# Patient Record
Sex: FEMALE | Race: WHITE | NOT HISPANIC OR LATINO | Employment: OTHER | URBAN - METROPOLITAN AREA
[De-identification: names, ages, dates, MRNs, and addresses within clinical notes are randomized per-mention and may not be internally consistent; named-entity substitution may affect disease eponyms.]

---

## 2017-08-29 DIAGNOSIS — Z96.642 S/P TOTAL HIP ARTHROPLASTY, LEFT: Primary | ICD-10-CM

## 2017-09-19 ENCOUNTER — OFFICE VISIT (OUTPATIENT)
Dept: ORTHOPEDICS | Facility: CLINIC | Age: 67
End: 2017-09-19
Payer: COMMERCIAL

## 2017-09-19 ENCOUNTER — RADIANT APPOINTMENT (OUTPATIENT)
Dept: GENERAL RADIOLOGY | Facility: CLINIC | Age: 67
End: 2017-09-19
Attending: ORTHOPAEDIC SURGERY
Payer: COMMERCIAL

## 2017-09-19 VITALS
HEART RATE: 57 BPM | HEIGHT: 68 IN | WEIGHT: 151 LBS | SYSTOLIC BLOOD PRESSURE: 112 MMHG | BODY MASS INDEX: 22.88 KG/M2 | OXYGEN SATURATION: 99 % | DIASTOLIC BLOOD PRESSURE: 60 MMHG

## 2017-09-19 DIAGNOSIS — Z96.642 S/P TOTAL HIP ARTHROPLASTY, LEFT: ICD-10-CM

## 2017-09-19 DIAGNOSIS — M25.561 PAIN IN BOTH KNEES, UNSPECIFIED CHRONICITY: ICD-10-CM

## 2017-09-19 DIAGNOSIS — M25.562 PAIN IN BOTH KNEES, UNSPECIFIED CHRONICITY: ICD-10-CM

## 2017-09-19 DIAGNOSIS — M25.562 PAIN IN BOTH KNEES, UNSPECIFIED CHRONICITY: Primary | ICD-10-CM

## 2017-09-19 DIAGNOSIS — M25.561 PAIN IN BOTH KNEES, UNSPECIFIED CHRONICITY: Primary | ICD-10-CM

## 2017-09-19 PROCEDURE — 99213 OFFICE O/P EST LOW 20 MIN: CPT | Performed by: ORTHOPAEDIC SURGERY

## 2017-09-19 PROCEDURE — 73502 X-RAY EXAM HIP UNI 2-3 VIEWS: CPT | Performed by: RADIOLOGY

## 2017-09-19 PROCEDURE — 73562 X-RAY EXAM OF KNEE 3: CPT | Mod: LT | Performed by: RADIOLOGY

## 2017-09-19 ASSESSMENT — PAIN SCALES - GENERAL: PAINLEVEL: NO PAIN (0)

## 2017-09-19 NOTE — NURSING NOTE
"Laura Gonzalez's goals for this visit include:   Chief Complaint   Patient presents with     RECHECK     1 year follow up - left hip     Consult     bilateral knee pain       She requests these members of at her care team be copied on today's visit    Initial Vitals: /60 (BP Location: Right arm, Cuff Size: Adult Regular)  Pulse 57  Ht 1.727 m (5' 8\")  Wt 68.5 kg (151 lb)  SpO2 99%  BMI 22.96 kg/m2 Estimated body mass index is 22.96 kg/(m^2) as calculated from the following:    Height as of this encounter: 1.727 m (5' 8\").    Weight as of this encounter: 68.5 kg (151 lb).. BP completed using cuff size :regular  PCP: Rob Jansen    Referring Provider  ESTABLISHED PATIENT  No address on file    Do you need refills at today's visit? SHERIF Vargas CMA    "

## 2017-09-19 NOTE — PROGRESS NOTES
"Chief Complaint: RECHECK (1 year follow up - left hip) and Consult (bilateral knee pain)       HPI: Laura Gonzalez returns today in follow-up for her hip. She reports that she is doing well. She is exercising regularly and is not having any hip problems. She is having some anterior knee symptoms, made worse with doing squats. She is taking no pain medication for this    Returned to work:     Medications and allergies are documented in the EMR and have been reviewed.      Current Outpatient Prescriptions:      valACYclovir (VALTREX) 1000 mg tablet, , Disp: , Rfl:      ESTRING 2 MG vaginal ring, , Disp: , Rfl:      Zolpidem Tartrate (AMBIEN PO), Take 10 mg by mouth nightly as needed for sleep, Disp: , Rfl:      calcium-magnesium (CALMAG) 500-250 MG TABS, Take 2 tablets by mouth daily, Disp: , Rfl:      UNABLE TO FIND, 1 capsule daily MEDICATION NAME: Strontium, Disp: , Rfl:      Atorvastatin Calcium (LIPITOR PO), Take 20 mg by mouth At Bedtime , Disp: , Rfl:      Montelukast Sodium (SINGULAIR PO), Take 10 mg by mouth At Bedtime , Disp: , Rfl:      estradiol (VIVELLE-DOT) 0.1 MG/24HR patch, Place 1 patch onto the skin twice a week, Disp: , Rfl:     Allergies: Codeine camsylate    Physical Exam:  On physical examination the patient appears the stated age, is in no acute distress, affect is appropriate, and breathing is non-labored.    /60 (BP Location: Right arm, Cuff Size: Adult Regular)  Pulse 57  Ht 1.727 m (5' 8\")  Wt 68.5 kg (151 lb)  SpO2 99%  BMI 22.96 kg/m2  Body mass index is 22.96 kg/(m^2).    Gait: normal   ROM:  Both hip and knee ROM and full, fluid, and painless.     Distally, the circulatory, motor, and sensation exam is intact with 5/5 EHL, gastroc-soleus, and tibialis anterior.  Sensation to light touch is intact.  Dorsalis pedis and posterior tibialis pulses are palpable.  There are no sores on the feet, no bruising, and no lymphedema.    X-rays:    I reviewed the x-rays dated today. "  Previous films reviewed.    Findings:  Normal progression for a total hip arthroplasty without evidence of loosening or subsidence.    Knee radiographs are normal       Assessment: doing well total hip. Anterior knee pain, mild, normal radiographs.     Plan: RTC in five years for routine hip radiographs, sooner if issues.     Established Patient

## 2017-09-19 NOTE — MR AVS SNAPSHOT
After Visit Summary   2017    Laura Gonzalez    MRN: [id number]           Patient Information     Date Of Birth          1950        Visit Information        Provider Department      2017 2:40 PM Jeffy Wolff MD [facility]        Today's Diagnoses     Pain in both knees, unspecified chronicity    -  1       Follow-ups after your visit        Who to contact     If you have questions or need follow up information about today's clinic visit or your schedule please contact [facility] directly at [phone].  Normal or non-critical lab and imaging results will be communicated to you by [employer], letter or phone within 4 business days after the clinic has received the results. If you do not hear from us within 7 days, please contact the clinic through [employer] or phone. If you have a critical or abnormal lab result, we will notify you by phone as soon as possible.  Submit refill requests through [email or web address] or call your pharmacy and they will forward the refill request to us. Please allow 3 business days for your refill to be completed.          Additional Information About Your Visit        MyChart Information     [email or web address] is an electronic gateway that provides easy, online access to your medical records. With [email or web address], you can request a clinic appointment, read your test results, renew a prescription or communicate with your care team.     To sign up for [email or web address] visit the website at [email or web address]   You will be asked to enter the access code listed below, as well as some personal information. Please follow the directions to create your username and password.     Your access code is: [email or web address]  Expires: 2017  5:56 PM     Your access code will  in 90 days. If you need help or a new code, please contact your [facility] or call [phone] for assistance.        Care EveryWhere ID     This  "is your Care EveryWhere ID. This could be used by other organizations to access your [facility] medical records  [id number]        Your Vitals Were     Pulse Height Pulse Oximetry BMI (Body Mass Index)          57 1.727 m (5' 8\") 99% 22.96 kg/m2         Blood Pressure from Last 3 Encounters:   09/19/17 112/60   09/20/16 110/66   08/02/16 114/64    Weight from Last 3 Encounters:   09/19/17 68.5 kg (151 lb)   06/15/16 71 kg (156 lb 8.4 oz)   05/24/16 67.6 kg (149 lb)                 Today's Medication Changes          These changes are accurate as of: 9/19/17  5:56 PM.  If you have any questions, ask your nurse or doctor.               Stop taking these medicines if you haven't already. Please contact your care team if you have questions.     Calcium Magnesium 240-180-100 Liqd   Stopped by:  Jeffy Wolff MD           order for DME   Stopped by:  Jeffy Wolff MD           oxyCODONE 5 MG IR tablet   Commonly known as:  ROXICODONE   Stopped by:  Jeffy Wolff MD                    Primary Care Provider Office Phone # Fax #    Hout Inderjitcomb [phone]       [facility] [id number] [facility] [id number]        Equal Access to Services     BELIA [facility] CenterNANETTE AH: Hadii garth ku hadasho Soomaali, waaxda luqadaha, qaybta kaalmada adeegyada, waxdiandra cruz hayramon shaffer . So [facility] [phone].    ATENCIÓN: Si habla español, tiene a little disposición servicios gratuitos de asistencia lingüística. Fartun al [phone].    We comply with applicable federal civil rights laws and [place] laws. We do not discriminate on the basis of race, color, national origin, age, disability sex, sexual orientation or gender identity.            Thank you!     Thank you for choosing [facility]  for your care. Our goal is always to provide you with excellent care. Hearing back from our patients is one way we can continue to improve our services. Please take a few minutes to complete the " written survey that you may receive in the mail after your visit with us. Thank you!             Your Updated Medication List - Protect others around you: Learn how to safely use, store and throw away your medicines at www.disposemymeds.org.          This list is accurate as of: 9/19/17  5:56 PM.  Always use your most recent med list.                   Brand Name Dispense Instructions for use Diagnosis    AMBIEN PO      Take 10 mg by mouth nightly as needed for sleep        calcium-magnesium 500-250 MG Tabs per tablet    CALMAG     Take 2 tablets by mouth daily        ESTRING 2 MG vaginal ring   Generic drug:  estradiol           LIPITOR PO      Take 20 mg by mouth At Bedtime        SINGULAIR PO      Take 10 mg by mouth At Bedtime        UNABLE TO FIND      1 capsule daily MEDICATION NAME: Strontium        valACYclovir 1000 mg tablet    VALTREX          VIVELLE-DOT 0.1 MG/24HR BIW patch   Generic drug:  estradiol      Place 1 patch onto the skin twice a week

## 2018-09-24 ENCOUNTER — APPOINTMENT (OUTPATIENT)
Dept: ULTRASOUND IMAGING | Facility: CLINIC | Age: 68
End: 2018-09-24
Attending: EMERGENCY MEDICINE
Payer: COMMERCIAL

## 2018-09-24 ENCOUNTER — HOSPITAL ENCOUNTER (EMERGENCY)
Facility: CLINIC | Age: 68
Discharge: HOME | End: 2018-09-24
Attending: EMERGENCY MEDICINE | Admitting: EMERGENCY MEDICINE
Payer: COMMERCIAL

## 2018-09-24 VITALS
OXYGEN SATURATION: 97 % | RESPIRATION RATE: 14 BRPM | DIASTOLIC BLOOD PRESSURE: 60 MMHG | TEMPERATURE: 98.2 F | BODY MASS INDEX: 22.58 KG/M2 | WEIGHT: 149 LBS | HEIGHT: 68 IN | SYSTOLIC BLOOD PRESSURE: 125 MMHG | HEART RATE: 52 BPM

## 2018-09-24 DIAGNOSIS — R10.11 RUQ ABDOMINAL PAIN: ICD-10-CM

## 2018-09-24 LAB
ALBUMIN SERPL-MCNC: 3.5 G/DL (ref 3.4–5)
ALBUMIN UR-MCNC: NEGATIVE MG/DL
ALP SERPL-CCNC: 68 U/L (ref 40–150)
ALT SERPL W P-5'-P-CCNC: 32 U/L (ref 0–50)
ANION GAP SERPL CALCULATED.3IONS-SCNC: 6 MMOL/L (ref 3–14)
APPEARANCE UR: CLEAR
AST SERPL W P-5'-P-CCNC: 27 U/L (ref 0–45)
BACTERIA #/AREA URNS HPF: ABNORMAL /HPF
BILIRUB SERPL-MCNC: 0.3 MG/DL (ref 0.2–1.3)
BILIRUB UR QL STRIP: NEGATIVE
BUN SERPL-MCNC: 25 MG/DL (ref 7–30)
CALCIUM SERPL-MCNC: 8.4 MG/DL (ref 8.5–10.1)
CHLORIDE SERPL-SCNC: 105 MMOL/L (ref 94–109)
CO2 SERPL-SCNC: 27 MMOL/L (ref 20–32)
COLOR UR AUTO: ABNORMAL
CREAT SERPL-MCNC: 0.86 MG/DL (ref 0.52–1.04)
D DIMER PPP FEU-MCNC: 0.4 UG/ML FEU (ref 0–0.5)
ERYTHROCYTE [DISTWIDTH] IN BLOOD BY AUTOMATED COUNT: 13.4 % (ref 10–15)
GFR SERPL CREATININE-BSD FRML MDRD: 65 ML/MIN/1.7M2
GLUCOSE SERPL-MCNC: 77 MG/DL (ref 70–99)
GLUCOSE UR STRIP-MCNC: NEGATIVE MG/DL
HCT VFR BLD AUTO: 40.7 % (ref 35–47)
HGB BLD-MCNC: 13.8 G/DL (ref 11.7–15.7)
HGB UR QL STRIP: NEGATIVE
INTERPRETATION ECG - MUSE: NORMAL
KETONES UR STRIP-MCNC: NEGATIVE MG/DL
LEUKOCYTE ESTERASE UR QL STRIP: NEGATIVE
LIPASE SERPL-CCNC: 138 U/L (ref 73–393)
MCH RBC QN AUTO: 31.6 PG (ref 26.5–33)
MCHC RBC AUTO-ENTMCNC: 33.9 G/DL (ref 31.5–36.5)
MCV RBC AUTO: 93 FL (ref 78–100)
NITRATE UR QL: NEGATIVE
PH UR STRIP: 6.5 PH (ref 5–7)
PLATELET # BLD AUTO: 262 10E9/L (ref 150–450)
POTASSIUM SERPL-SCNC: 3.8 MMOL/L (ref 3.4–5.3)
PROT SERPL-MCNC: 7.1 G/DL (ref 6.8–8.8)
RBC # BLD AUTO: 4.37 10E12/L (ref 3.8–5.2)
RBC #/AREA URNS AUTO: <1 /HPF (ref 0–2)
SODIUM SERPL-SCNC: 138 MMOL/L (ref 133–144)
SOURCE: ABNORMAL
SP GR UR STRIP: 1 (ref 1–1.03)
TROPONIN I SERPL-MCNC: <0.015 UG/L (ref 0–0.04)
UROBILINOGEN UR STRIP-MCNC: NORMAL MG/DL (ref 0–2)
WBC # BLD AUTO: 6.4 10E9/L (ref 4–11)
WBC #/AREA URNS AUTO: 2 /HPF (ref 0–5)

## 2018-09-24 PROCEDURE — 25000125 ZZHC RX 250: Performed by: EMERGENCY MEDICINE

## 2018-09-24 PROCEDURE — 99285 EMERGENCY DEPT VISIT HI MDM: CPT | Mod: 25

## 2018-09-24 PROCEDURE — 83690 ASSAY OF LIPASE: CPT | Performed by: EMERGENCY MEDICINE

## 2018-09-24 PROCEDURE — 85379 FIBRIN DEGRADATION QUANT: CPT | Performed by: EMERGENCY MEDICINE

## 2018-09-24 PROCEDURE — 96361 HYDRATE IV INFUSION ADD-ON: CPT

## 2018-09-24 PROCEDURE — 96374 THER/PROPH/DIAG INJ IV PUSH: CPT

## 2018-09-24 PROCEDURE — 81001 URINALYSIS AUTO W/SCOPE: CPT | Performed by: EMERGENCY MEDICINE

## 2018-09-24 PROCEDURE — 84484 ASSAY OF TROPONIN QUANT: CPT | Performed by: EMERGENCY MEDICINE

## 2018-09-24 PROCEDURE — 25000132 ZZH RX MED GY IP 250 OP 250 PS 637: Performed by: EMERGENCY MEDICINE

## 2018-09-24 PROCEDURE — 80053 COMPREHEN METABOLIC PANEL: CPT | Performed by: EMERGENCY MEDICINE

## 2018-09-24 PROCEDURE — 85027 COMPLETE CBC AUTOMATED: CPT | Performed by: EMERGENCY MEDICINE

## 2018-09-24 PROCEDURE — 93005 ELECTROCARDIOGRAM TRACING: CPT

## 2018-09-24 PROCEDURE — 25000128 H RX IP 250 OP 636: Performed by: EMERGENCY MEDICINE

## 2018-09-24 PROCEDURE — 76705 ECHO EXAM OF ABDOMEN: CPT

## 2018-09-24 RX ORDER — EZETIMIBE 10 MG/1
10 TABLET ORAL AT BEDTIME
COMMUNITY

## 2018-09-24 RX ORDER — OXYCODONE HYDROCHLORIDE 5 MG/1
5 TABLET ORAL EVERY 4 HOURS PRN
Qty: 6 TABLET | Refills: 0 | Status: SHIPPED | OUTPATIENT
Start: 2018-09-24 | End: 2021-08-27

## 2018-09-24 RX ORDER — ACETAMINOPHEN 325 MG/1
650 TABLET ORAL ONCE
Status: COMPLETED | OUTPATIENT
Start: 2018-09-24 | End: 2018-09-24

## 2018-09-24 RX ORDER — HYDROMORPHONE HYDROCHLORIDE 1 MG/ML
0.5 INJECTION, SOLUTION INTRAMUSCULAR; INTRAVENOUS; SUBCUTANEOUS ONCE
Status: COMPLETED | OUTPATIENT
Start: 2018-09-24 | End: 2018-09-24

## 2018-09-24 RX ADMIN — SODIUM CHLORIDE 1000 ML: 9 INJECTION, SOLUTION INTRAVENOUS at 21:54

## 2018-09-24 RX ADMIN — Medication 0.5 MG: at 23:10

## 2018-09-24 RX ADMIN — ACETAMINOPHEN 650 MG: 325 TABLET, FILM COATED ORAL at 23:09

## 2018-09-24 RX ADMIN — LIDOCAINE HYDROCHLORIDE 30 ML: 20 SOLUTION ORAL; TOPICAL at 21:52

## 2018-09-24 ASSESSMENT — ENCOUNTER SYMPTOMS
ABDOMINAL PAIN: 1
NAUSEA: 0
BLOOD IN STOOL: 0
DYSURIA: 0
HEMATURIA: 0
CONSTIPATION: 0
VOMITING: 0
FREQUENCY: 0
SHORTNESS OF BREATH: 0
DIARRHEA: 0
FEVER: 0
CHILLS: 0
WOUND: 0

## 2018-09-24 NOTE — ED AVS SNAPSHOT
Emergency Department    [address] [facility] [address]    Phone:  [phone]    Fax:  [phone]                                       Laura Gonzalez   MRN: [id number]    Department:   Emergency Department   Date of Visit:  9/24/2018           After Visit Summary Signature Page     I have received my discharge instructions, and my questions have been answered. I have discussed any challenges I see with this plan with the nurse or doctor.    ..........................................................................................................................................  Patient/Patient Representative Signature      ..........................................................................................................................................  Patient Representative Print Name and Relationship to Patient    ..................................................               ................................................  Date                                   Time    ..........................................................................................................................................  Reviewed by Signature/Title    ...................................................              ..............................................  Date                                               Time          22EPIC Rev 08/18

## 2018-09-24 NOTE — ED AVS SNAPSHOT
Emergency Department    [address] [facility] [address]    Phone:  [phone]    Fax:  [phone]                                       Laura Gonzalez   MRN: [id number]    Department:   Emergency Department   Date of Visit:  9/24/2018           Patient Information     Date Of Birth          1950        Your diagnoses for this visit were:     RUQ abdominal pain        You were seen by Janet Brooks MD.      Follow-up Information     Follow up with Rob Jansen.    Specialty:  Internal Medicine    Why:  tomorrow for close follow-up    Contact information:    [facility]  [address] [place] [address]  [phone]          Follow up with Zachary Singletary MD.    Specialty:  Gastroenterology    Why:  or your GI doctor to schedule Endoscopy procedure to look in your stomach    Contact information:    [facility]  [address], [place] [address] [phone]          Follow up with  Emergency Department.    Specialty:  EMERGENCY MEDICINE    Why:  As needed if pain worsens or if you develop chest pain, trouble breathing, fever, vomiting or rash    Contact information:    [address] [facility] [address] [phone]        Discharge Instructions       Take Tylenol 650 mg every 4-6 hours as needed for pain.    Avoid Advil, Ibuprofen, aleve and other non-steroidal anti-inflammatory medications.    Stop the Ezetimibe medication and call your doctor tomorrow to discuss your new pain.    Opioid Medication Information    You have been given a prescription for an opioid (narcotic) pain medicine and/or have received a pain medicine while here in the Emergency Department. These medicines can make you drowsy or impaired. You must not drive, operate dangerous equipment, or engage in any other dangerous activities while taking these medications. If you drive while taking these medications, you could be arrested for DUI, or driving  under the influence. Do not drink any alcohol while you are taking these medications.   Opioid pain medications can cause addiction. If you have a history of chemical dependency of any type, you are at a higher risk of becoming addicted to pain medications.  Only take these prescribed medications to treat your pain when all other options have been tried. Take it for as short a time and as few doses as possible. Store your pain pills in a secure place, as they are frequently stolen and provide a dangerous opportunity for children or visitors in your house to start abusing these powerful medications. We will not replace any lost or stolen medicine.  As soon as your pain is better, you should flush all your remaining medication.   Many prescription pain medications contain Tylenol  (acetaminophen), including Vicodin , Tylenol #3 , Norco , Lortab , and Percocet .  You should not take any extra pills of Tylenol  if you are using these prescription medications or you can get very sick.  Do not ever take more than 4000 mg of acetaminophen in any 24 hour period.  All opioids tend to cause constipation. Drink plenty of water and eat foods that have a lot of fiber, such as fruits, vegetables, prune juice, apple juice and high fiber cereal.  Take a laxative if you don t move your bowels at least every other day. Miralax , Milk of Magnesia, Colace , or Senna  can be used to keep you regular.            Discharge References/Attachments     ABDOMINAL PAIN, ADULT (ENGLISH)    FLANK PAIN, UNCERTAIN CAUSE (ENGLISH)      24 Hour Appointment Hotline       To make an appointment at any [facility], call [phone] ([phone]). If you don't have a family doctor or clinic, we will help you find one. [place] clinics are conveniently located to serve the needs of you and your family.             Review of your medicines      START taking        Dose / Directions Last dose taken    omeprazole 20 MG CR capsule   Commonly known  as:  priLOSEC   Dose:  40 mg   Quantity:  28 capsule        Take 2 capsules (40 mg) by mouth daily for 14 days   Refills:  0        oxyCODONE IR 5 MG tablet   Commonly known as:  ROXICODONE   Dose:  5 mg   Quantity:  6 tablet        Take 1 tablet (5 mg) by mouth every 4 hours as needed for pain No driving a car or drinking alcohol for 8 hours after taking this medication.   Refills:  0          Our records show that you are taking the medicines listed below. If these are incorrect, please call your family doctor or clinic.        Dose / Directions Last dose taken    AMBIEN PO   Dose:  10 mg        Take 10 mg by mouth nightly as needed for sleep   Refills:  0        calcium-magnesium 500-250 MG Tabs per tablet   Commonly known as:  CALMAG   Dose:  2 tablet   Indication:  with Vitamin C, Vitamin D, Vitamin K capsule        Take 2 tablets by mouth daily   Refills:  0        ESTRING 2 MG vaginal ring   Generic drug:  estradiol        Refills:  0        EZETIMIBE PO   Dose:  10 mg        Take 10 mg by mouth At Bedtime   Refills:  0        LIPITOR PO   Dose:  20 mg        Take 20 mg by mouth At Bedtime   Refills:  0        SINGULAIR PO   Dose:  10 mg        Take 10 mg by mouth At Bedtime   Refills:  0        UNABLE TO FIND   Dose:  1 capsule        1 capsule daily MEDICATION NAME: Strontium   Refills:  0        valACYclovir 1000 mg tablet   Commonly known as:  VALTREX        Refills:  0        VIVELLE-DOT 0.1 MG/24HR BIW patch   Dose:  1 patch   Generic drug:  estradiol        Place 1 patch onto the skin twice a week   Refills:  0                Information about OPIOIDS     PRESCRIPTION OPIOIDS: WHAT YOU NEED TO KNOW   We gave you an opioid (narcotic) pain medicine. It is important to manage your pain, but opioids are not always the best choice. You should first try all the other options your care team gave you. Take this medicine for as short a time (and as few doses) as possible.    Some activities can increase your  pain, such as bandage changes or therapy sessions. It may help to take your pain medicine 30 to 60 minutes before these activities. Reduce your stress by getting enough sleep, working on hobbies you enjoy and practicing relaxation or meditation. Talk to your care team about ways to manage your pain beyond prescription opioids.    These medicines have risks:    DO NOT drive when on new or higher doses of pain medicine. These medicines can affect your alertness and reaction times, and you could be arrested for driving under the influence (DUI). If you need to use opioids long-term, talk to your care team about driving.    DO NOT operate heavy machinery    DO NOT do any other dangerous activities while taking these medicines.    DO NOT drink any alcohol while taking these medicines.     If the opioid prescribed includes acetaminophen, DO NOT take with any other medicines that contain acetaminophen. Read all labels carefully. Look for the word  acetaminophen  or  Tylenol.  Ask your pharmacist if you have questions or are unsure.    You can get addicted to pain medicines, especially if you have a history of addiction (chemical, alcohol or substance dependence). Talk to your care team about ways to reduce this risk.    All opioids tend to cause constipation. Drink plenty of water and eat foods that have a lot of fiber, such as fruits, vegetables, prune juice, apple juice and high-fiber cereal. Take a laxative (Miralax, milk of magnesia, Colace, Senna) if you don t move your bowels at least every other day. Other side effects include upset stomach, sleepiness, dizziness, throwing up, tolerance (needing more of the medicine to have the same effect), physical dependence and slowed breathing.    Store your pills in a secure place, locked if possible. We will not replace any lost or stolen medicine. If you don t finish your medicine, please throw away (dispose) as directed by your pharmacist. The [place] [employer] has more information about safe disposal: https://www.[facility].us/living-green/managing-unwanted-medications        Prescriptions were sent or printed at these locations (2 Prescriptions)                   Other Prescriptions                Printed at Department/Unit printer (2 of 2)         omeprazole (PRILOSEC) 20 MG CR capsule               oxyCODONE IR (ROXICODONE) 5 MG tablet                Procedures and tests performed during your visit     Abdomen US, limited (RUQ only)    CBC (+ platelets, no diff)    Comprehensive metabolic panel    D dimer quantitative    EKG 12 lead    Lipase    Troponin I (now)    UA with Microscopic reflex to Culture      Orders Needing Specimen Collection     None      Pending Results     No orders found from 9/22/2018 to 9/25/2018.            Pending Culture Results     No orders found from 9/22/2018 to 9/25/2018.            Pending Results Instructions     If you had any lab results that were not finalized at the time of your Discharge, you can call the ED Lab Result RN at [phone]. You will be contacted by this team for any positive Lab results or changes in treatment. The nurses are available 7 days a week from 10A to 6:30P.  You can leave a message 24 hours per day and they will return your call.        Test Results From Your Hospital Stay        9/24/2018  9:48 PM      Component Results     Component Value Ref Range & Units Status    WBC 6.4 4.0 - 11.0 10e9/L Final    RBC Count 4.37 3.8 - 5.2 10e12/L Final    Hemoglobin 13.8 11.7 - 15.7 g/dL Final    Hematocrit 40.7 35.0 - 47.0 % Final    MCV 93 78 - 100 fl Final    MCH 31.6 26.5 - 33.0 pg Final    MCHC 33.9 31.5 - 36.5 g/dL Final    RDW 13.4 10.0 - 15.0 % Final    Platelet Count 262 150 - 450 10e9/L Final         9/24/2018 10:05 PM      Component Results     Component Value Ref Range & Units Status    D Dimer 0.4 0.0 - 0.50 ug/ml FEU Final    This D-dimer assay is intended for use in conjunction with a clinical  pretest   probability assessment model to exclude pulmonary embolism (PE) and deep   venous thrombosis (DVT) in outpatients suspected of PE or DVT. The cut-off   value is 0.5 ug/mL FEU.           9/24/2018 10:11 PM      Component Results     Component Value Ref Range & Units Status    Troponin I ES <0.015 0.000 - 0.045 ug/L Final    The 99th percentile for upper reference range is 0.045 ug/L.  Troponin values   in the range of 0.045 - 0.120 ug/L may be associated with risks of adverse   clinical events.           9/24/2018 10:11 PM      Component Results     Component Value Ref Range & Units Status    Sodium 138 133 - 144 mmol/L Final    Potassium 3.8 3.4 - 5.3 mmol/L Final    Chloride 105 94 - 109 mmol/L Final    Carbon Dioxide 27 20 - 32 mmol/L Final    Anion Gap 6 3 - 14 mmol/L Final    Glucose 77 70 - 99 mg/dL Final    Urea Nitrogen 25 7 - 30 mg/dL Final    Creatinine 0.86 0.52 - 1.04 mg/dL Final    GFR Estimate 65 >60 mL/min/1.7m2 Final    Non  GFR Calc    GFR Estimate If Black 79 >60 mL/min/1.7m2 Final    African American GFR Calc    Calcium 8.4 (L) 8.5 - 10.1 mg/dL Final    Bilirubin Total 0.3 0.2 - 1.3 mg/dL Final    Albumin 3.5 3.4 - 5.0 g/dL Final    Protein Total 7.1 6.8 - 8.8 g/dL Final    Alkaline Phosphatase 68 40 - 150 U/L Final    ALT 32 0 - 50 U/L Final    AST 27 0 - 45 U/L Final         9/24/2018 10:06 PM      Component Results     Component Value Ref Range & Units Status    Lipase 138 73 - 393 U/L Final         9/24/2018 10:30 PM      Narrative     RIGHT UPPER QUADRANT ULTRASOUND 9/24/2018 10:16 PM    HISTORY: Check for cholecystitis or hepatitis. Right upper quadrant  pain.     COMPARISON: None.    FINDINGS:   Gallbladder: Normal with no cholelithiasis, wall thickening or focal  tenderness.     Bile ducts: CHD is normal diameter. No intrahepatic biliary  dilatation.    Liver: Normal.     Pancreas: Normal.     Right kidney: Normal.         Impression     IMPRESSION: Normal right  upper quadrant ultrasound.     ALIYA KABA MD         9/24/2018  9:53 PM      Component Results     Component Value Ref Range & Units Status    Color Urine Straw  Final    Appearance Urine Clear  Final    Glucose Urine Negative NEG^Negative mg/dL Final    Bilirubin Urine Negative NEG^Negative Final    Ketones Urine Negative NEG^Negative mg/dL Final    Specific Gravity Urine 1.003 1.003 - 1.035 Final    Blood Urine Negative NEG^Negative Final    pH Urine 6.5 5.0 - 7.0 pH Final    Protein Albumin Urine Negative NEG^Negative mg/dL Final    Urobilinogen mg/dL Normal 0.0 - 2.0 mg/dL Final    Nitrite Urine Negative NEG^Negative Final    Leukocyte Esterase Urine Negative NEG^Negative Final    Source Midstream Urine  Final    WBC Urine 2 0 - 5 /HPF Final    RBC Urine <1 0 - 2 /HPF Final    Bacteria Urine Few (A) NEG^Negative /HPF Final                Clinical Quality Measure: Blood Pressure Screening     Your blood pressure was checked while you were in the emergency department today. The last reading we obtained was  BP: 112/89 . Please read the guidelines below about what these numbers mean and what you should do about them.  If your systolic blood pressure (the top number) is less than 120 and your diastolic blood pressure (the bottom number) is less than 80, then your blood pressure is normal. There is nothing more that you need to do about it.  If your systolic blood pressure (the top number) is 120-139 or your diastolic blood pressure (the bottom number) is 80-89, your blood pressure may be higher than it should be. You should have your blood pressure rechecked within a year by a primary care provider.  If your systolic blood pressure (the top number) is 140 or greater or your diastolic blood pressure (the bottom number) is 90 or greater, you may have high blood pressure. High blood pressure is treatable, but if left untreated over time it can put you at risk for heart attack, stroke, or kidney failure. You should  "have your blood pressure rechecked by a primary care provider within the next 4 weeks.  If your provider in the emergency department today gave you specific instructions to follow-up with your doctor or provider even sooner than that, you should follow that instruction and not wait for up to 4 weeks for your follow-up visit.        Thank you for choosing [facility]       Thank you for choosing [facility] for your care. Our goal is always to provide you with excellent care. Hearing back from our patients is one way we can continue to improve our services. Please take a few minutes to complete the written survey that you may receive in the mail after you visit with us. Thank you!        [employer] Information     [employer] lets you send messages to your doctor, view your test results, renew your prescriptions, schedule appointments and more. To sign up, go to [email or web address] . Click on \"Log in\" on the left side of the screen, which will take you to the Welcome page. Then click on \"Sign up Now\" on the right side of the page.     You will be asked to enter the access code listed below, as well as some personal information. Please follow the directions to create your username and password.     Your access code is: [email or web address]  Expires: 2018 11:14 PM     Your access code will  in 90 days. If you need help or a new code, please call your [facility] clinic or [phone].        Care EveryWhere ID     This is your Care EveryWhere ID. This could be used by other organizations to access your [facility] medical records  [id number]        Equal Access to Services     BELIA BELL : Hadii garth ku hadasho Soomaali, waaxda luqadaha, qaybta kaalmada adeegyada, roel shaffer . So [facility] [phone].    ATENCIÓN: Si habla español, tiene a little disposición servicios gratuitos de asistencia lingüística. Llame al [phone].    We comply with applicable federal civil rights laws and [place] laws. We " do not discriminate on the basis of race, color, national origin, age, disability, sex, sexual orientation, or gender identity.            After Visit Summary       This is your record. Keep this with you and show to your community pharmacist(s) and doctor(s) at your next visit.

## 2018-09-25 NOTE — DISCHARGE INSTRUCTIONS
Take Tylenol 650 mg every 4-6 hours as needed for pain.    Avoid Advil, Ibuprofen, aleve and other non-steroidal anti-inflammatory medications.    Stop the Ezetimibe medication and call your doctor tomorrow to discuss your new pain.    Opioid Medication Information    You have been given a prescription for an opioid (narcotic) pain medicine and/or have received a pain medicine while here in the Emergency Department. These medicines can make you drowsy or impaired. You must not drive, operate dangerous equipment, or engage in any other dangerous activities while taking these medications. If you drive while taking these medications, you could be arrested for DUI, or driving under the influence. Do not drink any alcohol while you are taking these medications.   Opioid pain medications can cause addiction. If you have a history of chemical dependency of any type, you are at a higher risk of becoming addicted to pain medications.  Only take these prescribed medications to treat your pain when all other options have been tried. Take it for as short a time and as few doses as possible. Store your pain pills in a secure place, as they are frequently stolen and provide a dangerous opportunity for children or visitors in your house to start abusing these powerful medications. We will not replace any lost or stolen medicine.  As soon as your pain is better, you should flush all your remaining medication.   Many prescription pain medications contain Tylenol  (acetaminophen), including Vicodin , Tylenol #3 , Norco , Lortab , and Percocet .  You should not take any extra pills of Tylenol  if you are using these prescription medications or you can get very sick.  Do not ever take more than 4000 mg of acetaminophen in any 24 hour period.  All opioids tend to cause constipation. Drink plenty of water and eat foods that have a lot of fiber, such as fruits, vegetables, prune juice, apple juice and high fiber cereal.  Take a laxative  if you don t move your bowels at least every other day. Miralax , Milk of Magnesia, Colace , or Senna  can be used to keep you regular.

## 2018-09-25 NOTE — ED PROVIDER NOTES
History     Chief Complaint:  Abdominal Pain    HPI   Laura Gonzalez is a 67 year old female with a history of coronary artery disease and hyperlipidemia who presents with upper abdominal pain. This morning, the patient reports she woke up with bilateral upper abdominal pain, more pronounced on the right side. Throughout the rest of the day, the patient states the pain persisted, so she came to the ED for further evaluation this evening. She describes the pain as a burning, inflammatory-like sensation. The patient reports some radiation into the middle of her back. She also endorses some increased dizziness since yesterday, but denies any nausea, vomiting, chest pain, shortness of breath, urinary symptoms, constipation, diarrhea, blood in her stool, trauma or injury, unusual food last night, or other acute symptoms. Of note, the patient recently started Ezetimibe about 8 days ago after being evaluated at [facility] by Dr. Anselmo Payne after she was found to have a significant amount of atherosclerosis.    Allergies:  Codeine    Medications:    Lipitor  Ezetimibe 10 mg  Estradiol patch  Estring vaginal ring  Singulair  Valtrex  Ambien  Aspirin 81 mg    Past Medical History:    Asthma  Coronary artery disease  Hyperlipidemia  She denies any history of gallbladder problems.    Past Surgical History:    Appendectomy  Left arthroplasty hip  Foot surgery  Tooth extraction  Hysterectomy    Family History:    History reviewed. No pertinent family history.     Social History:  Smoking status: Former smoker, quit 10/3/1988  Alcohol use: No  PCP: MIQUEL ANDERS  Presents to the ED with her spouse  Marital Status:  [2]     Review of Systems   Constitutional: Negative for chills and fever.   Respiratory: Negative for shortness of breath.    Cardiovascular: Negative for chest pain.   Gastrointestinal: Positive for abdominal pain. Negative for blood in stool, constipation, diarrhea, nausea and vomiting.  "  Genitourinary: Negative for dysuria, frequency, hematuria and urgency.   Skin: Negative for wound.   All other systems reviewed and are negative.    Physical Exam   Patient Vitals for the past 24 hrs:   BP Temp Temp src Pulse Heart Rate Resp SpO2 Height Weight   09/24/18 2313 - - - - 52 14 97 % - -   09/24/18 2311 125/60 - - - - - - - -   09/24/18 2310 - - - - 54 17 - - -   09/24/18 2233 - - - - 50 19 - - -   09/24/18 2148 - - - - - - 100 % - -   09/24/18 2121 112/89 - - - 51 11 98 % - -   09/24/18 2042 147/65 98.2  F (36.8  C) Temporal 52 - 18 98 % 1.727 m (5' 8\") 67.6 kg (149 lb)     Physical Exam  Nursing note and vitals reviewed.  Constitutional:  Appears well-developed and well-nourished.   HENT:   Head:    Atraumatic.   Mouth/Throat:   Oropharynx is clear and moist. No oropharyngeal exudate.   Eyes:    Pupils are equal, round, and reactive to light.   Neck:    Normal range of motion. Neck supple.      No tracheal deviation present. No thyromegaly present.   Cardiovascular:  Normal rate, regular rhythm, no murmur   Pulmonary/Chest: Breath sounds are clear and equal without wheezes or crackles.  Abdominal:   Soft. Bowel sounds are normal. Exhibits no distension and      no mass. RUQ tenderness with mild guarding, but no rebound.  Musculoskeletal:  Exhibits no edema.   Lymphadenopathy:  No cervical adenopathy.   Neurological:   Alert and oriented to person, place, and time.   Skin:    Skin is warm and dry. No rash noted. No pallor.     Emergency Department Course   ECG (20:44:13):  Rate 49 bpm. NJ interval 148. QRS duration 84. QT/QTc 448/404. P-R-T axes 69 65 70. Sinus bradycardia. Otherwise normal ECG. Interpreted at 2058 by Janet Brooks MD.    Imaging:  Radiographic findings were communicated with the patient who voiced understanding of the findings.    Abdomen US, limited (RUQ only):  Normal right upper quadrant ultrasound.   As read by Radiology.    Laboratory:  CBC: WNL (WBC 6.4, HGB 13.8, PLT " 262)  CMP: Calcium 8.4 (L), o/w WNL (Creatinine 0.86)  Lipase: 138  Troponin: <0.015  D-dimer: 0.4  UA: Few bacteria, o/w negative    Interventions:  2152: GI Cocktail - Maalox 15 mL, Viscous Lidocaine 15 mL, 30 mL suspension PO  2154: NS 1L IV Bolus  2309: 650 mg Tylenol PO  2310: 0.5 mg Dilaudid IV    Emergency Department Course:  Past medical records, nursing notes, and vitals reviewed.  2111: I performed an exam of the patient and obtained history, as documented above.  ECG performed, results above.  IV inserted and blood drawn.  The patient was sent for a RUQ abdominal ultrasound while in the emergency department, findings above.    2249: I rechecked the patient. Explained findings to the patient and her spouse. We discussed performing a CT scan, but the patient refused at this time.    I rechecked the patient. Findings and plan explained to the patient. Patient discharged home with instructions regarding supportive care, medications, and reasons to return. The importance of close follow-up was reviewed.     Impression & Plan    Medical Decision Making:  Laura Gonzalez is a 67 year old female who presents to the ED for evaluation of abdominal pain. This patient has right upper quadrant abdominal pain of unclear etiology. Initially, I considered the possibility of cholecystitis, but her gallbladder ultrasound is normal. I considered the possibility of hepatitis from the statin medication she is on, but her liver enzymes are normal. There was no sign of cardiac problem or pulmonary embolism either, and no signs of pyelonephritis since her urine is clear. She has not been having any cough or cold symptoms to suggest pneumonia, and her white blood cell count is normal. She has not had a fever recently. I considered the possibility of shingles in the differential as well, so she was told to return if she develops a rash, which she did not have at this time. The patient's  had persistent concerns  about the cause of her pain, as did I, so I suggested performing a CT scan to further evaluate for possible causes, such as a SBO, diverticulitis, colitis, etc. However, the patient refused a CT scan because she states she did not feel that it would show anything. I discussed with her that since her pain just started today, it would be reasonable to decline the CT scan. She was told she could return at any time if her pain worsens or if she develops any other symptoms such as fever, chest pain, vomiting, trouble breathing, or other concerning symptoms. The patient has currently been denying any shortness of breath during her examination today. I discussed with her that she needs close follow-up with her primary care physician tomorrow. She should return with worsening pain since we did not find a clear cause today. I considered gastritis or peptic ulcer in the differential, so she was placed on Omeprazole and told to avoid Nsaid medications; she can take Tylenol as needed. She will call her Dr. Payne to discuss possible medication side effects as the cause of her pain since she started Ezetimibe recently. I felt she could be safely discharged to home. I gave her a script for Oxycodone, and told her not to drink alcohol or drive a car for at least 8 hours after taking this medication. The patient voiced understanding and was agreeable to the plan, and was discharged home in stable condition.    Diagnosis:    ICD-10-CM   1. RUQ abdominal pain R10.11     Disposition: Discharged to home    Discharge Medications:  New Prescriptions    OMEPRAZOLE (PRILOSEC) 20 MG CR CAPSULE    Take 2 capsules (40 mg) by mouth daily for 14 days    OXYCODONE IR (ROXICODONE) 5 MG TABLET    Take 1 tablet (5 mg) by mouth every 4 hours as needed for pain No driving a car or drinking alcohol for 8 hours after taking this medication.     Velma Driver  9/24/2018    EMERGENCY DEPARTMENT    I, Velma Driver, am serving as a scribe at 9:11 PM on  9/24/2018 to document services personally performed by Janet Brooks MD based on my observations and the provider's statements to me.     Janet Brooks MD  09/25/18 0113       Janet Brooks MD  09/25/18 0114

## 2020-06-02 ENCOUNTER — OFFICE VISIT (OUTPATIENT)
Dept: PLASTIC SURGERY | Facility: CLINIC | Age: 70
End: 2020-06-02

## 2020-06-02 DIAGNOSIS — Z41.1 ELECTIVE PROCEDURE FOR UNACCEPTABLE COSMETIC APPEARANCE: Primary | ICD-10-CM

## 2020-06-02 NOTE — LETTER
June 2, 2020  Re: Laura Bret Gonzalez  1950    Dear Dr. Amador,    Thank you so much for referring Laura Bret Gonzalez to the [facility]. I had the pleasure of visiting with Laura today.     Attached you will find a copy of my note. Please feel free to reach out to me with any questions, [phone].     Facial Plastic and Reconstructive Surgery    Procedure: Chemodenervation with Botulinum Toxin A  Indication: Undesirable Dynamic Rhytids  Injector: Shanda Harley MD      Informed consent was obtained.  The skin was cleaned with antimicrobial solution and a topical ice was placed.     The patient was asked to systematically engage the muscles in the area to be injected. The tuberculin needles were used for subdermal injection and hemostasis was obtained with light digital pressure when needed. The skin was cleaned.     A total of 30 units were injected.  Botulinum toxin A type: Botox    Please see procedure log.    The patient tolerated the procedure well and there were no complications. Post procedure care instructions were given to the patient.    She is also considering Belotero for perioral rhytids, and possible blepharoplasty              Your trust in our practice and care is much appreciated.    Sincerely,  Shanda Harley MD

## 2020-06-02 NOTE — PROGRESS NOTES
Facial Plastic and Reconstructive Surgery    Procedure: Chemodenervation with Botulinum Toxin A  Indication: Undesirable Dynamic Rhytids  Injector: Shanda Harley MD      Informed consent was obtained.  The skin was cleaned with antimicrobial solution and a topical ice was placed.     The patient was asked to systematically engage the muscles in the area to be injected. The tuberculin needles were used for subdermal injection and hemostasis was obtained with light digital pressure when needed. The skin was cleaned.     A total of 30 units were injected.  Botulinum toxin A type: Botox    Please see procedure log.    The patient tolerated the procedure well and there were no complications. Post procedure care instructions were given to the patient.      She is also considering Belotero for perioral rhytids, and possible blepharoplasty

## 2020-06-02 NOTE — LETTER
6/2/2020       RE: Laura Gonzalez  [address]  [place] [address]     Dear Colleague,    Thank you for referring your patient, Laura Gonzalez, to the [facility] at [facility]. Please see a copy of my visit note below.    Facial Plastic and Reconstructive Surgery    Procedure: Chemodenervation with Botulinum Toxin A  Indication: Undesirable Dynamic Rhytids  Injector: Shanda Harley MD    Informed consent was obtained.  The skin was cleaned with antimicrobial solution and a topical ice was placed.     The patient was asked to systematically engage the muscles in the area to be injected. The tuberculin needles were used for subdermal injection and hemostasis was obtained with light digital pressure when needed. The skin was cleaned.     A total of 30 units were injected.  Botulinum toxin A type: Botox    Please see procedure log.    The patient tolerated the procedure well and there were no complications. Post procedure care instructions were given to the patient.    She is also considering Belotero for perioral rhytids, and possible blepharoplasty    Again, thank you for allowing me to participate in the care of your patient.      Sincerely,    Shanda Harley MD

## 2020-12-03 ENCOUNTER — APPOINTMENT (OUTPATIENT)
Dept: URBAN - METROPOLITAN AREA CLINIC 256 | Age: 70
Setting detail: DERMATOLOGY
End: 2020-12-03

## 2020-12-03 VITALS — HEIGHT: 67 IN | WEIGHT: 149 LBS | RESPIRATION RATE: 15 BRPM

## 2020-12-03 DIAGNOSIS — H01.13 ECZEMATOUS DERMATITIS OF EYELID: ICD-10-CM

## 2020-12-03 PROBLEM — H01.135 ECZEMATOUS DERMATITIS OF LEFT LOWER EYELID: Status: ACTIVE | Noted: 2020-12-03

## 2020-12-03 PROBLEM — H01.134 ECZEMATOUS DERMATITIS OF LEFT UPPER EYELID: Status: ACTIVE | Noted: 2020-12-03

## 2020-12-03 PROBLEM — H01.131 ECZEMATOUS DERMATITIS OF RIGHT UPPER EYELID: Status: ACTIVE | Noted: 2020-12-03

## 2020-12-03 PROBLEM — H01.132 ECZEMATOUS DERMATITIS OF RIGHT LOWER EYELID: Status: ACTIVE | Noted: 2020-12-03

## 2020-12-03 PROCEDURE — 99202 OFFICE O/P NEW SF 15 MIN: CPT

## 2020-12-03 PROCEDURE — OTHER COUNSELING: OTHER

## 2020-12-03 PROCEDURE — OTHER PRESCRIPTION: OTHER

## 2020-12-03 RX ORDER — TACROLIMUS 1 MG/G
0.1% OINTMENT TOPICAL BID
Qty: 1 | Refills: 3 | Status: ERX | COMMUNITY
Start: 2020-12-03

## 2020-12-03 ASSESSMENT — LOCATION SIMPLE DESCRIPTION DERM
LOCATION SIMPLE: LEFT INFERIOR EYELID
LOCATION SIMPLE: RIGHT INFERIOR EYELID
LOCATION SIMPLE: LEFT SUPERIOR EYELID
LOCATION SIMPLE: RIGHT SUPERIOR EYELID

## 2020-12-03 ASSESSMENT — LOCATION DETAILED DESCRIPTION DERM
LOCATION DETAILED: LEFT MEDIAL INFERIOR EYELID
LOCATION DETAILED: LEFT LATERAL SUPERIOR EYELID
LOCATION DETAILED: RIGHT LATERAL INFERIOR EYELID
LOCATION DETAILED: RIGHT MEDIAL SUPERIOR EYELID

## 2020-12-03 ASSESSMENT — LOCATION ZONE DERM: LOCATION ZONE: EYELID

## 2020-12-03 NOTE — HPI: RASH
What Type Of Note Output Would You Prefer (Optional)?: Standard Output
How Severe Is Your Rash?: mild
Is This A New Presentation, Or A Follow-Up?: Rash
Additional History: She has used tacrolimus in the past for this, however her current tube is .

## 2021-08-27 ENCOUNTER — HOSPITAL ENCOUNTER (INPATIENT)
Facility: CLINIC | Age: 71
LOS: 2 days | Discharge: HOME | DRG: 439 | End: 2021-08-29
Attending: EMERGENCY MEDICINE | Admitting: STUDENT IN AN ORGANIZED HEALTH CARE EDUCATION/TRAINING PROGRAM
Payer: COMMERCIAL

## 2021-08-27 ENCOUNTER — APPOINTMENT (OUTPATIENT)
Dept: CT IMAGING | Facility: CLINIC | Age: 71
DRG: 439 | End: 2021-08-27
Attending: EMERGENCY MEDICINE
Payer: COMMERCIAL

## 2021-08-27 DIAGNOSIS — K56.609 SBO (SMALL BOWEL OBSTRUCTION) (H): ICD-10-CM

## 2021-08-27 DIAGNOSIS — K85.90 ACUTE PANCREATITIS, UNSPECIFIED COMPLICATION STATUS, UNSPECIFIED PANCREATITIS TYPE: ICD-10-CM

## 2021-08-27 LAB
ALBUMIN SERPL-MCNC: 3.4 G/DL (ref 3.4–5)
ALP SERPL-CCNC: 135 U/L (ref 40–150)
ALT SERPL W P-5'-P-CCNC: 53 U/L (ref 0–50)
ANION GAP SERPL CALCULATED.3IONS-SCNC: 5 MMOL/L (ref 3–14)
AST SERPL W P-5'-P-CCNC: 40 U/L (ref 0–45)
BASOPHILS # BLD AUTO: 0 10E3/UL (ref 0–0.2)
BASOPHILS NFR BLD AUTO: 0 %
BILIRUB SERPL-MCNC: 0.3 MG/DL (ref 0.2–1.3)
BUN SERPL-MCNC: 14 MG/DL (ref 7–30)
CALCIUM SERPL-MCNC: 8.9 MG/DL (ref 8.5–10.1)
CHLORIDE BLD-SCNC: 106 MMOL/L (ref 94–109)
CO2 SERPL-SCNC: 28 MMOL/L (ref 20–32)
CREAT SERPL-MCNC: 0.86 MG/DL (ref 0.52–1.04)
EOSINOPHIL # BLD AUTO: 0 10E3/UL (ref 0–0.7)
EOSINOPHIL NFR BLD AUTO: 0 %
ERYTHROCYTE [DISTWIDTH] IN BLOOD BY AUTOMATED COUNT: 14 % (ref 10–15)
GFR SERPL CREATININE-BSD FRML MDRD: 68 ML/MIN/1.73M2
GLUCOSE BLD-MCNC: 96 MG/DL (ref 70–99)
HCT VFR BLD AUTO: 39.7 % (ref 35–47)
HGB BLD-MCNC: 12.8 G/DL (ref 11.7–15.7)
IMM GRANULOCYTES # BLD: 0 10E3/UL
IMM GRANULOCYTES NFR BLD: 0 %
LIPASE SERPL-CCNC: ABNORMAL U/L (ref 73–393)
LYMPHOCYTES # BLD AUTO: 1.4 10E3/UL (ref 0.8–5.3)
LYMPHOCYTES NFR BLD AUTO: 14 %
MCH RBC QN AUTO: 29.1 PG (ref 26.5–33)
MCHC RBC AUTO-ENTMCNC: 32.2 G/DL (ref 31.5–36.5)
MCV RBC AUTO: 90 FL (ref 78–100)
MONOCYTES # BLD AUTO: 0.8 10E3/UL (ref 0–1.3)
MONOCYTES NFR BLD AUTO: 9 %
NEUTROPHILS # BLD AUTO: 7.6 10E3/UL (ref 1.6–8.3)
NEUTROPHILS NFR BLD AUTO: 77 %
NRBC # BLD AUTO: 0 10E3/UL
NRBC BLD AUTO-RTO: 0 /100
PLATELET # BLD AUTO: 313 10E3/UL (ref 150–450)
POTASSIUM BLD-SCNC: 4 MMOL/L (ref 3.4–5.3)
PROT SERPL-MCNC: 7.2 G/DL (ref 6.8–8.8)
RBC # BLD AUTO: 4.4 10E6/UL (ref 3.8–5.2)
SARS-COV-2 RNA RESP QL NAA+PROBE: NEGATIVE
SODIUM SERPL-SCNC: 139 MMOL/L (ref 133–144)
WBC # BLD AUTO: 9.9 10E3/UL (ref 4–11)

## 2021-08-27 PROCEDURE — 96361 HYDRATE IV INFUSION ADD-ON: CPT

## 2021-08-27 PROCEDURE — 74177 CT ABD & PELVIS W/CONTRAST: CPT

## 2021-08-27 PROCEDURE — 87635 SARS-COV-2 COVID-19 AMP PRB: CPT | Performed by: EMERGENCY MEDICINE

## 2021-08-27 PROCEDURE — 85025 COMPLETE CBC W/AUTO DIFF WBC: CPT | Performed by: EMERGENCY MEDICINE

## 2021-08-27 PROCEDURE — 36415 COLL VENOUS BLD VENIPUNCTURE: CPT | Performed by: EMERGENCY MEDICINE

## 2021-08-27 PROCEDURE — 258N000003 HC RX IP 258 OP 636: Performed by: EMERGENCY MEDICINE

## 2021-08-27 PROCEDURE — 80053 COMPREHEN METABOLIC PANEL: CPT | Performed by: EMERGENCY MEDICINE

## 2021-08-27 PROCEDURE — 99285 EMERGENCY DEPT VISIT HI MDM: CPT | Mod: 25

## 2021-08-27 PROCEDURE — 250N000011 HC RX IP 250 OP 636: Performed by: EMERGENCY MEDICINE

## 2021-08-27 PROCEDURE — 250N000009 HC RX 250: Performed by: EMERGENCY MEDICINE

## 2021-08-27 PROCEDURE — 120N000004 HC R&B MS OVERFLOW

## 2021-08-27 PROCEDURE — 99223 1ST HOSP IP/OBS HIGH 75: CPT | Mod: AI | Performed by: STUDENT IN AN ORGANIZED HEALTH CARE EDUCATION/TRAINING PROGRAM

## 2021-08-27 PROCEDURE — 96374 THER/PROPH/DIAG INJ IV PUSH: CPT | Mod: 59

## 2021-08-27 PROCEDURE — 83690 ASSAY OF LIPASE: CPT | Performed by: EMERGENCY MEDICINE

## 2021-08-27 PROCEDURE — 96375 TX/PRO/DX INJ NEW DRUG ADDON: CPT

## 2021-08-27 PROCEDURE — C9803 HOPD COVID-19 SPEC COLLECT: HCPCS

## 2021-08-27 PROCEDURE — 96376 TX/PRO/DX INJ SAME DRUG ADON: CPT

## 2021-08-27 RX ORDER — ZOLPIDEM TARTRATE 12.5 MG/1
12.5 TABLET, FILM COATED, EXTENDED RELEASE ORAL AT BEDTIME
Status: ON HOLD | COMMUNITY
End: 2022-05-21 | Stop reason: DRUGHIGH

## 2021-08-27 RX ORDER — HYDROMORPHONE HYDROCHLORIDE 1 MG/ML
0.5 INJECTION, SOLUTION INTRAMUSCULAR; INTRAVENOUS; SUBCUTANEOUS
Status: DISCONTINUED | OUTPATIENT
Start: 2021-08-27 | End: 2021-08-28

## 2021-08-27 RX ORDER — LORAZEPAM 0.5 MG/1
TABLET ORAL
Status: ON HOLD | COMMUNITY
End: 2022-06-07

## 2021-08-27 RX ORDER — IOPAMIDOL 755 MG/ML
74 INJECTION, SOLUTION INTRAVASCULAR ONCE
Status: COMPLETED | OUTPATIENT
Start: 2021-08-27 | End: 2021-08-27

## 2021-08-27 RX ORDER — ONDANSETRON 2 MG/ML
8 INJECTION INTRAMUSCULAR; INTRAVENOUS ONCE
Status: COMPLETED | OUTPATIENT
Start: 2021-08-27 | End: 2021-08-27

## 2021-08-27 RX ORDER — SODIUM CHLORIDE 9 MG/ML
1000 INJECTION, SOLUTION INTRAVENOUS CONTINUOUS
Status: DISCONTINUED | OUTPATIENT
Start: 2021-08-27 | End: 2021-08-28

## 2021-08-27 RX ADMIN — ONDANSETRON 8 MG: 2 INJECTION INTRAMUSCULAR; INTRAVENOUS at 19:25

## 2021-08-27 RX ADMIN — SODIUM CHLORIDE 1000 ML: 9 INJECTION, SOLUTION INTRAVENOUS at 19:26

## 2021-08-27 RX ADMIN — SODIUM CHLORIDE 61 ML: 9 INJECTION, SOLUTION INTRAVENOUS at 20:23

## 2021-08-27 RX ADMIN — HYDROMORPHONE HYDROCHLORIDE 0.5 MG: 1 INJECTION, SOLUTION INTRAMUSCULAR; INTRAVENOUS; SUBCUTANEOUS at 21:13

## 2021-08-27 RX ADMIN — IOPAMIDOL 74 ML: 755 INJECTION, SOLUTION INTRAVENOUS at 20:23

## 2021-08-27 RX ADMIN — HYDROMORPHONE HYDROCHLORIDE 1 MG: 1 INJECTION, SOLUTION INTRAMUSCULAR; INTRAVENOUS; SUBCUTANEOUS at 19:24

## 2021-08-27 RX ADMIN — SODIUM CHLORIDE 1000 ML: 9 INJECTION, SOLUTION INTRAVENOUS at 21:13

## 2021-08-27 ASSESSMENT — MIFFLIN-ST. JEOR: SCORE: 1234.82

## 2021-08-27 ASSESSMENT — ENCOUNTER SYMPTOMS
VOMITING: 1
ABDOMINAL PAIN: 1

## 2021-08-28 LAB
ALBUMIN SERPL-MCNC: 2.7 G/DL (ref 3.4–5)
ALP SERPL-CCNC: 113 U/L (ref 40–150)
ALT SERPL W P-5'-P-CCNC: 38 U/L (ref 0–50)
ANION GAP SERPL CALCULATED.3IONS-SCNC: 1 MMOL/L (ref 3–14)
AST SERPL W P-5'-P-CCNC: 28 U/L (ref 0–45)
BILIRUB SERPL-MCNC: 0.5 MG/DL (ref 0.2–1.3)
BUN SERPL-MCNC: 11 MG/DL (ref 7–30)
CALCIUM SERPL-MCNC: 8.1 MG/DL (ref 8.5–10.1)
CHLORIDE BLD-SCNC: 109 MMOL/L (ref 94–109)
CO2 SERPL-SCNC: 29 MMOL/L (ref 20–32)
CREAT SERPL-MCNC: 0.8 MG/DL (ref 0.52–1.04)
FASTING STATUS PATIENT QL REPORTED: YES
GFR SERPL CREATININE-BSD FRML MDRD: 74 ML/MIN/1.73M2
GLUCOSE BLD-MCNC: 84 MG/DL (ref 70–99)
LIPASE SERPL-CCNC: ABNORMAL U/L (ref 73–393)
POTASSIUM BLD-SCNC: 3.4 MMOL/L (ref 3.4–5.3)
PROT SERPL-MCNC: 5.8 G/DL (ref 6.8–8.8)
SODIUM SERPL-SCNC: 139 MMOL/L (ref 133–144)
TRIGL SERPL-MCNC: 54 MG/DL

## 2021-08-28 PROCEDURE — 99233 SBSQ HOSP IP/OBS HIGH 50: CPT | Performed by: INTERNAL MEDICINE

## 2021-08-28 PROCEDURE — 36415 COLL VENOUS BLD VENIPUNCTURE: CPT | Performed by: STUDENT IN AN ORGANIZED HEALTH CARE EDUCATION/TRAINING PROGRAM

## 2021-08-28 PROCEDURE — 99214 OFFICE O/P EST MOD 30 MIN: CPT | Performed by: SURGERY

## 2021-08-28 PROCEDURE — 250N000011 HC RX IP 250 OP 636: Performed by: STUDENT IN AN ORGANIZED HEALTH CARE EDUCATION/TRAINING PROGRAM

## 2021-08-28 PROCEDURE — 83690 ASSAY OF LIPASE: CPT | Performed by: STUDENT IN AN ORGANIZED HEALTH CARE EDUCATION/TRAINING PROGRAM

## 2021-08-28 PROCEDURE — 82040 ASSAY OF SERUM ALBUMIN: CPT | Performed by: STUDENT IN AN ORGANIZED HEALTH CARE EDUCATION/TRAINING PROGRAM

## 2021-08-28 PROCEDURE — 84478 ASSAY OF TRIGLYCERIDES: CPT | Performed by: STUDENT IN AN ORGANIZED HEALTH CARE EDUCATION/TRAINING PROGRAM

## 2021-08-28 PROCEDURE — 258N000003 HC RX IP 258 OP 636: Performed by: STUDENT IN AN ORGANIZED HEALTH CARE EDUCATION/TRAINING PROGRAM

## 2021-08-28 PROCEDURE — 258N000003 HC RX IP 258 OP 636: Performed by: INTERNAL MEDICINE

## 2021-08-28 PROCEDURE — 250N000013 HC RX MED GY IP 250 OP 250 PS 637: Performed by: STUDENT IN AN ORGANIZED HEALTH CARE EDUCATION/TRAINING PROGRAM

## 2021-08-28 PROCEDURE — 120N000004 HC R&B MS OVERFLOW

## 2021-08-28 RX ORDER — HYDROMORPHONE HYDROCHLORIDE 2 MG/1
2 TABLET ORAL EVERY 4 HOURS PRN
Status: DISCONTINUED | OUTPATIENT
Start: 2021-08-28 | End: 2021-08-29 | Stop reason: HOSPADM

## 2021-08-28 RX ORDER — MONTELUKAST SODIUM 10 MG/1
10 TABLET ORAL AT BEDTIME
Status: DISCONTINUED | OUTPATIENT
Start: 2021-08-28 | End: 2021-08-29 | Stop reason: HOSPADM

## 2021-08-28 RX ORDER — ATORVASTATIN CALCIUM 40 MG/1
40 TABLET, FILM COATED ORAL AT BEDTIME
Status: DISCONTINUED | OUTPATIENT
Start: 2021-08-28 | End: 2021-08-29 | Stop reason: HOSPADM

## 2021-08-28 RX ORDER — LORAZEPAM 0.5 MG/1
0.5 TABLET ORAL 2 TIMES DAILY PRN
Status: DISCONTINUED | OUTPATIENT
Start: 2021-08-28 | End: 2021-08-29 | Stop reason: HOSPADM

## 2021-08-28 RX ORDER — ONDANSETRON 4 MG/1
4 TABLET, ORALLY DISINTEGRATING ORAL EVERY 6 HOURS PRN
Status: DISCONTINUED | OUTPATIENT
Start: 2021-08-28 | End: 2021-08-29 | Stop reason: HOSPADM

## 2021-08-28 RX ORDER — NALOXONE HYDROCHLORIDE 0.4 MG/ML
0.2 INJECTION, SOLUTION INTRAMUSCULAR; INTRAVENOUS; SUBCUTANEOUS
Status: DISCONTINUED | OUTPATIENT
Start: 2021-08-28 | End: 2021-08-29 | Stop reason: HOSPADM

## 2021-08-28 RX ORDER — ACETAMINOPHEN 325 MG/1
650 TABLET ORAL EVERY 6 HOURS PRN
Status: DISCONTINUED | OUTPATIENT
Start: 2021-08-28 | End: 2021-08-29 | Stop reason: HOSPADM

## 2021-08-28 RX ORDER — HYDROMORPHONE HYDROCHLORIDE 1 MG/ML
.3-.5 INJECTION, SOLUTION INTRAMUSCULAR; INTRAVENOUS; SUBCUTANEOUS
Status: DISCONTINUED | OUTPATIENT
Start: 2021-08-28 | End: 2021-08-29 | Stop reason: HOSPADM

## 2021-08-28 RX ORDER — ONDANSETRON 2 MG/ML
4 INJECTION INTRAMUSCULAR; INTRAVENOUS EVERY 6 HOURS PRN
Status: DISCONTINUED | OUTPATIENT
Start: 2021-08-28 | End: 2021-08-29 | Stop reason: HOSPADM

## 2021-08-28 RX ORDER — NALOXONE HYDROCHLORIDE 0.4 MG/ML
0.4 INJECTION, SOLUTION INTRAMUSCULAR; INTRAVENOUS; SUBCUTANEOUS
Status: DISCONTINUED | OUTPATIENT
Start: 2021-08-28 | End: 2021-08-29 | Stop reason: HOSPADM

## 2021-08-28 RX ORDER — LIDOCAINE 40 MG/G
CREAM TOPICAL
Status: DISCONTINUED | OUTPATIENT
Start: 2021-08-28 | End: 2021-08-29 | Stop reason: HOSPADM

## 2021-08-28 RX ORDER — EZETIMIBE 10 MG/1
10 TABLET ORAL AT BEDTIME
Status: DISCONTINUED | OUTPATIENT
Start: 2021-08-28 | End: 2021-08-29 | Stop reason: HOSPADM

## 2021-08-28 RX ORDER — PROCHLORPERAZINE 25 MG
12.5 SUPPOSITORY, RECTAL RECTAL EVERY 12 HOURS PRN
Status: DISCONTINUED | OUTPATIENT
Start: 2021-08-28 | End: 2021-08-29 | Stop reason: HOSPADM

## 2021-08-28 RX ORDER — PROCHLORPERAZINE MALEATE 5 MG
5 TABLET ORAL EVERY 6 HOURS PRN
Status: DISCONTINUED | OUTPATIENT
Start: 2021-08-28 | End: 2021-08-29 | Stop reason: HOSPADM

## 2021-08-28 RX ORDER — ACETAMINOPHEN 650 MG/1
650 SUPPOSITORY RECTAL EVERY 6 HOURS PRN
Status: DISCONTINUED | OUTPATIENT
Start: 2021-08-28 | End: 2021-08-29 | Stop reason: HOSPADM

## 2021-08-28 RX ORDER — SODIUM CHLORIDE 9 MG/ML
INJECTION, SOLUTION INTRAVENOUS CONTINUOUS
Status: DISCONTINUED | OUTPATIENT
Start: 2021-08-28 | End: 2021-08-28

## 2021-08-28 RX ORDER — ZOLPIDEM TARTRATE 6.25 MG/1
12.5 TABLET, FILM COATED, EXTENDED RELEASE ORAL
Status: DISCONTINUED | OUTPATIENT
Start: 2021-08-28 | End: 2021-08-29 | Stop reason: HOSPADM

## 2021-08-28 RX ADMIN — HYDROMORPHONE HYDROCHLORIDE 0.5 MG: 1 INJECTION, SOLUTION INTRAMUSCULAR; INTRAVENOUS; SUBCUTANEOUS at 05:06

## 2021-08-28 RX ADMIN — SODIUM CHLORIDE, PRESERVATIVE FREE: 5 INJECTION INTRAVENOUS at 21:21

## 2021-08-28 RX ADMIN — SODIUM CHLORIDE 500 ML: 9 INJECTION, SOLUTION INTRAVENOUS at 08:16

## 2021-08-28 RX ADMIN — HYDROMORPHONE HYDROCHLORIDE 0.5 MG: 1 INJECTION, SOLUTION INTRAMUSCULAR; INTRAVENOUS; SUBCUTANEOUS at 00:44

## 2021-08-28 RX ADMIN — SODIUM CHLORIDE, PRESERVATIVE FREE: 5 INJECTION INTRAVENOUS at 00:44

## 2021-08-28 RX ADMIN — ZOLPIDEM TARTRATE 12.5 MG: 6.25 TABLET, FILM COATED, EXTENDED RELEASE ORAL at 02:00

## 2021-08-28 RX ADMIN — HYDROMORPHONE HYDROCHLORIDE 0.5 MG: 1 INJECTION, SOLUTION INTRAMUSCULAR; INTRAVENOUS; SUBCUTANEOUS at 07:46

## 2021-08-28 RX ADMIN — ZOLPIDEM TARTRATE 12.5 MG: 6.25 TABLET, FILM COATED, EXTENDED RELEASE ORAL at 22:09

## 2021-08-28 RX ADMIN — HYDROMORPHONE HYDROCHLORIDE 2 MG: 2 TABLET ORAL at 09:59

## 2021-08-28 RX ADMIN — ATORVASTATIN CALCIUM 40 MG: 40 TABLET, FILM COATED ORAL at 00:45

## 2021-08-28 RX ADMIN — EZETIMIBE 10 MG: 10 TABLET ORAL at 01:16

## 2021-08-28 RX ADMIN — SODIUM CHLORIDE, PRESERVATIVE FREE: 5 INJECTION INTRAVENOUS at 13:41

## 2021-08-28 RX ADMIN — ATORVASTATIN CALCIUM 40 MG: 40 TABLET, FILM COATED ORAL at 21:19

## 2021-08-28 RX ADMIN — ACETAMINOPHEN 650 MG: 325 TABLET, FILM COATED ORAL at 13:39

## 2021-08-28 RX ADMIN — MONTELUKAST 10 MG: 10 TABLET, FILM COATED ORAL at 21:19

## 2021-08-28 RX ADMIN — EZETIMIBE 10 MG: 10 TABLET ORAL at 21:19

## 2021-08-28 RX ADMIN — MONTELUKAST 10 MG: 10 TABLET, FILM COATED ORAL at 00:45

## 2021-08-28 RX ADMIN — HYDROMORPHONE HYDROCHLORIDE 2 MG: 2 TABLET ORAL at 14:34

## 2021-08-28 RX ADMIN — HYDROMORPHONE HYDROCHLORIDE 0.5 MG: 1 INJECTION, SOLUTION INTRAMUSCULAR; INTRAVENOUS; SUBCUTANEOUS at 02:59

## 2021-08-28 RX ADMIN — SODIUM CHLORIDE, PRESERVATIVE FREE: 5 INJECTION INTRAVENOUS at 05:56

## 2021-08-28 RX ADMIN — HYDROMORPHONE HYDROCHLORIDE 2 MG: 2 TABLET ORAL at 19:29

## 2021-08-28 ASSESSMENT — ACTIVITIES OF DAILY LIVING (ADL)
ADLS_ACUITY_SCORE: 11

## 2021-08-28 NOTE — PLAN OF CARE
Aox4. VSS on RA. Abd pain managed with PRN dilaudid and heat. denies nausea. NPO ex. Meds and ice chips. SBA. PRN ambien given. IVF infusing. Hypoactive BS. Passing flatus. Discharge pending. Continue to monitor.

## 2021-08-28 NOTE — CONSULTS
"[facility] Consultation     Laura Gonzalez  [address]  [place] [address]  70 year old female     Admission Date/Time: 8/27/2021  Primary Care Provider: Rob Jansen  Referring / Attending Physician:  Tay     We were asked to see the patient in consultation by Dr. Cross for evaluation of pancreatitis.    ASSESSMENT:    Acute recurrent pancreatitis without clear etiology such as etoh use, or common Rxs.  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      RECOMMENDATIONS:  -Clears, ADAT  -Hope to minimize narcotic use further and plan dispo in the next 24 hrs at which time she will need to follow up with [facility] for further evaluation.    -Alternatively, if her pancreatitis continues to smolder, we could attempt endoscopy to evaluate the pancreaticojejunostomy with Dr. Wilson or Concetta.    Thank you for involving us in this patient's care.  Please call me with any questions.    Allen Duffy DO   [facility]  Cell [phone]    ________________________________________________________________________        CC: Abdominal pain and vomiting     HPI:  Laura Gonzalez is a 70 year old female who we are asked to see for abdominal pain and vomiting, presenting initially to the emergency department last night with symptoms starting earlier that day.    The patient is currently feeling dramatically improved since admission.  She is now on oral narcotics which she is tolerating well without worsening pain.  She would like to try diet which we discussed in detail.    Her history is rather extensive, mostly involving gastroenterology at [facility].  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      Available in \"care everywhere\" is an EUS from 2/2020 with Dr. Funes for acute recurrent pancreatitis at which time he did not clearly describe a " dilated PD, but appears he passed a 22g needle into the PD and contrast flowed into the jejunum readily.  Therefore, it was assumed there was no stricture at the pancreaticoj.  ERCP from [place] in 2020 was performed for suspected a sending cholangitis.  A strictured choledochojejunostomy was found in the proximal jejunum,a wire was passed and the biliary tree was cannulated with brisk flow of bile though a single 5 mm stenosis was seen and balloon sweep revealed no stones or sludge, 10 mm axios metal stent was placed into the common hepatic duct across the strictured choledochojejunostomy, second intrahepatic plastic stent was placed, right hepatic duct.  Repeat ERCP was performed in September for biliary stent removal, at which time choledochojejunostomy was well visualized and there was no stricture or ulcer.  Both the previously placed stents were removed from the biliary tree.     ROS: A comprehensive ten point review of systems was negative aside from those in mentioned in the HPI.      PAST MEDICAL HISTORY:  Patient Active Problem List    Diagnosis Date Noted     SBO (small bowel obstruction) (H) 2021     Priority: Medium     Acute pancreatitis, unspecified complication status, unspecified pancreatitis type 2021     Priority: Medium     Status post total replacement of left hip 2016     Priority: Medium     Aftercare following hip joint replacement surgery 2016     Priority: Medium     History of total left hip replacement 06/15/2016     Priority: Medium     SOCIAL HISTORY:  Social History     Tobacco Use     Smoking status: Former Smoker     Quit date: 10/3/1988     Years since quittin.9     Smokeless tobacco: Never Used   Substance Use Topics     Alcohol use: No     Drug use: No     FAMILY HISTORY:  No family history on file.  ALLERGIES:   Allergies   Allergen Reactions     Codeine Camsylate      MEDICATIONS:   Current Facility-Administered Medications   Medication      "acetaminophen (TYLENOL) tablet 650 mg    Or     acetaminophen (TYLENOL) Suppository 650 mg     atorvastatin (LIPITOR) tablet 40 mg     ezetimibe (ZETIA) tablet 10 mg     HYDROmorphone (DILAUDID) tablet 2 mg     HYDROmorphone (PF) (DILAUDID) injection 0.3-0.5 mg     lidocaine (LMX4) cream     lidocaine 1 % 0.1-1 mL     LORazepam (ATIVAN) tablet 0.5 mg     melatonin tablet 1 mg     montelukast (SINGULAIR) tablet 10 mg     naloxone (NARCAN) injection 0.2 mg    Or     naloxone (NARCAN) injection 0.4 mg    Or     naloxone (NARCAN) injection 0.2 mg    Or     naloxone (NARCAN) injection 0.4 mg     ondansetron (ZOFRAN-ODT) ODT tab 4 mg    Or     ondansetron (ZOFRAN) injection 4 mg     prochlorperazine (COMPAZINE) injection 5 mg    Or     prochlorperazine (COMPAZINE) tablet 5 mg    Or     prochlorperazine (COMPAZINE) suppository 12.5 mg     sodium chloride (PF) 0.9% PF flush 3 mL     sodium chloride (PF) 0.9% PF flush 3 mL     sodium chloride 0.9% infusion     zolpidem ER (AMBIEN CR) CR tablet 12.5 mg     PHYSICAL EXAM:   /48 (BP Location: Right arm)   Pulse 52   Temp 97.7  F (36.5  C) (Oral)   Resp 16   Ht 1.727 m (5' 8\")   Wt 67.1 kg (148 lb)   SpO2 97%   BMI 22.50 kg/m     GEN: Alert, oriented x3, communicative and in NAD.  JULEE: AT, anicteric, OP without erythema, exudate, or ulcers.    NECK: Supple.    LYMPH: No LAD noted.  HRT: RRR  LUNGS: CTA  ABD: ND, +BS, mild TTP without rebound.   SKIN: No rash, jaundice or spider angiomata  MSKL: LE free of edema, strength 5/5 all 4 extrems  NEURO: CN grossly intact, sensation intact to light touch, toes downgoing.     ADDITIONAL DATA:   I reviewed the patient's new clinical lab test results.   Recent Labs   Lab Test 08/27/21  1925 09/24/18  2116 06/18/16  0616 06/17/16  0723   WBC 9.9 6.4  --   --    HGB 12.8 13.8  --  11.2*   MCV 90 93  --   --     262 224  --      Recent Labs   Lab Test 08/28/21  0646 08/27/21  1925 09/24/18  2116   POTASSIUM 3.4 4.0 3.8 "   CHLORIDE 109 106 105   CO2 29 28 27   BUN 11 14 25   ANIONGAP 1* 5 6     Recent Labs   Lab Test 08/28/21  0646 08/27/21  1925 09/24/18  2116 06/15/16  0840   ALBUMIN 2.7* 3.4 3.5  --    BILITOTAL 0.5 0.3 0.3  --    ALT 38 53* 32  --    AST 28 40 27  --    PROTEIN  --   --  Negative Negative   LIPASE 10,482* 18,274* 138  --         I reviewed the patient's new imaging results.

## 2021-08-28 NOTE — PROGRESS NOTES
MD Notification    Notified Person: MD    Notified Person Name: Delgado Cross    Notification Date/Time: 8/28/21 @ 4:18 pm    Notification Interaction: Page    Purpose of Notification: Obs 23 ED. GI spoke with pt and they can advance to clear liquid diet, can we get orders for this? Thank you.     Orders Received: Clear liquid diet ordered    Comments:

## 2021-08-28 NOTE — ED NOTES
[facility]  ED Nurse Handoff Report    ED Chief complaint: Abdominal Pain      ED Diagnosis:   Final diagnoses:   Acute pancreatitis, unspecified complication status, unspecified pancreatitis type   SBO (small bowel obstruction) (H)       Code Status: Full Code    Allergies:   Allergies   Allergen Reactions     Codeine Camsylate        Patient Story: Patient presents to the ED complaining of abdominal pain Complicated GI history  Focused Assessment:  Alert, oriented. Pain controled with IV dilaudid.  Patient is independent with cares.  Lipase 18,000    Treatments and/or interventions provided: labs, imaging   Patient's response to treatments and/or interventions:   Labs Ordered and Resulted from Time of ED Arrival Up to the Time of Departure from the ED   COMPREHENSIVE METABOLIC PANEL - Abnormal; Notable for the following components:       Result Value    ALT 53 (*)     All other components within normal limits   LIPASE - Abnormal; Notable for the following components:    Lipase 18,274 (*)     All other components within normal limits   CBC WITH PLATELETS & DIFFERENTIAL    Narrative:     The following orders were created for panel order CBC with platelets differential.  Procedure                               Abnormality         Status                     ---------                               -----------         ------                     CBC with platelets and d...[[id number]]                      Final result                 Please view results for these tests on the individual orders.   CBC WITH PLATELETS AND DIFFERENTIAL   COVID-19 VIRUS (CORONAVIRUS) BY PCR         To be done/followed up on inpatient unit:      Does this patient have any cognitive concerns?: none    Activity level - Baseline/Home:  Independent  Activity Level - Current:   Independent    Patient's Preferred language: English   Needed?: No    Isolation: None  Infection: Not Applicable  Patient tested for COVID 19  "prior to admission: YES  Bariatric?: No    Vital Signs:   Vitals:    08/27/21 1911 08/27/21 1912 08/27/21 1917 08/27/21 2140   BP:   117/72 130/55   Pulse: 71   56   Resp: 20      Temp:  98.7  F (37.1  C)     TempSrc: Oral Oral     SpO2: 97%      Weight: 67.1 kg (148 lb)      Height: 1.727 m (5' 8\")          Cardiac Rhythm:     Was the PSS-3 completed:   Yes  What interventions are required if any?               Family Comments:   OBS brochure/video discussed/provided to patient/family: N/A              Name of person given brochure if not patient:               Relationship to patient:     For the majority of the shift this patient's behavior was Green.   Behavioral interventions performed were .    ED NURSE PHONE NUMBER: *[phone]         "

## 2021-08-28 NOTE — PROGRESS NOTES
GI consult received.    Will see the patient this afternoon.     Allen Duffy DO   [facility]  Cell [phone]

## 2021-08-28 NOTE — PROGRESS NOTES
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 She has not been able to pass gas or have a bowel movement since. Her current symptoms feel similar to when she had obstruction of her biliary tree.   -She is found to have markedly elevated lipase of 18,274 and normal LFTs. CT scan results are as noted below.      IMPRESSION:   1.  Postoperative changes of prior Whipple procedure.  2.  Moderate fat stranding and fluid about the remaining pancreatic  body and tail is suspicious for acute pancreatitis.  3.  A segment of thickened small bowel in the left mid and upper  abdomen is suspicious for an infectious or inflammatory enteritis, and  appears to cause some degree of associated small bowel obstruction.  4.  Moderate amount of stool in the ascending colon.     - Etiology for pancreatitis is not clear. Pt denies EtOH use. She has been on some new medications (neomycin, rifaximin, repatha) but per Up to Date review these are now known to cause pancreatitis. Presumably 2/2 anatomical obstruction in the setting of her whipple procedure.  -calcium level nl.   - SBO possibly secondary to inflammation, but could also be related to adhesions from multiple prior abdominal surgeries.      Today: nl vitals. Afebrile. IV diluadid every 2  hours overnight.   Had 2L NS on admission. UOB independently  LFTs nl. Bmp nl. Lipase 18K--> 10K today  Seen by Gen surgery 8/28. Bowel obstruction 2/2 intraabdominal inflammatory process. Agree with medical management. Recommend transfer to [facility] team if surgical intervention needed. No need for surgical intervention at this time, appreciate in put  -BUN and hematocrit nl.   -no SIRS. Nl HR.   -abd pain better. No new issues  - had biliary stent removed about 1 year ago. No hx of pancreatic stenosis or stenting    Plan:   - Attempted to transfer pt to [facility] and [facility] (pt follows with GI at [facility]) but no beds are available currently. The ED provider did discuss with the gastroenterologist on call at [facility] who recommended treating conservatively for now. If she does not responding to conservative treatment, may consider transfer to [facility] in the coming days for consideration of some sort of procedure.  - Continue IVF , give 500cc NS bolus  - NPO  - Pain, and nausea control PRN  - Will consult GI here, assistance appreciated.  - General surgery consult for SBO  -seen 8/27, medical management  -[facility] following to see this afternoon.     Hx of SIBO  Recently completed a course of Neomycin and Rifaximin.      Coronary Atherosclerosis  Follows with [facility]. PTA medications include atrovastatin, ezetemibe and repatha.      Diet: NPO, diet per GI.   DVT Prophylaxis: Pneumatic Compression Devices  Condon Catheter: Not present  Central Lines: None  Code Status: Full Code            Disposition Plan     Expected discharge: TBD recommended to prior living arrangement once adequate pain management/ tolerating PO medications.    Delgado Cross MD  Text Page  (7am to 6pm)  Interval History    Seen by Gen surgery  Lipase down  abd pain better but still significant  Up ambulating  Passing gas  No new issues.       -Data reviewed today: I reviewed all new labs and imaging results over the last 24 hours. I personally reviewed labs and imaging  since admission    Physical Exam   Temp: 97.5  F (36.4  C) Temp src: Oral BP: 125/43 Pulse: 56   Resp: 18 SpO2: 98 % O2 Device: None (Room air)    Vitals:    08/27/21 1911   Weight: 67.1 kg (148 lb)     Vital Signs with Ranges  Temp:  [97.5  F (36.4  C)-98.7  F (37.1  C)] 97.5  F (36.4  C)  Pulse:  [56-71] 56  Resp:  [16-20] 18  BP: (117-137)/(43-72) 125/43  SpO2:  [96 %-98 %] 98 %  No intake/output data recorded.    Constitutional: In bed, nad, nontoxic  Respiratory: CTAB  Cardiovascular: RRR no r/g/m. Not tachy  GI: soft, diffusely tender. +BS but reduced. ND  Skin/Integumen: no rash or edema  Neuro: nl speech and mentation  Psych: nl affect      Medications     sodium chloride 125 mL/hr at 08/28/21 0756       sodium chloride 0.9%  500 mL Intravenous Once     atorvastatin  40 mg Oral At Bedtime     ezetimibe  10 mg Oral At Bedtime     montelukast  10 mg Oral At Bedtime     sodium chloride (PF)  3 mL Intracatheter Q8H       Data   Recent Labs   Lab 08/28/21  0646 08/27/21  1925   WBC  --  9.9   HGB  --  12.8   MCV  --  90   PLT  --  313    139   POTASSIUM 3.4 4.0   CHLORIDE 109 106   CO2 29 28   BUN 11 14   CR 0.80 0.86   ANIONGAP 1* 5   GLEN 8.1* 8.9   GLC 84 96   ALBUMIN 2.7* 3.4   PROTTOTAL 5.8* 7.2   BILITOTAL 0.5 0.3   ALKPHOS 113 135   ALT 38 53*   AST 28 40   LIPASE 10,482* 18,274*       Imaging:   Recent Results (from the past 24 hour(s))   CT Abdomen Pelvis w Contrast    Narrative    CT ABDOMEN AND PELVIS WITH CONTRAST 8/27/2021 8:32 PM    CLINICAL HISTORY: Abdominal pain. Vomiting.    TECHNIQUE: CT scan of the abdomen and pelvis was performed following  injection of IV contrast. Multiplanar reformats were obtained. Dose  reduction techniques were used.  CONTRAST: 74mL Isovue-370  COMPARISON: None.    FINDINGS:   LOWER CHEST: The visualized lung bases are clear.    HEPATOBILIARY: The gallbladder is not seen, and is likely surgically  absent. Mild pneumobilia may be related to a prior sphincterotomy.  No  focal hepatic masses are identified.    PANCREAS: Postoperative changes of prior Whipple procedure. Moderate  fat stranding and fluid about the remaining pancreatic body and tail  is suspicious for acute pancreatitis. No areas of pancreatic  nonenhancement to suggest pancreatic necrosis. No discrete  peripancreatic fluid collections are identified.    SPLEEN: Normal.    ADRENAL GLANDS: Normal.    KIDNEYS/BLADDER: Urinary bladder is mildly distended. The kidneys are  unremarkable. No hydronephrosis.    BOWEL: There are multiple mildly dilated loops of proximal small  bowel, with a transition point at a thickened segment of small bowel  in the left upper abdomen measuring approximately 12 cm in length  (series 5 images 15-36). Multiple loops of decompressed distal small  bowel are noted in the right lower quadrant. There is a moderate  amount of stool in the ascending colon. Prior appendectomy.    PELVIC ORGANS: Multiple images through the pelvis are degraded by  artifact related to a left hip arthroplasty. Prior hysterectomy.    LYMPH NODES: No enlarged lymph nodes are identified in the abdomen or  pelvis.    VASCULATURE: Moderate atherosclerotic aortoiliac calcification.    ADDITIONAL FINDINGS: None.    MUSCULOSKELETAL: Degenerative changes are noted in the lumbar spine.      Impression    IMPRESSION:   1.  Postoperative changes of prior Whipple procedure.  2.  Moderate fat stranding and fluid about the remaining pancreatic  body and tail. Findings are suspicious for acute pancreatitis.  Clinical and laboratory correlation are recommended.  3.  A segment of thickened small bowel in the left mid and upper  abdomen is suspicious for an infectious or inflammatory enteritis, and  appears to cause some degree of associated small bowel obstruction.  4.  Moderate amount of stool in the ascending colon.    ESTEPHANIA OREILLY MD         SYSTEM ID:  [id number]

## 2021-08-28 NOTE — PLAN OF CARE
RN:  Patient A/O x4.  C/O abdominal pain controlled at this time best on oral dilaudid.  Pain rated 6-8/10; diminished to a 2/10 on recheck.  Other VSS on RA.  Denies nausea.  No chest pain or SOB.  Tylenol provided x1 for discharge headache with relief. Tolerating ice chips.  IV fluids infusing per orders. X1 bolus of fluids administered this morning.  Up IND to the bathroom.  Belching often and passing flatus.  Surgery following.  GI consulted and yet to see today.  Discharge pending progress.

## 2021-08-28 NOTE — CONSULTS
[facility]  General Surgery Consultation         Rubens Pinedo MD, MD    Laura Gonzalez MRN# [id number]   YOB: 1950 Age: 70 year old      Date of Admission:  8/27/2021  Date of Consult: 8/28/2021         Assessment and Plan:   Pleasant 70-year-old female presents with abdominal pain and nausea.  Is feeling considerably better.  Has a complicated surgical history with some postsurgical complications.  Current issue appears to be pancreatitis.  Whether this is due to a stricture or something else, her lipase is trending down.  I imagine the concern for bowel obstruction is secondary to the intra-abdominal inflammatory process.  I agree with medical management including IV fluids, pain management, and n.p.o. status today.  If she is feeling improved tomorrow and is not significantly nauseated, may consider starting clears.  From a surgical standpoint, if intervention was required, I would strongly advocate transfer to [place].  I do not think it is necessary at this point.  Surgical co-morbities include coronary artery disease, hyperlipidemia, previous Whipple procedure with biliary stricture.            Code Status:   Full Code         Primary Care Physician:   Rob Jansen [phone]         Requesting Physician:      Dr. Jameson         Chief Complaint:   Abdominal pain, nausea    History is obtained from the patient         History of Present Illness:     Laura Gonzalez is a 70 year old female who has a complicated GI history who presented to the ED with 4 hours of increasing abdominal pain and vomiting. She feels like her symptoms are similar to when she had her biliary stricture in 2020, but her bilirubin is normal. She has had recurrent episodes of vomiting. She has not passed any gas or had a bowel movement for several hours.  Lipase was found to be 18,000 upon presentation and inflammatory changes suggested acute pancreatitis.  Also noted was concern  for a bowel obstruction in the upper abdomen, possibly due to inflammatory changes from pancreatitis.  We are asked to help evaluate.    She denies chest pain, shortness of breath, urinary symptoms, fever or chills.            Past Medical History:   Laura Gonzalez  has a past medical history of Asthma, CAD (coronary artery disease), and HLD (hyperlipidemia).         Past Surgical History:   Laura Gonzalez  has a past surgical history that includes Foot surgery; Hysterectomy; appendectomy; TOOTH EXTRACTION W/FORCEP; Colonoscopy (10/3/2013); and Arthroplasty hip (Left, 6/15/2016).         Home Medications:     Prior to Admission medications    Medication Sig Last Dose Taking? Auth Provider   Atorvastatin Calcium (LIPITOR PO) Take 40 mg by mouth At Bedtime  8/26/2021 at Unknown time Yes Reported, Patient   calcium-magnesium (CALMAG) 500-250 MG TABS Take 2 tablets by mouth daily 8/26/2021 at Unknown time Yes Reported, Patient   EVOLOCUMAB SC Inject 140 mg Subcutaneous every 14 days 8/25/2021 Yes Unknown, Entered By History   EZETIMIBE PO Take 10 mg by mouth At Bedtime 8/26/2021 at Unknown time Yes Reported, Patient   LORazepam (ATIVAN) 0.5 MG tablet Take 1-2 tablets by mouth twice daily as needed for anxiety  Yes Unknown, Entered By History   Montelukast Sodium (SINGULAIR PO) Take 10 mg by mouth At Bedtime  8/26/2021 at Unknown time Yes Reported, Patient   UNABLE TO FIND MEDICATION NAME: Estrogen pellet in arm  Yes Unknown, Entered By History   valACYclovir (VALTREX) 1000 mg tablet Take 1,000 mg by mouth daily as needed (cold sore)   Yes Reported, Patient   VITAMIN A PO Take 1 capsule (unknown dose, prescription strength) by mouth daily 8/26/2021 at Unknown time Yes Unknown, Entered By History   zolpidem ER (AMBIEN CR) 12.5 MG CR tablet Take 12.5 mg by mouth nightly as needed for sleep Past Week at Unknown time Yes Unknown, Entered By History            Current Medications:           sodium chloride  "0.9%  500 mL Intravenous Once     atorvastatin  40 mg Oral At Bedtime     ezetimibe  10 mg Oral At Bedtime     montelukast  10 mg Oral At Bedtime     sodium chloride (PF)  3 mL Intracatheter Q8H     acetaminophen **OR** acetaminophen, HYDROmorphone, HYDROmorphone, lidocaine 4%, lidocaine (buffered or not buffered), LORazepam, melatonin, naloxone **OR** naloxone **OR** naloxone **OR** naloxone, ondansetron **OR** ondansetron, prochlorperazine **OR** prochlorperazine **OR** prochlorperazine, sodium chloride (PF), zolpidem ER         Allergies:     Allergies   Allergen Reactions     Codeine Camsylate             Social History:   Laura Gonzalez  reports that she quit smoking about 32 years ago. She has never used smokeless tobacco. She reports that she does not drink alcohol and does not use drugs.          Family History:   Laura Gonzalez family history is not on file.          Review of Systems:   The 10 point Review of Systems is negative other than noted in the HPI.  No fever or chills.  No recent weight loss.           Physical Exam:   Blood pressure 125/43, pulse 56, temperature 97.5  F (36.4  C), temperature source Oral, resp. rate 18, height 1.727 m (5' 8\"), weight 67.1 kg (148 lb), SpO2 98 %, not currently breastfeeding.  148 lbs 0 oz    Thin healthy female in no distress.  Patient has a pleasant affect and communicates well.   Pupils equal round and reactive to light.   No cervical lymphadenopathy or thyromegaly.   Lung fields clear, breathing comfortably.   Heart normal sinus rhythm.  No murmurs rubs or gallops.  Abdomen soft, minimally tender.  No distention.  Scars consistent with surgical history.  No rebound or guarding.  Skin warm, dry.  No obvious rashes or lesions.           Data:   All new lab and imaging data was reviewed.  CT scan images suggest pancreatitis.  Also noted is possible partial small bowel obstruction related to a thickened loop of small intestine.  Minimal gastric " distention.  Recent Labs   Lab Test 08/27/21 1925 09/24/18  2116   WBC 9.9 6.4   HGB 12.8 13.8   MCV 90 93    262      Recent Labs   Lab Test 08/28/21  0646 08/27/21  1925    139   POTASSIUM 3.4 4.0   CHLORIDE 109 106   CO2 29 28   BUN 11 14   CR 0.80 0.86   ANIONGAP 1* 5   GLEN 8.1* 8.9   GLC 84 96

## 2021-08-28 NOTE — PHARMACY-ADMISSION MEDICATION HISTORY
Pharmacy Medication History  Admission medication history interview status for the 8/27/2021 admission is complete. See EPIC admission navigator for prior to admission medications     Location of Interview: Patient room  Medication history sources: Patient and Surescripts    Significant changes made to the medication list:  Added: Repatha, lorazepam, estrogen pellet, vitamin A  Removed: Estradiol patch, oxycodone  Changed: Atorvastatin 20 mg -> 40 mg    In the past week, patient estimated taking medication this percent of the time: greater than 90%    Additional medication history information:   Patient has a prescription for hydrochlorothiazide 25 mg, but she only takes this when she is traveling and has leg swelling. She has not taken it in several months (not added to PTA list.)    Medication reconciliation completed by provider prior to medication history? No    Time spent in this activity: 20 minutes    Prior to Admission medications    Medication Sig Last Dose Taking? Auth Provider   Atorvastatin Calcium (LIPITOR PO) Take 40 mg by mouth At Bedtime  8/26/2021 at Unknown time Yes Reported, Patient   calcium-magnesium (CALMAG) 500-250 MG TABS Take 2 tablets by mouth daily 8/26/2021 at Unknown time Yes Reported, Patient   EVOLOCUMAB SC Inject 140 mg Subcutaneous every 14 days 8/25/2021 Yes Unknown, Entered By History   EZETIMIBE PO Take 10 mg by mouth At Bedtime 8/26/2021 at Unknown time Yes Reported, Patient   LORazepam (ATIVAN) 0.5 MG tablet Take 1-2 tablets by mouth twice daily as needed for anxiety  Yes Unknown, Entered By History   Montelukast Sodium (SINGULAIR PO) Take 10 mg by mouth At Bedtime  8/26/2021 at Unknown time Yes Reported, Patient   UNABLE TO FIND MEDICATION NAME: Estrogen pellet in arm  Yes Unknown, Entered By History   valACYclovir (VALTREX) 1000 mg tablet Take 1,000 mg by mouth daily as needed (cold sore)   Yes Reported, Patient   VITAMIN A PO Take 1 capsule (unknown dose, prescription  strength) by mouth daily 8/26/2021 at Unknown time Yes Unknown, Entered By History   Zolpidem Tartrate (AMBIEN PO) Take 12.5 mg by mouth nightly as needed for sleep  Yes Reported, Patient       The information provided in this note is only as accurate as the sources available at the time of update(s)

## 2021-08-28 NOTE — ED PROVIDER NOTES
History   Chief Complaint:  Abdominal pain and vomiting     HPI   Laura Gonzalez is a 70 year old female with history of duodenal polyp who presents with abdominal pain and vomiting. Patient reports she is post operative for a pylorus-preserving whipple (2019 for a duodenal polyp) with recurrent choledochojejunostomy anastomotic stricture in 2020 requiring stent (Stent removal 2020). Recently she had a positive hydrogen breath for small bowel bacteria and started a 2 week course of antibiotics approximately 3 weeks ago. She notes she has another breath test scheduled. Today, her abdominal pain began approximately 4 hours ago and she feels like something is obstructed. Patient has been vomiting for the past 4 hours and had yellow emesis full of mucous. She has been able to belch but not pass gas. Her symptoms have gotten progressively worse over the past 4 hours and feels similar to when she her biliary tree was obstructed.     Review of Systems   Gastrointestinal: Positive for abdominal pain and vomiting.   All other systems reviewed and are negative.      Allergies:  Codeine Camsylate    Medications:  Lipitor  Estradiol  Estring  Ezetimibe  Singulair   Oxycodone  Valtrex  Ambien  Vitamin D3  Repatha sureclick   Amoxil  Hydrochlorothiazide   Ativan  Medrol Dosepak  Zetia    Past Medical History:    Asthma  CAD  Hyperlipidemia  SOCO  Polyp colon adenomatous   Chronic abdominal pain  Polyp duodenal  Cataract    Past Surgical History:    Appendectomy  Left hip arthroplasty  Combined colonoscopy, single biopsy/polypectomy by biopsy  Foot surgery  HC tooth extraction w/forcep  Hysterectomy  Laparoscopic pancreatectomy-pylorus preserving whipple  Sinus surgery  Artery surgery  Breast augmentation  Salping-oophorectomy  Therapeutic     Family History:    Colon cancer  Chron's disease  Breast cancer  Hyperlipidemia  Anxiety disorder  Depression  Alcohol abuse  Clotting  "disorder  CAD  Arthritis  Lung cancer  ADD    Social History:  Patient presents alone.     Physical Exam     Patient Vitals for the past 24 hrs:   BP Temp Temp src Pulse Resp SpO2 Height Weight   08/27/21 2140 130/55 -- -- 56 -- -- -- --   08/27/21 1917 117/72 -- -- -- -- -- -- --   08/27/21 1912 -- 98.7  F (37.1  C) Oral -- -- -- -- --   08/27/21 1911 -- -- Oral 71 20 97 % 1.727 m (5' 8\") 67.1 kg (148 lb)       Physical Exam  General/Appearance: appears stated age, well-groomed, appears very uncomfortable, dry heaving  Eyes: EOMI, no scleral injection, no icterus  ENT: MMM  Neck: supple, nl ROM, no stiffness  Cardiovascular: RRR, nl S1S2, no m/r/g, 2+ pulses in all 4 extremities, cap refill <2sec  Respiratory: CTAB, good air movement throughout, no wheezes/rhonchi/rales, no increased WOB, no retractions  GI: abd soft, non-distended, diffuse ttp to light palpation,  no HSM, no rebound, no guarding, nl BS  MSK: AREVALO, good tone, no bony abnormality  Skin: warm and well-perfused, no rash, no edema, no ecchymosis, nl turgor  Neuro: GCS 15, alert and oriented, no gross focal neuro deficits  Psych: interacts appropriately  Heme: no petechia, no purpura, no active bleeding        Emergency Department Course   Imaging:  CT Abdomen Pelvis w Contrast  1.  Postoperative changes of prior Whipple procedure.   2.  Moderate fat stranding and fluid about the remaining pancreatic   body and tail is suspicious for acute pancreatitis.   3.  A segment of thickened small bowel in the left mid and upper   abdomen is suspicious for an infectious or inflammatory enteritis, and   appears to cause some degree of associated small bowel obstruction.   4.  Moderate amount of stool in the ascending colon.  Per radiology    Laboratory:  CMP: ALT 53 (H) o/w WNL (Creatinine 0.86)   Lipase: 18,274 (H)  CBC: WBC 9.9, HGB 12.8,    Asymptomatic COVID: in process    Emergency Department Course:    Reviewed:  I reviewed nursing notes, vitals, past " medical history and care everywhere    Assessments:  1904 I obtained history and examined the patient as noted above.   2100 I rechecked the patient and explained findings.   2136 I rechecked the patient and explained findings.     Consults:   2116 I spoke with the hospitalist service regarding patient's presentation, findings, and plan of care.  2133 I spoke with patient placement at [facility] regarding patient's presentation, findings, and plan of care.  2215 I spoke with [facility] regarding pt transfer. They are unable to accept at this time but state, if pt doesn't improve with conservative management in the next few days to recontact them for possible procedures    Interventions:  1924 Hydromorphone 1 mg IV  1925 Zofran 8 mg IV  1926 0.9% sodium chloride BOLUS 1,000 mL IV  1933 Omnipaque 50 mL PO  2113 Hydromorphone 0.5 mg IV  2113 0.9% sodium chloride BOLUS 1,000 mL IV    Disposition:  The patient was admitted to the hospital under the care of Dr. Jameson.       Impression & Plan   Medical Decision Making:  This patient is a pleasant 70-year-old female with history of Whipple status post recurrent choledochojejunostomy anastomotic stricture, status post stenting with stent removal who presents today with abdominal pain, nausea and vomiting, decreased gas/fluctuance. CT and labs confirm pancreatitis with possible small bowel obstruction. Is unclear if this is functional secondary to inflammation from the nearby pancreas. After IV pain meds and Zofran she looks markedly more comfortable. We will continue IV fluids, supportive care, and she will come into the hospitalist service with possible GI consultation as needed.    Covid-19  Laura Gonzalez was evaluated during a global COVID-19 pandemic, which necessitated consideration that the patient might be at risk for infection with the SARS-CoV-2 virus that causes COVID-19.   Applicable protocols for evaluation were followed during the patient's care.   COVID-19 was  considered as part of the patient's evaluation. The plan for testing is:  a test was obtained during this visit.    Diagnosis:    ICD-10-CM    1. Acute pancreatitis, unspecified complication status, unspecified pancreatitis type  K85.90    2. SBO (small bowel obstruction) (H)  K56.609        Discharge Medications:  New Prescriptions    No medications on file       Scribe Disclosure:  I, Bela Franco, am serving as a scribe at 7:04 PM on 8/27/2021 to document services personally performed by Nely Thapa based on my observations and the provider's statements to me.            Nely Thapa MD  08/27/21 2220       Nely Thapa MD  08/27/21 2220

## 2021-08-28 NOTE — PROGRESS NOTES
RECEIVING UNIT ED HANDOFF REVIEW    ED Nurse Handoff Report was reviewed by: Mariluz Geiger RN on August 27, 2021 at 11:41 PM

## 2021-08-28 NOTE — H&P
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 She has not been able to pass gas or have a bowel movement since. Her current symptoms feel similar to when she had obstruction of her biliary tree. She is found to have markedly elevated lipase of 18,274 and normal LFTs. CT scan results are as noted below.     IMPRESSION:   1.  Postoperative changes of prior Whipple procedure.  2.  Moderate fat stranding and fluid about the remaining pancreatic  body and tail is suspicious for acute pancreatitis.  3.  A segment of thickened small bowel in the left mid and upper  abdomen is suspicious for an infectious or inflammatory enteritis, and  appears to cause some degree of associated small bowel obstruction.  4.  Moderate amount of stool in the ascending colon.    - Etiology for pancreatitis is not clear. Pt denies EtOH use. She has been on some new medications (neomycin, rifaximin, repatha) but per Up to Date review these are now known to cause pancreatitis. Presumably 2/2 anatomical obstruction in the setting of her whipple procedure.  - SBO possibly secondary to inflammation, but could also be related to adhesions from multiple prior abdominal surgeries.    - Attempted to transfer  pt to [facility] and [place] (pt follows with GI at [place]) but no beds are available currently. The ED provider did discuss with the gastroenterologist on call at [place] who recommended treating conservatively for now. If she does not responding to conservative treatment, may consider transfer to [place] in the coming days for consideration of some sort of procedure.  - Continue IVF   - NPO  - Pain, and nausea control PRN  - Will consult GI here, assistance appreciated.  - General surgery consult for SBO      Hx of SIBO  Recently completed a course of Neomycin and Rifaximin.     Coronary Atherosclerosis  Follows with [place]. PTA medications include atrovastatin, ezetemibe and repatha.     Diet: NPO  DVT Prophylaxis: Pneumatic Compression Devices  Condon Catheter: Not present  Central Lines: None  Code Status: Full Code     Disposition Plan   Expected discharge: TBD recommended to prior living arrangement once adequate pain management/ tolerating PO medications.     The patient's care was discussed with the Patient.    Shakila Jameson MD  [facility]  Securely message with the Vocera Web Console (learn more here)  Text page via [facility] Paging/Directory      ______________________________________________________________________    Chief Complaint   Abdominal Pain     History is obtained from the patient    History of Present Illness   Laura Gonzalez is a 70 year old female who has a complicated GI history as noted above who presented to the ED with 4 hours of increasing abdominal pain and vomiting. She feels like her symptoms are similar to when she had her biliary stricture in 2020. She has had recurrent episodes of vomiting. She has not passed any gas or had a bowel movement for several hours.     She denies chest pain, shortness of breath, urinary symptoms, fever or chills.     Review of Systems    The 10 point Review of Systems is negative other than noted in the HPI or here.     Past Medical  History    I have reviewed this patient's medical history and updated it with pertinent information if needed.   Past Medical History:   Diagnosis Date     Asthma      CAD (coronary artery disease)      HLD (hyperlipidemia)        Past Surgical History   I have reviewed this patient's surgical history and updated it with pertinent information if needed.  Past Surgical History:   Procedure Laterality Date     APPENDECTOMY       ARTHROPLASTY HIP Left 6/15/2016    Procedure: ARTHROPLASTY HIP;  Surgeon: Jeffy Wolff MD;  Location: [facility] OR     COLONOSCOPY  10/3/2013    Procedure: COMBINED COLONOSCOPY, SINGLE BIOPSY/POLYPECTOMY BY BIOPSY;;  Surgeon: Kenney Walls MD;  Location:  GI     FOOT SURGERY       HC TOOTH EXTRACTION W/FORCEP       HYSTERECTOMY         Social History   I have reviewed this patient's social history and updated it with pertinent information if needed.  Social History     Tobacco Use     Smoking status: Former Smoker     Quit date: 10/3/1988     Years since quittin.9     Smokeless tobacco: Never Used   Substance Use Topics     Alcohol use: No     Drug use: No       Family History   Reviewed in Care everywhere and non-contributory     Prior to Admission Medications   Prior to Admission Medications   Prescriptions Last Dose Informant Patient Reported? Taking?   Atorvastatin Calcium (LIPITOR PO)   Yes No   Sig: Take 20 mg by mouth At Bedtime    ESTRING 2 MG vaginal ring   Yes No   EZETIMIBE PO   Yes No   Sig: Take 10 mg by mouth At Bedtime   Montelukast Sodium (SINGULAIR PO)   Yes No   Sig: Take 10 mg by mouth At Bedtime    UNABLE TO FIND   Yes No   Si capsule daily MEDICATION NAME: Strontium   Zolpidem Tartrate (AMBIEN PO)   Yes No   Sig: Take 10 mg by mouth nightly as needed for sleep   calcium-magnesium (CALMAG) 500-250 MG TABS   Yes No   Sig: Take 2 tablets by mouth daily   estradiol (VIVELLE-DOT) 0.1 MG/24HR patch   Yes No   Sig: Place 1 patch onto the skin twice a week    oxyCODONE IR (ROXICODONE) 5 MG tablet   No No   Sig: Take 1 tablet (5 mg) by mouth every 4 hours as needed for pain No driving a car or drinking alcohol for 8 hours after taking this medication.   valACYclovir (VALTREX) 1000 mg tablet   Yes No      Facility-Administered Medications: None     Allergies   Allergies   Allergen Reactions     Codeine Camsylate        Physical Exam   Vital Signs: Temp: 98.7  F (37.1  C) Temp src: Oral BP: 117/72 Pulse: 71   Resp: 20 SpO2: 97 % O2 Device: None (Room air)    Weight: 148 lbs 0 oz    Constitutional: Awake, alert, cooperative, no apparent distress.  Eyes: Conjunctiva and pupils examined and normal.  HEENT: Moist mucous membranes, normal dentition.  Respiratory: Clear to auscultation bilaterally, no crackles or wheezing.  Cardiovascular: Regular rate and rhythm, normal S1 and S2, and no murmur noted.  GI: Soft, non-distended, diffusely tender, hypoactive bowel sounds.  Skin: No rashes, no cyanosis, no edema.  Musculoskeletal: No joint swelling, erythema or tenderness.  Neurologic: Cranial nerves 2-12 intact, normal strength and sensation.  Psychiatric: Alert, oriented to person, place and time, no obvious anxiety or depression.      Data   Data reviewed today: I reviewed all medications, new labs and imaging results over the last 24 hours.  Recent Labs   Lab 08/27/21  1925   WBC 9.9   HGB 12.8   MCV 90         POTASSIUM 4.0   CHLORIDE 106   CO2 28   BUN 14   CR 0.86   ANIONGAP 5   GLEN 8.9   GLC 96   ALBUMIN 3.4   PROTTOTAL 7.2   BILITOTAL 0.3   ALKPHOS 135   ALT 53*   AST 40   LIPASE 18,274*     Recent Results (from the past 24 hour(s))   CT Abdomen Pelvis w Contrast    Narrative    CT ABDOMEN AND PELVIS WITH CONTRAST 8/27/2021 8:32 PM    CLINICAL HISTORY: Abdominal pain. Vomiting.    TECHNIQUE: CT scan of the abdomen and pelvis was performed following  injection of IV contrast. Multiplanar reformats were obtained. Dose  reduction techniques were used.  CONTRAST:  74mL Isovue-370  COMPARISON: None.    FINDINGS:   LOWER CHEST: The visualized lung bases are clear.    HEPATOBILIARY: The gallbladder is not seen, and is likely surgically  absent. Mild pneumobilia may be related to a prior sphincterotomy. No  focal hepatic masses are identified.    PANCREAS: Postoperative changes of prior Whipple procedure. Moderate  fat stranding and fluid about the remaining pancreatic body and tail  is suspicious for acute pancreatitis. No areas of pancreatic  nonenhancement to suggest pancreatic necrosis. No discrete  peripancreatic fluid collections are identified.    SPLEEN: Normal.    ADRENAL GLANDS: Normal.    KIDNEYS/BLADDER: Urinary bladder is mildly distended. The kidneys are  unremarkable. No hydronephrosis.    BOWEL: There are multiple mildly dilated loops of proximal small  bowel, with a transition point at a thickened segment of small bowel  in the left upper abdomen measuring approximately 12 cm in length  (series 5 images 15-36). Multiple loops of decompressed distal small  bowel are noted in the right lower quadrant. There is a moderate  amount of stool in the ascending colon. Prior appendectomy.    PELVIC ORGANS: Multiple images through the pelvis are degraded by  artifact related to a left hip arthroplasty. Prior hysterectomy.    LYMPH NODES: No enlarged lymph nodes are identified in the abdomen or  pelvis.    VASCULATURE: Moderate atherosclerotic aortoiliac calcification.    ADDITIONAL FINDINGS: None.    MUSCULOSKELETAL: Degenerative changes are noted in the lumbar spine.      Impression    IMPRESSION:   1.  Postoperative changes of prior Whipple procedure.  2.  Moderate fat stranding and fluid about the remaining pancreatic  body and tail. Findings are suspicious for acute pancreatitis.  Clinical and laboratory correlation are recommended.  3.  A segment of thickened small bowel in the left mid and upper  abdomen is suspicious for an infectious or inflammatory enteritis,  and  appears to cause some degree of associated small bowel obstruction.  4.  Moderate amount of stool in the ascending colon.    ESTEPHANIA OREILLY MD         SYSTEM ID:  [id number]

## 2021-08-29 VITALS
DIASTOLIC BLOOD PRESSURE: 48 MMHG | TEMPERATURE: 49.3 F | OXYGEN SATURATION: 95 % | BODY MASS INDEX: 22.43 KG/M2 | RESPIRATION RATE: 16 BRPM | SYSTOLIC BLOOD PRESSURE: 112 MMHG | HEART RATE: 72 BPM | WEIGHT: 148 LBS | HEIGHT: 68 IN

## 2021-08-29 LAB
ALBUMIN SERPL-MCNC: 2.6 G/DL (ref 3.4–5)
ALP SERPL-CCNC: 112 U/L (ref 40–150)
ALT SERPL W P-5'-P-CCNC: 36 U/L (ref 0–50)
ANION GAP SERPL CALCULATED.3IONS-SCNC: 3 MMOL/L (ref 3–14)
AST SERPL W P-5'-P-CCNC: 31 U/L (ref 0–45)
BILIRUB DIRECT SERPL-MCNC: 0.2 MG/DL (ref 0–0.2)
BILIRUB SERPL-MCNC: 0.4 MG/DL (ref 0.2–1.3)
BUN SERPL-MCNC: 7 MG/DL (ref 7–30)
CALCIUM SERPL-MCNC: 8.2 MG/DL (ref 8.5–10.1)
CHLORIDE BLD-SCNC: 110 MMOL/L (ref 94–109)
CO2 SERPL-SCNC: 27 MMOL/L (ref 20–32)
CREAT SERPL-MCNC: 0.8 MG/DL (ref 0.52–1.04)
ERYTHROCYTE [DISTWIDTH] IN BLOOD BY AUTOMATED COUNT: 14.2 % (ref 10–15)
GFR SERPL CREATININE-BSD FRML MDRD: 74 ML/MIN/1.73M2
GLUCOSE BLD-MCNC: 89 MG/DL (ref 70–99)
HCT VFR BLD AUTO: 33.3 % (ref 35–47)
HGB BLD-MCNC: 10.7 G/DL (ref 11.7–15.7)
LIPASE SERPL-CCNC: 1426 U/L (ref 73–393)
MCH RBC QN AUTO: 29.2 PG (ref 26.5–33)
MCHC RBC AUTO-ENTMCNC: 32.1 G/DL (ref 31.5–36.5)
MCV RBC AUTO: 91 FL (ref 78–100)
PLATELET # BLD AUTO: 241 10E3/UL (ref 150–450)
POTASSIUM BLD-SCNC: 3.8 MMOL/L (ref 3.4–5.3)
PROT SERPL-MCNC: 5.8 G/DL (ref 6.8–8.8)
RBC # BLD AUTO: 3.67 10E6/UL (ref 3.8–5.2)
SODIUM SERPL-SCNC: 140 MMOL/L (ref 133–144)
WBC # BLD AUTO: 7 10E3/UL (ref 4–11)

## 2021-08-29 PROCEDURE — 85027 COMPLETE CBC AUTOMATED: CPT | Performed by: STUDENT IN AN ORGANIZED HEALTH CARE EDUCATION/TRAINING PROGRAM

## 2021-08-29 PROCEDURE — 36415 COLL VENOUS BLD VENIPUNCTURE: CPT | Performed by: STUDENT IN AN ORGANIZED HEALTH CARE EDUCATION/TRAINING PROGRAM

## 2021-08-29 PROCEDURE — 82248 BILIRUBIN DIRECT: CPT | Performed by: INTERNAL MEDICINE

## 2021-08-29 PROCEDURE — 80048 BASIC METABOLIC PNL TOTAL CA: CPT | Performed by: INTERNAL MEDICINE

## 2021-08-29 PROCEDURE — 99231 SBSQ HOSP IP/OBS SF/LOW 25: CPT | Performed by: PHYSICIAN ASSISTANT

## 2021-08-29 PROCEDURE — 83690 ASSAY OF LIPASE: CPT | Performed by: INTERNAL MEDICINE

## 2021-08-29 PROCEDURE — 99238 HOSP IP/OBS DSCHRG MGMT 30/<: CPT | Performed by: INTERNAL MEDICINE

## 2021-08-29 ASSESSMENT — ACTIVITIES OF DAILY LIVING (ADL)
ADLS_ACUITY_SCORE: 11

## 2021-08-29 NOTE — DISCHARGE SUMMARY
[facility]    Discharge Summary  Hospitalist    Date of Admission:  8/27/2021  Date of Discharge:  8/29/2021  Discharging Provider: Delgado Cross MD    Discharge Diagnoses   Acute pancreatitis likely related to stenosis at the pancreaticojejunostomy.    History of Present Illness     Laura Gonzalez is a 70 year old female who has a complicated GI history as noted above who presented to the ED with 4 hours of increasing abdominal pain and vomiting. She feels like her symptoms are similar to when she had her biliary stricture in 2020. She has had recurrent episodes of vomiting. She has not passed any gas or had a bowel movement for several hours.     She denies chest pain, shortness of breath, urinary symptoms, fever or chills.           Hospital Course   Laura Gonzalez was admitted on 8/27/2021.  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 She has not been able to pass gas or have a bowel movement since. Her current symptoms feel similar to when she had obstruction of her biliary tree.   -She is found to have markedly elevated lipase of 18,274 and normal  LFTs. CT scan results are as noted below.      IMPRESSION:   1.  Postoperative changes of prior Whipple procedure.  2.  Moderate fat stranding and fluid about the remaining pancreatic  body and tail is suspicious for acute pancreatitis.  3.  A segment of thickened small bowel in the left mid and upper  abdomen is suspicious for an infectious or inflammatory enteritis, and  appears to cause some degree of associated small bowel obstruction.  4.  Moderate amount of stool in the ascending colon.     - Etiology for pancreatitis is not clear. Pt denies EtOH use. She has been on some new medications (neomycin, rifaximin, repatha) but per Up to Date review these are now known to cause pancreatitis. Presumably 2/2 anatomical obstruction in the setting of her whipple procedure.  -calcium level nl.   - SBO possibly secondary to inflammation from pancreatitis, but could also be related to adhesions from multiple prior abdominal surgeries.       -Had 2L NS on admission. UOB independently  LFTs nl. Bmp nl. Lipase 18K--> 10K  Seen by Gen surgery 8/28. Bowel obstruction 2/2 intraabdominal inflammatory process. Agree with medical management. Recommend transfer to [place] team if surgical intervention needed. No need for surgical intervention at this time, appreciate in put  -BUN and hematocrit nl.   -no SIRS. Nl HR.   -abd pain better. No new issues  -did need prn dilaudid iv at beginning of admission  - had biliary stent removed about 1 year ago. No hx of pancreatic stenosis or stenting  -seen by Gen Surgery and [facility]  - pt pain rapidly improved. Started on clear liquid diet and tolerated  - Lipase down to 1,426.  Hb 10 range down from 12 range. Wbc nl. Nl vitals  - abd now soft, NT, abd pain resolved.   -off ivfs  -likely had mild ileus related to acute pancreatitis. Resolved.   Pancreatitis essentially resolved. Suspect likely related to stenosis at the ppancreaticojejunostomy. Essentially resolved. No further pain.  Tolerated low fat diet. OK for discharge per surgery and GI.   Pt will follow up with [facility] primary GI team this week.   Cont with low fat diet     -follow up Hb outpatient. Last Hb 7/2021 was in 12 range.          Hx of SIBO  Recently completed a course of Neomycin and Rifaximin.      Coronary Atherosclerosis  Follows with [place]. PTA medications include atrovastatin, ezetemibe and repatha.      Diet: pt advanced to low fat diet and tolerated  DVT Prophylaxis: Pneumatic Compression Devices  Condon Catheter: Not present  Central Lines: None  Code Status: Full Code            Disposition Plan-discharge home.     Delgado Cross MD, MD    Significant Results and Procedures   See hospital course  Pending Results   none  Unresulted Labs Ordered in the Past 30 Days of this Admission     No orders found from 7/28/2021 to 8/28/2021.          Code Status   Full Code       Primary Care Physician   MIQUEL ANDERS    Physical Exam   Temp: (!) 49.3  F (9.6  C) Temp src: Oral BP: 112/48 Pulse: 72   Resp: 16 SpO2: 95 % O2 Device: None (Room air)    Vitals:    08/27/21 1911   Weight: 67.1 kg (148 lb)     Vital Signs with Ranges  Temp:  [49.3  F (9.6  C)-98.1  F (36.7  C)] 49.3  F (9.6  C)  Pulse:  [52-72] 72  Resp:  [16-18] 16  BP: (109-130)/(45-52) 112/48  SpO2:  [95 %-98 %] 95 %  I/O last 3 completed shifts:  In: 2297.08 [P.O.:720; I.V.:1577.08]  Out: -     Constitutional: nad  Respiratory: CTAB  Cardiovascular: RRR no r/g/m  GI: soft, nt, nd, nl bS  Skin: no rash or edema  Musculoskeletal: no focal jt swelling or redness  Neurologic: nl speech and  mentation  Neuropsychiatric: nl affect    Discharge Disposition   Discharged to home  Condition at discharge: Good    Consultations This Hospital Stay   GASTROENTEROLOGY IP CONSULT  SURGERY GENERAL IP CONSULT    Time Spent on this Encounter   IDelgado MD, personally saw the patient today and spent less than or equal to 30 minutes discharging this  patient.    Discharge Orders      Reason for your hospital stay    Acute pancreatitis resolved and likely related to stenosis at the pancreaticojejunostomy.  There are no other obvious precipitating factors     Activity    Your activity upon discharge: activity as tolerated     Follow-up and recommended labs and tests     Follow up with primary gastroenterology team at [facility] this week  Follow up with primary care doctor this week with CBC to follow up hemoglobin level. With fluids went from 12 to 10 range.     Full Code     Diet    Follow this diet upon discharge: Orders Placed This Encounter      Low Fat Diet     Discharge Medications   Current Discharge Medication List      CONTINUE these medications which have NOT CHANGED    Details   Atorvastatin Calcium (LIPITOR PO) Take 40 mg by mouth At Bedtime       calcium-magnesium (CALMAG) 500-250 MG TABS Take 2 tablets by mouth daily      EVOLOCUMAB SC Inject 140 mg Subcutaneous every 14 days      EZETIMIBE PO Take 10 mg by mouth At Bedtime      LORazepam (ATIVAN) 0.5 MG tablet Take 1-2 tablets by mouth twice daily as needed for anxiety      Montelukast Sodium (SINGULAIR PO) Take 10 mg by mouth At Bedtime       UNABLE TO FIND MEDICATION NAME: Estrogen pellet in arm      valACYclovir (VALTREX) 1000 mg tablet Take 1,000 mg by mouth daily as needed (cold sore)       VITAMIN A PO Take 1 capsule (unknown dose, prescription strength) by mouth daily      zolpidem ER (AMBIEN CR) 12.5 MG CR tablet Take 12.5 mg by mouth nightly as needed for sleep           Allergies   Allergies   Allergen Reactions     Codeine Camsylate      Data   Most Recent 3 CBC's:Recent Labs   Lab Test 08/29/21  0631 08/27/21  1925 09/24/18  2116   WBC 7.0 9.9 6.4   HGB 10.7* 12.8 13.8   MCV 91 90 93    313 262      Most Recent 3 BMP's:  Recent Labs   Lab Test 08/29/21  0631 08/28/21  0646 08/27/21  1925    139 139   POTASSIUM 3.8 3.4 4.0   CHLORIDE 110* 109 106   CO2 27 29 28   BUN 7 11  14   CR 0.80 0.80 0.86   ANIONGAP 3 1* 5   GLEN 8.2* 8.1* 8.9   GLC 89 84 96     Most Recent 2 LFT's:  Recent Labs   Lab Test 08/29/21  0631 08/28/21  0646   AST 31 28   ALT 36 38   ALKPHOS 112 113   BILITOTAL 0.4 0.5     Most Recent INR's and Anticoagulation Dosing History:  Anticoagulation Dose History     Recent Dosing and Labs Latest Ref Rng & Units 5/26/2006    INR 0.86 - 1.14 0.92        Most Recent 3 Troponin's:  Recent Labs   Lab Test 09/24/18  2116   TROPI <0.015     Most Recent Cholesterol Panel:  Recent Labs   Lab Test 08/28/21  0646   TRIG 54     Most Recent 6 Bacteria Isolates From Any Culture (See EPIC Reports for Culture Details):No lab results found.  Most Recent TSH, T4 and A1c Labs:No lab results found.  Results for orders placed or performed during the hospital encounter of 08/27/21   CT Abdomen Pelvis w Contrast    Narrative    CT ABDOMEN AND PELVIS WITH CONTRAST 8/27/2021 8:32 PM    CLINICAL HISTORY: Abdominal pain. Vomiting.    TECHNIQUE: CT scan of the abdomen and pelvis was performed following  injection of IV contrast. Multiplanar reformats were obtained. Dose  reduction techniques were used.  CONTRAST: 74mL Isovue-370  COMPARISON: None.    FINDINGS:   LOWER CHEST: The visualized lung bases are clear.    HEPATOBILIARY: The gallbladder is not seen, and is likely surgically  absent. Mild pneumobilia may be related to a prior sphincterotomy. No  focal hepatic masses are identified.    PANCREAS: Postoperative changes of prior Whipple procedure. Moderate  fat stranding and fluid about the remaining pancreatic body and tail  is suspicious for acute pancreatitis. No areas of pancreatic  nonenhancement to suggest pancreatic necrosis. No discrete  peripancreatic fluid collections are identified.    SPLEEN: Normal.    ADRENAL GLANDS: Normal.    KIDNEYS/BLADDER: Urinary bladder is mildly distended. The kidneys are  unremarkable. No hydronephrosis.    BOWEL: There are multiple mildly dilated loops of  proximal small  bowel, with a transition point at a thickened segment of small bowel  in the left upper abdomen measuring approximately 12 cm in length  (series 5 images 15-36). Multiple loops of decompressed distal small  bowel are noted in the right lower quadrant. There is a moderate  amount of stool in the ascending colon. Prior appendectomy.    PELVIC ORGANS: Multiple images through the pelvis are degraded by  artifact related to a left hip arthroplasty. Prior hysterectomy.    LYMPH NODES: No enlarged lymph nodes are identified in the abdomen or  pelvis.    VASCULATURE: Moderate atherosclerotic aortoiliac calcification.    ADDITIONAL FINDINGS: None.    MUSCULOSKELETAL: Degenerative changes are noted in the lumbar spine.      Impression    IMPRESSION:   1.  Postoperative changes of prior Whipple procedure.  2.  Moderate fat stranding and fluid about the remaining pancreatic  body and tail. Findings are suspicious for acute pancreatitis.  Clinical and laboratory correlation are recommended.  3.  A segment of thickened small bowel in the left mid and upper  abdomen is suspicious for an infectious or inflammatory enteritis, and  appears to cause some degree of associated small bowel obstruction.  4.  Moderate amount of stool in the ascending colon.    ESTEPHANIA OREILLY MD         SYSTEM ID:  [id number]

## 2021-08-29 NOTE — PROGRESS NOTES
GI    Patient feeling much improved today.  Tolerating liquids, cream of wheat, would like to try regular diet.  This has not worsened her abdominal pain, she actually denies pain currently.    Abdomen is soft and nontender    My impression is this patient's pancreatitis has largely resolved and is likely related to stenosis at the pancreaticojejunostomy.  There are no other obvious precipitating factors for diagnosis.  She is improved to the point where I think she can be discharged with short interval follow-up with [facility].    Please call with questions, we will sign off.    Allen Duffy DO   [facility]  Cell [phone]

## 2021-08-29 NOTE — PROGRESS NOTES
"General Surgery Progress Note    Admission Date: 8/27/2021  Today's Date: 8/29/2021         Assessment:      Laura Gonzalez is a 70 year old female with history of Whipple procedure admitted with pancreatitis and likely stenosis at pancreaticojejunostomy.    Significantly improved. Passing gas, tolerating full liquids, free of nausea.         Plan:   - Trying lower fat diet for lunch today. If tolerates this well without recurrent pain or nausea, expect discharge home today  - Give miralax today. Discussed home bowel regimen to keep stools soft and moving. She has medications at home she will use  - Follow-up with [facility] team. She has already been in contact with them and is scheduling appointment  - Continue with frequent ambulation, PCDs while resting  - No further recommendations from surgery.        Interval History:   Afebrile overnight, vitals stable. One inaccurate temp reading recorded overnight. Patient feeling significantly better. Tolerating full liquids, no nausea, minimal pain. Passing gas, going for long walks in the halls. Lipase continues to improve.          Physical Exam:   /48   Pulse 72   Temp (!) 49.3  F (9.6  C) (Oral)   Resp 16   Ht 1.727 m (5' 8\")   Wt 67.1 kg (148 lb)   SpO2 95%   BMI 22.50 kg/m    I/O last 3 completed shifts:  In: 2297.08 [P.O.:720; I.V.:1577.08]  Out: -   General: NAD, pleasant, alert and oriented x3  Respiratory: non-labored breathing   Abdomen: soft, minimal tenderness to palpation. Nondistended. Well-healed surgical scars.  Extremities: no lower extremity edema, no calf tenderness    LABS:  Recent Labs   Lab Test 08/29/21  0631 08/27/21 1925 09/24/18 2116   WBC 7.0 9.9 6.4   HGB 10.7* 12.8 13.8   MCV 91 90 93    313 262      Recent Labs   Lab Test 08/29/21  0631 08/28/21  0646 08/27/21 1925   POTASSIUM 3.8 3.4 4.0   CHLORIDE 110* 109 106   CO2 27 29 28   BUN 7 11 14   CR 0.80 0.80 0.86   ANIONGAP 3 1* 5      Recent Labs   Lab Test " 08/29/21  0631 08/28/21  0646 08/27/21 1925   ALBUMIN 2.6* 2.7* 3.4   BILITOTAL 0.4 0.5 0.3   ALT 36 38 53*   AST 31 28 40   ALKPHOS 112 113 135      Recent Labs   Lab Test 08/29/21  0631 08/28/21  0646 08/27/21 1925   LIPASE 1,426* 10,482* 18,274*     -------------------------------    Jeanie Moise PA-C  [facility]  [phone]

## 2021-08-29 NOTE — PLAN OF CARE
Pt is AxOx4, darshana otherwise VSS on RA, gave instruction not to be woken up for further vital signs check overnight.Jamila,clear liquid diet,PIV is s/l,denies any pain nor nausea, up ind, amb to bathroom and voiding o.k.Gen surgery/G.I following, attempted to transfer her to [facility] or [place] but bed unavailable,Plan is to minimize narcotic use., if pancreatitis worsens, will attempt endoscopy,discharge pending.

## 2021-08-29 NOTE — PROGRESS NOTES
MD Notification    Notified Person: MD    Notified Person Name: Tl Todd    Notification Date/Time: 8/28/21 @ 10:39 pm    Notification Interaction: Page    Purpose of Notification: Obs 23 EB. Can we d/c IV fluids. Pt tolerating clears. Thank you.     Orders Received:    Comments:

## 2021-08-29 NOTE — PROGRESS NOTES
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 She has not been able to pass gas or have a bowel movement since. Her current symptoms feel similar to when she had obstruction of her biliary tree.   -She is found to have markedly elevated lipase of 18,274 and normal LFTs. CT scan results are as noted below.      IMPRESSION:   1.  Postoperative changes of prior Whipple procedure.  2.  Moderate fat stranding and fluid about the remaining pancreatic  body and tail is suspicious for acute pancreatitis.  3.  A segment of thickened small bowel in the left mid and upper  abdomen is suspicious for an infectious or inflammatory enteritis, and  appears to cause some degree of associated small bowel obstruction.  4.  Moderate amount of stool in the ascending colon.     - Etiology for pancreatitis is not clear. Pt denies EtOH use. She has been on some new medications (neomycin, rifaximin, repatha) but per Up to Date review these are now known to cause pancreatitis. Presumably 2/2 anatomical obstruction in the setting of her whipple procedure.  -calcium level nl.   - SBO possibly secondary to inflammation, but could also be related to adhesions from multiple prior abdominal surgeries.      -Had 2L NS on admission. UOB independently  LFTs  nl. Bmp nl. Lipase 18K--> 10K  Seen by Gen surgery 8/28. Bowel obstruction 2/2 intraabdominal inflammatory process. Agree with medical management. Recommend transfer to [place] team if surgical intervention needed. No need for surgical intervention at this time, appreciate in put  -BUN and hematocrit nl.   -no SIRS. Nl HR.   -abd pain better. No new issues  -did need prn dilaudid iv at beginning of admission  - had biliary stent removed about 1 year ago. No hx of pancreatic stenosis or stenting  -seen by Gen Surgery and [facility]  - pt pain rapidly improved. Started on clear liquid diet and tolerated  - Lipase down to 1,426.  Hb 10 range down from 12 range. Wbc nl. Nl vitals  - abd now soft, NT, abd pain resolved.   -off ivfs    Plan:   -ADAT low fat. Advance to full liquids with bkfst  - possible discharge later today if does well with diet today.   Await GI in put  - will need close follow up with [facility] this week for further eval.   -follow up Hb outpatient. Last Hb 7/2021 was in 12 range.         Hx of SIBO  Recently completed a course of Neomycin and Rifaximin.      Coronary Atherosclerosis  Follows with [facility]. PTA medications include atrovastatin, ezetemibe and repatha.      Diet: NPO, diet per GI.   DVT Prophylaxis: Pneumatic Compression Devices  Condon Catheter: Not present  Central Lines: None  Code Status: Full Code            Disposition Plan     Expected discharge: possibly later today    Delgado Cross MD  Text Page  (7am to 6pm)  Interval History    Feeling well. No further abd pain.   Last pain  meds yest am. Passing gas    -Data reviewed today: I reviewed all new labs and imaging results over the last 24 hours. I personally reviewed labs and imaging since admission    Physical Exam   Temp: (!) 49.3  F (9.6  C) Temp src: Oral BP: 122/52 Pulse: 59   Resp: 16 SpO2: 95 % O2 Device: None (Room air)    Vitals:    08/27/21 1911   Weight: 67.1 kg (148 lb)     Vital Signs with Ranges  Temp:  [49.3   F (9.6  C)-98.1  F (36.7  C)] 49.3  F (9.6  C)  Pulse:  [52-59] 59  Resp:  [16-18] 16  BP: (109-130)/(45-52) 122/52  SpO2:  [95 %-98 %] 95 %  I/O last 3 completed shifts:  In: 2297.08 [P.O.:720; I.V.:1577.08]  Out: -     Constitutional: In bed, nad, nontoxic  Respiratory: CTAB  Cardiovascular: RRR no r/g/m. Not tachy  GI: soft, NT, nl BS  Skin/Integumen: no rash or edema  Neuro: nl speech and mentation  Psych: nl affect      Medications       atorvastatin  40 mg Oral At Bedtime     ezetimibe  10 mg Oral At Bedtime     montelukast  10 mg Oral At Bedtime     sodium chloride (PF)  3 mL Intracatheter Q8H       Data   Recent Labs   Lab 08/29/21  0631 08/28/21  0646 08/27/21  1925   WBC 7.0  --  9.9   HGB 10.7*  --  12.8   MCV 91  --  90     --  313    139 139   POTASSIUM 3.8 3.4 4.0   CHLORIDE 110* 109 106   CO2 27 29 28   BUN 7 11 14   CR 0.80 0.80 0.86   ANIONGAP 3 1* 5   GLEN 8.2* 8.1* 8.9   GLC 89 84 96   ALBUMIN 2.6* 2.7* 3.4   PROTTOTAL 5.8* 5.8* 7.2   BILITOTAL 0.4 0.5 0.3   ALKPHOS 112 113 135   ALT 36 38 53*   AST 31 28 40   LIPASE 1,426* 10,482* 18,274*       Imaging:   No results found for this or any previous visit (from the past 24 hour(s)).

## 2021-08-29 NOTE — PLAN OF CARE
Pt A/Ox4. VSS on RA. Denied nausea and abdominal pain this shift. Tolerated low fat diet. Amb to bathroom,voiding. Independent in room. O.K gen surgery. AVS discharge teachings done, pt verbalized understanding. Home meds returned to patient. With belongings, pt discharged with NA.

## 2022-02-18 ENCOUNTER — ANESTHESIA (OUTPATIENT)
Dept: SURGERY | Facility: CLINIC | Age: 72
End: 2022-02-18
Payer: COMMERCIAL

## 2022-02-18 ENCOUNTER — ANESTHESIA EVENT (OUTPATIENT)
Dept: SURGERY | Facility: CLINIC | Age: 72
End: 2022-02-18
Payer: COMMERCIAL

## 2022-02-18 ENCOUNTER — HOSPITAL ENCOUNTER (OUTPATIENT)
Facility: CLINIC | Age: 72
Discharge: HOME | End: 2022-02-18
Attending: EMERGENCY MEDICINE | Admitting: PLASTIC SURGERY
Payer: COMMERCIAL

## 2022-02-18 VITALS
WEIGHT: 145 LBS | DIASTOLIC BLOOD PRESSURE: 58 MMHG | OXYGEN SATURATION: 98 % | HEART RATE: 70 BPM | TEMPERATURE: 97.8 F | BODY MASS INDEX: 22.05 KG/M2 | SYSTOLIC BLOOD PRESSURE: 106 MMHG | RESPIRATION RATE: 16 BRPM

## 2022-02-18 DIAGNOSIS — N64.4 BREAST PAIN: ICD-10-CM

## 2022-02-18 LAB
ABO/RH(D): NORMAL
ANION GAP SERPL CALCULATED.3IONS-SCNC: 6 MMOL/L (ref 3–14)
ANTIBODY SCREEN: NEGATIVE
APTT PPP: 41 SECONDS (ref 22–38)
BASOPHILS # BLD AUTO: 0 10E3/UL (ref 0–0.2)
BASOPHILS NFR BLD AUTO: 0 %
BUN SERPL-MCNC: 17 MG/DL (ref 7–30)
CALCIUM SERPL-MCNC: 8.1 MG/DL (ref 8.5–10.1)
CHLORIDE BLD-SCNC: 103 MMOL/L (ref 94–109)
CO2 SERPL-SCNC: 27 MMOL/L (ref 20–32)
CREAT SERPL-MCNC: 0.83 MG/DL (ref 0.52–1.04)
EOSINOPHIL # BLD AUTO: 0.2 10E3/UL (ref 0–0.7)
EOSINOPHIL NFR BLD AUTO: 2 %
ERYTHROCYTE [DISTWIDTH] IN BLOOD BY AUTOMATED COUNT: 15.5 % (ref 10–15)
GFR SERPL CREATININE-BSD FRML MDRD: 74 ML/MIN/1.73M2
GLUCOSE BLD-MCNC: 152 MG/DL (ref 70–99)
HCT VFR BLD AUTO: 30.7 % (ref 35–47)
HGB BLD-MCNC: 7.6 G/DL (ref 11.7–15.7)
HGB BLD-MCNC: 9.6 G/DL (ref 11.7–15.7)
HOLD SPECIMEN: NORMAL
HOLD SPECIMEN: NORMAL
IMM GRANULOCYTES # BLD: 0 10E3/UL
IMM GRANULOCYTES NFR BLD: 0 %
INR PPP: 0.98 (ref 0.85–1.15)
LYMPHOCYTES # BLD AUTO: 1.2 10E3/UL (ref 0.8–5.3)
LYMPHOCYTES NFR BLD AUTO: 11 %
MCH RBC QN AUTO: 29.4 PG (ref 26.5–33)
MCHC RBC AUTO-ENTMCNC: 31.3 G/DL (ref 31.5–36.5)
MCV RBC AUTO: 94 FL (ref 78–100)
MONOCYTES # BLD AUTO: 0.8 10E3/UL (ref 0–1.3)
MONOCYTES NFR BLD AUTO: 7 %
NEUTROPHILS # BLD AUTO: 8.8 10E3/UL (ref 1.6–8.3)
NEUTROPHILS NFR BLD AUTO: 80 %
NRBC # BLD AUTO: 0 10E3/UL
NRBC BLD AUTO-RTO: 0 /100
PLATELET # BLD AUTO: 284 10E3/UL (ref 150–450)
POTASSIUM BLD-SCNC: 3.6 MMOL/L (ref 3.4–5.3)
RBC # BLD AUTO: 3.26 10E6/UL (ref 3.8–5.2)
SARS-COV-2 RNA RESP QL NAA+PROBE: NEGATIVE
SODIUM SERPL-SCNC: 136 MMOL/L (ref 133–144)
SPECIMEN EXPIRATION DATE: NORMAL
WBC # BLD AUTO: 11 10E3/UL (ref 4–11)

## 2022-02-18 PROCEDURE — 85610 PROTHROMBIN TIME: CPT | Performed by: EMERGENCY MEDICINE

## 2022-02-18 PROCEDURE — 360N000075 HC SURGERY LEVEL 2, PER MIN: Performed by: PLASTIC SURGERY

## 2022-02-18 PROCEDURE — 710N000009 HC RECOVERY PHASE 1, LEVEL 1, PER MIN: Performed by: PLASTIC SURGERY

## 2022-02-18 PROCEDURE — 93005 ELECTROCARDIOGRAM TRACING: CPT

## 2022-02-18 PROCEDURE — 999N000141 HC STATISTIC PRE-PROCEDURE NURSING ASSESSMENT: Performed by: PLASTIC SURGERY

## 2022-02-18 PROCEDURE — 99285 EMERGENCY DEPT VISIT HI MDM: CPT | Mod: 25

## 2022-02-18 PROCEDURE — 85018 HEMOGLOBIN: CPT | Performed by: ANESTHESIOLOGY

## 2022-02-18 PROCEDURE — 272N000001 HC OR GENERAL SUPPLY STERILE: Performed by: PLASTIC SURGERY

## 2022-02-18 PROCEDURE — 250N000013 HC RX MED GY IP 250 OP 250 PS 637: Performed by: ANESTHESIOLOGY

## 2022-02-18 PROCEDURE — 250N000011 HC RX IP 250 OP 636: Performed by: NURSE ANESTHETIST, CERTIFIED REGISTERED

## 2022-02-18 PROCEDURE — 250N000011 HC RX IP 250 OP 636: Performed by: ANESTHESIOLOGY

## 2022-02-18 PROCEDURE — 710N000012 HC RECOVERY PHASE 2, PER MINUTE: Performed by: PLASTIC SURGERY

## 2022-02-18 PROCEDURE — 96374 THER/PROPH/DIAG INJ IV PUSH: CPT

## 2022-02-18 PROCEDURE — 250N000009 HC RX 250: Performed by: NURSE ANESTHETIST, CERTIFIED REGISTERED

## 2022-02-18 PROCEDURE — 87635 SARS-COV-2 COVID-19 AMP PRB: CPT | Performed by: EMERGENCY MEDICINE

## 2022-02-18 PROCEDURE — 96375 TX/PRO/DX INJ NEW DRUG ADDON: CPT

## 2022-02-18 PROCEDURE — 86901 BLOOD TYPING SEROLOGIC RH(D): CPT | Performed by: EMERGENCY MEDICINE

## 2022-02-18 PROCEDURE — 250N000009 HC RX 250: Performed by: PLASTIC SURGERY

## 2022-02-18 PROCEDURE — 999N000054 HC STATISTIC EKG NON-CHARGEABLE

## 2022-02-18 PROCEDURE — 36415 COLL VENOUS BLD VENIPUNCTURE: CPT | Performed by: ANESTHESIOLOGY

## 2022-02-18 PROCEDURE — 258N000003 HC RX IP 258 OP 636: Performed by: NURSE ANESTHETIST, CERTIFIED REGISTERED

## 2022-02-18 PROCEDURE — 85730 THROMBOPLASTIN TIME PARTIAL: CPT | Performed by: EMERGENCY MEDICINE

## 2022-02-18 PROCEDURE — 85025 COMPLETE CBC W/AUTO DIFF WBC: CPT | Performed by: EMERGENCY MEDICINE

## 2022-02-18 PROCEDURE — 370N000017 HC ANESTHESIA TECHNICAL FEE, PER MIN: Performed by: PLASTIC SURGERY

## 2022-02-18 PROCEDURE — 82310 ASSAY OF CALCIUM: CPT | Performed by: EMERGENCY MEDICINE

## 2022-02-18 PROCEDURE — 250N000011 HC RX IP 250 OP 636: Performed by: EMERGENCY MEDICINE

## 2022-02-18 PROCEDURE — 86850 RBC ANTIBODY SCREEN: CPT | Performed by: EMERGENCY MEDICINE

## 2022-02-18 PROCEDURE — C9803 HOPD COVID-19 SPEC COLLECT: HCPCS

## 2022-02-18 PROCEDURE — 36415 COLL VENOUS BLD VENIPUNCTURE: CPT | Performed by: EMERGENCY MEDICINE

## 2022-02-18 PROCEDURE — 258N000003 HC RX IP 258 OP 636: Performed by: EMERGENCY MEDICINE

## 2022-02-18 RX ORDER — ONDANSETRON 2 MG/ML
INJECTION INTRAMUSCULAR; INTRAVENOUS PRN
Status: DISCONTINUED | OUTPATIENT
Start: 2022-02-18 | End: 2022-02-18

## 2022-02-18 RX ORDER — LIDOCAINE HYDROCHLORIDE AND EPINEPHRINE 10; 10 MG/ML; UG/ML
INJECTION, SOLUTION INFILTRATION; PERINEURAL PRN
Status: DISCONTINUED | OUTPATIENT
Start: 2022-02-18 | End: 2022-02-18 | Stop reason: HOSPADM

## 2022-02-18 RX ORDER — FENTANYL CITRATE 50 UG/ML
25 INJECTION, SOLUTION INTRAMUSCULAR; INTRAVENOUS
Status: DISCONTINUED | OUTPATIENT
Start: 2022-02-18 | End: 2022-02-18 | Stop reason: HOSPADM

## 2022-02-18 RX ORDER — HYDROMORPHONE HCL IN WATER/PF 6 MG/30 ML
0.4 PATIENT CONTROLLED ANALGESIA SYRINGE INTRAVENOUS EVERY 5 MIN PRN
Status: DISCONTINUED | OUTPATIENT
Start: 2022-02-18 | End: 2022-02-18 | Stop reason: HOSPADM

## 2022-02-18 RX ORDER — FENTANYL CITRATE 50 UG/ML
25 INJECTION, SOLUTION INTRAMUSCULAR; INTRAVENOUS EVERY 5 MIN PRN
Status: DISCONTINUED | OUTPATIENT
Start: 2022-02-18 | End: 2022-02-18 | Stop reason: HOSPADM

## 2022-02-18 RX ORDER — PROPOFOL 10 MG/ML
INJECTION, EMULSION INTRAVENOUS PRN
Status: DISCONTINUED | OUTPATIENT
Start: 2022-02-18 | End: 2022-02-18

## 2022-02-18 RX ORDER — GLYCOPYRROLATE 0.2 MG/ML
INJECTION, SOLUTION INTRAMUSCULAR; INTRAVENOUS PRN
Status: DISCONTINUED | OUTPATIENT
Start: 2022-02-18 | End: 2022-02-18

## 2022-02-18 RX ORDER — PHENYLEPHRINE HYDROCHLORIDE 10 MG/ML
INJECTION INTRAVENOUS PRN
Status: DISCONTINUED | OUTPATIENT
Start: 2022-02-18 | End: 2022-02-18

## 2022-02-18 RX ORDER — EPHEDRINE SULFATE 50 MG/ML
INJECTION, SOLUTION INTRAVENOUS PRN
Status: DISCONTINUED | OUTPATIENT
Start: 2022-02-18 | End: 2022-02-18

## 2022-02-18 RX ORDER — ONDANSETRON 2 MG/ML
4 INJECTION INTRAMUSCULAR; INTRAVENOUS EVERY 30 MIN PRN
Status: DISCONTINUED | OUTPATIENT
Start: 2022-02-18 | End: 2022-02-18 | Stop reason: HOSPADM

## 2022-02-18 RX ORDER — SODIUM CHLORIDE 9 MG/ML
INJECTION, SOLUTION INTRAVENOUS CONTINUOUS
Status: DISCONTINUED | OUTPATIENT
Start: 2022-02-18 | End: 2022-02-18 | Stop reason: HOSPADM

## 2022-02-18 RX ORDER — SODIUM CHLORIDE, SODIUM LACTATE, POTASSIUM CHLORIDE, CALCIUM CHLORIDE 600; 310; 30; 20 MG/100ML; MG/100ML; MG/100ML; MG/100ML
INJECTION, SOLUTION INTRAVENOUS CONTINUOUS
Status: DISCONTINUED | OUTPATIENT
Start: 2022-02-18 | End: 2022-02-18 | Stop reason: HOSPADM

## 2022-02-18 RX ORDER — DEXAMETHASONE SODIUM PHOSPHATE 4 MG/ML
INJECTION, SOLUTION INTRA-ARTICULAR; INTRALESIONAL; INTRAMUSCULAR; INTRAVENOUS; SOFT TISSUE PRN
Status: DISCONTINUED | OUTPATIENT
Start: 2022-02-18 | End: 2022-02-18

## 2022-02-18 RX ORDER — LIDOCAINE HYDROCHLORIDE 10 MG/ML
INJECTION, SOLUTION INFILTRATION; PERINEURAL PRN
Status: DISCONTINUED | OUTPATIENT
Start: 2022-02-18 | End: 2022-02-18

## 2022-02-18 RX ORDER — LABETALOL HYDROCHLORIDE 5 MG/ML
10 INJECTION, SOLUTION INTRAVENOUS EVERY 10 MIN PRN
Status: DISCONTINUED | OUTPATIENT
Start: 2022-02-18 | End: 2022-02-18 | Stop reason: HOSPADM

## 2022-02-18 RX ORDER — TRANEXAMIC ACID 10 MG/ML
1 INJECTION, SOLUTION INTRAVENOUS 2 TIMES DAILY
Status: DISCONTINUED | OUTPATIENT
Start: 2022-02-18 | End: 2022-02-18 | Stop reason: HOSPADM

## 2022-02-18 RX ORDER — OXYCODONE HYDROCHLORIDE 5 MG/1
5 TABLET ORAL EVERY 4 HOURS PRN
Status: DISCONTINUED | OUTPATIENT
Start: 2022-02-18 | End: 2022-02-18 | Stop reason: HOSPADM

## 2022-02-18 RX ORDER — KETOROLAC TROMETHAMINE 30 MG/ML
15 INJECTION, SOLUTION INTRAMUSCULAR; INTRAVENOUS
Status: COMPLETED | OUTPATIENT
Start: 2022-02-18 | End: 2022-02-18

## 2022-02-18 RX ORDER — HYDROMORPHONE HYDROCHLORIDE 1 MG/ML
0.5 INJECTION, SOLUTION INTRAMUSCULAR; INTRAVENOUS; SUBCUTANEOUS
Status: DISCONTINUED | OUTPATIENT
Start: 2022-02-18 | End: 2022-02-18 | Stop reason: HOSPADM

## 2022-02-18 RX ORDER — CEFAZOLIN SODIUM/WATER 2 G/20 ML
2 SYRINGE (ML) INTRAVENOUS
Status: COMPLETED | OUTPATIENT
Start: 2022-02-18 | End: 2022-02-18

## 2022-02-18 RX ORDER — ONDANSETRON 4 MG/1
4 TABLET, ORALLY DISINTEGRATING ORAL EVERY 30 MIN PRN
Status: DISCONTINUED | OUTPATIENT
Start: 2022-02-18 | End: 2022-02-18 | Stop reason: HOSPADM

## 2022-02-18 RX ORDER — MEPERIDINE HYDROCHLORIDE 25 MG/ML
25 INJECTION INTRAMUSCULAR; INTRAVENOUS; SUBCUTANEOUS
Status: DISCONTINUED | OUTPATIENT
Start: 2022-02-18 | End: 2022-02-18 | Stop reason: HOSPADM

## 2022-02-18 RX ORDER — SODIUM CHLORIDE, SODIUM LACTATE, POTASSIUM CHLORIDE, CALCIUM CHLORIDE 600; 310; 30; 20 MG/100ML; MG/100ML; MG/100ML; MG/100ML
INJECTION, SOLUTION INTRAVENOUS CONTINUOUS PRN
Status: DISCONTINUED | OUTPATIENT
Start: 2022-02-18 | End: 2022-02-18

## 2022-02-18 RX ADMIN — PHENYLEPHRINE HYDROCHLORIDE 200 MCG: 10 INJECTION INTRAVENOUS at 18:14

## 2022-02-18 RX ADMIN — ONDANSETRON 4 MG: 2 INJECTION INTRAMUSCULAR; INTRAVENOUS at 16:29

## 2022-02-18 RX ADMIN — FENTANYL CITRATE 25 MCG: 50 INJECTION, SOLUTION INTRAMUSCULAR; INTRAVENOUS at 19:19

## 2022-02-18 RX ADMIN — FENTANYL CITRATE 50 MCG: 50 INJECTION, SOLUTION INTRAMUSCULAR; INTRAVENOUS at 17:40

## 2022-02-18 RX ADMIN — TRANEXAMIC ACID 1 G: 10 INJECTION, SOLUTION INTRAVENOUS at 18:18

## 2022-02-18 RX ADMIN — HYDROMORPHONE HYDROCHLORIDE 0.5 MG: 1 INJECTION, SOLUTION INTRAMUSCULAR; INTRAVENOUS; SUBCUTANEOUS at 16:19

## 2022-02-18 RX ADMIN — EPHEDRINE SULFATE 10 MG: 50 INJECTION, SOLUTION INTRAVENOUS at 17:46

## 2022-02-18 RX ADMIN — LIDOCAINE HYDROCHLORIDE 50 MG: 10 INJECTION, SOLUTION INFILTRATION; PERINEURAL at 17:40

## 2022-02-18 RX ADMIN — SODIUM CHLORIDE, POTASSIUM CHLORIDE, SODIUM LACTATE AND CALCIUM CHLORIDE: 600; 310; 30; 20 INJECTION, SOLUTION INTRAVENOUS at 17:35

## 2022-02-18 RX ADMIN — ONDANSETRON HYDROCHLORIDE 4 MG: 2 INJECTION, SOLUTION INTRAVENOUS at 18:14

## 2022-02-18 RX ADMIN — KETOROLAC TROMETHAMINE 15 MG: 30 INJECTION, SOLUTION INTRAMUSCULAR at 19:22

## 2022-02-18 RX ADMIN — FENTANYL CITRATE 25 MCG: 50 INJECTION, SOLUTION INTRAMUSCULAR; INTRAVENOUS at 19:27

## 2022-02-18 RX ADMIN — TRANEXAMIC ACID 1 G: 10 INJECTION, SOLUTION INTRAVENOUS at 17:35

## 2022-02-18 RX ADMIN — SODIUM CHLORIDE, POTASSIUM CHLORIDE, SODIUM LACTATE AND CALCIUM CHLORIDE: 600; 310; 30; 20 INJECTION, SOLUTION INTRAVENOUS at 18:27

## 2022-02-18 RX ADMIN — PHENYLEPHRINE HYDROCHLORIDE 200 MCG: 10 INJECTION INTRAVENOUS at 18:10

## 2022-02-18 RX ADMIN — GLYCOPYRROLATE 0.2 MG: 0.2 INJECTION, SOLUTION INTRAMUSCULAR; INTRAVENOUS at 17:48

## 2022-02-18 RX ADMIN — OXYCODONE HYDROCHLORIDE 5 MG: 5 TABLET ORAL at 19:47

## 2022-02-18 RX ADMIN — Medication 2 G: at 17:35

## 2022-02-18 RX ADMIN — PHENYLEPHRINE HYDROCHLORIDE 100 MCG: 10 INJECTION INTRAVENOUS at 17:56

## 2022-02-18 RX ADMIN — SODIUM CHLORIDE 1000 ML: 9 INJECTION, SOLUTION INTRAVENOUS at 16:24

## 2022-02-18 RX ADMIN — HYDROMORPHONE HYDROCHLORIDE 0.5 MG: 1 INJECTION, SOLUTION INTRAMUSCULAR; INTRAVENOUS; SUBCUTANEOUS at 16:46

## 2022-02-18 RX ADMIN — DEXAMETHASONE SODIUM PHOSPHATE 4 MG: 4 INJECTION, SOLUTION INTRA-ARTICULAR; INTRALESIONAL; INTRAMUSCULAR; INTRAVENOUS; SOFT TISSUE at 17:41

## 2022-02-18 RX ADMIN — FENTANYL CITRATE 50 MCG: 50 INJECTION, SOLUTION INTRAMUSCULAR; INTRAVENOUS at 18:54

## 2022-02-18 RX ADMIN — PROPOFOL 75 MCG/KG/MIN: 10 INJECTION, EMULSION INTRAVENOUS at 17:49

## 2022-02-18 RX ADMIN — PHENYLEPHRINE HYDROCHLORIDE 100 MCG: 10 INJECTION INTRAVENOUS at 18:01

## 2022-02-18 RX ADMIN — PHENYLEPHRINE HYDROCHLORIDE 100 MCG: 10 INJECTION INTRAVENOUS at 18:21

## 2022-02-18 RX ADMIN — PROPOFOL 120 MG: 10 INJECTION, EMULSION INTRAVENOUS at 17:40

## 2022-02-18 RX ADMIN — PHENYLEPHRINE HYDROCHLORIDE 100 MCG: 10 INJECTION INTRAVENOUS at 17:52

## 2022-02-18 NOTE — ED PROVIDER NOTES
History     Chief Complaint:  Post-op Problem     The history is provided by the patient and the spouse.      Laura Gonzalez is a 71 year old female with history of asthma, coronary artery disease, hyperlipidemia who presents with left breast pain which began approximately 2 hours ago as she was putting on a camisole. After this, she went to run a simple errand when her left breast began to swell. She states that she had a left-sided breast implant 1.5 weeks ago. She has been alternating between ibuprofen, Tylenol, and oxycodone and has been feeling well until the pain began approximately 2 hours ago. She denies fever, nausea, vomiting, discharge from the surgical incision site. Last oral intake was a sip of water approximately 45 minutes ago and breakfast ar 0730.    Review of Systems   All other systems reviewed and are negative.    Allergies:  Codeine    Medications:  Lipitor  Evolocumab  Ezetimibe  Ativan  Singulair  Valacyclovir  Ambien  Roxicodone  Zofran  Tramadol  Hydrochlorothiazide     Past Medical History:     Asthma  Coronary artery disease  Hyperlipidemia  SBO  Pancreatitis  Anxiety  Colon polyp  Chronic abdominal pain  Cataracts  Benign paroxysmal positional vertigo    Past Surgical History:    Appendectomy  Hip arthroplasty, left  Colonoscopy  Foot surgery  Tooth extraction  Hysterectomy with salpingo-oophorectomy  Pancreatectomy  Sinus surgery  Colon polyp removal  Artery surgery  Breast augmentation  Therapeutic     Family History:    Father: alcohol abuse, aortic aneurysm, depression, coronary artery disease, colon cancer  Mother: arthritis, lung cancer  Brother: depression, anxiety, hyperlipidemia, colon cancer  Sister: anxiety, depression   Son: ADD    Social History:  The patient presents to the ED with her .  The patient presents to the ED via car.    Physical Exam     Patient Vitals for the past 24 hrs:   BP Temp Temp src Pulse Resp SpO2 Weight   22 2043 106/58  97.8  F (36.6  C) Temporal 70 16 98 % --   02/18/22 2021 128/50 97.8  F (36.6  C) -- 70 14 100 % --   02/18/22 2000 120/58 98.4  F (36.9  C) Temporal 78 14 100 % --   02/18/22 1925 -- -- -- 72 12 100 % --   02/18/22 1920 128/65 -- -- 70 11 100 % --   02/18/22 1915 -- -- -- 81 11 100 % --   02/18/22 1910 125/59 -- -- 83 10 100 % --   02/18/22 1905 127/55 -- -- 82 22 (!) 85 % --   02/18/22 1900 122/62 -- -- (!) 0 25 100 % --   02/18/22 1720 116/54 97.5  F (36.4  C) -- 80 20 96 % --   02/18/22 1655 98/48 -- -- 76 -- 95 % --   02/18/22 1645 111/82 -- -- 78 -- 97 % --   02/18/22 1640 96/53 -- -- 80 -- 99 % --   02/18/22 1543 110/63 97  F (36.1  C) Oral 95 22 100 % 65.8 kg (145 lb)     Physical Exam  General: Resting on the bed.  Head: No obvious trauma to head.  Ears, Nose, Throat:  External ears normal.  Nose normal.    Eyes:  Conjunctivae clear.   CV: Regular rate and rhythm.  No murmurs.      Respiratory: Effort normal and breath sounds normal.  No wheezing or crackles.   Gastrointestinal: Soft.  No distension. There is no tenderness.    Neuro: Alert. Moving all extremities appropriately.  Normal speech.    Skin: Skin is warm and dry.  No rash noted.   Breast: left breast notably swollen and firm.  Incision appears clean dry intact.  Very tender on exam.      Emergency Department Course     ECG  ECG taken at 1645, ECG read at 1700  Normal sinus rhythm  Low voltage QRS  Borderline ECG  No significant change as compared to prior, dated 9/24/18.  Rate 80 bpm. GA interval 128 ms. QRS duration 76 ms. QT/QTc 404/465 ms. P-R-T axes 51 2 22.     Laboratory:  Labs Ordered and Resulted from Time of ED Arrival to Time of ED Departure   CBC WITH PLATELETS AND DIFFERENTIAL - Abnormal       Result Value    WBC Count 11.0      RBC Count 3.26 (*)     Hemoglobin 9.6 (*)     Hematocrit 30.7 (*)     MCV 94      MCH 29.4      MCHC 31.3 (*)     RDW 15.5 (*)     Platelet Count 284      % Neutrophils 80      % Lymphocytes 11      %  Monocytes 7      % Eosinophils 2      % Basophils 0      % Immature Granulocytes 0      NRBCs per 100 WBC 0      Absolute Neutrophils 8.8 (*)     Absolute Lymphocytes 1.2      Absolute Monocytes 0.8      Absolute Eosinophils 0.2      Absolute Basophils 0.0      Absolute Immature Granulocytes 0.0      Absolute NRBCs 0.0     PARTIAL THROMBOPLASTIN TIME - Abnormal    aPTT 41 (*)    INR - Normal    INR 0.98     TYPE AND SCREEN, ADULT    ABO/RH(D) A POS      Antibody Screen Negative      SPECIMEN EXPIRATION DATE [id number]        Emergency Department Course:       Reviewed:  I reviewed nursing notes, vitals, past medical history, Care Everywhere    Assessments:  1612 I obtained history and examined the patient as noted above.     Consults:  1618 PA from [facility] is at the bedside. She states that the patient has a delayed hematoma and Dr. Garcia plans to take her to the OR.  1700 Dr. Garcia arrived to the ED and is taking the patient to the OR.    Interventions:  1619 Dilaudid 0.5 mg IV  1624 NS 1 L IV  1629 Zofran 4 mg IV  1646 Dilaudid 0.5 mg IV    Disposition:  The patient was transferred to the OR under the care of Dr. Garcia.    Impression & Plan     Medical Decision Making:  Laura Gonzalez is a 71 year old female who presents to the emergency department for evaluation of breast pain.  VS reassuring.  Broad differential was pursued include not limited to postoperative complication, hematoma, abscess, cellulitis, postoperative surgical site infection, etc.  CBC with mild anemia but no significant leukocytosis.  Coags normal.  EKG done for preoperative purposes and unremarkable.  Patient given pain medications and nausea medicine.  She is feeling improved.  Plastic surgery to bedside to evaluate patient.  Concern for postoperative hematoma.  Plan for operative management.  Patient sent to the OR.    Diagnosis:    ICD-10-CM    1. Breast pain  N64.4        Scribe Disclosure:  Abigail ARAIZA  Patrick, am serving as a scribe at 3:58 PM on 2/18/2022 to document services personally performed by Ruthie Kline MD based on my observations and the provider's statements to me.        Ruthie Kline MD  02/18/22 0394

## 2022-02-18 NOTE — ANESTHESIA PROCEDURE NOTES
Airway       Patient location during procedure: OR       Procedure Start/Stop Times: 2/18/2022 5:42 PM  Staff -        CRNA: Narcisa Qiu APRN CRNA       Performed By: CRNA  Consent for Airway        Urgency: emergent  Indications and Patient Condition       Indications for airway management: amy-procedural       Induction type:intravenous       Mask difficulty assessment: 1 - vent by mask    Final Airway Details       Final airway type: supraglottic airway    Supraglottic Airway Details        Type: LMA       Brand: I-Gel       LMA size: 4    Post intubation assessment        Placement verified by: capnometry        Number of attempts at approach: 1       Secured with: commercial tube [email or web address]       Ease of procedure: easy       Dentition: Intact

## 2022-02-18 NOTE — ED TRIAGE NOTES
"Pt arrives to the ED due to intense pain after having a left sided breast implant surgery a week ago Tuesday. Pt states she contacted her surgeon who states they will meet them here. Pt states she is having \"delayed bleeding\".   "

## 2022-02-18 NOTE — ANESTHESIA PREPROCEDURE EVALUATION
Anesthesia Pre-Procedure Evaluation    Patient: Laura Gonzalez   MRN: [id number] : 1950        Preoperative Diagnosis: * No pre-op diagnosis entered *    Procedure : Procedure(s):  evacuate hematoma left  BREAST          Past Medical History:   Diagnosis Date     Asthma      CAD (coronary artery disease)      HLD (hyperlipidemia)       Past Surgical History:   Procedure Laterality Date     APPENDECTOMY       ARTHROPLASTY HIP Left 6/15/2016    Procedure: ARTHROPLASTY HIP;  Surgeon: Jeffy Wolff MD;  Location: [facility] OR     COLONOSCOPY  10/3/2013    Procedure: COMBINED COLONOSCOPY, SINGLE BIOPSY/POLYPECTOMY BY BIOPSY;;  Surgeon: Kenney Walls MD;  Location:  GI     FOOT SURGERY       HC TOOTH EXTRACTION W/FORCEP       HYSTERECTOMY        No Known Allergies   Social History     Tobacco Use     Smoking status: Former Smoker     Quit date: 10/3/1988     Years since quittin.4     Smokeless tobacco: Never Used   Substance Use Topics     Alcohol use: No      Wt Readings from Last 1 Encounters:   22 65.8 kg (145 lb)        Anesthesia Evaluation   Pt has had prior anesthetic. Type: General.    No history of anesthetic complications       ROS/MED HX  ENT/Pulmonary:     (+) Intermittent, asthma     Neurologic:  - neg neurologic ROS     Cardiovascular:     (+) Dyslipidemia --CAD ---    METS/Exercise Tolerance:     Hematologic:  - neg hematologic  ROS     Musculoskeletal:  - neg musculoskeletal ROS     GI/Hepatic:  - neg GI/hepatic ROS     Renal/Genitourinary:  - neg Renal ROS     Endo:  - neg endo ROS     Psychiatric/Substance Use:  - neg psychiatric ROS     Infectious Disease:  - neg infectious disease ROS     Malignancy:  - neg malignancy ROS     Other:  - neg other ROS          Physical Exam    Airway        Mallampati: I   TM distance: > 3 FB   Neck ROM: full   Mouth opening: > 3 cm    Respiratory Devices and Support         Dental  no notable dental history         Cardiovascular    cardiovascular exam normal          Pulmonary   pulmonary exam normal                OUTSIDE LABS:  CBC:   Lab Results   Component Value Date    WBC 11.0 02/18/2022    WBC 7.0 08/29/2021    HGB 9.6 (L) 02/18/2022    HGB 10.7 (L) 08/29/2021    HCT 30.7 (L) 02/18/2022    HCT 33.3 (L) 08/29/2021     02/18/2022     08/29/2021     BMP:   Lab Results   Component Value Date     02/18/2022     08/29/2021    POTASSIUM 3.6 02/18/2022    POTASSIUM 3.8 08/29/2021    CHLORIDE 103 02/18/2022    CHLORIDE 110 (H) 08/29/2021    CO2 27 02/18/2022    CO2 27 08/29/2021    BUN 17 02/18/2022    BUN 7 08/29/2021    CR 0.83 02/18/2022    CR 0.80 08/29/2021     (H) 02/18/2022    GLC 89 08/29/2021     COAGS:   Lab Results   Component Value Date    PTT 41 (H) 02/18/2022    INR 0.98 02/18/2022     POC:   Lab Results   Component Value Date     (H) 06/16/2016     HEPATIC:   Lab Results   Component Value Date    ALBUMIN 2.6 (L) 08/29/2021    PROTTOTAL 5.8 (L) 08/29/2021    ALT 36 08/29/2021    AST 31 08/29/2021    ALKPHOS 112 08/29/2021    BILITOTAL 0.4 08/29/2021     OTHER:   Lab Results   Component Value Date    GLEN 8.1 (L) 02/18/2022    LIPASE 1,426 (H) 08/29/2021       Anesthesia Plan    ASA Status:  2   NPO Status:  NPO Appropriate    Anesthesia Type: General.     - Airway: LMA   Induction: Intravenous, Propofol.   Maintenance: Balanced.        Consents    Anesthesia Plan(s) and associated risks, benefits, and realistic alternatives discussed. Questions answered and patient/representative(s) expressed understanding.    - Discussed:     - Discussed with:  Patient         Postoperative Care    Pain management: IV analgesics, Oral pain medications, Multi-modal analgesia.   PONV prophylaxis: Ondansetron (or other 5HT-3), Dexamethasone or Solumedrol     Comments:                Romaine Martinez MD

## 2022-02-19 NOTE — ANESTHESIA POSTPROCEDURE EVALUATION
Patient: Laura Gonzalez    Procedure: Procedure(s):  evacuate hematoma left  BREAST       Diagnosis:* No pre-op diagnosis entered *  Diagnosis Additional Information: No value filed.    Anesthesia Type:  General    Note:  Disposition: Inpatient   Postop Pain Control: Uneventful            Sign Out: Well controlled pain   PONV: No   Neuro/Psych: Uneventful            Sign Out: Acceptable/Baseline neuro status   Airway/Respiratory: Uneventful            Sign Out: Acceptable/Baseline resp. status   CV/Hemodynamics: Uneventful            Sign Out: Acceptable CV status; No obvious hypovolemia; No obvious fluid overload   Other NRE: NONE   DID A NON-ROUTINE EVENT OCCUR? No    Event details/Postop Comments:  PACU HGB 7.6. No apparent ongoing bloodloss. VSS. Josette           Last vitals:  Vitals Value Taken Time   /58 02/18/22 2000   Temp 98.4  F (36.9  C) 02/18/22 2000   Pulse 74 02/18/22 2003   Resp 22 02/18/22 2003   SpO2 98 % 02/18/22 2003   Vitals shown include unvalidated device data.    Electronically Signed By: Sung Tam MD  February 18, 2022  8:48 PM

## 2022-02-19 NOTE — OR NURSING
Dr. Garcia was called for discharge order and to clarify any discharge instructions.  He instructed me to give them instructions regarding the WILL drain and he would call the family tomorrow (2/19) to assess understanding and questions.      , Rubens confirmed that he received adequate instructions from Dr. Garcia and that he had a phone number to reach him in the event they had questions.    Reviewed WILL drain care and provided written instructions. Confirmed understanding.

## 2022-02-19 NOTE — ANESTHESIA CARE TRANSFER NOTE
Patient: Laura Gonzalez    Procedure: Procedure(s):  evacuate hematoma left  BREAST       Diagnosis: * No pre-op diagnosis entered *  Diagnosis Additional Information: No value filed.    Anesthesia Type:   General     Note:    Oropharynx: oropharynx clear of all foreign objects and spontaneously breathing  Level of Consciousness: awake  Oxygen Supplementation: face mask    Independent Airway: airway patency satisfactory and stable  Dentition: dentition unchanged  Vital Signs Stable: post-procedure vital signs reviewed and stable  Report to RN Given: handoff report given  Patient transferred to: PACU    Handoff Report: Identifed the Patient, Identified the Reponsible Provider, Reviewed the pertinent medical history, Discussed the surgical course, Reviewed Intra-OP anesthesia mangement and issues during anesthesia, Set expectations for post-procedure period and Allowed opportunity for questions and acknowledgement of understanding      Vitals:  Vitals Value Taken Time   /56 02/18/22 1857   Temp     Pulse 185 02/18/22 1857   Resp 25 02/18/22 1900   SpO2 100 % 02/18/22 1900   Vitals shown include unvalidated device data.    Electronically Signed By: ITZ Ball CRNA  February 18, 2022  7:01 PM

## 2022-02-21 LAB
ATRIAL RATE - MUSE: 80 BPM
DIASTOLIC BLOOD PRESSURE - MUSE: NORMAL MMHG
INTERPRETATION ECG - MUSE: NORMAL
P AXIS - MUSE: 51 DEGREES
PR INTERVAL - MUSE: 128 MS
QRS DURATION - MUSE: 76 MS
QT - MUSE: 404 MS
QTC - MUSE: 465 MS
R AXIS - MUSE: 2 DEGREES
SYSTOLIC BLOOD PRESSURE - MUSE: NORMAL MMHG
T AXIS - MUSE: 22 DEGREES
VENTRICULAR RATE- MUSE: 80 BPM

## 2022-02-24 NOTE — OP NOTE
Procedure Date: 02/18/2022    PREOPERATIVE DIAGNOSIS:  Left breast hematoma, status post capsulectomy 14 days previous.    POSTOPERATIVE DIAGNOSIS:  Left breast hematoma, status post capsulectomy 14 days previous.     PROCEDURE PERFORMED:  Evacuation of left breast hematoma, replacement of intact silicone implant.    INDICATION:  Treatment for expanding hematoma.    SURGEON:  Dayron Garcia MD    ASSISTANT:  None.    ANESTHESIA:  General, current events.    DESCRIPTION OF PROCEDURE:  The patient had been seen in the emergency room with an expanding left breast hematoma after a capsulectomy and implant exchange 14 days previous.  After correct identification of the patient, surgical site and informed consent, the patient was taken to the operating room.  General anesthesia was induced and the chest was prepped and draped sterilely.  A secondary inframammary fold incision was made and a hematoma was encountered.  This was evacuated from the space.  The implant was removed, placed in a sterile container and soaked in Betadine.  The complete hematoma was evacuated. Approximately 1 liter of hematoma was removed. With inspection of the pocket, there were 2 active arterial bleeding sites along the pectoralis muscle that were controlled with electrocautery.  Irrigation with saline solution was done and there were multiple areas of punctate oozing along the pectoralis muscle.  After considerable effort to control all sites of oozing and active bleeding, further irrigation with saline solution was done until absolute clear return was noted.  A drain was placed.  Antibiotic irrigation of Betadine solution was rinsed in the pocket.  Gloves were changed.  The implant was rinsed with Betadine solution and replaced into the pocket.      Belinda's layer was closed to itself with 2-0 Vicryl and the skin was closed with interrupted and running 3-0 Monocryl and a 3-0 Stratafix suture.  Interrupted staples were also placed on top of  this.  A dressing of Xeroform was placed, and the patient's drain was dressed with gauze placed to bulb suction and the fluffy dressing was placed over the chest and the patient was wrapped in an Ace wrap.  Upon completion of the operation, the patient was hemodynamically stable throughout.  There were no immediate complications.  The patient tolerated the procedure well.  Sponge and needle counts were correct upon completion and the patient was transferred to the recovery unit in stable fashion.  The operative events were communicated to the patient's .  Total blood loss of the operation itself was 50 mL.    Dayron Garcia MD        D: 2022   T: 2022   MT: [id number]    Name:     BERTHA HOBBS  MRN:      [id number]        Account:        [id number]   :      1950           Procedure Date: 2022     Document: [id number]

## 2022-03-17 ENCOUNTER — PATIENT OUTREACH (OUTPATIENT)
Dept: GASTROENTEROLOGY | Facility: CLINIC | Age: 72
End: 2022-03-17
Payer: COMMERCIAL

## 2022-03-17 NOTE — TELEPHONE ENCOUNTER
Per Dr. Noguera book into clinic on 3/23    Per referral  1. Whipple procedure a few years ago for a 5 cm sessile duodenal polyp after biopsy showed dysplastic cells and there was a failed attempt at endoscopic snare removal of polyp.    2. Recurrent episodes of upper abdo pain since then, some so severe that have prompted ER visits and some inpatient admissions to [facility], for recurrent pancreatitis.     3. At one point she had endoscopic treatment of biliary stricture via dilations and stents via direct endoscopic placement via Tyrone loop.    4. Now for past year has had increasing frequency of abdominal pain, now daily, significantly limiting her quality of life. Was admitted again last week to [facility] with a severe flare due to pancreatitis and [facility] are now recommending treatment of a pancreatic duct stricture via an EUS-guided rendezvous procedure. The medical (non-procedural) GI doc she saw made it sound like risks were high and so Laura would like a second opinion here at the [facility]    ML

## 2022-03-18 ENCOUNTER — DOCUMENTATION ONLY (OUTPATIENT)
Dept: GASTROENTEROLOGY | Facility: CLINIC | Age: 72
End: 2022-03-18
Payer: COMMERCIAL

## 2022-03-18 NOTE — PROGRESS NOTES
Called to request for recent images:    Department of Radiology in [place], [place]    [address] [place] [address]    [phone]      Scans:  MR, 9/1/21  MR, 9/24/21  DX, 3/10/22  CT, 3/10/22  MR, 3/11/22    Images will be pushed to  PACS.    Fortunato Lozano LPN

## 2022-03-21 ENCOUNTER — DOCUMENTATION ONLY (OUTPATIENT)
Dept: GASTROENTEROLOGY | Facility: CLINIC | Age: 72
End: 2022-03-21
Payer: COMMERCIAL

## 2022-03-21 NOTE — PROGRESS NOTES
No images received yet, per Film File room.    Called [facility] again to URGENTLY push images, as Pt has appt on 3/23/22.    Msg was sent to processor.    Fortunato Lozano LPN

## 2022-03-21 NOTE — PROGRESS NOTES
No images received yet. Called [facility] again to request for images.    Department of Radiology in [place], [place]    [address] [place] [address]    [phone]    HIM: [phone], option #2     Scans:  MR, 9/1/21  MR, 9/24/21  DX, 3/10/22  CT, 3/10/22  MR, 3/11/22     Images will be pushed to  PACS.     Fortunato Lozano LPN

## 2022-03-23 ENCOUNTER — TELEPHONE (OUTPATIENT)
Dept: GASTROENTEROLOGY | Facility: CLINIC | Age: 72
End: 2022-03-23

## 2022-03-23 ENCOUNTER — OFFICE VISIT (OUTPATIENT)
Dept: GASTROENTEROLOGY | Facility: CLINIC | Age: 72
End: 2022-03-23
Payer: COMMERCIAL

## 2022-03-23 VITALS
SYSTOLIC BLOOD PRESSURE: 118 MMHG | BODY MASS INDEX: 22.73 KG/M2 | HEART RATE: 66 BPM | OXYGEN SATURATION: 100 % | WEIGHT: 150 LBS | DIASTOLIC BLOOD PRESSURE: 67 MMHG | HEIGHT: 68 IN

## 2022-03-23 DIAGNOSIS — K85.90 ACUTE PANCREATITIS, UNSPECIFIED COMPLICATION STATUS, UNSPECIFIED PANCREATITIS TYPE: Primary | ICD-10-CM

## 2022-03-23 PROCEDURE — 99205 OFFICE O/P NEW HI 60 MIN: CPT | Mod: GC | Performed by: INTERNAL MEDICINE

## 2022-03-23 RX ORDER — HYDROCHLOROTHIAZIDE 25 MG/1
25 TABLET ORAL DAILY
COMMUNITY
Start: 2022-03-03 | End: 2022-05-20

## 2022-03-23 ASSESSMENT — PAIN SCALES - GENERAL: PAINLEVEL: NO PAIN (0)

## 2022-03-23 NOTE — PATIENT INSTRUCTIONS
Follow up:    Dr. Noguera has outlined the following steps after your recent clinic visit:    1. Your provider is recommending a fecal elastase test. This requires a stool sample. You can  a kit any [facility] lab location, take the kit home and return a sample to the lab.     2. Creon    3. Visit with dietitian    4. Follow up with Viv, date TBD      Please call with any questions or concerns regarding your clinic visit today.    It is a pleasure being involved in your health care.    Contacts post-consultation depending on your need:    Schedule Clinic Appointments            [phone] # 1   M-F 7:30 - 5 pm    Emily Cifuentes RN Care Coordinator  [phone]    Fortunato Lozano LPN    [phone]     OR Procedure Scheduling                             [phone]    My Chart is available 24 hours a day and is a secure way to access your records and communicate with your care team.  I strongly recommend signing up if you haven't already done so, if you are comfortable with computers.  If you would like to inquire about this or are having problems with My Chart access, you may call [phone] or go online at [email or web address].  Please allow at least 24 hours for a response and extra time on weekends and Holidays.       Creon was sent to your Pharmacy, with refills.  Please call your Pharmacy to ensure script has been received.  Please let me know if the cost is very high.     With a current Creon prescription, you can register for the Creon ON COURSE program ([employer] [employer]) which is a very simple process.  By doing this, you can receive FREE Vitamin supplements & delivery, for all eligible patients (You cannot be on Medicare, Medicaid, U[employer] or Government assistance programs)   You can look into [email or web address] if you don't have any coverage and cannot afford the medication.  It is based on your income with no guarantee that assistance would be available.  Also, you can  look into: www.pparx.org for assistance.     You ve been prescribed pancreatic enzyme therapy to help aid in your digestion because of pancreatic insufficiency and/or the symptoms you are exhibiting.  With normal digestion, pancreatic enzymes are released once nourishment is introduced by mouth, to aid in the breakdown of protein, carbohydrates and other elements.  When you have some insufficiency in this process, you can experience abdominal pain, bloating, nausea, vomiting, diarrhea +/- oily stools and/or abdominal cramping.     It s important to continue with a low fat diet, taking in small amounts of nourishment at a time, having a solid baseline of hydration at all times.  When pancreatic enzymes are recommended for you, it s best to take one enzyme pill at the start of getting nourishment; whether it s your first bite of solid food or initial drink of nutritional liquid supplements.  Keep in mind some liquid alternatives, like [employer] Instant Breakfast, Ensure and Boost, as well as smoothies and protein shakes.  It isn t necessary to take enzymes with clear liquids, but important to have water and clear liquids with you at all times for hydration.  Take another enzyme during that nourishment and at the end, up to prescription recommendations.     You may notice worsening or initiation of symptoms when introducing pancreatic enzymes to your digestive process, but should improve or subside within a week or so.  If you are experiencing significant symptoms, stop taking the enzymes and call your RN Care Coordinator.  If you are unsure if you are getting the response you should be and/or have any questions or concerns, please contact your RN Care Coordinator.      Important Safety Facts    The most common side effects include: increased (hyperglycemia) or decreased (hypoglycemia) blood sugars, pain in your stomach area (abdominal area), frequent or abnormal bowel movements, gas, vomiting, dizziness or sore throat  and cough.    CREON may increase blood uric acid levels. This may cause worsening of swollen, painful joints (gout).    CREON and other pancreatic enzyme products are made from the pancreas of pigs, the same pigs people eat as pork. These pigs may carry viruses. Although it has never been reported, it may be possible for a person to get a viral infection from taking pancreatic enzyme products that come from pigs.    CREON may increase your chance of having a rare bowel disorder called fibrosing colonopathy. This condition is serious and may require surgery. The risk of having this condition may be reduced by following the dosing instructions that your doctor gave you.  Before Starting  Tell your doctor if you:    are allergic to pork (pig) products    have gout, kidney disease, or high blood uric acid (hyperuricemia)    have trouble swallowing capsules    are pregnant or plan to become pregnant. It is not known if CREON will harm your unborn baby.    are breast-feeding or plan to breast-feed. It is not known if CREON passes into your breast milk.  CREON is a prescription medicine used to treat people who cannot digest food normally because their pancreas does not make enough enzymes.    Always take CREON with a meal or snack and enough liquid to swallow CREON completely.    Do not crush or chew CREON capsules or its contents, and do not hold the capsule or capsule contents in your mouth. This may cause irritation in your mouth or change the way CREON works in your body.

## 2022-03-23 NOTE — LETTER
"    3/23/2022         RE: Laura Gonzalez  [address]  [place] [address]        Dear Colleague,    Thank you for referring your patient, Laura Gonzalez, to the [facility] [place]. Please see a copy of my visit note below.    Gastroenterology History and Physical     Laura Gonzalez MRN# [id number]   YOB: 1950 Age: 71 year old       CC: Chronic pain, recurrent acute pancreatitis s/p Whipple procedure in 2019.     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     Was offered EUS guided \"rendezvous\" of pancreatic duct at [facility] but was concerned about need, high risk,  and lack of meeting the performing MD until the day of the procedure.     At least 2-3 documented episodes of acute pancreatitis, recent pain flare and admission to [facility] w low lipase. MRCPs with variable degrees of remnant PD dilation. See latest imaging from [facility] below.    After over one hour reviewing data, history, and options, our consensus -     1) Has had at least 2-3 documented episodes of acute pancreatitis since laparoscopic Whipple in 2019, documented by lipase and CT showing interstitial pancreatitis, last in Aug 2021. MRCPs with varying degrees of mild remnant pancreatic duct dilation. Now some element of chronic pain, similar, with low lipases suggestive of chronic pancreatitis.   2) Probably has evolved to chronic pancreatitis  3) Uncertain whether has obstructed pancreaticojejunostomy, or would benefit from PD decompression. In 2020 had EUS transgastric pancreatogram with \"prompt drainage) into jejunum.   4) Due to " uncertainty of need for aggressive intervention, we all agreed to wait and see how she does before offering   5) Reassured that if intervention is decided upon, Dr Oates has the longest and probably most cumulative experience with EUS guided pancreatic drainage, and is an ideal choice.   6) If intervention offered, would ideally consist of pancreaticogastrostomy, with extension of stents through stomach, pancreatic duct remnant , pancreaticojejunostomy and using small caliber needle (22 ga) , 018 wire, angioplasty balloon , and 3 or 4 F stents as we have reported and performed since last 4-5 years in such patients, in lieu of rendezvous, as only requires occasional revision, and appears to be safest w respect to acute complications due to small caliber of puncture, and especially if drainage fails.  7) Needs reassurane, and trial of medical therapy.     Questions were answered to the patient's stated satisfaction. They were advised on risks and benefits of the management options and expressed understanding and agreement with the current plan.    60 minutes were spent on this patient encounter with staff and resident together.    Plan:  - Creon , start 24K units per meal, increase as tolerated to 72 K  - Dietitian consult  - Fecal elastase  - RTC 3-4 weeks to discuss course, and possible EUS intervention.     Arnaldo Noguera M.D., NAVEEN, DEANDRE  Professor of Medicine  CoDirector, [facility]  [place] [id number]    HPI: Laura Gonzalez is a 71 year old female with past medical history of CAD, HLD, and unresectable duodenal polyp now s/p whipple procedure on 5/08/19. While undergoing workup for abdominal pain that started in fall of 2018 she underwent and EGD where a flat 5 cm duodenal polyp was discovered. She was recommended to undergo a polypectomy due to her history of colon cancer in her father and brother. On 2/19/19 she  "underwent an unsuccessful endoscopic polypectomy due to submucosal fibrosis. She was subsequently admitted to [facility] for a Whipple procedure. A duodenal surgical biopsy showed a tubulovillous morphology with low grade dysplasia.    Unfortunately her recovery has been complicated by recurrent pain episodes requiring hospitalizations with dramatically elevated lipase on at least one, possibly more occasions that [facility] [id number][facility]    She had recent hospitalization at [facility], summarized at bottom, for anemia / transfusion from very large hematoma resulting from breast implant revision. And abdominal pain flare, similar to her last documented episode of acute pancreatitis 8/2021. But with low lipase. LFTs essentially normal. Offered an EUS guided \"rendezvous\" for decompression of her pancreatic duct.  as of 3/24/2022 16:50     She has an extensive endoscopy/imaging history and briefly on 1/23/2020 she underwent MRCP with secretin which showed biliary dilation suggestive of hepaticojejunostomy and pancreatic duct following secretin administration suggestive of narrowing of the pancreaticojejunostomy, however, subsequently she underwent EUS/ERCP with Dr. Funes on 2/18/2020 with 22 gau needle puncture of 1-2mm pancreatic duct, where there was no evidence of stricture that needed stenting or dilation. Additionally she had a trial of Creon starting 3/4/2020 but it is not clear whether this had any benefit. At that time her A1C was 5.9 putting her in the prediabetic range. She did require admission on 6/26/2020 for abdominal pain with workup requiring EGD which revealed a patent but a strictured choledochojejunostomy and she did receive an AXIOS metal stent in the common hepatic duct across the choledochojejunostomy.   Ref. Range 9/24/2018 21:16 8/27/2021 19:25 8/28/2021 06:46 8/29/2021 06:31   Lipase Latest Ref Range: 73 - 393 U/L 138 18,274 (H) 10,482 (H) 1,426 " "(H)     IMPRESSION:   1.  Postoperative changes of prior Whipple procedure.  2.  Moderate fat stranding and fluid about the remaining pancreatic  body and tail. Findings are suspicious for acute pancreatitis.  Clinical and laboratory correlation are recommended.  3.  A segment of thickened small bowel in the left mid and upper  abdomen is suspicious for an infectious or inflammatory enteritis, and  appears to cause some degree of associated small bowel obstruction.  4.  Moderate amount of stool in the ascending colon.       suspected pancreatitis and/or anastomotic strictures. At other times her pain is equally severe but does not last long enough to require hospitalization. There are no obvious triggers.    Today she is doing well however she lives in a constant state of worry that her pain will recur. She denies any symptoms today other than low grade bloating and discomfort, that she can \"live with\" .    Faustino Nobles, Ph.D., MS4  [employer] [place] [employer]    1. Mildly prominent pancreatic duct is stable to slightly decreased in size.   2. Mild intrahepatic biliary ductal dilatation, peribiliary enhancement, and heterogeneous hepatic   enhancement, likely inflammatory related.    Narrative  Performed by [employer]  EXAM:  MR ABDOMEN MRCP WITHOUT AND WITH IV CONTRAST     3D maximum intensity projections/volume renderings were created on an independent workstation as   ordered by the treating provider and reviewed by the radiologist for biliary and pancreatic duct   visualization.     COMPARISON:  CT abdomen 03/07/2022. MR abdomen 09/24/2021.     FINDINGS:  Again demonstrated are postoperative changes of Whipple procedure. Mildly prominent   pancreatic duct in the remnant pancreas measuring 3 mm, previously measuring 4 mm on prior MR   abdomen 09/24/2021. Homogenous signal intensity and enhancement of the remnant pancreas. No   peripancreatic fluid collections.     Stable mild intrahepatic " biliary ductal dilatation with peribiliary enhancement. Pneumobilia. Stable   hepatic cysts. No suspicious hepatic lesions. Scattered regions of arterial hyperenhancement without   additional signal abnormality may be inflammatory or perfusional.     Stable prominent periportal and peripancreatic lymph nodes, possibly reactive. No ascites. Spleen   and adrenal glands are unremarkable. Small left renal cyst. L3 hemangioma. Thoracolumbar curvature.   No suspicious osseous lesions. Sacral Tarlov cysts.     Bilateral breast implants.    Procedure Note    Gracie Napier M.D. - 03/11/2022   Formatting of this note might be different from the original.   EXAM:  MR ABDOMEN MRCP WITHOUT AND WITH IV CONTRAST     3D maximum intensity projections/volume renderings were created on an independent workstation as   ordered by the treating provider and reviewed by the radiologist for biliary and pancreatic duct   visualization.     COMPARISON:  CT abdomen 03/07/2022. MR abdomen 09/24/2021.     FINDINGS:  Again demonstrated are postoperative changes of Whipple procedure. Mildly prominent   pancreatic duct in the remnant pancreas measuring 3 mm, previously measuring 4 mm on prior MR   abdomen 09/24/2021. Homogenous signal intensity and enhancement of the remnant pancreas. No   peripancreatic fluid collections.     Stable mild intrahepatic biliary ductal dilatation with peribiliary enhancement. Pneumobilia. Stable   hepatic cysts. No suspicious hepatic lesions. Scattered regions of arterial hyperenhancement without   additional signal abnormality may be inflammatory or perfusional.     Stable prominent periportal and peripancreatic lymph nodes, possibly reactive. No ascites. Spleen   and adrenal glands are unremarkable. Small left renal cyst. L3 hemangioma. Thoracolumbar curvature.   No suspicious osseous lesions. Sacral Tarlov cysts.     Bilateral breast implants.     IMPRESSION:   1. Mildly prominent pancreatic duct is stable to  slightly decreased in size.   2. Mild intrahepatic biliary ductal dilatation, peribiliary enhancement, and heterogeneous hepatic   enhancement, likely inflammatory related.  Specimen Collected: 03/11/22  3:13 PM Last Resulted: 03/11/22  3:42 PM   Received From: [facility]  Result Received: 03/23/22  8:21 AM        CT Abdomen Angiogram with IV Contrast    Anatomical Region Laterality Modality   Abdomen -- Computed Tomography   Cardiovascular RST LOS -- Computed Tomography   Abdominal ARZ LOS -- --   Vascular Interventional ARZ LOS -- --   Vascular Interventional FLA LOS -- --   Abdominal FLA LOS -- --       Impression  Performed by [employer] [place]  No active GI bleed. No active contrast extravasation.    Narrative  Performed by [id number]  EXAM:  CT ABDOMEN ANGIOGRAM WITH IV CONTRAST   Including 3D image post-processing.     COMPARISON:  Outside CT abdomen and pelvis performed 08/27/2021     FINDINGS:   VASCULAR FINDINGS:   No findings of active GI bleed. No contrast extravasation. Mesenteric vasculature is patent with   normal enhancement of the bowel. Patent portal vein. Mild to moderate calcified atherosclerosis of   the abdominal aorta and its branches. No aortic dissection, penetrating atherosclerotic ulcer, or   aneurysm. The celiac axis, SMA, renal arteries, and BEATRIS are patent.     ADDITIONAL FINDINGS:   Postoperative changes of pylorus preserving Whipple. Pneumobilia secondary to   pancreaticojejunostomy. Mild dependent pulmonary atelectasis and/or scarring. Partially seen   postoperative changes of the left breast. Degenerative changes of the spine. Lumbar curve. Left BRODIE.    Procedure Note    Trav Girard M.D. - 03/10/2022   Formatting of this note might be different from the original.   EXAM:  CT ABDOMEN ANGIOGRAM WITH IV CONTRAST   Including 3D image post-processing.     COMPARISON:  Outside CT abdomen and pelvis performed 08/27/2021     FINDINGS:   VASCULAR FINDINGS:   No findings of active GI  bleed. No contrast extravasation. Mesenteric vasculature is patent with   normal enhancement of the bowel. Patent portal vein. Mild to moderate calcified atherosclerosis of   the abdominal aorta and its branches. No aortic dissection, penetrating atherosclerotic ulcer, or   aneurysm. The celiac axis, SMA, renal arteries, and BEATRIS are patent.     ADDITIONAL FINDINGS:   Postoperative changes of pylorus preserving Whipple. Pneumobilia secondary to   pancreaticojejunostomy. Mild dependent pulmonary atelectasis and/or scarring. Partially seen   postoperative changes of the left breast. Degenerative changes of the spine. Lumbar curve. Left BRODIE.        Recent Data from [facility]  Related to Lipase  Component 03/10/22 09/03/20 06/24/20   Lipase, S 11 8 Low     In 2020, had following EUS after recurrent episodes of pancreatitis.    GI   Patient Name: Laura Gonzalze   MRN: [id number]   YOB: 1950   Age: 69   Gender: Female   Procedure Date: 2/18/2020   Procedure:               Upper EUS   Providers:               Rubens Funes MD   Referring Provider:      Itzel Armas MD   Pre-op Diagnoses:        Acute recurrent pancreatitis   Recommendation:        - The findings and recommendations were discussed with the referring        physician.   Findings:        ENDOSONOGRAPHIC FINDING: :        There was no sign of endosonographic abnormality in the entire remaining        pancreas. The duct in the tail measured 1mm and in the body 2mm. The        wall was not thickened, the side branches were not dilated or even        visible, and the parenchyma was normal. There is no evidence of an        anastomotic stricture and with simple pressure with the echoendoscope        tip against the gastric wall the duct completely drained into the        jejunum. To further assess the anastomosis, a 22 gauge needle was        advanced into the duct and contrast freely flowed into the jejunum and        the duct quickly  decompressed. The findings were discussed with the        referring team and they agree there is no role for drainage. We also        discussed the biliary tree and given the normal LFTs they opted for no        intervention.   Procedural Details:        The patient was seen, evaluated, history reviewed, airway and heart-lung        exams were performed by licensed provider and were satisfactory for        planned level of sedation care.        The risks, benefits and alternatives for the procedure and sedation were        discussed and informed consent was obtained. A procedural pause was        conducted in the presence of assisting personnel to verify the correct        patient identity and procedure to be performed. Throughout the        procedure, the patient's blood pressure, pulse, and oxygen saturations        were monitored continuously. The Endosonoscope was introduced through        the mouth, and advanced to the jejunum. The upper EUS was accomplished        without difficulty. The patient tolerated the procedure well.   Complications:           No immediate complications.   Estimated Blood Loss:        Estimated blood loss: none.   Attending Participation: I personally performed the entire procedure.   Rubens Funes MD   2/18/2020 9:13:20 AM   This report has been signed electronically.   Number of Addenda: 0   Note Initiated On: 2/18/2020 7:30 AM  Specimen Collected: --   Date: 02/18/20   Received From: [facility]          Results for BERTHA HOBBS (MRN [id number]) as of 3/24/2022 16:50   Ref. Range 9/24/2018 21:16 8/27/2021 19:25 8/28/2021 06:46 8/29/2021 06:31   Lipase Latest Ref Range: 73 - 393 U/L 138 18,274 (H) 10,482 (H) 1,426 (H)     Past Medical History:  Past Medical History:   Diagnosis Date     Asthma      CAD (coronary artery disease)      HLD (hyperlipidemia)        Past Surgical History:  Past Surgical History:   Procedure Laterality Date     APPENDECTOMY       ARTHROPLASTY  HIP Left 6/15/2016    Procedure: ARTHROPLASTY HIP;  Surgeon: Jeffy Wolff MD;  Location: [facility] OR     COLONOSCOPY  10/3/2013    Procedure: COMBINED COLONOSCOPY, SINGLE BIOPSY/POLYPECTOMY BY BIOPSY;;  Surgeon: Kenney Walls MD;  Location:  GI     FOOT SURGERY       HC TOOTH EXTRACTION W/FORCEP       HYSTERECTOMY       INCISION AND DRAINAGE BREAST Left 2/18/2022    Procedure: evacuate hematoma left  BREAST;  Surgeon: Dayron Garcia MD;  Location: [facility] OR       Allergies:   No Known Allergies    Medications:  Atorvastatin Calcium (LIPITOR PO), Take 40 mg by mouth At Bedtime   calcium-magnesium (CALMAG) 500-250 MG TABS, Take 2 tablets by mouth daily  EVOLOCUMAB SC, Inject 140 mg Subcutaneous every 14 days  EZETIMIBE PO, Take 10 mg by mouth At Bedtime  Montelukast Sodium (SINGULAIR PO), Take 10 mg by mouth At Bedtime   valACYclovir (VALTREX) 1000 mg tablet, Take 1,000 mg by mouth daily as needed (cold sore)   VITAMIN A PO, Take 1 capsule (unknown dose, prescription strength) by mouth daily  hydrochlorothiazide (HYDRODIURIL) 25 MG tablet, Take 25 mg by mouth daily  LORazepam (ATIVAN) 0.5 MG tablet, Take 1-2 tablets by mouth twice daily as needed for anxiety (Patient not taking: Reported on 3/23/2022)  UNABLE TO FIND, MEDICATION NAME: Estrogen pellet in arm (Patient not taking: Reported on 3/23/2022)  zolpidem ER (AMBIEN CR) 12.5 MG CR tablet, Take 12.5 mg by mouth nightly as needed for sleep (Patient not taking: Reported on 3/23/2022)    No current facility-administered medications on file prior to visit.      Medications prior to Admission:  Current Outpatient Medications   Medication Sig Dispense Refill     amylase-lipase-protease (CREON) 71621-33426 units CPEP per EC capsule Take 2-3 with meals / 1-2 with snacks, up to 15 per day. 450 capsule 6     Atorvastatin Calcium (LIPITOR PO) Take 40 mg by mouth At Bedtime        calcium-magnesium (CALMAG) 500-250 MG TABS Take 2 tablets by mouth daily        EVOLOCUMAB SC Inject 140 mg Subcutaneous every 14 days       EZETIMIBE PO Take 10 mg by mouth At Bedtime       Montelukast Sodium (SINGULAIR PO) Take 10 mg by mouth At Bedtime        valACYclovir (VALTREX) 1000 mg tablet Take 1,000 mg by mouth daily as needed (cold sore)        VITAMIN A PO Take 1 capsule (unknown dose, prescription strength) by mouth daily       hydrochlorothiazide (HYDRODIURIL) 25 MG tablet Take 25 mg by mouth daily       LORazepam (ATIVAN) 0.5 MG tablet Take 1-2 tablets by mouth twice daily as needed for anxiety (Patient not taking: Reported on 3/23/2022)       UNABLE TO FIND MEDICATION NAME: Estrogen pellet in arm (Patient not taking: Reported on 3/23/2022)       zolpidem ER (AMBIEN CR) 12.5 MG CR tablet Take 12.5 mg by mouth nightly as needed for sleep (Patient not taking: Reported on 3/23/2022)           Social History:  Social History     Socioeconomic History     Marital status:      Spouse name: Not on file     Number of children: Not on file     Years of education: Not on file     Highest education level: Not on file   Occupational History     Not on file   Tobacco Use     Smoking status: Former Smoker     Quit date: 10/3/1988     Years since quittin.4     Smokeless tobacco: Never Used   Substance and Sexual Activity     Alcohol use: No     Drug use: No     Sexual activity: Not on file   Other Topics Concern     Parent/sibling w/ CABG, MI or angioplasty before 65F 55M? Not Asked   Social History Narrative     Not on file     Social Determinants of Health     Financial Resource Strain: Not on file   Food Insecurity: Not on file   Transportation Needs: Not on file   Physical Activity: Not on file   Stress: Not on file   Social Connections: Not on file   Intimate Partner Violence: Not on file   Housing Stability: Not on file       Family History:  No family history on file.    ROS:  The remainder of the complete ROS was negative unless noted in the HPI.    Exam:  /67 (BP  "Location: Left arm, Patient Position: Sitting, Cuff Size: Adult Regular)   Pulse 66   Ht 1.715 m (5' 7.5\")   Wt 68 kg (150 lb)   SpO2 100%   BMI 23.15 kg/m      Constitutional: Well appearing, no acute distress, oriented to time, place, and self  Psychiatric: Normal speech, normal thought process, congruent mood  Skin: No noticeable traumatic lesions or rashes  Neurologic: Grossly no neurologic deficits, responds to questions  Eyes: Anicteric, equal and round, no conjunctival injection  Head, Ears, Throat: Atraumatic, anicteric, no proptosis, lips without lesions, normal appearing nose, vocalizes, no discharge  Neck: No visible lymphadenopathy, no goiter  Respiratory: Breathing comfortably, no digital clubbing  Cardiovascular: Non-diaphoretic, acyanotic, no peripheral edema  Abdomen: Non-distended, no obvious hernias or masses  Musculoskeletal: Moves limbs appropriately, no obvious deformities    Labs:  All laboratory data reviewed    Emily Cifuentes RN Freeman, Arnaldo Nguyen MD  Care Summary     Admission at [facility]   03/10/2022 - 03/12/2022   Anemia (Primary Dx);   Abdominal Pain;   Elevated Alkaline Phosphatase       HOSPITAL COURSE   Ms. Laura Gonzalez is a 71 y.o. female who lives at home with her  in [place], [place] with a PMH of duodenal poylp s/p pylorus preserving Whipple 5/2019 with recurrent choledochojejunostomy anastomotic stricture s/p AXIOS stenting and coaxial double pigtail stent in 6/2020, s/p stent removal 9/2020, hx of pancreatitis 9/2021 treated OSH, CAD, hyperlipidemia, who presents with abdominal pain, bloating, nausea and anemia.     Symptoms ongoing for 10 days. On ED presentation she was afebrile with stable vital signs. ED labs significant for hemoglobin 7.0, , lipase 11, total protein 5.7. Of note she had L breast implant 2/8/2022 with hematoma evacuation 2/18/2022. Prior to this event Hgb noted at 10.7 8/2021. Abdominal CTA and abdominal xray without new " "changes. She was given 1U pRBCs for Hgb 7.0.     MRCP obtained 3/11 with mild pancreatic duct dilation which was stable to decreased and mild intrahepatic biliary dilatation peribiliary enhancement and heterogenous hepatic enhancement, likely inflammatory. GI evaluation felt symptoms consistent with anastomotic stricture at choledocho jejunostomy stent site. Consideration in outpatient setting for interventional endoscopy and EUS guided transgastric approach at the pancreatic duct. This procedure with significant risk, ongoing discussions with patient and GI team.     Additionally with concern for hematoma at L breast surgical site, ultrasound performed and negative. Some concern for LE circumference asymmetry (L>R), LE ultrasound negative for DVT.     Iron studies obtained and significant for pronounced iron deficiency anemia. This, in context of post-surgical bleed and likely malabsorption given duodenal resection in context of polyp and Whipple procedure. It is notable however that prior to surgery her hemoglobin was quite stably normal without microcytosis. She has had normal colonoscopy 2020 with multiple EGDs over the past few years due to her GI issues. Do not feel oral iron supplementation will be useful given this operation. Will replete with IV iron dextran. Additionally workup for NIKOLAI per GI and PCP.     Patient stable for discharge on 3/12 with stable hemoglobin 7.7.     GI Consult while inpatient:   GASTROENTEROLOGY & HEPATOBILIARY CONSULT   Date/Time: 3/11/2022 9:08 AM CST   Patient Name: Laura Bertrand   MRN: [id number]   : 1950     CONSULT   Evaluation of: \"Followed by Dr. Mg. Pt with pyloris-sparing Whipple, recurrent episodes of pancreatitis and previous axial stent (now removed). He with pain syndrome similar to pancreatitis, without lipase or imaging consistent with this. ? Recurrent stricture\"   Referring Provider: Medicine 3     HISTORY OF PRESENT ILLNESS   Ms. Sanchez " Bret Gonzalez is a 71 y.o. female admitted to Medicine 3 on 3/10/2022 with 10-day history of abdominal pain, distention, bloating, and nausea. 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     Briefly, she reports 10 days of postprandial abdominal pain (worsening in duration and frequency, described as severe upper abdominal cramping pain), distention, bloating, and nausea; ROS also positive for drenching night sweats, fatigue, low oral intake d/t postprandial pain (with normal appetite). She states she has this type of severe, cramping pain at baseline but it lasts 1-2 minutes; when the pain became longer in duration and more frequent, she called EMS. Denies melena/hematochezia, vomiting/hematemesis, fevers/chills, weight changes. In the ED, she was vitally stable; hemoglobin 6.9 (baseline 12.0 in 9/2021, last 7.6 in 2/2022), normal BMP, normal ALT/ALT,  (baseline 130s-200s), albumin 3.3, lipase 11, normal UA. CTA abdomen showed only postoperative changes, pneumobilia secondary to pancreaticojejunostomy, and mild atelectasis without evidence of active GI bleed. She was transfused 1 unit PRBCs and admitted for further management. Her anemia was thought to be secondary to the recent breast hematoma in the absence of overt GI bleed symptoms and negative stool guaiac in the ED.     Regarding her recent episode  "of pancreatitis, she had OSH CT AP on 8/27 showing moderate fat stranding and fluid about the remaining pancreatic body and tail, with a segment of thickened small bowel suspicious for infectious/inflammatory enteritis appearing to cause some degree of associated SBO; labs checked at that time showed lipase 84890 > 33401 > 1426, triglycerides 54, normal LFTs excluding mild elevation in ALT 53. She was admitted at OSH and managed conservatively. She states that her symptoms at that time were similar to her current symptoms except that she had profound vomiting at that time. Her last MRCP was on 9/1/2021 and showed \"interval decrease in pancreas swelling and improved peripancreatic fluid with residual mild peripancreatic fat stranding, similar appearance of mild pancreatic duct dilation, redemonstrated diffuse mild bile duct dilation with amy/biliary hyperemia.\" Regarding the prior abdominal wall trigger point injections, she thinks she had some benefit with this, stating that \"the pain did go away,\" and reports that her abdominal wall pain is \"similar\" to her current pain. Otherwise denies intake of spicy/fatty foods, alcohol use, recent travel, recent illness or sick contacts, recent medication changes. She reports that her pain has improved considerably since admission, likely at least in part from the pain medications she has received.     REVIEW OF SYSTEMS   Negative except per HPI.     PHYSICAL EXAM   Vitals: /61 (BP Location: Right arm;Upper, Patient Position: Lying)  Pulse (!) 57  Temp 36.8  C (Oral)  Resp 16  Ht 171.5 cm  Wt 72 kg  SpO2 95%  BMI 24.49 kg/m      General: Sitting comfortably in bed. Cooperative, in no acute distress.   Eyes: Clear, non-injected, anicteric sclera.   ENT: MMM. No oropharyngeal exudate, erythema, or lesions.   Cardiac: Hemodynamically stable.   Lungs: Non-labored breathing on room air.   Abdomen: Tender to palpation in upper abdomen, particularly in RUQ > epigastric " regions, with mild diffuse discomfort in remainder of abdomen, not acutely peritonitic. Positive Carnett sign.   Extremities: Warm and well-perfused UE and LE.   Skin: No rashes.   Neuro: Alert, oriented x 3.     PERTINENT DIAGNOSTIC STUDIES   LABS   Recent Labs   03/11/22   0556 03/10/22   2216 03/10/22   1940   WBC 7.2 8.4 7.8   HGB 7.6 L 7.0 L 6.9 L    346 380 H    -- 138    H -- 106   BUN 10 -- 12   CREATININE 0.92 -- 0.80   CALCIUM 8.0 L -- 8.0 L   ALBUMIN 2.9 L -- 3.3 L   BILITOT 0.4 -- <0.2   AST 22 -- 29   ALT 24 -- 26   ALKPHOS 247 H -- 266 H     ASSESSMENT AND PLAN   Ms. Laura Gonzalez is a 71 y.o. female admitted to [place] [id number] on 3/10/2022 with 10-day history of abdominal pain, distention, bloating, and nausea. 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  was consulted for evaluation of her abdominal pain (query anastomotic stricture causing her symptoms).     Her negative CT imaging and normal lipase are indeed reassuring that she is not having acute pancreatitis. It is odd that her symptoms are postprandial, and it is unclear how her new night sweats fit into the picture as well. Agree with further workup for anastomotic stricture, for which we would suggest MRCP. If this is inconclusive and her symptoms persist/worsen, could consider invasive procedure such as  transgastric approach via EUS to assess for pancreatic duct stricture/dilatation, although will defer this for now (especially given her symptoms are improving) in favor of noninvasive approach. If workup is negative for progressive strictures, abdominal wall pain would certainly be higher on the differential - could also trial diagnostic and therapeutic lidocaine patches while inpatient, since trigger point injections can likely not be facilitated inpatient. Remainder as below.     PROBLEM LIST   # Upper abdominal pain   # Positive Carnett sign   # History of duodenal polyp s/p pylorus-sparing Whipple   # Recurrent choledochojejunostomy anastomotic stricture s/p stenting   # Recent history of acute pancreatitis   # Abdominal wall pain s/p trigger point injections     RECOMMENDATIONS   1. MRCP to further evaluate for pancreatic duct stricture/dilatation.   2. Can trial topical lidocaine patches over sites of abdominal pain as diagnostic and therapeutic approach for possible abdominal wall pain.     This patient was staffed with Dr. Caraballo, with the recommendations discussed with the primary team. Thank you for involving us in the care of this patient. We will continue to follow along. Please page the GI Hepatobiliary consult pager at [phone] with any questions or concerns.     Randall Alejandra M.D.   Internal Medicine PGY-3          Arnaldo Noguera MD

## 2022-03-23 NOTE — TELEPHONE ENCOUNTER
Called [facility] radiology file room:  [phone]    Fax request to [place] [phone]    Called file room, attached to patient record.    ML

## 2022-03-24 ENCOUNTER — LAB (OUTPATIENT)
Dept: LAB | Facility: CLINIC | Age: 72
End: 2022-03-24
Payer: COMMERCIAL

## 2022-03-24 ENCOUNTER — TELEPHONE (OUTPATIENT)
Dept: FAMILY MEDICINE | Facility: CLINIC | Age: 72
End: 2022-03-24
Payer: COMMERCIAL

## 2022-03-24 ENCOUNTER — TRANSCRIBE ORDERS (OUTPATIENT)
Dept: OTHER | Age: 72
End: 2022-03-24

## 2022-03-24 DIAGNOSIS — K85.90 ACUTE PANCREATITIS: Primary | ICD-10-CM

## 2022-03-24 DIAGNOSIS — K85.90 ACUTE PANCREATITIS, UNSPECIFIED COMPLICATION STATUS, UNSPECIFIED PANCREATITIS TYPE: ICD-10-CM

## 2022-03-24 PROCEDURE — 82653 EL-1 FECAL QUANTITATIVE: CPT

## 2022-03-24 NOTE — PROGRESS NOTES
"Gastroenterology History and Physical     Laura Gonzalez MRN# [id number]   YOB: 1950 Age: 71 year old       CC: Chronic pain, recurrent acute pancreatitis s/p Whipple procedure in 2019.     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     Was offered EUS guided \"rendezvous\" of pancreatic duct at [facility] but was concerned about need, high risk,  and lack of meeting the performing MD until the day of the procedure.     At least 2-3 documented episodes of acute pancreatitis, recent pain flare and admission to [facility] w low lipase. MRCPs with variable degrees of remnant PD dilation. See latest imaging from [place] below.    After over one hour reviewing data, history, and options, our consensus -     1) Has had at least 2-3 documented episodes of acute pancreatitis since laparoscopic Whipple in 2019, documented by lipase and CT showing interstitial pancreatitis, last in Aug 2021. MRCPs with varying degrees of mild remnant pancreatic duct dilation. Now some element of chronic pain, similar, with low lipases suggestive of chronic pancreatitis.   2) Probably has evolved to chronic pancreatitis  3) Uncertain whether has obstructed pancreaticojejunostomy, or would benefit from PD decompression. In 2020 had EUS transgastric pancreatogram with \"prompt drainage) into jejunum.   4) Due to uncertainty of need for aggressive intervention, we all agreed to wait and see how she does before offering   5) Reassured that if intervention is decided upon, Dr Oates has the longest and probably most cumulative experience with EUS guided pancreatic drainage, and is an ideal choice.   6) If " intervention offered, would ideally consist of pancreaticogastrostomy, with extension of stents through stomach, pancreatic duct remnant , pancreaticojejunostomy and using small caliber needle (22 ga) , 018 wire, angioplasty balloon , and 3 or 4 F stents as we have reported and performed since last 4-5 years in such patients, in lieu of rendezvous, as only requires occasional revision, and appears to be safest w respect to acute complications due to small caliber of puncture, and especially if drainage fails.  7) Needs reassurane, and trial of medical therapy.     Questions were answered to the patient's stated satisfaction. They were advised on risks and benefits of the management options and expressed understanding and agreement with the current plan.    60 minutes were spent on this patient encounter with staff and resident together.    Plan:  - Creon , start 24K units per meal, increase as tolerated to 72 K  - Dietitian consult  - Fecal elastase  - RTC 3-4 weeks to discuss course, and possible EUS intervention.     Arnaldo Noguera M.D., DEANDRE HODGE  Professor of Medicine  CoDirector, [place] [address]    HPI: Laura Gonzalez is a 71 year old female with past medical history of CAD, HLD, and unresectable duodenal polyp now s/p whipple procedure on 5/08/19. While undergoing workup for abdominal pain that started in fall of 2018 she underwent and EGD where a flat 5 cm duodenal polyp was discovered. She was recommended to undergo a polypectomy due to her history of colon cancer in her father and brother. On 2/19/19 she underwent an unsuccessful endoscopic polypectomy due to submucosal fibrosis. She was subsequently admitted to [facility] for a Whipple procedure. A duodenal surgical biopsy showed a tubulovillous morphology with low grade dysplasia.    Unfortunately her recovery has been  "complicated by recurrent pain episodes requiring hospitalizations with dramatically elevated lipase on at least one, possibly more occasions [facility] [id number][facility]    She had recent hospitalization at [facility], summarized at bottom, for anemia / transfusion from very large hematoma resulting from breast implant revision. And abdominal pain flare, similar to her last documented episode of acute pancreatitis 8/2021. But with low lipase. LFTs essentially normal. Offered an EUS guided \"rendezvous\" for decompression of her pancreatic duct.  as of 3/24/2022 16:50     She has an extensive endoscopy/imaging history and briefly on 1/23/2020 she underwent MRCP with secretin which showed biliary dilation suggestive of hepaticojejunostomy and pancreatic duct following secretin administration suggestive of narrowing of the pancreaticojejunostomy, however, subsequently she underwent EUS/ERCP with Dr. Funes on 2/18/2020 with 22 gau needle puncture of 1-2mm pancreatic duct, where there was no evidence of stricture that needed stenting or dilation. Additionally she had a trial of Creon starting 3/4/2020 but it is not clear whether this had any benefit. At that time her A1C was 5.9 putting her in the prediabetic range. She did require admission on 6/26/2020 for abdominal pain with workup requiring EGD which revealed a patent but a strictured choledochojejunostomy and she did receive an AXIOS metal stent in the common hepatic duct across the choledochojejunostomy.   Ref. Range 9/24/2018 21:16 8/27/2021 19:25 8/28/2021 06:46 8/29/2021 06:31   Lipase Latest Ref Range: 73 - 393 U/L 138 18,274 (H) 10,482 (H) 1,426 (H)     IMPRESSION:   1.  Postoperative changes of prior Whipple procedure.  2.  Moderate fat stranding and fluid about the remaining pancreatic  body and tail. Findings are suspicious for acute pancreatitis.  Clinical and laboratory correlation are recommended.  3.  A segment of thickened " "small bowel in the left mid and upper  abdomen is suspicious for an infectious or inflammatory enteritis, and  appears to cause some degree of associated small bowel obstruction.  4.  Moderate amount of stool in the ascending colon.       suspected pancreatitis and/or anastomotic strictures. At other times her pain is equally severe but does not last long enough to require hospitalization. There are no obvious triggers.    Today she is doing well however she lives in a constant state of worry that her pain will recur. She denies any symptoms today other than low grade bloating and discomfort, that she can \"live with\" .    Faustino Nobles, Ph.D., MS4  [employer] [place] [employer]    1. Mildly prominent pancreatic duct is stable to slightly decreased in size.   2. Mild intrahepatic biliary ductal dilatation, peribiliary enhancement, and heterogeneous hepatic   enhancement, likely inflammatory related.    Narrative  Performed by [employer]  EXAM:  MR ABDOMEN MRCP WITHOUT AND WITH IV CONTRAST     3D maximum intensity projections/volume renderings were created on an independent workstation as   ordered by the treating provider and reviewed by the radiologist for biliary and pancreatic duct   visualization.     COMPARISON:  CT abdomen 03/07/2022. MR abdomen 09/24/2021.     FINDINGS:  Again demonstrated are postoperative changes of Whipple procedure. Mildly prominent   pancreatic duct in the remnant pancreas measuring 3 mm, previously measuring 4 mm on prior MR   abdomen 09/24/2021. Homogenous signal intensity and enhancement of the remnant pancreas. No   peripancreatic fluid collections.     Stable mild intrahepatic biliary ductal dilatation with peribiliary enhancement. Pneumobilia. Stable   hepatic cysts. No suspicious hepatic lesions. Scattered regions of arterial hyperenhancement without   additional signal abnormality may be inflammatory or perfusional.     Stable prominent periportal and peripancreatic " lymph nodes, possibly reactive. No ascites. Spleen   and adrenal glands are unremarkable. Small left renal cyst. L3 hemangioma. Thoracolumbar curvature.   No suspicious osseous lesions. Sacral Tarlov cysts.     Bilateral breast implants.    Procedure Note    Gracie Napier M.D. - 03/11/2022   Formatting of this note might be different from the original.   EXAM:  MR ABDOMEN MRCP WITHOUT AND WITH IV CONTRAST     3D maximum intensity projections/volume renderings were created on an independent workstation as   ordered by the treating provider and reviewed by the radiologist for biliary and pancreatic duct   visualization.     COMPARISON:  CT abdomen 03/07/2022. MR abdomen 09/24/2021.     FINDINGS:  Again demonstrated are postoperative changes of Whipple procedure. Mildly prominent   pancreatic duct in the remnant pancreas measuring 3 mm, previously measuring 4 mm on prior MR   abdomen 09/24/2021. Homogenous signal intensity and enhancement of the remnant pancreas. No   peripancreatic fluid collections.     Stable mild intrahepatic biliary ductal dilatation with peribiliary enhancement. Pneumobilia. Stable   hepatic cysts. No suspicious hepatic lesions. Scattered regions of arterial hyperenhancement without   additional signal abnormality may be inflammatory or perfusional.     Stable prominent periportal and peripancreatic lymph nodes, possibly reactive. No ascites. Spleen   and adrenal glands are unremarkable. Small left renal cyst. L3 hemangioma. Thoracolumbar curvature.   No suspicious osseous lesions. Sacral Tarlov cysts.     Bilateral breast implants.     IMPRESSION:   1. Mildly prominent pancreatic duct is stable to slightly decreased in size.   2. Mild intrahepatic biliary ductal dilatation, peribiliary enhancement, and heterogeneous hepatic   enhancement, likely inflammatory related.  Specimen Collected: 03/11/22  3:13 PM Last Resulted: 03/11/22  3:42 PM   Received From: [facility]  Result Received: 03/23/22   8:21 AM        CT Abdomen Angiogram with IV Contrast    Anatomical Region Laterality Modality   Abdomen -- Computed Tomography   Cardiovascular RST LOS -- Computed Tomography   Abdominal ARZ LOS -- --   Vascular Interventional ARZ LOS -- --   Vascular Interventional FLA LOS -- --   Abdominal FLA LOS -- --       Impression  Performed by 20x200  No active GI bleed. No active contrast extravasation.    Narrative  Performed by 20x200  EXAM:  CT ABDOMEN ANGIOGRAM WITH IV CONTRAST   Including 3D image post-processing.     COMPARISON:  Outside CT abdomen and pelvis performed 08/27/2021     FINDINGS:   VASCULAR FINDINGS:   No findings of active GI bleed. No contrast extravasation. Mesenteric vasculature is patent with   normal enhancement of the bowel. Patent portal vein. Mild to moderate calcified atherosclerosis of   the abdominal aorta and its branches. No aortic dissection, penetrating atherosclerotic ulcer, or   aneurysm. The celiac axis, SMA, renal arteries, and BEATRIS are patent.     ADDITIONAL FINDINGS:   Postoperative changes of pylorus preserving Whipple. Pneumobilia secondary to   pancreaticojejunostomy. Mild dependent pulmonary atelectasis and/or scarring. Partially seen   postoperative changes of the left breast. Degenerative changes of the spine. Lumbar curve. Left BRODIE.    Procedure Note    Trav Girard M.D. - 03/10/2022   Formatting of this note might be different from the original.   EXAM:  CT ABDOMEN ANGIOGRAM WITH IV CONTRAST   Including 3D image post-processing.     COMPARISON:  Outside CT abdomen and pelvis performed 08/27/2021     FINDINGS:   VASCULAR FINDINGS:   No findings of active GI bleed. No contrast extravasation. Mesenteric vasculature is patent with   normal enhancement of the bowel. Patent portal vein. Mild to moderate calcified atherosclerosis of   the abdominal aorta and its branches. No aortic dissection, penetrating atherosclerotic ulcer, or   aneurysm. The celiac axis,  SMA, renal arteries, and BEATRIS are patent.     ADDITIONAL FINDINGS:   Postoperative changes of pylorus preserving Whipple. Pneumobilia secondary to   pancreaticojejunostomy. Mild dependent pulmonary atelectasis and/or scarring. Partially seen   postoperative changes of the left breast. Degenerative changes of the spine. Lumbar curve. Left BRODIE.        Recent Data from [facility]  Related to Lipase  Component 03/10/22 09/03/20 06/24/20   Lipase, S 11 8 Low     In 2020, had following EUS after recurrent episodes of pancreatitis.    GI   Patient Name: Laura Gonzalez   MRN: [id number]   YOB: 1950   Age: 69   Gender: Female   Procedure Date: 2/18/2020   Procedure:               Upper EUS   Providers:               Rubens Funes MD   Referring Provider:      Itzel Armas MD   Pre-op Diagnoses:        Acute recurrent pancreatitis   Recommendation:        - The findings and recommendations were discussed with the referring        physician.   Findings:        ENDOSONOGRAPHIC FINDING: :        There was no sign of endosonographic abnormality in the entire remaining        pancreas. The duct in the tail measured 1mm and in the body 2mm. The        wall was not thickened, the side branches were not dilated or even        visible, and the parenchyma was normal. There is no evidence of an        anastomotic stricture and with simple pressure with the echoendoscope        tip against the gastric wall the duct completely drained into the        jejunum. To further assess the anastomosis, a 22 gauge needle was        advanced into the duct and contrast freely flowed into the jejunum and        the duct quickly decompressed. The findings were discussed with the        referring team and they agree there is no role for drainage. We also        discussed the biliary tree and given the normal LFTs they opted for no        intervention.   Procedural Details:        The patient was seen, evaluated, history reviewed,  airway and heart-lung        exams were performed by licensed provider and were satisfactory for        planned level of sedation care.        The risks, benefits and alternatives for the procedure and sedation were        discussed and informed consent was obtained. A procedural pause was        conducted in the presence of assisting personnel to verify the correct        patient identity and procedure to be performed. Throughout the        procedure, the patient's blood pressure, pulse, and oxygen saturations        were monitored continuously. The Endosonoscope was introduced through        the mouth, and advanced to the jejunum. The upper EUS was accomplished        without difficulty. The patient tolerated the procedure well.   Complications:           No immediate complications.   Estimated Blood Loss:        Estimated blood loss: none.   Attending Participation: I personally performed the entire procedure.   Rubens Funes MD   2/18/2020 9:13:20 AM   This report has been signed electronically.   Number of Addenda: 0   Note Initiated On: 2/18/2020 7:30 AM  Specimen Collected: --   Date: 02/18/20   Received From: [facility]          Results for BERTHA HOBBS (MRN [id number]) as of 3/24/2022 16:50   Ref. Range 9/24/2018 21:16 8/27/2021 19:25 8/28/2021 06:46 8/29/2021 06:31   Lipase Latest Ref Range: 73 - 393 U/L 138 18,274 (H) 10,482 (H) 1,426 (H)     Past Medical History:  Past Medical History:   Diagnosis Date     Asthma      CAD (coronary artery disease)      HLD (hyperlipidemia)        Past Surgical History:  Past Surgical History:   Procedure Laterality Date     APPENDECTOMY       ARTHROPLASTY HIP Left 6/15/2016    Procedure: ARTHROPLASTY HIP;  Surgeon: Jeffy Wolff MD;  Location: [facility] OR     COLONOSCOPY  10/3/2013    Procedure: COMBINED COLONOSCOPY, SINGLE BIOPSY/POLYPECTOMY BY BIOPSY;;  Surgeon: Kenney Walls MD;  Location:  GI     FOOT SURGERY       HC TOOTH EXTRACTION W/FORCEP        HYSTERECTOMY       INCISION AND DRAINAGE BREAST Left 2/18/2022    Procedure: evacuate hematoma left  BREAST;  Surgeon: Dayron Garcia MD;  Location: [facility] OR       Allergies:   No Known Allergies    Medications:  Atorvastatin Calcium (LIPITOR PO), Take 40 mg by mouth At Bedtime   calcium-magnesium (CALMAG) 500-250 MG TABS, Take 2 tablets by mouth daily  EVOLOCUMAB SC, Inject 140 mg Subcutaneous every 14 days  EZETIMIBE PO, Take 10 mg by mouth At Bedtime  Montelukast Sodium (SINGULAIR PO), Take 10 mg by mouth At Bedtime   valACYclovir (VALTREX) 1000 mg tablet, Take 1,000 mg by mouth daily as needed (cold sore)   VITAMIN A PO, Take 1 capsule (unknown dose, prescription strength) by mouth daily  hydrochlorothiazide (HYDRODIURIL) 25 MG tablet, Take 25 mg by mouth daily  LORazepam (ATIVAN) 0.5 MG tablet, Take 1-2 tablets by mouth twice daily as needed for anxiety (Patient not taking: Reported on 3/23/2022)  UNABLE TO FIND, MEDICATION NAME: Estrogen pellet in arm (Patient not taking: Reported on 3/23/2022)  zolpidem ER (AMBIEN CR) 12.5 MG CR tablet, Take 12.5 mg by mouth nightly as needed for sleep (Patient not taking: Reported on 3/23/2022)    No current facility-administered medications on file prior to visit.      Medications prior to Admission:  Current Outpatient Medications   Medication Sig Dispense Refill     amylase-lipase-protease (CREON) 18948-81521 units CPEP per EC capsule Take 2-3 with meals / 1-2 with snacks, up to 15 per day. 450 capsule 6     Atorvastatin Calcium (LIPITOR PO) Take 40 mg by mouth At Bedtime        calcium-magnesium (CALMAG) 500-250 MG TABS Take 2 tablets by mouth daily       EVOLOCUMAB SC Inject 140 mg Subcutaneous every 14 days       EZETIMIBE PO Take 10 mg by mouth At Bedtime       Montelukast Sodium (SINGULAIR PO) Take 10 mg by mouth At Bedtime        valACYclovir (VALTREX) 1000 mg tablet Take 1,000 mg by mouth daily as needed (cold sore)        VITAMIN A PO Take 1 capsule  "(unknown dose, prescription strength) by mouth daily       hydrochlorothiazide (HYDRODIURIL) 25 MG tablet Take 25 mg by mouth daily       LORazepam (ATIVAN) 0.5 MG tablet Take 1-2 tablets by mouth twice daily as needed for anxiety (Patient not taking: Reported on 3/23/2022)       UNABLE TO FIND MEDICATION NAME: Estrogen pellet in arm (Patient not taking: Reported on 3/23/2022)       zolpidem ER (AMBIEN CR) 12.5 MG CR tablet Take 12.5 mg by mouth nightly as needed for sleep (Patient not taking: Reported on 3/23/2022)           Social History:  Social History     Socioeconomic History     Marital status:      Spouse name: Not on file     Number of children: Not on file     Years of education: Not on file     Highest education level: Not on file   Occupational History     Not on file   Tobacco Use     Smoking status: Former Smoker     Quit date: 10/3/1988     Years since quittin.4     Smokeless tobacco: Never Used   Substance and Sexual Activity     Alcohol use: No     Drug use: No     Sexual activity: Not on file   Other Topics Concern     Parent/sibling w/ CABG, MI or angioplasty before 65F 55M? Not Asked   Social History Narrative     Not on file     Social Determinants of Health     Financial Resource Strain: Not on file   Food Insecurity: Not on file   Transportation Needs: Not on file   Physical Activity: Not on file   Stress: Not on file   Social Connections: Not on file   Intimate Partner Violence: Not on file   Housing Stability: Not on file       Family History:  No family history on file.    ROS:  The remainder of the complete ROS was negative unless noted in the HPI.    Exam:  /67 (BP Location: Left arm, Patient Position: Sitting, Cuff Size: Adult Regular)   Pulse 66   Ht 1.715 m (5' 7.5\")   Wt 68 kg (150 lb)   SpO2 100%   BMI 23.15 kg/m      Constitutional: Well appearing, no acute distress, oriented to time, place, and self  Psychiatric: Normal speech, normal thought process, " congruent mood  Skin: No noticeable traumatic lesions or rashes  Neurologic: Grossly no neurologic deficits, responds to questions  Eyes: Anicteric, equal and round, no conjunctival injection  Head, Ears, Throat: Atraumatic, anicteric, no proptosis, lips without lesions, normal appearing nose, vocalizes, no discharge  Neck: No visible lymphadenopathy, no goiter  Respiratory: Breathing comfortably, no digital clubbing  Cardiovascular: Non-diaphoretic, acyanotic, no peripheral edema  Abdomen: Non-distended, no obvious hernias or masses  Musculoskeletal: Moves limbs appropriately, no obvious deformities    Labs:  All laboratory data reviewed    Emily Cifuentes, Arnaldo Murcia MD  Care Summary     Admission at [place]   03/10/2022 - 03/12/2022   Anemia (Primary Dx);   Abdominal Pain;   Elevated Alkaline Phosphatase       HOSPITAL COURSE   Ms. Laura Gonzalez is a 71 y.o. female who lives at home with her  in [place], [place] with a PMH of duodenal poylp s/p pylorus preserving Whipple 5/2019 with recurrent choledochojejunostomy anastomotic stricture s/p AXIOS stenting and coaxial double pigtail stent in 6/2020, s/p stent removal 9/2020, hx of pancreatitis 9/2021 treated OSH, CAD, hyperlipidemia, who presents with abdominal pain, bloating, nausea and anemia.     Symptoms ongoing for 10 days. On ED presentation she was afebrile with stable vital signs. ED labs significant for hemoglobin 7.0, , lipase 11, total protein 5.7. Of note she had L breast implant 2/8/2022 with hematoma evacuation 2/18/2022. Prior to this event Hgb noted at 10.7 8/2021. Abdominal CTA and abdominal xray without new changes. She was given 1U pRBCs for Hgb 7.0.     MRCP obtained 3/11 with mild pancreatic duct dilation which was stable to decreased and mild intrahepatic biliary dilatation peribiliary enhancement and heterogenous hepatic enhancement, likely inflammatory. GI evaluation felt symptoms consistent with  "anastomotic stricture at choledocho jejunostomy stent site. Consideration in outpatient setting for interventional endoscopy and EUS guided transgastric approach at the pancreatic duct. This procedure with significant risk, ongoing discussions with patient and GI team.     Additionally with concern for hematoma at L breast surgical site, ultrasound performed and negative. Some concern for LE circumference asymmetry (L>R), LE ultrasound negative for DVT.     Iron studies obtained and significant for pronounced iron deficiency anemia. This, in context of post-surgical bleed and likely malabsorption given duodenal resection in context of polyp and Whipple procedure. It is notable however that prior to surgery her hemoglobin was quite stably normal without microcytosis. She has had normal colonoscopy  with multiple EGDs over the past few years due to her GI issues. Do not feel oral iron supplementation will be useful given this operation. Will replete with IV iron dextran. Additionally workup for NIKOLAI per GI and PCP.     Patient stable for discharge on 3/12 with stable hemoglobin 7.7.     GI Consult while inpatient:   GASTROENTEROLOGY & HEPATOBILIARY CONSULT   Date/Time: 3/11/2022 9:08 AM CST   Patient Name: Laura Bertrand   MRN: [id number]   : 1950     CONSULT   Evaluation of: \"Followed by Dr. Mg. Pt with pyloris-sparing Whipple, recurrent episodes of pancreatitis and previous axial stent (now removed). He with pain syndrome similar to pancreatitis, without lipase or imaging consistent with this. ? Recurrent stricture\"   Referring Provider: [place] [id number]     HISTORY OF PRESENT ILLNESS   Ms. Laura Gonzalez is a 71 y.o. female admitted to Medicine 3 on 3/10/2022 with 10-day history of abdominal pain, distention, bloating, and nausea. Medical comorbidities are significant for duodenal polyp (5 cm near-circumferential, low-grade dysplasia) s/p pylorus-preserving Whipple (2019) c/b recurrent " choledochojejunostomy anastomotic stricture s/p AXIOS stent placement and coaxial double pigtail stent (6/26/2020, removed 9/2020), recent history of acute pancreatitis (8/2021) thought to be secondary to anastomotic stricture of pancreatic duct, arterially enhancing right hepatic lobe lesions, HLD, atypical chest pain, abdominal wall pain s/p trigger point injections to left and right abdominal wall (3/2021), recent breast augmentation c/b large breast hematoma s/p evacuation (2/18/2022).     Briefly, she reports 10 days of postprandial abdominal pain (worsening in duration and frequency, described as severe upper abdominal cramping pain), distention, bloating, and nausea; ROS also positive for drenching night sweats, fatigue, low oral intake d/t postprandial pain (with normal appetite). She states she has this type of severe, cramping pain at baseline but it lasts 1-2 minutes; when the pain became longer in duration and more frequent, she called EMS. Denies melena/hematochezia, vomiting/hematemesis, fevers/chills, weight changes. In the ED, she was vitally stable; hemoglobin 6.9 (baseline 12.0 in 9/2021, last 7.6 in 2/2022), normal BMP, normal ALT/ALT,  (baseline 130s-200s), albumin 3.3, lipase 11, normal UA. CTA abdomen showed only postoperative changes, pneumobilia secondary to pancreaticojejunostomy, and mild atelectasis without evidence of active GI bleed. She was transfused 1 unit PRBCs and admitted for further management. Her anemia was thought to be secondary to the recent breast hematoma in the absence of overt GI bleed symptoms and negative stool guaiac in the ED.     Regarding her recent episode of pancreatitis, she had OSH CT AP on 8/27 showing moderate fat stranding and fluid about the remaining pancreatic body and tail, with a segment of thickened small bowel suspicious for infectious/inflammatory enteritis appearing to cause some degree of associated SBO; labs checked at that time showed  "lipase 96146 > 83011 > 1426, triglycerides 54, normal LFTs excluding mild elevation in ALT 53. She was admitted at OSH and managed conservatively. She states that her symptoms at that time were similar to her current symptoms except that she had profound vomiting at that time. Her last MRCP was on 9/1/2021 and showed \"interval decrease in pancreas swelling and improved peripancreatic fluid with residual mild peripancreatic fat stranding, similar appearance of mild pancreatic duct dilation, redemonstrated diffuse mild bile duct dilation with amy/biliary hyperemia.\" Regarding the prior abdominal wall trigger point injections, she thinks she had some benefit with this, stating that \"the pain did go away,\" and reports that her abdominal wall pain is \"similar\" to her current pain. Otherwise denies intake of spicy/fatty foods, alcohol use, recent travel, recent illness or sick contacts, recent medication changes. She reports that her pain has improved considerably since admission, likely at least in part from the pain medications she has received.     REVIEW OF SYSTEMS   Negative except per HPI.     PHYSICAL EXAM   Vitals: /61 (BP Location: Right arm;Upper, Patient Position: Lying)  Pulse (!) 57  Temp 36.8  C (Oral)  Resp 16  Ht 171.5 cm  Wt 72 kg  SpO2 95%  BMI 24.49 kg/m      General: Sitting comfortably in bed. Cooperative, in no acute distress.   Eyes: Clear, non-injected, anicteric sclera.   ENT: MMM. No oropharyngeal exudate, erythema, or lesions.   Cardiac: Hemodynamically stable.   Lungs: Non-labored breathing on room air.   Abdomen: Tender to palpation in upper abdomen, particularly in RUQ > epigastric regions, with mild diffuse discomfort in remainder of abdomen, not acutely peritonitic. Positive Carnett sign.   Extremities: Warm and well-perfused UE and LE.   Skin: No rashes.   Neuro: Alert, oriented x 3.     PERTINENT DIAGNOSTIC STUDIES   LABS   Recent Labs   03/11/22   0556 03/10/22   2216 " 03/10/22   1940   WBC 7.2 8.4 7.8   HGB 7.6 L 7.0 L 6.9 L    346 380 H    -- 138    H -- 106   BUN 10 -- 12   CREATININE 0.92 -- 0.80   CALCIUM 8.0 L -- 8.0 L   ALBUMIN 2.9 L -- 3.3 L   BILITOT 0.4 -- <0.2   AST 22 -- 29   ALT 24 -- 26   ALKPHOS 247 H -- 266 H     ASSESSMENT AND PLAN   Ms. Laura Gonzalez is a 71 y.o. female admitted to [facility] on 3/10/2022 with 10-day history of abdominal pain, distention, bloating, and nausea. 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  was consulted for evaluation of her abdominal pain (query anastomotic stricture causing her symptoms).     Her negative CT imaging and normal lipase are indeed reassuring that she is not having acute pancreatitis. It is odd that her symptoms are postprandial, and it is unclear how her new night sweats fit into the picture as well. Agree with further workup for anastomotic stricture, for which we would suggest MRCP. If this is inconclusive and her symptoms persist/worsen, could consider invasive procedure such as transgastric approach via EUS to assess for pancreatic duct stricture/dilatation, although will defer this for now (especially given her symptoms are improving) in favor of noninvasive approach. If workup is negative for progressive strictures, abdominal wall pain would certainly be higher on the  differential - could also trial diagnostic and therapeutic lidocaine patches while inpatient, since trigger point injections can likely not be facilitated inpatient. Remainder as below.     PROBLEM LIST   # Upper abdominal pain   # Positive Carnett sign   # History of duodenal polyp s/p pylorus-sparing Whipple   # Recurrent choledochojejunostomy anastomotic stricture s/p stenting   # Recent history of acute pancreatitis   # Abdominal wall pain s/p trigger point injections     RECOMMENDATIONS   1. MRCP to further evaluate for pancreatic duct stricture/dilatation.   2. Can trial topical lidocaine patches over sites of abdominal pain as diagnostic and therapeutic approach for possible abdominal wall pain.     This patient was staffed with Dr. Caraballo, with the recommendations discussed with the primary team. Thank you for involving us in the care of this patient. We will continue to follow along. Please page the GI Hepatobiliary consult pager at [phone] with any questions or concerns.     Randall Alejandra M.D.   Internal Medicine PGY-3

## 2022-03-24 NOTE — TELEPHONE ENCOUNTER
Pt calling was seen yesterday at the Biliary/pancreas clinic at the [facility].  She needs to provide a fecal sample and was told to go to any [facility] [place] clinic and  a sterile container.    S/w lab who states pt needs an appt.  Advised pt of this and she scheduled for 2:15 pm today to  the sterile container and hat.    Gracie DURAN RN  EP Triage

## 2022-03-25 LAB — ELASTASE PANC STL-MCNT: 269 UG/G

## 2022-04-05 ENCOUNTER — VIRTUAL VISIT (OUTPATIENT)
Dept: GASTROENTEROLOGY | Facility: CLINIC | Age: 72
End: 2022-04-05
Payer: COMMERCIAL

## 2022-04-05 DIAGNOSIS — Z90.410 HISTORY OF WHIPPLE PROCEDURE: ICD-10-CM

## 2022-04-05 DIAGNOSIS — Z90.49 HISTORY OF WHIPPLE PROCEDURE: ICD-10-CM

## 2022-04-05 DIAGNOSIS — K85.90 ACUTE PANCREATITIS, UNSPECIFIED COMPLICATION STATUS, UNSPECIFIED PANCREATITIS TYPE: Primary | ICD-10-CM

## 2022-04-05 PROCEDURE — 97802 MEDICAL NUTRITION INDIV IN: CPT | Mod: GT | Performed by: DIETITIAN, REGISTERED

## 2022-04-05 NOTE — LETTER
4/5/2022         RE: Laura Gonzalez  [address]  [place] [address]        Dear Colleague,    Thank you for referring your patient, Laura Gonzalez, to the [facility] [place]. Please see a copy of my visit note below.      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 Met with Dr. Noguera recently who noted probably now evolved to chronic pancreatitis. 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 Wearing leggings or elastic around waist is uncomfortable. She recently started Creon and taking 2 capsules with meals currently but today will increase to 3 caps with meals. She takes them at the beginning of meals. She has noticed some improvement with her discomfort with taking them so believes they are helpful. She tends to eat several meals per day and snacks but lunch is more difficult for her to eat since retiring this past December 2021. She will have a snack in the afternoon for lunch or a Premier protein drink or premier drink mixed with granola, but she believes the premier drinks are causing some pain and discomfort. Since her last recent hospitalization, she has also had issues eating yogurt which she used to eat more and tolerate fine. She tolerates a small amount of 1% milk in her 2 cups of coffee though without issue. Since her recent hospitalization a few weeks ago, she has been having  "issues waking up about 3 AM with significant sweating where she has to change her bed clothes and sheet that she is sleeping with. She tends to snack on licorice and mary chips at night before bedtime and also drinks regular gingerale each night before bed. She also stated that she had very low vit D and A levels a few weeks ago in last hospitalization so was started on high dose prescription vit A and D.     She stated that she had not been previously following a pancreatitis diet or lower fat diet so interested in diet education.    Height:   Ht Readings from Last 1 Encounters:   03/23/22 1.715 m (5' 7.5\")     Weight:  Wt Readings from Last 10 Encounters:   03/23/22 68 kg (150 lb)   02/18/22 65.8 kg (145 lb)   08/27/21 67.1 kg (148 lb)   09/24/18 67.6 kg (149 lb)   09/19/17 68.5 kg (151 lb)   06/15/16 71 kg (156 lb 8.4 oz)   05/24/16 67.6 kg (149 lb)   10/03/13 67.1 kg (148 lb)        BMI: Estimated body mass index is 23.15 kg/m  as calculated from the following:    Height as of 3/23/22: 1.715 m (5' 7.5\").    Weight as of 3/23/22: 68 kg (150 lb).    Diet Recall:  (some usual/recent meals/snacks/beverages): used to eat yogurt but set off her panc symptoms  Meal Food    Breakfast oatmeal, blueberries and brown sugar. Splash milk or bagel with low fat cream cheese with orange wedge or blueberries or turkey sausage, egg white on english muffin (no cheese) with slice tayler or pure granola   Lunch Sometimes goes out and has salad and fish OR premier protein or granola with premier   Dinner Fish (halibut, salmon, shrimp, walleye) or lean chicken, baked potato and green leafy salad with tayler and olive oil and balsalmic dressing, green veg with baked potato with (1/2) with S&P. Recently had baby calf liver twice d/t low hemoglobin but doesn't usually do this   Snacks If snacks in evening mary chips sometimes adds some tuna fish with lite ramirez   Beverages 64 oz smartWater, 2 c coffee with 1% milk with stevia, gingerale " each night, herbal tea at night.   Alcohol Intake none      Labs:    Last Comprehensive Metabolic Panel:  Sodium   Date Value Ref Range Status   02/18/2022 136 133 - 144 mmol/L Final   09/24/2018 138 133 - 144 mmol/L Final     Potassium   Date Value Ref Range Status   02/18/2022 3.6 3.4 - 5.3 mmol/L Final   09/24/2018 3.8 3.4 - 5.3 mmol/L Final     Chloride   Date Value Ref Range Status   02/18/2022 103 94 - 109 mmol/L Final   09/24/2018 105 94 - 109 mmol/L Final     Carbon Dioxide   Date Value Ref Range Status   09/24/2018 27 20 - 32 mmol/L Final     Carbon Dioxide (CO2)   Date Value Ref Range Status   02/18/2022 27 20 - 32 mmol/L Final     Anion Gap   Date Value Ref Range Status   02/18/2022 6 3 - 14 mmol/L Final   09/24/2018 6 3 - 14 mmol/L Final     Glucose   Date Value Ref Range Status   02/18/2022 152 (H) 70 - 99 mg/dL Final   09/24/2018 77 70 - 99 mg/dL Final     Urea Nitrogen   Date Value Ref Range Status   02/18/2022 17 7 - 30 mg/dL Final   09/24/2018 25 7 - 30 mg/dL Final     Creatinine   Date Value Ref Range Status   02/18/2022 0.83 0.52 - 1.04 mg/dL Final   09/24/2018 0.86 0.52 - 1.04 mg/dL Final     GFR Estimate   Date Value Ref Range Status   02/18/2022 74 >60 mL/min/1.73m2 Final     Comment:     Effective December 21, 2021 eGFRcr in adults is calculated using the 2021 CKD-EPI creatinine equation which includes age and gender (Nidia et al., NEJM, DOI: 10.1056/PGEAog1785984)   09/24/2018 65 >60 mL/min/1.7m2 Final     Comment:     Non  GFR Calc     Calcium   Date Value Ref Range Status   02/18/2022 8.1 (L) 8.5 - 10.1 mg/dL Final   09/24/2018 8.4 (L) 8.5 - 10.1 mg/dL Final     CBC RESULTS:   Recent Labs   Lab Test 02/18/22  1920 02/18/22  1621   WBC  --  11.0   RBC  --  3.26*   HGB 7.6* 9.6*   HCT  --  30.7*   MCV  --  94   MCH  --  29.4   MCHC  --  31.3*   RDW  --  15.5*   PLT  --  284       Pertinent Medications/vitamin and mineral supplements:   Reported taking prescription vitamin A  and D.  Current Outpatient Medications   Medication     amylase-lipase-protease (CREON) 95564-95273 units CPEP per EC capsule     Atorvastatin Calcium (LIPITOR PO)     calcium-magnesium (CALMAG) 500-250 MG TABS     EVOLOCUMAB SC     EZETIMIBE PO     hydrochlorothiazide (HYDRODIURIL) 25 MG tablet     LORazepam (ATIVAN) 0.5 MG tablet     Montelukast Sodium (SINGULAIR PO)     UNABLE TO FIND     valACYclovir (VALTREX) 1000 mg tablet     VITAMIN A PO     zolpidem ER (AMBIEN CR) 12.5 MG CR tablet     No current facility-administered medications for this visit.     Food Allergies:  NKFA     MALNUTRITION:  % Weight Loss:  None noted  % Intake:  No decreased intake noted  Subcutaneous Fat Loss:  None observed  Muscle Loss:  None observed  Fluid Retention:  None noted    Malnutrition Diagnosis: Patient does not meet two of the above criteria necessary for diagnosing malnutrition  In Context of:  Chronic illness or disease    Nutrition Diagnosis:      Food and nutrition related knowledge deficit related to lack of previous diet education as evidenced by pt report and interest in diet education.    Nutrition Prescription: pancreatitis and s/p whipple procedure diet     Nutrition Intervention:    Nutrition Education/Counseling:  Provided diet education for pancreatitis and pt with hx of whipple procedure. Additionally discussed some diet tips based on pt reported sweating overnight and reported possible hypoglycemia. Additionally encouraged her to speak with her PCP and with Dr. Oates at her upcoming appt. Will message Dr. Oates as well.    Patient verbalized understanding of education provided. See Goals below.     Goals:  1. Aim for eating 4-6 smaller meals per day spread out throughout the day and lower in fat.  --generally lower in fat diet is 25%-30% of your total calories from fat.  --for example if you are eating 1500 calories per day, this is 42 grams-50 grams of fat per day.  --for 1800 calories per day, this is  about 50 grams-60 grams of fat per day.    2. For lower fat diet:   -Choose lean proteins/lean cuts of meats. Remove skins from chicken and turkey breast, lean cuts of meats, 93% lean beef, fish (not fried/baked).  -Use lower fat cooking methods such as baking, broiling, grilling.  -Limit using a lot of fats/oil/high fat sauces/butter when preparing foods.  -Choose skim or 1% based dairy products (such as lowfat yogurts, skim/1% milk, non fat/1% cottage cheese, lowfat pudding).  -Do not eat large amounts of nuts, seeds, nut butters, avocado, oils (these are the unsaturated fats so okay to have some fats in your diet but eat in small portions at a meal or snack.)    3. Can have a protein drink as a small meal/snack (these count towards your 4-6 smaller meals).   -Some examples include Ensure Max, Ensure High protein, Boost Max or can make your own using a protein powder and mix with fluids. (can also blend some fruit or vegetables into a homemade protein smoothie if you'd like).    4. Continue to take your enzyme capsules at the beginning meals, snacks, and also with protein drinks.     --If having coffee with milk, outside of a meal time, then can take an enzyme capsule with coffee and milk.    --If having coffee with milk right before eating breakfast, then can take 1 enzyme capsule with your coffee, and then take the other 2 right at the beginning of eating your meal.    5. Avoid all alcoholic beverages as you are currently doing.    6. Continue to drink at least 64 oz or more of water per day.     7. Continue taking your vitamin D and vitamin A supplements until you have follow up labs unless your doctor has you stop them.    8. Discontinued drinking gingerale especially in evening before bedtime.    9. Discontinue eating sweetened snacks such as licorice or cookies as well as just mary chips in the evening.     --In evening, recommend having a snack that contains a higher fiber carbohydrate source along with  "some protein such as 1 slice of whole grain toast with some lean protein (lean chicken or turkey slice/piece or small amount of unsweetened peanut butter spread on toast) in the evening to see if this is helpful in decreasing overnight sweating.     --If you wake up with overnight sweating, could try having a small amount (such as 4 oz/1/2 cup) of 100% juice to see if this is helpful in relieving some of the sweating.    *Recommend speaking with your primary care doctor and also with Dr. Oates about the significant overnight sweating as well.    10. Can keep daily food and beverage journals at least for a couple of weeks to get an idea of what percentage of fat you eat daily, based on your usual meals and foods.     --Can use an danisha such as \"Lose It\" or \"My Fitness Pal\" to get an idea of the percentage of fat you eat daily. (the free versions of these apps will give you total calories and percentages of fat, carbohydrates and protein daily.  Can follow up as needed.    Nutrition Monitoring and Evaluation: Will monitor adherence to nutrition recommendations at future RD visits.     Further Medical Nutrition Therapy:  Follow-up as needed.    Patient was encouraged to call/contact RD with any further questions.      Maddie Mullen, MS, RD, LD  "

## 2022-04-05 NOTE — PATIENT INSTRUCTIONS
It was nice speaking with you today. Below are the nutrition recommendations we discussed at your visit.    Please let me know if you have any additional questions.    Nutrition Recommendations:    1. Aim for eating 4-6 smaller meals per day spread out throughout the day and lower in fat.  --generally lower in fat diet is 25%-30% of your total calories from fat.  --for example if you are eating 1500 calories per day, this is 42 grams-50 grams of fat per day.  --for 1800 calories per day, this is about 50 grams-60 grams of fat per day.    2. For lower fat diet:   -Choose lean proteins/lean cuts of meats. Remove skins from chicken and turkey breast, lean cuts of meats, 93% lean beef, fish (not fried/baked).  -Use lower fat cooking methods such as baking, broiling, grilling.  -Limit using a lot of fats/oil/high fat sauces/butter when preparing foods.  -Choose skim or 1% based dairy products (such as lowfat yogurts, skim/1% milk, non fat/1% cottage cheese, lowfat pudding).  -Do not eat large amounts of nuts, seeds, nut butters, avocado, oils (these are the unsaturated fats so okay to have some fats in your diet but eat in small portions at a meal or snack.)    3. Can have a protein drink as a small meal/snack (these count towards your 4-6 smaller meals).   -Some examples include Ensure Max, Ensure High protein, Boost Max or can make your own using a protein powder and mix with fluids. (can also blend some fruit or vegetables into a homemade protein smoothie if you'd like).    4. Continue to take your enzyme capsules at the beginning meals, snacks, and also with protein drinks.     --If having coffee with milk, outside of a meal time, then can take an enzyme capsule with coffee and milk.    --If having coffee with milk right before eating breakfast, then can take 1 enzyme capsule with your coffee, and then take the other 2 right at the beginning of eating your meal.    5. Avoid all alcoholic beverages as you are  "currently doing.    6. Continue to drink at least 64 oz or more of water per day.     7. Continue taking your vitamin D and vitamin A supplements until you have follow up labs unless your doctor has you stop them.    8. Discontinued drinking gingerale especially in evening before bedtime.    9. Discontinue eating sweetened snacks such as licorice or cookies as well as just mary chips in the evening.     --In evening, recommend having a snack that contains a higher fiber carbohydrate source along with some protein such as 1 slice of whole grain toast with some lean protein (lean chicken or turkey slice/piece or small amount of unsweetened peanut butter spread on toast) in the evening to see if this is helpful in decreasing overnight sweating.     --If you wake up with overnight sweating, could try having a small amount (such as 4 oz/1/2 cup) of 100% juice to see if this is helpful in relieving some of the sweating.    *Recommend speaking with your primary care doctor and also with Dr. Oates about the significant overnight sweating as well.    10. Can keep daily food and beverage journals at least for a couple of weeks to get an idea of what percentage of fat you eat daily, based on your usual meals and foods.     --Can use an danisha such as \"Lose It\" or \"My Fitness Pal\" to get an idea of the percentage of fat you eat daily. (the free versions of these apps will give you total calories and percentages of fat, carbohydrates and protein daily.  Can follow up as needed.    If you would like to schedule a follow up appointment, please call [phone].    Thank you,    Maddie Mullen, MS, RD, LD              "

## 2022-04-05 NOTE — PROGRESS NOTES
Laura is a 71 year old who is being evaluated via a billable video visit.      Patient confirms medications and allergies are accurate via patients echeck in completion, and or denies any changes since last reviewed/verified.     How would you like to obtain your AVS? MyChart  If the video visit is dropped, the invitation should be resent by: Text to cell phone: [phone]  Will anyone else be joining your video visit? No    Video Start Time: 8:31 AM  Video-Visit Details    Type of service:  Video Visit    Video End Time:9:23 AM    Originating Location (pt. 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 Met with Dr. Noguera recently who noted probably now evolved to chronic pancreatitis. 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 Wearing leggings or elastic around waist is uncomfortable. She recently started Creon and taking 2 capsules with meals currently but today will increase to 3 caps with meals. She takes them at the beginning of meals. She has noticed some improvement with her discomfort with taking them so believes they are helpful. She tends to eat several meals per day and snacks but lunch is more difficult for her to eat since retiring this past December 2021. She will have a snack in the  "afternoon for lunch or a Premier protein drink or premier drink mixed with granola, but she believes the premier drinks are causing some pain and discomfort. Since her last recent hospitalization, she has also had issues eating yogurt which she used to eat more and tolerate fine. She tolerates a small amount of 1% milk in her 2 cups of coffee though without issue. Since her recent hospitalization a few weeks ago, she has been having issues waking up about 3 AM with significant sweating where she has to change her bed clothes and sheet that she is sleeping with. She tends to snack on licorice and mary chips at night before bedtime and also drinks regular gingerale each night before bed. She also stated that she had very low vit D and A levels a few weeks ago in last hospitalization so was started on high dose prescription vit A and D.     She stated that she had not been previously following a pancreatitis diet or lower fat diet so interested in diet education.    Height:   Ht Readings from Last 1 Encounters:   03/23/22 1.715 m (5' 7.5\")     Weight:  Wt Readings from Last 10 Encounters:   03/23/22 68 kg (150 lb)   02/18/22 65.8 kg (145 lb)   08/27/21 67.1 kg (148 lb)   09/24/18 67.6 kg (149 lb)   09/19/17 68.5 kg (151 lb)   06/15/16 71 kg (156 lb 8.4 oz)   05/24/16 67.6 kg (149 lb)   10/03/13 67.1 kg (148 lb)        BMI: Estimated body mass index is 23.15 kg/m  as calculated from the following:    Height as of 3/23/22: 1.715 m (5' 7.5\").    Weight as of 3/23/22: 68 kg (150 lb).    Diet Recall:  (some usual/recent meals/snacks/beverages): used to eat yogurt but set off her panc symptoms  Meal Food    Breakfast oatmeal, blueberries and brown sugar. Splash milk or bagel with low fat cream cheese with orange wedge or blueberries or turkey sausage, egg white on english muffin (no cheese) with slice tayler or pure granola   Lunch Sometimes goes out and has salad and fish OR premier protein or granola with premier   Dinner " Fish (halibut, salmon, shrimp, walleye) or lean chicken, baked potato and green leafy salad with tayler and olive oil and balsalmic dressing, green veg with baked potato with (1/2) with S&P. Recently had baby calf liver twice d/t low hemoglobin but doesn't usually do this   Snacks If snacks in evening mary chips sometimes adds some tuna fish with lite ramirez   Beverages 64 oz smartWater, 2 c coffee with 1% milk with stevia, gingerale each night, herbal tea at night.   Alcohol Intake none      Labs:    Last Comprehensive Metabolic Panel:  Sodium   Date Value Ref Range Status   02/18/2022 136 133 - 144 mmol/L Final   09/24/2018 138 133 - 144 mmol/L Final     Potassium   Date Value Ref Range Status   02/18/2022 3.6 3.4 - 5.3 mmol/L Final   09/24/2018 3.8 3.4 - 5.3 mmol/L Final     Chloride   Date Value Ref Range Status   02/18/2022 103 94 - 109 mmol/L Final   09/24/2018 105 94 - 109 mmol/L Final     Carbon Dioxide   Date Value Ref Range Status   09/24/2018 27 20 - 32 mmol/L Final     Carbon Dioxide (CO2)   Date Value Ref Range Status   02/18/2022 27 20 - 32 mmol/L Final     Anion Gap   Date Value Ref Range Status   02/18/2022 6 3 - 14 mmol/L Final   09/24/2018 6 3 - 14 mmol/L Final     Glucose   Date Value Ref Range Status   02/18/2022 152 (H) 70 - 99 mg/dL Final   09/24/2018 77 70 - 99 mg/dL Final     Urea Nitrogen   Date Value Ref Range Status   02/18/2022 17 7 - 30 mg/dL Final   09/24/2018 25 7 - 30 mg/dL Final     Creatinine   Date Value Ref Range Status   02/18/2022 0.83 0.52 - 1.04 mg/dL Final   09/24/2018 0.86 0.52 - 1.04 mg/dL Final     GFR Estimate   Date Value Ref Range Status   02/18/2022 74 >60 mL/min/1.73m2 Final     Comment:     Effective December 21, 2021 eGFRcr in adults is calculated using the 2021 CKD-EPI creatinine equation which includes age and gender (Nidia et al., NEJM, DOI: 10.1056/GLSEuw9876207)   09/24/2018 65 >60 mL/min/1.7m2 Final     Comment:     Non  GFR Calc     Calcium    Date Value Ref Range Status   02/18/2022 8.1 (L) 8.5 - 10.1 mg/dL Final   09/24/2018 8.4 (L) 8.5 - 10.1 mg/dL Final     CBC RESULTS:   Recent Labs   Lab Test 02/18/22 1920 02/18/22  1621   WBC  --  11.0   RBC  --  3.26*   HGB 7.6* 9.6*   HCT  --  30.7*   MCV  --  94   MCH  --  29.4   MCHC  --  31.3*   RDW  --  15.5*   PLT  --  284       Pertinent Medications/vitamin and mineral supplements:   Reported taking prescription vitamin A and D.  Current Outpatient Medications   Medication     amylase-lipase-protease (CREON) 73531-40308 units CPEP per EC capsule     Atorvastatin Calcium (LIPITOR PO)     calcium-magnesium (CALMAG) 500-250 MG TABS     EVOLOCUMAB SC     EZETIMIBE PO     hydrochlorothiazide (HYDRODIURIL) 25 MG tablet     LORazepam (ATIVAN) 0.5 MG tablet     Montelukast Sodium (SINGULAIR PO)     UNABLE TO FIND     valACYclovir (VALTREX) 1000 mg tablet     VITAMIN A PO     zolpidem ER (AMBIEN CR) 12.5 MG CR tablet     No current facility-administered medications for this visit.     Food Allergies:  NKFA     MALNUTRITION:  % Weight Loss:  None noted  % Intake:  No decreased intake noted  Subcutaneous Fat Loss:  None observed  Muscle Loss:  None observed  Fluid Retention:  None noted    Malnutrition Diagnosis: Patient does not meet two of the above criteria necessary for diagnosing malnutrition  In Context of:  Chronic illness or disease    Nutrition Diagnosis:      Food and nutrition related knowledge deficit related to lack of previous diet education as evidenced by pt report and interest in diet education.    Nutrition Prescription: pancreatitis and s/p whipple procedure diet     Nutrition Intervention:    Nutrition Education/Counseling:  Provided diet education for pancreatitis and pt with hx of whipple procedure. Additionally discussed some diet tips based on pt reported sweating overnight and reported possible hypoglycemia. Additionally encouraged her to speak with her PCP and with Dr. Oates at her  upcoming appt. Will message Dr. Oates as well.    Patient verbalized understanding of education provided. See Goals below.     Goals:  1. Aim for eating 4-6 smaller meals per day spread out throughout the day and lower in fat.  --generally lower in fat diet is 25%-30% of your total calories from fat.  --for example if you are eating 1500 calories per day, this is 42 grams-50 grams of fat per day.  --for 1800 calories per day, this is about 50 grams-60 grams of fat per day.    2. For lower fat diet:   -Choose lean proteins/lean cuts of meats. Remove skins from chicken and turkey breast, lean cuts of meats, 93% lean beef, fish (not fried/baked).  -Use lower fat cooking methods such as baking, broiling, grilling.  -Limit using a lot of fats/oil/high fat sauces/butter when preparing foods.  -Choose skim or 1% based dairy products (such as lowfat yogurts, skim/1% milk, non fat/1% cottage cheese, lowfat pudding).  -Do not eat large amounts of nuts, seeds, nut butters, avocado, oils (these are the unsaturated fats so okay to have some fats in your diet but eat in small portions at a meal or snack.)    3. Can have a protein drink as a small meal/snack (these count towards your 4-6 smaller meals).   -Some examples include Ensure Max, Ensure High protein, Boost Max or can make your own using a protein powder and mix with fluids. (can also blend some fruit or vegetables into a homemade protein smoothie if you'd like).    4. Continue to take your enzyme capsules at the beginning meals, snacks, and also with protein drinks.     --If having coffee with milk, outside of a meal time, then can take an enzyme capsule with coffee and milk.    --If having coffee with milk right before eating breakfast, then can take 1 enzyme capsule with your coffee, and then take the other 2 right at the beginning of eating your meal.    5. Avoid all alcoholic beverages as you are currently doing.    6. Continue to drink at least 64 oz or more of  "water per day.     7. Continue taking your vitamin D and vitamin A supplements until you have follow up labs unless your doctor has you stop them.    8. Discontinued drinking gingerale especially in evening before bedtime.    9. Discontinue eating sweetened snacks such as licorice or cookies as well as just mary chips in the evening.     --In evening, recommend having a snack that contains a higher fiber carbohydrate source along with some protein such as 1 slice of whole grain toast with some lean protein (lean chicken or turkey slice/piece or small amount of unsweetened peanut butter spread on toast) in the evening to see if this is helpful in decreasing overnight sweating.     --If you wake up with overnight sweating, could try having a small amount (such as 4 oz/1/2 cup) of 100% juice to see if this is helpful in relieving some of the sweating.    *Recommend speaking with your primary care doctor and also with Dr. Oates about the significant overnight sweating as well.    10. Can keep daily food and beverage journals at least for a couple of weeks to get an idea of what percentage of fat you eat daily, based on your usual meals and foods.     --Can use an danisha such as \"Lose It\" or \"My Fitness Pal\" to get an idea of the percentage of fat you eat daily. (the free versions of these apps will give you total calories and percentages of fat, carbohydrates and protein daily.  Can follow up as needed.    Nutrition Monitoring and Evaluation: Will monitor adherence to nutrition recommendations at future RD visits.     Further Medical Nutrition Therapy:  Follow-up as needed.    Patient was encouraged to call/contact RD with any further questions.    Maddie Mullen, MS, RD, LD      "

## 2022-04-11 ENCOUNTER — PATIENT OUTREACH (OUTPATIENT)
Dept: GASTROENTEROLOGY | Facility: CLINIC | Age: 72
End: 2022-04-11
Payer: COMMERCIAL

## 2022-04-11 NOTE — TELEPHONE ENCOUNTER
Called pt to review plan for f/ up with Dr. Noguera  Scheduled with Иван on 4/18, however, Chuckie will not be in person that day and is overbooked.

## 2022-04-13 ENCOUNTER — PATIENT OUTREACH (OUTPATIENT)
Dept: GASTROENTEROLOGY | Facility: CLINIC | Age: 72
End: 2022-04-13
Payer: COMMERCIAL

## 2022-04-13 DIAGNOSIS — Z90.410 HISTORY OF WHIPPLE PROCEDURE: ICD-10-CM

## 2022-04-13 DIAGNOSIS — K85.90 ACUTE PANCREATITIS, UNSPECIFIED COMPLICATION STATUS, UNSPECIFIED PANCREATITIS TYPE: Primary | ICD-10-CM

## 2022-04-13 DIAGNOSIS — Z90.49 HISTORY OF WHIPPLE PROCEDURE: ICD-10-CM

## 2022-04-13 NOTE — TELEPHONE ENCOUNTER
Per Dr. Noguera, r/t fevers, we should check LFTs, lipase also added    Labs ordered, left message for patient to discuss.    mL

## 2022-04-15 ENCOUNTER — LAB (OUTPATIENT)
Dept: LAB | Facility: CLINIC | Age: 72
End: 2022-04-15
Payer: COMMERCIAL

## 2022-04-15 DIAGNOSIS — K85.90 ACUTE PANCREATITIS, UNSPECIFIED COMPLICATION STATUS, UNSPECIFIED PANCREATITIS TYPE: ICD-10-CM

## 2022-04-15 DIAGNOSIS — Z90.49 HISTORY OF WHIPPLE PROCEDURE: ICD-10-CM

## 2022-04-15 DIAGNOSIS — Z90.410 HISTORY OF WHIPPLE PROCEDURE: ICD-10-CM

## 2022-04-15 LAB — LIPASE SERPL-CCNC: 54 U/L (ref 73–393)

## 2022-04-15 PROCEDURE — 83690 ASSAY OF LIPASE: CPT

## 2022-04-15 PROCEDURE — 80076 HEPATIC FUNCTION PANEL: CPT

## 2022-04-15 PROCEDURE — 36415 COLL VENOUS BLD VENIPUNCTURE: CPT

## 2022-04-16 ENCOUNTER — HEALTH MAINTENANCE LETTER (OUTPATIENT)
Age: 72
End: 2022-04-16

## 2022-04-16 LAB
ALBUMIN SERPL-MCNC: 3.5 G/DL (ref 3.4–5)
ALP SERPL-CCNC: 139 U/L (ref 40–150)
ALT SERPL W P-5'-P-CCNC: 33 U/L (ref 0–50)
AST SERPL W P-5'-P-CCNC: 38 U/L (ref 0–45)
BILIRUB DIRECT SERPL-MCNC: 0.2 MG/DL (ref 0–0.2)
BILIRUB SERPL-MCNC: 0.4 MG/DL (ref 0.2–1.3)
PROT SERPL-MCNC: 7 G/DL (ref 6.8–8.8)

## 2022-04-18 ENCOUNTER — OFFICE VISIT (OUTPATIENT)
Dept: GASTROENTEROLOGY | Facility: CLINIC | Age: 72
End: 2022-04-18
Attending: INTERNAL MEDICINE
Payer: COMMERCIAL

## 2022-04-18 VITALS
TEMPERATURE: 98.4 F | DIASTOLIC BLOOD PRESSURE: 73 MMHG | SYSTOLIC BLOOD PRESSURE: 117 MMHG | OXYGEN SATURATION: 98 % | HEART RATE: 69 BPM

## 2022-04-18 DIAGNOSIS — Z90.49 HISTORY OF WHIPPLE PROCEDURE: ICD-10-CM

## 2022-04-18 DIAGNOSIS — K85.90 ACUTE PANCREATITIS, UNSPECIFIED COMPLICATION STATUS, UNSPECIFIED PANCREATITIS TYPE: Primary | ICD-10-CM

## 2022-04-18 DIAGNOSIS — Z90.410 HISTORY OF WHIPPLE PROCEDURE: ICD-10-CM

## 2022-04-18 PROCEDURE — G0463 HOSPITAL OUTPT CLINIC VISIT: HCPCS

## 2022-04-18 PROCEDURE — 99215 OFFICE O/P EST HI 40 MIN: CPT | Performed by: INTERNAL MEDICINE

## 2022-04-18 ASSESSMENT — PAIN SCALES - GENERAL: PAINLEVEL: NO PAIN (0)

## 2022-04-18 NOTE — LETTER
4/18/2022         RE: Laura Gonzalez  [address]  [place] [address]        Dear Colleague,    Thank you for referring your patient, Laura Gonzalez, to the [facility]. Please see a copy of my visit note below.    GI CLINIC VISIT - NEW PATIENT    CC/REFERRING MD:  Arnaldo Noguera  REASON FOR CONSULTATION: Relapsing acute pancreatitis following Whipple. Discussion of possible endoscopic intervention.    ASSESSMENT/PLAN:  Relapsing acute pancreatitis following Whipple resection for non-malignant duodenal polyps. At least 2 episodes of acute pancreatitis, with the last in August 2021. More recent issues with bloating and discomfort with wearing tight clothing. The bloating may be due to post-op anatomic issues or pancreatic enzyme insufficiency and the other discomfort with clothing related to incisional pain or other issues but unlikely pancreatic in origin.    She is currently feeling very well since enzymes were initiated and is clearly quite hesitant to pursue endoscopic intervention, however her main issue at this time for considering is a desire to avoid needing enzyme supplementation.    We spent over 60 minutes discussing the potential techniques for endoscopic pancreatic ductal drainage and the potential risks, including pancreatitis, ductal leakage, pseudocyst formation peritonitis and bleeding.    I discussed that I would not recommend drainage solely for the purpose of being able to stop enzymes. It is not clear whether drainage will significantly improve enzyme levels and there are several options to pursue, including OTC plant-based enzyme supplements, that could be less expensive for her. She does not qualify for patient assistance programs.    The goal of intervention would be to relieve symptoms and prevent recurrent episodes of pancreatitis. Given that she is currently feeling the best she has in months and has not had an episode of  "pancreatitis in nearly 1 year, she is inclined to defer intervention at this time. She is scheduled to regroup with Dr. Noguera in several weeks to discuss this further.      PLAN:  - Conservative management for now.  Follow-up as scheduled with Dr. Noguera to discuss further.    Thank you for this consultation.  It was a pleasure to participate in the care of this patient; please contact us with any further questions.  A total of  65 minutes was spent on the day of the visit, >50% of which was counseling regarding the above delineated issues. The remainder was review of records and imaging as well as documentation and coordination of care.      RENEE Oates MD  Professor of Medicine  Division of Gastroenterology, Hepatology and Nutrition  [facility]  -------------------------------------------------------------------------------------------------------------------  HPI:  The pt is a/n 71 year old female who I was asked to see at the request of Dr. Arnaldo Noguera for further discussion of possible endoscopic intervention for pancreatitis. She was referred to Dr. Noguera by Dr. Rob Jansen on the recommendation of Dr. Graciela Gutierrez.    Please see Dr. Noguera's consult note dated 3/23/22 for additional details.  She was accompanied today by a friend who is a retired emergency physician.    The pt underwent laparoscopic Whipple by Dr. Preston at [place] for definitive treatment of duodenal polyposis that was refractory to endoscopic management. Pathology showed tubulovillous adenoma with LGD and no malignancy. She has had at least three episodes of relapsing acute pancreatitis since that time. During an evaluation at [place] she was apparently offered EUS-guided rendezvous however she was concerned re the possible risk and deferred.    I do see that she had EUS by Dr. Funes 2/18/20 with a listed indication of \"acute recurrent pancreatitis\". From his note, it appears that this was initially planned for " "possible rendezvous drainage. The main PD at that time measured 2 mm in maximal diameter. He did perform and EUS-guided pancreatogram, which per his note showed no evidence of anastomotic stricture and therefore he deferred additional ductal intervention. This was apparently uncomplicated and done as outpt. There is no lipase following the procedure and GI note from 1 wk later makes no mention of complications. She was recommended to be on a low fat diet and Creon.    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 She had been following with the GI clinic at [facility] for epigastric pain and had various testing including for SIBO. She had a documented episode of acute pancreatitis 8/2/21 with lipase 10,482. More recently she was admitted at [facility] 3/24/22 primarily for a hematoma following implant revision but did have abdominal pain at that time. Lipase was low at 11. CT angio showed no pancreatitis. MRCP showed a prominent PD at 4 mm but no apparent pancreatitis. Per report she was offered a PD rendezvous procedure but was hesitant to pursue this.    She sought second opinion with Dr. Noguera and was seen 3/23/22. At that time she noted dealing with a constant state of worry that her pain would return but was otherwise feeling well with \"low grade bloating\" that \"she can live with\".    Per Dr. Noguera's note, it was his impression that she has probably developed a degree of chronic pancreatitis at this point. He discussed possible EUS-guided interventions but recommended reassurance and a trial of medical therapy with enzymes.    She requested discussion with me re the endoscopic options.    Currently she states that she is \"feeling the best she has in many months\". She has upper abdominal discomfort primarily when wearing tight clothing and sports bras.     She is concerned that if she is now feeling better due to the re-instituted enzyme supplementation that she will " need to take enzymes for the rest of her life, and she had had difficulty getting insurance coverage for this with significant out of pocket cost. Her main concern today was re whether an EUS guided pancreaticogastrostomy would allow her to stop the enzymes.      ROS:  See history of present illness.    PERTINENT PAST MEDICAL HISTORY:  Past Medical History:   Diagnosis Date     Asthma     pt.states no longer has symptoms     CAD (coronary artery disease)      HLD (hyperlipidemia)        PREVIOUS SURGERIES:  Past Surgical History:   Procedure Laterality Date     APPENDECTOMY       ARTHROPLASTY HIP Left 6/15/2016    Procedure: ARTHROPLASTY HIP;  Surgeon: Jeffy Wolff MD;  Location: [facility] OR     COLONOSCOPY  10/3/2013    Procedure: COMBINED COLONOSCOPY, SINGLE BIOPSY/POLYPECTOMY BY BIOPSY;;  Surgeon: Kenney Walls MD;  Location:  GI     FOOT SURGERY       HC TOOTH EXTRACTION W/FORCEP       HYSTERECTOMY       INCISION AND DRAINAGE BREAST Left 2/18/2022    Procedure: evacuate hematoma left  BREAST;  Surgeon: Dayron Garcia MD;  Location: [facility] OR       PREVIOUS ENDOSCOPY:  See HPI    ALLERGIES:   No Known Allergies    PERTINENT MEDICATIONS:    Current Outpatient Medications:      amylase-lipase-protease (CREON) 09897-36624 units CPEP per EC capsule, Take 2-3 with meals / 1-2 with snacks, up to 15 per day. (Patient taking differently: 2 capsules Take 2-3 with meals / 1-2 with snacks, up to 15 per day.), Disp: 450 capsule, Rfl: 6     Atorvastatin Calcium (LIPITOR PO), Take 40 mg by mouth At Bedtime , Disp: , Rfl:      Calcium Carb-Cholecalciferol (CALCIUM 1000 + D) 1000-800 MG-UNIT TABS, , Disp: , Rfl:      calcium-magnesium (CALMAG) 500-250 MG TABS, Take 2 tablets by mouth daily, Disp: , Rfl:      EVOLOCUMAB SC, Inject 140 mg Subcutaneous every 14 days, Disp: , Rfl:      EZETIMIBE PO, Take 10 mg by mouth At Bedtime, Disp: , Rfl:      hydrochlorothiazide (HYDRODIURIL) 25 MG tablet, Take 25 mg by mouth  daily, Disp: , Rfl:      Montelukast Sodium (SINGULAIR PO), Take 10 mg by mouth At Bedtime , Disp: , Rfl:      valACYclovir (VALTREX) 1000 mg tablet, Take 1,000 mg by mouth daily as needed (cold sore) , Disp: , Rfl:      VITAMIN A PO, Take 1 capsule (unknown dose, prescription strength) by mouth daily, Disp: , Rfl:      LORazepam (ATIVAN) 0.5 MG tablet, Take 1-2 tablets by mouth twice daily as needed for anxiety (Patient not taking: No sig reported), Disp: , Rfl:      magnesium 100 MG CAPS, , Disp: , Rfl:      omega 3 complex with vitamins E and K (SMOOTH SPEAK) liquid, , Disp: , Rfl:      UNABLE TO FIND, MEDICATION NAME: Estrogen pellet in arm (Patient not taking: No sig reported), Disp: , Rfl:      zolpidem ER (AMBIEN CR) 12.5 MG CR tablet, Take 12.5 mg by mouth At Bedtime, Disp: , Rfl:     SOCIAL HISTORY:  Social History     Socioeconomic History     Marital status:      Spouse name: Not on file     Number of children: Not on file     Years of education: Not on file     Highest education level: Not on file   Occupational History     Not on file   Tobacco Use     Smoking status: Former Smoker     Quit date: 10/3/1988     Years since quittin.6     Smokeless tobacco: Never Used   Substance and Sexual Activity     Alcohol use: No     Drug use: No     Sexual activity: Not on file   Other Topics Concern     Parent/sibling w/ CABG, MI or angioplasty before 65F 55M? Not Asked   Social History Narrative     Not on file     Social Determinants of Health     Financial Resource Strain: Not on file   Food Insecurity: Not on file   Transportation Needs: Not on file   Physical Activity: Not on file   Stress: Not on file   Social Connections: Not on file   Intimate Partner Violence: Not on file   Housing Stability: Not on file       FAMILY HISTORY:  No family history on file.      PHYSICAL EXAMINATION:  Vitals reviewed, AFVSS   Wt   Wt Readings from Last 2 Encounters:   22 68 kg (150 lb)   22 65.8 kg (145  lb)      Not examined. Consultative discussion.    PERTINENT STUDIES:    Lab on 04/15/2022   Component Date Value Ref Range Status     Bilirubin Total 04/15/2022 0.4  0.2 - 1.3 mg/dL Final     Bilirubin Direct 04/15/2022 0.2  0.0 - 0.2 mg/dL Final     Protein Total 04/15/2022 7.0  6.8 - 8.8 g/dL Final     Albumin 04/15/2022 3.5  3.4 - 5.0 g/dL Final     Alkaline Phosphatase 04/15/2022 139  40 - 150 U/L Final     AST 04/15/2022 38  0 - 45 U/L Final     ALT 04/15/2022 33  0 - 50 U/L Final     Lipase 04/15/2022 54 (A) 73 - 393 U/L Final         Sincerely,    Romaine Oates MD

## 2022-04-18 NOTE — NURSING NOTE
"Oncology Rooming Note    April 18, 2022 1:47 PM   Laura Gonzalez is a 71 year old female who presents for:    Chief Complaint   Patient presents with     Oncology Clinic Visit     New Acute Pancreatitis     Initial Vitals: /73   Pulse 69   Temp 98.4  F (36.9  C) (Oral)   SpO2 98%  Estimated body mass index is 23.15 kg/m  as calculated from the following:    Height as of 3/23/22: 1.715 m (5' 7.5\").    Weight as of 3/23/22: 68 kg (150 lb). There is no height or weight on file to calculate BSA.  No Pain (0) Comment: Data Unavailable   No LMP recorded. Patient has had a hysterectomy.  Allergies reviewed: Yes  Medications reviewed: Yes    Medications: Medication refills not needed today.  Pharmacy name entered into [employer]: [facility] [id number] [place] [id number]    Clinical concerns: Pt wants to know about vit D and A and if she should be taking it.      Evette Cooper, EMT on April 18, 2022 at 1:48 PM        "

## 2022-04-19 ENCOUNTER — PATIENT OUTREACH (OUTPATIENT)
Dept: GASTROENTEROLOGY | Facility: CLINIC | Age: 72
End: 2022-04-19
Payer: COMMERCIAL

## 2022-04-19 NOTE — TELEPHONE ENCOUNTER
"Called to coordinate plan of care with patient, she had clinic with Dr Oates yesterday. Plan is to stay on Creon, she's tracking her temperature related to night sweats had \"profound night sweats last night\", her baseline is around 97 degrees, 99.9 at night right after her night sweats.     RTC with Dr. Noguera scheduled for tomorrow, pt prefers in person visit. Scheduled at noon    ML  "

## 2022-04-20 ENCOUNTER — LAB (OUTPATIENT)
Dept: LAB | Facility: CLINIC | Age: 72
End: 2022-04-20
Payer: COMMERCIAL

## 2022-04-20 ENCOUNTER — OFFICE VISIT (OUTPATIENT)
Dept: GASTROENTEROLOGY | Facility: CLINIC | Age: 72
End: 2022-04-20

## 2022-04-20 VITALS
DIASTOLIC BLOOD PRESSURE: 71 MMHG | BODY MASS INDEX: 23.15 KG/M2 | SYSTOLIC BLOOD PRESSURE: 126 MMHG | OXYGEN SATURATION: 98 % | HEIGHT: 68 IN | HEART RATE: 66 BPM

## 2022-04-20 DIAGNOSIS — R61 NIGHT SWEATS: Primary | ICD-10-CM

## 2022-04-20 DIAGNOSIS — R61 NIGHT SWEATS: ICD-10-CM

## 2022-04-20 LAB
ALBUMIN SERPL-MCNC: 3.5 G/DL (ref 3.4–5)
ALP SERPL-CCNC: 135 U/L (ref 40–150)
ALT SERPL W P-5'-P-CCNC: 35 U/L (ref 0–50)
ANION GAP SERPL CALCULATED.3IONS-SCNC: 4 MMOL/L (ref 3–14)
AST SERPL W P-5'-P-CCNC: 33 U/L (ref 0–45)
BILIRUB SERPL-MCNC: 0.3 MG/DL (ref 0.2–1.3)
BUN SERPL-MCNC: 16 MG/DL (ref 7–30)
CALCIUM SERPL-MCNC: 9.1 MG/DL (ref 8.5–10.1)
CHLORIDE BLD-SCNC: 106 MMOL/L (ref 94–109)
CO2 SERPL-SCNC: 30 MMOL/L (ref 20–32)
CREAT SERPL-MCNC: 0.73 MG/DL (ref 0.52–1.04)
CRP SERPL-MCNC: <2.9 MG/L (ref 0–8)
ERYTHROCYTE [DISTWIDTH] IN BLOOD BY AUTOMATED COUNT: 18.1 % (ref 10–15)
ERYTHROCYTE [SEDIMENTATION RATE] IN BLOOD BY WESTERGREN METHOD: 14 MM/HR (ref 0–30)
GFR SERPL CREATININE-BSD FRML MDRD: 87 ML/MIN/1.73M2
GLUCOSE BLD-MCNC: 105 MG/DL (ref 70–99)
HCT VFR BLD AUTO: 41.1 % (ref 35–47)
HGB BLD-MCNC: 12.5 G/DL (ref 11.7–15.7)
MCH RBC QN AUTO: 27.8 PG (ref 26.5–33)
MCHC RBC AUTO-ENTMCNC: 30.4 G/DL (ref 31.5–36.5)
MCV RBC AUTO: 91 FL (ref 78–100)
PLATELET # BLD AUTO: 337 10E3/UL (ref 150–450)
POTASSIUM BLD-SCNC: 4.5 MMOL/L (ref 3.4–5.3)
PROT SERPL-MCNC: 7.3 G/DL (ref 6.8–8.8)
RBC # BLD AUTO: 4.5 10E6/UL (ref 3.8–5.2)
SODIUM SERPL-SCNC: 140 MMOL/L (ref 133–144)
WBC # BLD AUTO: 8.1 10E3/UL (ref 4–11)

## 2022-04-20 PROCEDURE — 85652 RBC SED RATE AUTOMATED: CPT | Performed by: PATHOLOGY

## 2022-04-20 PROCEDURE — 86140 C-REACTIVE PROTEIN: CPT | Performed by: PATHOLOGY

## 2022-04-20 PROCEDURE — 99417 PROLNG OP E/M EACH 15 MIN: CPT | Performed by: INTERNAL MEDICINE

## 2022-04-20 PROCEDURE — 80053 COMPREHEN METABOLIC PANEL: CPT | Performed by: PATHOLOGY

## 2022-04-20 PROCEDURE — 85027 COMPLETE CBC AUTOMATED: CPT | Performed by: PATHOLOGY

## 2022-04-20 PROCEDURE — 36415 COLL VENOUS BLD VENIPUNCTURE: CPT | Performed by: PATHOLOGY

## 2022-04-20 PROCEDURE — 99215 OFFICE O/P EST HI 40 MIN: CPT | Mod: GC | Performed by: INTERNAL MEDICINE

## 2022-04-20 RX ORDER — CIPROFLOXACIN 500 MG/1
500 TABLET, FILM COATED ORAL 2 TIMES DAILY
Qty: 20 TABLET | Refills: 0 | Status: SHIPPED | OUTPATIENT
Start: 2022-04-20 | End: 2022-04-30

## 2022-04-20 RX ORDER — METRONIDAZOLE 500 MG/1
500 TABLET ORAL 3 TIMES DAILY
Qty: 30 TABLET | Refills: 0 | Status: SHIPPED | OUTPATIENT
Start: 2022-04-20 | End: 2022-04-30

## 2022-04-20 ASSESSMENT — PAIN SCALES - GENERAL: PAINLEVEL: NO PAIN (0)

## 2022-04-20 NOTE — NURSING NOTE
"Chief Complaint   Patient presents with     Follow Up       Vitals:    04/20/22 1148   BP: 126/71   Pulse: 66   SpO2: 98%   Height: 1.715 m (5' 7.5\")       Body mass index is 23.15 kg/m .    Toshia Samson MA    "

## 2022-04-20 NOTE — PROGRESS NOTES
"[facility]:     Follow up for: Chronic pain, recurrent acute pancreatitis s/p Whipple procedure in 2019.    Last visit: 3/23/2022    Date of visit: 4/20/2022     Referring MDs: Rob Hill    CC: Chronic pain, recurrent acute pancreatitis s/p Whipple procedure in 2019.    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 Last flare at [facility] 3/10/2022 with low lipase. She was offered EUS guided \"rendezvous\" of pancreatic duct at [facility] but was concerned about need, high risk,  and lack of meeting the performing MD until the day of the procedure thus declined. MRCPs with variable degrees of remnant PD dilation. See latest imaging from [facility] below.    We saw her for a second opinion 3/23/2022 at which time she was relatively symptomatic. Started her on PERT and referred to Dr Oates to discuss possibility of pancreaticogastrostomy. Despite her fecal elastase returning nml, her symptoms have significantly improved with PERT thus we are planning to continue with medical management for now. However I do suspect she may need further intervention at some point given the amount of flares she has had and degree of pancreatic destruction. We can revisit pancreaticogastrostomy with Dr Oates should her symptoms progress in the future. Currently her only concern is persistent drenching night sweats since her last hospitalization. No reported fevers. No recent inflammatory markers but white count has been normal. This may be enteric related to alterations/changes in her microbiota from surgery. Will plan to rule out acute infections process with " labs today and treat with empiric 10 days course of cipro/flagyl. Will check in again in 6 weeks to see how she is doing.        PLAN:  - Check CMP, CBC, CRP, ESR today  - Start empiric Ciprofloxacin and Flagyl for total 10 days course   - Continue Creon, increase to 72 K units per meal as tolerated and 24-48K units with snacks   - Aim for 48K units of amylase per meal if buying generic formula  - Continue Vit A/D supplementation   - Continue work with Dietitian   - Should symptoms increase, will re-discuss pancreaticogastrostomy with Dr Oates      Return to Clinic: 6 weeks for virtual visit     Walter Pack MD  Internal Medicine, PGY2  Gastroenterology- Pancreatic and Biliary Department   [facility]      Patient discussed and seen with Gastroenterology staff Dr. Noguera, who is in agreement with the above.     Seen and examined with GI fellow, agree with findings and recommendations  70 minutes face to face visit w pt and resident.  Think she will eventually need pancreaticogastrostomy as has had 5 attacks of acute pancreatitis. Concerned about future attacks and pancreatic injury  Feeling much better on Creon on a daily basis  HOWEVER having drenching night sweats, which may represent bacterial overgrowth, resdiual cholangitis, etc.  Plan as above   Arnaldo Noguera MD GI Staff    ====================================================================================================================================  HPI:   Laura Gonzalez is a 71 year old female with past medical history of CAD, HLD, and unresectable duodenal polyp now s/p whipple procedure on 5/08/19. While undergoing workup for abdominal pain that started in fall of 2018 she underwent and EGD where a flat 5 cm duodenal polyp was discovered. She was recommended to undergo a polypectomy due to her history of colon cancer in her father and brother. On 2/19/19 she underwent an unsuccessful endoscopic polypectomy due to  "submucosal fibrosis. She was subsequently admitted to [facility] for a Whipple procedure. A duodenal surgical biopsy showed a tubulovillous morphology with low grade dysplasia. Unfortunately her recovery has been complicated by recurrent pain episodes requiring hospitalizations with dramatically elevated lipase on at least one, possibly more occasions that we could find on [facility].     She had recent hospitalization at [place], summarized in previous note for anemia / transfusion from very large hematoma resulting from breast implant revision. She had an abdominal pain flare, similar to her last documented episode of acute pancreatitis 8/2021 but with low lipase. LFTs essentially normal. Offered an EUS guided \"rendezvous\" for decompression of her pancreatic duct which she declined. Subsequently referred to [facility] for second opinion.     She has an extensive endoscopy/imaging history and briefly on 1/23/2020 she underwent MRCP with secretin which showed biliary dilation suggestive of hepaticojejunostomy and pancreatic duct following secretin administration suggestive of narrowing of the pancreaticojejunostomy, however, subsequently she underwent EUS/ERCP with Dr. Funes on 2/18/2020 with 22 gau needle puncture of 1-2mm pancreatic duct, where there was no evidence of stricture that needed stenting or dilation. Additionally she had a trial of Creon starting 3/4/2020 but it is not clear whether this had any benefit. At that time her A1C was 5.9 putting her in the prediabetic range. She did require admission on 6/26/2020 for abdominal pain with workup requiring EGD which revealed a patent but a strictured choledochojejunostomy and she did receive an AXIOS metal stent in the common hepatic duct across the choledochojejunostomy.      We saw her for the first time in late March. At that time, she was relatively symptomatic with persistent abdominal pain. A fecal elastase was checked (returned normal) and " "she was initiated on PERT. She was referred to Dr Oates for discussion of potential pancreatciogastrostomy. However since starting on PERT, her symptoms have significantly improved and because of this, Dr Oates has recommended continuing medical management for now. Her only concern at this time is persistent night sweats since her last hospitalization in early March. She denies any fevers or chills with this. No history of C.diff.       Targeted GI review of systems complete and is otherwise negative.     Past Medical History:   Diagnosis Date     Asthma      CAD (coronary artery disease)      HLD (hyperlipidemia)        Past Surgical History:   Procedure Laterality Date     APPENDECTOMY       ARTHROPLASTY HIP Left 6/15/2016    Procedure: ARTHROPLASTY HIP;  Surgeon: Jeffy Wolff MD;  Location: [facility] OR     COLONOSCOPY  10/3/2013    Procedure: COMBINED COLONOSCOPY, SINGLE BIOPSY/POLYPECTOMY BY BIOPSY;;  Surgeon: Kenney Walls MD;  Location:  GI     FOOT SURGERY       HC TOOTH EXTRACTION W/FORCEP       HYSTERECTOMY       INCISION AND DRAINAGE BREAST Left 2022    Procedure: evacuate hematoma left  BREAST;  Surgeon: Dayron Garcia MD;  Location: [facility] OR       No family history on file.    Social History     Tobacco Use     Smoking status: Former Smoker     Quit date: 10/3/1988     Years since quittin.5     Smokeless tobacco: Never Used   Substance Use Topics     Alcohol use: No       Physical exam:     Vitals:/71   Pulse 66   Ht 1.715 m (5' 7.5\")   SpO2 98%   BMI 23.15 kg/m     BMI: Body mass index is 23.15 kg/m .      GEN: NAD, A&Ox3, appropriate  HEENT: EOMI, PERRLA, MMM, hearing grossly intact  Neck: full ROM  Cardiopulmonary: non labored, comfortable on RA, nondiaphoretic, no LE edema  Abdominal: soft, nontender, nondistended, no HSM, no rebound, no guarding, normoactive BS  Skin: no jaundice, no gross lesions on visible skin  Neuro: A&Ox3, grossly intact motor/sensation, " gait intact  MSK: no gross deformity, moves arms/legs equally  Psych: normal affect, congruent with mood      Labs:   CBC RESULTS: Recent Labs   Lab Test 04/20/22  1348   WBC 8.1   RBC 4.50   HGB 12.5   HCT 41.1   MCV 91   MCH 27.8   MCHC 30.4*   RDW 18.1*        Last Comprehensive Metabolic Panel:  Sodium   Date Value Ref Range Status   04/20/2022 140 133 - 144 mmol/L Final   09/24/2018 138 133 - 144 mmol/L Final     Potassium   Date Value Ref Range Status   04/20/2022 4.5 3.4 - 5.3 mmol/L Final   09/24/2018 3.8 3.4 - 5.3 mmol/L Final     Chloride   Date Value Ref Range Status   04/20/2022 106 94 - 109 mmol/L Final   09/24/2018 105 94 - 109 mmol/L Final     Carbon Dioxide   Date Value Ref Range Status   09/24/2018 27 20 - 32 mmol/L Final     Carbon Dioxide (CO2)   Date Value Ref Range Status   04/20/2022 30 20 - 32 mmol/L Final     Anion Gap   Date Value Ref Range Status   04/20/2022 4 3 - 14 mmol/L Final   09/24/2018 6 3 - 14 mmol/L Final     Glucose   Date Value Ref Range Status   04/20/2022 105 (H) 70 - 99 mg/dL Final   09/24/2018 77 70 - 99 mg/dL Final     Urea Nitrogen   Date Value Ref Range Status   04/20/2022 16 7 - 30 mg/dL Final   09/24/2018 25 7 - 30 mg/dL Final     Creatinine   Date Value Ref Range Status   04/20/2022 0.73 0.52 - 1.04 mg/dL Final   09/24/2018 0.86 0.52 - 1.04 mg/dL Final     GFR Estimate   Date Value Ref Range Status   04/20/2022 87 >60 mL/min/1.73m2 Final     Comment:     Effective December 21, 2021 eGFRcr in adults is calculated using the 2021 CKD-EPI creatinine equation which includes age and gender (Nidia steele al., NEJM, DOI: 10.1056/HJNZdb1657489)   09/24/2018 65 >60 mL/min/1.7m2 Final     Comment:     Non  GFR Calc     Calcium   Date Value Ref Range Status   04/20/2022 9.1 8.5 - 10.1 mg/dL Final   09/24/2018 8.4 (L) 8.5 - 10.1 mg/dL Final     CRP <2.9  ESR 14    Relevant imaging/Endoscopy/Pathology/Surgical reports  - See note dating 3/23/2022

## 2022-04-20 NOTE — LETTER
"    4/20/2022         RE: Laura Gonzalez  [address]  [place] [address]        Dear Colleague,    Thank you for referring your patient, Laura Gonzalez, to the [facility] [place]. Please see a copy of my visit note below.    [facility]astroenter[facility]:     Follow up for: Chronic pain, recurrent acute pancreatitis s/p Whipple procedure in 2019.    Last visit: 3/23/2022    Date of visit: 4/20/2022     Referring MDs: Rob Hill    CC: Chronic pain, recurrent acute pancreatitis s/p Whipple procedure in 2019.    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 Last flare at [place] 3/10/2022 with low lipase. She was offered EUS guided \"rendezvous\" of pancreatic duct at [place] but was concerned about need, high risk,  and lack of meeting the performing MD until the day of the procedure thus declined. MRCPs with variable degrees of remnant PD dilation. See latest imaging from [place] below.    We saw her for a second opinion 3/23/2022 at which time she was relatively symptomatic. Started her on PERT and referred to Dr Oates to discuss possibility of pancreaticogastrostomy. Despite her fecal elastase returning nml, her symptoms have significantly improved with PERT thus we are planning to continue with medical management for now. However I do suspect she may need further intervention at some point given the amount of flares she has had and degree of pancreatic destruction. We can revisit pancreaticogastrostomy with Dr Oates should her symptoms progress in the future. Currently her only " concern is persistent drenching night sweats since her last hospitalization. No reported fevers. No recent inflammatory markers but white count has been normal. This may be enteric related to alterations/changes in her microbiota from surgery. Will plan to rule out acute infections process with labs today and treat with empiric 10 days course of cipro/flagyl. Will check in again in 6 weeks to see how she is doing.        PLAN:  - Check CMP, CBC, CRP, ESR today  - Start empiric Ciprofloxacin and Flagyl for total 10 days course   - Continue Creon, increase to 72 K units per meal as tolerated and 24-48K units with snacks   - Aim for 48K units of amylase per meal if buying generic formula  - Continue Vit A/D supplementation   - Continue work with Dietitian   - Should symptoms increase, will re-discuss pancreaticogastrostomy with Dr Oates      Return to Clinic: 6 weeks for virtual visit     Walter Pack MD  Internal Medicine, PGY2  Gastroenterology- Pancreatic and Biliary Department   [facility]      Patient discussed and seen with Gastroenterology staff Dr. Noguera, who is in agreement with the above.     Seen and examined with GI fellow, agree with findings and recommendations  70 minutes face to face visit w pt and resident.  Think she will eventually need pancreaticogastrostomy as has had 5 attacks of acute pancreatitis. Concerned about future attacks and pancreatic injury  Feeling much better on Creon on a daily basis  HOWEVER having drenching night sweats, which may represent bacterial overgrowth, resdiual cholangitis, etc.  Plan as above   Arnalod Noguera MD GI Staff    ====================================================================================================================================  HPI:   Laura Gonzalez is a 71 year old female with past medical history of CAD, HLD, and unresectable duodenal polyp now s/p whipple procedure on 5/08/19. While undergoing workup for  "abdominal pain that started in fall of 2018 she underwent and EGD where a flat 5 cm duodenal polyp was discovered. She was recommended to undergo a polypectomy due to her history of colon cancer in her father and brother. On 2/19/19 she underwent an unsuccessful endoscopic polypectomy due to submucosal fibrosis. She was subsequently admitted to [facility] for a Whipple procedure. A duodenal surgical biopsy showed a tubulovillous morphology with low grade dysplasia. Unfortunately her recovery has been complicated by recurrent pain episodes requiring hospitalizations with dramatically elevated lipase on at least one, possibly more occasions that we could find on [facility].     She had recent hospitalization at [place], summarized in previous note for anemia / transfusion from very large hematoma resulting from breast implant revision. She had an abdominal pain flare, similar to her last documented episode of acute pancreatitis 8/2021 but with low lipase. LFTs essentially normal. Offered an EUS guided \"rendezvous\" for decompression of her pancreatic duct which she declined. Subsequently referred to [facility] for second opinion.     She has an extensive endoscopy/imaging history and briefly on 1/23/2020 she underwent MRCP with secretin which showed biliary dilation suggestive of hepaticojejunostomy and pancreatic duct following secretin administration suggestive of narrowing of the pancreaticojejunostomy, however, subsequently she underwent EUS/ERCP with Dr. Funes on 2/18/2020 with 22 gau needle puncture of 1-2mm pancreatic duct, where there was no evidence of stricture that needed stenting or dilation. Additionally she had a trial of Creon starting 3/4/2020 but it is not clear whether this had any benefit. At that time her A1C was 5.9 putting her in the prediabetic range. She did require admission on 6/26/2020 for abdominal pain with workup requiring EGD which revealed a patent but a strictured " "choledochojejunostomy and she did receive an AXIOS metal stent in the common hepatic duct across the choledochojejunostomy.      We saw her for the first time in late March. At that time, she was relatively symptomatic with persistent abdominal pain. A fecal elastase was checked (returned normal) and she was initiated on PERT. She was referred to Dr Oates for discussion of potential pancreatciogastrostomy. However since starting on PERT, her symptoms have significantly improved and because of this, Dr Oates has recommended continuing medical management for now. Her only concern at this time is persistent night sweats since her last hospitalization in early March. She denies any fevers or chills with this. No history of C.diff.       Targeted GI review of systems complete and is otherwise negative.     Past Medical History:   Diagnosis Date     Asthma      CAD (coronary artery disease)      HLD (hyperlipidemia)        Past Surgical History:   Procedure Laterality Date     APPENDECTOMY       ARTHROPLASTY HIP Left 6/15/2016    Procedure: ARTHROPLASTY HIP;  Surgeon: Jeffy Wolff MD;  Location: [facility] OR     COLONOSCOPY  10/3/2013    Procedure: COMBINED COLONOSCOPY, SINGLE BIOPSY/POLYPECTOMY BY BIOPSY;;  Surgeon: Kenney Walls MD;  Location:  GI     FOOT SURGERY       HC TOOTH EXTRACTION W/FORCEP       HYSTERECTOMY       INCISION AND DRAINAGE BREAST Left 2022    Procedure: evacuate hematoma left  BREAST;  Surgeon: Dayron Garcia MD;  Location: [facility] OR       No family history on file.    Social History     Tobacco Use     Smoking status: Former Smoker     Quit date: 10/3/1988     Years since quittin.5     Smokeless tobacco: Never Used   Substance Use Topics     Alcohol use: No       Physical exam:     Vitals:/71   Pulse 66   Ht 1.715 m (5' 7.5\")   SpO2 98%   BMI 23.15 kg/m     BMI: Body mass index is 23.15 kg/m .      GEN: NAD, A&Ox3, appropriate  HEENT: EOMI, PERRLA, MMM, hearing " grossly intact  Neck: full ROM  Cardiopulmonary: non labored, comfortable on RA, nondiaphoretic, no LE edema  Abdominal: soft, nontender, nondistended, no HSM, no rebound, no guarding, normoactive BS  Skin: no jaundice, no gross lesions on visible skin  Neuro: A&Ox3, grossly intact motor/sensation, gait intact  MSK: no gross deformity, moves arms/legs equally  Psych: normal affect, congruent with mood      Labs:   CBC RESULTS: Recent Labs   Lab Test 04/20/22  1348   WBC 8.1   RBC 4.50   HGB 12.5   HCT 41.1   MCV 91   MCH 27.8   MCHC 30.4*   RDW 18.1*        Last Comprehensive Metabolic Panel:  Sodium   Date Value Ref Range Status   04/20/2022 140 133 - 144 mmol/L Final   09/24/2018 138 133 - 144 mmol/L Final     Potassium   Date Value Ref Range Status   04/20/2022 4.5 3.4 - 5.3 mmol/L Final   09/24/2018 3.8 3.4 - 5.3 mmol/L Final     Chloride   Date Value Ref Range Status   04/20/2022 106 94 - 109 mmol/L Final   09/24/2018 105 94 - 109 mmol/L Final     Carbon Dioxide   Date Value Ref Range Status   09/24/2018 27 20 - 32 mmol/L Final     Carbon Dioxide (CO2)   Date Value Ref Range Status   04/20/2022 30 20 - 32 mmol/L Final     Anion Gap   Date Value Ref Range Status   04/20/2022 4 3 - 14 mmol/L Final   09/24/2018 6 3 - 14 mmol/L Final     Glucose   Date Value Ref Range Status   04/20/2022 105 (H) 70 - 99 mg/dL Final   09/24/2018 77 70 - 99 mg/dL Final     Urea Nitrogen   Date Value Ref Range Status   04/20/2022 16 7 - 30 mg/dL Final   09/24/2018 25 7 - 30 mg/dL Final     Creatinine   Date Value Ref Range Status   04/20/2022 0.73 0.52 - 1.04 mg/dL Final   09/24/2018 0.86 0.52 - 1.04 mg/dL Final     GFR Estimate   Date Value Ref Range Status   04/20/2022 87 >60 mL/min/1.73m2 Final     Comment:     Effective December 21, 2021 eGFRcr in adults is calculated using the 2021 CKD-EPI creatinine equation which includes age and gender (Nidia et al., NEJM, DOI: 10.1056/YYDRze5915861)   09/24/2018 65 >60 mL/min/1.7m2  Final     Comment:     Non  GFR Calc     Calcium   Date Value Ref Range Status   04/20/2022 9.1 8.5 - 10.1 mg/dL Final   09/24/2018 8.4 (L) 8.5 - 10.1 mg/dL Final     CRP <2.9  ESR 14    Relevant imaging/Endoscopy/Pathology/Surgical reports  - See note dating 3/23/2022        Sincerely,    Arnaldo Noguera MD

## 2022-04-21 ENCOUNTER — PATIENT OUTREACH (OUTPATIENT)
Dept: GASTROENTEROLOGY | Facility: CLINIC | Age: 72
End: 2022-04-21
Payer: COMMERCIAL

## 2022-04-21 NOTE — TELEPHONE ENCOUNTER
Called to follow up after clinic with Dr. Noguera      Per patient, she had bad night last night with pain/symptoms- pt asked if we can prescribe medication for pancreatic attacks, explained our office cannot do that, pt will f/ up with PCP. Message sent to /Иван regarding pain flare.     Per patient, she's not sure she wants to take Cipro- taking injection for atherosclerosis- encouraged patient to follow up with pharmacist regarding concerns with prescribed medication.     Will send information regarding creon options via [employer]    PLAN:  - Check CMP, CBC, CRP, ESR today  - Start empiric Ciprofloxacin and Flagyl for total 10 days course   - Continue Creon, increase to 72 K units per meal as tolerated and 24-48K units with snacks              - Aim for 48K units of amylase per meal if buying generic formula  - Continue Vit A/D supplementation   - Continue work with Dietitian   - Should symptoms increase, will re-discuss pancreaticogastrostomy with Dr Oates

## 2022-04-21 NOTE — PATIENT INSTRUCTIONS
Follow up:    Dr. Noguera has outlined the following steps after your recent clinic visit:    PLAN:  - Check CMP, CBC, CRP, ESR today  - Start empiric Ciprofloxacin and Flagyl for total 10 days course   - Continue Creon, increase to 72 K units per meal as tolerated and 24-48K units with snacks              - Aim for 48K units of amylase per meal if buying generic formula  - Continue Vit A/D supplementation   - Continue work with Dietitian   - Should symptoms increase, will re-discuss pancreaticogastrostomy with Dr Oates         Please call with any questions or concerns regarding your clinic visit today.    It is a pleasure being involved in your health care.    Contacts post-consultation depending on your need:    Schedule Clinic Appointments            [phone] # 1   M-F 7:30 - 5 pm    Emily Cifuentes RN Care Coordinator  [phone]     OR Procedure Scheduling                             [phone]    For urgent/emergent questions after business hours, you may reach the on-call GI Fellow by contacting the [facility]  at [phone].    How do I schedule labs, imaging studies, or procedures that were ordered in clinic today?     Labs: To schedule lab appointment at the Clinic and Surgery Center, use my chart or call [phone]. If you have a [facility] lab closer to home where you are regularly seen you can give them a call.     Procedures: If a colonoscopy, upper endoscopy, breath test, esophageal manometry, or pH impedence was ordered today, our endoscopy team will call you to schedule this. If you have not heard from our endoscopy team within a week, please call [phone] to schedule.     Imaging Studies: If you were scheduled for a CT scan, X-ray, MRI, ultrasound, HIDA scan or other imaging study, please call [phone] to have this scheduled.     Referral: If a referral to another specialty was ordered, expect a phone call or follow instructions above. If you have not heard  from anyone regarding your referral in a week, please call our clinic to check the status.     How to I schedule a follow-up visit?  If you did not schedule a follow-up visit today, please call [phone] to schedule a follow-up office visit.

## 2022-04-22 ENCOUNTER — PATIENT OUTREACH (OUTPATIENT)
Dept: GASTROENTEROLOGY | Facility: CLINIC | Age: 72
End: 2022-04-22
Payer: COMMERCIAL

## 2022-04-22 ENCOUNTER — PREP FOR PROCEDURE (OUTPATIENT)
Dept: GASTROENTEROLOGY | Facility: CLINIC | Age: 72
End: 2022-04-22
Payer: COMMERCIAL

## 2022-04-22 DIAGNOSIS — K85.90 ACUTE PANCREATITIS, UNSPECIFIED COMPLICATION STATUS, UNSPECIFIED PANCREATITIS TYPE: Primary | ICD-10-CM

## 2022-04-22 NOTE — TELEPHONE ENCOUNTER
"Called to talk to patient after flare and to discuss next procedure.     Per patient, she's \"uncomfortable\"- went to the gym today, pain back again today. Pain in right flank, radiates to left side/left lower flank. Review labs requested by Dr Oates    Please assist in scheduling:     Procedure/Imaging/Clinic: EUS/ERCP  Physician: Иван/Chuckie  Timin/12  Procedure length:provider average  Anesthesia:gen  Dx: recurrent pancreatitis  Tier:2  Location: [facility]       Comments:     "

## 2022-04-25 ENCOUNTER — LAB (OUTPATIENT)
Dept: LAB | Facility: CLINIC | Age: 72
End: 2022-04-25
Payer: COMMERCIAL

## 2022-04-25 DIAGNOSIS — K85.90 ACUTE PANCREATITIS, UNSPECIFIED COMPLICATION STATUS, UNSPECIFIED PANCREATITIS TYPE: ICD-10-CM

## 2022-04-25 LAB
ERYTHROCYTE [DISTWIDTH] IN BLOOD BY AUTOMATED COUNT: 18.5 % (ref 10–15)
HCT VFR BLD AUTO: 40.9 % (ref 35–47)
HGB BLD-MCNC: 13.1 G/DL (ref 11.7–15.7)
MCH RBC QN AUTO: 29.6 PG (ref 26.5–33)
MCHC RBC AUTO-ENTMCNC: 32 G/DL (ref 31.5–36.5)
MCV RBC AUTO: 93 FL (ref 78–100)
PLATELET # BLD AUTO: 314 10E3/UL (ref 150–450)
RBC # BLD AUTO: 4.42 10E6/UL (ref 3.8–5.2)
WBC # BLD AUTO: 6 10E3/UL (ref 4–11)

## 2022-04-25 PROCEDURE — 83690 ASSAY OF LIPASE: CPT

## 2022-04-25 PROCEDURE — 85027 COMPLETE CBC AUTOMATED: CPT

## 2022-04-25 PROCEDURE — 36415 COLL VENOUS BLD VENIPUNCTURE: CPT

## 2022-04-26 DIAGNOSIS — Z11.59 ENCOUNTER FOR SCREENING FOR OTHER VIRAL DISEASES: Primary | ICD-10-CM

## 2022-04-26 LAB — LIPASE SERPL-CCNC: 52 U/L (ref 73–393)

## 2022-04-29 ENCOUNTER — PREP FOR PROCEDURE (OUTPATIENT)
Dept: GASTROENTEROLOGY | Facility: CLINIC | Age: 72
End: 2022-04-29
Payer: COMMERCIAL

## 2022-05-08 NOTE — BRIEF OP NOTE
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 She was offered EUS guided \"rendezvous\" of pancreatic duct at OSH but patient declined. MRCP with variable degrees of remnant PD dilation. Patient presents today for planned pancreaticogastrostomy.    - Uncomplicated pancreaticogastrostomy was successfully performed and secured with a 3 Fr x 13 cm single pigtail Advanix stent with pigtail in the jejunum (across the pancreaticojejunostomy'' and straight end in the stomach ''through-and-through stent''.  - One hemostatic clip was placed to anchor stent to gastric wall.  - Post-pyloric preserving Whipple anatomy with healthy appearance and normal residual pancreatic parenchyma except for scattered hyperechoic strands.    Plan  - Observe patient in PACU for possible discharge same day.   - Observe patient's clinical course, adminster 1 L LR in PACU and repeat lipase/amylase in 2 hours. Discharge home later today if no/minimal tolerable pain and labs with no evidence of pancreatitis.    - Plan for ERCP with Dr. Noguera in two weeks for upsizing stent and further pancreatic endotherapy.   - The findings and recommendations were discussed with the patient and their family.   "

## 2022-05-10 ENCOUNTER — LAB (OUTPATIENT)
Dept: LAB | Facility: CLINIC | Age: 72
End: 2022-05-10
Attending: INTERNAL MEDICINE

## 2022-05-10 DIAGNOSIS — Z11.59 ENCOUNTER FOR SCREENING FOR OTHER VIRAL DISEASES: ICD-10-CM

## 2022-05-10 PROCEDURE — U0003 INFECTIOUS AGENT DETECTION BY NUCLEIC ACID (DNA OR RNA); SEVERE ACUTE RESPIRATORY SYNDROME CORONAVIRUS 2 (SARS-COV-2) (CORONAVIRUS DISEASE [COVID-19]), AMPLIFIED PROBE TECHNIQUE, MAKING USE OF HIGH THROUGHPUT TECHNOLOGIES AS DESCRIBED BY CMS-2020-01-R: HCPCS | Performed by: INTERNAL MEDICINE

## 2022-05-11 LAB — SARS-COV-2 RNA RESP QL NAA+PROBE: NEGATIVE

## 2022-05-11 NOTE — PROGRESS NOTES
GI CLINIC VISIT - NEW PATIENT    CC/REFERRING MD:  Arnaldo Noguera  REASON FOR CONSULTATION: Relapsing acute pancreatitis following Whipple. Discussion of possible endoscopic intervention.    ASSESSMENT/PLAN:  Relapsing acute pancreatitis following Whipple resection for non-malignant duodenal polyps. At least 2 episodes of acute pancreatitis, with the last in August 2021. More recent issues with bloating and discomfort with wearing tight clothing. The bloating may be due to post-op anatomic issues or pancreatic enzyme insufficiency and the other discomfort with clothing related to incisional pain or other issues but unlikely pancreatic in origin.    She is currently feeling very well since enzymes were initiated and is clearly quite hesitant to pursue endoscopic intervention, however her main issue at this time for considering is a desire to avoid needing enzyme supplementation.    We spent over 60 minutes discussing the potential techniques for endoscopic pancreatic ductal drainage and the potential risks, including pancreatitis, ductal leakage, pseudocyst formation peritonitis and bleeding.    I discussed that I would not recommend drainage solely for the purpose of being able to stop enzymes. It is not clear whether drainage will significantly improve enzyme levels and there are several options to pursue, including OTC plant-based enzyme supplements, that could be less expensive for her. She does not qualify for patient assistance programs.    The goal of intervention would be to relieve symptoms and prevent recurrent episodes of pancreatitis. Given that she is currently feeling the best she has in months and has not had an episode of pancreatitis in nearly 1 year, she is inclined to defer intervention at this time. She is scheduled to regroup with Dr. Noguera in several weeks to discuss this further.      PLAN:  - Conservative management for now.  Follow-up as scheduled with Dr. Noguera to discuss  "further.    Thank you for this consultation.  It was a pleasure to participate in the care of this patient; please contact us with any further questions.  A total of  65 minutes was spent on the day of the visit, >50% of which was counseling regarding the above delineated issues. The remainder was review of records and imaging as well as documentation and coordination of care.      RENEE Oates MD  Professor of Medicine  Division of Gastroenterology, Hepatology and Nutrition  [facility]  -------------------------------------------------------------------------------------------------------------------  HPI:  The pt is a/n 71 year old female who I was asked to see at the request of Dr. Arnaldo Noguera for further discussion of possible endoscopic intervention for pancreatitis. She was referred to Dr. Noguera by Dr. Rob Jansen on the recommendation of Dr. Graciela Gutierrez.    Please see Dr. Noguera's consult note dated 3/23/22 for additional details.  She was accompanied today by a friend who is a retired emergency physician.    The pt underwent laparoscopic Whipple by Dr. Preston at [place] for definitive treatment of duodenal polyposis that was refractory to endoscopic management. Pathology showed tubulovillous adenoma with LGD and no malignancy. She has had at least three episodes of relapsing acute pancreatitis since that time. During an evaluation at [place] she was apparently offered EUS-guided rendezvous however she was concerned re the possible risk and deferred.    I do see that she had EUS by Dr. Funes 2/18/20 with a listed indication of \"acute recurrent pancreatitis\". From his note, it appears that this was initially planned for possible rendezvous drainage. The main PD at that time measured 2 mm in maximal diameter. He did perform and EUS-guided pancreatogram, which per his note showed no evidence of anastomotic stricture and therefore he deferred additional ductal intervention. This was " "apparently uncomplicated and done as outpt. There is no lipase following the procedure and GI note from 1 wk later makes no mention of complications. She was recommended to be on a low fat diet and Creon.    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 She had been following with the GI clinic at [place] for epigastric pain and had various testing including for SIBO. She had a documented episode of acute pancreatitis 8/2/21 with lipase 10,482. More recently she was admitted at [place] 3/24/22 primarily for a hematoma following implant revision but did have abdominal pain at that time. Lipase was low at 11. CT angio showed no pancreatitis. MRCP showed a prominent PD at 4 mm but no apparent pancreatitis. Per report she was offered a PD rendezvous procedure but was hesitant to pursue this.    She sought second opinion with Dr. Noguera and was seen 3/23/22. At that time she noted dealing with a constant state of worry that her pain would return but was otherwise feeling well with \"low grade bloating\" that \"she can live with\".    Per Dr. Noguera's note, it was his impression that she has probably developed a degree of chronic pancreatitis at this point. He discussed possible EUS-guided interventions but recommended reassurance and a trial of medical therapy with enzymes.    She requested discussion with me re the endoscopic options.    Currently she states that she is \"feeling the best she has in many months\". She has upper abdominal discomfort primarily when wearing tight clothing and sports bras.     She is concerned that if she is now feeling better due to the re-instituted enzyme supplementation that she will need to take enzymes for the rest of her life, and she had had difficulty getting insurance coverage for this with significant out of pocket cost. Her main concern today was re whether an EUS guided pancreaticogastrostomy would allow her to stop the " enzymes.      ROS:  See history of present illness.    PERTINENT PAST MEDICAL HISTORY:  Past Medical History:   Diagnosis Date     Asthma     pt.states no longer has symptoms     CAD (coronary artery disease)      HLD (hyperlipidemia)        PREVIOUS SURGERIES:  Past Surgical History:   Procedure Laterality Date     APPENDECTOMY       ARTHROPLASTY HIP Left 6/15/2016    Procedure: ARTHROPLASTY HIP;  Surgeon: Jeffy Wolff MD;  Location: [facility] OR     COLONOSCOPY  10/3/2013    Procedure: COMBINED COLONOSCOPY, SINGLE BIOPSY/POLYPECTOMY BY BIOPSY;;  Surgeon: Kenney Walls MD;  Location:  GI     FOOT SURGERY       HC TOOTH EXTRACTION W/FORCEP       HYSTERECTOMY       INCISION AND DRAINAGE BREAST Left 2/18/2022    Procedure: evacuate hematoma left  BREAST;  Surgeon: Dayron Garcia MD;  Location: [facility] OR       PREVIOUS ENDOSCOPY:  See HPI    ALLERGIES:   No Known Allergies    PERTINENT MEDICATIONS:    Current Outpatient Medications:      amylase-lipase-protease (CREON) 59282-43545 units CPEP per EC capsule, Take 2-3 with meals / 1-2 with snacks, up to 15 per day. (Patient taking differently: 2 capsules Take 2-3 with meals / 1-2 with snacks, up to 15 per day.), Disp: 450 capsule, Rfl: 6     Atorvastatin Calcium (LIPITOR PO), Take 40 mg by mouth At Bedtime , Disp: , Rfl:      Calcium Carb-Cholecalciferol (CALCIUM 1000 + D) 1000-800 MG-UNIT TABS, , Disp: , Rfl:      calcium-magnesium (CALMAG) 500-250 MG TABS, Take 2 tablets by mouth daily, Disp: , Rfl:      EVOLOCUMAB SC, Inject 140 mg Subcutaneous every 14 days, Disp: , Rfl:      EZETIMIBE PO, Take 10 mg by mouth At Bedtime, Disp: , Rfl:      hydrochlorothiazide (HYDRODIURIL) 25 MG tablet, Take 25 mg by mouth daily, Disp: , Rfl:      Montelukast Sodium (SINGULAIR PO), Take 10 mg by mouth At Bedtime , Disp: , Rfl:      valACYclovir (VALTREX) 1000 mg tablet, Take 1,000 mg by mouth daily as needed (cold sore) , Disp: , Rfl:      VITAMIN A PO, Take 1 capsule  (unknown dose, prescription strength) by mouth daily, Disp: , Rfl:      LORazepam (ATIVAN) 0.5 MG tablet, Take 1-2 tablets by mouth twice daily as needed for anxiety (Patient not taking: No sig reported), Disp: , Rfl:      magnesium 100 MG CAPS, , Disp: , Rfl:      omega 3 complex with vitamins E and K (SMOOTH SPEAK) liquid, , Disp: , Rfl:      UNABLE TO FIND, MEDICATION NAME: Estrogen pellet in arm (Patient not taking: No sig reported), Disp: , Rfl:      zolpidem ER (AMBIEN CR) 12.5 MG CR tablet, Take 12.5 mg by mouth At Bedtime, Disp: , Rfl:     SOCIAL HISTORY:  Social History     Socioeconomic History     Marital status:      Spouse name: Not on file     Number of children: Not on file     Years of education: Not on file     Highest education level: Not on file   Occupational History     Not on file   Tobacco Use     Smoking status: Former Smoker     Quit date: 10/3/1988     Years since quittin.6     Smokeless tobacco: Never Used   Substance and Sexual Activity     Alcohol use: No     Drug use: No     Sexual activity: Not on file   Other Topics Concern     Parent/sibling w/ CABG, MI or angioplasty before 65F 55M? Not Asked   Social History Narrative     Not on file     Social Determinants of Health     Financial Resource Strain: Not on file   Food Insecurity: Not on file   Transportation Needs: Not on file   Physical Activity: Not on file   Stress: Not on file   Social Connections: Not on file   Intimate Partner Violence: Not on file   Housing Stability: Not on file       FAMILY HISTORY:  No family history on file.      PHYSICAL EXAMINATION:  Vitals reviewed, AFVSS   Wt   Wt Readings from Last 2 Encounters:   22 68 kg (150 lb)   22 65.8 kg (145 lb)      Not examined. Consultative discussion.    PERTINENT STUDIES:    Lab on 04/15/2022   Component Date Value Ref Range Status     Bilirubin Total 04/15/2022 0.4  0.2 - 1.3 mg/dL Final     Bilirubin Direct 04/15/2022 0.2  0.0 - 0.2 mg/dL Final      Protein Total 04/15/2022 7.0  6.8 - 8.8 g/dL Final     Albumin 04/15/2022 3.5  3.4 - 5.0 g/dL Final     Alkaline Phosphatase 04/15/2022 139  40 - 150 U/L Final     AST 04/15/2022 38  0 - 45 U/L Final     ALT 04/15/2022 33  0 - 50 U/L Final     Lipase 04/15/2022 54 (A) 73 - 393 U/L Final

## 2022-05-12 ENCOUNTER — APPOINTMENT (OUTPATIENT)
Dept: GENERAL RADIOLOGY | Facility: CLINIC | Age: 72
End: 2022-05-12
Attending: INTERNAL MEDICINE
Payer: COMMERCIAL

## 2022-05-12 ENCOUNTER — HOSPITAL ENCOUNTER (OUTPATIENT)
Facility: CLINIC | Age: 72
Discharge: HOME | End: 2022-05-12
Attending: INTERNAL MEDICINE | Admitting: INTERNAL MEDICINE
Payer: COMMERCIAL

## 2022-05-12 ENCOUNTER — ANESTHESIA EVENT (OUTPATIENT)
Dept: SURGERY | Facility: CLINIC | Age: 72
End: 2022-05-12
Payer: COMMERCIAL

## 2022-05-12 ENCOUNTER — ANESTHESIA (OUTPATIENT)
Dept: SURGERY | Facility: CLINIC | Age: 72
End: 2022-05-12
Payer: COMMERCIAL

## 2022-05-12 VITALS
RESPIRATION RATE: 16 BRPM | HEIGHT: 67 IN | SYSTOLIC BLOOD PRESSURE: 140 MMHG | HEART RATE: 54 BPM | DIASTOLIC BLOOD PRESSURE: 76 MMHG | TEMPERATURE: 97.7 F | OXYGEN SATURATION: 100 % | BODY MASS INDEX: 24.19 KG/M2 | WEIGHT: 154.1 LBS

## 2022-05-12 DIAGNOSIS — K85.90 ACUTE PANCREATITIS, UNSPECIFIED COMPLICATION STATUS, UNSPECIFIED PANCREATITIS TYPE: Primary | ICD-10-CM

## 2022-05-12 LAB
ALBUMIN SERPL-MCNC: 3.1 G/DL (ref 3.4–5)
ALP SERPL-CCNC: 115 U/L (ref 40–150)
ALT SERPL W P-5'-P-CCNC: 31 U/L (ref 0–50)
AMYLASE SERPL-CCNC: 48 U/L (ref 30–110)
AMYLASE SERPL-CCNC: 54 U/L (ref 30–110)
ANION GAP SERPL CALCULATED.3IONS-SCNC: 5 MMOL/L (ref 3–14)
AST SERPL W P-5'-P-CCNC: 32 U/L (ref 0–45)
BILIRUB SERPL-MCNC: 0.4 MG/DL (ref 0.2–1.3)
BUN SERPL-MCNC: 21 MG/DL (ref 7–30)
CALCIUM SERPL-MCNC: 8.6 MG/DL (ref 8.5–10.1)
CHLORIDE BLD-SCNC: 107 MMOL/L (ref 94–109)
CO2 SERPL-SCNC: 28 MMOL/L (ref 20–32)
CREAT SERPL-MCNC: 0.74 MG/DL (ref 0.52–1.04)
ERYTHROCYTE [DISTWIDTH] IN BLOOD BY AUTOMATED COUNT: 17.5 % (ref 10–15)
GFR SERPL CREATININE-BSD FRML MDRD: 85 ML/MIN/1.73M2
GLUCOSE BLD-MCNC: 103 MG/DL (ref 70–99)
GLUCOSE BLDC GLUCOMTR-MCNC: 91 MG/DL (ref 70–99)
HCT VFR BLD AUTO: 37 % (ref 35–47)
HGB BLD-MCNC: 11.9 G/DL (ref 11.7–15.7)
INR PPP: 1.08 (ref 0.85–1.15)
LIPASE SERPL-CCNC: 246 U/L (ref 73–393)
LIPASE SERPL-CCNC: 48 U/L (ref 73–393)
MCH RBC QN AUTO: 29 PG (ref 26.5–33)
MCHC RBC AUTO-ENTMCNC: 32.2 G/DL (ref 31.5–36.5)
MCV RBC AUTO: 90 FL (ref 78–100)
PLATELET # BLD AUTO: 264 10E3/UL (ref 150–450)
POTASSIUM BLD-SCNC: 3.8 MMOL/L (ref 3.4–5.3)
PROT SERPL-MCNC: 6.3 G/DL (ref 6.8–8.8)
RBC # BLD AUTO: 4.1 10E6/UL (ref 3.8–5.2)
SODIUM SERPL-SCNC: 140 MMOL/L (ref 133–144)
WBC # BLD AUTO: 7 10E3/UL (ref 4–11)

## 2022-05-12 PROCEDURE — 999N000181 XR SURGERY CARM FLUORO GREATER THAN 5 MIN W STILLS: Mod: TC

## 2022-05-12 PROCEDURE — 710N000012 HC RECOVERY PHASE 2, PER MINUTE: Performed by: INTERNAL MEDICINE

## 2022-05-12 PROCEDURE — 999N000141 HC STATISTIC PRE-PROCEDURE NURSING ASSESSMENT: Performed by: INTERNAL MEDICINE

## 2022-05-12 PROCEDURE — 250N000009 HC RX 250: Performed by: NURSE ANESTHETIST, CERTIFIED REGISTERED

## 2022-05-12 PROCEDURE — 370N000017 HC ANESTHESIA TECHNICAL FEE, PER MIN: Performed by: INTERNAL MEDICINE

## 2022-05-12 PROCEDURE — 255N000002 HC RX 255 OP 636: Performed by: INTERNAL MEDICINE

## 2022-05-12 PROCEDURE — 83690 ASSAY OF LIPASE: CPT | Performed by: INTERNAL MEDICINE

## 2022-05-12 PROCEDURE — 250N000025 HC SEVOFLURANE, PER MIN: Performed by: INTERNAL MEDICINE

## 2022-05-12 PROCEDURE — 82962 GLUCOSE BLOOD TEST: CPT

## 2022-05-12 PROCEDURE — 85027 COMPLETE CBC AUTOMATED: CPT | Performed by: INTERNAL MEDICINE

## 2022-05-12 PROCEDURE — 36415 COLL VENOUS BLD VENIPUNCTURE: CPT | Performed by: INTERNAL MEDICINE

## 2022-05-12 PROCEDURE — 250N000013 HC RX MED GY IP 250 OP 250 PS 637: Performed by: ANESTHESIOLOGY

## 2022-05-12 PROCEDURE — 258N000003 HC RX IP 258 OP 636: Performed by: NURSE ANESTHETIST, CERTIFIED REGISTERED

## 2022-05-12 PROCEDURE — 360N000082 HC SURGERY LEVEL 2 W/ FLUORO, PER MIN: Performed by: INTERNAL MEDICINE

## 2022-05-12 PROCEDURE — 710N000010 HC RECOVERY PHASE 1, LEVEL 2, PER MIN: Performed by: INTERNAL MEDICINE

## 2022-05-12 PROCEDURE — C1769 GUIDE WIRE: HCPCS | Performed by: INTERNAL MEDICINE

## 2022-05-12 PROCEDURE — 250N000009 HC RX 250: Performed by: INTERNAL MEDICINE

## 2022-05-12 PROCEDURE — C1877 STENT, NON-COAT/COV W/O DEL: HCPCS | Performed by: INTERNAL MEDICINE

## 2022-05-12 PROCEDURE — 82150 ASSAY OF AMYLASE: CPT | Performed by: INTERNAL MEDICINE

## 2022-05-12 PROCEDURE — 258N000003 HC RX IP 258 OP 636: Performed by: INTERNAL MEDICINE

## 2022-05-12 PROCEDURE — 272N000001 HC OR GENERAL SUPPLY STERILE: Performed by: INTERNAL MEDICINE

## 2022-05-12 PROCEDURE — 85610 PROTHROMBIN TIME: CPT | Performed by: INTERNAL MEDICINE

## 2022-05-12 PROCEDURE — 80053 COMPREHEN METABOLIC PANEL: CPT | Performed by: INTERNAL MEDICINE

## 2022-05-12 PROCEDURE — 250N000011 HC RX IP 250 OP 636: Performed by: NURSE ANESTHETIST, CERTIFIED REGISTERED

## 2022-05-12 PROCEDURE — C1725 CATH, TRANSLUMIN NON-LASER: HCPCS | Performed by: INTERNAL MEDICINE

## 2022-05-12 DEVICE — STENT ADVANIX PANCREA PIGTAIL 3FRX13CM M00536850
Type: IMPLANTABLE DEVICE | Site: PANCREATIC DUCT | Status: NON-FUNCTIONAL
Removed: 2023-03-30

## 2022-05-12 RX ORDER — FLUMAZENIL 0.1 MG/ML
0.2 INJECTION, SOLUTION INTRAVENOUS
Status: DISCONTINUED | OUTPATIENT
Start: 2022-05-12 | End: 2022-05-12 | Stop reason: HOSPADM

## 2022-05-12 RX ORDER — INDOMETHACIN 50 MG/1
100 SUPPOSITORY RECTAL
Status: DISCONTINUED | OUTPATIENT
Start: 2022-05-12 | End: 2022-05-12 | Stop reason: HOSPADM

## 2022-05-12 RX ORDER — ONDANSETRON 2 MG/ML
INJECTION INTRAMUSCULAR; INTRAVENOUS PRN
Status: DISCONTINUED | OUTPATIENT
Start: 2022-05-12 | End: 2022-05-12

## 2022-05-12 RX ORDER — GLYCOPYRROLATE 0.2 MG/ML
INJECTION, SOLUTION INTRAMUSCULAR; INTRAVENOUS PRN
Status: DISCONTINUED | OUTPATIENT
Start: 2022-05-12 | End: 2022-05-12

## 2022-05-12 RX ORDER — NALOXONE HYDROCHLORIDE 0.4 MG/ML
0.4 INJECTION, SOLUTION INTRAMUSCULAR; INTRAVENOUS; SUBCUTANEOUS
Status: DISCONTINUED | OUTPATIENT
Start: 2022-05-12 | End: 2022-05-12 | Stop reason: HOSPADM

## 2022-05-12 RX ORDER — ONDANSETRON 2 MG/ML
4 INJECTION INTRAMUSCULAR; INTRAVENOUS EVERY 30 MIN PRN
Status: DISCONTINUED | OUTPATIENT
Start: 2022-05-12 | End: 2022-05-12 | Stop reason: HOSPADM

## 2022-05-12 RX ORDER — ONDANSETRON 4 MG/1
4 TABLET, ORALLY DISINTEGRATING ORAL EVERY 30 MIN PRN
Status: DISCONTINUED | OUTPATIENT
Start: 2022-05-12 | End: 2022-05-12 | Stop reason: HOSPADM

## 2022-05-12 RX ORDER — DEXAMETHASONE SODIUM PHOSPHATE 4 MG/ML
4 INJECTION, SOLUTION INTRA-ARTICULAR; INTRALESIONAL; INTRAMUSCULAR; INTRAVENOUS; SOFT TISSUE EVERY 10 MIN PRN
Status: DISCONTINUED | OUTPATIENT
Start: 2022-05-12 | End: 2022-05-12 | Stop reason: HOSPADM

## 2022-05-12 RX ORDER — SODIUM CHLORIDE, SODIUM LACTATE, POTASSIUM CHLORIDE, CALCIUM CHLORIDE 600; 310; 30; 20 MG/100ML; MG/100ML; MG/100ML; MG/100ML
INJECTION, SOLUTION INTRAVENOUS CONTINUOUS
Status: DISCONTINUED | OUTPATIENT
Start: 2022-05-12 | End: 2022-05-12 | Stop reason: HOSPADM

## 2022-05-12 RX ORDER — LIDOCAINE 40 MG/G
CREAM TOPICAL
Status: DISCONTINUED | OUTPATIENT
Start: 2022-05-12 | End: 2022-05-12 | Stop reason: HOSPADM

## 2022-05-12 RX ORDER — OXYCODONE HYDROCHLORIDE 5 MG/1
5 TABLET ORAL EVERY 6 HOURS PRN
Qty: 12 TABLET | Refills: 0 | Status: ON HOLD | OUTPATIENT
Start: 2022-05-12 | End: 2022-05-15

## 2022-05-12 RX ORDER — PROPOFOL 10 MG/ML
INJECTION, EMULSION INTRAVENOUS PRN
Status: DISCONTINUED | OUTPATIENT
Start: 2022-05-12 | End: 2022-05-12

## 2022-05-12 RX ORDER — INDOMETHACIN 50 MG/1
SUPPOSITORY RECTAL PRN
Status: DISCONTINUED | OUTPATIENT
Start: 2022-05-12 | End: 2022-05-12 | Stop reason: HOSPADM

## 2022-05-12 RX ORDER — IOPAMIDOL 510 MG/ML
INJECTION, SOLUTION INTRAVASCULAR PRN
Status: DISCONTINUED | OUTPATIENT
Start: 2022-05-12 | End: 2022-05-12 | Stop reason: HOSPADM

## 2022-05-12 RX ORDER — FENTANYL CITRATE 50 UG/ML
25 INJECTION, SOLUTION INTRAMUSCULAR; INTRAVENOUS EVERY 5 MIN PRN
Status: DISCONTINUED | OUTPATIENT
Start: 2022-05-12 | End: 2022-05-12 | Stop reason: HOSPADM

## 2022-05-12 RX ORDER — LEVOFLOXACIN 5 MG/ML
INJECTION, SOLUTION INTRAVENOUS PRN
Status: DISCONTINUED | OUTPATIENT
Start: 2022-05-12 | End: 2022-05-12

## 2022-05-12 RX ORDER — DEXAMETHASONE SODIUM PHOSPHATE 4 MG/ML
INJECTION, SOLUTION INTRA-ARTICULAR; INTRALESIONAL; INTRAMUSCULAR; INTRAVENOUS; SOFT TISSUE PRN
Status: DISCONTINUED | OUTPATIENT
Start: 2022-05-12 | End: 2022-05-12

## 2022-05-12 RX ORDER — NALOXONE HYDROCHLORIDE 0.4 MG/ML
0.2 INJECTION, SOLUTION INTRAMUSCULAR; INTRAVENOUS; SUBCUTANEOUS
Status: DISCONTINUED | OUTPATIENT
Start: 2022-05-12 | End: 2022-05-12 | Stop reason: HOSPADM

## 2022-05-12 RX ORDER — ONDANSETRON 2 MG/ML
4 INJECTION INTRAMUSCULAR; INTRAVENOUS EVERY 6 HOURS PRN
Status: DISCONTINUED | OUTPATIENT
Start: 2022-05-12 | End: 2022-05-12 | Stop reason: HOSPADM

## 2022-05-12 RX ORDER — OXYCODONE HYDROCHLORIDE 5 MG/1
5 TABLET ORAL EVERY 4 HOURS PRN
Status: DISCONTINUED | OUTPATIENT
Start: 2022-05-12 | End: 2022-05-12 | Stop reason: HOSPADM

## 2022-05-12 RX ORDER — HYDROMORPHONE HCL IN WATER/PF 6 MG/30 ML
0.2 PATIENT CONTROLLED ANALGESIA SYRINGE INTRAVENOUS EVERY 5 MIN PRN
Status: DISCONTINUED | OUTPATIENT
Start: 2022-05-12 | End: 2022-05-12 | Stop reason: HOSPADM

## 2022-05-12 RX ORDER — FENTANYL CITRATE 50 UG/ML
25 INJECTION, SOLUTION INTRAMUSCULAR; INTRAVENOUS
Status: DISCONTINUED | OUTPATIENT
Start: 2022-05-12 | End: 2022-05-12 | Stop reason: HOSPADM

## 2022-05-12 RX ORDER — SODIUM CHLORIDE, SODIUM LACTATE, POTASSIUM CHLORIDE, CALCIUM CHLORIDE 600; 310; 30; 20 MG/100ML; MG/100ML; MG/100ML; MG/100ML
INJECTION, SOLUTION INTRAVENOUS CONTINUOUS PRN
Status: DISCONTINUED | OUTPATIENT
Start: 2022-05-12 | End: 2022-05-12

## 2022-05-12 RX ORDER — ONDANSETRON 4 MG/1
4 TABLET, ORALLY DISINTEGRATING ORAL EVERY 6 HOURS PRN
Status: DISCONTINUED | OUTPATIENT
Start: 2022-05-12 | End: 2022-05-12 | Stop reason: HOSPADM

## 2022-05-12 RX ORDER — FENTANYL CITRATE 50 UG/ML
INJECTION, SOLUTION INTRAMUSCULAR; INTRAVENOUS PRN
Status: DISCONTINUED | OUTPATIENT
Start: 2022-05-12 | End: 2022-05-12

## 2022-05-12 RX ORDER — LIDOCAINE HYDROCHLORIDE 20 MG/ML
INJECTION, SOLUTION INFILTRATION; PERINEURAL PRN
Status: DISCONTINUED | OUTPATIENT
Start: 2022-05-12 | End: 2022-05-12

## 2022-05-12 RX ADMIN — OXYCODONE HYDROCHLORIDE 5 MG: 5 TABLET ORAL at 10:35

## 2022-05-12 RX ADMIN — MIDAZOLAM 2 MG: 1 INJECTION INTRAMUSCULAR; INTRAVENOUS at 07:27

## 2022-05-12 RX ADMIN — PHENYLEPHRINE HYDROCHLORIDE 100 MCG: 10 INJECTION INTRAVENOUS at 07:43

## 2022-05-12 RX ADMIN — FENTANYL CITRATE 100 MCG: 50 INJECTION, SOLUTION INTRAMUSCULAR; INTRAVENOUS at 07:31

## 2022-05-12 RX ADMIN — PHENYLEPHRINE HYDROCHLORIDE 50 MCG: 10 INJECTION INTRAVENOUS at 08:08

## 2022-05-12 RX ADMIN — PHENYLEPHRINE HYDROCHLORIDE 150 MCG: 10 INJECTION INTRAVENOUS at 08:11

## 2022-05-12 RX ADMIN — PHENYLEPHRINE HYDROCHLORIDE 100 MCG: 10 INJECTION INTRAVENOUS at 07:38

## 2022-05-12 RX ADMIN — PHENYLEPHRINE HYDROCHLORIDE 100 MCG: 10 INJECTION INTRAVENOUS at 07:54

## 2022-05-12 RX ADMIN — PHENYLEPHRINE HYDROCHLORIDE 150 MCG: 10 INJECTION INTRAVENOUS at 07:49

## 2022-05-12 RX ADMIN — SODIUM CHLORIDE, POTASSIUM CHLORIDE, SODIUM LACTATE AND CALCIUM CHLORIDE 1000 ML: 600; 310; 30; 20 INJECTION, SOLUTION INTRAVENOUS at 09:30

## 2022-05-12 RX ADMIN — PHENYLEPHRINE HYDROCHLORIDE 100 MCG: 10 INJECTION INTRAVENOUS at 07:32

## 2022-05-12 RX ADMIN — LEVOFLOXACIN 500 MG: 5 INJECTION, SOLUTION INTRAVENOUS at 07:28

## 2022-05-12 RX ADMIN — PROPOFOL 140 MG: 10 INJECTION, EMULSION INTRAVENOUS at 07:31

## 2022-05-12 RX ADMIN — DEXAMETHASONE SODIUM PHOSPHATE 8 MG: 4 INJECTION, SOLUTION INTRA-ARTICULAR; INTRALESIONAL; INTRAMUSCULAR; INTRAVENOUS; SOFT TISSUE at 07:31

## 2022-05-12 RX ADMIN — ROCURONIUM BROMIDE 50 MG: 50 INJECTION, SOLUTION INTRAVENOUS at 07:31

## 2022-05-12 RX ADMIN — ONDANSETRON 4 MG: 2 INJECTION INTRAMUSCULAR; INTRAVENOUS at 08:34

## 2022-05-12 RX ADMIN — LIDOCAINE HYDROCHLORIDE 60 MG: 20 INJECTION, SOLUTION INFILTRATION; PERINEURAL at 07:31

## 2022-05-12 RX ADMIN — SODIUM CHLORIDE, POTASSIUM CHLORIDE, SODIUM LACTATE AND CALCIUM CHLORIDE: 600; 310; 30; 20 INJECTION, SOLUTION INTRAVENOUS at 07:27

## 2022-05-12 RX ADMIN — SUGAMMADEX 200 MG: 100 INJECTION, SOLUTION INTRAVENOUS at 08:42

## 2022-05-12 RX ADMIN — ROCURONIUM BROMIDE 50 MG: 50 INJECTION, SOLUTION INTRAVENOUS at 07:32

## 2022-05-12 RX ADMIN — PHENYLEPHRINE HYDROCHLORIDE 100 MCG: 10 INJECTION INTRAVENOUS at 08:21

## 2022-05-12 RX ADMIN — GLYCOPYRROLATE 0.3 MG: 0.2 INJECTION, SOLUTION INTRAMUSCULAR; INTRAVENOUS at 07:27

## 2022-05-12 NOTE — ANESTHESIA PROCEDURE NOTES
Airway       Patient location during procedure: OR       Procedure Start/Stop Times: 5/12/2022 7:34 AM  Staff -        CRNA: Alok Aceves APRN CRNA       Performed By: CRNAIndications and Patient Condition       Indications for airway management: amy-procedural       Induction type:intravenous       Mask difficulty assessment: 1 - vent by mask    Final Airway Details       Final airway type: endotracheal airway       Successful airway: ETT - single  Endotracheal Airway Details        ETT size (mm): 7.0       Cuffed: yes       Successful intubation technique: direct laryngoscopy       DL Blade Type: [place] 2       Grade View of Cords: 1       Adjucts: stylet       Position: Center    Post intubation assessment        Placement verified by: capnometry, equal breath sounds and chest rise        Number of attempts at approach: 1       Ease of procedure: easy       Dentition: Unchanged    Medication(s) Administered   Medication Administration Time: 5/12/2022 7:34 AM    Additional Comments       Eyes taped prior to intubation.

## 2022-05-12 NOTE — ANESTHESIA CARE TRANSFER NOTE
Patient: Laura Gonzalez    Procedure: Procedure(s):  ENDOSCOPIC ULTRASOUND WITH PANCREATICOGASTROSTOMY CREATION, TRACT DILATION, STENT PLACEMENT  ESOPHAGOGASTRODUODENOSCOPY WITH ENDOSCOPIC CLIP PLACEMENT       Diagnosis: Recurrent pancreatitis [K85.90]  Diagnosis Additional Information: No value filed.    Anesthesia Type:   General     Note:    Oropharynx: oropharynx clear of all foreign objects  Level of Consciousness: awake  Oxygen Supplementation: room air    Independent Airway: airway patency satisfactory and stable  Dentition: dentition unchanged  Vital Signs Stable: post-procedure vital signs reviewed and stable  Report to RN Given: handoff report given  Patient transferred to: PACU    Handoff Report: Identifed the Patient, Identified the Reponsible Provider, Reviewed the pertinent medical history, Discussed the surgical course, Reviewed Intra-OP anesthesia mangement and issues during anesthesia, Set expectations for post-procedure period and Allowed opportunity for questions and acknowledgement of understanding      Vitals:  Vitals Value Taken Time   /71 05/12/22 0846   Temp     Pulse 74 05/12/22 0849   Resp 12 05/12/22 0849   SpO2 98 % 05/12/22 0849   Vitals shown include unvalidated device data.    Electronically Signed By: ITZ Laura CRNA  May 12, 2022  8:50 AM

## 2022-05-12 NOTE — ANESTHESIA POSTPROCEDURE EVALUATION
Patient: Laura Gonzalez    Procedure: Procedure(s):  ENDOSCOPIC ULTRASOUND WITH PANCREATICOGASTROSTOMY CREATION, TRACT DILATION, STENT PLACEMENT  ESOPHAGOGASTRODUODENOSCOPY WITH ENDOSCOPIC CLIP PLACEMENT       Anesthesia Type:  General    Note:  Disposition: Outpatient   Postop Pain Control: Uneventful            Sign Out: Well controlled pain   PONV: No   Neuro/Psych: Uneventful            Sign Out: Acceptable/Baseline neuro status   Airway/Respiratory: Uneventful            Sign Out: Acceptable/Baseline resp. status   CV/Hemodynamics: Uneventful            Sign Out: Acceptable CV status; No obvious hypovolemia; No obvious fluid overload   Other NRE: NONE   DID A NON-ROUTINE EVENT OCCUR? No           Last vitals:  Vitals Value Taken Time   /71 05/12/22 0846   Temp 36.4  C (97.5  F) 05/12/22 0846   Pulse 62 05/12/22 0858   Resp 14 05/12/22 0858   SpO2 93 % 05/12/22 0858   Vitals shown include unvalidated device data.    Electronically Signed By: Nathaniel Erickson MD  May 12, 2022  9:00 AM

## 2022-05-12 NOTE — PROGRESS NOTES
SPIRITUAL HEALTH SERVICES  [facility] 3C   PRE-SURGERY VISIT    Had pre-surgery visit with pt.  Provided spiritual support, prayer.     Rev. Effie Schroeder MDiv, [facility]  Staff    Pager [phone]

## 2022-05-12 NOTE — ANESTHESIA PREPROCEDURE EVALUATION
Anesthesia Pre-Procedure Evaluation    Patient: Laura Gonzalez   MRN: [id number] : 1950        Procedure : Procedure(s):  ENDOSCOPIC ULTRASOUND, ESOPHAGOSCOPY / UPPER GASTROINTESTINAL TRACT (GI)  ENDOSCOPIC RETROGRADE CHOLANGIOPANCREATOGRAPHY          Past Medical History:   Diagnosis Date     Asthma     pt.states no longer has symptoms     CAD (coronary artery disease)      HLD (hyperlipidemia)       Past Surgical History:   Procedure Laterality Date     APPENDECTOMY       ARTHROPLASTY HIP Left 6/15/2016    Procedure: ARTHROPLASTY HIP;  Surgeon: Jeffy Wolff MD;  [facility]     COLONOSCOPY  10/3/2013    Procedure: COMBINED COLONOSCOPY, SINGLE BIOPSY/POLYPECTOMY BY BIOPSY;;  Surgeon: Kenney Walls MD;  Location:  GI     FOOT SURGERY       HC TOOTH EXTRACTION W/FORCEP       HYSTERECTOMY       INCISION AND DRAINAGE BREAST Left 2022    Procedure: evacuate hematoma left  BREAST;  Surgeon: Dayron Garcia MD;  Location: [facility] OR      No Known Allergies   Social History     Tobacco Use     Smoking status: Former Smoker     Quit date: 10/3/1988     Years since quittin.6     Smokeless tobacco: Never Used   Substance Use Topics     Alcohol use: No      Wt Readings from Last 1 Encounters:   22 69.9 kg (154 lb 1.6 oz)        Anesthesia Evaluation   Pt has had prior anesthetic. Type: General and MAC.    No history of anesthetic complications       ROS/MED HX  ENT/Pulmonary:    (-) asthma   Neurologic:       Cardiovascular:     (+) --CAD ---    METS/Exercise Tolerance:     Hematologic:       Musculoskeletal:       GI/Hepatic:       Renal/Genitourinary:       Endo:       Psychiatric/Substance Use:       Infectious Disease:       Malignancy:       Other:            Physical Exam    Airway  airway exam normal           Respiratory Devices and Support         Dental  no notable dental history         Cardiovascular   cardiovascular exam normal          Pulmonary   pulmonary exam  "normal                OUTSIDE LABS:  CBC:   Lab Results   Component Value Date    WBC 6.0 04/25/2022    WBC 8.1 04/20/2022    HGB 13.1 04/25/2022    HGB 12.5 04/20/2022    HCT 40.9 04/25/2022    HCT 41.1 04/20/2022     04/25/2022     04/20/2022     BMP:   Lab Results   Component Value Date     04/20/2022     02/18/2022    POTASSIUM 4.5 04/20/2022    POTASSIUM 3.6 02/18/2022    CHLORIDE 106 04/20/2022    CHLORIDE 103 02/18/2022    CO2 30 04/20/2022    CO2 27 02/18/2022    BUN 16 04/20/2022    BUN 17 02/18/2022    CR 0.73 04/20/2022    CR 0.83 02/18/2022    GLC 91 05/12/2022     (H) 04/20/2022     COAGS:   Lab Results   Component Value Date    PTT 41 (H) 02/18/2022    INR 0.98 02/18/2022     POC:   Lab Results   Component Value Date     (H) 06/16/2016     HEPATIC:   Lab Results   Component Value Date    ALBUMIN 3.5 04/20/2022    PROTTOTAL 7.3 04/20/2022    ALT 35 04/20/2022    AST 33 04/20/2022    ALKPHOS 135 04/20/2022    BILITOTAL 0.3 04/20/2022     OTHER:   Lab Results   Component Value Date    GLEN 9.1 04/20/2022    LIPASE 52 (L) 04/25/2022    CRP <2.9 04/20/2022    SED 14 04/20/2022     71 year old female with history of laparoscopic Whipple procedure by Dr Preston at [place] in 2019 for 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 Last flare at [place] 3/10/2022 with low lipase. She was offered EUS guided \"rendezvous\" of pancreatic duct at [place] but was concerned about need, high risk,  and lack of meeting the performing MD until the day of the procedure thus declined. MRCPs with variable degrees of remnant PD dilation. See latest imaging from [place] below.     We saw her for a second opinion 3/23/2022 at which time she was relatively symptomatic. Started her " on PERT and referred to Dr Oates to discuss possibility of pancreaticogastrostomy. Despite her fecal elastase returning nml, her symptoms have significantly improved with PERT thus we are planning to continue with medical management for now. However I do suspect she may need further intervention at some point given the amount of flares she has had and degree of pancreatic destruction. We can revisit pancreaticogastrostomy with Dr Oates should her symptoms progress in the future. Currently her only concern is persistent drenching night sweats since her last hospitalization. No reported fevers. No recent inflammatory markers but white count has been normal. This may be enteric related to alterations/changes in her microbiota from surgery. Will plan to rule out acute infections process with labs today and treat with empiric 10 days course of cipro/flagyl. Will check in again in 6 weeks to see how she is doing.    Anesthesia Plan    ASA Status:  2   NPO Status:  NPO Appropriate    Anesthesia Type: General.     - Airway: ETT   Induction: Intravenous.   Maintenance: Balanced.        Consents    Anesthesia Plan(s) and associated risks, benefits, and realistic alternatives discussed. Questions answered and patient/representative(s) expressed understanding.     - Discussed: Risks, Benefits and Alternatives for BOTH SEDATION and the PROCEDURE were discussed     - Discussed with:  Patient, Spouse      - Extended Intubation/Ventilatory Support Discussed: No.      - Patient is DNR/DNI Status: No    Use of blood products discussed: No .     Postoperative Care    Pain management: IV analgesics.   PONV prophylaxis: Ondansetron (or other 5HT-3), Dexamethasone or Solumedrol     Comments:           H&P reviewed: Unable to attach H&P to encounter due to EHR limitations. H&P Update: appropriate H&P reviewed, patient examined. No interval changes since H&P (within 30 days).         Nathaniel Erickson MD

## 2022-05-12 NOTE — DISCHARGE INSTRUCTIONS
[facility]  Same-Day Surgery   Adult Discharge Orders & Instructions     For 24 hours after surgery    Get plenty of rest.  A responsible adult must stay with you for at least 24 hours after you leave the hospital.   Do not drive or use heavy equipment.  If you have weakness or tingling, don't drive or use heavy equipment until this feeling goes away.  Do not drink alcohol.  Avoid strenuous or risky activities.  Ask for help when climbing stairs.   You may feel lightheaded.  IF so, sit for a few minutes before standing.  Have someone help you get up.   If you have nausea (feel sick to your stomach): Drink only clear liquids such as apple juice, ginger ale, broth or 7-Up.  Rest may also help.  Be sure to drink enough fluids.  Move to a regular diet as you feel able.  You may have a slight fever. Call the doctor if your fever is over 100 F (37.7 C) (taken under the tongue) or lasts longer than 24 hours.  You may have a dry mouth, a sore throat, muscle aches or trouble sleeping.  These should go away after 24 hours.  Do not make important or legal decisions.   Call your doctor for any of the followin.  Signs of infection (fever, growing tenderness at the surgery site, a large amount of drainage or bleeding, severe pain, foul-smelling drainage, redness, swelling).    2. It has been over 8 to 10 hours since surgery and you are still not able to urinate (pass water).    3.  Headache for over 24 hours.      To contact a doctor, call Dr Noguera's clinic at [phone]  or:    '   [phone] and ask for the resident on call for Gastroenterology (answered 24 hours a day)  '   Emergency Department:    [facility]: [phone]       (TTY for hearing impaired: [phone])    [facility]: [phone]       (TTY for hearing impaired: [phone])

## 2022-05-12 NOTE — PROVIDER NOTIFICATION
Dr. Mckeon paged: *Carlos, E. Patient is waiting for Rx to be sent to discharge pharmacy for 5 mg oxy. Are you able to send script? Thanks, Karina MERCEDES phase 2 [id number]

## 2022-05-13 ENCOUNTER — APPOINTMENT (OUTPATIENT)
Dept: CT IMAGING | Facility: CLINIC | Age: 72
DRG: 919 | End: 2022-05-13
Attending: EMERGENCY MEDICINE
Payer: COMMERCIAL

## 2022-05-13 ENCOUNTER — HOSPITAL ENCOUNTER (OUTPATIENT)
Facility: CLINIC | Age: 72
Setting detail: OBSERVATION
Discharge: HOME | DRG: 919 | End: 2022-05-15
Attending: EMERGENCY MEDICINE | Admitting: INTERNAL MEDICINE
Payer: COMMERCIAL

## 2022-05-13 DIAGNOSIS — Z11.52 ENCOUNTER FOR SCREENING LABORATORY TESTING FOR SEVERE ACUTE RESPIRATORY SYNDROME CORONAVIRUS 2 (SARS-COV-2): ICD-10-CM

## 2022-05-13 DIAGNOSIS — K85.90 ACUTE PANCREATITIS, UNSPECIFIED COMPLICATION STATUS, UNSPECIFIED PANCREATITIS TYPE: ICD-10-CM

## 2022-05-13 DIAGNOSIS — K56.609 SBO (SMALL BOWEL OBSTRUCTION) (H): Primary | ICD-10-CM

## 2022-05-13 LAB
ALBUMIN SERPL-MCNC: 3.2 G/DL (ref 3.4–5)
ALBUMIN SERPL-MCNC: 3.8 G/DL (ref 3.4–5)
ALP SERPL-CCNC: 112 U/L (ref 40–150)
ALP SERPL-CCNC: 135 U/L (ref 40–150)
ALT SERPL W P-5'-P-CCNC: 28 U/L (ref 0–50)
ALT SERPL W P-5'-P-CCNC: 32 U/L (ref 0–50)
ANION GAP SERPL CALCULATED.3IONS-SCNC: 5 MMOL/L (ref 3–14)
ANION GAP SERPL CALCULATED.3IONS-SCNC: 7 MMOL/L (ref 3–14)
AST SERPL W P-5'-P-CCNC: 25 U/L (ref 0–45)
AST SERPL W P-5'-P-CCNC: 32 U/L (ref 0–45)
BASOPHILS # BLD AUTO: 0 10E3/UL (ref 0–0.2)
BASOPHILS NFR BLD AUTO: 0 %
BILIRUB SERPL-MCNC: 0.4 MG/DL (ref 0.2–1.3)
BILIRUB SERPL-MCNC: 0.5 MG/DL (ref 0.2–1.3)
BUN SERPL-MCNC: 16 MG/DL (ref 7–30)
BUN SERPL-MCNC: 20 MG/DL (ref 7–30)
CALCIUM SERPL-MCNC: 8.5 MG/DL (ref 8.5–10.1)
CALCIUM SERPL-MCNC: 9 MG/DL (ref 8.5–10.1)
CHLORIDE BLD-SCNC: 103 MMOL/L (ref 94–109)
CHLORIDE BLD-SCNC: 108 MMOL/L (ref 94–109)
CO2 SERPL-SCNC: 26 MMOL/L (ref 20–32)
CO2 SERPL-SCNC: 26 MMOL/L (ref 20–32)
CREAT SERPL-MCNC: 0.81 MG/DL (ref 0.52–1.04)
CREAT SERPL-MCNC: 0.86 MG/DL (ref 0.52–1.04)
EOSINOPHIL # BLD AUTO: 0 10E3/UL (ref 0–0.7)
EOSINOPHIL NFR BLD AUTO: 0 %
ERYTHROCYTE [DISTWIDTH] IN BLOOD BY AUTOMATED COUNT: 17.2 % (ref 10–15)
ERYTHROCYTE [DISTWIDTH] IN BLOOD BY AUTOMATED COUNT: 17.6 % (ref 10–15)
ERYTHROCYTE [DISTWIDTH] IN BLOOD BY AUTOMATED COUNT: 17.6 % (ref 10–15)
GFR SERPL CREATININE-BSD FRML MDRD: 71 ML/MIN/1.73M2
GFR SERPL CREATININE-BSD FRML MDRD: 77 ML/MIN/1.73M2
GLUCOSE BLD-MCNC: 111 MG/DL (ref 70–99)
GLUCOSE BLD-MCNC: 155 MG/DL (ref 70–99)
HCT VFR BLD AUTO: 39 % (ref 35–47)
HCT VFR BLD AUTO: 42 % (ref 35–47)
HCT VFR BLD AUTO: 43.9 % (ref 35–47)
HGB BLD-MCNC: 12.2 G/DL (ref 11.7–15.7)
HGB BLD-MCNC: 12.9 G/DL (ref 11.7–15.7)
HGB BLD-MCNC: 13.9 G/DL (ref 11.7–15.7)
IMM GRANULOCYTES # BLD: 0.1 10E3/UL
IMM GRANULOCYTES NFR BLD: 0 %
IMM GRANULOCYTES NFR BLD: 0 %
IMM GRANULOCYTES NFR BLD: 1 %
LACTATE SERPL-SCNC: 1.6 MMOL/L (ref 0.7–2)
LIPASE SERPL-CCNC: 4949 U/L (ref 73–393)
LYMPHOCYTES # BLD AUTO: 0.9 10E3/UL (ref 0.8–5.3)
LYMPHOCYTES # BLD AUTO: 1 10E3/UL (ref 0.8–5.3)
LYMPHOCYTES # BLD AUTO: 1.2 10E3/UL (ref 0.8–5.3)
LYMPHOCYTES NFR BLD AUTO: 10 %
LYMPHOCYTES NFR BLD AUTO: 5 %
LYMPHOCYTES NFR BLD AUTO: 6 %
MCH RBC QN AUTO: 28.6 PG (ref 26.5–33)
MCHC RBC AUTO-ENTMCNC: 30.7 G/DL (ref 31.5–36.5)
MCHC RBC AUTO-ENTMCNC: 31.3 G/DL (ref 31.5–36.5)
MCHC RBC AUTO-ENTMCNC: 31.7 G/DL (ref 31.5–36.5)
MCV RBC AUTO: 90 FL (ref 78–100)
MCV RBC AUTO: 91 FL (ref 78–100)
MCV RBC AUTO: 93 FL (ref 78–100)
MONOCYTES # BLD AUTO: 0.8 10E3/UL (ref 0–1.3)
MONOCYTES # BLD AUTO: 1.1 10E3/UL (ref 0–1.3)
MONOCYTES # BLD AUTO: 1.1 10E3/UL (ref 0–1.3)
MONOCYTES NFR BLD AUTO: 6 %
NEUTROPHILS # BLD AUTO: 10.9 10E3/UL (ref 1.6–8.3)
NEUTROPHILS # BLD AUTO: 15.7 10E3/UL (ref 1.6–8.3)
NEUTROPHILS # BLD AUTO: 16.2 10E3/UL (ref 1.6–8.3)
NEUTROPHILS NFR BLD AUTO: 83 %
NEUTROPHILS NFR BLD AUTO: 88 %
NEUTROPHILS NFR BLD AUTO: 89 %
NRBC # BLD AUTO: 0 10E3/UL
NRBC BLD AUTO-RTO: 0 /100
PLATELET # BLD AUTO: 273 10E3/UL (ref 150–450)
PLATELET # BLD AUTO: 284 10E3/UL (ref 150–450)
PLATELET # BLD AUTO: 329 10E3/UL (ref 150–450)
POTASSIUM BLD-SCNC: 3.9 MMOL/L (ref 3.4–5.3)
POTASSIUM BLD-SCNC: 4.3 MMOL/L (ref 3.4–5.3)
PROT SERPL-MCNC: 6.4 G/DL (ref 6.8–8.8)
PROT SERPL-MCNC: 7.5 G/DL (ref 6.8–8.8)
RBC # BLD AUTO: 4.27 10E6/UL (ref 3.8–5.2)
RBC # BLD AUTO: 4.51 10E6/UL (ref 3.8–5.2)
RBC # BLD AUTO: 4.86 10E6/UL (ref 3.8–5.2)
SARS-COV-2 RNA RESP QL NAA+PROBE: NEGATIVE
SODIUM SERPL-SCNC: 136 MMOL/L (ref 133–144)
SODIUM SERPL-SCNC: 139 MMOL/L (ref 133–144)
UPPER EUS: NORMAL
WBC # BLD AUTO: 13.1 10E3/UL (ref 4–11)
WBC # BLD AUTO: 17.9 10E3/UL (ref 4–11)
WBC # BLD AUTO: 18.3 10E3/UL (ref 4–11)

## 2022-05-13 PROCEDURE — 258N000003 HC RX IP 258 OP 636: Performed by: STUDENT IN AN ORGANIZED HEALTH CARE EDUCATION/TRAINING PROGRAM

## 2022-05-13 PROCEDURE — 96376 TX/PRO/DX INJ SAME DRUG ADON: CPT

## 2022-05-13 PROCEDURE — 250N000011 HC RX IP 250 OP 636: Performed by: STUDENT IN AN ORGANIZED HEALTH CARE EDUCATION/TRAINING PROGRAM

## 2022-05-13 PROCEDURE — 85025 COMPLETE CBC W/AUTO DIFF WBC: CPT | Performed by: STUDENT IN AN ORGANIZED HEALTH CARE EDUCATION/TRAINING PROGRAM

## 2022-05-13 PROCEDURE — 96361 HYDRATE IV INFUSION ADD-ON: CPT | Performed by: EMERGENCY MEDICINE

## 2022-05-13 PROCEDURE — 85004 AUTOMATED DIFF WBC COUNT: CPT | Performed by: EMERGENCY MEDICINE

## 2022-05-13 PROCEDURE — 96374 THER/PROPH/DIAG INJ IV PUSH: CPT | Mod: 59 | Performed by: EMERGENCY MEDICINE

## 2022-05-13 PROCEDURE — 99223 1ST HOSP IP/OBS HIGH 75: CPT | Mod: AI | Performed by: INTERNAL MEDICINE

## 2022-05-13 PROCEDURE — 84155 ASSAY OF PROTEIN SERUM: CPT | Mod: 91 | Performed by: STUDENT IN AN ORGANIZED HEALTH CARE EDUCATION/TRAINING PROGRAM

## 2022-05-13 PROCEDURE — 36415 COLL VENOUS BLD VENIPUNCTURE: CPT | Performed by: STUDENT IN AN ORGANIZED HEALTH CARE EDUCATION/TRAINING PROGRAM

## 2022-05-13 PROCEDURE — 99285 EMERGENCY DEPT VISIT HI MDM: CPT | Mod: 25 | Performed by: EMERGENCY MEDICINE

## 2022-05-13 PROCEDURE — 250N000013 HC RX MED GY IP 250 OP 250 PS 637: Performed by: STUDENT IN AN ORGANIZED HEALTH CARE EDUCATION/TRAINING PROGRAM

## 2022-05-13 PROCEDURE — 36415 COLL VENOUS BLD VENIPUNCTURE: CPT | Performed by: EMERGENCY MEDICINE

## 2022-05-13 PROCEDURE — 250N000011 HC RX IP 250 OP 636: Performed by: EMERGENCY MEDICINE

## 2022-05-13 PROCEDURE — 83605 ASSAY OF LACTIC ACID: CPT | Performed by: EMERGENCY MEDICINE

## 2022-05-13 PROCEDURE — G0378 HOSPITAL OBSERVATION PER HR: HCPCS

## 2022-05-13 PROCEDURE — 74177 CT ABD & PELVIS W/CONTRAST: CPT

## 2022-05-13 PROCEDURE — 258N000003 HC RX IP 258 OP 636: Performed by: EMERGENCY MEDICINE

## 2022-05-13 PROCEDURE — 250N000013 HC RX MED GY IP 250 OP 250 PS 637

## 2022-05-13 PROCEDURE — 83690 ASSAY OF LIPASE: CPT | Performed by: EMERGENCY MEDICINE

## 2022-05-13 PROCEDURE — 96375 TX/PRO/DX INJ NEW DRUG ADDON: CPT | Performed by: EMERGENCY MEDICINE

## 2022-05-13 PROCEDURE — 120N000002 HC R&B MED SURG/OB UMMC

## 2022-05-13 PROCEDURE — 96376 TX/PRO/DX INJ SAME DRUG ADON: CPT | Performed by: EMERGENCY MEDICINE

## 2022-05-13 PROCEDURE — C9803 HOPD COVID-19 SPEC COLLECT: HCPCS | Performed by: EMERGENCY MEDICINE

## 2022-05-13 PROCEDURE — 99285 EMERGENCY DEPT VISIT HI MDM: CPT | Performed by: EMERGENCY MEDICINE

## 2022-05-13 PROCEDURE — 74177 CT ABD & PELVIS W/CONTRAST: CPT | Mod: 26 | Performed by: RADIOLOGY

## 2022-05-13 PROCEDURE — 99233 SBSQ HOSP IP/OBS HIGH 50: CPT | Mod: GC | Performed by: INTERNAL MEDICINE

## 2022-05-13 PROCEDURE — 36415 COLL VENOUS BLD VENIPUNCTURE: CPT | Performed by: INTERNAL MEDICINE

## 2022-05-13 PROCEDURE — 250N000013 HC RX MED GY IP 250 OP 250 PS 637: Performed by: INTERNAL MEDICINE

## 2022-05-13 PROCEDURE — U0003 INFECTIOUS AGENT DETECTION BY NUCLEIC ACID (DNA OR RNA); SEVERE ACUTE RESPIRATORY SYNDROME CORONAVIRUS 2 (SARS-COV-2) (CORONAVIRUS DISEASE [COVID-19]), AMPLIFIED PROBE TECHNIQUE, MAKING USE OF HIGH THROUGHPUT TECHNOLOGIES AS DESCRIBED BY CMS-2020-01-R: HCPCS | Performed by: EMERGENCY MEDICINE

## 2022-05-13 PROCEDURE — 82040 ASSAY OF SERUM ALBUMIN: CPT | Performed by: EMERGENCY MEDICINE

## 2022-05-13 PROCEDURE — 80053 COMPREHEN METABOLIC PANEL: CPT | Performed by: EMERGENCY MEDICINE

## 2022-05-13 PROCEDURE — 87040 BLOOD CULTURE FOR BACTERIA: CPT | Performed by: INTERNAL MEDICINE

## 2022-05-13 RX ORDER — NALOXONE HYDROCHLORIDE 0.4 MG/ML
0.4 INJECTION, SOLUTION INTRAMUSCULAR; INTRAVENOUS; SUBCUTANEOUS
Status: DISCONTINUED | OUTPATIENT
Start: 2022-05-13 | End: 2022-05-15 | Stop reason: HOSPADM

## 2022-05-13 RX ORDER — LIDOCAINE 40 MG/G
CREAM TOPICAL
Status: DISCONTINUED | OUTPATIENT
Start: 2022-05-13 | End: 2022-05-15 | Stop reason: HOSPADM

## 2022-05-13 RX ORDER — CALCIUM CARBONATE 500(1250)
500 TABLET ORAL DAILY
Status: DISCONTINUED | OUTPATIENT
Start: 2022-05-13 | End: 2022-05-15 | Stop reason: HOSPADM

## 2022-05-13 RX ORDER — HYDROMORPHONE HYDROCHLORIDE 1 MG/ML
0.5 INJECTION, SOLUTION INTRAMUSCULAR; INTRAVENOUS; SUBCUTANEOUS
Status: DISCONTINUED | OUTPATIENT
Start: 2022-05-13 | End: 2022-05-13

## 2022-05-13 RX ORDER — POLYETHYLENE GLYCOL 3350 17 G/17G
17 POWDER, FOR SOLUTION ORAL DAILY PRN
Status: DISCONTINUED | OUTPATIENT
Start: 2022-05-13 | End: 2022-05-15 | Stop reason: HOSPADM

## 2022-05-13 RX ORDER — HYDROXYZINE HYDROCHLORIDE 25 MG/1
25 TABLET, FILM COATED ORAL EVERY 6 HOURS PRN
Status: DISCONTINUED | OUTPATIENT
Start: 2022-05-13 | End: 2022-05-13

## 2022-05-13 RX ORDER — LORAZEPAM 0.5 MG/1
0.5 TABLET ORAL DAILY PRN
Status: DISCONTINUED | OUTPATIENT
Start: 2022-05-13 | End: 2022-05-15 | Stop reason: HOSPADM

## 2022-05-13 RX ORDER — ACETAMINOPHEN 500 MG
500 TABLET ORAL EVERY 6 HOURS
Status: DISCONTINUED | OUTPATIENT
Start: 2022-05-13 | End: 2022-05-15 | Stop reason: HOSPADM

## 2022-05-13 RX ORDER — HYDROMORPHONE HYDROCHLORIDE 1 MG/ML
0.5 INJECTION, SOLUTION INTRAMUSCULAR; INTRAVENOUS; SUBCUTANEOUS ONCE
Status: COMPLETED | OUTPATIENT
Start: 2022-05-13 | End: 2022-05-13

## 2022-05-13 RX ORDER — CELECOXIB 100 MG/1
100 CAPSULE ORAL 2 TIMES DAILY
Status: DISCONTINUED | OUTPATIENT
Start: 2022-05-13 | End: 2022-05-15 | Stop reason: HOSPADM

## 2022-05-13 RX ORDER — AMOXICILLIN 250 MG
2 CAPSULE ORAL 2 TIMES DAILY PRN
Status: DISCONTINUED | OUTPATIENT
Start: 2022-05-13 | End: 2022-05-15 | Stop reason: HOSPADM

## 2022-05-13 RX ORDER — NALOXONE HYDROCHLORIDE 0.4 MG/ML
0.2 INJECTION, SOLUTION INTRAMUSCULAR; INTRAVENOUS; SUBCUTANEOUS
Status: DISCONTINUED | OUTPATIENT
Start: 2022-05-13 | End: 2022-05-15 | Stop reason: HOSPADM

## 2022-05-13 RX ORDER — HYDROMORPHONE HYDROCHLORIDE 2 MG/1
2-4 TABLET ORAL EVERY 4 HOURS PRN
Status: DISCONTINUED | OUTPATIENT
Start: 2022-05-13 | End: 2022-05-15 | Stop reason: HOSPADM

## 2022-05-13 RX ORDER — HYDROMORPHONE HYDROCHLORIDE 2 MG/1
4-6 TABLET ORAL EVERY 4 HOURS PRN
Status: DISCONTINUED | OUTPATIENT
Start: 2022-05-13 | End: 2022-05-13

## 2022-05-13 RX ORDER — EZETIMIBE 10 MG/1
10 TABLET ORAL AT BEDTIME
Status: DISCONTINUED | OUTPATIENT
Start: 2022-05-13 | End: 2022-05-15 | Stop reason: HOSPADM

## 2022-05-13 RX ORDER — IOPAMIDOL 755 MG/ML
95 INJECTION, SOLUTION INTRAVASCULAR ONCE
Status: COMPLETED | OUTPATIENT
Start: 2022-05-13 | End: 2022-05-13

## 2022-05-13 RX ORDER — ATORVASTATIN CALCIUM 40 MG/1
40 TABLET, FILM COATED ORAL AT BEDTIME
Status: DISCONTINUED | OUTPATIENT
Start: 2022-05-13 | End: 2022-05-15 | Stop reason: HOSPADM

## 2022-05-13 RX ORDER — HYDROMORPHONE HYDROCHLORIDE 2 MG/1
4-6 TABLET ORAL EVERY 4 HOURS PRN
Status: CANCELLED | OUTPATIENT
Start: 2022-05-13

## 2022-05-13 RX ORDER — PROCHLORPERAZINE 25 MG
12.5 SUPPOSITORY, RECTAL RECTAL EVERY 12 HOURS PRN
Status: DISCONTINUED | OUTPATIENT
Start: 2022-05-13 | End: 2022-05-15 | Stop reason: HOSPADM

## 2022-05-13 RX ORDER — ONDANSETRON 2 MG/ML
4 INJECTION INTRAMUSCULAR; INTRAVENOUS EVERY 6 HOURS PRN
Status: DISCONTINUED | OUTPATIENT
Start: 2022-05-13 | End: 2022-05-15 | Stop reason: HOSPADM

## 2022-05-13 RX ORDER — HYDROMORPHONE HYDROCHLORIDE 1 MG/ML
0.5 INJECTION, SOLUTION INTRAMUSCULAR; INTRAVENOUS; SUBCUTANEOUS EVERY 4 HOURS PRN
Status: DISCONTINUED | OUTPATIENT
Start: 2022-05-13 | End: 2022-05-13

## 2022-05-13 RX ORDER — SODIUM CHLORIDE, SODIUM LACTATE, POTASSIUM CHLORIDE, CALCIUM CHLORIDE 600; 310; 30; 20 MG/100ML; MG/100ML; MG/100ML; MG/100ML
INJECTION, SOLUTION INTRAVENOUS CONTINUOUS
Status: DISCONTINUED | OUTPATIENT
Start: 2022-05-13 | End: 2022-05-14

## 2022-05-13 RX ORDER — HYDROXYZINE HYDROCHLORIDE 25 MG/1
25 TABLET, FILM COATED ORAL EVERY 6 HOURS PRN
Status: DISCONTINUED | OUTPATIENT
Start: 2022-05-13 | End: 2022-05-15 | Stop reason: HOSPADM

## 2022-05-13 RX ORDER — AMOXICILLIN 250 MG
1 CAPSULE ORAL 2 TIMES DAILY PRN
Status: DISCONTINUED | OUTPATIENT
Start: 2022-05-13 | End: 2022-05-15 | Stop reason: HOSPADM

## 2022-05-13 RX ORDER — HYDROMORPHONE HYDROCHLORIDE 4 MG/1
4 TABLET ORAL ONCE
Status: COMPLETED | OUTPATIENT
Start: 2022-05-13 | End: 2022-05-13

## 2022-05-13 RX ORDER — PROCHLORPERAZINE MALEATE 5 MG
5 TABLET ORAL EVERY 6 HOURS PRN
Status: DISCONTINUED | OUTPATIENT
Start: 2022-05-13 | End: 2022-05-15 | Stop reason: HOSPADM

## 2022-05-13 RX ORDER — ONDANSETRON 2 MG/ML
4 INJECTION INTRAMUSCULAR; INTRAVENOUS ONCE
Status: COMPLETED | OUTPATIENT
Start: 2022-05-13 | End: 2022-05-13

## 2022-05-13 RX ORDER — MONTELUKAST SODIUM 10 MG/1
10 TABLET ORAL AT BEDTIME
Status: DISCONTINUED | OUTPATIENT
Start: 2022-05-13 | End: 2022-05-15 | Stop reason: HOSPADM

## 2022-05-13 RX ORDER — ZOLPIDEM TARTRATE 6.25 MG/1
12.5 TABLET, FILM COATED, EXTENDED RELEASE ORAL AT BEDTIME
Status: DISCONTINUED | OUTPATIENT
Start: 2022-05-13 | End: 2022-05-15 | Stop reason: HOSPADM

## 2022-05-13 RX ORDER — HYDROXYZINE HYDROCHLORIDE 10 MG/1
10 TABLET, FILM COATED ORAL 3 TIMES DAILY PRN
Status: DISCONTINUED | OUTPATIENT
Start: 2022-05-13 | End: 2022-05-15 | Stop reason: HOSPADM

## 2022-05-13 RX ORDER — ONDANSETRON 4 MG/1
4 TABLET, ORALLY DISINTEGRATING ORAL EVERY 6 HOURS PRN
Status: DISCONTINUED | OUTPATIENT
Start: 2022-05-13 | End: 2022-05-15 | Stop reason: HOSPADM

## 2022-05-13 RX ORDER — SODIUM CHLORIDE 9 MG/ML
INJECTION, SOLUTION INTRAVENOUS CONTINUOUS
Status: DISCONTINUED | OUTPATIENT
Start: 2022-05-13 | End: 2022-05-13

## 2022-05-13 RX ADMIN — ZOLPIDEM TARTRATE 12.5 MG: 6.25 TABLET, FILM COATED, EXTENDED RELEASE ORAL at 22:19

## 2022-05-13 RX ADMIN — CELECOXIB 100 MG: 100 CAPSULE ORAL at 21:54

## 2022-05-13 RX ADMIN — ATORVASTATIN CALCIUM 40 MG: 40 TABLET, FILM COATED ORAL at 21:53

## 2022-05-13 RX ADMIN — POLYETHYLENE GLYCOL 3350 17 G: 17 POWDER, FOR SOLUTION ORAL at 18:35

## 2022-05-13 RX ADMIN — SODIUM CHLORIDE 1000 ML: 9 INJECTION, SOLUTION INTRAVENOUS at 03:28

## 2022-05-13 RX ADMIN — ACETAMINOPHEN 500 MG: 500 TABLET ORAL at 21:53

## 2022-05-13 RX ADMIN — ACETAMINOPHEN 500 MG: 500 TABLET ORAL at 13:42

## 2022-05-13 RX ADMIN — HYDROXYZINE HYDROCHLORIDE 25 MG: 25 TABLET, FILM COATED ORAL at 17:51

## 2022-05-13 RX ADMIN — HYDROMORPHONE HYDROCHLORIDE 1 MG: 1 INJECTION, SOLUTION INTRAMUSCULAR; INTRAVENOUS; SUBCUTANEOUS at 15:41

## 2022-05-13 RX ADMIN — HYDROMORPHONE HYDROCHLORIDE 1 MG: 1 INJECTION, SOLUTION INTRAMUSCULAR; INTRAVENOUS; SUBCUTANEOUS at 09:52

## 2022-05-13 RX ADMIN — DOCUSATE SODIUM 50 MG AND SENNOSIDES 8.6 MG 2 TABLET: 8.6; 5 TABLET, FILM COATED ORAL at 18:31

## 2022-05-13 RX ADMIN — HYDROMORPHONE HYDROCHLORIDE 1 MG: 1 INJECTION, SOLUTION INTRAMUSCULAR; INTRAVENOUS; SUBCUTANEOUS at 19:30

## 2022-05-13 RX ADMIN — ONDANSETRON 4 MG: 2 INJECTION INTRAMUSCULAR; INTRAVENOUS at 19:25

## 2022-05-13 RX ADMIN — HYDROMORPHONE HYDROCHLORIDE 0.5 MG: 1 INJECTION, SOLUTION INTRAMUSCULAR; INTRAVENOUS; SUBCUTANEOUS at 08:51

## 2022-05-13 RX ADMIN — HYDROMORPHONE HYDROCHLORIDE 1 MG: 1 INJECTION, SOLUTION INTRAMUSCULAR; INTRAVENOUS; SUBCUTANEOUS at 13:48

## 2022-05-13 RX ADMIN — ACETAMINOPHEN 500 MG: 500 TABLET ORAL at 07:54

## 2022-05-13 RX ADMIN — HYDROMORPHONE HYDROCHLORIDE 4 MG: 4 TABLET ORAL at 13:42

## 2022-05-13 RX ADMIN — HYDROMORPHONE HYDROCHLORIDE 1 MG: 1 INJECTION, SOLUTION INTRAMUSCULAR; INTRAVENOUS; SUBCUTANEOUS at 12:03

## 2022-05-13 RX ADMIN — MONTELUKAST 10 MG: 10 TABLET, FILM COATED ORAL at 21:53

## 2022-05-13 RX ADMIN — EZETIMIBE 10 MG: 10 TABLET ORAL at 22:19

## 2022-05-13 RX ADMIN — SODIUM CHLORIDE, POTASSIUM CHLORIDE, SODIUM LACTATE AND CALCIUM CHLORIDE: 600; 310; 30; 20 INJECTION, SOLUTION INTRAVENOUS at 06:25

## 2022-05-13 RX ADMIN — HYDROMORPHONE HYDROCHLORIDE 0.5 MG: 1 INJECTION, SOLUTION INTRAMUSCULAR; INTRAVENOUS; SUBCUTANEOUS at 05:10

## 2022-05-13 RX ADMIN — HYDROMORPHONE HYDROCHLORIDE 1 MG: 1 INJECTION, SOLUTION INTRAMUSCULAR; INTRAVENOUS; SUBCUTANEOUS at 03:28

## 2022-05-13 RX ADMIN — HYDROMORPHONE HYDROCHLORIDE 4 MG: 2 TABLET ORAL at 22:19

## 2022-05-13 RX ADMIN — CALCIUM 500 MG: 500 TABLET ORAL at 07:54

## 2022-05-13 RX ADMIN — IOPAMIDOL 95 ML: 755 INJECTION, SOLUTION INTRAVENOUS at 04:20

## 2022-05-13 RX ADMIN — ONDANSETRON 4 MG: 2 INJECTION INTRAMUSCULAR; INTRAVENOUS at 03:28

## 2022-05-13 RX ADMIN — SODIUM CHLORIDE, POTASSIUM CHLORIDE, SODIUM LACTATE AND CALCIUM CHLORIDE: 600; 310; 30; 20 INJECTION, SOLUTION INTRAVENOUS at 18:31

## 2022-05-13 RX ADMIN — CELECOXIB 100 MG: 100 CAPSULE ORAL at 13:42

## 2022-05-13 ASSESSMENT — ACTIVITIES OF DAILY LIVING (ADL)
ADLS_ACUITY_SCORE: 35

## 2022-05-13 NOTE — ED PROVIDER NOTES
ED Provider Note  [facility]      History     Chief Complaint   Patient presents with     Abdominal Pain     Abdominal Pain     HPI  Laura Gonzalez is a 71 year old female who has a past medical history of pancreatitis, bowel obstruction presenting with abdominal pain after an endoscopic ultrasound pancreatico gastrostomy that was done yesterday.  She has been nauseous and then dry heaving.  No fevers.  Pain is in the upper abdomen.  No chest pain, shortness of breath, fevers, chills, sweating.  Denies dysuria or hematuria.  She has been having normal bowel movements and passing gas.    Past Medical History  Past Medical History:   Diagnosis Date     Asthma     pt.states no longer has symptoms     CAD (coronary artery disease)      HLD (hyperlipidemia)      Past Surgical History:   Procedure Laterality Date     APPENDECTOMY       ARTHROPLASTY HIP Left 6/15/2016    Procedure: ARTHROPLASTY HIP;  Surgeon: Jeffy Wolff MD;  Location: [facility] OR     COLONOSCOPY  10/3/2013    Procedure: COMBINED COLONOSCOPY, SINGLE BIOPSY/POLYPECTOMY BY BIOPSY;;  Surgeon: Kenney Walls MD;  Location:  GI     FOOT SURGERY       HC TOOTH EXTRACTION W/FORCEP       HYSTERECTOMY       INCISION AND DRAINAGE BREAST Left 2/18/2022    Procedure: evacuate hematoma left  BREAST;  Surgeon: Dayron Garcia MD;  Location: [facility] OR     amylase-lipase-protease (CREON) 97065-07365 units CPEP per EC capsule  Atorvastatin Calcium (LIPITOR PO)  Calcium Carb-Cholecalciferol (CALCIUM 1000 + D) 1000-800 MG-UNIT TABS  calcium-magnesium (CALMAG) 500-250 MG TABS  EVOLOCUMAB SC  EZETIMIBE PO  hydrochlorothiazide (HYDRODIURIL) 25 MG tablet  LORazepam (ATIVAN) 0.5 MG tablet  magnesium 100 MG CAPS  Montelukast Sodium (SINGULAIR PO)  omega 3 complex with vitamins E and K (SMOOTH SPEAK) liquid  oxyCODONE (ROXICODONE) 5 MG tablet  UNABLE TO FIND  valACYclovir (VALTREX) 1000 mg tablet  VITAMIN A PO  zolpidem ER (AMBIEN CR) 12.5  MG CR tablet      Allergies   Allergen Reactions     Tramadol Other (See Comments)     Has a hyperactive reaction and can't sleep      Family History  No family history on file.  Social History   Social History     Tobacco Use     Smoking status: Former Smoker     Quit date: 10/3/1988     Years since quittin.6     Smokeless tobacco: Never Used   Substance Use Topics     Alcohol use: No     Drug use: No      Past medical history, past surgical history, medications, allergies, family history, and social history were reviewed with the patient. No additional pertinent items.       Review of Systems  A complete review of systems was performed with pertinent positives and negatives noted in the HPI, and all other systems negative.    Physical Exam   BP: (!) 166/60  Pulse: (!) 45  Temp: 97.8  F (36.6  C)  Resp: 16  SpO2: 99 %  Physical Exam  Physical Exam   Constitutional: oriented to person, place, and time. appears well-developed and well-nourished.   HENT:   Head: Normocephalic and atraumatic.   Neck: Normal range of motion.   Pulmonary/Chest: Effort normal. No respiratory distress.   Cardiac: No murmurs, rubs, gallops. RRR.  Abdominal: Abdomen soft, tender palpation epigastric area, nondistended. No rebound tenderness.  MSK: Long bones without deformity or evidence of trauma  Neurological: alert and oriented to person, place, and time.   Skin: Skin is warm and dry.   Psychiatric:  normal mood and affect.  behavior is normal. Thought content normal.     ED Course      Procedures                Results for orders placed or performed during the hospital encounter of 22   CT Abdomen Pelvis w Contrast     Status: None    Narrative    EXAM: CT ABDOMEN PELVIS W CONTRAST  LOCATION: [facility]  DATE/TIME: 2022 4:16 AM    INDICATION: Epigastric pain  COMPARISON: MRI from 2022.  TECHNIQUE: CT scan of the abdomen and pelvis was performed following injection of IV  contrast. Multiplanar reformats were obtained. Dose reduction techniques were used.  CONTRAST: iopamidol (ISOVUE 370) solution 95 mL    FINDINGS:   LOWER CHEST: Coronary artery calcifications. Bilateral breast implants. Scattered bands of atelectasis in the lower lung zones.    HEPATOBILIARY: Status post Whipple surgery with hepaticojejunostomy and cholecystectomy. Minimal central intrahepatic biliary ductal dilatation. Small amount of perihepatic ascites.    PANCREAS: Status post Whipple with preservation of the majority of the pancreatic parenchyma. A pancreaticogastrostomy tube is in place. There is some stranding along the distal aspect of the pancreas.    SPLEEN: Normal.    ADRENAL GLANDS: Normal.    KIDNEYS/BLADDER: Symmetric enhancement. No hydronephrosis. Bladder within normal limits where seen, though beam hardening artifact from left hip arthroplasty compromises detail.    BOWEL: Mild wall thickening of the stomach with some adjacent stranding. No mechanical small bowel obstruction. No free air.    LYMPH NODES: Normal.    VASCULATURE: Moderate aortoiliac calcification.    PELVIC ORGANS: Normal.    MUSCULOSKELETAL: Mild lumbar spine degenerative changes. Left hip arthroplasty.      Impression    IMPRESSION:   1.  Status post Whipple surgery with pancreaticogastrostomy tube in place. There is some mild edema about the pancreas and stomach which could reflect acute pancreatitis. There is additionally more diffuse wall thickening of the stomach which is likely   secondary in nature though a gastritis is also difficult to exclude.    2.  Small amount of perihepatic ascites.   Comprehensive metabolic panel     Status: Abnormal   Result Value Ref Range    Sodium 136 133 - 144 mmol/L    Potassium 3.9 3.4 - 5.3 mmol/L    Chloride 103 94 - 109 mmol/L    Carbon Dioxide (CO2) 26 20 - 32 mmol/L    Anion Gap 7 3 - 14 mmol/L    Urea Nitrogen 20 7 - 30 mg/dL    Creatinine 0.86 0.52 - 1.04 mg/dL    Calcium 9.0 8.5 - 10.1  mg/dL    Glucose 155 (H) 70 - 99 mg/dL    Alkaline Phosphatase 135 40 - 150 U/L    AST 32 0 - 45 U/L    ALT 32 0 - 50 U/L    Protein Total 7.5 6.8 - 8.8 g/dL    Albumin 3.8 3.4 - 5.0 g/dL    Bilirubin Total 0.4 0.2 - 1.3 mg/dL    GFR Estimate 71 >60 mL/min/1.73m2   Lactic acid whole blood     Status: Normal   Result Value Ref Range    Lactic Acid 1.6 0.7 - 2.0 mmol/L   Lipase     Status: Abnormal   Result Value Ref Range    Lipase 4,949 (H) 73 - 393 U/L   CBC with platelets and differential     Status: Abnormal   Result Value Ref Range    WBC Count 17.9 (H) 4.0 - 11.0 10e3/uL    RBC Count 4.86 3.80 - 5.20 10e6/uL    Hemoglobin 13.9 11.7 - 15.7 g/dL    Hematocrit 43.9 35.0 - 47.0 %    MCV 90 78 - 100 fL    MCH 28.6 26.5 - 33.0 pg    MCHC 31.7 31.5 - 36.5 g/dL    RDW 17.2 (H) 10.0 - 15.0 %    Platelet Count 329 150 - 450 10e3/uL    % Neutrophils 88 %    % Lymphocytes 6 %    % Monocytes 6 %    % Eosinophils 0 %    % Basophils 0 %    % Immature Granulocytes 0 %    NRBCs per 100 WBC 0 <1 /100    Absolute Neutrophils 15.7 (H) 1.6 - 8.3 10e3/uL    Absolute Lymphocytes 1.0 0.8 - 5.3 10e3/uL    Absolute Monocytes 1.1 0.0 - 1.3 10e3/uL    Absolute Eosinophils 0.0 0.0 - 0.7 10e3/uL    Absolute Basophils 0.0 0.0 - 0.2 10e3/uL    Absolute Immature Granulocytes 0.1 <=0.4 10e3/uL    Absolute NRBCs 0.0 10e3/uL   CBC with platelets differential     Status: Abnormal    Narrative    The following orders were created for panel order CBC with platelets differential.  Procedure                               Abnormality         Status                     ---------                               -----------         ------                     CBC with platelets and d...[[id number]]  Abnormal            Final result                 Please view results for these tests on the individual orders.     Medications   0.9% sodium chloride BOLUS (0 mLs Intravenous Stopped 5/13/22 0520)     Followed by   sodium chloride 0.9% infusion ( Intravenous  Canceled Entry 5/13/22 0521)   HYDROmorphone (DILAUDID) injection 1 mg (1 mg Intravenous Given 5/13/22 0328)   ondansetron (ZOFRAN) injection 4 mg (4 mg Intravenous Given 5/13/22 0328)   iopamidol (ISOVUE-370) solution 95 mL (95 mLs Intravenous Given 5/13/22 0420)   sodium chloride (PF) 0.9% PF flush 74 mL (74 mLs Intravenous Given 5/13/22 0421)   HYDROmorphone (PF) (DILAUDID) injection 0.5 mg (0.5 mg Intravenous Given 5/13/22 0510)        Assessments & Plan (with Medical Decision Making)   MDM   patient presenting with a postprocedure pancreatitis.  She has an elevated white blood count but no symptoms of infection.  She is afebrile.  Discussed gastroenterology who agrees with admission for pain control and to defer antibiotics unless if she spikes a fever or starts to appear ill.  CT findings of pancreatitis, no perforation or other complications.  Patient admitted to medicine for further care    I have reviewed the nursing notes. I have reviewed the findings, diagnosis, plan and need for follow up with the patient.    New Prescriptions    No medications on file       Final diagnoses:   Acute pancreatitis, unspecified complication status, unspecified pancreatitis type       --  Jatinder Martinez  [facility] EMERGENCY DEPARTMENT  5/13/2022     Jatinder Martinez MD  05/13/22 [id number]

## 2022-05-13 NOTE — PROGRESS NOTES
Admit to 5B from ED at 1330. BP (!) 152/55 (BP Location: Left arm)   Pulse 59   Temp 98.3  F (36.8  C) (Oral)   Resp 16   Wt 74.2 kg (163 lb 9.3 oz)   SpO2 97%   BMI 25.62 kg/m    Here with acute pancreatitis. Pain well controlled with PO and IV dilaudid. Up independently. Voiding adequately. Continue with pain control. Tolerating clear liquid diet. LR infusing 200ml/hour.     Belongings: clothes, phone, ear pods, purse.

## 2022-05-13 NOTE — CONSULTS
GASTROENTEROLOGY CONSULTATION      Date of Admission:  5/13/2022           Reason for Consultation:   We were asked by Dr. 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    Pt meets criteria for acute pancreatitis due to abd pain, biochemical evidence (elevated lipase) and CT imaging suggestive of pancreatitis. Mostly likely its is post procedural considering temporality of the events.  She was given one bolus of 1Lit Normal saline and is currently on 200 ml/hour LR.      Recommendations  -- Fluid resuscitation, with Lactated ringers at 3-5 cc/kg/hr                  - Reassess and adjust fluid requirement q6H  -- Monitor closely for evidence of adequate hydration (IAP/APA guidelines)                  - Hematocrit reduction by at least 35 %                  - BUN decrease by about 35%                  - Urine output of 0.5 - 1 cc/kg/hr                  - Improving Na  -- [place] analgesia with PRN dilaudid  -- Trend transaminases  -- Monitor oxygenation to detect early signs of ARDS  -- Regular BG checks   -- Encourage PO intake in the next 24 - 48 hours (Based on ACG and IAP/APA guidelines) if improved  -- No role for antibiotics for now     -- No utility in trending lipase  -- Daily BMP, monitor for hypocalcemia     Thank you for involving us in this patient's care. Please do not hesitate to contact the GI service with any questions or  concerns.     Pt care plan discussed with , GI staff physician.    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    Pt went home after the procedure and at around 3 pm started having upper abdominal pain. She took a couple of oxycodone tablets over the next four hours to control pain. By 8 pm, the pain was more intense, felt like an extended cramp that wouldn't go away. It would started in the epigastric region and radiate along with left upper quadrant towards the back.This pain was associated with hypersalivation, nausea and feeling of weakness.Due to uncontrolled symptoms, she came in to the ER.     In the ER, lipase was elevated to 4949. CT abdomen was done and this demosntrated post Whipple anatomy with some stranding along the distal aspect of the pancreas. Pt was started on IV analgesiccs, IV hydration and admitted for further management.     At the time of my interview, she reported continued ongoing pain. Denied having any nausea or vomiting.         Data:   Key relevant labs:    Latest Reference Range & Units 05/13/22 03:21   Bilirubin Total 0.2 - 1.3 mg/dL 0.4   Alkaline Phosphatase 40 - 150 U/L 135   ALT 0 - 50 U/L 32   AST 0 - 45 U/L 32   Lactic Acid 0.7 - 2.0 mmol/L 1.6   Lipase 73 - 393 U/L 4,949 (H)       Key relevant imaging:    CT Abdomen and Pelvis with IV contrast (5/13/2022)    IMPRESSION:   1.  Status post Whipple surgery with pancreaticogastrostomy tube in place. There is  some mild edema about the pancreas and stomach which could reflect acute pancreatitis. There is additionally more diffuse wall thickening of the stomach which is likely   secondary in nature though a gastritis is also difficult to exclude.     2.  Small amount of perihepatic ascites           Previous Endoscopy:   EUS (5/13/2022)    Impression:            - Uncomplicated pancreaticogastrostomy was                          successfully performed and secured with a 3 Fr x 13 cm                          single pigtail Advanix stent with pigtail in the                          jejunum (across the pancreaticojejunostomy' with the                          pigtail in the stomach ''through-and-through stent'').                          - One hemostatic clip was placed to anchor stent to                          gastric wall.                          - Post-pyloric preserving Whipple anatomy with healthy                          appearance and normal residual pancreatic parenchyma                          except for scattered hyperechoic strands.          Medications:   (Not in a hospital admission)            Physical Exam:   BP (!) 160/69   Pulse 74   Temp 97.8  F (36.6  C)   Resp 16   SpO2 100%   Wt:   Wt Readings from Last 2 Encounters:   05/12/22 69.9 kg (154 lb 1.6 oz)   03/23/22 68 kg (150 lb)      Constitutional: cooperative, pleasant, no acute distress  Eyes: Sclera anicteric  Ears/nose/mouth/throat: mucus membranes moist, hearing intact  Neck: supple,  CV: No edema, RRR  Respiratory: Unlabored breathing  Abd: Soft, tenderness in the epigastric and left upper quadrant region, tenderness along left loin and iliac region  Skin: warm, perfused, no jaundice  Neuro: AAO x 3, No asterixis  Psych: Normal affect  MSK: No gross deformities          Past Medical History:   Reviewed and edited as appropriate  Past Medical History:   Diagnosis Date     Asthma     pt.states no longer has symptoms     CAD (coronary artery  disease)      HLD (hyperlipidemia)             Past Surgical History:   Reviewed and edited as appropriate   Past Surgical History:   Procedure Laterality Date     APPENDECTOMY       ARTHROPLASTY HIP Left 6/15/2016    Procedure: ARTHROPLASTY HIP;  Surgeon: Jeffy Wolff MD;  Location: [facility] OR     COLONOSCOPY  10/3/2013    Procedure: COMBINED COLONOSCOPY, SINGLE BIOPSY/POLYPECTOMY BY BIOPSY;;  Surgeon: Kenney Walls MD;  Location:  GI     FOOT SURGERY       HC TOOTH EXTRACTION W/FORCEP       HYSTERECTOMY       INCISION AND DRAINAGE BREAST Left 2/18/2022    Procedure: evacuate hematoma left  BREAST;  Surgeon: Dayron Garcia MD;  Location:  OR            Social History:   Alcohol use: No  Smoking history: Former smoker  Living situation: Lives in [place]         Family History:   Reviewed and edited as appropriate  No family history on file.   Father had coronary artery disease and liver cancer.          Allergies:   Reviewed and edited as appropriate     Allergies   Allergen Reactions     Tramadol Other (See Comments)     Has a hyperactive reaction and can't sleep               Review of Systems:     A complete 10 point review of systems was performed and is negative except as noted in the HPI

## 2022-05-13 NOTE — PROGRESS NOTES
"Attempted to have patient go to triage from EMS stretcher. Pt stated she could not due to pain. Pt stated that she needs us to call Dr Noguera immediately and that her \"pancreas is going to bleed to death\". MD made  Aware.   "

## 2022-05-13 NOTE — H&P
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    Per GI note from 5/12, she was offered EUS guided \"rendezvous\" of pancreatic duct at OSH but patient declined. MRCP with variable degrees of remnant PD dilation. Patient presented on 5/12 for planned pancreaticogastrostomy given recurrent/frequent pancreatitis flares, which was successfully performed, and GI secured with a 3 Fr x 13 cm single pigtail Advanix stent with pigtail in the jejunum (across the pancreaticojejunostomy'' and straight end in the stomach ''through-and-through stent''. Pt was discharged home with f/u in 2 weeks with Dr. Noguera in 2 weeks for upsizing stent.     Pt stated that after her procedure on 5/12, after her anesthesia started to wear off in the late afternoon, her pain \"was unbearable and excruciating. I knew something was wrong.\" She stated that she has had epigastric/LLQ pain with intermittent emesis. She has not eaten anything since 5/11 prior to ERCP.     On admission, afebrile. WBC 18 with abs neutro 16, CMP unremarkable, lipase 4949 (246, 1 day ago), LA wnl. CT Abd showing \"Status post Whipple surgery with pancreaticogastrostomy tube in place. There is some mild edema about the pancreas and stomach which could " "reflect acute pancreatitis. There is additionally more diffuse wall thickening of the stomach which is likely secondary in nature though a gastritis is also difficult to exclude.\"    Denied fevers (but stated she is hot), chills, CP, bloody stools. She was SOB during the \"pancreas attacks.\"     Review of Systems   The 10 point Review of Systems is negative other than noted in the HPI or here.     Past Medical History    I have reviewed this patient's medical history and updated it with pertinent information if needed.   Past Medical History:   Diagnosis Date     Asthma     pt.states no longer has symptoms     CAD (coronary artery disease)      HLD (hyperlipidemia)         Past Surgical History   I have reviewed this patient's surgical history and updated it with pertinent information if needed.  Past Surgical History:   Procedure Laterality Date     APPENDECTOMY       ARTHROPLASTY HIP Left 6/15/2016    Procedure: ARTHROPLASTY HIP;  Surgeon: Jeffy Wolff MD;  Location: [facility] OR     COLONOSCOPY  10/3/2013    Procedure: COMBINED COLONOSCOPY, SINGLE BIOPSY/POLYPECTOMY BY BIOPSY;;  Surgeon: Kenney Walls MD;  Location:  GI     FOOT SURGERY       HC TOOTH EXTRACTION W/FORCEP       HYSTERECTOMY       INCISION AND DRAINAGE BREAST Left 2022    Procedure: evacuate hematoma left  BREAST;  Surgeon: Dayron Garcia MD;  Location:  OR        Social History   Social History     Tobacco Use     Smoking status: Former Smoker     Quit date: 10/3/1988     Years since quittin.6     Smokeless tobacco: Never Used   Substance Use Topics     Alcohol use: No     Drug use: No       Family History   Dad and brother had liver cancer. Brother passed away 5 yrs ago form liver cancer.    Prior to Admission Medications   Prior to Admission Medications   Prescriptions Last Dose Informant Patient Reported? Taking?   Atorvastatin Calcium (LIPITOR PO)   Yes No   Sig: Take 40 mg by mouth At Bedtime    Calcium " Carb-Cholecalciferol (CALCIUM 1000 + D) 1000-800 MG-UNIT TABS   Yes No   EVOLOCUMAB SC   Yes No   Sig: Inject 140 mg Subcutaneous every 14 days   EZETIMIBE PO   Yes No   Sig: Take 10 mg by mouth At Bedtime   LORazepam (ATIVAN) 0.5 MG tablet   Yes No   Sig: Take 1-2 tablets by mouth twice daily as needed for anxiety   Montelukast Sodium (SINGULAIR PO)   Yes No   Sig: Take 10 mg by mouth At Bedtime    UNABLE TO FIND   Yes No   Sig: MEDICATION NAME: Estrogen pellet in arm   Patient not taking: No sig reported   VITAMIN A PO   Yes No   Sig: Take 1 capsule (unknown dose, prescription strength) by mouth daily   amylase-lipase-protease (CREON) 28730-89206 units CPEP per EC capsule   No No   Sig: Take 2-3 with meals / 1-2 with snacks, up to 15 per day.   Patient taking differently: 2 capsules Take 2-3 with meals / 1-2 with snacks, up to 15 per day.   calcium-magnesium (CALMAG) 500-250 MG TABS   Yes No   Sig: Take 2 tablets by mouth daily   hydrochlorothiazide (HYDRODIURIL) 25 MG tablet   Yes No   Sig: Take 25 mg by mouth daily   magnesium 100 MG CAPS   Yes No   omega 3 complex with vitamins E and K (SMOOTH SPEAK) liquid   Yes No   oxyCODONE (ROXICODONE) 5 MG tablet   No No   Sig: Take 1 tablet (5 mg) by mouth every 6 hours as needed for pain   valACYclovir (VALTREX) 1000 mg tablet   Yes No   Sig: Take 1,000 mg by mouth daily as needed (cold sore)    zolpidem ER (AMBIEN CR) 12.5 MG CR tablet   Yes No   Sig: Take 12.5 mg by mouth At Bedtime      Facility-Administered Medications: None     Allergies   Allergies   Allergen Reactions     Tramadol Other (See Comments)     Has a hyperactive reaction and can't sleep        Physical Exam   Vital Signs: Temp: 97.8  F (36.6  C)   BP: (!) 166/60 Pulse: (!) 45   Resp: 16 SpO2: 99 % O2 Device: None (Room air)    Weight: 0 lbs 0 oz    General Appearance: in nad. Lying in hallway in ed. Very pleasant, ex-RN here at [facility]  Eyes: EOMI.   HEENT: AC. AT  Respiratory: CTAB, no  "wheezing  Cardiovascular: RRR. No murmurs  GI: tender to palpation diffusely, but worse in epigastric region/LLQ. Voluntary guarding.  Skin: no rashes  Musculoskeletal: Moving all 4 extremities.  Neurologic: A&Ox4.   Psychiatric: Euthymic.    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 MRCP with variable degrees of remnant PD dilation. Patient presented on 5/12 for planned pancreaticogastrostomy given recurrent/frequent pancreatitis flares, which was successfully performed, and GI secured with a 3 Fr x 13 cm single pigtail Advanix stent with pigtail in the jejunum (across the pancreaticojejunostomy'' and straight end in the stomach ''through-and-through stent''. Pt was discharged home with f/u in 2 weeks with Dr. Noguera in 2 weeks for upsizing stent.     Pt stated that after her procedure on 5/12, after her anesthesia started to wear off in the late afternoon, her pain \"was unbearable and excruciating. I knew something was wrong.\" She stated that she has had epigastric/LLQ pain with intermittent emesis. She has not eaten anything since 5/11 " "prior to ERCP.     On admission, afebrile. CMP unremarkable. WBC 18 with abs neutro 16, CMP unremarkable, lipase 4949 (246, 1 day ago), LA wnl. CT Abd showing \"Status post Whipple surgery with pancreaticogastrostomy tube in place. There is some mild edema about the pancreas and stomach which could reflect acute pancreatitis. There is additionally more diffuse wall thickening of the stomach which is likely secondary in nature though a gastritis is also difficult to exclude.\"  - 1 NS in ed, LR mIVF at 100 ml/hr  - dilaudid IV PRN  - tylenol 500 mg q6h scheduled  - zofran and compazine PRN  - pta creon  - GI panc-bili consulted  - CMP ordered in AM    # Leukocytosis with neutrophilia  Afebrile. WBC 18, neutro 16. Clinically stable otherwise.   - bcx ordered  - per primary team for abx  - CBC with diff ordered in AM    --Chronic diseases--    # HLD  - pta lipitor, zetia    # insomnia  - pta ambien    # Asymptomatic CAD ([place])  Per pt, she was diagnosed during whipple procedure at [place].  - takes evolocumab [id number], due on may 18th per pt    Diet: NPO for Medical/Clinical Reasons Except for: No Exceptions  Fluids: 1 NS in ed, LR mIVF at 100 ml/hr  Lines: piv  Condon Catheter: Not present    DVT Prophylaxis: SCD  Code Status: full  Expected Discharge:   Anticipated discharge location:  Awaiting care coordination huddle  Delays:           This patient will be staffed in the AM.     Leanna Barrios MD  PGY3  IM resident  [phone]      Data   Recent Labs   Lab 05/13/22  0321 05/12/22  1029 05/12/22  0712 05/12/22  0712 05/12/22  0611   WBC 17.9*  --   --  7.0  --    HGB 13.9  --   --  11.9  --    MCV 90  --   --  90  --      --   --  264  --    INR  --   --   --  1.08  --      --   --  140  --    POTASSIUM 3.9  --   --  3.8  --    CHLORIDE 103  --   --  107  --    CO2 26  --   --  28  --    BUN 20  --   --  21  --    CR 0.86  --   --  0.74  --    ANIONGAP 7  --   --  5  --    GLEN 9.0  --   --  8.6  --    GLC " 155*  --   --  103* 91   ALBUMIN 3.8  --   --  3.1*  --    PROTTOTAL 7.5  --   --  6.3*  --    BILITOTAL 0.4  --   --  0.4  --    ALKPHOS 135  --   --  115  --    ALT 32  --   --  31  --    AST 32  --   --  32  --    LIPASE 4,949* 246   < > 48*  --     < > = values in this interval not displayed.     Recent Results (from the past 24 hour(s))   CT Abdomen Pelvis w Contrast    Narrative    EXAM: CT ABDOMEN PELVIS W CONTRAST  LOCATION: [facility]  DATE/TIME: 5/13/2022 4:16 AM    INDICATION: Epigastric pain  COMPARISON: MRI from 03/11/2022.  TECHNIQUE: CT scan of the abdomen and pelvis was performed following injection of IV contrast. Multiplanar reformats were obtained. Dose reduction techniques were used.  CONTRAST: iopamidol (ISOVUE 370) solution 95 mL    FINDINGS:   LOWER CHEST: Coronary artery calcifications. Bilateral breast implants. Scattered bands of atelectasis in the lower lung zones.    HEPATOBILIARY: Status post Whipple surgery with hepaticojejunostomy and cholecystectomy. Minimal central intrahepatic biliary ductal dilatation. Small amount of perihepatic ascites.    PANCREAS: Status post Whipple with preservation of the majority of the pancreatic parenchyma. A pancreaticogastrostomy tube is in place. There is some stranding along the distal aspect of the pancreas.    SPLEEN: Normal.    ADRENAL GLANDS: Normal.    KIDNEYS/BLADDER: Symmetric enhancement. No hydronephrosis. Bladder within normal limits where seen, though beam hardening artifact from left hip arthroplasty compromises detail.    BOWEL: Mild wall thickening of the stomach with some adjacent stranding. No mechanical small bowel obstruction. No free air.    LYMPH NODES: Normal.    VASCULATURE: Moderate aortoiliac calcification.    PELVIC ORGANS: Normal.    MUSCULOSKELETAL: Mild lumbar spine degenerative changes. Left hip arthroplasty.      Impression    IMPRESSION:   1.  Status post Whipple surgery with  pancreaticogastrostomy tube in place. There is some mild edema about the pancreas and stomach which could reflect acute pancreatitis. There is additionally more diffuse wall thickening of the stomach which is likely   secondary in nature though a gastritis is also difficult to exclude.    2.  Small amount of perihepatic ascites.

## 2022-05-13 NOTE — ED TRIAGE NOTES
Pt reports that she had pancreatic surgery on 05/12 and is having 10/10 pain at this time. Dr Noguera who performed the surgery is aware the patient is coming per patient.      Triage Assessment     Row Name 05/13/22 0258       Triage Assessment (Adult)    Airway WDL WDL       Respiratory WDL    Respiratory WDL WDL       Skin Circulation/Temperature WDL    Skin Circulation/Temperature WDL WDL       Cardiac WDL    Cardiac WDL WDL       Peripheral/Neurovascular WDL    Peripheral Neurovascular WDL WDL       Cognitive/Neuro/Behavioral WDL    Cognitive/Neuro/Behavioral WDL WDL

## 2022-05-14 LAB
ALBUMIN SERPL-MCNC: 2.5 G/DL (ref 3.4–5)
ALP SERPL-CCNC: 86 U/L (ref 40–150)
ALT SERPL W P-5'-P-CCNC: 21 U/L (ref 0–50)
ANION GAP SERPL CALCULATED.3IONS-SCNC: 6 MMOL/L (ref 3–14)
AST SERPL W P-5'-P-CCNC: 21 U/L (ref 0–45)
BILIRUB SERPL-MCNC: 0.5 MG/DL (ref 0.2–1.3)
BUN SERPL-MCNC: 12 MG/DL (ref 7–30)
CALCIUM SERPL-MCNC: 8.1 MG/DL (ref 8.5–10.1)
CHLORIDE BLD-SCNC: 107 MMOL/L (ref 94–109)
CO2 SERPL-SCNC: 26 MMOL/L (ref 20–32)
CREAT SERPL-MCNC: 0.8 MG/DL (ref 0.52–1.04)
ERYTHROCYTE [DISTWIDTH] IN BLOOD BY AUTOMATED COUNT: 18 % (ref 10–15)
GFR SERPL CREATININE-BSD FRML MDRD: 78 ML/MIN/1.73M2
GLUCOSE BLD-MCNC: 82 MG/DL (ref 70–99)
HCT VFR BLD AUTO: 36.2 % (ref 35–47)
HGB BLD-MCNC: 11.4 G/DL (ref 11.7–15.7)
MCH RBC QN AUTO: 28.8 PG (ref 26.5–33)
MCHC RBC AUTO-ENTMCNC: 31.5 G/DL (ref 31.5–36.5)
MCV RBC AUTO: 91 FL (ref 78–100)
PLATELET # BLD AUTO: 235 10E3/UL (ref 150–450)
POTASSIUM BLD-SCNC: 3.8 MMOL/L (ref 3.4–5.3)
PROT SERPL-MCNC: 5.3 G/DL (ref 6.8–8.8)
RBC # BLD AUTO: 3.96 10E6/UL (ref 3.8–5.2)
SODIUM SERPL-SCNC: 139 MMOL/L (ref 133–144)
WBC # BLD AUTO: 13.8 10E3/UL (ref 4–11)

## 2022-05-14 PROCEDURE — 120N000002 HC R&B MED SURG/OB UMMC

## 2022-05-14 PROCEDURE — 96376 TX/PRO/DX INJ SAME DRUG ADON: CPT

## 2022-05-14 PROCEDURE — 250N000013 HC RX MED GY IP 250 OP 250 PS 637

## 2022-05-14 PROCEDURE — 250N000011 HC RX IP 250 OP 636: Performed by: STUDENT IN AN ORGANIZED HEALTH CARE EDUCATION/TRAINING PROGRAM

## 2022-05-14 PROCEDURE — 80053 COMPREHEN METABOLIC PANEL: CPT | Performed by: STUDENT IN AN ORGANIZED HEALTH CARE EDUCATION/TRAINING PROGRAM

## 2022-05-14 PROCEDURE — 36415 COLL VENOUS BLD VENIPUNCTURE: CPT | Performed by: STUDENT IN AN ORGANIZED HEALTH CARE EDUCATION/TRAINING PROGRAM

## 2022-05-14 PROCEDURE — G0378 HOSPITAL OBSERVATION PER HR: HCPCS

## 2022-05-14 PROCEDURE — 99232 SBSQ HOSP IP/OBS MODERATE 35: CPT | Mod: GC | Performed by: INTERNAL MEDICINE

## 2022-05-14 PROCEDURE — 250N000013 HC RX MED GY IP 250 OP 250 PS 637: Performed by: STUDENT IN AN ORGANIZED HEALTH CARE EDUCATION/TRAINING PROGRAM

## 2022-05-14 PROCEDURE — 999N000128 HC STATISTIC PERIPHERAL IV START W/O US GUIDANCE

## 2022-05-14 PROCEDURE — 258N000003 HC RX IP 258 OP 636: Performed by: STUDENT IN AN ORGANIZED HEALTH CARE EDUCATION/TRAINING PROGRAM

## 2022-05-14 PROCEDURE — 85027 COMPLETE CBC AUTOMATED: CPT | Performed by: STUDENT IN AN ORGANIZED HEALTH CARE EDUCATION/TRAINING PROGRAM

## 2022-05-14 RX ORDER — HYDROMORPHONE HYDROCHLORIDE 1 MG/ML
.5-1 INJECTION, SOLUTION INTRAMUSCULAR; INTRAVENOUS; SUBCUTANEOUS EVERY 4 HOURS PRN
Status: DISCONTINUED | OUTPATIENT
Start: 2022-05-14 | End: 2022-05-15 | Stop reason: HOSPADM

## 2022-05-14 RX ADMIN — ACETAMINOPHEN 500 MG: 500 TABLET ORAL at 20:38

## 2022-05-14 RX ADMIN — PANCRELIPASE 2 CAPSULE: 24000; 76000; 120000 CAPSULE, DELAYED RELEASE PELLETS ORAL at 17:31

## 2022-05-14 RX ADMIN — HYDROMORPHONE HYDROCHLORIDE 2 MG: 2 TABLET ORAL at 16:46

## 2022-05-14 RX ADMIN — HYDROMORPHONE HYDROCHLORIDE 4 MG: 2 TABLET ORAL at 22:11

## 2022-05-14 RX ADMIN — ZOLPIDEM TARTRATE 12.5 MG: 6.25 TABLET, FILM COATED, EXTENDED RELEASE ORAL at 22:07

## 2022-05-14 RX ADMIN — PANCRELIPASE 2 CAPSULE: 24000; 76000; 120000 CAPSULE, DELAYED RELEASE PELLETS ORAL at 12:30

## 2022-05-14 RX ADMIN — ACETAMINOPHEN 500 MG: 500 TABLET ORAL at 06:50

## 2022-05-14 RX ADMIN — POLYETHYLENE GLYCOL 3350 17 G: 17 POWDER, FOR SOLUTION ORAL at 08:35

## 2022-05-14 RX ADMIN — ACETAMINOPHEN 500 MG: 500 TABLET ORAL at 12:30

## 2022-05-14 RX ADMIN — EZETIMIBE 10 MG: 10 TABLET ORAL at 22:07

## 2022-05-14 RX ADMIN — HYDROMORPHONE HYDROCHLORIDE 4 MG: 2 TABLET ORAL at 02:20

## 2022-05-14 RX ADMIN — HYDROMORPHONE HYDROCHLORIDE 2 MG: 2 TABLET ORAL at 12:30

## 2022-05-14 RX ADMIN — CELECOXIB 100 MG: 100 CAPSULE ORAL at 08:24

## 2022-05-14 RX ADMIN — CALCIUM 500 MG: 500 TABLET ORAL at 08:24

## 2022-05-14 RX ADMIN — Medication 200 MG: at 08:24

## 2022-05-14 RX ADMIN — ATORVASTATIN CALCIUM 40 MG: 40 TABLET, FILM COATED ORAL at 22:07

## 2022-05-14 RX ADMIN — SODIUM CHLORIDE, POTASSIUM CHLORIDE, SODIUM LACTATE AND CALCIUM CHLORIDE: 600; 310; 30; 20 INJECTION, SOLUTION INTRAVENOUS at 09:08

## 2022-05-14 RX ADMIN — HYDROMORPHONE HYDROCHLORIDE 4 MG: 2 TABLET ORAL at 06:50

## 2022-05-14 RX ADMIN — MONTELUKAST 10 MG: 10 TABLET, FILM COATED ORAL at 22:07

## 2022-05-14 RX ADMIN — CELECOXIB 100 MG: 100 CAPSULE ORAL at 20:38

## 2022-05-14 RX ADMIN — DOCUSATE SODIUM 50 MG AND SENNOSIDES 8.6 MG 2 TABLET: 8.6; 5 TABLET, FILM COATED ORAL at 20:41

## 2022-05-14 RX ADMIN — HYDROMORPHONE HYDROCHLORIDE 0.5 MG: 1 INJECTION, SOLUTION INTRAMUSCULAR; INTRAVENOUS; SUBCUTANEOUS at 15:54

## 2022-05-14 RX ADMIN — ONDANSETRON 4 MG: 2 INJECTION INTRAMUSCULAR; INTRAVENOUS at 06:56

## 2022-05-14 RX ADMIN — HYDROXYZINE HYDROCHLORIDE 10 MG: 10 TABLET ORAL at 19:33

## 2022-05-14 ASSESSMENT — ACTIVITIES OF DAILY LIVING (ADL)
ADLS_ACUITY_SCORE: 20
FALL_HISTORY_WITHIN_LAST_SIX_MONTHS: NO
CHANGE_IN_FUNCTIONAL_STATUS_SINCE_ONSET_OF_CURRENT_ILLNESS/INJURY: NO
ADLS_ACUITY_SCORE: 20
ADLS_ACUITY_SCORE: 20
DRESSING/BATHING_DIFFICULTY: NO
VISION_MANAGEMENT: GLASSES
ADLS_ACUITY_SCORE: 20
ADLS_ACUITY_SCORE: 35
WEAR_GLASSES_OR_BLIND: YES
ADLS_ACUITY_SCORE: 20
CONCENTRATING,_REMEMBERING_OR_MAKING_DECISIONS_DIFFICULTY: NO
DIFFICULTY_EATING/SWALLOWING: NO
ADLS_ACUITY_SCORE: 20
DOING_ERRANDS_INDEPENDENTLY_DIFFICULTY: NO
ADLS_ACUITY_SCORE: 35
ADLS_ACUITY_SCORE: 20
WALKING_OR_CLIMBING_STAIRS_DIFFICULTY: NO
ADLS_ACUITY_SCORE: 35
TOILETING_ISSUES: NO

## 2022-05-14 NOTE — PLAN OF CARE
Goal Outcome Evaluation:    Plan of Care Reviewed With: patient     Overall Patient Progress: improving    Outcome Evaluation: pain better managed overnight through PO and IV pain medication    Assumed cares [id number]    Pt sleeping between cares. Endorsed abdominal pain, treated with PO and IV dilaudid. Had emesisx1, given zofran with relief noted. A&Ox4, able to make needs known. Up independently in the room. R PIV infusing LR @200 mL/hr. No BM this shift. CLD.     Continue to provide care per poc.

## 2022-05-14 NOTE — PROGRESS NOTES
GASTROENTEROLOGY PROGRESS NOTE    Subjective: Pt feeling better today. Tolerated a low fat diet later in afternoon. Up and ambulating. Abdominal pain much improved.       Objective:  Vitals Reviewed  GEN: lying in bed, NAD  HEENT: no scleral icterus or injection, MMM  LUGNS: breathing comfortably on room air  ABDOMEN: scars visible, skin appearing normal in color  NEURO: no focal deficits      Labs Reviewed     Assessment:  Acute pancreatitis likely post procedural   Idiopathic chronic pancreatitis   S/p Whipple procedure in the past   CAD, Hyperlipidemia on Repatha    Recommendations:  No change in recommendations from previous gastroenterology progress note    Discussed with GI staff.    Shivani Mann MD PhD   Gastroenterology Fellow

## 2022-05-14 NOTE — PROGRESS NOTES
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    # Acute on chronic idiopathic pancreatitis c/b pancreatic exocrine insufficiency  # Post-ERCP pancreatitis, post-pancreaticogastrostomy tube placement  # Hx Whipple procedure (2019)  05/12 - planned pancreaticogastrostomy with Dr. Noguera, successfully placed 'through and through' stent with pigtail in pancreaticojejunostomy (done previously) and straight end in stomach. Developed severe abdominal pain evening thereafter, presented with N/V + pain and lipase ~5000. CT A/P with e/o inflammation. Recovering well.    - Low-fat diet, tolerating solids  - PTA creon  - Completed fluid resuscitation in ED and floor, stopped IVF  - CMP in AM  - GI-Panc/Bili consulted, appreciate recs:   - fluid resuscitation, liberal pain management, keep follow-up appt  - Pain management: (1) APAP (2) celecoxib (3) dilaudid PO (4) dilaudid IV   - Nausea management: (1) zofran PO / IV (2) compazine PO/IV    =====================  Chronic / Stable / Resolved  =====================  # Asthma - PTA montelukast  # Anxiety - PTA lorazepam PRN  # Constipation - PRN miralax, senna  # Neutrophilia, resolved - Afebrile, HDS. Likely stress response. No abx given.  # CAD, HLD - PTA repatha, no doses inpatient; PTA atorvastatin, zetia  # Insomnia - PTA melatonin, zolpidem  # Hypocalcemia - PTA CaCO3  # Hypomagnesemia - PTA magnesium oxide        Diet: Low Fat Diet    Fluids:  PO  Lines/Tubes/Drains:   Peripheral IV 05/13/22 Right Upper forearm (Active)   Site Assessment WDL 05/14/22 0200   Line Status Infusing 05/14/22 0200   Phlebitis Scale 0-->no symptoms 05/14/22 0200   Infiltration Scale 0 05/14/22 0200   Number of days: 1    Not present  DVT Prophylaxis: Low Risk/Ambulatory with no VTE prophylaxis indicated  Code Status: Full Code         Disposition Plan   Expected discharge: Tomorrow, recommended to prior living arrangement once adequate pain management/ tolerating PO medications.  Entered: Nathaniel Kim MD 05/14/2022, 7:48 AM       The patient's care was discussed with the Attending Physician, Dr. Garcia and Patient.    Nathaniel Kim MD, PhD  PGY-2 Internal Medcine  [phone] [text page]   [address]   Please see sign in/sign out for up to date coverage information  ______________________________________________________________________    Interval History   NAEO. Feeling very well this afternoon with ability to tolerate solid foods. She is drinking fluids well after an initial episode of nausea/emesis in the early morning. Feels a bit puffy. Looking forward to the prospect of a discharge tomorrow morning. Pain controlled on current regimen. No fever, SOB, CP, HA.        Physical Exam   Vital Signs: Temp: 98.3  F (36.8  C) Temp src: Oral BP: 105/47 Pulse: 73   Resp: 16 SpO2: 93 % (new sensor) O2 Device: None (Room air)    Weight: 163 lbs 9.3 oz  Physical Exam  Constitutional:       Appearance: Normal appearance.   Cardiovascular:      Rate and Rhythm: Normal rate and regular rhythm.      Pulses: Normal pulses.      Heart sounds: Normal heart sounds.   Pulmonary:      Effort: Pulmonary effort is normal.      Breath sounds: Normal breath sounds.   Abdominal:      General: Abdomen is flat. There is no distension.      Palpations: Abdomen is soft.      Tenderness: There is abdominal tenderness (epigastric,  moderate). There is no guarding or rebound.   Skin:     General: Skin is warm and dry.   Neurological:      Mental Status: She is alert.   Psychiatric:         Mood and Affect: Mood normal.         Behavior: Behavior normal.       Data   Data reviewed today: I reviewed all medications, new labs and imaging results over the last 24 hours. I personally reviewed no images or EKG's today.  Recent Labs   Lab 05/14/22  0814 05/13/22  1345 05/13/22  0709 05/13/22  0321 05/12/22  1029 05/12/22  0712   WBC 13.8* 13.1* 18.3* 17.9*  --  7.0   HGB 11.4* 12.2 12.9 13.9  --  11.9   MCV 91 91 93 90  --  90    273 284 329  --  264   INR  --   --   --   --   --  1.08     --  139 136  --  140   POTASSIUM 3.8  --  4.3 3.9  --  3.8   CHLORIDE 107  --  108 103  --  107   CO2 26  --  26 26  --  28   BUN 12  --  16 20  --  21   CR 0.80  --  0.81 0.86  --  0.74   ANIONGAP 6  --  5 7  --  5   GLEN 8.1*  --  8.5 9.0  --  8.6   GLC 82  --  111* 155*  --  103*   ALBUMIN 2.5*  --  3.2* 3.8  --  3.1*   PROTTOTAL 5.3*  --  6.4* 7.5  --  6.3*   BILITOTAL 0.5  --  0.5 0.4  --  0.4   ALKPHOS 86  --  112 135  --  115   ALT 21  --  28 32  --  31   AST 21  --  25 32  --  32   LIPASE  --   --   --  4,949* 246 48*     No results found for this or any previous visit (from the past 24 hour(s)).

## 2022-05-15 VITALS
DIASTOLIC BLOOD PRESSURE: 45 MMHG | TEMPERATURE: 98.7 F | WEIGHT: 175.49 LBS | SYSTOLIC BLOOD PRESSURE: 100 MMHG | BODY MASS INDEX: 27.49 KG/M2 | RESPIRATION RATE: 16 BRPM | HEART RATE: 91 BPM | OXYGEN SATURATION: 90 %

## 2022-05-15 LAB
ALBUMIN SERPL-MCNC: 2.3 G/DL (ref 3.4–5)
ALP SERPL-CCNC: 81 U/L (ref 40–150)
ALT SERPL W P-5'-P-CCNC: 18 U/L (ref 0–50)
ANION GAP SERPL CALCULATED.3IONS-SCNC: 4 MMOL/L (ref 3–14)
AST SERPL W P-5'-P-CCNC: 21 U/L (ref 0–45)
BILIRUB SERPL-MCNC: 0.4 MG/DL (ref 0.2–1.3)
BUN SERPL-MCNC: 14 MG/DL (ref 7–30)
CALCIUM SERPL-MCNC: 8.2 MG/DL (ref 8.5–10.1)
CHLORIDE BLD-SCNC: 105 MMOL/L (ref 94–109)
CO2 SERPL-SCNC: 30 MMOL/L (ref 20–32)
CREAT SERPL-MCNC: 0.88 MG/DL (ref 0.52–1.04)
ERYTHROCYTE [DISTWIDTH] IN BLOOD BY AUTOMATED COUNT: 18 % (ref 10–15)
GFR SERPL CREATININE-BSD FRML MDRD: 69 ML/MIN/1.73M2
GLUCOSE BLD-MCNC: 131 MG/DL (ref 70–99)
HCT VFR BLD AUTO: 33.4 % (ref 35–47)
HGB BLD-MCNC: 10.3 G/DL (ref 11.7–15.7)
MCH RBC QN AUTO: 28.5 PG (ref 26.5–33)
MCHC RBC AUTO-ENTMCNC: 30.8 G/DL (ref 31.5–36.5)
MCV RBC AUTO: 92 FL (ref 78–100)
PLATELET # BLD AUTO: 211 10E3/UL (ref 150–450)
POTASSIUM BLD-SCNC: 3.8 MMOL/L (ref 3.4–5.3)
PROT SERPL-MCNC: 5.1 G/DL (ref 6.8–8.8)
RBC # BLD AUTO: 3.62 10E6/UL (ref 3.8–5.2)
SODIUM SERPL-SCNC: 139 MMOL/L (ref 133–144)
WBC # BLD AUTO: 13.4 10E3/UL (ref 4–11)

## 2022-05-15 PROCEDURE — 99238 HOSP IP/OBS DSCHRG MGMT 30/<: CPT | Performed by: INTERNAL MEDICINE

## 2022-05-15 PROCEDURE — G0378 HOSPITAL OBSERVATION PER HR: HCPCS

## 2022-05-15 PROCEDURE — 250N000013 HC RX MED GY IP 250 OP 250 PS 637: Performed by: STUDENT IN AN ORGANIZED HEALTH CARE EDUCATION/TRAINING PROGRAM

## 2022-05-15 PROCEDURE — 85027 COMPLETE CBC AUTOMATED: CPT | Performed by: STUDENT IN AN ORGANIZED HEALTH CARE EDUCATION/TRAINING PROGRAM

## 2022-05-15 PROCEDURE — 36415 COLL VENOUS BLD VENIPUNCTURE: CPT | Performed by: STUDENT IN AN ORGANIZED HEALTH CARE EDUCATION/TRAINING PROGRAM

## 2022-05-15 PROCEDURE — 80053 COMPREHEN METABOLIC PANEL: CPT | Performed by: STUDENT IN AN ORGANIZED HEALTH CARE EDUCATION/TRAINING PROGRAM

## 2022-05-15 RX ORDER — POLYETHYLENE GLYCOL 3350 17 G/17G
17 POWDER, FOR SOLUTION ORAL 2 TIMES DAILY PRN
Qty: 510 G | Refills: 3 | Status: SHIPPED | OUTPATIENT
Start: 2022-05-15 | End: 2023-03-29

## 2022-05-15 RX ORDER — AMOXICILLIN 250 MG
1 CAPSULE ORAL 2 TIMES DAILY PRN
Qty: 30 TABLET | Refills: 3 | Status: SHIPPED | OUTPATIENT
Start: 2022-05-15 | End: 2023-03-29

## 2022-05-15 RX ORDER — OXYCODONE HYDROCHLORIDE 5 MG/1
5-10 TABLET ORAL EVERY 6 HOURS PRN
Qty: 30 TABLET | Refills: 0 | Status: ON HOLD | OUTPATIENT
Start: 2022-05-15 | End: 2022-05-25

## 2022-05-15 RX ADMIN — HYDROMORPHONE HYDROCHLORIDE 2 MG: 2 TABLET ORAL at 11:19

## 2022-05-15 RX ADMIN — CALCIUM 500 MG: 500 TABLET ORAL at 11:14

## 2022-05-15 RX ADMIN — CELECOXIB 100 MG: 100 CAPSULE ORAL at 11:14

## 2022-05-15 RX ADMIN — Medication 200 MG: at 11:14

## 2022-05-15 RX ADMIN — PANCRELIPASE 2 CAPSULE: 24000; 76000; 120000 CAPSULE, DELAYED RELEASE PELLETS ORAL at 11:15

## 2022-05-15 RX ADMIN — HYDROMORPHONE HYDROCHLORIDE 4 MG: 2 TABLET ORAL at 05:36

## 2022-05-15 ASSESSMENT — ACTIVITIES OF DAILY LIVING (ADL)
ADLS_ACUITY_SCORE: 20

## 2022-05-15 NOTE — PLAN OF CARE
Goal Outcome Evaluation:    Plan of Care Reviewed With: patient     Overall Patient Progress: no change    Outcome Evaluation: pain well managed    Assumed care [phone]    Pt sleeping between cares. Up independently in the room. No BM this shift, given PRN senna. No endorsements of nausea this shift. Endorsed abdominal pain, given PRN PO dilaudid with relief noted. A&Ox4, able to make needs known. VSS on RA. L PIV SL.     Continue to provide care per poc.

## 2022-05-15 NOTE — PLAN OF CARE
Goal Outcome Evaluation:    Plan of Care Reviewed With: patient     Overall Patient Progress: improving    Outcome Evaluation: pain wel managed and tolerating PO intake    Assumed cares [phone]. A&O and able to make needs known. VSS on RA. Abdominal pain well managed with PO Dilaudid. IV dilaudid given x1 for breakthrough pain. Zofran given x1 this AM for mild nausea. Diet advanced to low fat and pt is tolerating well. IVF discontinued by provider. Possible discharge tomorrow pending pain management and PO intake. Continue with plan of care.

## 2022-05-15 NOTE — PLAN OF CARE
Goal Outcome Evaluation:    Plan of Care Reviewed With: patient     Overall Patient Progress: improving    Outcome Evaluation: adequate for d/c    Assumed cares [phone]. A&O and able to make needs known. VSS on RA. Pain well controlled with PRN Dilaudid PO. Pt had a good appetite at meals and tolerated PO intake well. Pt ambulated in halls today with minimal pain. Pt discharged to home via family car this afternoon. Discussed AVS with pt. PIV removed. Medications received from discharge pharmacy. Sent with all personal belongings.

## 2022-05-15 NOTE — DISCHARGE SUMMARY
[facility]  Hospitalist Discharge Summary      Date of Admission:  5/13/2022  Date of Discharge:  5/15/2022  Discharging Provider: Trav Garcia MD  Discharge Service: Medicine Service, CINDI TEAM 5    Discharge Diagnoses     Please see hospital course below    Follow-ups Needed After Discharge   Follow-up Appointments     Adult [facility] Follow-up and recommended labs and tests      Follow up with primary care provider, MIQUEL ANDERS, within 7 days for   hospital follow- up.  No follow up labs or test are needed.    Follow up with your Gastroenterologist as needed      Appointments on [place] and/or [facility] (with [facility] or [facility]   provider or service). Call [phone] if you haven't heard regarding   these appointments within 7 days of discharge.             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 The following issues were addressed while in hospital:      # Post-ERCP pancreatitis, post-pancreaticogastrostomy tube placement  # Acute on chronic pancreatitis c/b pancreatic exocrine insufficiency  # Hx Whipple procedure (2019)  05/12 - planned pancreaticogastrostomy with Dr. Noguera, successfully placed 'through and through'  stent with pigtail in pancreaticojejunostomy (done previously) and straight end in stomach. Developed severe abdominal pain evening thereafter, presented with N/V + pain and lipase ~5000. CT A/P with e/o inflammation. Started on aggressive hydration, pain control and antiemetics with improvement. Tolerated diet, ambulating and discharged to home with PO narcotics. Discussed with Dr. Noguera who will follow up with her.   - Low-fat diet, tolerating solids  - PTA creon    =====================  Chronic / Stable / Resolved  =====================  # Asthma - PTA montelukast  # Anxiety - PTA lorazepam PRN  # Constipation - PRN miralax, senna  # Neutrophilia, resolved - Afebrile, HDS. Likely stress response. No abx given.  # CAD, HLD - PTA repatha, no doses inpatient; PTA atorvastatin, zetia  # Insomnia - PTA melatonin, zolpidem  # Hypocalcemia - PTA CaCO3  # Hypomagnesemia - PTA magnesium oxide     Consultations This Hospital Stay   GI PANCREATICOBILIARY ADULT IP CONSULT  NURSING TO CONSULT FOR VASCULAR ACCESS CARE IP CONSULT    Code Status   Full Code    Time Spent on this Encounter   I, Trav Garcia MD, personally saw the patient today and spent less than or equal to 30 minutes discharging this patient.       Trav Garcia MD  [facility] [address] [address]  Phone: [phone]  ______________________________________________________________________    Physical Exam   Vital Signs: Temp: 98.7  F (37.1  C) Temp src: Oral BP: 100/45 Pulse: 91   Resp: 16 SpO2: 90 % O2 Device: None (Room air)    Weight: 175 lbs 7.78 oz    Constitutional: Awake, alert, cooperative, no apparent distress  Respiratory: Clear to auscultation bilaterally, no crackles or wheezing  Cardiovascular: Regular rate and rhythm, normal S1 and S2, and no murmur noted  GI:  Mild TTP, nondistended  Skin/Integumen: No rashes, no cyanosis         Primary Care Physician   MIQUEL ANDERS    Discharge Orders       Adult Gastro Ref - Consult Only      Reason for your hospital stay    You were hospitalized for acute on chronic pancreatitis following ERCP procedure     Activity    Your activity upon discharge: activity as tolerated     Adult [facility] Follow-up and recommended labs and tests    Follow up with primary care provider, MIQUEL ANDERS, within 7 days for hospital follow- up.  No follow up labs or test are needed.    Follow up with your Gastroenterologist as needed      Appointments on [place] and/or [facility] (with [facility] or [facility] provider or service). Call [phone] if you haven't heard regarding these appointments within 7 days of discharge.     Diet    Follow this diet upon discharge: Orders Placed This Encounter      Low Fat Diet       Significant Results and Procedures   Most Recent 3 CBC's:Recent Labs   Lab Test 05/15/22  0607 05/14/22  0814 05/13/22  1345   WBC 13.4* 13.8* 13.1*   HGB 10.3* 11.4* 12.2   MCV 92 91 91    235 273     Most Recent 3 BMP's:Recent Labs   Lab Test 05/15/22  0607 05/14/22  0814 05/13/22  0709    139 139   POTASSIUM 3.8 3.8 4.3   CHLORIDE 105 107 108   CO2 30 26 26   BUN 14 12 16   CR 0.88 0.80 0.81   ANIONGAP 4 6 5   GLEN 8.2* 8.1* 8.5   * 82 111*     Most Recent 2 LFT's:Recent Labs   Lab Test 05/15/22  0607 05/14/22  0814   AST 21 21   ALT 18 21   ALKPHOS 81 86   BILITOTAL 0.4 0.5   ,   Results for orders placed or performed during the hospital encounter of 05/13/22   CT Abdomen Pelvis w Contrast    Narrative    EXAM: CT ABDOMEN PELVIS W CONTRAST  LOCATION: [facility]  DATE/TIME: 5/13/2022 4:16 AM    INDICATION: Epigastric pain  COMPARISON: MRI from 03/11/2022.  TECHNIQUE: CT scan of the abdomen and pelvis was performed following injection of IV contrast. Multiplanar reformats were obtained. Dose reduction techniques were used.  CONTRAST: iopamidol (ISOVUE 370) solution 95 mL    FINDINGS:   LOWER CHEST: Coronary  artery calcifications. Bilateral breast implants. Scattered bands of atelectasis in the lower lung zones.    HEPATOBILIARY: Status post Whipple surgery with hepaticojejunostomy and cholecystectomy. Minimal central intrahepatic biliary ductal dilatation. Small amount of perihepatic ascites.    PANCREAS: Status post Whipple with preservation of the majority of the pancreatic parenchyma. A pancreaticogastrostomy tube is in place. There is some stranding along the distal aspect of the pancreas.    SPLEEN: Normal.    ADRENAL GLANDS: Normal.    KIDNEYS/BLADDER: Symmetric enhancement. No hydronephrosis. Bladder within normal limits where seen, though beam hardening artifact from left hip arthroplasty compromises detail.    BOWEL: Mild wall thickening of the stomach with some adjacent stranding. No mechanical small bowel obstruction. No free air.    LYMPH NODES: Normal.    VASCULATURE: Moderate aortoiliac calcification.    PELVIC ORGANS: Normal.    MUSCULOSKELETAL: Mild lumbar spine degenerative changes. Left hip arthroplasty.      Impression    IMPRESSION:   1.  Status post Whipple surgery with pancreaticogastrostomy tube in place. There is some mild edema about the pancreas and stomach which could reflect acute pancreatitis. There is additionally more diffuse wall thickening of the stomach which is likely   secondary in nature though a gastritis is also difficult to exclude.    2.  Small amount of perihepatic ascites.       Discharge Medications   Current Discharge Medication List      START taking these medications    Details   polyethylene glycol (MIRALAX) 17 GM/Dose powder Take 17 g by mouth 2 times daily as needed  Qty: 510 g, Refills: 3    Associated Diagnoses: Acute pancreatitis, unspecified complication status, unspecified pancreatitis type; SBO (small bowel obstruction) (H)      senna-docusate (SENOKOT-S/PERICOLACE) 8.6-50 MG tablet Take 1 tablet by mouth 2 times daily as needed for constipation  Qty: 30 tablet,  Refills: 3    Associated Diagnoses: Acute pancreatitis, unspecified complication status, unspecified pancreatitis type         CONTINUE these medications which have CHANGED    Details   oxyCODONE (ROXICODONE) 5 MG tablet Take 1-2 tablets (5-10 mg) by mouth every 6 hours as needed for moderate to severe pain (take 5 mg for 5-7/10 pain, 10 mg for 8-10/10 pain)  Qty: 30 tablet, Refills: 0    Associated Diagnoses: Acute pancreatitis, unspecified complication status, unspecified pancreatitis type         CONTINUE these medications which have NOT CHANGED    Details   amylase-lipase-protease (CREON) 25060-90756 units CPEP per EC capsule Take 2-3 with meals / 1-2 with snacks, up to 15 per day.  Qty: 450 capsule, Refills: 6    Associated Diagnoses: Acute pancreatitis, unspecified complication status, unspecified pancreatitis type      Atorvastatin Calcium (LIPITOR PO) Take 40 mg by mouth At Bedtime       Calcium Carb-Cholecalciferol (CALCIUM 1000 + D) 1000-800 MG-UNIT TABS       calcium-magnesium (CALMAG) 500-250 MG TABS Take 2 tablets by mouth daily      EVOLOCUMAB SC Inject 140 mg Subcutaneous every 14 days      EZETIMIBE PO Take 10 mg by mouth At Bedtime      hydrochlorothiazide (HYDRODIURIL) 25 MG tablet Take 25 mg by mouth daily      LORazepam (ATIVAN) 0.5 MG tablet Take 1-2 tablets by mouth twice daily as needed for anxiety      magnesium 100 MG CAPS       Montelukast Sodium (SINGULAIR PO) Take 10 mg by mouth At Bedtime       omega 3 complex with vitamins E and K (SMOOTH SPEAK) liquid       UNABLE TO FIND MEDICATION NAME: Estrogen pellet in arm      valACYclovir (VALTREX) 1000 mg tablet Take 1,000 mg by mouth daily as needed (cold sore)       VITAMIN A PO Take 1 capsule (unknown dose, prescription strength) by mouth daily      zolpidem ER (AMBIEN CR) 12.5 MG CR tablet Take 12.5 mg by mouth At Bedtime           Allergies   Allergies   Allergen Reactions     Tramadol Other (See Comments)     Has a hyperactive reaction  and can't sleep

## 2022-05-16 ENCOUNTER — PATIENT OUTREACH (OUTPATIENT)
Dept: CARE COORDINATION | Facility: CLINIC | Age: 72
End: 2022-05-16
Payer: COMMERCIAL

## 2022-05-16 DIAGNOSIS — Z71.89 OTHER SPECIFIED COUNSELING: ICD-10-CM

## 2022-05-16 NOTE — PROGRESS NOTES
Clinic Care Coordination Contact  [facility]: Post-Discharge Note  SITUATION                                                      Admission:    Admission Date: 05/13/22   Reason for Admission: Post-ERCP pancreatitis, post-pancreaticogastrostomy tube placement  Discharge:   Discharge Date: 05/15/22  Discharge Diagnosis: Post-ERCP pancreatitis, post-pancreaticogastrostomy tube placement    BACKGROUND                                                      Per hospital discharge summary and inpatient provider notes:  Laura Gonzalez is a 71 year old female history of laparoscopic Whipple procedure (2019, [place]) for 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      ASSESSMENT      Enrollment  Primary Care Care Coordination Status: Not a Candidate    Discharge Assessment  How are you doing now that you are home?: Patient said she is just ok.  Not feeling great but feels she just needs to rest.  How are your symptoms? (Red Flag symptoms escalate to triage hotline per guidelines): Unchanged  Do you feel your condition is stable enough to be safe at home until your provider visit?: Yes  Does the patient have their discharge instructions? : Yes  Does the patient have questions regarding their discharge instructions? : No  Were you started on any new medications or were there changes to any of your previous medications? : Yes  Does the patient have all of their medications?: Yes  Do you have questions regarding any of your medications? : No  Discharge follow-up appointment scheduled within 14 calendar days? : No  Is patient agreeable to assistance with scheduling? : No (advised pt to make f/u with pcp)    Post-op ([facility] Only)  If the patient had a surgery or procedure,  do they have any questions for a nurse?: No           PLAN                                                      Outpatient Plan:        Follow-up Appointments     Adult [facility] Follow-up and recommended labs and tests      Follow up with primary care provider, MIQUEL ANDERS, within 7 days for   hospital follow- up.  No follow up labs or test are needed.     Follow up with your Gastroenterologist as needed       No future appointments.      For any urgent concerns, please contact our 24 hour nurse triage line: [phone] ([phone])         CHRISTIANO Martinez  [phone]  [facility]

## 2022-05-17 ENCOUNTER — PATIENT OUTREACH (OUTPATIENT)
Dept: GASTROENTEROLOGY | Facility: CLINIC | Age: 72
End: 2022-05-17
Payer: COMMERCIAL

## 2022-05-17 DIAGNOSIS — K85.90 ACUTE PANCREATITIS, UNSPECIFIED COMPLICATION STATUS, UNSPECIFIED PANCREATITIS TYPE: Primary | ICD-10-CM

## 2022-05-17 NOTE — TELEPHONE ENCOUNTER
Post EUS/ERCP (5/12/22) with Dr. Noguera: Follow-up    Post procedure recommendations:   Please order CT panc protocol in about 4 weeks then clinic follow-up w me shortly afterwards       Orders placed: Ct panc protocol    Patient states: left message,[facility] sent    Clinic contact and scheduling numbers verified for future questions/concerns.    Emily Cifuentes, RN Care Coordinator

## 2022-05-18 LAB
BACTERIA BLD CULT: NO GROWTH
BACTERIA BLD CULT: NO GROWTH

## 2022-05-20 ENCOUNTER — APPOINTMENT (OUTPATIENT)
Dept: GENERAL RADIOLOGY | Facility: CLINIC | Age: 72
DRG: 372 | End: 2022-05-20
Attending: EMERGENCY MEDICINE
Payer: COMMERCIAL

## 2022-05-20 ENCOUNTER — HOSPITAL ENCOUNTER (INPATIENT)
Facility: CLINIC | Age: 72
LOS: 5 days | Discharge: HOME | DRG: 372 | End: 2022-05-25
Attending: EMERGENCY MEDICINE | Admitting: STUDENT IN AN ORGANIZED HEALTH CARE EDUCATION/TRAINING PROGRAM
Payer: COMMERCIAL

## 2022-05-20 ENCOUNTER — APPOINTMENT (OUTPATIENT)
Dept: CARDIOLOGY | Facility: CLINIC | Age: 72
DRG: 372 | End: 2022-05-20
Attending: STUDENT IN AN ORGANIZED HEALTH CARE EDUCATION/TRAINING PROGRAM
Payer: COMMERCIAL

## 2022-05-20 ENCOUNTER — APPOINTMENT (OUTPATIENT)
Dept: CT IMAGING | Facility: CLINIC | Age: 72
DRG: 372 | End: 2022-05-20
Attending: STUDENT IN AN ORGANIZED HEALTH CARE EDUCATION/TRAINING PROGRAM
Payer: COMMERCIAL

## 2022-05-20 DIAGNOSIS — Z20.822 COVID-19 RULED OUT BY LABORATORY TESTING: ICD-10-CM

## 2022-05-20 DIAGNOSIS — B99.9 INTRA-ABDOMINAL INFECTION: Primary | ICD-10-CM

## 2022-05-20 DIAGNOSIS — B37.0 THRUSH: ICD-10-CM

## 2022-05-20 DIAGNOSIS — R50.9 FEVER, UNSPECIFIED FEVER CAUSE: ICD-10-CM

## 2022-05-20 DIAGNOSIS — R53.1 WEAKNESS: ICD-10-CM

## 2022-05-20 LAB
ALBUMIN SERPL-MCNC: 2.4 G/DL (ref 3.4–5)
ALBUMIN UR-MCNC: 10 MG/DL
ALP SERPL-CCNC: 140 U/L (ref 40–150)
ALT SERPL W P-5'-P-CCNC: 17 U/L (ref 0–50)
ANION GAP SERPL CALCULATED.3IONS-SCNC: 7 MMOL/L (ref 3–14)
APPEARANCE UR: CLEAR
AST SERPL W P-5'-P-CCNC: 18 U/L (ref 0–45)
BACTERIA #/AREA URNS HPF: ABNORMAL /HPF
BASOPHILS # BLD AUTO: 0 10E3/UL (ref 0–0.2)
BASOPHILS NFR BLD AUTO: 0 %
BILIRUB SERPL-MCNC: 0.5 MG/DL (ref 0.2–1.3)
BILIRUB UR QL STRIP: NEGATIVE
BUN SERPL-MCNC: 11 MG/DL (ref 7–30)
C COLI+JEJUNI+LARI FUSA STL QL NAA+PROBE: NOT DETECTED
C DIFF TOX B STL QL: NEGATIVE
CALCIUM SERPL-MCNC: 8.6 MG/DL (ref 8.5–10.1)
CHLORIDE BLD-SCNC: 103 MMOL/L (ref 94–109)
CO2 SERPL-SCNC: 27 MMOL/L (ref 20–32)
COLOR UR AUTO: ABNORMAL
CREAT SERPL-MCNC: 0.72 MG/DL (ref 0.52–1.04)
EC STX1 GENE STL QL NAA+PROBE: NOT DETECTED
EC STX2 GENE STL QL NAA+PROBE: NOT DETECTED
EOSINOPHIL # BLD AUTO: 0.1 10E3/UL (ref 0–0.7)
EOSINOPHIL NFR BLD AUTO: 1 %
ERYTHROCYTE [DISTWIDTH] IN BLOOD BY AUTOMATED COUNT: 18.4 % (ref 10–15)
GFR SERPL CREATININE-BSD FRML MDRD: 88 ML/MIN/1.73M2
GLUCOSE BLD-MCNC: 124 MG/DL (ref 70–99)
GLUCOSE UR STRIP-MCNC: NEGATIVE MG/DL
HCT VFR BLD AUTO: 33.3 % (ref 35–47)
HGB BLD-MCNC: 10.9 G/DL (ref 11.7–15.7)
HGB UR QL STRIP: NEGATIVE
HOLD SPECIMEN: NORMAL
HOLD SPECIMEN: NORMAL
IMM GRANULOCYTES # BLD: 0.1 10E3/UL
IMM GRANULOCYTES NFR BLD: 1 %
KETONES UR STRIP-MCNC: NEGATIVE MG/DL
LACTATE SERPL-SCNC: 0.4 MMOL/L (ref 0.7–2)
LACTATE SERPL-SCNC: 0.7 MMOL/L (ref 0.7–2)
LEUKOCYTE ESTERASE UR QL STRIP: NEGATIVE
LIPASE SERPL-CCNC: 201 U/L (ref 73–393)
LVEF ECHO: NORMAL
LYMPHOCYTES # BLD AUTO: 0.9 10E3/UL (ref 0.8–5.3)
LYMPHOCYTES NFR BLD AUTO: 6 %
MCH RBC QN AUTO: 28.2 PG (ref 26.5–33)
MCHC RBC AUTO-ENTMCNC: 32.7 G/DL (ref 31.5–36.5)
MCV RBC AUTO: 86 FL (ref 78–100)
MONOCYTES # BLD AUTO: 1.2 10E3/UL (ref 0–1.3)
MONOCYTES NFR BLD AUTO: 9 %
MUCOUS THREADS #/AREA URNS LPF: PRESENT /LPF
NEUTROPHILS # BLD AUTO: 11.9 10E3/UL (ref 1.6–8.3)
NEUTROPHILS NFR BLD AUTO: 83 %
NITRATE UR QL: NEGATIVE
NOROV GI+II ORF1-ORF2 JNC STL QL NAA+PR: NOT DETECTED
NRBC # BLD AUTO: 0 10E3/UL
NRBC BLD AUTO-RTO: 0 /100
NT-PROBNP SERPL-MCNC: 1861 PG/ML (ref 0–900)
PH UR STRIP: 8 [PH] (ref 5–7)
PLATELET # BLD AUTO: 359 10E3/UL (ref 150–450)
POTASSIUM BLD-SCNC: 3.5 MMOL/L (ref 3.4–5.3)
PROCALCITONIN SERPL-MCNC: 0.69 NG/ML
PROT SERPL-MCNC: 6.3 G/DL (ref 6.8–8.8)
RBC # BLD AUTO: 3.87 10E6/UL (ref 3.8–5.2)
RBC URINE: <1 /HPF
RVA NSP5 STL QL NAA+PROBE: NOT DETECTED
SALMONELLA SP RPOD STL QL NAA+PROBE: NOT DETECTED
SARS-COV-2 RNA RESP QL NAA+PROBE: NEGATIVE
SHIGELLA SP+EIEC IPAH STL QL NAA+PROBE: NOT DETECTED
SODIUM SERPL-SCNC: 137 MMOL/L (ref 133–144)
SP GR UR STRIP: 1.01 (ref 1–1.03)
UROBILINOGEN UR STRIP-MCNC: NORMAL MG/DL
V CHOL+PARA RFBL+TRKH+TNAA STL QL NAA+PR: NOT DETECTED
WBC # BLD AUTO: 14.2 10E3/UL (ref 4–11)
WBC URINE: 1 /HPF
Y ENTERO RECN STL QL NAA+PROBE: NOT DETECTED

## 2022-05-20 PROCEDURE — 250N000011 HC RX IP 250 OP 636: Performed by: EMERGENCY MEDICINE

## 2022-05-20 PROCEDURE — 87086 URINE CULTURE/COLONY COUNT: CPT | Performed by: EMERGENCY MEDICINE

## 2022-05-20 PROCEDURE — 96376 TX/PRO/DX INJ SAME DRUG ADON: CPT | Performed by: EMERGENCY MEDICINE

## 2022-05-20 PROCEDURE — 250N000013 HC RX MED GY IP 250 OP 250 PS 637: Performed by: PHYSICIAN ASSISTANT

## 2022-05-20 PROCEDURE — 36415 COLL VENOUS BLD VENIPUNCTURE: CPT | Performed by: HOSPITALIST

## 2022-05-20 PROCEDURE — 250N000013 HC RX MED GY IP 250 OP 250 PS 637: Performed by: HOSPITALIST

## 2022-05-20 PROCEDURE — 36415 COLL VENOUS BLD VENIPUNCTURE: CPT | Performed by: EMERGENCY MEDICINE

## 2022-05-20 PROCEDURE — 96366 THER/PROPH/DIAG IV INF ADDON: CPT | Performed by: EMERGENCY MEDICINE

## 2022-05-20 PROCEDURE — 99223 1ST HOSP IP/OBS HIGH 75: CPT | Mod: AI | Performed by: STUDENT IN AN ORGANIZED HEALTH CARE EDUCATION/TRAINING PROGRAM

## 2022-05-20 PROCEDURE — 81001 URINALYSIS AUTO W/SCOPE: CPT | Performed by: EMERGENCY MEDICINE

## 2022-05-20 PROCEDURE — U0005 INFEC AGEN DETEC AMPLI PROBE: HCPCS | Performed by: EMERGENCY MEDICINE

## 2022-05-20 PROCEDURE — 71046 X-RAY EXAM CHEST 2 VIEWS: CPT

## 2022-05-20 PROCEDURE — 250N000011 HC RX IP 250 OP 636

## 2022-05-20 PROCEDURE — 80053 COMPREHEN METABOLIC PANEL: CPT | Performed by: EMERGENCY MEDICINE

## 2022-05-20 PROCEDURE — 84145 PROCALCITONIN (PCT): CPT | Performed by: STUDENT IN AN ORGANIZED HEALTH CARE EDUCATION/TRAINING PROGRAM

## 2022-05-20 PROCEDURE — 96361 HYDRATE IV INFUSION ADD-ON: CPT | Performed by: EMERGENCY MEDICINE

## 2022-05-20 PROCEDURE — 99222 1ST HOSP IP/OBS MODERATE 55: CPT | Performed by: INTERNAL MEDICINE

## 2022-05-20 PROCEDURE — 250N000011 HC RX IP 250 OP 636: Performed by: PHYSICIAN ASSISTANT

## 2022-05-20 PROCEDURE — 93308 TTE F-UP OR LMTD: CPT | Mod: 26 | Performed by: EMERGENCY MEDICINE

## 2022-05-20 PROCEDURE — 83690 ASSAY OF LIPASE: CPT | Performed by: EMERGENCY MEDICINE

## 2022-05-20 PROCEDURE — 71046 X-RAY EXAM CHEST 2 VIEWS: CPT | Mod: 26 | Performed by: RADIOLOGY

## 2022-05-20 PROCEDURE — C9803 HOPD COVID-19 SPEC COLLECT: HCPCS | Performed by: EMERGENCY MEDICINE

## 2022-05-20 PROCEDURE — 87506 IADNA-DNA/RNA PROBE TQ 6-11: CPT | Performed by: STUDENT IN AN ORGANIZED HEALTH CARE EDUCATION/TRAINING PROGRAM

## 2022-05-20 PROCEDURE — 250N000013 HC RX MED GY IP 250 OP 250 PS 637: Performed by: STUDENT IN AN ORGANIZED HEALTH CARE EDUCATION/TRAINING PROGRAM

## 2022-05-20 PROCEDURE — 99285 EMERGENCY DEPT VISIT HI MDM: CPT | Mod: CS,25 | Performed by: EMERGENCY MEDICINE

## 2022-05-20 PROCEDURE — 83605 ASSAY OF LACTIC ACID: CPT | Performed by: HOSPITALIST

## 2022-05-20 PROCEDURE — 74177 CT ABD & PELVIS W/CONTRAST: CPT

## 2022-05-20 PROCEDURE — 96365 THER/PROPH/DIAG IV INF INIT: CPT | Performed by: EMERGENCY MEDICINE

## 2022-05-20 PROCEDURE — 93306 TTE W/DOPPLER COMPLETE: CPT | Mod: 26 | Performed by: INTERNAL MEDICINE

## 2022-05-20 PROCEDURE — 87040 BLOOD CULTURE FOR BACTERIA: CPT | Performed by: EMERGENCY MEDICINE

## 2022-05-20 PROCEDURE — 93308 TTE F-UP OR LMTD: CPT | Performed by: EMERGENCY MEDICINE

## 2022-05-20 PROCEDURE — 120N000011 HC R&B TRANSPLANT UMMC

## 2022-05-20 PROCEDURE — 85025 COMPLETE CBC W/AUTO DIFF WBC: CPT | Performed by: EMERGENCY MEDICINE

## 2022-05-20 PROCEDURE — 250N000011 HC RX IP 250 OP 636: Performed by: STUDENT IN AN ORGANIZED HEALTH CARE EDUCATION/TRAINING PROGRAM

## 2022-05-20 PROCEDURE — 87493 C DIFF AMPLIFIED PROBE: CPT | Performed by: EMERGENCY MEDICINE

## 2022-05-20 PROCEDURE — 83605 ASSAY OF LACTIC ACID: CPT | Performed by: EMERGENCY MEDICINE

## 2022-05-20 PROCEDURE — 99285 EMERGENCY DEPT VISIT HI MDM: CPT | Mod: CS | Performed by: EMERGENCY MEDICINE

## 2022-05-20 PROCEDURE — 96375 TX/PRO/DX INJ NEW DRUG ADDON: CPT | Performed by: EMERGENCY MEDICINE

## 2022-05-20 PROCEDURE — 258N000003 HC RX IP 258 OP 636: Performed by: EMERGENCY MEDICINE

## 2022-05-20 PROCEDURE — 83880 ASSAY OF NATRIURETIC PEPTIDE: CPT | Performed by: STUDENT IN AN ORGANIZED HEALTH CARE EDUCATION/TRAINING PROGRAM

## 2022-05-20 PROCEDURE — 74177 CT ABD & PELVIS W/CONTRAST: CPT | Mod: 26 | Performed by: RADIOLOGY

## 2022-05-20 PROCEDURE — 93306 TTE W/DOPPLER COMPLETE: CPT

## 2022-05-20 RX ORDER — ONDANSETRON 4 MG/1
4 TABLET, ORALLY DISINTEGRATING ORAL EVERY 6 HOURS PRN
Status: DISCONTINUED | OUTPATIENT
Start: 2022-05-20 | End: 2022-05-25 | Stop reason: HOSPADM

## 2022-05-20 RX ORDER — OXYCODONE HYDROCHLORIDE 5 MG/1
5-10 TABLET ORAL EVERY 4 HOURS PRN
Status: DISCONTINUED | OUTPATIENT
Start: 2022-05-20 | End: 2022-05-25 | Stop reason: HOSPADM

## 2022-05-20 RX ORDER — IOPAMIDOL 755 MG/ML
107 INJECTION, SOLUTION INTRAVASCULAR ONCE
Status: COMPLETED | OUTPATIENT
Start: 2022-05-20 | End: 2022-05-20

## 2022-05-20 RX ORDER — ONDANSETRON 2 MG/ML
4 INJECTION INTRAMUSCULAR; INTRAVENOUS EVERY 6 HOURS PRN
Status: DISCONTINUED | OUTPATIENT
Start: 2022-05-20 | End: 2022-05-25 | Stop reason: HOSPADM

## 2022-05-20 RX ORDER — UREA 10 %
500 LOTION (ML) TOPICAL DAILY
Status: DISCONTINUED | OUTPATIENT
Start: 2022-05-20 | End: 2022-05-25 | Stop reason: HOSPADM

## 2022-05-20 RX ORDER — PIPERACILLIN SODIUM, TAZOBACTAM SODIUM 3; .375 G/15ML; G/15ML
3.38 INJECTION, POWDER, LYOPHILIZED, FOR SOLUTION INTRAVENOUS EVERY 6 HOURS
Status: DISCONTINUED | OUTPATIENT
Start: 2022-05-20 | End: 2022-05-20

## 2022-05-20 RX ORDER — AMOXICILLIN 250 MG
1 CAPSULE ORAL 2 TIMES DAILY PRN
Status: DISCONTINUED | OUTPATIENT
Start: 2022-05-20 | End: 2022-05-25 | Stop reason: HOSPADM

## 2022-05-20 RX ORDER — LIDOCAINE 40 MG/G
CREAM TOPICAL
Status: DISCONTINUED | OUTPATIENT
Start: 2022-05-20 | End: 2022-05-25 | Stop reason: HOSPADM

## 2022-05-20 RX ORDER — MONTELUKAST SODIUM 10 MG/1
10 TABLET ORAL AT BEDTIME
Status: DISCONTINUED | OUTPATIENT
Start: 2022-05-20 | End: 2022-05-25 | Stop reason: HOSPADM

## 2022-05-20 RX ORDER — POLYETHYLENE GLYCOL 3350 17 G/17G
17 POWDER, FOR SOLUTION ORAL 2 TIMES DAILY PRN
Status: DISCONTINUED | OUTPATIENT
Start: 2022-05-20 | End: 2022-05-25 | Stop reason: HOSPADM

## 2022-05-20 RX ORDER — PIPERACILLIN SODIUM, TAZOBACTAM SODIUM 4; .5 G/20ML; G/20ML
4.5 INJECTION, POWDER, LYOPHILIZED, FOR SOLUTION INTRAVENOUS EVERY 6 HOURS
Status: DISCONTINUED | OUTPATIENT
Start: 2022-05-20 | End: 2022-05-24

## 2022-05-20 RX ORDER — SODIUM CHLORIDE 9 MG/ML
INJECTION, SOLUTION INTRAVENOUS CONTINUOUS
Status: DISCONTINUED | OUTPATIENT
Start: 2022-05-20 | End: 2022-05-20

## 2022-05-20 RX ORDER — EZETIMIBE 10 MG/1
10 TABLET ORAL AT BEDTIME
Status: DISCONTINUED | OUTPATIENT
Start: 2022-05-20 | End: 2022-05-25 | Stop reason: HOSPADM

## 2022-05-20 RX ORDER — HYDROMORPHONE HYDROCHLORIDE 1 MG/ML
0.5 INJECTION, SOLUTION INTRAMUSCULAR; INTRAVENOUS; SUBCUTANEOUS ONCE
Status: COMPLETED | OUTPATIENT
Start: 2022-05-20 | End: 2022-05-20

## 2022-05-20 RX ORDER — LORAZEPAM 0.5 MG/1
0.5 TABLET ORAL 2 TIMES DAILY PRN
Status: DISCONTINUED | OUTPATIENT
Start: 2022-05-20 | End: 2022-05-25 | Stop reason: HOSPADM

## 2022-05-20 RX ORDER — PIPERACILLIN SODIUM, TAZOBACTAM SODIUM 4; .5 G/20ML; G/20ML
4.5 INJECTION, POWDER, LYOPHILIZED, FOR SOLUTION INTRAVENOUS ONCE
Status: DISCONTINUED | OUTPATIENT
Start: 2022-05-20 | End: 2022-05-20

## 2022-05-20 RX ORDER — ATORVASTATIN CALCIUM 40 MG/1
40 TABLET, FILM COATED ORAL AT BEDTIME
Status: DISCONTINUED | OUTPATIENT
Start: 2022-05-20 | End: 2022-05-25 | Stop reason: HOSPADM

## 2022-05-20 RX ORDER — OXYCODONE HYDROCHLORIDE 5 MG/1
5-10 TABLET ORAL EVERY 6 HOURS PRN
Status: DISCONTINUED | OUTPATIENT
Start: 2022-05-20 | End: 2022-05-20

## 2022-05-20 RX ORDER — HYDROMORPHONE HYDROCHLORIDE 1 MG/ML
0.5 INJECTION, SOLUTION INTRAMUSCULAR; INTRAVENOUS; SUBCUTANEOUS
Status: DISCONTINUED | OUTPATIENT
Start: 2022-05-20 | End: 2022-05-20

## 2022-05-20 RX ORDER — ACETAMINOPHEN 325 MG/1
650 TABLET ORAL EVERY 6 HOURS PRN
Status: DISCONTINUED | OUTPATIENT
Start: 2022-05-20 | End: 2022-05-22

## 2022-05-20 RX ORDER — HYDROMORPHONE HCL IN WATER/PF 6 MG/30 ML
0.2 PATIENT CONTROLLED ANALGESIA SYRINGE INTRAVENOUS EVERY 6 HOURS PRN
Status: DISCONTINUED | OUTPATIENT
Start: 2022-05-20 | End: 2022-05-25 | Stop reason: HOSPADM

## 2022-05-20 RX ORDER — ONDANSETRON 2 MG/ML
4 INJECTION INTRAMUSCULAR; INTRAVENOUS EVERY 30 MIN PRN
Status: DISCONTINUED | OUTPATIENT
Start: 2022-05-20 | End: 2022-05-20

## 2022-05-20 RX ORDER — CHOLECALCIFEROL (VITAMIN D3) 125 MCG
10000 CAPSULE ORAL DAILY
Status: DISCONTINUED | OUTPATIENT
Start: 2022-05-20 | End: 2022-05-25 | Stop reason: HOSPADM

## 2022-05-20 RX ORDER — ZOLPIDEM TARTRATE 5 MG/1
5 TABLET ORAL
Status: DISCONTINUED | OUTPATIENT
Start: 2022-05-20 | End: 2022-05-25 | Stop reason: HOSPADM

## 2022-05-20 RX ORDER — CALCIUM CARBONATE 500(1250)
1000 TABLET ORAL DAILY
Status: DISCONTINUED | OUTPATIENT
Start: 2022-05-20 | End: 2022-05-25 | Stop reason: HOSPADM

## 2022-05-20 RX ADMIN — OXYCODONE HYDROCHLORIDE 10 MG: 5 TABLET ORAL at 22:03

## 2022-05-20 RX ADMIN — ACETAMINOPHEN 650 MG: 325 TABLET ORAL at 18:31

## 2022-05-20 RX ADMIN — OXYCODONE HYDROCHLORIDE 10 MG: 5 TABLET ORAL at 15:40

## 2022-05-20 RX ADMIN — ONDANSETRON 4 MG: 2 INJECTION INTRAMUSCULAR; INTRAVENOUS at 05:01

## 2022-05-20 RX ADMIN — OXYCODONE HYDROCHLORIDE 10 MG: 5 TABLET ORAL at 09:13

## 2022-05-20 RX ADMIN — ATORVASTATIN CALCIUM 40 MG: 40 TABLET, FILM COATED ORAL at 22:04

## 2022-05-20 RX ADMIN — ONDANSETRON 4 MG: 2 INJECTION INTRAMUSCULAR; INTRAVENOUS at 13:49

## 2022-05-20 RX ADMIN — IOPAMIDOL 107 ML: 755 INJECTION, SOLUTION INTRAVENOUS at 09:49

## 2022-05-20 RX ADMIN — SODIUM CHLORIDE 1000 ML: 9 INJECTION, SOLUTION INTRAVENOUS at 05:01

## 2022-05-20 RX ADMIN — MONTELUKAST 10 MG: 10 TABLET, FILM COATED ORAL at 22:04

## 2022-05-20 RX ADMIN — PIPERACILLIN AND TAZOBACTAM 4.5 G: 4; .5 INJECTION, POWDER, FOR SOLUTION INTRAVENOUS at 18:32

## 2022-05-20 RX ADMIN — HYDROMORPHONE HYDROCHLORIDE 0.2 MG: 0.2 INJECTION, SOLUTION INTRAMUSCULAR; INTRAVENOUS; SUBCUTANEOUS at 19:54

## 2022-05-20 RX ADMIN — EZETIMIBE 10 MG: 10 TABLET ORAL at 22:04

## 2022-05-20 RX ADMIN — ZOLPIDEM TARTRATE 5 MG: 5 TABLET, FILM COATED ORAL at 22:04

## 2022-05-20 RX ADMIN — CALCIUM 1000 MG: 500 TABLET ORAL at 09:13

## 2022-05-20 RX ADMIN — HYDROMORPHONE HYDROCHLORIDE 0.5 MG: 1 INJECTION, SOLUTION INTRAMUSCULAR; INTRAVENOUS; SUBCUTANEOUS at 05:01

## 2022-05-20 RX ADMIN — PIPERACILLIN AND TAZOBACTAM 4.5 G: 4; .5 INJECTION, POWDER, FOR SOLUTION INTRAVENOUS at 12:36

## 2022-05-20 RX ADMIN — HYDROMORPHONE HYDROCHLORIDE 0.5 MG: 1 INJECTION, SOLUTION INTRAMUSCULAR; INTRAVENOUS; SUBCUTANEOUS at 13:44

## 2022-05-20 ASSESSMENT — ACTIVITIES OF DAILY LIVING (ADL)
ADLS_ACUITY_SCORE: 35
DRESSING/BATHING_DIFFICULTY: NO
CHANGE_IN_FUNCTIONAL_STATUS_SINCE_ONSET_OF_CURRENT_ILLNESS/INJURY: NO
CONCENTRATING,_REMEMBERING_OR_MAKING_DECISIONS_DIFFICULTY: NO
FALL_HISTORY_WITHIN_LAST_SIX_MONTHS: NO
ADLS_ACUITY_SCORE: 35
ADLS_ACUITY_SCORE: 35
WEAR_GLASSES_OR_BLIND: YES
WALKING_OR_CLIMBING_STAIRS_DIFFICULTY: NO
TOILETING_ISSUES: NO
ADLS_ACUITY_SCORE: 20
DOING_ERRANDS_INDEPENDENTLY_DIFFICULTY: NO
VISION_MANAGEMENT: GLASSES
DIFFICULTY_COMMUNICATING: NO
ADLS_ACUITY_SCORE: 35
HEARING_DIFFICULTY_OR_DEAF: NO
DIFFICULTY_EATING/SWALLOWING: NO
ADLS_ACUITY_SCORE: 20
ADLS_ACUITY_SCORE: 35

## 2022-05-20 NOTE — ED TRIAGE NOTES
Patient arrives via triage with fever of 104 at home, abdominal pain as well. Hx chronic pancreatitis. Patient too advil prior to arrival and now temp is normal. She recently had a procedure to have a stent placed in pancreatic duct.       Triage Assessment     Row Name 05/20/22 0444       Triage Assessment (Adult)    Airway WDL WDL       Respiratory WDL    Respiratory WDL WDL       Skin Circulation/Temperature WDL    Skin Circulation/Temperature WDL WDL       Cardiac WDL    Cardiac WDL WDL       Peripheral/Neurovascular WDL    Peripheral Neurovascular WDL WDL       Cognitive/Neuro/Behavioral WDL    Cognitive/Neuro/Behavioral WDL WDL

## 2022-05-20 NOTE — PROGRESS NOTES
"Brief Medicine Note:    Laura Gonzalez is a 71 year old female who was admitted on 5/20/2022. 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 Recently admitted 5/13/22 through 5/15/22 after she presented with abdominal pain after EUS guided pancreaticograstrostomy and through and through stent (5/12/22). Noted to have acute pancreatitis and treated with fluid resuscitation. Presented with fever up to 104-105 at home as well as increasing abdominal pain. See H&P from this date for full details.    She reports bloating of her abdomen since ERCP but has gotten somewhat better. Still reports feeling \"gassy.\" Having loose stools but related this to recent bowel regimen while inpatient. Noted abdominal pain that started in her right flank and has since migrated to mid-abdomen and is wrapping into LUQ and around left flank.     Sepsis suspected secondary to intra-abdominal infection  Hx of duodenal polyp s/p pylorus sparing Whipple c/b choledocojejunosotmy stricture s/p stenting  Recent EUS guided pancreaticgastrostomy with through and through stenting  Stent migration  Presented with fevers in setting of recent procedures with GI as well as persistent leukocytosis and elevated procal. Reporting RLQ pain now moving to LLQ/LUQ and around left flank. Notes some looser stools in setting of recent bowel regimen. Cdiff/enteric panel negative. CT abd/pelvis obtained with organized peripherally enhancing fluid collection in LUQ posterolateral to stomach and superior to spleen in addition to complex fluid and gas filled collection adjacent to lesser curvature of stomach tracking along GE junction and lower esophagus. Also notes single pancreaticogastrostomy stent is in the colon. LFTs, lipase wnl. Overall suspect presentation related to " intra-abdominal pathology as above. Low suspicion for other etiology such as UTI or pneumonia given lack of symptoms.  - Panc/Bili team consulted - recommended NPO and abx for now with plan for OR TBD.  - Continue zosyn 4.5 q6h   - Pending UC, BC  - NPO  - Holding on further IVF pending OR plan. Will start low mIVF if requiring ongoing NPO  - Pain regimen:   :: Oxycodone 5-10 mg q6h PRN   :: Giving 1x dose IV dilaudid for severe pain now  - Continue PTA creon with meals  - Trend WBC, LFTs  - Consider repeating procalcitonin in 48 hours if WBC remains elevated    Bilateral lower extremity edema  Bilateral pleural effusions  Reports ongoing lower extremity edema following aggressive fluid resuscitation during prior admission due to pancreatitis. Swelling improving but remains with some edema. Imaging with small left, trace right pleural effusions. BNP slightly elevated 1861. Not requiring supplement O2 and Echo without evidence of heart failure/diastolic dysfunction.   - Judicious use of fluids    CAD  Asymptomatic atherosclerosis  On repatha every 2 weeks on Wednesdays. Next due on 6/1/2022.  - Continue PTA atorvastatin, ezetimibe    Acute normocytic anemia  No e/o bleeding or hemolysis. Denies melena or hematochezia.  - Trending Hgb    Asthma - PTA montelukast  Anxiety - PTA lorazepam PRN  Insomnia - PTA zolpidem     CAMERON Hnuter-C  Internal Medicine JONNY Hospitalist  [facility]  Pager: [phone]

## 2022-05-20 NOTE — CONSULTS
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     # Infected pancreatic fluid collection 5/20  # pancreaticogastrostomy stent migration  5/20  # Recurrent acute pancreatitis (0540-5304)  # S/P pancreaticogastrostomy stent placement 5/12   # Hx of Post pancreaticogastrostomy acute pancreatitis 5/13   - The patient underwent pancreaticogastrostomy stent placement 5/12 by Dr. Noguera and Иван for management of recurrent acute pancreatitis and PD dilatation. A stent was placed across the stomach through the pancreas and into the jejunum. Post-procedurally on 5/13, the patient was admitted for pancreatitis (lipase 4949). That was managed conservatively. Since that time, the patient reported ongoing intermittent epigastric pain. On 5/20/22, the patient reported worsening abdominal pain and fevers of 103 at home. She presented to the ED and was found to have an enhancing fluid collection in the left upper quadrant posterolateral to the stomach and superior to the spleen measuring 8.5 x 6.3 x 4.8 cm and a complex fluid and gas filled collection adjacent to the lesser curvature of the stomach and " tracking along the gastroesophageal junction and lower esophagus measuring 6.1 x 2.5 x 5.8 cm. The recently placed single pigtail pancreaticogastrostomy stent was in the colon.   - On zosyn, pending blood cultures.      Recommendations  - No plans for endoscopic intervention this admission.   - Continue management with broad spectrum antibiotics and follow blood cultures and clinical response to Abx with WBC, CRP.   - We will evaluate the need for percutaneous drainage of the infected fluid collection   - We will discuss with the patient endoscopic and non-endoscopic management options for chronic pancreatitis   - Continue pancreatic enzymes at the same outpatient dosage   - Antiemetics/pain control per primary team.    Gastroenterology follow up recommendations: pending clinical course       Patient care plan discussed with GASTON Vivar, GI staff physician. Thank you for involving us in this patient's care. Please do not hesitate to contact the GI service with any questions or concerns.     Horace Gusman M.D  GI fellow  [phone]  Department of Gastroenterology   -------------------------------------------------------------------------------------------------------------------   History is obtained from the patient and the medical record.          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   The patient presented to the ED after waking up with fevers/sweating. Thermometer read at  home showed 104/105 F after repeating it for few times and trying different thermometers. The patient did not feel well yesterday and had decreased appetite and increased epigastric pain.   In the ED, vitals were stable. WBC 14.2, lipase 201, ALT 17, AST 18, TB 05, , LA 0.7, COVID-19 negative, negative C diff and enteric pathogen panel. CT abdomen pelvis showed Organized, peripherally enhancing fluid collection in the left upper quadrant posterolateral to the stomach and superior to the spleen measuring 8.5 x 6.3 x 4.8 cm and a complex fluid and gas filled collection adjacent to the lesser  curvature of the stomach and tracking along the gastroesophageal junction and lower esophagus measuring 6.1 x 2.5 x 5.8 cm. The recently placed single pigtail pancreaticogastrostomy stent was in the colon. The patient was started on zosyn.           Past Medical History:   Reviewed and edited as appropriate  Past Medical History:   Diagnosis Date     Asthma     pt.states no longer has symptoms     CAD (coronary artery disease)      HLD (hyperlipidemia)             Past Surgical History:   Reviewed and edited as appropriate   Past Surgical History:   Procedure Laterality Date     APPENDECTOMY       ARTHROPLASTY HIP Left 6/15/2016    Procedure: ARTHROPLASTY HIP;  Surgeon: Jeffy Wolff MD;  Location: [facility] OR     COLONOSCOPY  10/3/2013    Procedure: COMBINED COLONOSCOPY, SINGLE BIOPSY/POLYPECTOMY BY BIOPSY;;  Surgeon: Kenney Walls MD;  Location:  GI     ENDOSCOPIC ULTRASOUND UPPER GASTROINTESTINAL TRACT (GI) N/A 5/12/2022    Procedure: ENDOSCOPIC ULTRASOUND WITH PANCREATICOGASTROSTOMY CREATION, TRACT DILATION, STENT PLACEMENT;  Surgeon: Romaine Oates MD;  Location: [facility] OR     ESOPHAGOSCOPY, GASTROSCOPY, DUODENOSCOPY (EGD), COMBINED N/A 5/12/2022    Procedure: ESOPHAGOGASTRODUODENOSCOPY WITH ENDOSCOPIC CLIP PLACEMENT;  Surgeon: Arnaldo Noguera MD;  Location: [facility] OR     FOOT SURGERY        TOOTH  EXTRACTION W/FORCEP       HYSTERECTOMY       INCISION AND DRAINAGE BREAST Left 2/18/2022    Procedure: evacuate hematoma left  BREAST;  Surgeon: Dayron Garcia MD;  Location: [facility] OR            Previous Endoscopy:     Results for orders placed or performed during the hospital encounter of 10/03/13   COLONOSCOPY   Result Value Ref Range    COLONOSCOPY       [facility] Endoscopy Department  _______________________________________________________________________________  Patient Name: Laura Gonzalez          Procedure Date: 10/3/2013 11:45:20 AM       MRN: [id number]                       Account Number: [id number]                  YOB: 1950             Admit Type: Outpatient                      Age: 62                               Room: 2                                     Note Status: Finalized                Attending MD: Kenney Conroy MD                _______________________________________________________________________________     Procedure:                Colonoscopy  Indications:              FH of Colon Cancer - 1st degree relatives,father                             and brother.  Providers:                Kenney Walls MD, Zehra Forbes, RN  Referring MD:             Rob Jansen MD  Medicines:                Fentanyl 150 micrograms IV, Midazolam 2 mg IV,                             Glucagon 0.5 mg IV  Complications:            No immediate complications  _______________________________________________________________________________  Procedure:                Pre-Anesthesia Assessment:                            - Prior to the procedure, a History and Physical                             was performed, and patient medications and                             allergies were reviewed. The patient is competent.                             The risks and benefits of the procedure and the                             sedation options and risks were  discussed with the                             patient. All questions were answered and informed                             consent was obtained. Patient identification and                             proposed procedure were verified by the physician                             in the pre-procedure area. Mental Status                             Examination: alert and oriented. Airway                             Examination: normal oropharyngeal airway and neck                             mobility. Respiratory Examination: clear to                             auscultation. CV Examination: normal. Prophylactic                             Antibiotics: The patient does not require                             prophylactic antibiotics. Prior Anticoagulants: The                             patient has taken no previous anticoagulant or                             antiplatelet agents. ASA Grade Assessment: II - A                             patient with mild systemic disease. After reviewing                             the risks and benefits, the patient was deemed in                             satisfactory condition to undergo the procedure.                             The anesthesia plan was to use moderate sedation /                             analgesia (conscious sedation). Immediately prior                             to administration of medications, the patient was                             re-assessed for adequacy to receive sedatives. The                             heart rate, respiratory rate, oxygen saturations,                             blood pressure, adequacy of pulmonary ventilation,                             and response to care were monitored throughout the                             procedure. The physical status of the patient was                             re-assessed after the procedure.                            After obtaining informed consent, the colonoscope                              was passed under direct vision. Throughout the                             procedure, the patient's blood pressure, pulse, and                             oxygen saturations were monitored continuously. The                             Colonoscope was introduced through the anus and                             advanced to the cecum, identified by the                             appendiceal orifice, IC valve and                             transillumination. The colonoscopy was performed                             without difficulty. The patient tolerated the                             procedure well. The quality of the bowel                             preparation was good.                                                                                   Findings:       The perianal and digital rectal examinations were normal. Pertinent        negatives include normal sphincter tone. The colon (entire examined        portion) appeared normal. Biopsies were taken with a cold forceps from        the ascending colon and descending colon for evaluation of microscopic        colitis.                                                                                   Impression:               - The entire examined colon is normal. This was                             biopsied.  Recommendation:           - High fiber diet indefinitely.                            - Imodium 1 tablet by mouth daily.or prn.                                                                                     J Titi BAZZI  ________________  Kenney MARK Walls MD  Signed Date: 10/3/2013 12:45:54 PM  Number of Addenda: 0  I was physically present for the entire viewing portion of the exam.  Note Initiated On: 10/3/2013 11:45:20 AM  Scope Withdrawal Time: 0 hours 0 minutes 0 seconds   Scope Withdrawal Time: 0 hours 0 minutes 0 seconds   Total Procedure Duration: 0 hours 21 minutes 29 seconds   Total Procedure Duration: 0 hours 21 minutes 29 seconds              Social History:   Reviewed and edited as appropriate  Social History     Socioeconomic History     Marital status:      Spouse name: Not on file     Number of children: Not on file     Years of education: Not on file     Highest education level: Not on file   Occupational History     Not on file   Tobacco Use     Smoking status: Former Smoker     Quit date: 10/3/1988     Years since quittin.6     Smokeless tobacco: Never Used   Substance and Sexual Activity     Alcohol use: No     Drug use: No     Sexual activity: Not on file   Other Topics Concern     Parent/sibling w/ CABG, MI or angioplasty before 65F 55M? Not Asked   Social History Narrative     Not on file     Social Determinants of Health     Financial Resource Strain: Not on file   Food Insecurity: Not on file   Transportation Needs: Not on file   Physical Activity: Not on file   Stress: Not on file   Social Connections: Not on file   Intimate Partner Violence: Not on file   Housing Stability: Not on file            Family History:   Reviewed and edited as appropriate  No family history on file.        Allergies:   Reviewed and edited as appropriate     Allergies   Allergen Reactions     Tramadol Other (See Comments)     Has a hyperactive reaction and can't sleep             Medications:     Current Facility-Administered Medications   Medication     amylase-lipase-protease (CREON 24) 52577-65058 units per EC capsule 2 capsule     atorvastatin (LIPITOR) tablet 40 mg     calcium carbonate 500 mg (elemental) (OSCAL) tablet 1,000 mg     ezetimibe (ZETIA) tablet 10 mg     HYDROmorphone (DILAUDID) injection 0.2 mg     lidocaine (LMX4) cream     lidocaine 1 % 0.1-1 mL     LORazepam (ATIVAN) tablet 0.5 mg     magnesium gluconate (MAGONATE) tablet 500 mg     melatonin tablet 1 mg     montelukast (SINGULAIR) tablet 10 mg     ondansetron (ZOFRAN ODT) ODT tab 4 mg    Or     ondansetron (ZOFRAN) injection 4 mg     oxyCODONE (ROXICODONE) tablet 5-10  mg     piperacillin-tazobactam (ZOSYN) 4.5 g vial to attach to  mL bag     polyethylene glycol (MIRALAX) powder 17 g     senna-docusate (SENOKOT-S/PERICOLACE) 8.6-50 MG per tablet 1 tablet     sodium chloride (PF) 0.9% PF flush 3 mL     sodium chloride (PF) 0.9% PF flush 3 mL     vitamin A capsule 10,000 Units     zolpidem (AMBIEN) tablet 5 mg     Current Outpatient Medications   Medication Sig     amylase-lipase-protease (CREON) 65774-68345 units CPEP per EC capsule Take 2-3 with meals / 1-2 with snacks, up to 15 per day. (Patient taking differently: 2 capsules Take 2-3 with meals / 1-2 with snacks, up to 15 per day.)     Atorvastatin Calcium (LIPITOR PO) Take 40 mg by mouth At Bedtime      Calcium Carb-Cholecalciferol (CALCIUM 1000 + D) 1000-800 MG-UNIT TABS      calcium-magnesium (CALMAG) 500-250 MG TABS Take 2 tablets by mouth daily     EVOLOCUMAB SC Inject 140 mg Subcutaneous every 14 days     EZETIMIBE PO Take 10 mg by mouth At Bedtime     LORazepam (ATIVAN) 0.5 MG tablet Take 1-2 tablets by mouth twice daily as needed for anxiety     Montelukast Sodium (SINGULAIR PO) Take 10 mg by mouth At Bedtime      oxyCODONE (ROXICODONE) 5 MG tablet Take 1-2 tablets (5-10 mg) by mouth every 6 hours as needed for moderate to severe pain (take 5 mg for 5-7/10 pain, 10 mg for 8-10/10 pain)     polyethylene glycol (MIRALAX) 17 GM/Dose powder Take 17 g by mouth 2 times daily as needed     senna-docusate (SENOKOT-S/PERICOLACE) 8.6-50 MG tablet Take 1 tablet by mouth 2 times daily as needed for constipation     valACYclovir (VALTREX) 1000 mg tablet Take 1,000 mg by mouth daily as needed (cold sore)      VITAMIN A PO Take 1 capsule (unknown dose, prescription strength) by mouth daily     zolpidem ER (AMBIEN CR) 12.5 MG CR tablet Take 12.5 mg by mouth At Bedtime             Review of Systems:     A complete review of systems was performed and is negative except as noted in the HPI           Physical Exam:   /59 (BP  Location: Right arm, Patient Position: Semi-Monaco's, Cuff Size: Adult Regular)   Pulse 80   Temp (!) 100.6  F (38.1  C) (Oral)   Resp 14   SpO2 93%   Wt:   Wt Readings from Last 2 Encounters:   05/14/22 79.6 kg (175 lb 7.8 oz)   05/12/22 69.9 kg (154 lb 1.6 oz)      Constitutional: cooperative, pleasant, not dyspneic/diaphoretic, no acute distress  Eyes: Sclera anicteric/injected  Ears/nose/mouth/throat: Normal oropharynx without ulcers or exudate, mucus membranes moist, hearing intact  Neck: supple, thyroid normal size  CV: No edema  Respiratory: Unlabored breathing  Lymph: No axillary, submandibular, supraclavicular or inguinal lymphadenopathy  Abd: Epigastric tenderness, nondistended, +bs, no hepatosplenomegaly, nontender, no peritoneal signs  Skin: warm, perfused, no jaundice  Neuro: AAO x 3, No asterixis  Psych: Normal affect  MSK: Normal gait         Data:   Labs and imaging below were independently reviewed and interpreted    BMP  Recent Labs   Lab 05/20/22  0456 05/15/22  0607 05/14/22  0814    139 139   POTASSIUM 3.5 3.8 3.8   CHLORIDE 103 105 107   GLEN 8.6 8.2* 8.1*   CO2 27 30 26   BUN 11 14 12   CR 0.72 0.88 0.80   * 131* 82     CBC  Recent Labs   Lab 05/20/22  0456 05/15/22  0607 05/14/22  0814   WBC 14.2* 13.4* 13.8*   RBC 3.87 3.62* 3.96   HGB 10.9* 10.3* 11.4*   HCT 33.3* 33.4* 36.2   MCV 86 92 91   MCH 28.2 28.5 28.8   MCHC 32.7 30.8* 31.5   RDW 18.4* 18.0* 18.0*    211 235     INRNo lab results found in last 7 days.  LFTs  Recent Labs   Lab 05/20/22  0456 05/15/22  0607 05/14/22  0814   ALKPHOS 140 81 86   AST 18 21 21   ALT 17 18 21   BILITOTAL 0.5 0.4 0.5   PROTTOTAL 6.3* 5.1* 5.3*   ALBUMIN 2.4* 2.3* 2.5*      PANC  Recent Labs   Lab 05/20/22  0456   LIPASE 201     GI procedures   EUS pancreaticogastrostomy   Impression:            - Uncomplicated pancreaticogastrostomy was                          successfully performed and secured with a 3 Fr x 13 cm                           single pigtail Advanix stent with pigtail in the                          jejunum (across the pancreaticojejunostomy' with the                          pigtail in the stomach ''through-and-through stent'').                          - One hemostatic clip was placed to anchor stent to                          gastric wall.                          - Post-pyloric preserving Whipple anatomy with healthy                          appearance and normal residual pancreatic parenchyma                          except for scattered hyperechoic strands.   Recommendation:        - Observe patient in PACU for possible discharge same                          day.                          - Observe patient's clinical course, adminster 1 L LR                          in PACU and repeat lipase/amylase in 2 hours.                          Discharge home later today if no/minimal tolerable                          pain and labs with no evidence of pancreatitis.                          - Plan for ERCP with Dr. Noguera in two weeks for                          upsizing stent and further pancreatic endotherapy.                          - The findings and recommendations were discussed with                          the patient and their family.     Imaging:  CT abdomen pelvis 5/20/22   FINDINGS:     New small left and trace right pleural effusions. Linear  atelectasis/scarring in the lower lungs bilaterally.     Trace pneumobilia. Stable well-circumscribed low-density lesion in  hepatic segment 6. The gallbladder is surgically absent.     New organized fluid collection with peripheral enhancement in the left  upper quadrant superior to the spleen and posterior to the stomach  measuring 8.5 x 6.3 x 4.8 cm (series 4 image 64 and series 3 image  49).     New complex gas and fluid-filled collection with peripheral  enhancement adjacent to the lesser curvature of the stomach, gastric  cardia, and gastroesophageal junction measuring 6.1  x 2.5 are a 5.8 cm  (series 4 image 124, series 3 image 24).     Postsurgical changes of Whipple procedure. Increased thickening/fat  stranding in the alyssa hepatis region. Multiple dilated loops of small  bowel without a focal transition point identified. No pneumatosis or  portal venous gas. A single pigtail stent is present within the  transverse colon. The appendix is surgically absent.     Multiple scattered small pancreatic and mesenteric lymph nodes, likely  reactive.     Circumscribed low density lesion in the left kidney, likely a benign  cyst. Stones are hydronephrosis. Unremarkable bladder. Hysterectomy.  No pelvic masses.     Left total hip arthroplasty. No acute or suspicious osseous  abnormality. Levocurvature of the mid lumbar spine with degenerative  disc changes in the lumbar spine and lower thoracic spine. Bilateral  breast implants. Aortoiliac atherosclerotic calcifications.                                                                      IMPRESSION:    1. Organized, peripherally enhancing fluid collection in the left  upper quadrant posterolateral to the stomach and superior to the  spleen measuring 8.5 x 6.3 x 4.8 cm.  2. Complex fluid and gas filled collection adjacent to the lesser  curvature of the stomach and tracking along the gastroesophageal  junction and lower esophagus measuring 6.1 x 2.5 x 5.8 cm.  3. The single pigtail pancreaticogastrostomy stent is in the colon.  4. New small left and trace right pleural effusions.

## 2022-05-20 NOTE — H&P
[facility]    History and Physical - Hospitalist Service       Date of Admission:  5/20/2022    Assessment & Plan Laura Gonzalez is a 71 year old female admitted on 5/20/2022. She has a history of duodenal polyp s/p pylorus sparing Whipple (May 2019) c/b choledocojejunostomy stricture s/p axios stent placement with coaxial double pigtail stent 6/26/2020 removed 9/2020.    Fevers with unknown etiology - fever at home, low grade elevated temperatue in ED  Neutrophilic Leukocytosis - stable ongoing since ~5/13  Voluminous Watery Diarrhea - reported however no lab evidence to support (normal K, normal P, no metabolic acidosis).  - Potential sources of fever include - abdominal given recent endoscopic manipulation, enterocolitis given diarrhea, trace ascites noted on last CT abdomen, also recent stenting, less likely urinary or pulmonary given UA, CXR  - check C Diff and Enteric Panel, follow BCx, UCx, procalcitonin  - empiric zosyn for now, continued from ED, okay to stop if no infectious etiology unveiled  - check CT abdomen pelvis if c diff and enteric panel negative, evaluate for stent malfunction  - Panc Bili Consulted given recent instrumentation]    Acute Normocytic Anemia - no evidence of bleeding, doubt anemia due to inflammation given Hb normal approximately 1 week ago, does not have evidence of hemolysis as bilirubin is normal, question blood loss but patient denies.  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    Weight as of 5/14/22: 79.6 kg (175 lb 7.8 oz).      Disposition Plan   Expected Discharge: <2 days   Anticipated discharge location:  Awaiting care coordination huddle  Delays: none.      The patient's care was discussed with the Patient.    Jatinder Cortes DO  Hospitalist Service  [facility]  Securely message with the Vocera Web Console (learn more here)  Text page via [employer] Paging/Directory     ______________________________________________________________________    Chief Complaint   Fevers and Diarrhea    History is obtained from the patient    History of Present Illness   Laura Gonzalez is a 71 year old female admitted on 5/20/2022. She has a history of duodenal polyp s/p pylorus sparing Whipple (May 2019) c/b choledocojejunostomy stricture s/p axios stent placement with coaxial double pigtail stent 6/26/2020 removed 9/2020.    She presented to the ED with weakness and intolerance to PO intake at home associated with decreased energy and mild unchanged abdominal pain. Patient reported that early on the morning of 5/19 she awoke with flu like symptoms and checked her temperate at it was ~103 F so she took tylenol and presented to the ED.    In the ED she was afebrile and hemodynamically stable. Labs showing neutrophilic leukocytosis unchanged from prior admission ~1 week ago. Other labs looked okay. UA bland. CXR pending. Infectious studies sent to lab. She was started on IV zosyn for empiric treatment of infection.    At the " bedside she states RLQ abdominal pain is ongoing to improved and is currently she states mild radiating to the left lower quadrant and low back. She states fevers and overall malaise is improved after starting antibiotics and getting some IV fluids. Otherwise no major changes.    Review of Systems    The 10 point Review of Systems is negative other than noted in the HPI or here.     Past Medical History    I have reviewed this patient's medical history and updated it with pertinent information if needed.   Past Medical History:   Diagnosis Date     Asthma     pt.states no longer has symptoms     CAD (coronary artery disease)      HLD (hyperlipidemia)        Past Surgical History   I have reviewed this patient's surgical history and updated it with pertinent information if needed.  Past Surgical History:   Procedure Laterality Date     APPENDECTOMY       ARTHROPLASTY HIP Left 6/15/2016    Procedure: ARTHROPLASTY HIP;  Surgeon: Jeffy Wolff MD;  Location: [facility] OR     COLONOSCOPY  10/3/2013    Procedure: COMBINED COLONOSCOPY, SINGLE BIOPSY/POLYPECTOMY BY BIOPSY;;  Surgeon: Kenney Walls MD;  Location:  GI     ENDOSCOPIC ULTRASOUND UPPER GASTROINTESTINAL TRACT (GI) N/A 5/12/2022    Procedure: ENDOSCOPIC ULTRASOUND WITH PANCREATICOGASTROSTOMY CREATION, TRACT DILATION, STENT PLACEMENT;  Surgeon: Romaine Oates MD;  Location: [facility] OR     ESOPHAGOSCOPY, GASTROSCOPY, DUODENOSCOPY (EGD), COMBINED N/A 5/12/2022    Procedure: ESOPHAGOGASTRODUODENOSCOPY WITH ENDOSCOPIC CLIP PLACEMENT;  Surgeon: Arnaldo Noguera MD;  Location: [facility] OR     FOOT SURGERY       HC TOOTH EXTRACTION W/FORCEP       HYSTERECTOMY       INCISION AND DRAINAGE BREAST Left 2/18/2022    Procedure: evacuate hematoma left  BREAST;  Surgeon: Dayron Garcia MD;  Location:  OR       Social History   I have reviewed this patient's social history and updated it with pertinent information if needed.  Social History     Tobacco Use      Smoking status: Former Smoker     Quit date: 10/3/1988     Years since quittin.6     Smokeless tobacco: Never Used   Substance Use Topics     Alcohol use: No     Drug use: No       Family History   No significant family history, including no history of: early malignancy, cardiac death.    Prior to Admission Medications   Prior to Admission Medications   Prescriptions Last Dose Informant Patient Reported? Taking?   Atorvastatin Calcium (LIPITOR PO)   Yes No   Sig: Take 40 mg by mouth At Bedtime    Calcium Carb-Cholecalciferol (CALCIUM 1000 + D) 1000-800 MG-UNIT TABS   Yes No   EVOLOCUMAB SC   Yes No   Sig: Inject 140 mg Subcutaneous every 14 days   EZETIMIBE PO   Yes No   Sig: Take 10 mg by mouth At Bedtime   LORazepam (ATIVAN) 0.5 MG tablet   Yes No   Sig: Take 1-2 tablets by mouth twice daily as needed for anxiety   Montelukast Sodium (SINGULAIR PO)   Yes No   Sig: Take 10 mg by mouth At Bedtime    UNABLE TO FIND   Yes No   Sig: MEDICATION NAME: Estrogen pellet in arm   Patient not taking: No sig reported   VITAMIN A PO   Yes No   Sig: Take 1 capsule (unknown dose, prescription strength) by mouth daily   amylase-lipase-protease (CREON) 42805-65356 units CPEP per EC capsule   No No   Sig: Take 2-3 with meals / 1-2 with snacks, up to 15 per day.   Patient taking differently: 2 capsules Take 2-3 with meals / 1-2 with snacks, up to 15 per day.   calcium-magnesium (CALMAG) 500-250 MG TABS   Yes No   Sig: Take 2 tablets by mouth daily   hydrochlorothiazide (HYDRODIURIL) 25 MG tablet   Yes No   Sig: Take 25 mg by mouth daily   magnesium 100 MG CAPS   Yes No   omega 3 complex with vitamins E and K (SMOOTH SPEAK) liquid   Yes No   oxyCODONE (ROXICODONE) 5 MG tablet   No No   Sig: Take 1-2 tablets (5-10 mg) by mouth every 6 hours as needed for moderate to severe pain (take 5 mg for 5-7/10 pain, 10 mg for 8-10/10 pain)   polyethylene glycol (MIRALAX) 17 GM/Dose powder   No No   Sig: Take 17 g by mouth 2 times daily as  needed   senna-docusate (SENOKOT-S/PERICOLACE) 8.6-50 MG tablet   No No   Sig: Take 1 tablet by mouth 2 times daily as needed for constipation   valACYclovir (VALTREX) 1000 mg tablet   Yes No   Sig: Take 1,000 mg by mouth daily as needed (cold sore)    zolpidem ER (AMBIEN CR) 12.5 MG CR tablet   Yes No   Sig: Take 12.5 mg by mouth At Bedtime      Facility-Administered Medications: None     Allergies   Allergies   Allergen Reactions     Tramadol Other (See Comments)     Has a hyperactive reaction and can't sleep        Physical Exam   Vital Signs: Temp: 98.5  F (36.9  C) Temp src: Oral BP: 129/60 Pulse: 90   Resp: 20 SpO2: 93 % O2 Device: None (Room air)    Weight: 0 lbs 0 oz    General: non-distressed adult  HEENT: Normocephalic, atraumatic, pupils equal round reactive, membranes moist  CV: normal capillary refill, heart regular rate and rhythm, systolic murmur LUSB  Respiratory: Non-labored breathing, bronchovesicular lung sounds  Abdominal: soft, mildly tender to light palpation worse in the epigastrium, no rebound, no guarding, no rigidity, +bowel sounds  Genitourinary: no suprapubic tenderness  Musculoskeletal: normal bulk and tone  Skin: no rash, normal turgor  Neurologic: no facial droop, moving upper and lower extremities spontaneously, sensation in tact to light touch on extremities  Psychiatric: normal mood and affect    Data   Data reviewed today: I reviewed all medications, new labs and imaging results over the last 24 hours. I personally reviewed no images or EKG's today.    Recent Labs   Lab 05/20/22  0456 05/15/22  0607 05/14/22  0814   WBC 14.2* 13.4* 13.8*   HGB 10.9* 10.3* 11.4*   MCV 86 92 91    211 235    139 139   POTASSIUM 3.5 3.8 3.8   CHLORIDE 103 105 107   CO2 27 30 26   BUN 11 14 12   CR 0.72 0.88 0.80   ANIONGAP 7 4 6   GLEN 8.6 8.2* 8.1*   * 131* 82   ALBUMIN 2.4* 2.3* 2.5*   PROTTOTAL 6.3* 5.1* 5.3*   BILITOTAL 0.5 0.4 0.5   ALKPHOS 140 81 86   ALT 17 18 21   AST 18 21  21   LIPASE 201  --   --

## 2022-05-20 NOTE — PROGRESS NOTES
Admitted/transferred from: ED  2 RN   skin assessment completed by Kennedy Dow RN and Rosalia GUILLAUME RN  Skin assessment finding: intact Interventions/actions: none  Will continue to monitor.

## 2022-05-20 NOTE — UTILIZATION REVIEW
[facility] Utilization Review  Admission Status; Secondary Review Determination     Admission Date: 5/20/2022  4:34 AM      Under the authority of the Utilization Management Committee, the utilization review process indicated a secondary review on the above patient.  The review outcome is based on review of the medical records, discussions with staff, and applying clinical experience noted on the date of the review.        (X)      Inpatient Status Appropriate - This patient's medical care is consistent with medical management for inpatient care and reasonable inpatient medical practice.          RATIONALE FOR DETERMINATION   Laura Gonzalez is a 71 year old female with complex past medical history including Whipple procedure, choledochojejunostomy, and extensive gastrointestinal/hepatobiliary pathology, who presented with fever, abdominal pain.  She underwent recent US guided pancreatico gastrostomy.  Initial evaluation revealing for fever, leukocytosis and complex fluid collection in the abdomen along with displacement of the previous hepatobiliary stent.  She is started on IV empiric antibiotics, n.p.o., infectious work-up and surgical consultation with likely surgical intervention soon.  Cares are rendered complex due to prior hepatobiliary pathology and recent surgical procedure with high risk of adverse outcome.    The severity of illness, intensity of service provided, expected length of stay and risk for adverse outcome make the care complex, high risk and appropriate for hospital admission.The patient requires hospital based medical care which is anticipated to require a stay of 2 or more midnights;  therefore the patient is appropriately admitted to the hospital as inpatient.         The information on this document is developed by the utilization review team in order for the business office to ensure compliance.  This only denotes the appropriateness of proper admission status and does not  reflect the quality of care rendered.              Sincerely,       Farida Velez MD, MS  Physician Advisor  Utilization Review-[place]    Phone: [phone]

## 2022-05-20 NOTE — PLAN OF CARE
Goal Outcome Evaluation:  Pt admitted from ED with c/o abdominal pain and fever. Recently admitted 5/13/22 through 5/15/22 after she presented with abdominal pain after EUS guided pancreaticogastrostomy and through and through stent (5/12/22).  Temp 101.7,triggered lactic acid  which was negative.MD paged for tylenol order.pt c.o intermittent abdominal sharp pain usually associated with movements,declined pain medication at this time. denied nausea.Pt. is A&Ox4 up indep.LS clear,BS+,had BM this morning ,adequate urinary output,BLE +2 edema,skin is intact.zosyn administered as ordered.Will continue to monitor.

## 2022-05-20 NOTE — ED PROVIDER NOTES
1-year-old [employer] are just wondering if not for the last ED Provider Note  [facility]      History     Chief Complaint   Patient presents with     Abdominal Pain     HPI  Laura Gonzalez is a 71 year old female who history of Whipple procedure in 2019, acute on chronic pancreatic episodes, appendectomy, hysterectomy, with recent acute on chronic pancreatitis status post pancreatic stent placement in pancreaticojejunostomy-presents today with fever and weakness    Patient notes since discharge has continued to feel somewhat anorexic and weak at home.  Spouse notes decreased energy and poor p.o. intake with essentially 2 small meals a day in the morning and in the evening.  Has not been particular lightheaded or syncopal.  No chest pain shortness of breath.  Abdominal pain has been present but tolerable.    Early this morning patient felt particularly more weak and lightheaded woke up feeling hot and sweaty measured a temperature of 10 3-1 04, confirmed by her  several times.  Took Tylenol and came to the emergency department    Also endorses several episodes of coughing with thick sputum production  No dysuria or urinary urgency.  No vomiting.  No chest pain or shortness of breath.      Patient notes that shortly after discharge she has had greater than 5 watery stools per day and abdominal bloating.  Patient denies any recent antibiotics.  Per EMR received a prescription of Cipro Flagyl back in April 20, 2022        Past Medical History  Past Medical History:   Diagnosis Date     Asthma     pt.states no longer has symptoms     CAD (coronary artery disease)      HLD (hyperlipidemia)      Past Surgical History:   Procedure Laterality Date     APPENDECTOMY       ARTHROPLASTY HIP Left 6/15/2016    Procedure: ARTHROPLASTY HIP;  Surgeon: Jeffy Wolff MD;  Location: [facility] OR     COLONOSCOPY  10/3/2013    Procedure: COMBINED COLONOSCOPY, SINGLE BIOPSY/POLYPECTOMY BY BIOPSY;;  Surgeon:  "Kenney Walls MD;  Location:  GI     ENDOSCOPIC ULTRASOUND UPPER GASTROINTESTINAL TRACT (GI) N/A 2022    Procedure: ENDOSCOPIC ULTRASOUND WITH PANCREATICOGASTROSTOMY CREATION, TRACT DILATION, STENT PLACEMENT;  Surgeon: Romaine Oates MD;  Location: [facility] OR     ESOPHAGOSCOPY, GASTROSCOPY, DUODENOSCOPY (EGD), COMBINED N/A 2022    Procedure: ESOPHAGOGASTRODUODENOSCOPY WITH ENDOSCOPIC CLIP PLACEMENT;  Surgeon: Arnaldo Noguera MD;  Location: [facility] OR     FOOT SURGERY       HC TOOTH EXTRACTION W/FORCEP       HYSTERECTOMY       INCISION AND DRAINAGE BREAST Left 2022    Procedure: evacuate hematoma left  BREAST;  Surgeon: Dayron Garcia MD;  Location: [facility] OR     No current outpatient medications on file.    Allergies   Allergen Reactions     Tramadol Other (See Comments)     Has a hyperactive reaction and can't sleep      Family History  No family history on file.  Social History   Social History     Tobacco Use     Smoking status: Former Smoker     Quit date: 10/3/1988     Years since quittin.6     Smokeless tobacco: Never Used   Substance Use Topics     Alcohol use: No     Drug use: No      Past medical history, past surgical history, medications, allergies, family history, and social history were reviewed with the patient. No additional pertinent items.       Review of Systems  A complete review of systems was performed with pertinent positives and negatives noted in the HPI, and all other systems negative.    Physical Exam   BP: 129/60  Pulse: 90  Temp: 99.4  F (37.4  C)  Resp: 20  Height: 170.2 cm (5' 7\")  Weight: 73 kg (161 lb)  SpO2: 93 %  Physical Exam  GEN: Weak appearing but conversant, non toxic.  Very warm to touch.  HEENT: The head is normocephalic and atraumatic. Pupils are equal round and reactive to light. Extraocular motions are intact. There is no facial swelling. The neck is nontender and supple.   CV: Regular rate and rhythm 2/6 murmur over the right upper " sternal border. 2+ radial pulses bilaterally.  PULM: Clear to auscultation bilaterally.  ABD: Soft, mild diffuse tenderness worst in the right mid abdomen, nondistended.   EXT: Full range of motion.  Bilateral pitting edema to the knees  NEURO: Cranial nerves II through XII are intact and symmetric. Bilateral upper and lower extremities grossly show full range of motion without any focal deficits.   PSYCH: Calm and cooperative, interactive.     ED Course      Procedures  Results for orders placed during the hospital encounter of 05/20/22    POC US ECHO LIMITED    Impression  Limited Bedside Cardiac Ultrasound, performed and interpreted by me.  Indication: Weakness.  Parasternal long axis, parasternal short axis and apical 4 chamber views were acquired.  Image quality was satisfactory.    Findings:  Global left ventricular function appears intact.  Chambers do not appear dilated.  There is no evidence of free fluid within the pericardium.    IMPRESSION: Grossly normal left ventricular function and chamber size.  No pericardial effusion..                   Results for orders placed or performed during the hospital encounter of 05/20/22   XR Chest 2 Views     Status: None    Narrative    EXAM: XR CHEST 2 VW  LOCATION: [facility]  DATE/TIME: 5/20/2022 6:41 AM    INDICATION: fever  COMPARISON: 06/17/2016      Impression    IMPRESSION: Heart size is normal. There is new streaky bibasilar parenchymal opacity, right greater than left. Appearances favor atelectasis over early infiltrate. Upper lobes are clear. No effusions or CHF. No pneumothorax.   POC US ECHO LIMITED     Status: None    Impression    Limited Bedside Cardiac Ultrasound, performed and interpreted by me.   Indication: Weakness.  Parasternal long axis, parasternal short axis and apical 4 chamber views were acquired.   Image quality was satisfactory.    Findings:    Global left ventricular function appears  intact.  Chambers do not appear dilated.  There is no evidence of free fluid within the pericardium.    IMPRESSION: Grossly normal left ventricular function and chamber size.  No pericardial effusion..     CT Abdomen Pelvis w Contrast     Status: None    Narrative    EXAMINATION: CT ABDOMEN PELVIS W CONTRAST, 5/20/2022 9:56 AM    TECHNIQUE:  Helical CT of the abdomen and pelvis with IV contrast.  Coronal and sagittal reformatted images were created, archived and  reviewed at the workstation for further assessment.    CONTRAST: iopamidol (ISOVUE-370) solution 107 mL.    COMPARISON: CT 5/13/2022, ERCP images 5/12/2022     HISTORY: Abdominal abscess/infection suspected; patient with fevers  abdominal pain and recent ERCP with stenting, of note had whipple for  duodenal polyp \R\2019 as well, has chronic pancreatitis.    FINDINGS:     New small left and trace right pleural effusions. Linear  atelectasis/scarring in the lower lungs bilaterally.    Trace pneumobilia. Stable well-circumscribed low-density lesion in  hepatic segment 6. The gallbladder is surgically absent.    New organized fluid collection with peripheral enhancement in the left  upper quadrant superior to the spleen and posterior to the stomach  measuring 8.5 x 6.3 x 4.8 cm (series 4 image 64 and series 3 image  49).    New complex gas and fluid-filled collection with peripheral  enhancement adjacent to the lesser curvature of the stomach, gastric  cardia, and gastroesophageal junction measuring 6.1 x 2.5 are a 5.8 cm  (series 4 image 124, series 3 image 24).    Postsurgical changes of Whipple procedure. Increased thickening/fat  stranding in the alyssa hepatis region. Multiple dilated loops of small  bowel without a focal transition point identified. No pneumatosis or  portal venous gas. A single pigtail stent is present within the  transverse colon. The appendix is surgically absent.    Multiple scattered small pancreatic and mesenteric lymph nodes,  likely  reactive.    Circumscribed low density lesion in the left kidney, likely a benign  cyst. Stones are hydronephrosis. Unremarkable bladder. Hysterectomy.  No pelvic masses.    Left total hip arthroplasty. No acute or suspicious osseous  abnormality. Levocurvature of the mid lumbar spine with degenerative  disc changes in the lumbar spine and lower thoracic spine. Bilateral  breast implants. Aortoiliac atherosclerotic calcifications.      Impression    IMPRESSION:    1. Organized, peripherally enhancing fluid collection in the left  upper quadrant posterolateral to the stomach and superior to the  spleen measuring 8.5 x 6.3 x 4.8 cm.  2. Complex fluid and gas filled collection adjacent to the lesser  curvature of the stomach and tracking along the gastroesophageal  junction and lower esophagus measuring 6.1 x 2.5 x 5.8 cm.  3. The single pigtail pancreaticogastrostomy stent is in the colon.  4. New small left and trace right pleural effusions.    I have personally reviewed the examination and initial interpretation  and I agree with the findings.    ANAND ANTONIO MD         SYSTEM ID:  [id number]   Comprehensive metabolic panel     Status: Abnormal   Result Value Ref Range    Sodium 137 133 - 144 mmol/L    Potassium 3.5 3.4 - 5.3 mmol/L    Chloride 103 94 - 109 mmol/L    Carbon Dioxide (CO2) 27 20 - 32 mmol/L    Anion Gap 7 3 - 14 mmol/L    Urea Nitrogen 11 7 - 30 mg/dL    Creatinine 0.72 0.52 - 1.04 mg/dL    Calcium 8.6 8.5 - 10.1 mg/dL    Glucose 124 (H) 70 - 99 mg/dL    Alkaline Phosphatase 140 40 - 150 U/L    AST 18 0 - 45 U/L    ALT 17 0 - 50 U/L    Protein Total 6.3 (L) 6.8 - 8.8 g/dL    Albumin 2.4 (L) 3.4 - 5.0 g/dL    Bilirubin Total 0.5 0.2 - 1.3 mg/dL    GFR Estimate 88 >60 mL/min/1.73m2   Lipase     Status: Normal   Result Value Ref Range    Lipase 201 73 - 393 U/L   Lactic acid whole blood     Status: Normal   Result Value Ref Range    Lactic Acid 0.7 0.7 - 2.0 mmol/L   CBC with platelets and  differential     Status: Abnormal   Result Value Ref Range    WBC Count 14.2 (H) 4.0 - 11.0 10e3/uL    RBC Count 3.87 3.80 - 5.20 10e6/uL    Hemoglobin 10.9 (L) 11.7 - 15.7 g/dL    Hematocrit 33.3 (L) 35.0 - 47.0 %    MCV 86 78 - 100 fL    MCH 28.2 26.5 - 33.0 pg    MCHC 32.7 31.5 - 36.5 g/dL    RDW 18.4 (H) 10.0 - 15.0 %    Platelet Count 359 150 - 450 10e3/uL    % Neutrophils 83 %    % Lymphocytes 6 %    % Monocytes 9 %    % Eosinophils 1 %    % Basophils 0 %    % Immature Granulocytes 1 %    NRBCs per 100 WBC 0 <1 /100    Absolute Neutrophils 11.9 (H) 1.6 - 8.3 10e3/uL    Absolute Lymphocytes 0.9 0.8 - 5.3 10e3/uL    Absolute Monocytes 1.2 0.0 - 1.3 10e3/uL    Absolute Eosinophils 0.1 0.0 - 0.7 10e3/uL    Absolute Basophils 0.0 0.0 - 0.2 10e3/uL    Absolute Immature Granulocytes 0.1 <=0.4 10e3/uL    Absolute NRBCs 0.0 10e3/uL   [place] Draw     Status: None    Narrative    The following orders were created for panel order [place] Draw.  Procedure                               Abnormality         Status                     ---------                               -----------         ------                     Extra Blue Top Tube[[id number]]                              Final result               Extra Red Top Tube[[id number]]                               Final result                 Please view results for these tests on the individual orders.   Extra Blue Top Tube     Status: None   Result Value Ref Range    Hold Specimen JIC    Extra Red Top Tube     Status: None   Result Value Ref Range    Hold Specimen JIC    Asymptomatic COVID-19 Virus (Coronavirus) by PCR Nasopharyngeal     Status: Normal    Specimen: Nasopharyngeal; Swab   Result Value Ref Range    SARS CoV2 PCR Negative Negative, Testing sent to reference lab. Results will be returned via unsolicited result    Narrative    Testing was performed using the Xpert Xpress SARS-CoV-2 Assay on the  [employer] Gene-Xpert Instrument Systems. Additional information  about  this Emergency Use Authorization (EUA) assay can be found via the Lab  Guide. This test should be ordered for the detection of SARS-CoV-2 in  individuals who meet SARS-CoV-2 clinical and/or epidemiological  criteria. Test performance is unknown in asymptomatic patients. This  test is for in vitro diagnostic use under the FDA EUA for  laboratories certified under CLIA to perform high complexity testing.  This test has not been FDA cleared or approved. A negative result  does not rule out the presence of PCR inhibitors in the specimen or  target RNA in concentration below the limit of detection for the  assay. The possibility of a false negative should be considered if  the patient's recent exposure or clinical presentation suggests  COVID-19. This test was validated by the [facility]. This laboratory is certified under  the Clinical Laboratory Improvement Amendments of 1988 (CLIA-88) as  qualified to perform high complexity laboratory testing.     UA with Microscopic reflex to Culture     Status: Abnormal    Specimen: Urine, Clean Catch   Result Value Ref Range    Color Urine Straw Colorless, Straw, Light Yellow, Yellow    Appearance Urine Clear Clear    Glucose Urine Negative Negative mg/dL    Bilirubin Urine Negative Negative    Ketones Urine Negative Negative mg/dL    Specific Gravity Urine 1.012 1.003 - 1.035    Blood Urine Negative Negative    pH Urine 8.0 (H) 5.0 - 7.0    Protein Albumin Urine 10  (A) Negative mg/dL    Urobilinogen Urine Normal Normal, 2.0 mg/dL    Nitrite Urine Negative Negative    Leukocyte Esterase Urine Negative Negative    Bacteria Urine Few (A) None Seen /HPF    Mucus Urine Present (A) None Seen /LPF    RBC Urine <1 <=2 /HPF    WBC Urine 1 <=5 /HPF    Narrative    Urine Culture not indicated   Nt probnp inpatient     Status: Abnormal   Result Value Ref Range    N terminal Pro BNP Inpatient 1,861 (H) 0 - 900 pg/mL   Procalcitonin      Status: Abnormal   Result Value Ref Range    Procalcitonin 0.69 (H) <0.05 ng/mL   Lactic Acid STAT     Status: Abnormal   Result Value Ref Range    Lactic Acid 0.4 (L) 0.7 - 2.0 mmol/L   Echocardiogram Complete     Status: None   Result Value Ref Range    LVEF  60-65%     [facility]    [id number]  [id number]  [id number]  [id number]^ROZ^SANDRA     [facility]  Echocardiography Laboratory  [address] [place] [address]     Name: BERTHA HOBBS  MRN: [id number]  : 1950  Study Date: 2022 08:25 AM  Age: 71 yrs  Gender: Female  Patient Location: [facility]  Reason For Study: Cardiac Murmur  Ordering Physician: SANDRA COURTNEY  Performed By: DINO Cabello     BSA: 1.9 m2  Height: 67 in  Weight: 175 lb  BP: 123/61 mmHg  ______________________________________________________________________________  Procedure  Complete Portable Echo Adult.  ______________________________________________________________________________  Interpretation Summary  No significant valvular abnormalities were noted. No pathologic basis for  murmur identified.  ______________________________________________________________________________  Left Ventricle  Global and regional left ventricular function is normal with an EF of 60-65%.  Left ventricular wall thickness is normal. Left ventricular size is normal.  Left ventricular diastolic function is normal. No regional wall motion  abnormalities are seen.     Right Ventricle  Right ventricular function, chamber size, wall motion, and thickness are  normal.     Atria  Both atria appear normal. The atrial septum is intact as assessed by color  Doppler .     Mitral Valve  The mitral valve is normal. Trace mitral insufficiency is present.     Aortic Valve  The valve leaflets are not well visualized. On Doppler interrogation, there is  no significant stenosis or regurgitation.     Tricuspid Valve  The tricuspid valve is normal. Trace tricuspid  insufficiency is present.  Pulmonary artery systolic pressure cannot be assessed.     Pulmonic Valve  The valve leaflets are not well visualized. On Doppler interrogation, there is  no significant stenosis or regurgitation.     Vessels  The thoracic aorta is normal. The pulmonary artery cannot be assessed.  Dilation of the inferior vena cava is present with normal respiratory  variation in diameter.     Pericardium  No pericardial effusion is present.     Compared to Previous Study  There is no prior study for direct comparison.  ______________________________________________________________________________  MMode/2D Measurements & Calculations  IVSd: 0.94 cm  LVIDd: 5.4 cm  LVIDs: 3.6 cm  LVPWd: 0.87 cm  FS: 33.8 %  LV mass(C)d: 182.9 grams  LV mass(C)dI: 95.7 grams/m2  Ao root diam: 3.6 cm  LVOT diam: 2.3 cm  LVOT area: 4.2 cm2  LA Volume (BP): 57.9 ml  LA Volume Index (BP): 30.3 ml/m2  RWT: 0.32     Doppler Measurements & Calculations  MV E max maria luisa: 82.5 cm/sec  MV A max maria luisa: 77.1 cm/sec  MV E/A: 1.1  MV dec slope: 421.0 cm/sec2  PA V2 max: 89.2 cm/sec  PA max PG: 3.2 mmHg  PA acc time: 0.11 sec  E/E' av.3  Lateral E/e': 6.3  Medial E/e': 12.2     ______________________________________________________________________________  Report approved by: Herson Muniz 2022 09:54 AM         Blood Culture Peripheral Blood     Status: Normal (Preliminary result)    Specimen: Peripheral Blood   Result Value Ref Range    Culture No growth after 12 hours    Blood Culture Peripheral Blood     Status: Normal (Preliminary result)    Specimen: Peripheral Blood   Result Value Ref Range    Culture No growth after 12 hours    Clostridium difficile toxin B PCR     Status: Normal    Specimen: Per Rectum; Stool   Result Value Ref Range    C Difficile Toxin B by PCR Negative Negative    Narrative    The "[employer] Xpert C. difficile Assay, performed on the [employer]  Instrument Systems, is a qualitative in vitro  diagnostic test for rapid detection of toxin B gene sequences from unformed (liquid or soft) stool specimens collected from patients suspected of having Clostridioides difficile infection (CDI). The test utilizes automated real-time polymerase chain reaction (PCR) to detect toxin gene sequences associated with toxin producing C. difficile. The Xpert C. difficile Assay is intended as an aid in the diagnosis of CDI.   Enteric Bacteria and Virus Panel by [employer] Stool     Status: Normal    Specimen: Per Rectum; Stool   Result Value Ref Range    Campylobacter group Not Detected Not Detected    Salmonella species Not Detected Not Detected    Shigella species Not Detected Not Detected    Vibrio group Not Detected Not Detected    Rotavirus Not Detected Not Detected    Shiga toxin 1 gene Not Detected Not Detected    Shiga toxin 2 gene Not Detected Not Detected    Norovirus I and II Not Detected Not Detected    Yersinia enterocolitica Not Detected Not Detected    Narrative    Testing performed by multiplexed, qualitative PCR using the [employer] Enteric Pathogens Nucleic Acid Test. Results should not be used as the sole basis for diagnosis, treatment or other patient management decisions. Positive results do not rule out co-infection with other organisms that are not detected by this test and may not be the sole or definitive cause of patient illness. Negative results in the setting of clinical illness compatible with gastroenteritis may be due to infection by pathogens that are not detected by this test or non-infectious causes such as ulcerative colitis, irritable bowel syndrome or Crohn's disease. Note: Shiga toxin producing E. coli (STEC) typically harbor one or both genes that encode for Shiga toxins 1 and 2.   CBC with platelets differential     Status: Abnormal    Narrative    The following orders were created for panel order CBC with platelets differential.  Procedure                               Abnormality          Status                     ---------                               -----------         ------                     CBC with platelets and d...[[id number]]  Abnormal            Final result                 Please view results for these tests on the individual orders.   CBC with platelets differential     Status: None ()    Narrative    The following orders were created for panel order CBC with platelets differential.  Procedure                               Abnormality         Status                     ---------                               -----------         ------                     CBC with platelets and d...[[id number]]                                                   Please view results for these tests on the individual orders.     Medications   amylase-lipase-protease (CREON 24) 34074-77086 units per EC capsule 2 capsule (2 capsules Oral Not Given 5/20/22 1914)   atorvastatin (LIPITOR) tablet 40 mg (40 mg Oral Given 5/20/22 2204)   ezetimibe (ZETIA) tablet 10 mg (10 mg Oral Given 5/20/22 2204)   LORazepam (ATIVAN) tablet 0.5 mg (has no administration in time range)   montelukast (SINGULAIR) tablet 10 mg (10 mg Oral Given 5/20/22 2204)   polyethylene glycol (MIRALAX) powder 17 g (has no administration in time range)   senna-docusate (SENOKOT-S/PERICOLACE) 8.6-50 MG per tablet 1 tablet (has no administration in time range)   zolpidem (AMBIEN) tablet 5 mg (5 mg Oral Given 5/20/22 2204)   vitamin A capsule 10,000 Units (10,000 Units Oral Not Given 5/20/22 1237)   melatonin tablet 1 mg (has no administration in time range)   ondansetron (ZOFRAN ODT) ODT tab 4 mg ( Oral See Alternative 5/20/22 1349)     Or   ondansetron (ZOFRAN) injection 4 mg (4 mg Intravenous Given 5/20/22 1349)   calcium carbonate 500 mg (elemental) (OSCAL) tablet 1,000 mg (1,000 mg Oral Given 5/20/22 0913)   magnesium gluconate (MAGONATE) tablet 500 mg (500 mg Oral Not Given 5/20/22 1237)   lidocaine 1 % 0.1-1 mL (has no administration in  time range)   lidocaine (LMX4) cream (has no administration in time range)   sodium chloride (PF) 0.9% PF flush 3 mL (3 mLs Intracatheter Given 5/20/22 1839)   sodium chloride (PF) 0.9% PF flush 3 mL (has no administration in time range)   piperacillin-tazobactam (ZOSYN) 4.5 g vial to attach to  mL bag (4.5 g Intravenous New Bag 5/21/22 0038)   oxyCODONE (ROXICODONE) tablet 5-10 mg (10 mg Oral Given 5/21/22 0513)   HYDROmorphone (DILAUDID) injection 0.2 mg (0.2 mg Intravenous Given 5/21/22 0306)   acetaminophen (TYLENOL) tablet 650 mg (650 mg Oral Given 5/20/22 1831)   0.9% sodium chloride BOLUS (0 mLs Intravenous Stopped 5/20/22 1119)   iopamidol (ISOVUE-370) solution 107 mL (107 mLs Intravenous Given 5/20/22 0949)   sodium chloride (PF) 0.9% PF flush 77 mL (77 mLs Intravenous Given 5/20/22 0949)   HYDROmorphone (PF) (DILAUDID) injection 0.5 mg (0.5 mg Intravenous Given 5/20/22 1344)        Assessments & Plan (with Medical Decision Making)   71-year-old female with complex history as noted above presenting with fever, cough abdominal pain and generalized weakness    Differential diagnosis is broad including pneumonia, C. difficile, other bacterial colitis, urinary tract infection, endocarditis, pancreatitis, among other causes.    Clinically the patient appears weak but nontoxic  Temperature repeated here and labeled as afebrile, however was certainly very warm to touch at time of my examination.  Has recently placed pancreatico gastrostomy stent felt to be in good condition, abdominal examination appears reassuring with no significantly increased discomfort compared to her baseline.    Laboratories overall unrevealing including reassuring lactate  Wbc 14 but stable c/w prior  UA without clear evidence of infection   c-diff and stool culture sent and pending  CXR without clear PNA  Given hx and immunocompromise, Zosyn ordered and given    Discussed with internal medicine.  Etiology of suspected fever though  not documented here is uncertain but given clinical risk factors will admit to medicine observation with anticipated GI consultation for further management and work-up.pending      I have reviewed the nursing notes. I have reviewed the findings, diagnosis, plan and need for follow up with the patient.    Current Discharge Medication List          Final diagnoses:   Fever, unspecified fever cause       --  Faustino Buckley MD  [facility] EMERGENCY DEPARTMENT  5/20/2022     Faustino Buckley MD  05/21/22 0612

## 2022-05-21 ENCOUNTER — APPOINTMENT (OUTPATIENT)
Dept: INTERVENTIONAL RADIOLOGY/VASCULAR | Facility: CLINIC | Age: 72
DRG: 372 | End: 2022-05-21
Payer: COMMERCIAL

## 2022-05-21 LAB
ALBUMIN SERPL-MCNC: 1.8 G/DL (ref 3.4–5)
ALP SERPL-CCNC: 154 U/L (ref 40–150)
ALT SERPL W P-5'-P-CCNC: 12 U/L (ref 0–50)
ANION GAP SERPL CALCULATED.3IONS-SCNC: 7 MMOL/L (ref 3–14)
AST SERPL W P-5'-P-CCNC: 15 U/L (ref 0–45)
BACTERIA UR CULT: NO GROWTH
BASOPHILS # BLD AUTO: 0 10E3/UL (ref 0–0.2)
BASOPHILS NFR BLD AUTO: 0 %
BILIRUB SERPL-MCNC: 0.6 MG/DL (ref 0.2–1.3)
BUN SERPL-MCNC: 11 MG/DL (ref 7–30)
CALCIUM SERPL-MCNC: 7.6 MG/DL (ref 8.5–10.1)
CHLORIDE BLD-SCNC: 102 MMOL/L (ref 94–109)
CO2 SERPL-SCNC: 27 MMOL/L (ref 20–32)
CREAT SERPL-MCNC: 0.89 MG/DL (ref 0.52–1.04)
EOSINOPHIL # BLD AUTO: 0 10E3/UL (ref 0–0.7)
EOSINOPHIL NFR BLD AUTO: 0 %
ERYTHROCYTE [DISTWIDTH] IN BLOOD BY AUTOMATED COUNT: 18.6 % (ref 10–15)
GFR SERPL CREATININE-BSD FRML MDRD: 69 ML/MIN/1.73M2
GLUCOSE BLD-MCNC: 105 MG/DL (ref 70–99)
HCT VFR BLD AUTO: 29.6 % (ref 35–47)
HGB BLD-MCNC: 9.6 G/DL (ref 11.7–15.7)
IMM GRANULOCYTES # BLD: 0.2 10E3/UL
IMM GRANULOCYTES NFR BLD: 1 %
INR PPP: 1.33 (ref 0.85–1.15)
LYMPHOCYTES # BLD AUTO: 1.3 10E3/UL (ref 0.8–5.3)
LYMPHOCYTES NFR BLD AUTO: 6 %
MCH RBC QN AUTO: 28.6 PG (ref 26.5–33)
MCHC RBC AUTO-ENTMCNC: 32.4 G/DL (ref 31.5–36.5)
MCV RBC AUTO: 88 FL (ref 78–100)
MONOCYTES # BLD AUTO: 1.8 10E3/UL (ref 0–1.3)
MONOCYTES NFR BLD AUTO: 9 %
NEUTROPHILS # BLD AUTO: 17.5 10E3/UL (ref 1.6–8.3)
NEUTROPHILS NFR BLD AUTO: 84 %
NRBC # BLD AUTO: 0 10E3/UL
NRBC BLD AUTO-RTO: 0 /100
PLATELET # BLD AUTO: 357 10E3/UL (ref 150–450)
POTASSIUM BLD-SCNC: 3.5 MMOL/L (ref 3.4–5.3)
PROT SERPL-MCNC: 5.4 G/DL (ref 6.8–8.8)
RBC # BLD AUTO: 3.36 10E6/UL (ref 3.8–5.2)
SODIUM SERPL-SCNC: 136 MMOL/L (ref 133–144)
WBC # BLD AUTO: 20.9 10E3/UL (ref 4–11)

## 2022-05-21 PROCEDURE — 80053 COMPREHEN METABOLIC PANEL: CPT | Performed by: STUDENT IN AN ORGANIZED HEALTH CARE EDUCATION/TRAINING PROGRAM

## 2022-05-21 PROCEDURE — 250N000011 HC RX IP 250 OP 636: Performed by: PHYSICIAN ASSISTANT

## 2022-05-21 PROCEDURE — 99152 MOD SED SAME PHYS/QHP 5/>YRS: CPT | Mod: GC | Performed by: RADIOLOGY

## 2022-05-21 PROCEDURE — 250N000013 HC RX MED GY IP 250 OP 250 PS 637: Performed by: STUDENT IN AN ORGANIZED HEALTH CARE EDUCATION/TRAINING PROGRAM

## 2022-05-21 PROCEDURE — 36415 COLL VENOUS BLD VENIPUNCTURE: CPT | Performed by: STUDENT IN AN ORGANIZED HEALTH CARE EDUCATION/TRAINING PROGRAM

## 2022-05-21 PROCEDURE — 49406 IMAGE CATH FLUID PERI/RETRO: CPT | Mod: GC | Performed by: RADIOLOGY

## 2022-05-21 PROCEDURE — 85610 PROTHROMBIN TIME: CPT | Performed by: STUDENT IN AN ORGANIZED HEALTH CARE EDUCATION/TRAINING PROGRAM

## 2022-05-21 PROCEDURE — 120N000011 HC R&B TRANSPLANT UMMC

## 2022-05-21 PROCEDURE — 250N000013 HC RX MED GY IP 250 OP 250 PS 637: Performed by: PHYSICIAN ASSISTANT

## 2022-05-21 PROCEDURE — 99153 MOD SED SAME PHYS/QHP EA: CPT

## 2022-05-21 PROCEDURE — 250N000013 HC RX MED GY IP 250 OP 250 PS 637: Performed by: HOSPITALIST

## 2022-05-21 PROCEDURE — 87077 CULTURE AEROBIC IDENTIFY: CPT | Performed by: PHYSICIAN ASSISTANT

## 2022-05-21 PROCEDURE — 99233 SBSQ HOSP IP/OBS HIGH 50: CPT | Mod: FS | Performed by: HOSPITALIST

## 2022-05-21 PROCEDURE — 99207 PR APP CREDIT; MD BILLING SHARED VISIT: CPT | Performed by: PHYSICIAN ASSISTANT

## 2022-05-21 PROCEDURE — 49406 IMAGE CATH FLUID PERI/RETRO: CPT

## 2022-05-21 PROCEDURE — 87075 CULTR BACTERIA EXCEPT BLOOD: CPT | Performed by: PHYSICIAN ASSISTANT

## 2022-05-21 PROCEDURE — 87106 FUNGI IDENTIFICATION YEAST: CPT | Performed by: PHYSICIAN ASSISTANT

## 2022-05-21 PROCEDURE — 85025 COMPLETE CBC W/AUTO DIFF WBC: CPT | Performed by: STUDENT IN AN ORGANIZED HEALTH CARE EDUCATION/TRAINING PROGRAM

## 2022-05-21 PROCEDURE — 0W9G3ZZ DRAINAGE OF PERITONEAL CAVITY, PERCUTANEOUS APPROACH: ICD-10-PCS | Performed by: RADIOLOGY

## 2022-05-21 PROCEDURE — 99152 MOD SED SAME PHYS/QHP 5/>YRS: CPT

## 2022-05-21 RX ORDER — NALOXONE HYDROCHLORIDE 0.4 MG/ML
0.2 INJECTION, SOLUTION INTRAMUSCULAR; INTRAVENOUS; SUBCUTANEOUS
Status: DISCONTINUED | OUTPATIENT
Start: 2022-05-21 | End: 2022-05-25 | Stop reason: HOSPADM

## 2022-05-21 RX ORDER — FLUMAZENIL 0.1 MG/ML
0.2 INJECTION, SOLUTION INTRAVENOUS
Status: DISCONTINUED | OUTPATIENT
Start: 2022-05-21 | End: 2022-05-21 | Stop reason: HOSPADM

## 2022-05-21 RX ORDER — ZOLPIDEM TARTRATE 10 MG/1
1 TABLET ORAL
COMMUNITY
Start: 2022-05-08

## 2022-05-21 RX ORDER — FENTANYL CITRATE 50 UG/ML
25-50 INJECTION, SOLUTION INTRAMUSCULAR; INTRAVENOUS EVERY 5 MIN PRN
Status: DISCONTINUED | OUTPATIENT
Start: 2022-05-21 | End: 2022-05-21 | Stop reason: HOSPADM

## 2022-05-21 RX ORDER — NALOXONE HYDROCHLORIDE 0.4 MG/ML
0.4 INJECTION, SOLUTION INTRAMUSCULAR; INTRAVENOUS; SUBCUTANEOUS
Status: DISCONTINUED | OUTPATIENT
Start: 2022-05-21 | End: 2022-05-25 | Stop reason: HOSPADM

## 2022-05-21 RX ORDER — NALOXONE HYDROCHLORIDE 0.4 MG/ML
0.2 INJECTION, SOLUTION INTRAMUSCULAR; INTRAVENOUS; SUBCUTANEOUS
Status: DISCONTINUED | OUTPATIENT
Start: 2022-05-21 | End: 2022-05-21 | Stop reason: HOSPADM

## 2022-05-21 RX ORDER — NALOXONE HYDROCHLORIDE 0.4 MG/ML
0.4 INJECTION, SOLUTION INTRAMUSCULAR; INTRAVENOUS; SUBCUTANEOUS
Status: DISCONTINUED | OUTPATIENT
Start: 2022-05-21 | End: 2022-05-21 | Stop reason: HOSPADM

## 2022-05-21 RX ORDER — ESTRADIOL 0.1 MG/G
1 CREAM VAGINAL SEE ADMIN INSTRUCTIONS
COMMUNITY
Start: 2022-05-19 | End: 2022-11-18

## 2022-05-21 RX ADMIN — HYDROMORPHONE HYDROCHLORIDE 0.2 MG: 0.2 INJECTION, SOLUTION INTRAMUSCULAR; INTRAVENOUS; SUBCUTANEOUS at 23:58

## 2022-05-21 RX ADMIN — OXYCODONE HYDROCHLORIDE 10 MG: 5 TABLET ORAL at 18:41

## 2022-05-21 RX ADMIN — Medication 10000 UNITS: at 07:48

## 2022-05-21 RX ADMIN — PIPERACILLIN AND TAZOBACTAM 4.5 G: 4; .5 INJECTION, POWDER, FOR SOLUTION INTRAVENOUS at 12:25

## 2022-05-21 RX ADMIN — OXYCODONE HYDROCHLORIDE 10 MG: 5 TABLET ORAL at 22:11

## 2022-05-21 RX ADMIN — HYDROMORPHONE HYDROCHLORIDE 0.2 MG: 0.2 INJECTION, SOLUTION INTRAMUSCULAR; INTRAVENOUS; SUBCUTANEOUS at 10:01

## 2022-05-21 RX ADMIN — ACETAMINOPHEN 650 MG: 325 TABLET ORAL at 13:27

## 2022-05-21 RX ADMIN — PIPERACILLIN AND TAZOBACTAM 4.5 G: 4; .5 INJECTION, POWDER, FOR SOLUTION INTRAVENOUS at 23:58

## 2022-05-21 RX ADMIN — PANCRELIPASE 2 CAPSULE: 24000; 76000; 120000 CAPSULE, DELAYED RELEASE PELLETS ORAL at 07:39

## 2022-05-21 RX ADMIN — PIPERACILLIN AND TAZOBACTAM 4.5 G: 4; .5 INJECTION, POWDER, FOR SOLUTION INTRAVENOUS at 17:52

## 2022-05-21 RX ADMIN — Medication 500 MG: at 07:39

## 2022-05-21 RX ADMIN — HYDROMORPHONE HYDROCHLORIDE 0.2 MG: 0.2 INJECTION, SOLUTION INTRAMUSCULAR; INTRAVENOUS; SUBCUTANEOUS at 03:06

## 2022-05-21 RX ADMIN — ATORVASTATIN CALCIUM 40 MG: 40 TABLET, FILM COATED ORAL at 21:42

## 2022-05-21 RX ADMIN — MONTELUKAST 10 MG: 10 TABLET, FILM COATED ORAL at 21:42

## 2022-05-21 RX ADMIN — CALCIUM 1000 MG: 500 TABLET ORAL at 07:39

## 2022-05-21 RX ADMIN — HYDROMORPHONE HYDROCHLORIDE 0.2 MG: 0.2 INJECTION, SOLUTION INTRAMUSCULAR; INTRAVENOUS; SUBCUTANEOUS at 17:52

## 2022-05-21 RX ADMIN — PIPERACILLIN AND TAZOBACTAM 4.5 G: 4; .5 INJECTION, POWDER, FOR SOLUTION INTRAVENOUS at 06:49

## 2022-05-21 RX ADMIN — OXYCODONE HYDROCHLORIDE 10 MG: 5 TABLET ORAL at 05:13

## 2022-05-21 RX ADMIN — ACETAMINOPHEN 650 MG: 325 TABLET ORAL at 21:42

## 2022-05-21 RX ADMIN — PIPERACILLIN AND TAZOBACTAM 4.5 G: 4; .5 INJECTION, POWDER, FOR SOLUTION INTRAVENOUS at 00:38

## 2022-05-21 RX ADMIN — OXYCODONE HYDROCHLORIDE 10 MG: 5 TABLET ORAL at 13:27

## 2022-05-21 RX ADMIN — EZETIMIBE 10 MG: 10 TABLET ORAL at 21:42

## 2022-05-21 ASSESSMENT — ACTIVITIES OF DAILY LIVING (ADL)
ADLS_ACUITY_SCORE: 20

## 2022-05-21 NOTE — PROGRESS NOTES
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 She is now s/p CT-guided drainage of LUQ collection (5/21/22) due to fevers and progressive leukocytosis despite antibiotics.        #. Infected pancreatic fluid collection (LUQ)  #. pancreaticogastrostomy stent migration    #. recurrent acute pancreatitis ([id number])  #. S/P pancreaticogastrostomy stent placement   #. Hx of post-pancreaticogastrostomy acute pancreatitis   #. Leukocytosis, SIRS    RECOMMENDATIONS:  -- advance and hold at [facility] post-procedure  -- IR consult for percutaneous drain placement in LUQ collection, please obtain typical cultures of aspirate  -- no role for endoscopy at present  -- continue broad spectrum antibiotics  -- follow up all cultures to tailor therapy  -- daily CBC, CMP, INR while hospitalized  -- continue pancreatic enzymes at same home dose  -- antiemetics/pain control per primary team  -- pacnreaticobiliary GI team will continue to follow, please do not hesitate to reach out with questions or concerns    Gastroenterology follow up recommendations: Pending clinical course.      Thank you for involving us in this patient's care. Please do not hesitate to contact the GI service with any questions or concerns.      Pt care plan discussed with Dr. Gotti, GI staff physician.    Alfred Koo MD,  "PGY4  Gastroenterology Fellow  [facility]  See [facility] for GI on-call information    _______________________________________________________________    Subjective:    Abdominal pain ongoing though improved from previous. Felt feverish overnight. Some appetite has returned. Denies nausea. Extensive discussion of care plan had with patient, Dr. Noguera, Dr. Gotti, as well as the IR service. All questions answered.     Objective:  Blood pressure 113/63, pulse 75, temperature 98.3  F (36.8  C), temperature source Oral, resp. rate 16, height 1.702 m (5' 7\"), weight 72.5 kg (159 lb 14.4 oz), SpO2 94 %, not currently breastfeeding.    Gen: A&Ox3, NAD  HEENT: sclera anicteric  CV: RRR  Lungs: breathing comfortably on room air  Abd: soft, tender in epigastrium and LUQ, nondistended, bowel sounds present  Skin: no jaundice, no stigmata of chronic liver disease  MS: appropriate muscle mass for age  Neuro: non focal       LABS:    BMP  Recent Labs   Lab 05/21/22  0601 05/20/22  0456 05/15/22  0607    137 139   POTASSIUM 3.5 3.5 3.8   CHLORIDE 102 103 105   GLEN 7.6* 8.6 8.2*   CO2 27 27 30   BUN 11 11 14   CR 0.89 0.72 0.88   * 124* 131*     CBC  Recent Labs   Lab 05/21/22  0601 05/20/22  0456 05/15/22  0607   WBC 20.9* 14.2* 13.4*   RBC 3.36* 3.87 3.62*   HGB 9.6* 10.9* 10.3*   HCT 29.6* 33.3* 33.4*   MCV 88 86 92   MCH 28.6 28.2 28.5   MCHC 32.4 32.7 30.8*   RDW 18.6* 18.4* 18.0*    359 211     INR  Recent Labs   Lab 05/21/22  0601   INR 1.33*     LFTs  Recent Labs   Lab 05/21/22  0601 05/20/22  0456 05/15/22  0607   ALKPHOS 154* 140 81   AST 15 18 21   ALT 12 17 18   BILITOTAL 0.6 0.5 0.4   PROTTOTAL 5.4* 6.3* 5.1*   ALBUMIN 1.8* 2.4* 2.3*      PANC  Recent Labs   Lab 05/20/22  0456   LIPASE 201     IMAGING:    CT-guided abscess drainage: 5/21/22                                                                     IMPRESSION:   1. CT-guided drainage of left upper quadrant gas containing " fluid  collection as described. Total of 50 mL purulent appearing fluid is  aspirated and sent for requested labs. No significant residual  collection seen. Drain is in good position.     Plan is to keep the drain to gravity drainage. It will be flushed with  10 mL normal saline every shift. Outputs should be followed. Once the  outputs fall to less than 10 mL per day for 3 consecutive days,  sinogram should be performed to assess for pancreatic ductal  communication prior to considering drain removal.     ESTEPHANIA PATEL MD         SYSTEM ID:  [id number]    CTAP: 5/20/22                                                                     IMPRESSION:    1. Organized, peripherally enhancing fluid collection in the left  upper quadrant posterolateral to the stomach and superior to the  spleen measuring 8.5 x 6.3 x 4.8 cm.  2. Complex fluid and gas filled collection adjacent to the lesser  curvature of the stomach and tracking along the gastroesophageal  junction and lower esophagus measuring 6.1 x 2.5 x 5.8 cm.  3. The single pigtail pancreaticogastrostomy stent is in the colon.  4. New small left and trace right pleural effusions.     I have personally reviewed the examination and initial interpretation  and I agree with the findings.     ANAND ANTONIO MD

## 2022-05-21 NOTE — PHARMACY-ADMISSION MEDICATION HISTORY
"  Admission Medication History Completed by Pharmacy    See [facility] Admission Navigator for allergy information, preferred outpatient pharmacy, prior to admission medications and immunization status.     Medication History Sources:     EMR review, outpatient pharmacy fill history, last doses reported by patient to RN in ED    Changes made to PTA medication list (reason):    Added:   o Estradiol vaginal cream and Estradiol patch  o Cholecalciferol    Deleted: None    Changed:   o Added dose for vitamin A    Additional Information:    Unclear if patient is on hydrochlorothiazide. Admitting provider discontinued it from her med list on 5/20/22 but no comments/reference to hydrochlorothiazide made in the admission notes. Upon discharge on 5/15/22, her AVS states to \"continue\" hydrochlorothiazide 25 mg PO daily. Finally, based on fill history, she would be due for a refill on 6/3/22 so she would still have medication at home based on fills. I left it off med list for now.     Patient is supposed to transition off the estradiol patch to the estradiol vaginal cream. She changes the patch every 3 days, last placed Tues 5/17 prior to admission. She has not yet picked up the vaginal cream. Patient states she does NOT need the patch while here.     Prior to Admission medications    Medication Sig Last Dose Taking? Auth Provider   amylase-lipase-protease (CREON) 67564-67566 units CPEP per EC capsule Take 2-3 with meals / 1-2 with snacks, up to 15 per day.  Patient taking differently: No sig reported 5/19/2022 at Unknown time Yes Arnaldo Noguera MD   atorvastatin (LIPITOR) 40 MG tablet Take 40 mg by mouth At Bedtime  5/19/2022 at Unknown time Yes Reported, Patient   Calcium Carb-Cholecalciferol (CALCIUM 1000 + D) 1000-800 MG-UNIT TABS  5/19/2022 at Unknown time Yes Reported, Patient   calcium-magnesium (CALMAG) 500-250 MG TABS Take 2 tablets by mouth daily 5/19/2022 at Unknown time Yes Reported, Patient   cholecalciferol " (VITAMIN D3) 25 mcg (1000 units) capsule Take 1 capsule by mouth daily  Yes Unknown, Entered By History   estradiol (VIVELLE-DOT) 0.075 MG/24HR BIW patch Place 1 patch onto the skin twice a week 5/17/2022 Yes Unknown, Entered By History   evolocumab (REPATHA) 140 MG/ML prefilled autoinjector Inject 140 mg Subcutaneous every 14 days 5/19/2022 at Unknown time Yes Unknown, Entered By History   ezetimibe (ZETIA) 10 MG tablet Take 10 mg by mouth At Bedtime 5/19/2022 at Unknown time Yes Reported, Patient   LORazepam (ATIVAN) 0.5 MG tablet Take 1-2 tablets by mouth twice daily as needed for anxiety 5/19/2022 at Unknown time Yes Unknown, Entered By History   montelukast (SINGULAIR) 10 MG tablet Take 10 mg by mouth At Bedtime  5/19/2022 at Unknown time Yes Reported, Patient   polyethylene glycol (MIRALAX) 17 GM/Dose powder Take 17 g by mouth 2 times daily as needed Past Month at Unknown time Yes Trav Garcia MD   senna-docusate (SENOKOT-S/PERICOLACE) 8.6-50 MG tablet Take 1 tablet by mouth 2 times daily as needed for constipation Past Month at Unknown time Yes Trav Garcia MD   valACYclovir (VALTREX) 1000 mg tablet Take 1,000 mg by mouth daily as needed (cold sore)  More than a month at PRN Yes Reported, Patient   VITAMIN A PO Take 3,000 Units by mouth daily  Yes Unknown, Entered By History   zolpidem (AMBIEN) 10 MG tablet Take 1 tablet by mouth nightly as needed for sleep Past Week at Unknown time Yes Unknown, Entered By History   estradiol (ESTRACE) 0.1 MG/GM vaginal cream Place 1 g vaginally See Admin Instructions Insert 1 gram vaginally as directed by provider   Unknown, Entered By History   oxyCODONE (ROXICODONE) 5 MG tablet Take 1-2 tablets (5-10 mg) by mouth every 6 hours as needed for moderate to severe pain (take 5 mg for 5-7/10 pain, 10 mg for 8-10/10 pain)   Trav Garcia MD   hydrochlorothiazide (HYDRODIURIL) 25 MG tablet Take 25 mg by mouth daily   Reported, Patient       Date completed: 05/21/22  (addended 5/22/22)    Medication history completed by: Ruthie Rivera, PharmD, BCCP, BCPS

## 2022-05-21 NOTE — CONSULTS
"INTERVENTIONAL RADIOLOGY CONSULT    Patient is a 71 year old female with PMH of duodenal ampullary polyp s/p whipple complicated by chronic pancreatitis, s/p pancreaticogastrostomy stent placement 5/12/22. Interventional team consulted for \"image guided intra abdominal drainage.  Planned with Dr Noguera to perform subcostal liver drain\".    Admitted 5/20/2022 with worsening abdominal pain, fever, and leukocytosis.  CT shows development of complex peripancreatic fluid collection lesser curvature of stomach as well as additional perisplenic fluid collection.  As well as migration of stent.    - Covid: Negative  - Anticoagulation: None    SUBJECTIVE:    OBJECTIVE:    Physical Exam:     B/P: 108/52, T: 99.5, P: 84, R: 16       CBC RESULTS: Recent Labs   Lab Test 05/21/22  0601   WBC 20.9*   RBC 3.36*   HGB 9.6*   HCT 29.6*   MCV 88   MCH 28.6   MCHC 32.4   RDW 18.6*         Lab Results   Component Value Date    INR 1.33 05/21/2022    INR 1.08 05/12/2022    INR 0.92 05/26/2006     Lab Results   Component Value Date     05/21/2022     05/20/2022     09/24/2018     06/18/2016       Imaging:  CT 5/20 reviewed with complex rim enhancing gas and fluid containing multiloculated collection in the gastrohepatic space.  Likely connection to perisplenic fluid.  Pancreaticogastrostomy migrated to hepatic flexure/transverse colon.    ASSESSMENT/PLAN: Patient is on IR schedule 5/21/2022 for a CT guided intra-abdominal abscess drainage. Labs WNL for procedure. Orders for NPO, scrubs and antibiotics have been entered.    Of note, patient NPO at 830 AM and please keep NPO, for procedure at 430 PM.    Consent will be done prior to procedure.    Discussed with staff Dr Dickerson.    Ashok Molina,   Interventional Radiology Resident  Pager [phone]  "

## 2022-05-21 NOTE — PLAN OF CARE
A&O times 4. Pt denies nausea, SOB, and dizziness. Pt state that she is in moderate to severe pain prn medication given per MAR. Pt states that her pain is improving. Pt has no further needs and is safe with call light in reach.     Plan of Care Reviewed With: patient     Overall Patient Progress: improving

## 2022-05-21 NOTE — PRE-PROCEDURE
GENERAL PRE-PROCEDURE:   Procedure:  Image guided abdominal abscess drainage  Date/Time:  5/21/2022 4:13 PM    Written consent obtained?: Yes    Risks and benefits: Risks, benefits and alternatives were discussed    Consent given by:  Patient  Patient states understanding of procedure being performed: Yes    Patient's understanding of procedure matches consent: Yes    Procedure consent matches procedure scheduled: Yes    Expected level of sedation:  Moderate  Appropriately NPO:  Yes  ASA Class:  2  Mallampati  :  Grade 1- soft palate, uvula, tonsillar pillars, and posterior pharyngeal wall visible  Lungs:  Lungs clear with good breath sounds bilaterally  Heart:  Normal heart sounds and rate  History & Physical reviewed:  History and physical reviewed and no updates needed  Statement of review:  I have reviewed the lab findings, diagnostic data, medications, and the plan for sedation

## 2022-05-21 NOTE — PROGRESS NOTES
[facility]    Medicine Progress Note - Hospitalist Service, GOLD TEAM 4    Date of Admission:  5/20/2022    Assessment & Plan        Laura Gonzalez is a 71 year old female who was admitted on 5/20/2022. 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 Recently admitted 5/13/22 through 5/15/22 after she presented with abdominal pain after EUS guided pancreaticograstrostomy and through and through stent (5/12/22). Noted to have acute pancreatitis and treated with fluid resuscitation. Presented with fever up to 104-105 at home as well as increasing abdominal pain with persistent leukocytosis.     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 Reporting RLQ pain  which evolved to include LLQ/LUQ and around left flank. CT abd/pelvis obtained with organized peripherally enhancing fluid collection in LUQ posterolateral to stomach and superior to spleen in addition to complex fluid and gas filled collection adjacent to lesser curvature of stomach tracking along GE junction and lower esophagus. Also notes single pancreaticogastrostomy stent is in the colon. LFTs, lipase wnl. Overall suspect presentation related to intra-abdominal pathology as above. Low suspicion for other etiology such as UTI (negative UC) or pneumonia given lack of symptoms. WBC increasing and febrile overnight.   - Panc/Bili team following  - Resumed NPO status this morning per GI  recommendations  - IR consulted and plan for Dr. Noguera to perform subcostal liver drain this evening  - Continue zosyn 4.5 q6h   - Pending BC with NGTD   - Pain regimen:   :: Tylenol 650 mg q6h PRN              :: Oxycodone 5-10 mg q6h PRN              :: Giving 1x dose IV dilaudid for severe pain now  - Continue PTA creon with meals  - Trend WBC, LFTs     Bilateral lower extremity edema  Bilateral pleural effusions  Reports ongoing lower extremity edema following aggressive fluid resuscitation during prior admission due to pancreatitis. Swelling improving but remains with some edema. Imaging with small left, trace right pleural effusions. BNP slightly elevated 1861. Not requiring supplement O2 and Echo without evidence of heart failure/diastolic dysfunction.   - Judicious use of fluids  - Encouraged elevation of legs, ambulation     CAD  Asymptomatic atherosclerosis  On repatha every 2 weeks on Wednesdays. Next due on 6/1/2022.  - Continue PTA atorvastatin, ezetimibe     Acute normocytic anemia  No e/o bleeding or hemolysis. Denies melena or hematochezia. Hgb 9.6 (10.9, 10.3)  - Trending Hgb     Asthma - PTA montelukast  Anxiety - PTA lorazepam PRN  Insomnia - PTA zolpidem        Diet: NPO per Anesthesia Guidelines for Procedure/Surgery Except for: Meds    DVT Prophylaxis: Pneumatic Compression Devices  Condon Catheter: Not present  Central Lines: None  Cardiac Monitoring: None  Code Status: Full Code      Disposition Plan   Expected Discharge: 05/23/2022     Anticipated discharge location:  Awaiting care coordination huddle         The patient's care was discussed with the Attending Physician, Dr. Taylor, Bedside Nurse, Patient and GI Team.    Madhuri Geiger PA-C  Hospitalist Service, GOLD TEAM [address] [place] [address]  Securely message with the Vocera Web Console (learn more here)  Text page via [facility] Paging/Directory   Please see signed in provider for up to date coverage  "information      Clinically Significant Risk Factors Present on Admission             # Overweight: Estimated body mass index is 25.22 kg/m  as calculated from the following:    Height as of this encounter: 1.702 m (5' 7\").    Weight as of this encounter: 73 kg (161 lb).      ______________________________________________________________________    Interval History   No acute events overnight. Tmax noted to be 101.7. Feels pain is improving and appetite returning with ability to toleate some fruit and english muffin today. Had a BM yesterday which was closer to normal in characteristic. Continues to have some swelling in her feet but calves are improved in swelling.    4 point ROS is otherwise negative.    Data reviewed today: I reviewed all medications, new labs and imaging results over the last 24 hours. I personally reviewed no images or EKG's today.    Physical Exam   Vital Signs: Temp: 99.5  F (37.5  C) Temp src: Oral BP: 108/52 Pulse: 84   Resp: 16 SpO2: 90 % O2 Device: None (Room air)    Weight: 161 lbs 0 oz  General Appearance: Awake. Alert and oriented x4. Sitting up in chair eating breakfast. Appears comfortable and non-toxic.   Respiratory: Normal work of breathing on room air. Lungs CTAB. No wheezes.  Cardiovascular: RRR. S1, S2. No significant murmurs appreciated. Pedal pulses 2+. 1+ pitting edema in bilateral feet.  GI: Abdomen non-distended. Normoactive, soft. Tender to palpation in epigastric region and LUQ/LLQ.   Skin: Warm, dry. No rashes on exposed skin. No jaundice.  Neuro: No focal deficits. CN II-XII grossly intact.      Data   Recent Labs   Lab 05/21/22  0601 05/20/22  0456 05/15/22  0607   WBC 20.9* 14.2* 13.4*   HGB 9.6* 10.9* 10.3*   MCV 88 86 92    359 211   INR 1.33*  --   --     137 139   POTASSIUM 3.5 3.5 3.8   CHLORIDE 102 103 105   CO2 27 27 30   BUN 11 11 14   CR 0.89 0.72 0.88   ANIONGAP 7 7 4   GLEN 7.6* 8.6 8.2*   * 124* 131*   ALBUMIN 1.8* 2.4* 2.3*   PROTTOTAL " 5.4* 6.3* 5.1*   BILITOTAL 0.6 0.5 0.4   ALKPHOS 154* 140 81   ALT 12 17 18   AST 15 18 21   LIPASE  --  201  --      No results found for this or any previous visit (from the past 24 hour(s)).

## 2022-05-21 NOTE — PROCEDURES
[facility]    Procedure: IR Procedure Note    Date/Time: 5/21/2022 5:19 PM  Performed by: Ashok Molina MD  Authorized by: Ashok Molina MD   IR Fellow Physician:  Radiology Resident Physician: Ashok Molina DO  Other(s) attending procedure: Rubens Dickerson MD      UNIVERSAL PROTOCOL   Site Marked: NA  Prior Images Obtained and Reviewed:  Yes  Required items: Required blood products, implants, devices and special equipment available    Patient identity confirmed:  Verbally with patient, arm band, provided demographic data and hospital-assigned identification number  Patient was reevaluated immediately before administering moderate or deep sedation or anesthesia  Confirmation Checklist:  Patient's identity using two indicators, relevant allergies, procedure was appropriate and matched the consent or emergent situation and correct equipment/implants were available  Time out: Immediately prior to the procedure a time out was called    Universal Protocol: the Joint Commission Universal Protocol was followed    Preparation: Patient was prepped and draped in usual sterile fashion    ESBL (mL):  1     ANESTHESIA    Anesthesia: Local infiltration  Local Anesthetic:  Lidocaine 1% without epinephrine  Anesthetic Total (mL):  10      SEDATION  Patient Sedated: Yes    Sedation Type:  Moderate (conscious) sedation  Sedation:  Fentanyl and midazolam  Vital signs: Vital signs monitored during sedation    See dictated procedure note for full details.  Findings: CT guided left upper quadrant abscess drainage.  Approximately 50 ml of purulent appearing fluid was aspirated and sent to laboratory for analysis.    Specimens: fluid and/or tissue for laboratory analysis and culture    Complications: None    Condition: Stable    Plan: Place to gravity drainage.  Saline flush q shift with 10 ml of saline.  Chart inputs and outputs.  Once output below 10 mL per day  for 3 consecutive days, sinogram should be performed for pancreatic ductal communication prior to drain removal.      PROCEDURE    Patient Tolerance:  Patient tolerated the procedure well with no immediate complications  Length of time physician/provider present for 1:1 monitoring during sedation: 25

## 2022-05-21 NOTE — PLAN OF CARE
"/52 (BP Location: Right arm)   Pulse 84   Temp 99.5  F (37.5  C) (Oral)   Resp 16   Ht 1.702 m (5' 7\")   Wt 73 kg (161 lb)   SpO2 90%   BMI 25.22 kg/m      [id number]  VSS on RA. No blood sugar checks.  in room, supportive. Abdominal pain treated with prn dilaudid x1, oxy x1 and Tylenol x1. NPO. R PIV TKO between abx. LBM 5/20, denies nausea. Voiding WOD. Skin intact. Up SBA. Zosyn q6. Plan for CT guided drainage of bili abscess at 5pm in IR. Will continue to monitor and notify team with changes.                 "

## 2022-05-21 NOTE — PROGRESS NOTES
Patient Name: Laura Gonzalez  Medical Record Number: [id number]  Today's Date: 5/21/2022    Procedure: Abdominal abcess drain placement  Proceduralist: Dr. Dickerson and Dr. Layton  Pathology present: n/a    Procedure Start: 1645  Procedure end: 1710  Sedation medications administered: 125 mcg fentanyl and 2.5 mg versed     Report given to: Marley RN  : n/a    Other Notes: Pt arrived to IR room CT2 from . Consent reviewed. Pt denies any questions or concerns regarding procedure. Pt positioned supine and monitored per protocol. Pt tolerated procedure without any noted complications. 50 mls abcess fluid removed and sent to lab. Pt transferred back to .

## 2022-05-21 NOTE — PROGRESS NOTES
Brief Medicine Cross-Cover Note:    Discussed patient briefly with GI post-procedure who indicated okay to advance to clear liquid diet. If any post-procedural pain, change back to NPO and notify GI immediately. No other overnight recs at present. I appreciate their assistance.    [place] PA-C on 5/21/2022 at 6:54 PM

## 2022-05-22 LAB
ALBUMIN SERPL-MCNC: 2.1 G/DL (ref 3.4–5)
ALP SERPL-CCNC: 140 U/L (ref 40–150)
ALT SERPL W P-5'-P-CCNC: 20 U/L (ref 0–50)
ANION GAP SERPL CALCULATED.3IONS-SCNC: 6 MMOL/L (ref 3–14)
AST SERPL W P-5'-P-CCNC: 29 U/L (ref 0–45)
BASOPHILS # BLD AUTO: 0 10E3/UL (ref 0–0.2)
BASOPHILS NFR BLD AUTO: 0 %
BILIRUB SERPL-MCNC: 0.5 MG/DL (ref 0.2–1.3)
BUN SERPL-MCNC: 11 MG/DL (ref 7–30)
CALCIUM SERPL-MCNC: 8.1 MG/DL (ref 8.5–10.1)
CHLORIDE BLD-SCNC: 104 MMOL/L (ref 94–109)
CO2 SERPL-SCNC: 28 MMOL/L (ref 20–32)
CREAT SERPL-MCNC: 0.85 MG/DL (ref 0.52–1.04)
EOSINOPHIL # BLD AUTO: 0.1 10E3/UL (ref 0–0.7)
EOSINOPHIL NFR BLD AUTO: 1 %
ERYTHROCYTE [DISTWIDTH] IN BLOOD BY AUTOMATED COUNT: 18.6 % (ref 10–15)
GFR SERPL CREATININE-BSD FRML MDRD: 72 ML/MIN/1.73M2
GLUCOSE BLD-MCNC: 110 MG/DL (ref 70–99)
HCT VFR BLD AUTO: 33.7 % (ref 35–47)
HGB BLD-MCNC: 10.6 G/DL (ref 11.7–15.7)
IMM GRANULOCYTES # BLD: 0.1 10E3/UL
IMM GRANULOCYTES NFR BLD: 1 %
INR PPP: 1.21 (ref 0.85–1.15)
LIPASE BODY FLUID SOURCE: NORMAL
LIPASE FLD-CCNC: NORMAL U/L
LYMPHOCYTES # BLD AUTO: 1.4 10E3/UL (ref 0.8–5.3)
LYMPHOCYTES NFR BLD AUTO: 10 %
MCH RBC QN AUTO: 28.4 PG (ref 26.5–33)
MCHC RBC AUTO-ENTMCNC: 31.5 G/DL (ref 31.5–36.5)
MCV RBC AUTO: 90 FL (ref 78–100)
MONOCYTES # BLD AUTO: 1.2 10E3/UL (ref 0–1.3)
MONOCYTES NFR BLD AUTO: 9 %
NEUTROPHILS # BLD AUTO: 10.7 10E3/UL (ref 1.6–8.3)
NEUTROPHILS NFR BLD AUTO: 79 %
NRBC # BLD AUTO: 0 10E3/UL
NRBC BLD AUTO-RTO: 0 /100
PLATELET # BLD AUTO: 413 10E3/UL (ref 150–450)
POTASSIUM BLD-SCNC: 3.7 MMOL/L (ref 3.4–5.3)
PROT SERPL-MCNC: 6.4 G/DL (ref 6.8–8.8)
RBC # BLD AUTO: 3.73 10E6/UL (ref 3.8–5.2)
SODIUM SERPL-SCNC: 138 MMOL/L (ref 133–144)
WBC # BLD AUTO: 13.4 10E3/UL (ref 4–11)

## 2022-05-22 PROCEDURE — 99207 PR APP CREDIT; MD BILLING SHARED VISIT: CPT | Performed by: PHYSICIAN ASSISTANT

## 2022-05-22 PROCEDURE — 99233 SBSQ HOSP IP/OBS HIGH 50: CPT | Mod: FS | Performed by: HOSPITALIST

## 2022-05-22 PROCEDURE — 250N000013 HC RX MED GY IP 250 OP 250 PS 637: Performed by: PHYSICIAN ASSISTANT

## 2022-05-22 PROCEDURE — 120N000011 HC R&B TRANSPLANT UMMC

## 2022-05-22 PROCEDURE — 250N000013 HC RX MED GY IP 250 OP 250 PS 637: Performed by: HOSPITALIST

## 2022-05-22 PROCEDURE — 85610 PROTHROMBIN TIME: CPT | Performed by: PHYSICIAN ASSISTANT

## 2022-05-22 PROCEDURE — 83690 ASSAY OF LIPASE: CPT | Performed by: STUDENT IN AN ORGANIZED HEALTH CARE EDUCATION/TRAINING PROGRAM

## 2022-05-22 PROCEDURE — 250N000011 HC RX IP 250 OP 636: Performed by: PHYSICIAN ASSISTANT

## 2022-05-22 PROCEDURE — 85025 COMPLETE CBC W/AUTO DIFF WBC: CPT | Performed by: STUDENT IN AN ORGANIZED HEALTH CARE EDUCATION/TRAINING PROGRAM

## 2022-05-22 PROCEDURE — 36415 COLL VENOUS BLD VENIPUNCTURE: CPT | Performed by: PHYSICIAN ASSISTANT

## 2022-05-22 PROCEDURE — 250N000013 HC RX MED GY IP 250 OP 250 PS 637: Performed by: STUDENT IN AN ORGANIZED HEALTH CARE EDUCATION/TRAINING PROGRAM

## 2022-05-22 PROCEDURE — 80053 COMPREHEN METABOLIC PANEL: CPT | Performed by: STUDENT IN AN ORGANIZED HEALTH CARE EDUCATION/TRAINING PROGRAM

## 2022-05-22 RX ORDER — HYDROXYZINE HYDROCHLORIDE 25 MG/1
25 TABLET, FILM COATED ORAL
Status: COMPLETED | OUTPATIENT
Start: 2022-05-22 | End: 2022-05-24

## 2022-05-22 RX ORDER — LANOLIN ALCOHOL/MO/W.PET/CERES
3 CREAM (GRAM) TOPICAL
Status: DISCONTINUED | OUTPATIENT
Start: 2022-05-22 | End: 2022-05-25 | Stop reason: HOSPADM

## 2022-05-22 RX ORDER — ESTRADIOL 0.07 MG/D
1 FILM, EXTENDED RELEASE TRANSDERMAL
Status: ON HOLD | COMMUNITY
End: 2022-11-21

## 2022-05-22 RX ORDER — ACETAMINOPHEN 325 MG/1
650 TABLET ORAL EVERY 6 HOURS
Status: DISCONTINUED | OUTPATIENT
Start: 2022-05-22 | End: 2022-05-25 | Stop reason: HOSPADM

## 2022-05-22 RX ADMIN — CALCIUM 1000 MG: 500 TABLET ORAL at 07:28

## 2022-05-22 RX ADMIN — PIPERACILLIN AND TAZOBACTAM 4.5 G: 4; .5 INJECTION, POWDER, FOR SOLUTION INTRAVENOUS at 05:56

## 2022-05-22 RX ADMIN — OXYCODONE HYDROCHLORIDE 10 MG: 5 TABLET ORAL at 20:06

## 2022-05-22 RX ADMIN — ACETAMINOPHEN 650 MG: 325 TABLET ORAL at 02:10

## 2022-05-22 RX ADMIN — OXYCODONE HYDROCHLORIDE 10 MG: 5 TABLET ORAL at 15:45

## 2022-05-22 RX ADMIN — PANCRELIPASE 2 CAPSULE: 24000; 76000; 120000 CAPSULE, DELAYED RELEASE PELLETS ORAL at 17:55

## 2022-05-22 RX ADMIN — ACETAMINOPHEN 325MG 650 MG: 325 TABLET ORAL at 11:46

## 2022-05-22 RX ADMIN — EZETIMIBE 10 MG: 10 TABLET ORAL at 21:27

## 2022-05-22 RX ADMIN — PIPERACILLIN AND TAZOBACTAM 4.5 G: 4; .5 INJECTION, POWDER, FOR SOLUTION INTRAVENOUS at 17:54

## 2022-05-22 RX ADMIN — Medication 10000 UNITS: at 07:28

## 2022-05-22 RX ADMIN — OXYCODONE HYDROCHLORIDE 10 MG: 5 TABLET ORAL at 11:46

## 2022-05-22 RX ADMIN — Medication 500 MG: at 07:29

## 2022-05-22 RX ADMIN — HYDROMORPHONE HYDROCHLORIDE 0.2 MG: 0.2 INJECTION, SOLUTION INTRAMUSCULAR; INTRAVENOUS; SUBCUTANEOUS at 18:43

## 2022-05-22 RX ADMIN — PIPERACILLIN AND TAZOBACTAM 4.5 G: 4; .5 INJECTION, POWDER, FOR SOLUTION INTRAVENOUS at 11:46

## 2022-05-22 RX ADMIN — LORAZEPAM 0.5 MG: 0.5 TABLET ORAL at 02:41

## 2022-05-22 RX ADMIN — ATORVASTATIN CALCIUM 40 MG: 40 TABLET, FILM COATED ORAL at 21:27

## 2022-05-22 RX ADMIN — OXYCODONE HYDROCHLORIDE 10 MG: 5 TABLET ORAL at 02:10

## 2022-05-22 RX ADMIN — HYDROMORPHONE HYDROCHLORIDE 0.2 MG: 0.2 INJECTION, SOLUTION INTRAMUSCULAR; INTRAVENOUS; SUBCUTANEOUS at 12:40

## 2022-05-22 RX ADMIN — OXYCODONE HYDROCHLORIDE 10 MG: 5 TABLET ORAL at 07:35

## 2022-05-22 RX ADMIN — MONTELUKAST 10 MG: 10 TABLET, FILM COATED ORAL at 21:27

## 2022-05-22 RX ADMIN — HYDROMORPHONE HYDROCHLORIDE 0.2 MG: 0.2 INJECTION, SOLUTION INTRAMUSCULAR; INTRAVENOUS; SUBCUTANEOUS at 05:55

## 2022-05-22 RX ADMIN — ACETAMINOPHEN 325MG 650 MG: 325 TABLET ORAL at 17:54

## 2022-05-22 RX ADMIN — ZOLPIDEM TARTRATE 5 MG: 5 TABLET, FILM COATED ORAL at 21:27

## 2022-05-22 ASSESSMENT — ACTIVITIES OF DAILY LIVING (ADL)
ADLS_ACUITY_SCORE: 20

## 2022-05-22 NOTE — PLAN OF CARE
"BP 97/63 (BP Location: Right arm, Cuff Size: Adult Regular)   Pulse 71   Temp 98.2  F (36.8  C) (Oral)   Resp 16   Ht 1.702 m (5' 7\")   Wt 72.5 kg (159 lb 14.4 oz)   SpO2 90%   BMI 25.04 kg/m        Vitally stable, on room air. Pain somewhat unresolved by oxy and dilaudid. PRN ativan. Independent in room. Voiding well. No BM this shift. Drain with minimal (15ml) pink output. Dressing CDI. Clear liquid diet. Tolerating well. Patient stated she would like PRN sleep meds at bedtime.   Will continue to monitor.   "

## 2022-05-22 NOTE — PROGRESS NOTES
INTERVENTIONAL RADIOLOGY Progress note    SUBJECTIVE:No concerning events ovenight.  Exquisitive pain over drain site with movement, not fully relieved by pain meds. No upper chest pain, dyspnea, or pleuritic pain.    Overall generalized abdominal pain as improved with exception of pain at drain site.  No subjective fevers.    OBJECTIVE:      Physical Exam:    LUQ drain in place to bag, small serosanguinous fluid.  Drain site without redness but ttp, sutures intact    B/P: 97/63, T: 98.2, P: 71, R: 16       WBC 13.4 from 20.9 prior day.    Output ~45 serosanguinous in bag since placement.    Afebrile.    CBC RESULTS: Recent Labs   Lab Test 05/22/22  0636   WBC 13.4*   RBC 3.73*   HGB 10.6*   HCT 33.7*   MCV 90   MCH 28.4   MCHC 31.5   RDW 18.6*         Lab Results   Component Value Date    INR 1.21 05/22/2022    INR 1.33 05/21/2022    INR 0.92 05/26/2006     Lab Results   Component Value Date     05/22/2022     05/21/2022     09/24/2018     06/18/2016       Imaging: No recent imaging    ASSESSMENT/PLAN: CT abdominal drain placement 5/21/2022    - Pain at drain site is not unexpected given that approach was the only window to abdominal collection.  - Can consider local Marcaine injection if pain doesn't improve, otherwise continue pain management per primary  - Continue drainage to bag, chart drain outputs and q shift flush    Discussed with staff Dr Jimenez.    Ashok Molina DO  Interventional Radiology Resident  Pager [phone]

## 2022-05-22 NOTE — PLAN OF CARE
"/59   Pulse 58   Temp 98.3  F (36.8  C) (Oral)   Resp 16   Ht 1.702 m (5' 7\")   Wt 71.8 kg (158 lb 3.2 oz)   SpO2 93%   BMI 24.78 kg/m      3085-9851  VSS on RA. A+Ox4. No blood sugar checks. Drain site pain controlled with scheduled Tylenol and prn oxy and IV dilaudid. Full liquid diet. PIV TKO between zosyn q6. L drain to gravity. Irrigating q8. Dsg CDI. Teaching @3pm on Monday with PLC. LBM 5/20, passing gas. Voiding WOD. Skin intact. Up SBA. Pt ambulated in halls. Will continue to monitor and notify team with changes.                     "

## 2022-05-22 NOTE — PLAN OF CARE
Vitals: stable room air.   Blood glucose: NA.   Pain/nausea: tylenol x 1. Oxy x 2. Dilaudid x 1.   Diet: clears. Tolerating fair.  Lines: PIVR TKO NS.   : x 2.   GI: BM last yesterday.   Drains: Left drain OP 30 ml brown. Irrigated x 1. To gravity.   Skin: left drain CDI.   Mobility: up x 1. Sore from drain site.

## 2022-05-22 NOTE — PROGRESS NOTES
[facility]    Medicine Progress Note - Hospitalist Service, GOLD TEAM 4    Date of Admission:  5/20/2022    Assessment & Plan        Laura Gonzalez is a 71 year old female who was admitted on 5/20/2022. 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 Recently admitted 5/13/22 through 5/15/22 after she presented with abdominal pain after EUS guided pancreaticograstrostomy and through and through stent (5/12/22). Noted to have acute pancreatitis and treated with fluid resuscitation. Presented with fever up to 104-105 at home as well as increasing abdominal pain with persistent leukocytosis.     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 Reporting RLQ pain  which evolved to include LLQ/LUQ and around left flank. CT abd/pelvis obtained with organized peripherally enhancing fluid collection in LUQ posterolateral to stomach and superior to spleen in addition to complex fluid and gas filled collection adjacent to lesser curvature of stomach tracking along GE junction and lower esophagus. Also notes single pancreaticogastrostomy stent is in the colon. LFTs, lipase wnl. Overall suspect presentation related to intra-abdominal pathology as above. Underwent CT guided LUQ drainage with drain placed by IR on 5/21. WBC and fever curve improving.  - Panc/Bili and IR teams following  - Okay to advance to full liquid diet  - Continue zosyn 4.5 q6h   - Pending  aspirate cultures   :: Prelim aerobic with 3+ gram positive cocci, 1+ gram positive bacilli   :: Pending anaerobic culture  - Pending BC with NGTD   - Pain regimen:              :: Tylenol 650 mg q6h PRN --> scheduled q6h               :: Oxycodone 5-10 mg q4h PRN  - Continue PTA creon with meals  - Trend WBC, LFTs  - I/O/s from drain, saline flush qshift, once output less than 10 mL per day x 3 consecutive days  - Will need repeat sinogram prior to drain removal    Bilateral lower extremity edema - improving  Bilateral pleural effusions  Ongoing lower extremity edema on admission following aggressive fluid resuscitation during prior admission due to pancreatitis. Imaging with small left, trace right pleural effusions. BNP slightly elevated 1861. Not requiring supplement O2 and Echo without evidence of heart failure/diastolic dysfunction. Edema greatly improved today.  - Judicious use of fluids  - Encouraged elevation of legs, ambulation     CAD  Asymptomatic atherosclerosis  On repatha every 2 weeks on Wednesdays. Next due on 6/1/2022.  - Continue PTA atorvastatin, ezetimibe     Acute normocytic anemia  No e/o bleeding or hemolysis. Denies melena or hematochezia. Hgb 10.6 (9.6, 10.9)  - Trending Hgb     Asthma - PTA montelukast  Anxiety - PTA lorazepam PRN  Insomnia - PTA zolpidem        Diet: Full Liquid Diet    DVT Prophylaxis: Pneumatic Compression Devices/Ambulate  Condon Catheter: Not present  Central Lines: None  Cardiac Monitoring: None  Code Status: Full Code      Disposition Plan   Expected Discharge: 05/25/2022     Anticipated discharge location:  Awaiting care coordination huddle  Delays:    Awaiting cultures for antibiotics plan       The patient's care was discussed with the Attending Physician, Dr. Taylor, Bedside Nurse and Patient.    Madhuri Geiger PA-C  Hospitalist Service, GOLD TEAM [address] [place] [address]  Securely message with the Vocera Web Console (learn  more here)  Text page via [facility] Paging/Directory   Please see signed in provider for up to date coverage information      Clinically Significant Risk Factors Present on Admission                 ______________________________________________________________________    Interval History   No acute events overnight. Doing well today. Having some pain at procedural site. Notes pain worsening when she is due for pain medications. Only low grade fever overnight. Tolerating clears. Lower extremity edema resolved.    4 point ROS is otherwise negative.    Data reviewed today: I reviewed all medications, new labs and imaging results over the last 24 hours. I personally reviewed no images or EKG's today.    Physical Exam   Vital Signs: Temp: 98.2  F (36.8  C) Temp src: Oral BP: 105/54 Pulse: 57   Resp: 16 SpO2: 91 % O2 Device: None (Room air) Oxygen Delivery: 1 LPM  Weight: 158 lbs 3.2 oz    General Appearance:  Awake. Alert and oriented x4. Laying in bed. Appears comfortable and non-toxic.   Respiratory: Normal work of breathing on room air. Lungs CTAB. No wheezes.  Cardiovascular: RRR. S1, S2. No significant murmurs appreciated. Pedal pulses 2+. No peripheral edema.  GI: Abdomen non-distended. Normoactive, soft. Tender to palpation in LUQ/LLQ. No guarding. Drain with minimal pinkish-yellow fluid.  Skin: Warm, dry. No rashes on exposed skin. No jaundice. Dressing in place to LUQ.  Neuro: No focal deficits. CN II-XII grossly intact.      Data   Recent Labs   Lab 05/22/22  0636 05/21/22  0601 05/20/22  0456   WBC 13.4* 20.9* 14.2*   HGB 10.6* 9.6* 10.9*   MCV 90 88 86    357 359   INR 1.21* 1.33*  --     136 137   POTASSIUM 3.7 3.5 3.5   CHLORIDE 104 102 103   CO2 28 27 27   BUN 11 11 11   CR 0.85 0.89 0.72   ANIONGAP 6 7 7   GLEN 8.1* 7.6* 8.6   * 105* 124*   ALBUMIN 2.1* 1.8* 2.4*   PROTTOTAL 6.4* 5.4* 6.3*   BILITOTAL 0.5 0.6 0.5   ALKPHOS 140 154* 140   ALT 20 12 17   AST 29 15 18   LIPASE  --   --  201      Recent Results (from the past 24 hour(s))   CT Abdomen Peritonium Abscess Drainage    Narrative    PROCEDURE: CT-guided drain placement left upper quadrant abdominal  fluid collection    INDICATION: 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 She is status  post EUS guided pancreaticogastrostomy stent placement approximately  one week ago. The stent had migrated out of position and patient  developed a fluid collection containing gas in the left upper quadrant  of the abdomen between the liver and the stomach. Associated fevers  and leukocytosis. Request for CT-guided drainage.    RADIOLOGIST: Rubens Dickerson MD    RESIDENT: Dieter Molina D.O.    MEDICATIONS: Versed 2.5 mg IV, Fentanyl 125 mcg IV. Ministration and  continuous monitoring of intravenous conscious sedation was performed  by the Radiology nurse under my direct supervision.  Vital signs  throughout the procedure remained stable.  Total time of sedation was  25 minutes.     FINDINGS: Prior to the exam, risks and benefits of a CT-guided abscess  drainage was discussed and informed consent was obtained. The patient  was placed in the supine position on the CT scanner. Localizing CT  scan demonstrates the fluid collection in the left upper quadrant of  the abdomen containing gas the as seen on preoperative imaging. There  is adequate window for drainage though the lower margin of the rib  cage is in the path of access. Left upper quadrant of the abdomen is  prepped and draped sterilely.    Using 1% lidocaine for local anesthesia, CT guided puncture is made  into the fluid collection from a left anterolateral approach guided  over a lower anterior rib using an 18-gauge Hernández needle. There is  return of purulent appearing fluid samples of which are sent for  requested laboratory studies. 035 Bentson guidewire is coiled into the  collection. Tract is serially dilated up to a 12  Cypriot skater locking  pigtail drainage catheter positioned in the collection under CT  guidance. After drain placement, a total of 50 mL of fluid was  aspirated from the collection. Follow-up CT scan demonstrates that the  drain is in good position with no visible residual collection seen.     Tube is secured in place with 2-0 Ethilon suture and was placed under  sterile dressing to gravity drainage.    ESTIMATED BLOOD LOSS: Minimal.    COMPLICATIONS: No immediate complications.      Impression    IMPRESSION:   1. CT-guided drainage of left upper quadrant gas containing fluid  collection as described. Total of 50 mL purulent appearing fluid is  aspirated and sent for requested labs. No significant residual  collection seen. Drain is in good position.    Plan is to keep the drain to gravity drainage. It will be flushed with  10 mL normal saline every shift. Outputs should be followed. Once the  outputs fall to less than 10 mL per day for 3 consecutive days,  sinogram should be performed to assess for pancreatic ductal  communication prior to considering drain removal.    ESTEPHANIA PATEL MD         SYSTEM ID:  [id number]

## 2022-05-23 ENCOUNTER — APPOINTMENT (OUTPATIENT)
Dept: EDUCATION SERVICES | Facility: CLINIC | Age: 72
DRG: 372 | End: 2022-05-23
Payer: COMMERCIAL

## 2022-05-23 LAB
ALBUMIN SERPL-MCNC: 1.8 G/DL (ref 3.4–5)
ALP SERPL-CCNC: 104 U/L (ref 40–150)
ALT SERPL W P-5'-P-CCNC: 17 U/L (ref 0–50)
ANION GAP SERPL CALCULATED.3IONS-SCNC: 5 MMOL/L (ref 3–14)
AST SERPL W P-5'-P-CCNC: 19 U/L (ref 0–45)
BASOPHILS # BLD AUTO: 0 10E3/UL (ref 0–0.2)
BASOPHILS NFR BLD AUTO: 1 %
BILIRUB SERPL-MCNC: 0.3 MG/DL (ref 0.2–1.3)
BUN SERPL-MCNC: 8 MG/DL (ref 7–30)
CALCIUM SERPL-MCNC: 7.9 MG/DL (ref 8.5–10.1)
CHLORIDE BLD-SCNC: 107 MMOL/L (ref 94–109)
CO2 SERPL-SCNC: 28 MMOL/L (ref 20–32)
CREAT SERPL-MCNC: 0.73 MG/DL (ref 0.52–1.04)
EOSINOPHIL # BLD AUTO: 0.2 10E3/UL (ref 0–0.7)
EOSINOPHIL NFR BLD AUTO: 3 %
ERYTHROCYTE [DISTWIDTH] IN BLOOD BY AUTOMATED COUNT: 18.6 % (ref 10–15)
GFR SERPL CREATININE-BSD FRML MDRD: 87 ML/MIN/1.73M2
GLUCOSE BLD-MCNC: 112 MG/DL (ref 70–99)
HCT VFR BLD AUTO: 30.7 % (ref 35–47)
HGB BLD-MCNC: 9.6 G/DL (ref 11.7–15.7)
IMM GRANULOCYTES # BLD: 0 10E3/UL
IMM GRANULOCYTES NFR BLD: 1 %
INR PPP: 1.26 (ref 0.85–1.15)
LYMPHOCYTES # BLD AUTO: 1 10E3/UL (ref 0.8–5.3)
LYMPHOCYTES NFR BLD AUTO: 19 %
MCH RBC QN AUTO: 28 PG (ref 26.5–33)
MCHC RBC AUTO-ENTMCNC: 31.3 G/DL (ref 31.5–36.5)
MCV RBC AUTO: 90 FL (ref 78–100)
MONOCYTES # BLD AUTO: 0.7 10E3/UL (ref 0–1.3)
MONOCYTES NFR BLD AUTO: 13 %
NEUTROPHILS # BLD AUTO: 3.6 10E3/UL (ref 1.6–8.3)
NEUTROPHILS NFR BLD AUTO: 63 %
NRBC # BLD AUTO: 0 10E3/UL
NRBC BLD AUTO-RTO: 0 /100
PLATELET # BLD AUTO: 408 10E3/UL (ref 150–450)
POTASSIUM BLD-SCNC: 3.4 MMOL/L (ref 3.4–5.3)
PROT SERPL-MCNC: 5.4 G/DL (ref 6.8–8.8)
RBC # BLD AUTO: 3.43 10E6/UL (ref 3.8–5.2)
SODIUM SERPL-SCNC: 140 MMOL/L (ref 133–144)
WBC # BLD AUTO: 5.5 10E3/UL (ref 4–11)

## 2022-05-23 PROCEDURE — 85025 COMPLETE CBC W/AUTO DIFF WBC: CPT | Performed by: STUDENT IN AN ORGANIZED HEALTH CARE EDUCATION/TRAINING PROGRAM

## 2022-05-23 PROCEDURE — 120N000011 HC R&B TRANSPLANT UMMC

## 2022-05-23 PROCEDURE — 250N000011 HC RX IP 250 OP 636: Performed by: PHYSICIAN ASSISTANT

## 2022-05-23 PROCEDURE — 99207 PR APP CREDIT; MD BILLING SHARED VISIT: CPT | Performed by: PHYSICIAN ASSISTANT

## 2022-05-23 PROCEDURE — 250N000013 HC RX MED GY IP 250 OP 250 PS 637: Performed by: HOSPITALIST

## 2022-05-23 PROCEDURE — 85610 PROTHROMBIN TIME: CPT | Performed by: PHYSICIAN ASSISTANT

## 2022-05-23 PROCEDURE — 82040 ASSAY OF SERUM ALBUMIN: CPT | Performed by: STUDENT IN AN ORGANIZED HEALTH CARE EDUCATION/TRAINING PROGRAM

## 2022-05-23 PROCEDURE — 250N000013 HC RX MED GY IP 250 OP 250 PS 637: Performed by: PHYSICIAN ASSISTANT

## 2022-05-23 PROCEDURE — 99232 SBSQ HOSP IP/OBS MODERATE 35: CPT | Mod: FS | Performed by: HOSPITALIST

## 2022-05-23 PROCEDURE — 250N000013 HC RX MED GY IP 250 OP 250 PS 637: Performed by: STUDENT IN AN ORGANIZED HEALTH CARE EDUCATION/TRAINING PROGRAM

## 2022-05-23 PROCEDURE — 80053 COMPREHEN METABOLIC PANEL: CPT | Performed by: STUDENT IN AN ORGANIZED HEALTH CARE EDUCATION/TRAINING PROGRAM

## 2022-05-23 PROCEDURE — 36415 COLL VENOUS BLD VENIPUNCTURE: CPT | Performed by: PHYSICIAN ASSISTANT

## 2022-05-23 RX ORDER — DOCUSATE SODIUM 100 MG/1
100 CAPSULE, LIQUID FILLED ORAL DAILY
Status: DISCONTINUED | OUTPATIENT
Start: 2022-05-23 | End: 2022-05-25 | Stop reason: HOSPADM

## 2022-05-23 RX ORDER — NYSTATIN 100000/ML
500000 SUSPENSION, ORAL (FINAL DOSE FORM) ORAL 4 TIMES DAILY
Status: DISCONTINUED | OUTPATIENT
Start: 2022-05-23 | End: 2022-05-25 | Stop reason: HOSPADM

## 2022-05-23 RX ORDER — POLYETHYLENE GLYCOL 3350 17 G/17G
17 POWDER, FOR SOLUTION ORAL DAILY
Status: DISCONTINUED | OUTPATIENT
Start: 2022-05-23 | End: 2022-05-25 | Stop reason: HOSPADM

## 2022-05-23 RX ADMIN — Medication 10000 UNITS: at 07:20

## 2022-05-23 RX ADMIN — MONTELUKAST 10 MG: 10 TABLET, FILM COATED ORAL at 21:44

## 2022-05-23 RX ADMIN — CALCIUM 1000 MG: 500 TABLET ORAL at 07:20

## 2022-05-23 RX ADMIN — ACETAMINOPHEN 325MG 650 MG: 325 TABLET ORAL at 12:42

## 2022-05-23 RX ADMIN — OXYCODONE HYDROCHLORIDE 10 MG: 5 TABLET ORAL at 03:43

## 2022-05-23 RX ADMIN — ATORVASTATIN CALCIUM 40 MG: 40 TABLET, FILM COATED ORAL at 21:43

## 2022-05-23 RX ADMIN — ACETAMINOPHEN 325MG 650 MG: 325 TABLET ORAL at 17:36

## 2022-05-23 RX ADMIN — OXYCODONE HYDROCHLORIDE 10 MG: 5 TABLET ORAL at 12:52

## 2022-05-23 RX ADMIN — ACETAMINOPHEN 325MG 650 MG: 325 TABLET ORAL at 00:14

## 2022-05-23 RX ADMIN — POLYETHYLENE GLYCOL 3350 17 G: 17 POWDER, FOR SOLUTION ORAL at 12:42

## 2022-05-23 RX ADMIN — Medication 500 MG: at 07:20

## 2022-05-23 RX ADMIN — PANCRELIPASE 2 CAPSULE: 24000; 76000; 120000 CAPSULE, DELAYED RELEASE PELLETS ORAL at 10:13

## 2022-05-23 RX ADMIN — PIPERACILLIN AND TAZOBACTAM 4.5 G: 4; .5 INJECTION, POWDER, FOR SOLUTION INTRAVENOUS at 00:14

## 2022-05-23 RX ADMIN — EZETIMIBE 10 MG: 10 TABLET ORAL at 21:43

## 2022-05-23 RX ADMIN — OXYCODONE HYDROCHLORIDE 10 MG: 5 TABLET ORAL at 21:43

## 2022-05-23 RX ADMIN — PIPERACILLIN AND TAZOBACTAM 4.5 G: 4; .5 INJECTION, POWDER, FOR SOLUTION INTRAVENOUS at 17:37

## 2022-05-23 RX ADMIN — OXYCODONE HYDROCHLORIDE 10 MG: 5 TABLET ORAL at 00:14

## 2022-05-23 RX ADMIN — PANCRELIPASE 2 CAPSULE: 24000; 76000; 120000 CAPSULE, DELAYED RELEASE PELLETS ORAL at 17:36

## 2022-05-23 RX ADMIN — HYDROMORPHONE HYDROCHLORIDE 0.2 MG: 0.2 INJECTION, SOLUTION INTRAMUSCULAR; INTRAVENOUS; SUBCUTANEOUS at 01:07

## 2022-05-23 RX ADMIN — OXYCODONE HYDROCHLORIDE 10 MG: 5 TABLET ORAL at 17:36

## 2022-05-23 RX ADMIN — PIPERACILLIN AND TAZOBACTAM 4.5 G: 4; .5 INJECTION, POWDER, FOR SOLUTION INTRAVENOUS at 12:42

## 2022-05-23 RX ADMIN — DOCUSATE SODIUM 100 MG: 100 CAPSULE ORAL at 12:42

## 2022-05-23 RX ADMIN — NYSTATIN 500000 UNITS: 100000 SUSPENSION ORAL at 19:24

## 2022-05-23 RX ADMIN — ACETAMINOPHEN 325MG 650 MG: 325 TABLET ORAL at 06:10

## 2022-05-23 RX ADMIN — PANCRELIPASE 2 CAPSULE: 24000; 76000; 120000 CAPSULE, DELAYED RELEASE PELLETS ORAL at 14:25

## 2022-05-23 RX ADMIN — OXYCODONE HYDROCHLORIDE 10 MG: 5 TABLET ORAL at 08:26

## 2022-05-23 RX ADMIN — PIPERACILLIN AND TAZOBACTAM 4.5 G: 4; .5 INJECTION, POWDER, FOR SOLUTION INTRAVENOUS at 06:10

## 2022-05-23 RX ADMIN — ZOLPIDEM TARTRATE 5 MG: 5 TABLET, FILM COATED ORAL at 21:43

## 2022-05-23 ASSESSMENT — ACTIVITIES OF DAILY LIVING (ADL)
ADLS_ACUITY_SCORE: 20

## 2022-05-23 NOTE — PROGRESS NOTES
[id number]  VSS on RA. A+Ox4. No blood sugar checks. Drain site pain controlled with scheduled Tylenol and prn oxy x2. Pt tolerating full liquid diet. PIV TKO between zosyn q6. L drain to gravity. Irrigating q8. Dsg changed this shift. Drain teaching @3pm today. Small BM yesterday. Scheduled stool softeners given. Voiding WOD. Skin intact. Up ad benito. Will continue to monitor and notify team with changes.

## 2022-05-23 NOTE — PROGRESS NOTES
[facility]    Medicine Progress Note - Hospitalist Service, GOLD TEAM 4    Date of Admission:  5/20/2022    Assessment & Plan        Laura Gonzalez is a 71 year old female who was admitted on 5/20/2022. 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 Recently admitted 5/13/22 through 5/15/22 after she presented with abdominal pain after EUS guided pancreaticograstrostomy and through and through stent (5/12/22). Noted to have acute pancreatitis and treated with fluid resuscitation. Presented with fever up to 104-105 at home as well as increasing abdominal pain with persistent leukocytosis.     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 Reporting RLQ pain which evolved to include LLQ/LUQ and around left flank. CT abd/pelvis obtained with organized peripherally enhancing fluid collection in LUQ posterolateral to stomach and superior to spleen in addition to complex fluid and gas filled collection adjacent to lesser curvature of stomach tracking along GE junction and lower esophagus. Also notes single pancreaticogastrostomy stent is in the colon. LFTs, lipase wnl. Overall suspect presentation related to intra-abdominal pathology as above. Underwent CT guided LUQ drainage with drain placed by IR on 5/21. WBC and fever curve improving.  - Panc/Bili and IR teams following  - Continue full liquid diet today, advance when okay with GI   -  Continue zosyn 4.5 q6h pending cultures   - Pending aspirate cultures              :: Prelim aerobic with 3+ gram positive cocci, 1+ gram positive bacilli, 2+ WBC. 2+ Klebsiella pneumoniae               :: Prelim anaerobic culture with no anaerobic organisms isolated after 1 day   - Pending BC with NGTD   - Pain regimen:              :: Tylenol 650 mg q6h PRN --> scheduled q6h               :: Oxycodone 5-10 mg q4h PRN  - Continue PTA creon with meals  - Bowel Regimen:               :: Scheduled Miralax 17g PO daily              :: Scheduled Colace 1 tab PO daily   - Trend WBC, LFTs  - Nutrition consult   - I/O/s from drain, saline flush qshift  - Once output less than 10 mL per day x 3 consecutive days will need repeat sinogram prior to drain removal     Bilateral lower extremity edema - improving  Bilateral pleural effusions  Ongoing lower extremity edema on admission following aggressive fluid resuscitation during prior admission due to pancreatitis. Imaging with small left, trace right pleural effusions. BNP slightly elevated 1861. Not requiring supplement O2 and Echo without evidence of heart failure/diastolic dysfunction. Edema greatly improved today.  - Judicious use of fluids  - Encouraged elevation of legs, ambulation     CAD  Asymptomatic atherosclerosis  On repatha every 2 weeks on Wednesdays. Next due on 6/1/2022.  - Continue PTA atorvastatin, ezetimibe     Acute normocytic anemia  No e/o bleeding or hemolysis. Denies melena or hematochezia. Hgb 9.6 (10.6, 9.6, 10.9)  - Trending Hgb     Asthma - PTA montelukast  Anxiety - PTA lorazepam PRN  Insomnia - PTA zolpidem   Menopausal Sx - PTA estrogen cream once  bring it in - pharmacy to verify       Diet: Full Liquid Diet    DVT Prophylaxis: Pneumatic Compression Devices  Condon Catheter: Not present  Central Lines: None  Cardiac Monitoring: None  Code Status: Full Code      Disposition Plan   Expected Discharge: 05/25/2022     Anticipated discharge  "location:  Awaiting care coordination huddle  Delays: abx plan     The patient's care was discussed with the Attending Physician, Dr. Taylor, Bedside Nurse and Patient.    CAMERON Quintana  Hospitalist Service, GOLD TEAM [address] [place] [address]  Securely message with the Vocera Web Console (learn more here)  Text page via [facility] Paging/Directory   Please see signed in provider for up to date coverage information      Clinically Significant Risk Factors Present on Admission                 ______________________________________________________________________    Interval History   No acute events overnight. Doing well today but is somewhat tearful as she has to miss her granddaughter's 1st birthday today. Having some continued pain at drain site as well as some pruritus which patient believes may be due to her narcotic pain meds and dry skin. Applying lotion to affected areas with some improvement. Having difficulties with BM's but notes this is not abnormal for her. LBM 5/20. No fevers overnight, hemodynamically stable. No SOB or chest pain. Tolerating full liquid diet. Still notes minimal bilateral lower extremity edema, particularly around ankles, but otherwise improving.      4 point ROS is otherwise negative.    Data reviewed today: I reviewed all medications, new labs and imaging results over the last 24 hours.     Physical Exam   Blood pressure 139/84, pulse 62, temperature 97.8  F (36.6  C), temperature source Oral, resp. rate 18, height 1.702 m (5' 7\"), weight 72 kg (158 lb 11.7 oz), SpO2 97 %, not currently breastfeeding.  GENERAL: Alert and oriented x 3. NAD.   HEENT: Anicteric sclera. PERRL. Mucous membranes moist and without lesions.   CV: RRR. S1, S2. No murmurs appreciated.   RESPIRATORY: Effort normal on RA. Lungs CTAB with no wheezing, rales, rhonchi.   GI: Abdomen soft and non distended, bowel sounds present. No tenderness, rebound, guarding. Bandage in left " upper quadrant c/d/i.   MUSCULOSKELETAL: No joint swelling or tenderness. Moves all extremities. No calf tenderness.   NEUROLOGICAL: No focal deficits.   EXTREMITIES: No peripheral edema. Intact bilateral pedal pulses.   SKIN: No jaundice. No rashes.       Data   Recent Labs   Lab 05/23/22  0644 05/22/22  0636 05/21/22  0601 05/20/22  0456   WBC 5.5 13.4* 20.9* 14.2*   HGB 9.6* 10.6* 9.6* 10.9*   MCV 90 90 88 86    413 357 359   INR 1.26* 1.21* 1.33*  --     138 136 137   POTASSIUM 3.4 3.7 3.5 3.5   CHLORIDE 107 104 102 103   CO2 28 28 27 27   BUN 8 11 11 11   CR 0.73 0.85 0.89 0.72   ANIONGAP 5 6 7 7   GLEN 7.9* 8.1* 7.6* 8.6   * 110* 105* 124*   ALBUMIN 1.8* 2.1* 1.8* 2.4*   PROTTOTAL 5.4* 6.4* 5.4* 6.3*   BILITOTAL 0.3 0.5 0.6 0.5   ALKPHOS 104 140 154* 140   ALT 17 20 12 17   AST 19 29 15 18   LIPASE  --   --   --  201       Medications       acetaminophen  650 mg Oral Q6H     amylase-lipase-protease  2 capsule Oral TID w/meals     atorvastatin  40 mg Oral At Bedtime     calcium carbonate (OS-GLEN) 500 mg (elemental) tablet  1,000 mg Oral Daily     docusate sodium  100 mg Oral Daily     ezetimibe  10 mg Oral At Bedtime     magnesium gluconate  500 mg Oral Daily     montelukast  10 mg Oral At Bedtime     piperacillin-tazobactam  4.5 g Intravenous Q6H     polyethylene glycol  17 g Oral Daily     sodium chloride (PF)  10 mL Irrigation Q8H     sodium chloride (PF)  3 mL Intracatheter Q8H     vitamin A  10,000 Units Oral Daily

## 2022-05-23 NOTE — PROGRESS NOTES
"CLINICAL NUTRITION SERVICES - ASSESSMENT NOTE     Nutrition Prescription    RECOMMENDATIONS FOR MDs/PROVIDERS TO ORDER:  Consider the need for supplemental enteral nutrition if patient unable to consume adequate PO intake (at least ~1100 kcal and 50 grams of protein/60% of needs).     Recommend checking vitamin A and D levels.  Has been on supplementation and may be appropriate to reduce/stop.      Malnutrition Status:    Moderate malnutrition in the context of acute illness    Recommendations already ordered by Registered Dietitian (RD):  Oral supplements with meals (patient has list to choose off of)    Calorie counts (5/24-5/26)    Future/Additional Recommendations:  Consider having patient try PO intake without enzymes in 3-6 months and monitor for symptoms.  Once acute inflammation reduces, may have adequate pancreatic function to not require enzymes (per fecal elastase test).      REASON FOR ASSESSMENT  Laura Gonzalez is a/an 71 year old female assessed by the dietitian for Provider Order - poor PO intake, supplements    NUTRITION HISTORY  Patient reports fair PO intake prior to EUS procedure on 5/12/22.  Has been struggling with abdominal pain since the procedure which has limited her diet/oral intake.  She reports she feels like Creon may be helpful with symptoms, however also wondering when she can stop them.  Takes Premier at home from time to time and also likes protein bars. She tries to follow a low saturated fat diet.      CURRENT NUTRITION ORDERS  Diet: Full Liquid  Intake/Tolerance: Ate ~50% of dinner this evening.     LABS  Fecal elastase 3/24/22 - 269 (normal)     MEDICATIONS  Vitamin A 10,000 units daily  Creon 24 - 2 with meals TID     ANTHROPOMETRICS  Height: 170.2 cm (5' 7\")  Most Recent Weight: 72 kg (158 lb 11.7 oz)    IBW: 61.4 kg (117%)  BMI: Normal BMI  Weight History:   Wt Readings from Last 15 Encounters:   05/23/22 72 kg (158 lb 11.7 oz)   05/14/22 79.6 kg (175 lb 7.8 oz)   05/12/22 " 69.9 kg (154 lb 1.6 oz)   03/23/22 68 kg (150 lb)   02/18/22 65.8 kg (145 lb)   08/27/21 67.1 kg (148 lb)   09/24/18 67.6 kg (149 lb)   09/19/17 68.5 kg (151 lb)   06/15/16 71 kg (156 lb 8.4 oz)   05/24/16 67.6 kg (149 lb)   10/03/13 67.1 kg (148 lb)   Dosing Weight: 72 kg (actual wt)    ASSESSED NUTRITION NEEDS  Estimated Energy Needs: 2329-5140 kcals/day (25 - 30 kcals/kg)  Justification: Maintenance  Estimated Protein Needs: grams protein/day (1.2 - 1.5 grams of pro/kg)  Justification: Hypercatabolism with acute illness  Estimated Fluid Needs: 1 mL/kcal or per MD  Justification: Maintenance    PHYSICAL FINDINGS  See malnutrition section below.    MALNUTRITION  % Intake: </= 50% for >/= 5 days (severe)  % Weight Loss: Difficult to assess d/t fluid retention likely  Subcutaneous Fat Loss: Facial region:  mild  Muscle Loss: Facial & jaw region:  mild  Fluid Accumulation/Edema: Mild  Malnutrition Diagnosis: Moderate malnutrition in the context of acute illness    NUTRITION DIAGNOSIS  Inadequate oral intake related to abdominal pain as evidenced by patient on liquid diet with only ~50% intake.     INTERVENTIONS  Implementation  Nutrition Education: Provided education on fecal elastase, oral enzymes, oral supplements available (provided list for patient to choose off of when ordering), discussed calorie counts.     Medical food supplement therapy  Nutrition education for recommended modifications - encouraged small/frequent meals, reviewed high protein sources on liquid diet    Goals  Pt to consume at least 1100 kcal and 50 grams of protein daily (~60% needs)     Monitoring/Evaluation  Progress toward goals will be monitored and evaluated per protocol.    Donna Blanchard MS, RD, LD, CCTD  7A/Obs units, pager [phone]

## 2022-05-23 NOTE — PLAN OF CARE
"/58 (BP Location: Right arm)   Pulse 54   Temp 98.2  F (36.8  C) (Oral)   Resp 18   Ht 1.702 m (5' 7\")   Wt 71.8 kg (158 lb 3.2 oz)   SpO2 94%   BMI 24.78 kg/m      Shift: 0309-7882  VS: VSS on RA, afebrile  Neuro: AOx4  BG: none  Labs: Ca 8.1, lipase from drain 57,436  Respiratory: WDL  Pain/Nausea/PRN: LUQ pain around drain, oxy 10mg x3, IV dilaudid x1; denies nausea; received ambien at bedtime  Diet: full liquid  LDA: PIV TKO, LUQ drain to gravity with 30 ml out  GI/: voiding, LBM 5/22  Skin: WDL, drain dressing CDI  Mobility: UAL  Plan: drain edu today @ 3pm    Handoff given to following RN.                  "

## 2022-05-23 NOTE — CONSULTS
Met with patient and her , Rubens, for drainage tube education. Demonstrated on a model site cares, anchoring the tube, flushing with saline, and how to empty the drainage bag. We discussed when to call with concerns. We then did a bandage change on Laura's drain together. I encouraged both Laura and Rubens to practice flushing the tube along with the bedside nurse when able. Both learners verbalized an understanding of the information given.    Literature given: Handwashing and Skin Care, Drainage Tube Home Care Instructions, Flushing Your Drain with Saline.

## 2022-05-24 LAB
ALBUMIN SERPL-MCNC: 1.8 G/DL (ref 3.4–5)
ALP SERPL-CCNC: 110 U/L (ref 40–150)
ALT SERPL W P-5'-P-CCNC: 16 U/L (ref 0–50)
ANION GAP SERPL CALCULATED.3IONS-SCNC: 1 MMOL/L (ref 3–14)
AST SERPL W P-5'-P-CCNC: 21 U/L (ref 0–45)
BASOPHILS # BLD AUTO: 0 10E3/UL (ref 0–0.2)
BASOPHILS NFR BLD AUTO: 1 %
BILIRUB SERPL-MCNC: 0.4 MG/DL (ref 0.2–1.3)
BUN SERPL-MCNC: 9 MG/DL (ref 7–30)
CALCIUM SERPL-MCNC: 7.9 MG/DL (ref 8.5–10.1)
CHLORIDE BLD-SCNC: 109 MMOL/L (ref 94–109)
CO2 SERPL-SCNC: 32 MMOL/L (ref 20–32)
CREAT SERPL-MCNC: 0.77 MG/DL (ref 0.52–1.04)
DEPRECATED CALCIDIOL+CALCIFEROL SERPL-MC: 32 UG/L (ref 20–75)
EOSINOPHIL # BLD AUTO: 0.1 10E3/UL (ref 0–0.7)
EOSINOPHIL NFR BLD AUTO: 3 %
ERYTHROCYTE [DISTWIDTH] IN BLOOD BY AUTOMATED COUNT: 18.7 % (ref 10–15)
GFR SERPL CREATININE-BSD FRML MDRD: 82 ML/MIN/1.73M2
GLUCOSE BLD-MCNC: 88 MG/DL (ref 70–99)
HCT VFR BLD AUTO: 31.3 % (ref 35–47)
HGB BLD-MCNC: 9.8 G/DL (ref 11.7–15.7)
IMM GRANULOCYTES # BLD: 0 10E3/UL
IMM GRANULOCYTES NFR BLD: 0 %
INR PPP: 1.19 (ref 0.85–1.15)
LYMPHOCYTES # BLD AUTO: 1.4 10E3/UL (ref 0.8–5.3)
LYMPHOCYTES NFR BLD AUTO: 29 %
MCH RBC QN AUTO: 28.2 PG (ref 26.5–33)
MCHC RBC AUTO-ENTMCNC: 31.3 G/DL (ref 31.5–36.5)
MCV RBC AUTO: 90 FL (ref 78–100)
MONOCYTES # BLD AUTO: 0.6 10E3/UL (ref 0–1.3)
MONOCYTES NFR BLD AUTO: 12 %
NEUTROPHILS # BLD AUTO: 2.7 10E3/UL (ref 1.6–8.3)
NEUTROPHILS NFR BLD AUTO: 55 %
NRBC # BLD AUTO: 0 10E3/UL
NRBC BLD AUTO-RTO: 0 /100
PLATELET # BLD AUTO: 481 10E3/UL (ref 150–450)
POTASSIUM BLD-SCNC: 4 MMOL/L (ref 3.4–5.3)
PROT SERPL-MCNC: 5.6 G/DL (ref 6.8–8.8)
RBC # BLD AUTO: 3.48 10E6/UL (ref 3.8–5.2)
SODIUM SERPL-SCNC: 142 MMOL/L (ref 133–144)
WBC # BLD AUTO: 4.8 10E3/UL (ref 4–11)

## 2022-05-24 PROCEDURE — 84590 ASSAY OF VITAMIN A: CPT | Performed by: PHYSICIAN ASSISTANT

## 2022-05-24 PROCEDURE — 36415 COLL VENOUS BLD VENIPUNCTURE: CPT | Performed by: STUDENT IN AN ORGANIZED HEALTH CARE EDUCATION/TRAINING PROGRAM

## 2022-05-24 PROCEDURE — 80053 COMPREHEN METABOLIC PANEL: CPT | Performed by: STUDENT IN AN ORGANIZED HEALTH CARE EDUCATION/TRAINING PROGRAM

## 2022-05-24 PROCEDURE — 93005 ELECTROCARDIOGRAM TRACING: CPT

## 2022-05-24 PROCEDURE — 120N000011 HC R&B TRANSPLANT UMMC

## 2022-05-24 PROCEDURE — 93010 ELECTROCARDIOGRAM REPORT: CPT | Performed by: INTERNAL MEDICINE

## 2022-05-24 PROCEDURE — 250N000011 HC RX IP 250 OP 636: Performed by: PHYSICIAN ASSISTANT

## 2022-05-24 PROCEDURE — 250N000013 HC RX MED GY IP 250 OP 250 PS 637: Performed by: STUDENT IN AN ORGANIZED HEALTH CARE EDUCATION/TRAINING PROGRAM

## 2022-05-24 PROCEDURE — 85610 PROTHROMBIN TIME: CPT | Performed by: PHYSICIAN ASSISTANT

## 2022-05-24 PROCEDURE — 85025 COMPLETE CBC W/AUTO DIFF WBC: CPT | Performed by: STUDENT IN AN ORGANIZED HEALTH CARE EDUCATION/TRAINING PROGRAM

## 2022-05-24 PROCEDURE — 99232 SBSQ HOSP IP/OBS MODERATE 35: CPT | Mod: FS | Performed by: STUDENT IN AN ORGANIZED HEALTH CARE EDUCATION/TRAINING PROGRAM

## 2022-05-24 PROCEDURE — 82306 VITAMIN D 25 HYDROXY: CPT | Performed by: PHYSICIAN ASSISTANT

## 2022-05-24 PROCEDURE — 99233 SBSQ HOSP IP/OBS HIGH 50: CPT | Performed by: PHYSICIAN ASSISTANT

## 2022-05-24 PROCEDURE — 250N000013 HC RX MED GY IP 250 OP 250 PS 637: Performed by: HOSPITALIST

## 2022-05-24 PROCEDURE — 250N000013 HC RX MED GY IP 250 OP 250 PS 637: Performed by: PHYSICIAN ASSISTANT

## 2022-05-24 PROCEDURE — 99222 1ST HOSP IP/OBS MODERATE 55: CPT | Mod: GC | Performed by: INTERNAL MEDICINE

## 2022-05-24 RX ORDER — FLUCONAZOLE 200 MG/1
200 TABLET ORAL EVERY 24 HOURS
Status: DISCONTINUED | OUTPATIENT
Start: 2022-05-24 | End: 2022-05-25 | Stop reason: HOSPADM

## 2022-05-24 RX ADMIN — OXYCODONE HYDROCHLORIDE 10 MG: 5 TABLET ORAL at 12:07

## 2022-05-24 RX ADMIN — OXYCODONE HYDROCHLORIDE 10 MG: 5 TABLET ORAL at 05:24

## 2022-05-24 RX ADMIN — Medication 10000 UNITS: at 08:03

## 2022-05-24 RX ADMIN — NYSTATIN 500000 UNITS: 100000 SUSPENSION ORAL at 17:33

## 2022-05-24 RX ADMIN — NYSTATIN 500000 UNITS: 100000 SUSPENSION ORAL at 11:47

## 2022-05-24 RX ADMIN — ACETAMINOPHEN 325MG 650 MG: 325 TABLET ORAL at 11:47

## 2022-05-24 RX ADMIN — AMOXICILLIN AND CLAVULANATE POTASSIUM 1 TABLET: 875; 125 TABLET, FILM COATED ORAL at 19:47

## 2022-05-24 RX ADMIN — CALCIUM 1000 MG: 500 TABLET ORAL at 08:03

## 2022-05-24 RX ADMIN — Medication 500 MG: at 08:03

## 2022-05-24 RX ADMIN — HYDROXYZINE HYDROCHLORIDE 25 MG: 25 TABLET, FILM COATED ORAL at 00:21

## 2022-05-24 RX ADMIN — POLYETHYLENE GLYCOL 3350 17 G: 17 POWDER, FOR SOLUTION ORAL at 08:02

## 2022-05-24 RX ADMIN — PIPERACILLIN AND TAZOBACTAM 4.5 G: 4; .5 INJECTION, POWDER, FOR SOLUTION INTRAVENOUS at 06:08

## 2022-05-24 RX ADMIN — OXYCODONE HYDROCHLORIDE 10 MG: 5 TABLET ORAL at 17:33

## 2022-05-24 RX ADMIN — DOCUSATE SODIUM 100 MG: 100 CAPSULE ORAL at 08:03

## 2022-05-24 RX ADMIN — ACETAMINOPHEN 325MG 650 MG: 325 TABLET ORAL at 00:02

## 2022-05-24 RX ADMIN — HYDROMORPHONE HYDROCHLORIDE 0.2 MG: 0.2 INJECTION, SOLUTION INTRAMUSCULAR; INTRAVENOUS; SUBCUTANEOUS at 08:00

## 2022-05-24 RX ADMIN — HYDROMORPHONE HYDROCHLORIDE 0.2 MG: 0.2 INJECTION, SOLUTION INTRAMUSCULAR; INTRAVENOUS; SUBCUTANEOUS at 00:21

## 2022-05-24 RX ADMIN — FLUCONAZOLE 200 MG: 200 TABLET ORAL at 19:47

## 2022-05-24 RX ADMIN — NYSTATIN 500000 UNITS: 100000 SUSPENSION ORAL at 19:47

## 2022-05-24 RX ADMIN — PANCRELIPASE 2 CAPSULE: 24000; 76000; 120000 CAPSULE, DELAYED RELEASE PELLETS ORAL at 11:47

## 2022-05-24 RX ADMIN — EZETIMIBE 10 MG: 10 TABLET ORAL at 22:20

## 2022-05-24 RX ADMIN — PIPERACILLIN AND TAZOBACTAM 4.5 G: 4; .5 INJECTION, POWDER, FOR SOLUTION INTRAVENOUS at 00:04

## 2022-05-24 RX ADMIN — PIPERACILLIN AND TAZOBACTAM 4.5 G: 4; .5 INJECTION, POWDER, FOR SOLUTION INTRAVENOUS at 11:46

## 2022-05-24 RX ADMIN — ATORVASTATIN CALCIUM 40 MG: 40 TABLET, FILM COATED ORAL at 22:20

## 2022-05-24 RX ADMIN — ACETAMINOPHEN 325MG 650 MG: 325 TABLET ORAL at 17:34

## 2022-05-24 RX ADMIN — ZOLPIDEM TARTRATE 5 MG: 5 TABLET, FILM COATED ORAL at 22:21

## 2022-05-24 RX ADMIN — MONTELUKAST 10 MG: 10 TABLET, FILM COATED ORAL at 22:20

## 2022-05-24 RX ADMIN — PANCRELIPASE 2 CAPSULE: 24000; 76000; 120000 CAPSULE, DELAYED RELEASE PELLETS ORAL at 17:34

## 2022-05-24 RX ADMIN — NYSTATIN 500000 UNITS: 100000 SUSPENSION ORAL at 08:03

## 2022-05-24 ASSESSMENT — ACTIVITIES OF DAILY LIVING (ADL)
ADLS_ACUITY_SCORE: 20

## 2022-05-24 NOTE — PROGRESS NOTES
[facility]    Medicine Progress Note - Hospitalist Service, GOLD TEAM 4    Date of Admission:  5/20/2022    Assessment & Plan      Laura Gonzalez is a 71 year old female who was admitted on 5/20/2022. 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 Recently admitted 5/13/22 through 5/15/22 after she presented with abdominal pain after EUS guided pancreaticograstrostomy and through and through stent (5/12/22). Noted to have acute pancreatitis and treated with fluid resuscitation. Presented with fever up to 104-105 at home as well as increasing abdominal pain with persistent leukocytosis.     Sepsis suspected secondary to infected pancreatic fluid collection  Pancreticogastrostomy stent migration   Hx of duodenal polyp s/p pylorus sparing Whipple c/b choledocojejunosotmy stricture s/p stenting  Recent EUS guided pancreaticgastrostomy with through and through stenting 5/12/22  Presented with fevers in setting of recent procedures with GI as well as persistent leukocytosis and elevated procal. Reporting RLQ pain which evolved to include LLQ/LUQ and around left flank. CT abd/pelvis obtained with organized peripherally enhancing fluid collection in LUQ posterolateral to stomach and superior to spleen in addition to complex fluid and gas filled collection adjacent to lesser curvature of stomach tracking along GE junction and lower esophagus. Also notes single pancreaticogastrostomy stent is in the colon. LFTs, lipase wnl. Overall suspect presentation related to intra-abdominal pathology as above. Underwent CT guided LUQ drainage with drain placed by IR on 5/21. Fluid cultures with Klebsiella pneumoniae, enterococcus faecium and candida. WBC and fever curve improved.  - Panc/Bili and IR  teams following, appreciate recommendations   - Continue regular adult diet per GI    - Discontinue zosyn 4.5 q6h and start Augmentin 875 BID    - ID consulted, recommended starting fluconazole for candida in fluid collection    - Duration of abx TBD, likely 14 day course pending ID recs   - Pain regimen:              :: Tylenol 650 mg q6h PRN --> scheduled q6h               :: Oxycodone 5-10 mg q4h PRN              ::  Attempt to wean off IV dilaudid  - Continue PTA creon with meals  - Bowel Regimen:               :: Scheduled Miralax 17g PO daily              :: Scheduled Colace 1 tab PO daily   - Trend WBC, LFTs  - I/O/s from drain, saline flush qshift  - Once output less than 10 mL per day x 3 consecutive days will need repeat sinogram prior to drain removal with IR   - Follow up with Dr. Oates in clinic in 2 weeks (GI to arrange)      Bilateral lower extremity edema - improving  Bilateral pleural effusions  Ongoing lower extremity edema on admission following aggressive fluid resuscitation during prior admission due to pancreatitis. Imaging with small left, trace right pleural effusions. BNP slightly elevated 1861. Not requiring supplement O2 and Echo without evidence of heart failure/diastolic dysfunction. Edema greatly improved today.  - Judicious use of fluids  - Encouraged elevation of legs, ambulation     CAD  Asymptomatic atherosclerosis  On repatha every 2 weeks on Wednesdays. Next due on 6/1/2022.  - Continue PTA atorvastatin, ezetimibe     Acute normocytic anemia  No e/o bleeding or hemolysis. Denies melena or hematochezia. Hgb 9.8 (9.6, 10.6, 9.6, 10.9)  - Trending Hgb    Moderate malnutrition    As evident by % intake, subcutaneous fat loss, muscle los and fluid accumulation. PTA on vitamin D and Vitamin A.    - Nutrition consulted   - [place] cts   - Resume regular diet as tolerated   - Follow Vit A and D levels      Asthma - PTA montelukast  Anxiety - PTA lorazepam PRN  Insomnia - PTA  "zolpidem   Menopausal Sx - PTA estrogen cream once  bring it in - pharmacy to verify           Diet: Full Liquid Diet  Snacks/Supplements Adult: Other; Please allow patient to order any supplements with meals.; With Meals  Calorie Counts    DVT Prophylaxis: Pneumatic Compression Devices and Ambulate every shift  Condon Catheter: Not present  Central Lines: None  Cardiac Monitoring: None  Code Status: Full Code      Disposition Plan   Expected Discharge: 05/25/2022     Anticipated discharge location:  Awaiting care coordination huddle  Delays: Pain management with orals      The patient's care was discussed with the Attending Physician, Dr. Abreu, Bedside Nurse, Patient and GI, ID  Consultant.    CAMERON Quintana  Hospitalist Service, [address]  Securely message with the Vocera Web Console (learn more here)  Text page via [facility] Paging/Directory   Please see signed in provider for up to date coverage information      Clinically Significant Risk Factors Present on Admission                    ______________________________________________________________________    Interval History   No acute events overnight. Overall feeling much improved. Attempted to discontinue use of IV dilaudid but required additional doses at 12:30 and 08:00 for continued pain management. Patient notes she is learning to \"move better\" so as not to provoke her pain. Tolerating food well. Had a small BM this morning. Ne fevers, chills, chest pain, SOB or difficulties with urination. Denies palpitations, SOB, lightheadedness. Hopeful for discharge home tomorrow.     Data reviewed today: I reviewed all medications, new labs and imaging results over the last 24 hours.    Physical Exam   Blood pressure (!) 147/76, pulse 63, temperature 98.7  F (37.1  C), temperature source Oral, resp. rate 16, height 1.702 m (5' 7\"), weight 71.9 kg (158 lb 9.6 oz), SpO2 96 %, not currently " breastfeeding.  GENERAL: Alert and oriented x 3. NAD.   HEENT: Anicteric sclera. PERRL. Mucous membranes moist and without lesions.   CV: RRR. S1, S2. No murmurs appreciated.   RESPIRATORY: Effort normal on RA. Lungs CTAB with no wheezing, rales, rhonchi.   GI: Abdomen soft and non distended, bowel sounds present. No tenderness, rebound, guarding. Bandage in left upper quadrant c/d/i.   MUSCULOSKELETAL: No joint swelling or tenderness. Moves all extremities. No calf tenderness.   NEUROLOGICAL: No focal deficits.   EXTREMITIES: No peripheral edema. Intact bilateral pedal pulses.   SKIN: No jaundice. No rashes.       Data   Recent Labs   Lab 05/24/22  0436 05/23/22  0644 05/22/22  0636 05/21/22  0601 05/20/22  0456   WBC 4.8 5.5 13.4*   < > 14.2*   HGB 9.8* 9.6* 10.6*   < > 10.9*   MCV 90 90 90   < > 86   * 408 413   < > 359   INR 1.19* 1.26* 1.21*   < >  --     140 138   < > 137   POTASSIUM 4.0 3.4 3.7   < > 3.5   CHLORIDE 109 107 104   < > 103   CO2 32 28 28   < > 27   BUN 9 8 11   < > 11   CR 0.77 0.73 0.85   < > 0.72   ANIONGAP 1* 5 6   < > 7   GLEN 7.9* 7.9* 8.1*   < > 8.6   GLC 88 112* 110*   < > 124*   ALBUMIN 1.8* 1.8* 2.1*   < > 2.4*   PROTTOTAL 5.6* 5.4* 6.4*   < > 6.3*   BILITOTAL 0.4 0.3 0.5   < > 0.5   ALKPHOS 110 104 140   < > 140   ALT 16 17 20   < > 17   AST 21 19 29   < > 18   LIPASE  --   --   --   --  201    < > = values in this interval not displayed.       Medications       acetaminophen  650 mg Oral Q6H     amoxicillin-clavulanate  1 tablet Oral Q12H JAKE     amylase-lipase-protease  2 capsule Oral TID w/meals     atorvastatin  40 mg Oral At Bedtime     calcium carbonate (OS-GLEN) 500 mg (elemental) tablet  1,000 mg Oral Daily     docusate sodium  100 mg Oral Daily     ezetimibe  10 mg Oral At Bedtime     fluconazole  200 mg Oral Daily     magnesium gluconate  500 mg Oral Daily     montelukast  10 mg Oral At Bedtime     nystatin  500,000 Units Oral 4x Daily     polyethylene glycol  17 g  Oral Daily     sodium chloride (PF)  10 mL Irrigation Q8H     sodium chloride (PF)  3 mL Intracatheter Q8H     vitamin A  10,000 Units Oral Daily

## 2022-05-24 NOTE — PROGRESS NOTES
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 She is now s/p CT-guided drainage of LUQ collection (5/21/22) due to fevers and progressive leukocytosis despite antibiotics. Aspirate cultures with Klebsiella pneumoniae, Enterococcus faecium, Candida albicans. Treated with Zosyn.     # Infected pancreatic fluid collection (LUQ)  # pancreaticogastrostomy stent migration    # recurrent acute pancreatitis ([id number])  # S/P pancreaticogastrostomy stent placement   # Hx of post-pancreaticogastrostomy acute pancreatitis   # Leukocytosis, SIRS - improved    RECOMMENDATIONS:  - Tolerated FLD, ADAT  - Treated with Zosyn, defer to primary team for appropriate oral antibiotics based on susceptibilities    - Daily CBC, CMP, INR while hospitalized  - Continue pancreatic enzymes at same home dose  - Antiemetics/pain control per primary team  - Defer drain management to IR; typically if drain is putting out less than 5cc, IR can consider starting process of drain removal   - Follow up with Dr. Oates in clinic in 2 weeks (we will arrange)     GI will continue to follow    Thank you for involving us in this patient's care. Please do not hesitate to contact the GI service with any questions or  "concerns.     Pt care plan discussed with Dr. Edgar, GI staff physician.    Kaycee Keys PA-C  GI Service  [facility]  Text Page  _______________________________________________________________      Subjective: Nursing notes and 24hr events reviewed. Patient reports primary pain is at the drain site but much improved from yesterday. Pain is accentuated when she \"crunches\" to get out of bed.  Pain is managed on oral analgesics. Tolerated full liquid diet with juices and tomato soup, going to try a sandwich today. Had a large, soft brown BM this morning. No new nausea, vomiting, fevers.     ROS:   4 pt ROS negative unless noted in subjective.     Medications:  Current Facility-Administered Medications   Medication     acetaminophen (TYLENOL) tablet 650 mg     amylase-lipase-protease (CREON 24) 54110-75412 units per EC capsule 2 capsule     atorvastatin (LIPITOR) tablet 40 mg     calcium carbonate 500 mg (elemental) (OSCAL) tablet 1,000 mg     docusate sodium (COLACE) capsule 100 mg     ezetimibe (ZETIA) tablet 10 mg     HYDROmorphone (DILAUDID) injection 0.2 mg     lidocaine (LMX4) cream     lidocaine 1 % 0.1-1 mL     LORazepam (ATIVAN) tablet 0.5 mg     magnesium gluconate (MAGONATE) tablet 500 mg     melatonin tablet 3 mg     montelukast (SINGULAIR) tablet 10 mg     naloxone (NARCAN) injection 0.2 mg    Or     naloxone (NARCAN) injection 0.4 mg    Or     naloxone (NARCAN) injection 0.2 mg    Or     naloxone (NARCAN) injection 0.4 mg     nystatin (MYCOSTATIN) suspension 500,000 Units     ondansetron (ZOFRAN ODT) ODT tab 4 mg    Or     ondansetron (ZOFRAN) injection 4 mg     oxyCODONE (ROXICODONE) tablet 5-10 mg     piperacillin-tazobactam (ZOSYN) 4.5 g vial to attach to  mL bag     polyethylene glycol (MIRALAX) Packet 17 g     polyethylene glycol (MIRALAX) powder 17 g     senna-docusate (SENOKOT-S/PERICOLACE) 8.6-50 MG per tablet 1 tablet     sodium chloride (PF) 0.9% PF flush " "10 mL     sodium chloride (PF) 0.9% PF flush 3 mL     sodium chloride (PF) 0.9% PF flush 3 mL     vitamin A capsule 10,000 Units     zolpidem (AMBIEN) tablet 5 mg       Objective:  Blood pressure (!) 147/76, pulse 63, temperature 98.7  F (37.1  C), temperature source Oral, resp. rate 16, height 1.702 m (5' 7\"), weight 71.9 kg (158 lb 9.6 oz), SpO2 96 %, not currently breastfeeding.  Gen: Sitting in bed. Appears comfortable  HEENT: NCAT. Conjunctiva clear. Sclera anicteric   CV: RRR   Resp: Non-labored breathing  Abd: Soft, tenderness near drain site, ND, no guarding or rebound, +BS. Drain in place LUQ with minimal light yellow/off white output  Msk: no gross deformity  Skin: No jaundice  Ext: warm, well perfused  Neuro: grossly normal  Mental status/Psych: A&O. Asks/answers questions appropriately     PROCEDURES:  Reviewed in EMR    LABS:  BMP  Recent Labs   Lab 05/24/22 0436 05/23/22  0644 05/22/22  0636 05/21/22  0601    140 138 136   POTASSIUM 4.0 3.4 3.7 3.5   CHLORIDE 109 107 104 102   GLEN 7.9* 7.9* 8.1* 7.6*   CO2 32 28 28 27   BUN 9 8 11 11   CR 0.77 0.73 0.85 0.89   GLC 88 112* 110* 105*     CBC  Recent Labs   Lab 05/24/22 0436 05/23/22  0644 05/22/22  0636 05/21/22  0601   WBC 4.8 5.5 13.4* 20.9*   RBC 3.48* 3.43* 3.73* 3.36*   HGB 9.8* 9.6* 10.6* 9.6*   HCT 31.3* 30.7* 33.7* 29.6*   MCV 90 90 90 88   MCH 28.2 28.0 28.4 28.6   MCHC 31.3* 31.3* 31.5 32.4   RDW 18.7* 18.6* 18.6* 18.6*   * 408 413 357     INR  Recent Labs   Lab 05/24/22 0436 05/23/22  0644 05/22/22  0636 05/21/22  0601   INR 1.19* 1.26* 1.21* 1.33*     LFTs  Recent Labs   Lab 05/24/22  0436 05/23/22  0644 05/22/22  0636 05/21/22  0601   ALKPHOS 110 104 140 154*   AST 21 19 29 15   ALT 16 17 20 12   BILITOTAL 0.4 0.3 0.5 0.6   PROTTOTAL 5.6* 5.4* 6.4* 5.4*   ALBUMIN 1.8* 1.8* 2.1* 1.8*      PANC  Recent Labs   Lab 05/20/22  0456   LIPASE 201     CULTURES:   Blood culture:  Results for orders placed or performed during the " hospital encounter of 05/20/22   Blood Culture Peripheral Blood    Specimen: Peripheral Blood   Result Value Ref Range    Culture No growth after 4 days    Blood Culture Peripheral Blood    Specimen: Peripheral Blood   Result Value Ref Range    Culture No growth after 4 days    Results for orders placed or performed during the hospital encounter of 05/13/22   Blood Culture Arm, Left    Specimen: Arm, Left; Blood   Result Value Ref Range    Culture No Growth    Blood Culture Arm, Left    Specimen: Arm, Left; Blood   Result Value Ref Range    Culture No Growth       Urine culture:  Results for orders placed or performed during the hospital encounter of 05/20/22   Urine Culture    Specimen: Urine, Clean Catch   Result Value Ref Range    Culture No Growth      All cultures:  Recent Labs   Lab 05/21/22  1701 05/20/22  0552 05/20/22  0548 05/20/22  0547   CULTURE 2+ Klebsiella pneumoniae*  3+ Enterococcus faecium*  2+ Candida albicans*  Culture in progress No growth after 4 days No Growth No growth after 4 days     IMAGING:  CT-guided abscess drainage: 5/21/22                                                                      IMPRESSION:   1. CT-guided drainage of left upper quadrant gas containing fluid  collection as described. Total of 50 mL purulent appearing fluid is  aspirated and sent for requested labs. No significant residual  collection seen. Drain is in good position.     Plan is to keep the drain to gravity drainage. It will be flushed with  10 mL normal saline every shift. Outputs should be followed. Once the  outputs fall to less than 10 mL per day for 3 consecutive days,  sinogram should be performed to assess for pancreatic ductal  communication prior to considering drain removal.     ESTEPHANIA PATEL MD         SYSTEM ID:  [id number]     CTAP: 5/20/22                                                                      IMPRESSION:    1. Organized, peripherally enhancing fluid collection in the  left  upper quadrant posterolateral to the stomach and superior to the  spleen measuring 8.5 x 6.3 x 4.8 cm.  2. Complex fluid and gas filled collection adjacent to the lesser  curvature of the stomach and tracking along the gastroesophageal  junction and lower esophagus measuring 6.1 x 2.5 x 5.8 cm.  3. The single pigtail pancreaticogastrostomy stent is in the colon.  4. New small left and trace right pleural effusions.

## 2022-05-24 NOTE — PLAN OF CARE
"/67 (BP Location: Right arm)   Pulse 62   Temp 98.5  F (36.9  C) (Oral)   Resp 18   Ht 1.702 m (5' 7\")   Wt 72 kg (158 lb 11.7 oz)   SpO2 96%   BMI 24.86 kg/m   AVSS on RA. Patient continues to have abdominal pain. Prn Iv Dilaudid x 1.  Patient denies nausea. Urine Output - voiding not saving. Bowel Function - pt reports having a small BM yesterday. Drains - abscess drain x 1. Irrigating with 10 ml q 8 hours. Irrigated with 10 ml NS at 2400 and got 10 ml back. Activity - up independently. Education - she had a drain care class yesterday with her . At 2400 she participated in the flushing of her drain and seemed knowledgeable. Slept well in between cares.                        "

## 2022-05-24 NOTE — PROGRESS NOTES
Complained of mouth being sore - nystatin ordered. Abdominal pain with good relief from oxycodone. Liquid diet, poor appetite. Requested ambien for sleep.

## 2022-05-25 VITALS
DIASTOLIC BLOOD PRESSURE: 71 MMHG | RESPIRATION RATE: 18 BRPM | HEART RATE: 52 BPM | BODY MASS INDEX: 23.98 KG/M2 | TEMPERATURE: 98.3 F | OXYGEN SATURATION: 96 % | HEIGHT: 67 IN | SYSTOLIC BLOOD PRESSURE: 127 MMHG | WEIGHT: 152.78 LBS

## 2022-05-25 LAB
ALBUMIN SERPL-MCNC: 1.9 G/DL (ref 3.4–5)
ALP SERPL-CCNC: 106 U/L (ref 40–150)
ALT SERPL W P-5'-P-CCNC: 16 U/L (ref 0–50)
ANION GAP SERPL CALCULATED.3IONS-SCNC: 5 MMOL/L (ref 3–14)
AST SERPL W P-5'-P-CCNC: 20 U/L (ref 0–45)
ATRIAL RATE - MUSE: 60 BPM
BACTERIA BLD CULT: NO GROWTH
BACTERIA BLD CULT: NO GROWTH
BASOPHILS # BLD AUTO: 0 10E3/UL (ref 0–0.2)
BASOPHILS NFR BLD AUTO: 1 %
BILIRUB SERPL-MCNC: 0.2 MG/DL (ref 0.2–1.3)
BUN SERPL-MCNC: 10 MG/DL (ref 7–30)
CALCIUM SERPL-MCNC: 8.2 MG/DL (ref 8.5–10.1)
CHLORIDE BLD-SCNC: 110 MMOL/L (ref 94–109)
CO2 SERPL-SCNC: 26 MMOL/L (ref 20–32)
CREAT SERPL-MCNC: 0.69 MG/DL (ref 0.52–1.04)
DIASTOLIC BLOOD PRESSURE - MUSE: NORMAL MMHG
EOSINOPHIL # BLD AUTO: 0.1 10E3/UL (ref 0–0.7)
EOSINOPHIL NFR BLD AUTO: 2 %
ERYTHROCYTE [DISTWIDTH] IN BLOOD BY AUTOMATED COUNT: 18.8 % (ref 10–15)
GFR SERPL CREATININE-BSD FRML MDRD: >90 ML/MIN/1.73M2
GLUCOSE BLD-MCNC: 101 MG/DL (ref 70–99)
HCT VFR BLD AUTO: 32.6 % (ref 35–47)
HGB BLD-MCNC: 10.2 G/DL (ref 11.7–15.7)
IMM GRANULOCYTES # BLD: 0 10E3/UL
IMM GRANULOCYTES NFR BLD: 1 %
INR PPP: 1.16 (ref 0.85–1.15)
INTERPRETATION ECG - MUSE: NORMAL
LYMPHOCYTES # BLD AUTO: 1.7 10E3/UL (ref 0.8–5.3)
LYMPHOCYTES NFR BLD AUTO: 28 %
MCH RBC QN AUTO: 27.9 PG (ref 26.5–33)
MCHC RBC AUTO-ENTMCNC: 31.3 G/DL (ref 31.5–36.5)
MCV RBC AUTO: 89 FL (ref 78–100)
MONOCYTES # BLD AUTO: 0.6 10E3/UL (ref 0–1.3)
MONOCYTES NFR BLD AUTO: 9 %
NEUTROPHILS # BLD AUTO: 3.6 10E3/UL (ref 1.6–8.3)
NEUTROPHILS NFR BLD AUTO: 59 %
NRBC # BLD AUTO: 0 10E3/UL
NRBC BLD AUTO-RTO: 0 /100
P AXIS - MUSE: 54 DEGREES
PLATELET # BLD AUTO: 536 10E3/UL (ref 150–450)
POTASSIUM BLD-SCNC: 4 MMOL/L (ref 3.4–5.3)
PR INTERVAL - MUSE: 132 MS
PROT SERPL-MCNC: 5.8 G/DL (ref 6.8–8.8)
QRS DURATION - MUSE: 84 MS
QT - MUSE: 452 MS
QTC - MUSE: 452 MS
R AXIS - MUSE: 28 DEGREES
RBC # BLD AUTO: 3.65 10E6/UL (ref 3.8–5.2)
SODIUM SERPL-SCNC: 141 MMOL/L (ref 133–144)
SYSTOLIC BLOOD PRESSURE - MUSE: NORMAL MMHG
T AXIS - MUSE: 52 DEGREES
VENTRICULAR RATE- MUSE: 60 BPM
WBC # BLD AUTO: 6.1 10E3/UL (ref 4–11)

## 2022-05-25 PROCEDURE — 85610 PROTHROMBIN TIME: CPT | Performed by: PHYSICIAN ASSISTANT

## 2022-05-25 PROCEDURE — 250N000013 HC RX MED GY IP 250 OP 250 PS 637: Performed by: PHYSICIAN ASSISTANT

## 2022-05-25 PROCEDURE — 250N000013 HC RX MED GY IP 250 OP 250 PS 637: Performed by: STUDENT IN AN ORGANIZED HEALTH CARE EDUCATION/TRAINING PROGRAM

## 2022-05-25 PROCEDURE — 250N000013 HC RX MED GY IP 250 OP 250 PS 637: Performed by: HOSPITALIST

## 2022-05-25 PROCEDURE — 99233 SBSQ HOSP IP/OBS HIGH 50: CPT | Performed by: PHYSICIAN ASSISTANT

## 2022-05-25 PROCEDURE — 99239 HOSP IP/OBS DSCHRG MGMT >30: CPT | Mod: FS | Performed by: STUDENT IN AN ORGANIZED HEALTH CARE EDUCATION/TRAINING PROGRAM

## 2022-05-25 PROCEDURE — 36415 COLL VENOUS BLD VENIPUNCTURE: CPT | Performed by: STUDENT IN AN ORGANIZED HEALTH CARE EDUCATION/TRAINING PROGRAM

## 2022-05-25 PROCEDURE — 85025 COMPLETE CBC W/AUTO DIFF WBC: CPT | Performed by: STUDENT IN AN ORGANIZED HEALTH CARE EDUCATION/TRAINING PROGRAM

## 2022-05-25 PROCEDURE — 80053 COMPREHEN METABOLIC PANEL: CPT | Performed by: STUDENT IN AN ORGANIZED HEALTH CARE EDUCATION/TRAINING PROGRAM

## 2022-05-25 RX ORDER — NYSTATIN 100000/ML
500000 SUSPENSION, ORAL (FINAL DOSE FORM) ORAL 4 TIMES DAILY
Qty: 140 ML | Refills: 0 | Status: SHIPPED | OUTPATIENT
Start: 2022-05-25 | End: 2022-06-01

## 2022-05-25 RX ORDER — OXYCODONE HYDROCHLORIDE 5 MG/1
5 TABLET ORAL EVERY 6 HOURS PRN
Qty: 30 TABLET | Refills: 0 | Status: SHIPPED | OUTPATIENT
Start: 2022-05-25 | End: 2022-05-25

## 2022-05-25 RX ORDER — DOCUSATE SODIUM 100 MG/1
100 CAPSULE, LIQUID FILLED ORAL DAILY
Qty: 30 CAPSULE | Refills: 0 | Status: SHIPPED | OUTPATIENT
Start: 2022-05-25 | End: 2022-05-25

## 2022-05-25 RX ORDER — OXYCODONE HYDROCHLORIDE 5 MG/1
5 TABLET ORAL EVERY 6 HOURS PRN
Qty: 20 TABLET | Refills: 0 | Status: ON HOLD | OUTPATIENT
Start: 2022-05-25 | End: 2022-06-07

## 2022-05-25 RX ORDER — FLUCONAZOLE 200 MG/1
200 TABLET ORAL EVERY 24 HOURS
Qty: 10 TABLET | Refills: 0 | Status: ON HOLD | OUTPATIENT
Start: 2022-05-25 | End: 2022-06-07

## 2022-05-25 RX ADMIN — Medication 500 MG: at 08:12

## 2022-05-25 RX ADMIN — ACETAMINOPHEN 325MG 650 MG: 325 TABLET ORAL at 12:09

## 2022-05-25 RX ADMIN — Medication 10000 UNITS: at 08:12

## 2022-05-25 RX ADMIN — AMOXICILLIN AND CLAVULANATE POTASSIUM 1 TABLET: 875; 125 TABLET, FILM COATED ORAL at 08:12

## 2022-05-25 RX ADMIN — LORAZEPAM 0.5 MG: 0.5 TABLET ORAL at 00:19

## 2022-05-25 RX ADMIN — ACETAMINOPHEN 325MG 650 MG: 325 TABLET ORAL at 00:14

## 2022-05-25 RX ADMIN — NYSTATIN 500000 UNITS: 100000 SUSPENSION ORAL at 08:12

## 2022-05-25 RX ADMIN — OXYCODONE HYDROCHLORIDE 10 MG: 5 TABLET ORAL at 08:25

## 2022-05-25 RX ADMIN — OXYCODONE HYDROCHLORIDE 10 MG: 5 TABLET ORAL at 00:19

## 2022-05-25 RX ADMIN — ACETAMINOPHEN 325MG 650 MG: 325 TABLET ORAL at 06:39

## 2022-05-25 RX ADMIN — CALCIUM 1000 MG: 500 TABLET ORAL at 08:13

## 2022-05-25 RX ADMIN — PANCRELIPASE 2 CAPSULE: 24000; 76000; 120000 CAPSULE, DELAYED RELEASE PELLETS ORAL at 08:15

## 2022-05-25 ASSESSMENT — ACTIVITIES OF DAILY LIVING (ADL)
ADLS_ACUITY_SCORE: 20
ADLS_ACUITY_SCORE: 24
ADLS_ACUITY_SCORE: 24
ADLS_ACUITY_SCORE: 20
ADLS_ACUITY_SCORE: 20
ADLS_ACUITY_SCORE: 24
ADLS_ACUITY_SCORE: 20
ADLS_ACUITY_SCORE: 20

## 2022-05-25 NOTE — PROGRESS NOTES
Patient discharged to home at 2:59 PM via wheel chair. Accompanied by spouse and staff. Discharge instructions reviewed with patient, opportunity offered to ask questions. Prescriptions filled and sent with patient upon discharge. All belongings sent with patient.    Isatu Sogbeh, RN

## 2022-05-25 NOTE — PROGRESS NOTES
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 She is now s/p CT-guided drainage of LUQ collection (5/21/22) due to fevers and progressive leukocytosis despite antibiotics. Aspirate cultures with Klebsiella pneumoniae, Enterococcus faecium, Candida albicans. Treated with Zosyn, now switched to Augmentin and started on Fluconazole.     # Infected pancreatic fluid collection (LUQ)  # pancreaticogastrostomy stent migration    # recurrent acute pancreatitis ([id number])  # S/P pancreaticogastrostomy stent placement   # Hx of post-pancreaticogastrostomy acute pancreatitis   # Leukocytosis, SIRS - improved    RECOMMENDATIONS:  - Continue antibiotics; agree with Augmentin and fluconazole. Duration per ID, no longer than 2 weeks from drain placement or if drain is removed prior to 2 weeks no more than 3 days after that.   - Daily CBC, CMP, INR while hospitalized  - Continue pancreatic enzymes at same home dose  - Antiemetics/pain control per primary team  - Defer drain management to IR   - Follow up with Dr. Oates in clinic in 2 weeks (we will arrange)     GI will continue to follow    Thank you for involving us in this patient's care. Please do not hesitate to contact the GI service with  any questions or concerns.     Pt care plan discussed with Dr. Damon, GI staff physician.    Kaycee Keys PA-C  GI Service  [facility]  Text Page  _______________________________________________________________      Subjective: Nursing notes and 24hr events reviewed. Patient has tolerated full diet without nausea or increased pain. Pain continues to be localized to drain site and only when she moves. Has had loose stools since starting Augmentin. No new fevers or chills.     ROS:   4 pt ROS negative unless noted in subjective.     Medications:  Current Facility-Administered Medications   Medication     acetaminophen (TYLENOL) tablet 650 mg     amoxicillin-clavulanate (AUGMENTIN) 875-125 MG per tablet 1 tablet     amylase-lipase-protease (CREON 24) 12341-93078 units per EC capsule 2 capsule     atorvastatin (LIPITOR) tablet 40 mg     calcium carbonate 500 mg (elemental) (OSCAL) tablet 1,000 mg     docusate sodium (COLACE) capsule 100 mg     ezetimibe (ZETIA) tablet 10 mg     fluconazole (DIFLUCAN) tablet 200 mg     HYDROmorphone (DILAUDID) injection 0.2 mg     lidocaine (LMX4) cream     lidocaine 1 % 0.1-1 mL     LORazepam (ATIVAN) tablet 0.5 mg     magnesium gluconate (MAGONATE) tablet 500 mg     melatonin tablet 3 mg     montelukast (SINGULAIR) tablet 10 mg     naloxone (NARCAN) injection 0.2 mg    Or     naloxone (NARCAN) injection 0.4 mg    Or     naloxone (NARCAN) injection 0.2 mg    Or     naloxone (NARCAN) injection 0.4 mg     nystatin (MYCOSTATIN) suspension 500,000 Units     ondansetron (ZOFRAN ODT) ODT tab 4 mg    Or     ondansetron (ZOFRAN) injection 4 mg     oxyCODONE (ROXICODONE) tablet 5-10 mg     polyethylene glycol (MIRALAX) Packet 17 g     polyethylene glycol (MIRALAX) powder 17 g     senna-docusate (SENOKOT-S/PERICOLACE) 8.6-50 MG per tablet 1 tablet     sodium chloride (PF) 0.9% PF flush 10 mL     sodium chloride (PF) 0.9% PF flush 3 mL     sodium chloride  "(PF) 0.9% PF flush 3 mL     vitamin A capsule 10,000 Units     zolpidem (AMBIEN) tablet 5 mg       Objective:  Blood pressure (!) 143/81, pulse 62, temperature 97.9  F (36.6  C), temperature source Oral, resp. rate 18, height 1.702 m (5' 7\"), weight 71.9 kg (158 lb 9.6 oz), SpO2 98 %, not currently breastfeeding.  Gen: Sitting in bed. Appears comfortable  HEENT: NCAT. Conjunctiva clear. Sclera anicteric   CV: RRR   Resp: Non-labored breathing  Abd: Soft, tenderness near drain site, ND, no guarding or rebound, +BS. Drain in place LUQ with minimal light yellow/off white output  Msk: no gross deformity  Skin: No jaundice  Ext: warm, well perfused  Neuro: grossly normal  Mental status/Psych: A&O. Asks/answers questions appropriately     PROCEDURES:  Reviewed in EMR    LABS:  BMP  Recent Labs   Lab 05/25/22 0548 05/24/22 0436 05/23/22  0644 05/22/22  0636    142 140 138   POTASSIUM 4.0 4.0 3.4 3.7   CHLORIDE 110* 109 107 104   GLEN 8.2* 7.9* 7.9* 8.1*   CO2 26 32 28 28   BUN 10 9 8 11   CR 0.69 0.77 0.73 0.85   * 88 112* 110*     CBC  Recent Labs   Lab 05/25/22 0548 05/24/22 0436 05/23/22  0644 05/22/22  0636   WBC 6.1 4.8 5.5 13.4*   RBC 3.65* 3.48* 3.43* 3.73*   HGB 10.2* 9.8* 9.6* 10.6*   HCT 32.6* 31.3* 30.7* 33.7*   MCV 89 90 90 90   MCH 27.9 28.2 28.0 28.4   MCHC 31.3* 31.3* 31.3* 31.5   RDW 18.8* 18.7* 18.6* 18.6*   * 481* 408 413     INR  Recent Labs   Lab 05/25/22  0548 05/24/22  0436 05/23/22  0644 05/22/22  0636   INR 1.16* 1.19* 1.26* 1.21*     LFTs  Recent Labs   Lab 05/25/22  0548 05/24/22  0436 05/23/22  0644 05/22/22  0636   ALKPHOS 106 110 104 140   AST 20 21 19 29   ALT 16 16 17 20   BILITOTAL 0.2 0.4 0.3 0.5   PROTTOTAL 5.8* 5.6* 5.4* 6.4*   ALBUMIN 1.9* 1.8* 1.8* 2.1*      PANC  Recent Labs   Lab 05/20/22  0456   LIPASE 201     CULTURES:   Blood culture:  Results for orders placed or performed during the hospital encounter of 05/20/22   Blood Culture Peripheral Blood    " Specimen: Peripheral Blood   Result Value Ref Range    Culture No Growth    Blood Culture Peripheral Blood    Specimen: Peripheral Blood   Result Value Ref Range    Culture No Growth    Results for orders placed or performed during the hospital encounter of 05/13/22   Blood Culture Arm, Left    Specimen: Arm, Left; Blood   Result Value Ref Range    Culture No Growth    Blood Culture Arm, Left    Specimen: Arm, Left; Blood   Result Value Ref Range    Culture No Growth       Urine culture:  Results for orders placed or performed during the hospital encounter of 05/20/22   Urine Culture    Specimen: Urine, Clean Catch   Result Value Ref Range    Culture No Growth      All cultures:  Recent Labs   Lab 05/21/22  1701 05/20/22  0552 05/20/22  0548 05/20/22  0547   CULTURE 2+ Klebsiella pneumoniae*  3+ Enterococcus faecium*  2+ Candida albicans*  Culture in progress No Growth No Growth No Growth     IMAGING:  CT-guided abscess drainage: 5/21/22                                                                      IMPRESSION:   1. CT-guided drainage of left upper quadrant gas containing fluid  collection as described. Total of 50 mL purulent appearing fluid is  aspirated and sent for requested labs. No significant residual  collection seen. Drain is in good position.     Plan is to keep the drain to gravity drainage. It will be flushed with  10 mL normal saline every shift. Outputs should be followed. Once the  outputs fall to less than 10 mL per day for 3 consecutive days,  sinogram should be performed to assess for pancreatic ductal  communication prior to considering drain removal.     ESTEPHANIA PATEL MD         SYSTEM ID:  [id number]     CTAP: 5/20/22                                                                      IMPRESSION:    1. Organized, peripherally enhancing fluid collection in the left  upper quadrant posterolateral to the stomach and superior to the  spleen measuring 8.5 x 6.3 x 4.8 cm.  2. Complex  fluid and gas filled collection adjacent to the lesser  curvature of the stomach and tracking along the gastroesophageal  junction and lower esophagus measuring 6.1 x 2.5 x 5.8 cm.  3. The single pigtail pancreaticogastrostomy stent is in the colon.  4. New small left and trace right pleural effusions.

## 2022-05-25 NOTE — DISCHARGE SUMMARY
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  The following labs/tests are recommended: CBC, CMP.   Follow up candida sensitivities.   Follow up with gastroenterology, Dr. Grant in 2 weeks. Follow up candida   sensitivities.     Follow up with IR once drain output is less than 10 ml per day x 3 days or   with any issues with abdominal drain.     Appointments on [place] and/or [facility] (with [facility] or [facility]   provider or service). Call [phone] if you haven't heard regarding   these appointments within 7 days of discharge.             Unresulted Labs Ordered in the Past 30 Days of this Admission     Date and Time Order Name Status Description    5/24/2022  8:23 AM Vitamin A In process     5/21/2022  4:09 PM Anaerobic bacterial culture Preliminary     5/21/2022  4:09 PM Abscess Aerobic Bacterial Culture Routine with Gram Stain Preliminary       These results will be followed up by GI.     Discharge Disposition   Discharged to home  Condition at discharge: Stable      Hospital Course    Laura Gonzalez is a 71 year old female who was admitted on 5/20/2022. 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 Recently admitted 5/13/22 through 5/15/22 after she presented with abdominal pain after EUS guided pancreaticograstrostomy and through and through stent (5/12/22). Noted to have acute pancreatitis and treated with fluid resuscitation. Presented with fever up to 104-105 at home as well as increasing abdominal pain with persistent leukocytosis.     # Sepsis suspected secondary to infected pancreatic fluid collection  # Pancreticogastrostomy stent migration   # Hx of duodenal polyp s/p pylorus sparing Whipple c/b choledocojejunosotmy stricture s/p stenting  # Recent EUS guided pancreaticgastrostomy with through and through stenting 5/12/22  Presented with fevers in setting of recent procedures with GI as well as persistent leukocytosis and elevated procal. Reporting RLQ pain which evolved to include LLQ/LUQ and around left flank. CT abd/pelvis obtained with organized peripherally enhancing fluid collection in LUQ posterolateral to stomach and superior to spleen in addition to complex fluid and gas filled collection adjacent to lesser curvature of stomach tracking along GE junction and lower esophagus. Also notes single pancreaticogastrostomy stent in the colon. LFTs, lipase wnl. Overall suspect presentation related to intra-abdominal pathology as above. Underwent CT guided LUQ drainage with drain placed by IR on 5/21. Fluid cultures with Klebsiella pneumoniae, enterococcus faecium and candida. Transitioned from Zosyn to Augmentin on 5/24. ID consulted and recommended addition of fluconazole for candida coverage. WBC and fever curve improved.  - Augmentin 875 BID and Fluconazole 200 daily for no longer than 2 weeks OR 3  days after drain removal, whichever comes first.  - Pain control with 5 mg Oxycodone PO q4h PRN and Tylenol  - Continue PTA creon with meals  - Follow up with IR once daily drain outputs <10 mLs x3 days, will need repeat sinogram prior to drain removal  - Follow up with Dr. Oates in clinic in 2 weeks (GI to arrange)    # Thrush   - Nystatin  Oral suspension X 7 days      Chronic/Stable/Resolved   # Bilateral LE Edema/Bilateral Pleural Effusions, resolved - most likely 2/2 aggressive fluid resuscitation during prior admission for pancreatitis. Did not require supplemental O2 or lasix during inpatient stay and Echo without evidence of HF  # CAD/Asymptomatic atherosclerosis - PTA repatha, no doses inpatient; PTA atorvastatin, ezetimibe  # Normocytic Anemia, resolving - Hgb trending upward, most likely anemia of chronic disease. Follow up with PCP with CBC   # Constipation - PRN miralax, senna  # Moderate Malnutrition - Nutrition consulted, patient tolerating PO intake well prior to discharge. PTA Vit A and vitamin D continued. Vitamin D level 32. Continue PTA vitamin D and vitamin A. Follow up Vitamin A level with GI.   # Asthma - PTA montelukast  # Anxiety - PTA lorazepam PRN  # Insomnia - PTA zolpidem, melatonin  # Menopausal Sx - PTA estrogen cream  # Hypocalcemia - PTA CaCO3  # Hypomagnesemia - PTA magnesium oxide       Consultations This Hospital Stay   GI PANCREATICOBILIARY ADULT IP CONSULT  INTERVENTIONAL RADIOLOGY ADULT/PEDS IP CONSULT  PATIENT LEARNING CENTER IP CONSULT  MEDICATION HISTORY IP PHARMACY CONSULT  NUTRITION SERVICES ADULT IP CONSULT  INFECTIOUS DISEASE GENERAL ADULT IP CONSULT    Code Status   Full Code    Time Spent on this Encounter   IYovana PA, personally saw the patient today and spent greater than 30 minutes discharging this patient.       CAMERON Quintana  [facility] [address] [address]  Phone:  [phone]  ______________________________________________________________________    Physical Exam   Vital Signs: Temp: 98.4  F (36.9  C) Temp src: Oral BP: 120/54 Pulse: 56   Resp: 18 SpO2: 95 % O2 Device: None (Room air)    Weight: 152 lbs 12.46 oz  GENERAL: Alert and oriented x 3. NAD.   HEENT: Anicteric sclera. PERRL. Mucous membranes moist and without lesions.   CV: RRR. S1, S2. No murmurs appreciated.   RESPIRATORY: Effort normal on RA. Lungs CTAB with no wheezing, rales, rhonchi.   GI: Abdomen soft and non distended, bowel sounds present. No tenderness, rebound, guarding. Bandage in left upper quadrant c/d/i.   MUSCULOSKELETAL: No joint swelling or tenderness. Moves all extremities. No calf tenderness.   NEUROLOGICAL: No focal deficits.   EXTREMITIES: No peripheral edema. Intact bilateral pedal pulses.   SKIN: No jaundice. No rashes.       Primary Care Physician   MIQUEL ANDERS    Discharge Orders      IR Sinogram Injection Diagnostic     CT Abdomen Pelvis w/o Contrast     Reason for your hospital stay    Dear Laura Gonzalez    You were hospitalized at [facility] with an infected fluid collection in you abdomen for which a drain was placed and you were started on antibiotics/antifungals.  Over your hospitalization your symptoms improved and today you are ready to be discharged to home.  If you develop fever, shortness of breath, light headedness, chest pain or increased output from drain please seek medical attention.    We are suggesting the following medication changes:  - Augmentin one tab two times daily for 10 days or three days after drain is removed   - Fluconazole one tab daily for 10 days or three days after drain is removed     Please get the following tests done:  - Sinogram of drain with IR once output <10 ml per day for three consecutive days     Please set up an appointment with:  - Follow up with your PCP within one week   - Follow up with gastroenterology in  two weeks with Dr. Grant   - Follow up with interventional radiology once drain output is less than 10 ml per day for three days     It was a pleasure meeting with you today. Thank you for allowing me and my team the privilege of caring for you today. You are the reason we are here, and I truly hope we provided you with the excellent service you deserve. Please let us know if there is anything else we can do for you so that we can be sure you are leaving completely satisfied with your care experience.      Take care!  Yovana Victor PA-C  Hospitalist Service     Activity    Your activity upon discharge: activity as tolerated     Drain care    DRAIN CARES:  1) Drain to gravity.  Flush drain once daily with 10 ml of normal saline to maintain patency  2) Monitor and document daily drain outputs. Please subtract the daily flush amount from your totals. Bring drain output tracking to IR follow-up visit.  3) Change dressing once daily or as needed if dressing is soiled or wet (gauze and tape)  4) Call [phone] or [phone] during business hours with questions or concerns regarding drain. For urgent needs after business hours please call the hospital at [phone] and have the IR-On call provider paged.  5) Plan to follow-up with IR once outputs drop to <10 mLs per day consistently x3 days.     Follow Up ([facility])    Follow up with primary care provider, MIQUEL ANDERS, within 7 days for hospital follow- up.  The following labs/tests are recommended: CBC, CMP. Follow up candida sensitivities.   Follow up with gastroenterology, Dr. Grant in 2 weeks. Follow up candida sensitivities.     Follow up with IR once drain output is less than 10 ml per day x 3 days or with any issues with abdominal drain.     Appointments on [place] and/or [facility] (with [facility] or [facility] provider or service). Call [phone] if you haven't heard regarding these appointments within 7 days of discharge.     Diet    Follow this  diet upon discharge:       Regular Diet Adult       Significant Results and Procedures   Most Recent 3 CBC's:Recent Labs   Lab Test 05/25/22  0548 05/24/22  0436 05/23/22  0644   WBC 6.1 4.8 5.5   HGB 10.2* 9.8* 9.6*   MCV 89 90 90   * 481* 408     Most Recent 3 BMP's:Recent Labs   Lab Test 05/25/22  0548 05/24/22  0436 05/23/22  0644    142 140   POTASSIUM 4.0 4.0 3.4   CHLORIDE 110* 109 107   CO2 26 32 28   BUN 10 9 8   CR 0.69 0.77 0.73   ANIONGAP 5 1* 5   GLEN 8.2* 7.9* 7.9*   * 88 112*     Most Recent 2 LFT's:Recent Labs   Lab Test 05/25/22  0548 05/24/22  0436   AST 20 21   ALT 16 16   ALKPHOS 106 110   BILITOTAL 0.2 0.4     Most Recent 3 INR's:Recent Labs   Lab Test 05/25/22  0548 05/24/22  0436 05/23/22  0644   INR 1.16* 1.19* 1.26*   ,   Results for orders placed or performed during the hospital encounter of 05/20/22   XR Chest 2 Views    Narrative    EXAM: XR CHEST 2 VW  LOCATION: [facility]  DATE/TIME: 5/20/2022 6:41 AM    INDICATION: fever  COMPARISON: 06/17/2016      Impression    IMPRESSION: Heart size is normal. There is new streaky bibasilar parenchymal opacity, right greater than left. Appearances favor atelectasis over early infiltrate. Upper lobes are clear. No effusions or CHF. No pneumothorax.   POC US ECHO LIMITED    Impression    Limited Bedside Cardiac Ultrasound, performed and interpreted by me.   Indication: Weakness.  Parasternal long axis, parasternal short axis and apical 4 chamber views were acquired.   Image quality was satisfactory.    Findings:    Global left ventricular function appears intact.  Chambers do not appear dilated.  There is no evidence of free fluid within the pericardium.    IMPRESSION: Grossly normal left ventricular function and chamber size.  No pericardial effusion..     CT Abdomen Pelvis w Contrast    Narrative    EXAMINATION: CT ABDOMEN PELVIS W CONTRAST, 5/20/2022 9:56 AM    TECHNIQUE:  Helical CT  of the abdomen and pelvis with IV contrast.  Coronal and sagittal reformatted images were created, archived and  reviewed at the workstation for further assessment.    CONTRAST: iopamidol (ISOVUE-370) solution 107 mL.    COMPARISON: CT 5/13/2022, ERCP images 5/12/2022     HISTORY: Abdominal abscess/infection suspected; patient with fevers  abdominal pain and recent ERCP with stenting, of note had whipple for  duodenal polyp \R\2019 as well, has chronic pancreatitis.    FINDINGS:     New small left and trace right pleural effusions. Linear  atelectasis/scarring in the lower lungs bilaterally.    Trace pneumobilia. Stable well-circumscribed low-density lesion in  hepatic segment 6. The gallbladder is surgically absent.    New organized fluid collection with peripheral enhancement in the left  upper quadrant superior to the spleen and posterior to the stomach  measuring 8.5 x 6.3 x 4.8 cm (series 4 image 64 and series 3 image  49).    New complex gas and fluid-filled collection with peripheral  enhancement adjacent to the lesser curvature of the stomach, gastric  cardia, and gastroesophageal junction measuring 6.1 x 2.5 are a 5.8 cm  (series 4 image 124, series 3 image 24).    Postsurgical changes of Whipple procedure. Increased thickening/fat  stranding in the alyssa hepatis region. Multiple dilated loops of small  bowel without a focal transition point identified. No pneumatosis or  portal venous gas. A single pigtail stent is present within the  transverse colon. The appendix is surgically absent.    Multiple scattered small pancreatic and mesenteric lymph nodes, likely  reactive.    Circumscribed low density lesion in the left kidney, likely a benign  cyst. Stones are hydronephrosis. Unremarkable bladder. Hysterectomy.  No pelvic masses.    Left total hip arthroplasty. No acute or suspicious osseous  abnormality. Levocurvature of the mid lumbar spine with degenerative  disc changes in the lumbar spine and lower  thoracic spine. Bilateral  breast implants. Aortoiliac atherosclerotic calcifications.      Impression    IMPRESSION:    1. Organized, peripherally enhancing fluid collection in the left  upper quadrant posterolateral to the stomach and superior to the  spleen measuring 8.5 x 6.3 x 4.8 cm.  2. Complex fluid and gas filled collection adjacent to the lesser  curvature of the stomach and tracking along the gastroesophageal  junction and lower esophagus measuring 6.1 x 2.5 x 5.8 cm.  3. The single pigtail pancreaticogastrostomy stent is in the colon.  4. New small left and trace right pleural effusions.    I have personally reviewed the examination and initial interpretation  and I agree with the findings.    ANAND ANTONIO MD         SYSTEM ID:  [id number]   CT Abdomen Peritonium Abscess Drainage    Narrative    PROCEDURE: CT-guided drain placement left upper quadrant abdominal  fluid collection    INDICATION: 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 She is status  post EUS guided pancreaticogastrostomy stent placement approximately  one week ago. The stent had migrated out of position and patient  developed a fluid collection containing gas in the left upper quadrant  of the abdomen between the liver and the stomach. Associated fevers  and leukocytosis. Request for CT-guided drainage.    RADIOLOGIST: Rubens Dickerson MD    RESIDENT: Dieter Molina D.O.    MEDICATIONS: Versed 2.5 mg IV, Fentanyl 125 mcg IV. Ministration and  continuous monitoring of intravenous conscious sedation was performed  by the Radiology nurse under my direct supervision.  Vital signs  throughout the procedure remained stable.  Total time of sedation was  25 minutes.     FINDINGS: Prior to the exam, risks and benefits of a CT-guided abscess  drainage was discussed and informed consent was obtained. The patient  was placed in the supine position on the CT scanner.  Localizing CT  scan demonstrates the fluid collection in the left upper quadrant of  the abdomen containing gas the as seen on preoperative imaging. There  is adequate window for drainage though the lower margin of the rib  cage is in the path of access. Left upper quadrant of the abdomen is  prepped and draped sterilely.    Using 1% lidocaine for local anesthesia, CT guided puncture is made  into the fluid collection from a left anterolateral approach guided  over a lower anterior rib using an 18-gauge Hernández needle. There is  return of purulent appearing fluid samples of which are sent for  requested laboratory studies. 035 Aupixson guidewire is coiled into the  collection. Tract is serially dilated up to a 12 Korean skater locking  pigtail drainage catheter positioned in the collection under CT  guidance. After drain placement, a total of 50 mL of fluid was  aspirated from the collection. Follow-up CT scan demonstrates that the  drain is in good position with no visible residual collection seen.     Tube is secured in place with 2-0 Ethilon suture and was placed under  sterile dressing to gravity drainage.    ESTIMATED BLOOD LOSS: Minimal.    COMPLICATIONS: No immediate complications.      Impression    IMPRESSION:   1. CT-guided drainage of left upper quadrant gas containing fluid  collection as described. Total of 50 mL purulent appearing fluid is  aspirated and sent for requested labs. No significant residual  collection seen. Drain is in good position.    Plan is to keep the drain to gravity drainage. It will be flushed with  10 mL normal saline every shift. Outputs should be followed. Once the  outputs fall to less than 10 mL per day for 3 consecutive days,  sinogram should be performed to assess for pancreatic ductal  communication prior to considering drain removal.    ESTEPHANIA PATEL MD         SYSTEM ID:  [id number]   Echocardiogram Complete     Value    LVEF  60-65%    Narrative     [id number]  [id number]  [id number]  [id number]^ROZ^SANDRA     [facility]  Echocardiography Laboratory  [address] [place] [id number]     Name: BERTHA HOBBS  MRN: [id number]  : 1950  Study Date: 2022 08:25 AM  Age: 71 yrs  Gender: Female  Patient Location: [facility]  Reason For Study: Cardiac Murmur  Ordering Physician: SANDRA COURTNEY  Performed By: KOLBY Cabello     BSA: 1.9 m2  Height: 67 in  Weight: 175 lb  BP: 123/61 mmHg  ______________________________________________________________________________  Procedure  Complete Portable Echo Adult.  ______________________________________________________________________________  Interpretation Summary  No significant valvular abnormalities were noted. No pathologic basis for  murmur identified.  ______________________________________________________________________________  Left Ventricle  Global and regional left ventricular function is normal with an EF of 60-65%.  Left ventricular wall thickness is normal. Left ventricular size is normal.  Left ventricular diastolic function is normal. No regional wall motion  abnormalities are seen.     Right Ventricle  Right ventricular function, chamber size, wall motion, and thickness are  normal.     Atria  Both atria appear normal. The atrial septum is intact as assessed by color  Doppler .     Mitral Valve  The mitral valve is normal. Trace mitral insufficiency is present.     Aortic Valve  The valve leaflets are not well visualized. On Doppler interrogation, there is  no significant stenosis or regurgitation.     Tricuspid Valve  The tricuspid valve is normal. Trace tricuspid insufficiency is present.  Pulmonary artery systolic pressure cannot be assessed.     Pulmonic Valve  The valve leaflets are not well visualized. On Doppler interrogation, there is  no significant stenosis or regurgitation.     Vessels  The thoracic aorta is normal. The pulmonary artery cannot be  assessed.  Dilation of the inferior vena cava is present with normal respiratory  variation in diameter.     Pericardium  No pericardial effusion is present.     Compared to Previous Study  There is no prior study for direct comparison.  ______________________________________________________________________________  MMode/2D Measurements & Calculations  IVSd: 0.94 cm  LVIDd: 5.4 cm  LVIDs: 3.6 cm  LVPWd: 0.87 cm  FS: 33.8 %  LV mass(C)d: 182.9 grams  LV mass(C)dI: 95.7 grams/m2  Ao root diam: 3.6 cm  LVOT diam: 2.3 cm  LVOT area: 4.2 cm2  LA Volume (BP): 57.9 ml  LA Volume Index (BP): 30.3 ml/m2  RWT: 0.32     Doppler Measurements & Calculations  MV E max maria luisa: 82.5 cm/sec  MV A max maria luisa: 77.1 cm/sec  MV E/A: 1.1  MV dec slope: 421.0 cm/sec2  PA V2 max: 89.2 cm/sec  PA max PG: 3.2 mmHg  PA acc time: 0.11 sec  E/E' av.3  Lateral E/e': 6.3  Medial E/e': 12.2     ______________________________________________________________________________  Report approved by: Herson Muniz 2022 09:54 AM               Discharge Medications   Current Discharge Medication List      START taking these medications    Details   amoxicillin-clavulanate (AUGMENTIN) 875-125 MG tablet Take 1 tablet by mouth every 12 hours for 10 days  Qty: 20 tablet, Refills: 0    Associated Diagnoses: Intra-abdominal infection      fluconazole (DIFLUCAN) 200 MG tablet Take 1 tablet (200 mg) by mouth every 24 hours for 10 days  Qty: 10 tablet, Refills: 0    Associated Diagnoses: Intra-abdominal infection      nystatin (MYCOSTATIN) 694802 UNIT/ML suspension Take 5 mLs (500,000 Units) by mouth 4 times daily for 7 days  Qty: 140 mL, Refills: 0    Associated Diagnoses: Thrush      !! sodium chloride, PF, (NORMAL SALINE FLUSH) 0.9% PF flush 10 mLs by Intracatheter route every 24 hours  Qty: 300 mL, Refills: 4    Comments: Please flush abscess drain once daily with 10mLs normal saline to maintain patency  Associated Diagnoses: Intra-abdominal infection       !! sodium chloride, PF, 0.9% PF flush Irrigate with 10 mLs as directed every 8 hours  Qty: 500 mL, Refills: 0    Associated Diagnoses: Intra-abdominal infection       !! - Potential duplicate medications found. Please discuss with provider.      CONTINUE these medications which have CHANGED    Details   oxyCODONE (ROXICODONE) 5 MG tablet Take 1 tablet (5 mg) by mouth every 6 hours as needed for moderate to severe pain (take 5 mg for 5-7/10 pain, 10 mg for 8-10/10 pain)  Qty: 20 tablet, Refills: 0    Associated Diagnoses: Intra-abdominal infection         CONTINUE these medications which have NOT CHANGED    Details   amylase-lipase-protease (CREON) 79177-91747 units CPEP per EC capsule Take 2-3 with meals / 1-2 with snacks, up to 15 per day.  Qty: 450 capsule, Refills: 6    Associated Diagnoses: Acute pancreatitis, unspecified complication status, unspecified pancreatitis type      atorvastatin (LIPITOR) 40 MG tablet Take 40 mg by mouth At Bedtime       Calcium Carb-Cholecalciferol (CALCIUM 1000 + D) 1000-800 MG-UNIT TABS       calcium-magnesium (CALMAG) 500-250 MG TABS Take 2 tablets by mouth daily      cholecalciferol (VITAMIN D3) 25 mcg (1000 units) capsule Take 1 capsule by mouth daily      estradiol (VIVELLE-DOT) 0.075 MG/24HR BIW patch Place 1 patch onto the skin twice a week      evolocumab (REPATHA) 140 MG/ML prefilled autoinjector Inject 140 mg Subcutaneous every 14 days      ezetimibe (ZETIA) 10 MG tablet Take 10 mg by mouth At Bedtime      LORazepam (ATIVAN) 0.5 MG tablet Take 1-2 tablets by mouth twice daily as needed for anxiety      montelukast (SINGULAIR) 10 MG tablet Take 10 mg by mouth At Bedtime       polyethylene glycol (MIRALAX) 17 GM/Dose powder Take 17 g by mouth 2 times daily as needed  Qty: 510 g, Refills: 3    Associated Diagnoses: Acute pancreatitis, unspecified complication status, unspecified pancreatitis type; SBO (small bowel obstruction) (H)      senna-docusate (SENOKOT-S/PERICOLACE)  8.6-50 MG tablet Take 1 tablet by mouth 2 times daily as needed for constipation  Qty: 30 tablet, Refills: 3    Associated Diagnoses: Acute pancreatitis, unspecified complication status, unspecified pancreatitis type      valACYclovir (VALTREX) 1000 mg tablet Take 1,000 mg by mouth daily as needed (cold sore)       VITAMIN A PO Take 3,000 Units by mouth daily      zolpidem (AMBIEN) 10 MG tablet Take 1 tablet by mouth nightly as needed for sleep      estradiol (ESTRACE) 0.1 MG/GM vaginal cream Place 1 g vaginally See Admin Instructions Insert 1 gram vaginally as directed by provider         STOP taking these medications       zolpidem ER (AMBIEN CR) 12.5 MG CR tablet Comments:   Reason for Stopping:             Allergies   Allergies   Allergen Reactions     Tramadol Other (See Comments)     Has a hyperactive reaction and can't sleep

## 2022-05-25 NOTE — PROGRESS NOTES
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 Cultures with Klebsiella pneumoniae, Enterococcus faecium, and candida albicans. At this point, she is afebrile with resolving WBC count and clinically well appearing. Initially started on Zosyn which can be narrowed to Augmentin for discharge. Would add fluconazole for the time being. Duration is unknown and based on clinical response but would suggest no more than 2 weeks from drain placement or if drain is removed prior to 2 weeks - no more that 3 days after. Will defer to IR and GI for follow up. If antimicrobials are felt needed longer than 2 weeks, would request ID be consulted on an outpatient basis.    RECOMMENDATION:  1. Transition to Augmentin 875mg BID   I reviewed monitoring for side effects including CDI  2. Start PO fluconazole 200mg qday   I've requested susceptibles on the candida albicans in case issues arise in the future  3. Duration to be determined by IR and GI as discussed above  4. No specific ID follow up but if issues arise, please re-consult ID    The patient's care was discussed with the Attending Physician, Dr. Evans, Patient and Primary team.    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 This was complicated by pancreatitis but ultimately developed progressive night sweats and fever shortly prior to admission. Peripancreatic abscess was found on CT and IR placed drain on 5/21. Purulent material expressed and cultures with  Klebsiella pneumoniae, Enterococcus faecium, and candida albicans. ID consulted on 5/24 for antimicrobial management.    Today, she feels significantly improved and expected to discharge tomorrow. Night sweats and fevers resolved. Drain output decreasing. Denies ongoing issues.       Review of Systems   The 10 point Review of Systems is negative other than noted in the HPI or here.    Past Medical History    Past Medical History:   Diagnosis Date     Asthma     pt.states no longer has symptoms     CAD (coronary artery disease)      HLD (hyperlipidemia)        Past Surgical History   Past Surgical History:   Procedure Laterality Date     APPENDECTOMY       ARTHROPLASTY HIP Left 6/15/2016    Procedure: ARTHROPLASTY HIP;  Surgeon: Jeffy Wolff MD;  Location: [facility] OR     COLONOSCOPY  10/3/2013    Procedure: COMBINED COLONOSCOPY, SINGLE BIOPSY/POLYPECTOMY BY BIOPSY;;  Surgeon: Kenney Walls MD;  Location:  GI     ENDOSCOPIC ULTRASOUND UPPER GASTROINTESTINAL TRACT (GI) N/A 5/12/2022    Procedure: ENDOSCOPIC ULTRASOUND WITH PANCREATICOGASTROSTOMY CREATION, TRACT DILATION, STENT PLACEMENT;  Surgeon: Romaine Oates MD;  Location: [facility] OR     ESOPHAGOSCOPY, GASTROSCOPY, DUODENOSCOPY (EGD), COMBINED N/A  2022    Procedure: ESOPHAGOGASTRODUODENOSCOPY WITH ENDOSCOPIC CLIP PLACEMENT;  Surgeon: Arnaldo Noguera MD;  Location: [facility] OR     FOOT SURGERY       HC TOOTH EXTRACTION W/FORCEP       HYSTERECTOMY       INCISION AND DRAINAGE BREAST Left 2022    Procedure: evacuate hematoma left  BREAST;  Surgeon: Dayron Garcia MD;  Location: [facility] OR       Social History   Social History     Tobacco Use     Smoking status: Former Smoker     Quit date: 10/3/1988     Years since quittin.6     Smokeless tobacco: Never Used   Substance Use Topics     Alcohol use: No     Drug use: No       Medications   I have reviewed this patient's current medications  Current Facility-Administered Medications   Medication     acetaminophen (TYLENOL) tablet 650 mg     amoxicillin-clavulanate (AUGMENTIN) 875-125 MG per tablet 1 tablet     amylase-lipase-protease (CREON 24) 39662-32414 units per EC capsule 2 capsule     atorvastatin (LIPITOR) tablet 40 mg     calcium carbonate 500 mg (elemental) (OSCAL) tablet 1,000 mg     docusate sodium (COLACE) capsule 100 mg     ezetimibe (ZETIA) tablet 10 mg     fluconazole (DIFLUCAN) tablet 200 mg     HYDROmorphone (DILAUDID) injection 0.2 mg     lidocaine (LMX4) cream     lidocaine 1 % 0.1-1 mL     LORazepam (ATIVAN) tablet 0.5 mg     magnesium gluconate (MAGONATE) tablet 500 mg     melatonin tablet 3 mg     montelukast (SINGULAIR) tablet 10 mg     naloxone (NARCAN) injection 0.2 mg    Or     naloxone (NARCAN) injection 0.4 mg    Or     naloxone (NARCAN) injection 0.2 mg    Or     naloxone (NARCAN) injection 0.4 mg     nystatin (MYCOSTATIN) suspension 500,000 Units     ondansetron (ZOFRAN ODT) ODT tab 4 mg    Or     ondansetron (ZOFRAN) injection 4 mg     oxyCODONE (ROXICODONE) tablet 5-10 mg     polyethylene glycol (MIRALAX) Packet 17 g     polyethylene glycol (MIRALAX) powder 17 g     senna-docusate (SENOKOT-S/PERICOLACE) 8.6-50 MG per tablet 1 tablet     sodium chloride (PF) 0.9% PF  flush 10 mL     sodium chloride (PF) 0.9% PF flush 3 mL     sodium chloride (PF) 0.9% PF flush 3 mL     vitamin A capsule 10,000 Units     zolpidem (AMBIEN) tablet 5 mg       Allergies   Allergies   Allergen Reactions     Tramadol Other (See Comments)     Has a hyperactive reaction and can't sleep        Physical Exam   Vital Signs: Temp: 98.6  F (37  C) Temp src: Oral BP: 130/89 Pulse: 63   Resp: 16 SpO2: 98 % O2 Device: None (Room air)    Weight: 158 lbs 9.6 oz    Constitutional: awake, alert, cooperative, no apparent distress, and appears stated age  Eyes: Lids and lashes normal, pupils equal, round and reactive to light, extra ocular muscles intact, sclera clear, conjunctiva normal  ENT: Normocephalic, without obvious abnormality, atraumatic, external ears without lesions, oral pharynx with moist mucous membranes, tonsils without erythema or exudates, gums normal and good dentition.  Hematologic / Lymphatic: no cervical lymphadenopathy and no supraclavicular lymphadenopathy  Respiratory: No increased work of breathing, good air exchange, clear to auscultation bilaterally, no crackles or wheezing  Cardiovascular: Normal apical impulse, regular rate and rhythm, normal S1 and S2, no S3 or S4, and no murmur noted  GI: soft, mild TTP, LUQ drain with purulent output  Skin: no bruising or bleeding, normal skin color, texture, turgor and no redness, warmth, or swelling  Musculoskeletal: There is no redness, warmth, or swelling of the joints.  Full range of motion noted.  Motor strength is 5 out of 5 all extremities bilaterally.  Tone is normal.  Neurologic: Awake, alert, oriented to name, place and time.  Cranial nerves II-XII are grossly intact.  Neuropsychiatric: General: normal, calm and normal eye contact      Data   Inflammatory Markers   Recent Labs   Lab Test 04/20/22  1348   SED 14   CRP <2.9        Hematology Studies   Recent Labs   Lab Test 05/24/22  0436 05/23/22  0644 05/22/22  0636 05/21/22  0601  05/20/22  0456 05/15/22  0607   WBC 4.8 5.5 13.4* 20.9* 14.2* 13.4*   HGB 9.8* 9.6* 10.6* 9.6* 10.9* 10.3*   MCV 90 90 90 88 86 92   * 408 413 357 359 211       Metabolic Studies   Recent Labs   Lab Test 05/24/22  0436 05/23/22  0644 05/22/22  0636 05/21/22  0601 05/20/22  0456    140 138 136 137   POTASSIUM 4.0 3.4 3.7 3.5 3.5   CHLORIDE 109 107 104 102 103   CO2 32 28 28 27 27   BUN 9 8 11 11 11   CR 0.77 0.73 0.85 0.89 0.72   GFRESTIMATED 82 87 72 69 88       Hepatic Studies    Recent Labs   Lab Test 05/24/22  0436 05/23/22  0644 05/22/22  0636 05/21/22  0601 05/20/22  0456 05/15/22  0607   BILITOTAL 0.4 0.3 0.5 0.6 0.5 0.4   ALKPHOS 110 104 140 154* 140 81   ALBUMIN 1.8* 1.8* 2.1* 1.8* 2.4* 2.3*   AST 21 19 29 15 18 21   ALT 16 17 20 12 17 18       Most Recent 6 Bacteria Isolates From Any Culture (See EPIC Reports for Culture Details):No lab results found.    Urine Studies    Recent Labs   Lab Test 05/20/22  0546 09/24/18  2116 06/15/16  0840   LEUKEST Negative Negative Negative   WBCU 1 2  --        Vancomycin Levels  No lab results found.    Invalid input(s): VANCO    Hepatitis B Testing No lab results found.  Hepatitis C Testing   No results found for: HCVAB, HQTG, HCGENO, HCPCR, HQTRNA, HEPRNA  HIVTesting No lab results found.    Respiratory Virus Testing    No results found for: RS, FLUAG  COVID-19 Antibody Results, Testing for Immunity    COVID-19 Antibody Results, Testing for Immunity   No data to display.         COVID-19 PCR Results    COVID-19 PCR Results 6/24/20 9/3/20 9/8/20 9/28/20 10/21/20 8/27/21 2/18/22 5/10/22 5/13/22 5/20/22   COVID-19 Virus by PCR (External Result) Undetected Undetected Undetected Undetected Undetected        SARS CoV2 PCR      Negative Negative Negative Negative Negative      Comments are available for some flowsheets but are not being displayed.

## 2022-05-25 NOTE — PROGRESS NOTES
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 Cultures with Klebsiella pneumoniae, Enterococcus faecium, and candida albicans.    IR placed a 12F skater locking pigtail catheter into the LUQ on 5/21. Drain care recommendations are as follows.    DRAIN CARES:  1) Flush drain once daily with 10 ml of normal saline to maintain patency  2) Monitor and document daily drain outputs. Please subtract the daily flush amount from your totals. Bring drain output tracking to IR follow-up visit.  3) Change dressing once daily or as needed if dressing is soiled or wet (gauze and tape)  4) Call [phone] or [phone] during business hours with questions or concerns regarding drain. For urgent needs after business hours please call the hospital at [phone] and have the IR-On call provider paged.  5) Plan to follow-up with IR once outputs drop to <10 mLs per day consistently x3 days. Orders placed.    Chioma Lundberg DNP, APRN  Interventional Radiology   IR on-call pager: [phone]

## 2022-05-25 NOTE — PROGRESS NOTES
"VS: BP (!) 143/81 (BP Location: Right arm)   Pulse 62   Temp 97.9  F (36.6  C) (Oral)   Resp 18   Ht 1.702 m (5' 7\")   Wt 71.9 kg (158 lb 9.6 oz)   SpO2 98%   BMI 24.84 kg/m      [id number]  Current condition: VSS on room air  Neuro: Alert and oriented x4  Cardio: WNL  Respiratory: Lung sounds clear and equal bilaterally  GI/: Stool x1, voiding  Skin: Intact  Diet: Regular, 75% intake of dinner     Labs: Morning labs pending  BG: None  LDA: PIV on left forearm, painful when flushing, pt refused to have removed    Mobility: UAL    Pain: Pt requested oxy x1 at bedtime for abdominal pain at drain site  PRN medications: Ambien and Ativan x1 at bedtime  Changes:   Plan of Care: Will likely discharge home with oral antibiotics today, continue to follow plan of care, notify MD with any changes  "

## 2022-05-25 NOTE — PROGRESS NOTES
Calorie Count  Intake recorded for: 5/24  Total Kcals: 227 Total Protein: 20g  Kcals from Hospital Food: 227   Protein: 20g  Kcals from Outside Food (average):0 Protein: 0g  # Meals Ordered from Kitchen: 2  # Meals Recorded: 1 (Meal 1: 75% chicken salad sandwich, 50% sliced carrots)  # Supplements Recorded: 0

## 2022-05-26 ENCOUNTER — PATIENT OUTREACH (OUTPATIENT)
Dept: GASTROENTEROLOGY | Facility: CLINIC | Age: 72
End: 2022-05-26
Payer: COMMERCIAL

## 2022-05-26 ENCOUNTER — PATIENT OUTREACH (OUTPATIENT)
Dept: CARE COORDINATION | Facility: CLINIC | Age: 72
End: 2022-05-26
Payer: COMMERCIAL

## 2022-05-26 DIAGNOSIS — Z71.89 OTHER SPECIFIED COUNSELING: ICD-10-CM

## 2022-05-26 LAB
ANNOTATION COMMENT IMP: ABNORMAL
RETINYL PALMITATE SERPL-MCNC: 0.03 MG/L
VIT A SERPL-MCNC: 0.17 MG/L

## 2022-05-26 NOTE — CONFIDENTIAL NOTE
Called pt to discuss follow up clinic visit with Dr Oates. Left  offering 2-3 week follow up date of 6/13    Dr Noguera to also see pt, date yet to be determined.    1355  Pt returned call, would be available for an in person visit on 6/13 1:40pm. Message routed to clinic coordinators    Myesha Gurrola RN, BSN,   [facility]  Care coordinator

## 2022-05-26 NOTE — PROGRESS NOTES
Clinic Care Coordination Contact  [facility]: Post-Discharge Note  SITUATION                                                      Admission:    Admission Date: 05/20/22   Reason for Admission: Sepsis suspected secondary to infected pancreatic fluid collection  Discharge:   Discharge Date: 05/25/22  Discharge Diagnosis: Sepsis suspected secondary to infected pancreatic fluid collection    BACKGROUND                                                      Per hospital discharge summary and inpatient provider notes:    Laura Gonzalez is a 71 year old female who was admitted on 5/20/2022. 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 Recently admitted 5/13/22 through 5/15/22 after she presented with abdominal pain after EUS guided pancreaticograstrostomy and through and through stent (5/12/22). Noted to have acute pancreatitis and treated with fluid resuscitation. Presented with fever up to 104-105 at home as well as increasing abdominal pain with persistent leukocytosis.       ASSESSMENT      Enrollment  Primary Care Care Coordination Status: Not a Candidate    Discharge Assessment  How are you doing now that you are home?: Patient reports she is good, no questions  How are your symptoms? (Red Flag symptoms escalate to triage hotline per guidelines): Improved  Do you feel your condition is stable enough to be safe at home until your provider visit?: Yes  Does the patient have their discharge instructions? : Yes  Does the patient have questions regarding their discharge instructions? : No  Were you started on any new medications or were there changes to any of your previous medications? : Yes  Does the patient have all of their medications?: Yes  Do you have questions regarding any of your medications? : No  Discharge follow-up  appointment scheduled within 14 calendar days? : Yes  Discharge Follow Up Appointment Date: 06/13/22  Discharge Follow Up Appointment Scheduled with?: Specialty Care Provider                PLAN                                                      Outpatient Plan:      Follow-up Appointments     Follow Up ([facility])      Follow up with primary care provider, MIQUEL ANDERS, within 7 days for   hospital follow- up.  The following labs/tests are recommended: CBC, CMP.   Follow up candida sensitivities.   Follow up with gastroenterology, Dr. Grant in 2 weeks. Follow up candida   sensitivities.     Follow up with IR once drain output is less than 10 ml per day x 3 days or   with any issues with abdominal drain.        Future Appointments   Date Time Provider Department Center   6/13/2022  1:40 PM Romaine Oates MD [facility]         For any urgent concerns, please contact our 24 hour nurse triage line: [phone] ([phone])         CHRISTIANO Martinez  [phone]  [facility]

## 2022-05-27 ENCOUNTER — PATIENT OUTREACH (OUTPATIENT)
Dept: GASTROENTEROLOGY | Facility: CLINIC | Age: 72
End: 2022-05-27
Payer: COMMERCIAL

## 2022-05-27 DIAGNOSIS — Z11.59 ENCOUNTER FOR SCREENING FOR OTHER VIRAL DISEASES: Primary | ICD-10-CM

## 2022-05-27 LAB
BACTERIA ABSC ANAEROBE+AEROBE CULT: ABNORMAL
GRAM STAIN RESULT: ABNORMAL

## 2022-05-27 NOTE — CONFIDENTIAL NOTE
Pt called to update that her drain that was placed on 5/21 is not draining much. Was told to reach out if < 10ml a day. She is flushing it, and getting essentially nothing out today. Pt wondering if drain can be removed before the long holiday weekend  Message routed to IR to do pool with update. Per notes, plan:  Place to gravity drainage.  Saline flush q shift with 10 ml of saline.  Chart inputs and outputs.  Once output below 10 mL per day for 3 consecutive days, sinogram should be performed for pancreatic ductal communication prior to drain removal.    Myesha Gurrola, RN, BSN,   [facility]  Care coordinator

## 2022-05-28 LAB — BACTERIA ABSC ANAEROBE+AEROBE CULT: ABNORMAL

## 2022-05-31 ENCOUNTER — APPOINTMENT (OUTPATIENT)
Dept: INTERVENTIONAL RADIOLOGY/VASCULAR | Facility: CLINIC | Age: 72
End: 2022-05-31
Attending: NURSE PRACTITIONER
Payer: COMMERCIAL

## 2022-05-31 ENCOUNTER — HOSPITAL ENCOUNTER (OUTPATIENT)
Dept: CT IMAGING | Facility: CLINIC | Age: 72
Discharge: HOME | End: 2022-05-31
Attending: NURSE PRACTITIONER | Admitting: RADIOLOGY
Payer: COMMERCIAL

## 2022-05-31 ENCOUNTER — APPOINTMENT (OUTPATIENT)
Dept: INTERVENTIONAL RADIOLOGY/VASCULAR | Facility: CLINIC | Age: 72
End: 2022-05-31
Attending: RADIOLOGY
Payer: COMMERCIAL

## 2022-05-31 ENCOUNTER — HOSPITAL ENCOUNTER (OUTPATIENT)
Facility: CLINIC | Age: 72
Discharge: HOME | End: 2022-06-05
Attending: RADIOLOGY | Admitting: RADIOLOGY
Payer: COMMERCIAL

## 2022-05-31 ENCOUNTER — HOSPITAL ENCOUNTER (OUTPATIENT)
Facility: CLINIC | Age: 72
End: 2022-05-31
Admitting: INTERNAL MEDICINE
Payer: COMMERCIAL

## 2022-05-31 VITALS — SYSTOLIC BLOOD PRESSURE: 123 MMHG | DIASTOLIC BLOOD PRESSURE: 72 MMHG | OXYGEN SATURATION: 94 %

## 2022-05-31 DIAGNOSIS — B99.9 INTRA-ABDOMINAL INFECTION: Primary | ICD-10-CM

## 2022-05-31 DIAGNOSIS — B99.9 INTRA-ABDOMINAL INFECTION: ICD-10-CM

## 2022-05-31 PROCEDURE — G0463 HOSPITAL OUTPT CLINIC VISIT: HCPCS

## 2022-05-31 PROCEDURE — 74176 CT ABD & PELVIS W/O CONTRAST: CPT | Mod: 26 | Performed by: RADIOLOGY

## 2022-05-31 PROCEDURE — 99207 IR FOLLOW UP VISIT OUTPATIENT: CPT | Mod: GC | Performed by: RADIOLOGY

## 2022-05-31 PROCEDURE — 76080 X-RAY EXAM OF FISTULA: CPT | Mod: 26 | Performed by: PHYSICIAN ASSISTANT

## 2022-05-31 PROCEDURE — 74176 CT ABD & PELVIS W/O CONTRAST: CPT

## 2022-05-31 PROCEDURE — 255N000002 HC RX 255 OP 636: Performed by: RADIOLOGY

## 2022-05-31 PROCEDURE — 20501 NJX SINUS TRACT DIAGNOSTIC: CPT

## 2022-05-31 PROCEDURE — 49424 ASSESS CYST CONTRAST INJECT: CPT | Performed by: PHYSICIAN ASSISTANT

## 2022-05-31 RX ORDER — IOPAMIDOL 510 MG/ML
100 INJECTION, SOLUTION INTRAVASCULAR ONCE
Status: COMPLETED | OUTPATIENT
Start: 2022-05-31 | End: 2022-05-31

## 2022-05-31 RX ADMIN — IOPAMIDOL 15 ML: 510 INJECTION, SOLUTION INTRAVASCULAR at 14:44

## 2022-05-31 NOTE — PROGRESS NOTES
Exam: IR FOLLOW UP VISIT OUTPATIENT, 5/31/2022 5:22 PM    Indication: Drain removal, LUQ    Comparison: CT from 5/31/2022    Procedure/Findings:   Sinogram from earlier today was performed, with some concern about purulent drainage from the tube and drain was left in place and capped. Patient continues to have pain from the tube itself and after further review of the imaging, the decision was made to remove the catheter. CT reviewed with Dr. Dickerson, demonstrating resolution of the fluid collection surrounding the left upper quadrant drain.     Patient was brought to the holding room. Skin was prepped with chlorhexidine. Local anesthetic was offered however patient agreed to have the drain removed quickly without anesthesia. Retention suture was cut and the drain was removed without difficulty. Sterile dressing was applied. Patient noted immediate relief of discomfort. Return precautions provided to the patient.    Impression: Uneventful removal of left upper quadrant drain.

## 2022-05-31 NOTE — PROGRESS NOTES
Patient Name: Laura Gonzalez  Medical Record Number: [id number]  Today's Date: 5/31/2022    Procedure: Sinogram  Proceduralist: TATA Chaudhari  Pathology present: n/a    Procedure Start: 1415  Procedure end: 1430  Sedation medications administered: none       : n/a    Other Notes: Pt arrived to IR room4 from [facility] . Consent reviewed. Pt denies any questions or concerns regarding procedure. Pt positioned supine and monitored per protocol. Pt tolerated procedure without any noted complications. Drain will remain in place, pt will follow up with IR.  Pt transferred back to [place].    Carrie Fairchild, RN      Carrie Fairchild, RN

## 2022-05-31 NOTE — PROGRESS NOTES
Patient Name: Laura Gonzalez  Medical Record Number: [id number]  Today's Date: 5/31/2022    Procedure: abcess drain removal  Proceduralist: Dr. Dickerson and Dr. Morris  Pathology present: n/a    Procedure Start: 1720  Procedure end: 1722  Sedation medications administered: n/a     Other Notes: Pt arrived to IR room IR 6 from [facility]. Consent reviewed. Pt denies any questions or concerns regarding procedure. Pt positioned supine and monitored per protocol. Pt tolerated procedure without any noted complications. Pt transferred back to [facility].

## 2022-05-31 NOTE — PROCEDURES
[facility]    Procedure: IR Procedure Note    Date/Time: 5/31/2022 2:38 PM  Performed by: Jatinder Chaudhari PA-C  Authorized by: Jatinder Chaudhari PA-C       UNIVERSAL PROTOCOL   Site Marked: NA  Prior Images Obtained and Reviewed:  Yes  Required items: Required blood products, implants, devices and special equipment available    Patient identity confirmed:  Verbally with patient, arm band, provided demographic data and hospital-assigned identification number  Patient was reevaluated immediately before administering moderate or deep sedation or anesthesia  Confirmation Checklist:  Patient's identity using two indicators, relevant allergies, procedure was appropriate and matched the consent or emergent situation and correct equipment/implants were available  Time out: Immediately prior to the procedure a time out was called    Universal Protocol: the Joint Commission Universal Protocol was followed    Preparation: Patient was prepped and draped in usual sterile fashion    ESBL (mL):  0     ANESTHESIA    Anesthesia: Local infiltration  Local Anesthetic:  Lidocaine 1% without epinephrine      SEDATION    Patient Sedated: No    See dictated procedure note for full details.  Findings: Denies fever, chills, tremor, or nausea. Reports intermittent night-sweats, which have been present since hospitalization. Reports deep pain, and apprehension with deep breathing. Reports 4 ml net output X 4 days. CT prior demonstrates near resolution of prior abdominal collection. Aspiration returns a scant amount of purulent material. Sinogram of 12 Fr. Drain reveals complex abdominal collection, of approximately 7 ml in volume, with no identified fistula to bowel or pancreatic duct. Contrast observed flowing retrograde along drain tract into dressings. Due to complexity of collection and purulent material on aspiration, decision was made to initiate 7 day capping trial rather than  drain removal.    Specimens: none    Complications: None    Condition: Stable    Plan: Follow up with IR in 7 days for repeat evaluation and likely drain removal. Patient instructed to open drain to gravity should symptoms worsen.      PROCEDURE    Patient Tolerance:  Patient tolerated the procedure well with no immediate complications  Length of time physician/provider present for 1:1 monitoring during sedation: 0

## 2022-06-01 ENCOUNTER — TELEPHONE (OUTPATIENT)
Dept: GASTROENTEROLOGY | Facility: CLINIC | Age: 72
End: 2022-06-01
Payer: COMMERCIAL

## 2022-06-01 NOTE — TELEPHONE ENCOUNTER
[facility] Call Center    Phone Message    May a detailed message be left on voicemail: yes     Reason for Call: Other: 6.7.22 appointment to pull the tube is no longer needed. The pt it done 5.31.22.     Action Taken: Message routed to:  [facility] vascular    Travel Screening: Not Applicable

## 2022-06-05 ENCOUNTER — HOSPITAL ENCOUNTER (OUTPATIENT)
Facility: CLINIC | Age: 72
Setting detail: OBSERVATION
Discharge: HOME | End: 2022-06-07
Attending: EMERGENCY MEDICINE | Admitting: INTERNAL MEDICINE
Payer: COMMERCIAL

## 2022-06-05 ENCOUNTER — APPOINTMENT (OUTPATIENT)
Dept: CT IMAGING | Facility: CLINIC | Age: 72
End: 2022-06-05
Attending: EMERGENCY MEDICINE
Payer: COMMERCIAL

## 2022-06-05 DIAGNOSIS — R10.9 STOMACH ACHE: ICD-10-CM

## 2022-06-05 DIAGNOSIS — R11.2 NAUSEA AND VOMITING, INTRACTABILITY OF VOMITING NOT SPECIFIED, UNSPECIFIED VOMITING TYPE: ICD-10-CM

## 2022-06-05 DIAGNOSIS — R10.9 SUDDEN ONSET OF SEVERE ABDOMINAL PAIN: ICD-10-CM

## 2022-06-05 DIAGNOSIS — Z20.822 LAB TEST NEGATIVE FOR COVID-19 VIRUS: ICD-10-CM

## 2022-06-05 DIAGNOSIS — K85.90 ACUTE PANCREATITIS, UNSPECIFIED COMPLICATION STATUS, UNSPECIFIED PANCREATITIS TYPE: ICD-10-CM

## 2022-06-05 DIAGNOSIS — B99.9 INTRA-ABDOMINAL INFECTION: ICD-10-CM

## 2022-06-05 LAB
ALBUMIN SERPL-MCNC: 2.8 G/DL (ref 3.4–5)
ALBUMIN UR-MCNC: NEGATIVE MG/DL
ALP SERPL-CCNC: 194 U/L (ref 40–150)
ALT SERPL W P-5'-P-CCNC: 58 U/L (ref 0–50)
AMYLASE SERPL-CCNC: 803 U/L (ref 30–110)
ANION GAP SERPL CALCULATED.3IONS-SCNC: 5 MMOL/L (ref 3–14)
APPEARANCE UR: CLEAR
AST SERPL W P-5'-P-CCNC: 120 U/L (ref 0–45)
BASOPHILS # BLD AUTO: 0 10E3/UL (ref 0–0.2)
BASOPHILS NFR BLD AUTO: 0 %
BILIRUB SERPL-MCNC: 0.3 MG/DL (ref 0.2–1.3)
BILIRUB UR QL STRIP: NEGATIVE
BUN SERPL-MCNC: 21 MG/DL (ref 7–30)
CALCIUM SERPL-MCNC: 8.9 MG/DL (ref 8.5–10.1)
CHLORIDE BLD-SCNC: 106 MMOL/L (ref 94–109)
CO2 SERPL-SCNC: 26 MMOL/L (ref 20–32)
COLOR UR AUTO: ABNORMAL
CREAT SERPL-MCNC: 0.72 MG/DL (ref 0.52–1.04)
CRP SERPL-MCNC: 5 MG/L (ref 0–8)
EOSINOPHIL # BLD AUTO: 0 10E3/UL (ref 0–0.7)
EOSINOPHIL NFR BLD AUTO: 0 %
ERYTHROCYTE [DISTWIDTH] IN BLOOD BY AUTOMATED COUNT: 17.8 % (ref 10–15)
ERYTHROCYTE [SEDIMENTATION RATE] IN BLOOD BY WESTERGREN METHOD: 40 MM/HR (ref 0–30)
GFR SERPL CREATININE-BSD FRML MDRD: 88 ML/MIN/1.73M2
GLUCOSE BLD-MCNC: 125 MG/DL (ref 70–99)
GLUCOSE UR STRIP-MCNC: NEGATIVE MG/DL
HCO3 BLDV-SCNC: 30 MMOL/L (ref 21–28)
HCT VFR BLD AUTO: 38.5 % (ref 35–47)
HGB BLD-MCNC: 12.7 G/DL (ref 11.7–15.7)
HGB UR QL STRIP: NEGATIVE
IMM GRANULOCYTES # BLD: 0.1 10E3/UL
IMM GRANULOCYTES NFR BLD: 1 %
INR PPP: 1.06 (ref 0.85–1.15)
KETONES UR STRIP-MCNC: NEGATIVE MG/DL
LACTATE BLD-SCNC: 0.7 MMOL/L
LEUKOCYTE ESTERASE UR QL STRIP: NEGATIVE
LIPASE SERPL-CCNC: 9968 U/L (ref 73–393)
LYMPHOCYTES # BLD AUTO: 1.3 10E3/UL (ref 0.8–5.3)
LYMPHOCYTES NFR BLD AUTO: 10 %
MCH RBC QN AUTO: 28.3 PG (ref 26.5–33)
MCHC RBC AUTO-ENTMCNC: 33 G/DL (ref 31.5–36.5)
MCV RBC AUTO: 86 FL (ref 78–100)
MONOCYTES # BLD AUTO: 0.7 10E3/UL (ref 0–1.3)
MONOCYTES NFR BLD AUTO: 6 %
MUCOUS THREADS #/AREA URNS LPF: PRESENT /LPF
NEUTROPHILS # BLD AUTO: 10.2 10E3/UL (ref 1.6–8.3)
NEUTROPHILS NFR BLD AUTO: 83 %
NITRATE UR QL: NEGATIVE
NRBC # BLD AUTO: 0 10E3/UL
NRBC BLD AUTO-RTO: 0 /100
PCO2 BLDV: 36 MM HG (ref 40–50)
PH BLDV: 7.53 [PH] (ref 7.32–7.43)
PH UR STRIP: 8.5 [PH] (ref 5–7)
PLATELET # BLD AUTO: 549 10E3/UL (ref 150–450)
PO2 BLDV: 36 MM HG (ref 25–47)
POTASSIUM BLD-SCNC: 4 MMOL/L (ref 3.4–5.3)
PROT SERPL-MCNC: 7.2 G/DL (ref 6.8–8.8)
RBC # BLD AUTO: 4.49 10E6/UL (ref 3.8–5.2)
RBC URINE: 1 /HPF
SAO2 % BLDV: 76 % (ref 94–100)
SARS-COV-2 RNA RESP QL NAA+PROBE: NEGATIVE
SODIUM SERPL-SCNC: 137 MMOL/L (ref 133–144)
SP GR UR STRIP: 1 (ref 1–1.03)
SQUAMOUS EPITHELIAL: 2 /HPF
TROPONIN I SERPL HS-MCNC: 10 NG/L
TSH SERPL DL<=0.005 MIU/L-ACNC: 0.96 MU/L (ref 0.4–4)
UROBILINOGEN UR STRIP-MCNC: NORMAL MG/DL
WBC # BLD AUTO: 12.3 10E3/UL (ref 4–11)
WBC URINE: 1 /HPF

## 2022-06-05 PROCEDURE — G0378 HOSPITAL OBSERVATION PER HR: HCPCS

## 2022-06-05 PROCEDURE — C9803 HOPD COVID-19 SPEC COLLECT: HCPCS | Performed by: EMERGENCY MEDICINE

## 2022-06-05 PROCEDURE — 96361 HYDRATE IV INFUSION ADD-ON: CPT | Performed by: EMERGENCY MEDICINE

## 2022-06-05 PROCEDURE — 250N000011 HC RX IP 250 OP 636: Performed by: EMERGENCY MEDICINE

## 2022-06-05 PROCEDURE — 93010 ELECTROCARDIOGRAM REPORT: CPT | Performed by: EMERGENCY MEDICINE

## 2022-06-05 PROCEDURE — 83690 ASSAY OF LIPASE: CPT | Performed by: EMERGENCY MEDICINE

## 2022-06-05 PROCEDURE — 74177 CT ABD & PELVIS W/CONTRAST: CPT | Mod: 26 | Performed by: RADIOLOGY

## 2022-06-05 PROCEDURE — 120N000002 HC R&B MED SURG/OB UMMC

## 2022-06-05 PROCEDURE — 96376 TX/PRO/DX INJ SAME DRUG ADON: CPT

## 2022-06-05 PROCEDURE — U0005 INFEC AGEN DETEC AMPLI PROBE: HCPCS | Performed by: EMERGENCY MEDICINE

## 2022-06-05 PROCEDURE — 96374 THER/PROPH/DIAG INJ IV PUSH: CPT | Performed by: EMERGENCY MEDICINE

## 2022-06-05 PROCEDURE — 96375 TX/PRO/DX INJ NEW DRUG ADDON: CPT | Performed by: EMERGENCY MEDICINE

## 2022-06-05 PROCEDURE — 82803 BLOOD GASES ANY COMBINATION: CPT

## 2022-06-05 PROCEDURE — 36415 COLL VENOUS BLD VENIPUNCTURE: CPT | Performed by: EMERGENCY MEDICINE

## 2022-06-05 PROCEDURE — 258N000003 HC RX IP 258 OP 636: Performed by: NURSE PRACTITIONER

## 2022-06-05 PROCEDURE — 83605 ASSAY OF LACTIC ACID: CPT

## 2022-06-05 PROCEDURE — 258N000003 HC RX IP 258 OP 636: Performed by: INTERNAL MEDICINE

## 2022-06-05 PROCEDURE — 86140 C-REACTIVE PROTEIN: CPT | Performed by: EMERGENCY MEDICINE

## 2022-06-05 PROCEDURE — 99221 1ST HOSP IP/OBS SF/LOW 40: CPT | Mod: AI | Performed by: STUDENT IN AN ORGANIZED HEALTH CARE EDUCATION/TRAINING PROGRAM

## 2022-06-05 PROCEDURE — 96376 TX/PRO/DX INJ SAME DRUG ADON: CPT | Performed by: EMERGENCY MEDICINE

## 2022-06-05 PROCEDURE — 74177 CT ABD & PELVIS W/CONTRAST: CPT

## 2022-06-05 PROCEDURE — 85610 PROTHROMBIN TIME: CPT | Performed by: EMERGENCY MEDICINE

## 2022-06-05 PROCEDURE — 99207 PR APP CREDIT; MD BILLING SHARED VISIT: CPT | Mod: AI | Performed by: NURSE PRACTITIONER

## 2022-06-05 PROCEDURE — 96361 HYDRATE IV INFUSION ADD-ON: CPT

## 2022-06-05 PROCEDURE — 93005 ELECTROCARDIOGRAM TRACING: CPT | Performed by: EMERGENCY MEDICINE

## 2022-06-05 PROCEDURE — 85652 RBC SED RATE AUTOMATED: CPT | Performed by: EMERGENCY MEDICINE

## 2022-06-05 PROCEDURE — 87040 BLOOD CULTURE FOR BACTERIA: CPT | Performed by: EMERGENCY MEDICINE

## 2022-06-05 PROCEDURE — 82150 ASSAY OF AMYLASE: CPT | Performed by: EMERGENCY MEDICINE

## 2022-06-05 PROCEDURE — 84484 ASSAY OF TROPONIN QUANT: CPT | Performed by: EMERGENCY MEDICINE

## 2022-06-05 PROCEDURE — 250N000011 HC RX IP 250 OP 636: Performed by: NURSE PRACTITIONER

## 2022-06-05 PROCEDURE — 99285 EMERGENCY DEPT VISIT HI MDM: CPT | Mod: 25 | Performed by: EMERGENCY MEDICINE

## 2022-06-05 PROCEDURE — 84443 ASSAY THYROID STIM HORMONE: CPT | Performed by: EMERGENCY MEDICINE

## 2022-06-05 PROCEDURE — 80053 COMPREHEN METABOLIC PANEL: CPT | Performed by: EMERGENCY MEDICINE

## 2022-06-05 PROCEDURE — 81001 URINALYSIS AUTO W/SCOPE: CPT | Performed by: EMERGENCY MEDICINE

## 2022-06-05 PROCEDURE — 85025 COMPLETE CBC W/AUTO DIFF WBC: CPT | Performed by: EMERGENCY MEDICINE

## 2022-06-05 PROCEDURE — 258N000003 HC RX IP 258 OP 636: Performed by: EMERGENCY MEDICINE

## 2022-06-05 PROCEDURE — 250N000013 HC RX MED GY IP 250 OP 250 PS 637: Performed by: NURSE PRACTITIONER

## 2022-06-05 RX ORDER — SODIUM CHLORIDE, SODIUM LACTATE, POTASSIUM CHLORIDE, CALCIUM CHLORIDE 600; 310; 30; 20 MG/100ML; MG/100ML; MG/100ML; MG/100ML
INJECTION, SOLUTION INTRAVENOUS ONCE
Status: DISCONTINUED | OUTPATIENT
Start: 2022-06-05 | End: 2022-06-07 | Stop reason: HOSPADM

## 2022-06-05 RX ORDER — FENTANYL CITRATE 50 UG/ML
12.5 INJECTION, SOLUTION INTRAMUSCULAR; INTRAVENOUS ONCE
Status: COMPLETED | OUTPATIENT
Start: 2022-06-05 | End: 2022-06-05

## 2022-06-05 RX ORDER — LIDOCAINE 40 MG/G
CREAM TOPICAL
Status: DISCONTINUED | OUTPATIENT
Start: 2022-06-05 | End: 2022-06-07 | Stop reason: HOSPADM

## 2022-06-05 RX ORDER — ATORVASTATIN CALCIUM 40 MG/1
40 TABLET, FILM COATED ORAL AT BEDTIME
Status: DISCONTINUED | OUTPATIENT
Start: 2022-06-05 | End: 2022-06-07 | Stop reason: HOSPADM

## 2022-06-05 RX ORDER — ZOLPIDEM TARTRATE 5 MG/1
10 TABLET ORAL
Status: DISCONTINUED | OUTPATIENT
Start: 2022-06-05 | End: 2022-06-07 | Stop reason: HOSPADM

## 2022-06-05 RX ORDER — HYDROMORPHONE HCL IN WATER/PF 6 MG/30 ML
0.2 PATIENT CONTROLLED ANALGESIA SYRINGE INTRAVENOUS ONCE
Status: COMPLETED | OUTPATIENT
Start: 2022-06-05 | End: 2022-06-05

## 2022-06-05 RX ORDER — IOPAMIDOL 755 MG/ML
89 INJECTION, SOLUTION INTRAVASCULAR ONCE
Status: COMPLETED | OUTPATIENT
Start: 2022-06-05 | End: 2022-06-05

## 2022-06-05 RX ORDER — AMOXICILLIN 250 MG
2 CAPSULE ORAL 2 TIMES DAILY PRN
Status: DISCONTINUED | OUTPATIENT
Start: 2022-06-05 | End: 2022-06-07 | Stop reason: HOSPADM

## 2022-06-05 RX ORDER — AMOXICILLIN 250 MG
1 CAPSULE ORAL 2 TIMES DAILY PRN
Status: DISCONTINUED | OUTPATIENT
Start: 2022-06-05 | End: 2022-06-07 | Stop reason: HOSPADM

## 2022-06-05 RX ORDER — PROCHLORPERAZINE 25 MG
12.5 SUPPOSITORY, RECTAL RECTAL EVERY 12 HOURS PRN
Status: DISCONTINUED | OUTPATIENT
Start: 2022-06-05 | End: 2022-06-07 | Stop reason: HOSPADM

## 2022-06-05 RX ORDER — LORAZEPAM 0.5 MG/1
0.5 TABLET ORAL 2 TIMES DAILY PRN
Status: DISCONTINUED | OUTPATIENT
Start: 2022-06-05 | End: 2022-06-07 | Stop reason: HOSPADM

## 2022-06-05 RX ORDER — ONDANSETRON 4 MG/1
4 TABLET, ORALLY DISINTEGRATING ORAL EVERY 6 HOURS PRN
Status: DISCONTINUED | OUTPATIENT
Start: 2022-06-05 | End: 2022-06-07 | Stop reason: HOSPADM

## 2022-06-05 RX ORDER — ONDANSETRON 2 MG/ML
4 INJECTION INTRAMUSCULAR; INTRAVENOUS EVERY 6 HOURS PRN
Status: DISCONTINUED | OUTPATIENT
Start: 2022-06-05 | End: 2022-06-07 | Stop reason: HOSPADM

## 2022-06-05 RX ORDER — EZETIMIBE 10 MG/1
10 TABLET ORAL AT BEDTIME
Status: DISCONTINUED | OUTPATIENT
Start: 2022-06-05 | End: 2022-06-07 | Stop reason: HOSPADM

## 2022-06-05 RX ORDER — ACETAMINOPHEN 650 MG/1
650 SUPPOSITORY RECTAL EVERY 6 HOURS PRN
Status: DISCONTINUED | OUTPATIENT
Start: 2022-06-05 | End: 2022-06-07 | Stop reason: HOSPADM

## 2022-06-05 RX ORDER — PROCHLORPERAZINE MALEATE 5 MG
5 TABLET ORAL EVERY 6 HOURS PRN
Status: DISCONTINUED | OUTPATIENT
Start: 2022-06-05 | End: 2022-06-07 | Stop reason: HOSPADM

## 2022-06-05 RX ORDER — ACETAMINOPHEN 325 MG/1
650 TABLET ORAL EVERY 6 HOURS PRN
Status: DISCONTINUED | OUTPATIENT
Start: 2022-06-05 | End: 2022-06-07 | Stop reason: HOSPADM

## 2022-06-05 RX ORDER — SODIUM CHLORIDE, SODIUM LACTATE, POTASSIUM CHLORIDE, CALCIUM CHLORIDE 600; 310; 30; 20 MG/100ML; MG/100ML; MG/100ML; MG/100ML
INJECTION, SOLUTION INTRAVENOUS CONTINUOUS
Status: DISCONTINUED | OUTPATIENT
Start: 2022-06-05 | End: 2022-06-07 | Stop reason: HOSPADM

## 2022-06-05 RX ORDER — MONTELUKAST SODIUM 10 MG/1
10 TABLET ORAL AT BEDTIME
Status: DISCONTINUED | OUTPATIENT
Start: 2022-06-05 | End: 2022-06-07 | Stop reason: HOSPADM

## 2022-06-05 RX ORDER — HYDROMORPHONE HYDROCHLORIDE 1 MG/ML
0.5 INJECTION, SOLUTION INTRAMUSCULAR; INTRAVENOUS; SUBCUTANEOUS
Status: DISCONTINUED | OUTPATIENT
Start: 2022-06-05 | End: 2022-06-06

## 2022-06-05 RX ADMIN — IOPAMIDOL 89 ML: 755 INJECTION, SOLUTION INTRAVENOUS at 17:08

## 2022-06-05 RX ADMIN — HYDROMORPHONE HYDROCHLORIDE 0.2 MG: 0.2 INJECTION, SOLUTION INTRAMUSCULAR; INTRAVENOUS; SUBCUTANEOUS at 16:10

## 2022-06-05 RX ADMIN — MONTELUKAST 10 MG: 10 TABLET, FILM COATED ORAL at 21:48

## 2022-06-05 RX ADMIN — FENTANYL CITRATE 12.5 MCG: 50 INJECTION, SOLUTION INTRAMUSCULAR; INTRAVENOUS at 16:59

## 2022-06-05 RX ADMIN — SODIUM CHLORIDE, POTASSIUM CHLORIDE, SODIUM LACTATE AND CALCIUM CHLORIDE 1000 ML: 600; 310; 30; 20 INJECTION, SOLUTION INTRAVENOUS at 19:30

## 2022-06-05 RX ADMIN — HYDROMORPHONE HYDROCHLORIDE 0.5 MG: 1 INJECTION, SOLUTION INTRAMUSCULAR; INTRAVENOUS; SUBCUTANEOUS at 19:30

## 2022-06-05 RX ADMIN — ZOLPIDEM TARTRATE 10 MG: 5 TABLET, FILM COATED ORAL at 21:48

## 2022-06-05 RX ADMIN — ONDANSETRON 4 MG: 2 INJECTION INTRAMUSCULAR; INTRAVENOUS at 19:31

## 2022-06-05 RX ADMIN — ATORVASTATIN CALCIUM 40 MG: 40 TABLET, FILM COATED ORAL at 21:48

## 2022-06-05 RX ADMIN — SODIUM CHLORIDE, POTASSIUM CHLORIDE, SODIUM LACTATE AND CALCIUM CHLORIDE: 600; 310; 30; 20 INJECTION, SOLUTION INTRAVENOUS at 20:44

## 2022-06-05 RX ADMIN — ACETAMINOPHEN 650 MG: 325 TABLET ORAL at 20:44

## 2022-06-05 RX ADMIN — EZETIMIBE 10 MG: 10 TABLET ORAL at 22:21

## 2022-06-05 RX ADMIN — SODIUM CHLORIDE, POTASSIUM CHLORIDE, SODIUM LACTATE AND CALCIUM CHLORIDE 1000 ML: 600; 310; 30; 20 INJECTION, SOLUTION INTRAVENOUS at 16:11

## 2022-06-05 RX ADMIN — HYDROMORPHONE HYDROCHLORIDE 0.2 MG: 0.2 INJECTION, SOLUTION INTRAMUSCULAR; INTRAVENOUS; SUBCUTANEOUS at 17:52

## 2022-06-05 RX ADMIN — HYDROMORPHONE HYDROCHLORIDE 0.5 MG: 1 INJECTION, SOLUTION INTRAMUSCULAR; INTRAVENOUS; SUBCUTANEOUS at 21:38

## 2022-06-05 ASSESSMENT — ENCOUNTER SYMPTOMS
BLOOD IN STOOL: 0
SHORTNESS OF BREATH: 0
BACK PAIN: 0
NAUSEA: 1
DYSURIA: 0
HEMATURIA: 0
VOMITING: 1
MUSCULOSKELETAL NEGATIVE: 1
ABDOMINAL PAIN: 1
FEVER: 0
CHILLS: 0

## 2022-06-05 ASSESSMENT — ACTIVITIES OF DAILY LIVING (ADL)
ADLS_ACUITY_SCORE: 35

## 2022-06-05 NOTE — H&P
[facility]    History and Physical - Hospitalist Service, GOLD TEAM        Date of Admission:  6/5/2022    Assessment & Plan      Laura Gonzalez is a 71 year old woman admitted on 6/5/2022. 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  She was just admitted 5/13-5/15 for EUS guided pancreaticogastrostomy complicated by pancreatitis and an infected fluid collection requiring admission again from 5/20-5/25 as well as drain placement.     1) Recurrent pancreatitis, concern for intraabdominal infection - This is a patient with an extremely complex abdominal history most recently notable for pancreatitis that developed in May following an EUS guided pancreaticogastrostomy complicated by infected fluid collection necessitating admission 5/20-5/25 with drain placement 5/20 and removal 6/2.  Now returning with pancreatic pain, elevated lipase, AST, ALT and Alk Phos.  - CT abdomen pending,    - The patient was on fluconazole and Augmentin at home given prior cultures showing klebsiella pneumonia, enterococcus and candida which she stopped yesterday.  Will hold off on restarting antibiotics pending CT scan results.  - Will give one further liter of LR to a total of 2000 ccs and then run LR overnight at 150/hr  - Clear liquids overnight, NPO in am pending GI consultation  - Hydromorphone overnight      2) History of atherosclerosis  - Continue home atorvastatin     3) History of mild asthma  - Continue home monteluikast        Diet:   Clears, NPO midnight  DVT Prophylaxis: Pneumatic Compression Devices  Condon Catheter: Not present  Central Lines: None  Cardiac Monitoring: None  Code Status:   Full    Clinically Significant Risk Factors Present on Admission             # Hypoalbuminemia: Albumin = 2.8 g/dL (Ref range: 3.4 - 5.0 g/dL) on  admission, will monitor as appropriate          Disposition Plan   Expected Discharge: 06/07/2022   Anticipated discharge location:  Awaiting care coordination huddle  Delays:            The patient's care was discussed with the Attending Physician, Dr. Vazquez.    [facility]  Hospitalist Service, [facility]  Securely message with the Vocera Web Console (learn more here)  Text page via [facility] Paging/Directory   Please see signed in provider for up to date coverage information      ______________________________________________________________________    Chief Complaint   Abdominal pain    History is obtained from the patient    History of Present Illness   Laura Gonzalez is a 71 year old woman admitted on 6/5/2022. 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  She was just admitted 5/13-5/15 for EUS guided pancreaticogastrostomy complicated by pancreatitis and an infected fluid collection requiring admission again from 5/20-5/25 as well as drain placement.    The patient reports she had her abdominal drain removed on Tuesday of last week and stopped her antibiotics 2 days ago.  Yesterday she developed a sharp epigastric pain that took her breath away and was associated with flushing.  This lasted about 5 minutes.  These episodes tend to herald her development of pancreatitis.  She had a similar episode again this morning associated with nausea and vomiting and since that time has had persistent epigastric abdominal pain consistent with her past pancreatitis episodes.    She denies any fevers or chills.    Review of Systems    The 10 point Review of Systems is negative other than noted in the HPI or here.    Past Medical History    I have reviewed this patient's medical history and updated it with  pertinent information if needed.   Past Medical History:   Diagnosis Date     Asthma     pt.states no longer has symptoms     CAD (coronary artery disease)      HLD (hyperlipidemia)        Past Surgical History   I have reviewed this patient's surgical history and updated it with pertinent information if needed.  Past Surgical History:   Procedure Laterality Date     APPENDECTOMY       ARTHROPLASTY HIP Left 6/15/2016    Procedure: ARTHROPLASTY HIP;  Surgeon: Jeffy Wolff MD;  Location: [facility] OR     COLONOSCOPY  10/3/2013    Procedure: COMBINED COLONOSCOPY, SINGLE BIOPSY/POLYPECTOMY BY BIOPSY;;  Surgeon: Kenney Walls MD;  Location:  GI     ENDOSCOPIC ULTRASOUND UPPER GASTROINTESTINAL TRACT (GI) N/A 2022    Procedure: ENDOSCOPIC ULTRASOUND WITH PANCREATICOGASTROSTOMY CREATION, TRACT DILATION, STENT PLACEMENT;  Surgeon: Romaine Oates MD;  Location: [facility] OR     ESOPHAGOSCOPY, GASTROSCOPY, DUODENOSCOPY (EGD), COMBINED N/A 2022    Procedure: ESOPHAGOGASTRODUODENOSCOPY WITH ENDOSCOPIC CLIP PLACEMENT;  Surgeon: Arnaldo Noguera MD;  Location: [facility] OR     FOOT SURGERY       HC TOOTH EXTRACTION W/FORCEP       HYSTERECTOMY       INCISION AND DRAINAGE BREAST Left 2022    Procedure: evacuate hematoma left  BREAST;  Surgeon: Dayron Garcia MD;  Location: [facility] OR     IR FOLLOW UP VISIT OUTPATIENT  2022     IR SINOGRAM INJECTION DIAGNOSTIC  2022       Social History   I have reviewed this patient's social history and updated it with pertinent information if needed.  Social History     Tobacco Use     Smoking status: Former Smoker     Quit date: 10/3/1988     Years since quittin.6     Smokeless tobacco: Never Used   Substance Use Topics     Alcohol use: No     Drug use: No       Family History       Prior to Admission Medications   Prior to Admission Medications   Prescriptions Last Dose Informant Patient Reported? Taking?   Calcium Carb-Cholecalciferol (CALCIUM 1000 + D)  1000-800 MG-UNIT TABS   Yes No   LORazepam (ATIVAN) 0.5 MG tablet   Yes No   Sig: Take 1-2 tablets by mouth twice daily as needed for anxiety   VITAMIN A PO   Yes No   Sig: Take 3,000 Units by mouth daily   amoxicillin-clavulanate (AUGMENTIN) 875-125 MG tablet   No No   Sig: Take 1 tablet by mouth every 12 hours for 10 days   amylase-lipase-protease (CREON) 94155-85597 units CPEP per EC capsule   No No   Sig: Take 2-3 with meals / 1-2 with snacks, up to 15 per day.   Patient taking differently: No sig reported   atorvastatin (LIPITOR) 40 MG tablet   Yes No   Sig: Take 40 mg by mouth At Bedtime    calcium-magnesium (CALMAG) 500-250 MG TABS   Yes No   Sig: Take 2 tablets by mouth daily   cholecalciferol (VITAMIN D3) 25 mcg (1000 units) capsule   Yes No   Sig: Take 1 capsule by mouth daily   estradiol (ESTRACE) 0.1 MG/GM vaginal cream   Yes No   Sig: Place 1 g vaginally See Admin Instructions Insert 1 gram vaginally as directed by provider   estradiol (VIVELLE-DOT) 0.075 MG/24HR BIW patch   Yes No   Sig: Place 1 patch onto the skin twice a week   evolocumab (REPATHA) 140 MG/ML prefilled autoinjector   Yes No   Sig: Inject 140 mg Subcutaneous every 14 days   ezetimibe (ZETIA) 10 MG tablet   Yes No   Sig: Take 10 mg by mouth At Bedtime   fluconazole (DIFLUCAN) 200 MG tablet   No No   Sig: Take 1 tablet (200 mg) by mouth every 24 hours for 10 days   montelukast (SINGULAIR) 10 MG tablet   Yes No   Sig: Take 10 mg by mouth At Bedtime    oxyCODONE (ROXICODONE) 5 MG tablet   No No   Sig: Take 1 tablet (5 mg) by mouth every 6 hours as needed for moderate to severe pain (take 5 mg for 5-7/10 pain, 10 mg for 8-10/10 pain)   polyethylene glycol (MIRALAX) 17 GM/Dose powder   No No   Sig: Take 17 g by mouth 2 times daily as needed   senna-docusate (SENOKOT-S/PERICOLACE) 8.6-50 MG tablet   No No   Sig: Take 1 tablet by mouth 2 times daily as needed for constipation   sodium chloride, PF, (NORMAL SALINE FLUSH) 0.9% PF flush   No No    Sig: 10 mLs by Intracatheter route every 24 hours   sodium chloride, PF, 0.9% PF flush   No No   Sig: Irrigate with 10 mLs as directed every 8 hours   valACYclovir (VALTREX) 1000 mg tablet   Yes No   Sig: Take 1,000 mg by mouth daily as needed (cold sore)    zolpidem (AMBIEN) 10 MG tablet   Yes No   Sig: Take 1 tablet by mouth nightly as needed for sleep      Facility-Administered Medications: None     Allergies   Allergies   Allergen Reactions     Tramadol Other (See Comments)     Has a hyperactive reaction and can't sleep        Physical Exam   Vital Signs: Temp: 97.8  F (36.6  C) Temp src: Oral BP: (!) 150/70 Pulse: 59   Resp: 21 SpO2: 96 %      Weight: 145 lbs 0 oz    Physical Exam   Constitutional:   Well nourished, well developed, resting comfortably   Head: Normocephalic and atraumatic.   Eyes: Conjunctivae are normal. Pupils are equal, round, and reactive to light.    Oropharynx: Pharynx has no erythema or exudate, mucous membranes are moist  Cardiovascular: Regular rate and rhythm without murmurs or gallops  Pulmonary/Chest: Clear to auscultation bilaterally, with no wheezes or retractions. No respiratory distress.  GI: Soft but diffusely tender  Musculoskeletal:  No edema or clubbing   Skin: Skin is warm and dry. No rash noted.   Neurological: Alert and oriented to person, place, and time. Nonfocal exam  Psychiatric:  Normal mood and affect.      Data   Data reviewed today: I reviewed all medications, new labs and imaging results over the last 24 hours.

## 2022-06-05 NOTE — LETTER
Transition Communication Hand-off for Care Transitions to Next Level of Care Provider    Name: Laura Gonzalez  : 1950  MRN #: [id number]  Primary Care Provider: MIQUEL ANDERS     Primary Clinic: [address] [address]     Reason for Hospitalization:  Sudden onset of severe abdominal pain [R10.9]  Acute pancreatitis, unspecified complication status, unspecified pancreatitis type [K85.90]  Admit Date/Time: 2022  3:55 PM  Discharge Date: 2022  Payor Source: Payor: [employer] / Plan: [employer]VANTAGE / Product Type: HMO /            Reason for Communication Hand-off Referral: Other Continuity of care.     Discharge Plan: home with outpatient follow up.       Discharge Needs Assessment:  Needs    Flowsheet Row Most Recent Value   Equipment Currently Used at Home none          Follow-up plan:    Future Appointments   Date Time Provider Department Center   2022  1:40 PM Romaine Oates MD [facility]         Yohana Garza RN    AVS/Discharge Summary is the source of truth; this is a helpful guide for improved communication of patient story

## 2022-06-05 NOTE — ED PROVIDER NOTES
[place] EMERGENCY DEPARTMENT ([facility])  6/05/22  History     Chief Complaint   Patient presents with     Abdominal Pain     The history is provided by the patient and medical records.     Laura Gonzalez is a 71 year old female with a PMH significant for duodenal polyp with pylorus sparing Whipple (2019), which was complicated by choledochojejunostomy stricture s/p axial stent placement and coaxial double-pigtail stent (placed 6/2020, removed 9/2020), with more recent EUS-guided pancreaticogastrostomy w/ through and through stent (5/12/2022) with development of pancreatitis, fluid collection with infection, admitted for sepsis 5/20-25/22, with additional PMH notable for CAD, hyperlipidemia, asthma, prior appendectomy, hysterectomy, hip replacement, amongst others, presenting today with acute/severe abdominal pain.    RECENT HISTORY REVIEW:  Patient has an extensive abdominal and surgical history.  Please see EMR and Care Everywhere for full details.  More recently, patient was admitted 5/13/2022 - 5/15/2022 with post ERCP pancreatitis, post pancreatico gastrostomy tube placement with acute on chronic pancreatitis complicated by exocrine insufficiency.  She had presented that time with abdominal pain, nausea, vomiting and lipase was about 5000.  CT scan performed.  She was given IV hydration, pain control, and antiemetics.  She symptomatically improved and plan was to follow-up with Dr. Noguera with diet recommendations, Creon, etc.    Patient was admitted most recently from 5/20/2022 - 5/25/2022 with sepsis thought to be secondary to an infected pancreatic fluid collection, in the setting of pancreaticogastrostomy stent migration.  She underwent CT-guided LUQ drain placement by IR on 5/21/2022.  They transition from Zosyn to Augmentin on 5/24.  After an ID consult it was also recommended they add fluconazole for Candida coverage.  Leukocytosis and fever improved.  Discharge medication plan was  Augmentin twice daily and fluconazole daily for no more than 2 weeks or 3 days after drain removal (whichever came first), to follow-up with IR for drain removal, pain control with p.o. oxycodone and acetaminophen, continue Creon with meals.    Per IR note from 5/31/2022, CT reportedly showed near resolution of the prior collection.  They performed a sinogram that showed a complex abdominal collection of 7 mL with no obvious fistula to bowel or pancreatic duct.  Because of this, they recommended a 7-day capping trial rather than drain removal.  Plan was to follow-up in 7 days for repeat evaluation and likely drain removal.    CURRENT PRESENTATION:  Patient presents today with her loved one, reporting severe abdominal pain.  Last night they report she did have a brief episode of abdominal discomfort that as consistent with usual was self-limited.  She sometimes would have these before developing a round of pancreatitis.  Located in the upper abdomen, severe enough where she feels as though she needs to lay on the ground briefly, take off her bra, etc. but as per usual episodes generally resolved on itself.  Lasted only a number of minutes and she had been doing well until earlier this morning.  Around 11 AM, had recurrent bout of belly pain that she thought would be consistent with her self-limiting episodes, however, thereafter developed an episode of nonbloody emesis and since then has had severe worsening of the abdominal pain and it has not resolved on its own.    No clear triggers of why pain may have worsened.  She reports that this feels different than her usual simple rounds of pancreatitis and that it is more deep and severe and feels more like when she had infectious issues as opposed to when she had simple pancreatitis.  She has not had any fevers like she did during last admission though she does not feel well and feels as though she know something is wrong and that she feels quite sick.  She has not had  any recurrent vomiting, but is continued to be somewhat nauseous.  Discomfort starts in the upper abdomen, radiates to the left side of the abdomen and then down throughout the whole of the abdomen.  Not so much having radiation to the back, but definitely throughout the whole of the abdomen itself.  No chest or breathing symptoms.  No fevers or chills as mentioned.  She reports that she has had poor p.o. and liquid intake and may be urinating less and her urine appears more dark and concentrated.  No dysuria or blood.  She was having some more urgent stools, but then today had a bit more of a normal stool prior to these episodes.  No blood or melena in the stool itself.  Has had a previous hysterectomy, no new  symptoms.  No rashes or skin changes no MSK symptoms.  No other new symptoms or complaints at this time.  Please see ROS for further details.    This part of the medical record was transcribed by Evette Madison Medical Scribe, from a dictation done by Dot Ibarra MD.     Past Medical History  Past Medical History:   Diagnosis Date     Asthma     pt.states no longer has symptoms     CAD (coronary artery disease)      HLD (hyperlipidemia)      Past Surgical History:   Procedure Laterality Date     APPENDECTOMY       ARTHROPLASTY HIP Left 6/15/2016    Procedure: ARTHROPLASTY HIP;  Surgeon: Jeffy Wolff MD;  Location: [facility] OR     COLONOSCOPY  10/3/2013    Procedure: COMBINED COLONOSCOPY, SINGLE BIOPSY/POLYPECTOMY BY BIOPSY;;  Surgeon: Kenney Walls MD;  Location:  GI     ENDOSCOPIC ULTRASOUND UPPER GASTROINTESTINAL TRACT (GI) N/A 5/12/2022    Procedure: ENDOSCOPIC ULTRASOUND WITH PANCREATICOGASTROSTOMY CREATION, TRACT DILATION, STENT PLACEMENT;  Surgeon: Romaine Oates MD;  Location: [facility] OR     ESOPHAGOSCOPY, GASTROSCOPY, DUODENOSCOPY (EGD), COMBINED N/A 5/12/2022    Procedure: ESOPHAGOGASTRODUODENOSCOPY WITH ENDOSCOPIC CLIP PLACEMENT;  Surgeon: Arnaldo Noguera MD;  Location: [facility] OR      FOOT SURGERY       HC TOOTH EXTRACTION W/FORCEP       HYSTERECTOMY       INCISION AND DRAINAGE BREAST Left 2022    Procedure: evacuate hematoma left  BREAST;  Surgeon: Dayron Garcia MD;  Location: [facility] OR     IR FOLLOW UP VISIT OUTPATIENT  2022     IR SINOGRAM INJECTION DIAGNOSTIC  2022     amylase-lipase-protease (CREON) 74020-44827 units CPEP per EC capsule  atorvastatin (LIPITOR) 40 MG tablet  Calcium Carb-Cholecalciferol (CALCIUM 1000 + D) 1000-800 MG-UNIT TABS  calcium-magnesium (CALMAG) 500-250 MG TABS  cholecalciferol (VITAMIN D3) 25 mcg (1000 units) capsule  estradiol (ESTRACE) 0.1 MG/GM vaginal cream  estradiol (VIVELLE-DOT) 0.075 MG/24HR BIW patch  evolocumab (REPATHA) 140 MG/ML prefilled autoinjector  ezetimibe (ZETIA) 10 MG tablet  LORazepam (ATIVAN) 0.5 MG tablet  montelukast (SINGULAIR) 10 MG tablet  oxyCODONE (ROXICODONE) 5 MG tablet  polyethylene glycol (MIRALAX) 17 GM/Dose powder  senna-docusate (SENOKOT-S/PERICOLACE) 8.6-50 MG tablet  sodium chloride, PF, (NORMAL SALINE FLUSH) 0.9% PF flush  sodium chloride, PF, 0.9% PF flush  valACYclovir (VALTREX) 1000 mg tablet  VITAMIN A PO  zolpidem (AMBIEN) 10 MG tablet      Allergies   Allergen Reactions     Tramadol Other (See Comments)     Has a hyperactive reaction and can't sleep      Family History  No family history on file.  Social History   Social History     Tobacco Use     Smoking status: Former Smoker     Quit date: 10/3/1988     Years since quittin.6     Smokeless tobacco: Never Used   Substance Use Topics     Alcohol use: No     Drug use: No      Past medical history, past surgical history, medications, allergies, family history, and social history were reviewed with the patient. No additional pertinent items.     I have reviewed the Medications, Allergies, Past Medical and Surgical History, and Social History in the Epic system.    Review of Systems   Constitutional: Negative for chills and fever.   HENT:  "Negative.    Respiratory: Negative.  Negative for shortness of breath.    Cardiovascular: Negative for chest pain.   Gastrointestinal: Positive for abdominal pain, nausea and vomiting. Negative for blood in stool.   Genitourinary: Positive for decreased urine volume. Negative for dysuria and hematuria.   Musculoskeletal: Negative.  Negative for back pain.   Skin: Negative.  Negative for rash.   Neurological: Negative for syncope, weakness and light-headedness.   Psychiatric/Behavioral: Negative for suicidal ideas.   All other systems reviewed and are negative.    A complete review of systems was performed with pertinent positives and negatives noted in the HPI, and all other systems negative.    Physical Exam   Pulse: 77  Temp: 97.8  F (36.6  C)  Resp: 18  Height: 172.7 cm (5' 8\")  Weight: 65.8 kg (145 lb)  SpO2: 98 %      Physical Exam  CONSTITUTIONAL: Well-developed and well-nourished. Awake and alert. Non-toxic appearance. No acute distress. While the patient does not appear toxic or in hemodynamic or respiratory distress, she does appear like she feels unwell.  Her face looks somewhat shiny in the way that when somebody has been vomiting or having some sort of infection, but is not frankly diaphoretic.  HENT:   - Head: Normocephalic and atraumatic.   - Ears: Hearing and external ear grossly normal.   - Nose: Nose normal. No rhinorrhea. No epistaxis.   - Mouth/Throat: MMM  EYES: Conjunctivae and lids are normal. No scleral icterus.   NECK: Normal range of motion and phonation normal. Neck supple.  No tracheal deviation, no stridor. No edema or erythema noted.  CARDIOVASCULAR: Normal rate, regular rhythm and no appreciable abnormal heart sounds.  PULMONARY/CHEST: Normal work of breathing. No accessory muscle usage or stridor. No respiratory distress.  No appreciable abnormal breath sounds.  ABDOMEN: Soft, non-distended. No rigidity, rebound or guarding. Patient has diffuse discomfort to palpation throughout the " abdomen, seems to be worse in the epigastric and LUQ area.  Does have voluntary guarding, no palpable masses or abnormal pulsatility appreciated.  No other gina peritoneal findings appreciated.  MUSCULOSKELETAL: Extremities warm and seemingly well perfused. No edema or calf tenderness.  NEUROLOGIC: Awake, alert. Not disoriented. She displays no atrophy and no tremor. Normal tone. No seizure activity. GCS 15  SKIN: Skin is warm and dry. No rash noted. No diaphoresis. No pallor.   PSYCHIATRIC: Normal mood and affect. Speech and behavior normal. Thought processes linear. Cognition and memory are normal.     ED Course       ED Course as of 06/05/22 1624   Sun Jun 05, 2022   1555 Dr. Noguera called regarding this patient; EUS a month ago stent - developed abscess which was percutaneously drained, last Monday IR removed the drain. Dr. Noguera spoke w/ Dr. Duran (IR) regarding patients pain; they were thinking pancreatitis, abscess or both, and would like worked up/admitted. (Labs, CT, symptomat management, fluids). Dr. Edgar is on call for GI, but Dr. Noguera is going to be in contact w/ him, and Dr. Noguera will see her tomorrow.               EKG Interpretation:      Interpreted by Dot Ibarra MD  Time reviewed: 1630  Symptoms at time of EKG: abdominal pain   Rhythm: normal sinus   Rate: 60  Axis: Normal  Ectopy: none  Conduction: normal  ST Segments/ T Waves: No acute ST elevation or depression  Comparison to prior: Looks generally similar to previous on 5/24/2022  Clinical Impression: Sinus, looks generally similar to previous        Assessments & Plan (with Medical Decision Making)   IMPRESSION: 71 year old female with a PMH significant for duodenal polyp with pylorus sparing Whipple (2019), which was complicated by choledochojejunostomy stricture s/p axial stent placement and coaxial double-pigtail stent (placed 6/2020, removed 9/2020), with more recent EUS-guided pancreaticogastrostomy w/ through and through  stent (5/12/2022) with development of pancreatitis, fluid collection with infection, admitted for sepsis 5/20-25/22, with additional PMH notable for CAD, hyperlipidemia, asthma, prior appendectomy, hysterectomy, hip replacement, amongst others, presenting today with acute/severe abdominal pain.    Clinically, patient appears uncomfortable and has some elevated BP but in the setting of her discomfort and her prior history of some elevated BPs does not seem frankly emergent (no symptoms currently for hypertensive crisis, but will continue to monitor).  Vitals otherwise grossly WNL.  Otherwise on examination, she appears uncomfortable but in no gina hemodynamic or respiratory distress.  She has diffuse abdominal discomfort to palpation with voluntary guarding worse in the epigastric and LUQ but without palpable masses or abnormal pulsatility.  She otherwise appears somewhat clinically dehydrated with a little bit of skin tenting, lips look mildly dry, but otherwise no acute emergent findings.    DDx includes, but not limited to, recurrent of prior abdominal infection/abscess, recurrent pancreatitis, other enteritis, colitis, less likely bowel obstruction amongst others.  No chest/thoracic symptoms to make me think this to be an occult cardiothoracic cause of symptoms, considered UTI, or infectious bowel condition like gastroenteritis, etc. but think those to be of lower likelihood given the somewhat similar nature though more severe than her previous hepatobiliary and pancreatic issues.    PLAN: Laboratory studies, urine studies, CT imaging, symptom management, and I think she will need to be admitted for ongoing evaluation/management, symptom management and GI specialty eval and potential for more advanced GI or surgical procedures.  - Risks/benefits of pursuing imaging reviewed and accepted.     RESULTS:  - Labs:    --- Amylase 803 (up from prior of 54 on 5/12/2022), lipase 9968  --- Lactate normal at 0.7, pH  7.53/CO2 36/bicarb 30  --- CMP shows AST and ALT both slightly increased ( up from 20, ALT 58 up from 16), T bili normal, alk phos 194 (up from 106), no acute findings to NA, K, CL and CR/BUN similar to prior except for an elevated BUN to creatinine ratio which goes along with her concern for clinical dehydration)  --- Based INR 1.06 (down from previous, TSH 0.96, troponin 10  --- Does have some leukocytosis at 12.3 (up from most recent, but not as elevated as it was back in May when she was admitted with sepsis, Hgb 12.7 (but may have a degree of hemoconcentration in the setting of the clinical dehydration),  (again could be some hemoconcentration versus acute phase reactant)  --- CRP 5  - Urine: Pending  - Imaging: CT A/P pending    INTERVENTIONS:   - IVF  - IV Dilaudid  - IV zofran    RE-EVALUATION:  - Pt otherwise continues to do well here in the ED, no acute issues or apparent concerning changes in vitals or clinical appearance.    DISCUSSIONS:  - w/ Dr. Noguera: Reviewed history and his recommendations. He will see patient tomorrow.   - w/ IM: Reviewed patient/presentation, current state of workup/any pending studies. They will admit for further evaluation/management, F/U pending studies as needed, coordinate w/ consulting services as needed. No additional requests/recommendations for workup/management for in the ED at this time.   - w/ Patient: I have reviewed the available findings, plan with the patient and her loved one/guest. They expressed understanding and agreement with this plan. All questions answered to the best of our ability at this time.     DISPOSITION/PLANNING:  - IMPRESSION:   --- Acute pancreatitis  --- severe abdominal pain, nausea, vomiting  - DISPOSITION: Admit to IM for further evaluation management    ______________________________________________________________________  This part of the medical record was transcribed by Evette Madison, Medical Scribe, from a dictation done  by Dot Ibarra MD.           Dot Ibarra MD  6/5/2022   [facility] EMERGENCY DEPARTMENT     Dot Ibarra MD  06/06/22 0427

## 2022-06-06 LAB
ALBUMIN SERPL-MCNC: 2.3 G/DL (ref 3.4–5)
ALP SERPL-CCNC: 159 U/L (ref 40–150)
ALT SERPL W P-5'-P-CCNC: 35 U/L (ref 0–50)
ANION GAP SERPL CALCULATED.3IONS-SCNC: 4 MMOL/L (ref 3–14)
AST SERPL W P-5'-P-CCNC: 47 U/L (ref 0–45)
ATRIAL RATE - MUSE: 60 BPM
BILIRUB SERPL-MCNC: 0.4 MG/DL (ref 0.2–1.3)
BUN SERPL-MCNC: 12 MG/DL (ref 7–30)
CALCIUM SERPL-MCNC: 8.5 MG/DL (ref 8.5–10.1)
CHLORIDE BLD-SCNC: 108 MMOL/L (ref 94–109)
CO2 SERPL-SCNC: 28 MMOL/L (ref 20–32)
CREAT SERPL-MCNC: 0.66 MG/DL (ref 0.52–1.04)
DIASTOLIC BLOOD PRESSURE - MUSE: NORMAL MMHG
ERYTHROCYTE [DISTWIDTH] IN BLOOD BY AUTOMATED COUNT: 17.9 % (ref 10–15)
GFR SERPL CREATININE-BSD FRML MDRD: >90 ML/MIN/1.73M2
GLUCOSE BLD-MCNC: 93 MG/DL (ref 70–99)
HCT VFR BLD AUTO: 34.4 % (ref 35–47)
HGB BLD-MCNC: 10.9 G/DL (ref 11.7–15.7)
INR PPP: 1.15 (ref 0.85–1.15)
INTERPRETATION ECG - MUSE: NORMAL
MCH RBC QN AUTO: 27.9 PG (ref 26.5–33)
MCHC RBC AUTO-ENTMCNC: 31.7 G/DL (ref 31.5–36.5)
MCV RBC AUTO: 88 FL (ref 78–100)
P AXIS - MUSE: 74 DEGREES
PLATELET # BLD AUTO: 464 10E3/UL (ref 150–450)
POTASSIUM BLD-SCNC: 3.7 MMOL/L (ref 3.4–5.3)
PR INTERVAL - MUSE: 130 MS
PROT SERPL-MCNC: 6 G/DL (ref 6.8–8.8)
QRS DURATION - MUSE: 86 MS
QT - MUSE: 444 MS
QTC - MUSE: 444 MS
R AXIS - MUSE: 29 DEGREES
RBC # BLD AUTO: 3.91 10E6/UL (ref 3.8–5.2)
SODIUM SERPL-SCNC: 140 MMOL/L (ref 133–144)
SYSTOLIC BLOOD PRESSURE - MUSE: NORMAL MMHG
T AXIS - MUSE: 52 DEGREES
VENTRICULAR RATE- MUSE: 60 BPM
WBC # BLD AUTO: 10.5 10E3/UL (ref 4–11)

## 2022-06-06 PROCEDURE — 36415 COLL VENOUS BLD VENIPUNCTURE: CPT | Performed by: NURSE PRACTITIONER

## 2022-06-06 PROCEDURE — 99233 SBSQ HOSP IP/OBS HIGH 50: CPT | Performed by: INTERNAL MEDICINE

## 2022-06-06 PROCEDURE — 250N000011 HC RX IP 250 OP 636: Performed by: NURSE PRACTITIONER

## 2022-06-06 PROCEDURE — 85027 COMPLETE CBC AUTOMATED: CPT | Performed by: NURSE PRACTITIONER

## 2022-06-06 PROCEDURE — G0378 HOSPITAL OBSERVATION PER HR: HCPCS

## 2022-06-06 PROCEDURE — 250N000013 HC RX MED GY IP 250 OP 250 PS 637: Performed by: NURSE PRACTITIONER

## 2022-06-06 PROCEDURE — 250N000011 HC RX IP 250 OP 636: Performed by: INTERNAL MEDICINE

## 2022-06-06 PROCEDURE — 96376 TX/PRO/DX INJ SAME DRUG ADON: CPT

## 2022-06-06 PROCEDURE — 250N000013 HC RX MED GY IP 250 OP 250 PS 637: Performed by: INTERNAL MEDICINE

## 2022-06-06 PROCEDURE — 96361 HYDRATE IV INFUSION ADD-ON: CPT

## 2022-06-06 PROCEDURE — 258N000003 HC RX IP 258 OP 636: Performed by: NURSE PRACTITIONER

## 2022-06-06 PROCEDURE — 258N000003 HC RX IP 258 OP 636: Performed by: INTERNAL MEDICINE

## 2022-06-06 PROCEDURE — 85610 PROTHROMBIN TIME: CPT | Performed by: NURSE PRACTITIONER

## 2022-06-06 PROCEDURE — 99233 SBSQ HOSP IP/OBS HIGH 50: CPT | Mod: GC | Performed by: INTERNAL MEDICINE

## 2022-06-06 PROCEDURE — 80053 COMPREHEN METABOLIC PANEL: CPT | Performed by: NURSE PRACTITIONER

## 2022-06-06 PROCEDURE — 120N000002 HC R&B MED SURG/OB UMMC

## 2022-06-06 RX ORDER — NALOXONE HYDROCHLORIDE 0.4 MG/ML
0.4 INJECTION, SOLUTION INTRAMUSCULAR; INTRAVENOUS; SUBCUTANEOUS
Status: DISCONTINUED | OUTPATIENT
Start: 2022-06-06 | End: 2022-06-07 | Stop reason: HOSPADM

## 2022-06-06 RX ORDER — POLYETHYLENE GLYCOL 3350 17 G/17G
17 POWDER, FOR SOLUTION ORAL DAILY
Status: DISCONTINUED | OUTPATIENT
Start: 2022-06-06 | End: 2022-06-07 | Stop reason: HOSPADM

## 2022-06-06 RX ORDER — SENNOSIDES 8.6 MG
2 TABLET ORAL 2 TIMES DAILY
Status: DISCONTINUED | OUTPATIENT
Start: 2022-06-06 | End: 2022-06-07 | Stop reason: HOSPADM

## 2022-06-06 RX ORDER — NALOXONE HYDROCHLORIDE 0.4 MG/ML
0.2 INJECTION, SOLUTION INTRAMUSCULAR; INTRAVENOUS; SUBCUTANEOUS
Status: DISCONTINUED | OUTPATIENT
Start: 2022-06-06 | End: 2022-06-07 | Stop reason: HOSPADM

## 2022-06-06 RX ORDER — OXYCODONE HYDROCHLORIDE 5 MG/1
5-10 TABLET ORAL EVERY 4 HOURS PRN
Status: DISCONTINUED | OUTPATIENT
Start: 2022-06-06 | End: 2022-06-07 | Stop reason: HOSPADM

## 2022-06-06 RX ADMIN — EZETIMIBE 10 MG: 10 TABLET ORAL at 21:17

## 2022-06-06 RX ADMIN — HYDROMORPHONE HYDROCHLORIDE 0.8 MG: 1 INJECTION, SOLUTION INTRAMUSCULAR; INTRAVENOUS; SUBCUTANEOUS at 08:56

## 2022-06-06 RX ADMIN — POLYETHYLENE GLYCOL 3350 17 G: 17 POWDER, FOR SOLUTION ORAL at 14:31

## 2022-06-06 RX ADMIN — HYDROMORPHONE HYDROCHLORIDE 0.5 MG: 1 INJECTION, SOLUTION INTRAMUSCULAR; INTRAVENOUS; SUBCUTANEOUS at 00:04

## 2022-06-06 RX ADMIN — SODIUM CHLORIDE, POTASSIUM CHLORIDE, SODIUM LACTATE AND CALCIUM CHLORIDE: 600; 310; 30; 20 INJECTION, SOLUTION INTRAVENOUS at 04:25

## 2022-06-06 RX ADMIN — ZOLPIDEM TARTRATE 10 MG: 5 TABLET, FILM COATED ORAL at 22:34

## 2022-06-06 RX ADMIN — OXYCODONE HYDROCHLORIDE 5 MG: 5 TABLET ORAL at 14:17

## 2022-06-06 RX ADMIN — HYDROMORPHONE HYDROCHLORIDE 0.8 MG: 1 INJECTION, SOLUTION INTRAMUSCULAR; INTRAVENOUS; SUBCUTANEOUS at 17:43

## 2022-06-06 RX ADMIN — SODIUM CHLORIDE, POTASSIUM CHLORIDE, SODIUM LACTATE AND CALCIUM CHLORIDE: 600; 310; 30; 20 INJECTION, SOLUTION INTRAVENOUS at 16:24

## 2022-06-06 RX ADMIN — OXYCODONE HYDROCHLORIDE 5 MG: 5 TABLET ORAL at 18:39

## 2022-06-06 RX ADMIN — HYDROMORPHONE HYDROCHLORIDE 0.8 MG: 1 INJECTION, SOLUTION INTRAMUSCULAR; INTRAVENOUS; SUBCUTANEOUS at 20:56

## 2022-06-06 RX ADMIN — HYDROMORPHONE HYDROCHLORIDE 0.5 MG: 1 INJECTION, SOLUTION INTRAMUSCULAR; INTRAVENOUS; SUBCUTANEOUS at 04:15

## 2022-06-06 RX ADMIN — OXYCODONE HYDROCHLORIDE 5 MG: 5 TABLET ORAL at 22:37

## 2022-06-06 RX ADMIN — MONTELUKAST 10 MG: 10 TABLET, FILM COATED ORAL at 21:17

## 2022-06-06 RX ADMIN — HYDROMORPHONE HYDROCHLORIDE 0.8 MG: 1 INJECTION, SOLUTION INTRAMUSCULAR; INTRAVENOUS; SUBCUTANEOUS at 13:29

## 2022-06-06 RX ADMIN — HYDROMORPHONE HYDROCHLORIDE 0.5 MG: 1 INJECTION, SOLUTION INTRAMUSCULAR; INTRAVENOUS; SUBCUTANEOUS at 02:07

## 2022-06-06 RX ADMIN — HYDROMORPHONE HYDROCHLORIDE 0.5 MG: 1 INJECTION, SOLUTION INTRAMUSCULAR; INTRAVENOUS; SUBCUTANEOUS at 06:47

## 2022-06-06 RX ADMIN — HYDROMORPHONE HYDROCHLORIDE 0.8 MG: 1 INJECTION, SOLUTION INTRAMUSCULAR; INTRAVENOUS; SUBCUTANEOUS at 15:48

## 2022-06-06 RX ADMIN — PANCRELIPASE 2 CAPSULE: 24000; 76000; 120000 CAPSULE, DELAYED RELEASE PELLETS ORAL at 18:55

## 2022-06-06 RX ADMIN — ATORVASTATIN CALCIUM 40 MG: 40 TABLET, FILM COATED ORAL at 21:17

## 2022-06-06 RX ADMIN — HYDROMORPHONE HYDROCHLORIDE 0.8 MG: 1 INJECTION, SOLUTION INTRAMUSCULAR; INTRAVENOUS; SUBCUTANEOUS at 11:17

## 2022-06-06 ASSESSMENT — ACTIVITIES OF DAILY LIVING (ADL)
ADLS_ACUITY_SCORE: 31
ADLS_ACUITY_SCORE: 31
ADLS_ACUITY_SCORE: 35
ADLS_ACUITY_SCORE: 31
ADLS_ACUITY_SCORE: 35
ADLS_ACUITY_SCORE: 31
ADLS_ACUITY_SCORE: 35
ADLS_ACUITY_SCORE: 35
ADLS_ACUITY_SCORE: 31
ADLS_ACUITY_SCORE: 35
ADLS_ACUITY_SCORE: 31
ADLS_ACUITY_SCORE: 35

## 2022-06-06 ASSESSMENT — ENCOUNTER SYMPTOMS
WEAKNESS: 0
LIGHT-HEADEDNESS: 0
RESPIRATORY NEGATIVE: 1

## 2022-06-06 NOTE — PLAN OF CARE
"Goal Outcome Evaluation:    Plan of Care Reviewed With: patient      /64 (BP Location: Left arm)   Pulse 58   Temp 99  F (37.2  C) (Oral)   Resp 16   Ht 1.727 m (5' 8\")   Wt 65.8 kg (145 lb)   SpO2 95%   BMI 22.05 kg/m        Neuro - Alert and oriented X 4, calls appropriately  GI/Gu - Voiding spontaneously without difficulty, no BM this shift  Diet -NPO after mid night  IV access - 2 R PIV, Upper has LR infusing at 125 ml/hr  Labs - Reviewed  Activities - Up ad benito  Pain - Dilaudid given for pain   Changes - No change this shift  Plan - Continue POC      "

## 2022-06-06 NOTE — PLAN OF CARE
Alert and oriented x 4. Complains of abdominal pain. Dilaudid 0.5 mg given Q 2 hours. Up to the BR independently and voiding. LR infusing at 125 ml/hr. Calls appropriately.

## 2022-06-06 NOTE — PROVIDER NOTIFICATION
"Provider Notified (Brenda Figueroa MD) @ [id number]  \"Laura Carlos 34-1 7b  Pt was wondering if there was any plans for her? she would like her diet to be switch if possible.  thanks [phone]\"     Diet was switched to full liquid diet   "

## 2022-06-06 NOTE — PROGRESS NOTES
[facility]    Medicine Progress Note - Hospitalist Service, GOLD TEAM 7    Date of Admission:  6/5/2022    Assessment & Plan           Laura Gonzalez is a 71 year old woman admitted on 6/5/2022. 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  She was just admitted 5/13-5/15 for EUS guided pancreaticogastrostomy complicated by pancreatitis and an infected fluid collection requiring admission again from 5/20-5/25 as well as drain placement.      Updates:  - Resumed PO Oxycodone, increased IV Dilaudid for better pain control  - Increased IVF to 200 ml/hr  - Started full liquid diet, no plans for procedures with GI at this point - can reduce IVF rate as PO intake improves    # Recurrent pancreatitis, recent intraabdominal infection - This is a patient with an extremely complex abdominal history most recently notable for pancreatitis that developed in May following an EUS guided pancreaticogastrostomy complicated by infected fluid collection necessitating admission 5/20-5/25 with drain placement 5/20 and removal 6/2.  Now returning with pancreatic pain, elevated lipase, AST, ALT and Alk Phos.  - Repeat CT AP overnight shows findings concerning for acute pancreatitis but no fluid collections/areas concerning for abscess or infectious source, so will hold on further abx for now  - Patient completed a course of Fluconazole and Augmentin as outpatient for prior cultures from abdominal abscess growing K. Pna, E. Faecium, and C. Albicans, finished on 6/5  - S/p 2L IVF bolus overnight, now on mIVF at 200 ml/hr  - GI consulted, appreciate recommendations   - will resume diet    - decrease IVF as PO intake improves   - Dr. Noguera and team considering further action  - Pain and nausea control   - Holding home Creon TID AC until taking PO  -  Resume bowel regimen when taking PO    # CAD/Asymptomatic atherosclerosis   - PTA repatha, no doses inpatient; PTA atorvastatin, ezetimibe    # History of mild asthma  - Continue home montelukast    # Anxiety  # Insomnia  - Resume PTA Ativan PRN  - PTA PRN Ambien    # Menopausal Sx   - PTA estrogen cream    # Hypocalcemia   - PTA CaCO3    # Hypomagnesemia  - PTA magnesium oxide        Diet: NPO for Medical/Clinical Reasons Except for: Meds, Ice Chips    DVT Prophylaxis: Pneumatic Compression Devices  Condon Catheter: Not present  Central Lines: None  Cardiac Monitoring: None  Code Status: Full Code      Disposition Plan   Expected Discharge: 06/07/2022     Anticipated discharge location:  Awaiting care coordination huddle  Delays:            The patient's care was discussed with the Bedside Nurse, Patient and GI Consultant.    Carolina Gutierrez MD  Hospitalist Service, [address]  Securely message with the Vocera Web Console (learn more here)  Text page via [facility] Paging/Directory   Please see signed in provider for up to date coverage information      Clinically Significant Risk Factors Present on Admission             # Hypoalbuminemia: Albumin = 2.3 g/dL (Ref range: 3.4 - 5.0 g/dL) on admission, will monitor as appropriate          ______________________________________________________________________    Interval History   Doing okay this morning. Reports ongoing upper abdominal pain today. Quite severe, pain localized to epigastrium with radiation into the left flank area. Also accompanied by nausea, though most of her emesis happened yesterday. Denies any cough, chest pain, or shortness of breath. Discussed that we will manage her acute pancreatitis conservatively for now with possible interventions with the GI team to follow.     Data reviewed today: I reviewed all medications, new labs and imaging results over the last 24 hours. I personally reviewed  CT AP showing pancreatic inflammation, no fluid collections.    Physical Exam   Vital Signs: Temp: 98.1  F (36.7  C) Temp src: Oral BP: 136/58 Pulse: 68   Resp: 18 SpO2: 98 % O2 Device: None (Room air)    Weight: 145 lbs 0 oz  General Appearance: Pleasant, conversant, no acute distress  Respiratory: Breathing comfortably on room air, lungs CTAB  Cardiovascular: RRR, no m/r/g.  GI: epigastric tenderness to light palpation w/o guarding or rebound, BS+,   Skin: no skin rash, pallor present  Other: Trace LE edema bilaterally     Data   Recent Labs   Lab 06/06/22  0641 06/05/22  1551   WBC 10.5 12.3*   HGB 10.9* 12.7   MCV 88 86   * 549*   INR 1.15 1.06    137   POTASSIUM 3.7 4.0   CHLORIDE 108 106   CO2 28 26   BUN 12 21   CR 0.66 0.72   ANIONGAP 4 5   GLEN 8.5 8.9   GLC 93 125*   ALBUMIN 2.3* 2.8*   PROTTOTAL 6.0* 7.2   BILITOTAL 0.4 0.3   ALKPHOS 159* 194*   ALT 35 58*   AST 47* 120*   LIPASE  --  9,968*     Recent Results (from the past 24 hour(s))   CT Abdomen Pelvis w Contrast    Narrative    EXAMINATION: CT ABDOMEN PELVIS W CONTRAST, 6/5/2022 5:18 PM    TECHNIQUE:  Helical CT images from the lung bases through the  symphysis pubis were obtained with IV contrast. Contrast dose:  iopamidol (ISOVUE-370) solution 89 mL    COMPARISON: CT 5/31/2022    HISTORY: severe abd pain, Abdominal pain prior pancreatic process.  History of Whipple pancreatectomy    FINDINGS:    Liver: Normal attenuation of the hepatic parenchyma. Unchanged  subcentimeter cystic hypodensities since 8/27/2021, too small to  further characterize but likely benign cysts.  Biliary system: The gallbladder surgically absent.. No intrahepatic or  extrahepatic biliary ductal dilatation.  Pancreas: Mild amount of fat stranding and peripancreatic fluid about  the residual pancreas as well as heterogeneous enhancement pattern.  Stomach: Changes of Whipple surgery with patent gastrojejunal  anastomosis.  Spleen: Within normal limits.  Adrenal  glands: Within normal limits.  Kidneys: Subcentimeter simple appearing renal cysts. No  hydronephrosis. No focal renal mass. Distal left ureter is difficult  to visualize due to beam hardening artifact from adjacent left total  hip prosthesis..  Bladder: Within normal limits.  Reproductive organs: Within normal limits.  Colon: Within normal limits.  Appendix: Surgically absent  Small Bowel: Scattered mildly dilated loops small bowel.  Lymph nodes: Mildly prominent periaortic nodes are likely reactive..  Vasculature: Aortobiiliac atherosclerosis. Major branches appear  patent  Peritoneum: No intraperitoneal free air. Trace intraperitoneal free  fluid mostly peripancreatic.     Lung bases: Bibasilar atelectasis. Calcified granuloma in the left  lower lobe..    Bones and soft tissues: No suspicious soft tissue mass or fluid  collection. No suspicious osseous lesion. Left total hip arthroplasty.  Bilateral silicone breast implants.      Impression    IMPRESSION:   1. Peripancreatic fat stranding and fluid with heterogeneous  attenuation of the pancreatic parenchyma suspicious for acute  pancreatitis. Postoperative changes of Whipple procedure.  2. Scattered dilated loops of small bowel are likely ileus related to  adjacent pancreatic inflammation.  3. Subcentimeter periaortic lymph nodes likely reactive.     I have personally reviewed the examination and initial interpretation  and I agree with the findings.    MILES BARBOZA MD         SYSTEM ID:  [id number]

## 2022-06-06 NOTE — CONSULTS
GASTROENTEROLOGY CONSULTATION      Date of Admission:  6/5/2022           Reason for Consultation:   We were asked by Keena  to evaluate this patient with recurrent acute pancreatitis with a flare.           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 She had few interventions done in the past at [facility] ( see below).  She presented to [facility] pancreas clinic for a second opinion and underwent  EUS guided pancreaticogastrostomy (may 2022)  recently but unfortunately the stent has migrated. In may 2022, since the procedure she had an acute pancreatitis episode followed by infected peripancreatic collections..     She now presented with abd pain and meets criteria for pancreatitis viz: abd pain, elevated lipase (9900 range) and CT imaging with peripancreatic stranding and heterogeneous attenuation suggestive of acute pancreatitis. She also has evidence of elevated liver tests which are now improving.     Pancreatitis history: Recurrent acute   Onset: 12/2019  Etiology: Presumed to be due to pancreaticjejunal anastamotic stenosis after Whipple  BIPSAP score:  2  Severity: Mild   Procedures: see below   Last cross sectional imaging: CT Abdomen 6/5/2022  Fluid collections: None  PERT: Yes , prior to this episode. Started empirically by previous provider at [place].       Recommendations  -- Fluid resuscitation, with Lactated ringers at 200 ml/hour                  -  Reassess and adjust fluid requirement q6H  -- Monitor closely for evidence of adequate hydration (IAP/APA guidelines)                  - Hematocrit reduction by at least 35 %                  - BUN decrease by about 35%                  - Urine output of 0.5 - 1 cc/kg/hr                  - Improving Na  -- Analgesia per primary team  -- Trend transaminases  -- Monitor oxygenation to detect early signs of ARDS  -- Regular BG checks   -- Encourage PO intake in the next 24 - 48 hours (Based on ACG and IAP/APA guidelines) if improved  -- No role for antibiotics for now     -- No utility in trending lipase  -- Daily BMP, monitor for hypocalcemia   -- Long term management of pancreaticojejunal anastamotic stricutre which is believed to be the trigger for these episode is to be determined after discussion with Dr. Noguera and Dr. Oates. Anticipate that this will happen as an outpatient after resolution of current episode   - Follow up with Dr. Noguera at the time of discharge    Thank you for involving us in this patient's care. Please do not hesitate to contact the GI service with any questions or concerns.     Pt care plan discussed with Dr. Edgar, GI staff physician.    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     Pt underwent EUS guided pancreaticogastrostomy on May 13, 2022. Post procedurally she developed pancreatitis resulting in a hosptial admission. This responded to conservative treatment including IV fluids and analgesics. She presented again to the ER on 5/20/2022 with abd pain, fever and leukocytosis. CT imaging was  "significant for migrated pancreaticogastrostomy stent, LUQ collection and she underwent CT guided drainage with drain placement. Aspirate cultures were positive for K pneumoniae, E faecium and C albicans. She was treated with antibiotics and ultimately the drain was removed by IR on 5/31/2022 (last Tuesday).     Pt says she was feeling fatigued but was otherwise ok until Saturday. On Saturday, she went to visit a friend and when she was on her way back, started having her \"classic\" pancreatitis pain which is a shooting pain in the epigastric region which is quire debilitating. This lasted for about 5 minutes and subsided. On Sunday, the pain came back after she had some coffee and strawberry protein shake (didn't complete it). This time it was more continuous, assocaited with nausea and multiple episodes of vomiting. She then informed Dr. Noguera, and came in to the ER for further care.     In the ER, lipase level was 9968, CT imaging showed peripancreatic fat stranding, with scattered dilated loops of small bowel suggestive of ileus. She was started o IV hydration, analgesics and then admitted for further management.     At the time of my exam, pt reported that her pain is controlled to some extent with two hourly as needed IV Dilaudid but she does feel some amount of pain before the end of two hours. She was worried about elevated liver tests.          Data:   Key relevant labs:    Latest Reference Range & Units 06/05/22 15:51 06/06/22 06:41   Albumin 3.4 - 5.0 g/dL 2.8 (L) 2.3 (L)   Protein Total 6.8 - 8.8 g/dL 7.2 6.0 (L)   Bilirubin Total 0.2 - 1.3 mg/dL 0.3 0.4   Alkaline Phosphatase 40 - 150 U/L 194 (H) 159 (H)   ALT 0 - 50 U/L 58 (H) 35   AST 0 - 45 U/L 120 (H) 47 (H)   Amylase 30 - 110 U/L 803 (H)       Latest Reference Range & Units 06/05/22 15:51   Lipase 73 - 393 U/L 9,968 (H)       Key relevant imaging:  CT abdomen and pelvis with IV contrast (6/5/2022)                                                    "                IMPRESSION:   1. Peripancreatic fat stranding and fluid with heterogeneous  attenuation of the pancreatic parenchyma suspicious for acute  pancreatitis. Postoperative changes of Whipple procedure.  2. Scattered dilated loops of small bowel are likely ileus related to  adjacent pancreatic inflammation.  3. Subcentimeter periaortic lymph nodes likely reactive.     IR follow up visit (5/31/2022)    Procedure/Findings:   Sinogram from earlier today was performed, with some concern about  purulent drainage from the tube and drain was left in place and  capped. Patient continues to have pain from the tube itself and after  further review of the imaging demonstrating no significant residual  collection on both CT and sinogram and no fistula to the pancreatic  duct, the decision was made to remove the catheter.      Patient was brought to the holding room. Skin was prepped with  chlorhexidine. Local anesthetic was offered however patient agreed to  have the drain removed quickly without anesthesia. Retention suture  was cut and the drain was removed without difficulty. Sterile dressing  was applied. Patient noted immediate relief of discomfort.                                                                Impression: Uneventful removal of left upper quadrant abscess drain.    CT Abdomen and Pelvis without contrast (5/31/2022)    IMPRESSION:  1.  Left anterior abdominal surgical drain remains in place. Foci of  gas and fat stranding without significant residual fluid collection.  Perisplenic fluid collection is nearly complete resolved.  2.  Persistent small left pleural effusion.           Previous Endoscopy:     EUS   2/18/2020  Indication: Acute recurrent pancreatitis   Post-op Diagnoses:        - There was no sign of significant pathology in the entire pancreas.        - No specimens collected.    ERCP   5/29/2020  Indication: Elevated aspartate transaminase (AST), Elevated alanine transaminase (ALT),  Elevated alkaline  phosphatase, For therapy of complication of previous  biliary surgery   Post-op Diagnoses:        - A single severe localized biliary stricture was found at the        hepaticojejunostomy anastomosis.        - Bile duct orifice was successfully dilated.    ERCP   6/26/2020  Indication; Suspected ascending cholangitis  Post-op Diagnoses:        - Patent pancreaticoduodenectomy (Whipple), characterized by healthy        appearing mucosa was found.        - Patent but strictured choledochojejunostomy was found in the jejunum        status post successful placement of a 10 mm by 10 mm AXIOS stent across        the choledochojejunostomy with a coaxial 7 Fr by 4 cm double pigtail        stent.    ERCP, device assisted, balloon sweep, EGD  9/8/2020  Indication: Elevated liver enzymes, Abnormal liver function test, Elevated aspartate transaminase (AST), Elevated alanine transaminase (ALT), Elevated alkaline phosphatase, Biliary stent removal, Bile duct stricture   Post-op Diagnoses:        - Normal esophagus.        - Normal stomach.        - Patent pyloroenterostomy without anastomotic stricture or ulcer.        - Both biliary enteric anstomotic stents in good position, removed with        rat-tooth forceps. Choledochoenterostomy is widely patent.        - The cholangiogram was normal.        - Both anastomotic biliary stents were removed from the common hepatic        duct and not replaced as detailed.        - The biliary tree was swept and sludge was found and extracted clear in        small amounts.    5/13/2022  EUS  Indication: Acute recurrent pancreatitis   Impression:            - Uncomplicated pancreaticogastrostomy was                          successfully performed and secured with a 3 Fr x 13 cm                          single pigtail Advanix stent with pigtail in the                          jejunum (across the pancreaticojejunostomy' with the                          pigtail in the stomach  ''through-and-through stent'').                          - One hemostatic clip was placed to anchor stent to                          gastric wall.                          - Post-pyloric preserving Whipple anatomy with healthy                          appearance and normal residual pancreatic parenchyma                          except for scattered hyperechoic strands.          Medications:     Medications Prior to Admission   Medication Sig Dispense Refill Last Dose     [] amoxicillin-clavulanate (AUGMENTIN) 875-125 MG tablet Take 1 tablet by mouth every 12 hours for 10 days 20 tablet 0      amylase-lipase-protease (CREON) 52557-92151 units CPEP per EC capsule Take 2-3 with meals / 1-2 with snacks, up to 15 per day. (Patient taking differently: No sig reported) 450 capsule 6      atorvastatin (LIPITOR) 40 MG tablet Take 40 mg by mouth At Bedtime         Calcium Carb-Cholecalciferol (CALCIUM 1000 + D) 1000-800 MG-UNIT TABS         calcium-magnesium (CALMAG) 500-250 MG TABS Take 2 tablets by mouth daily        cholecalciferol (VITAMIN D3) 25 mcg (1000 units) capsule Take 1 capsule by mouth daily        estradiol (ESTRACE) 0.1 MG/GM vaginal cream Place 1 g vaginally See Admin Instructions Insert 1 gram vaginally as directed by provider        estradiol (VIVELLE-DOT) 0.075 MG/24HR BIW patch Place 1 patch onto the skin twice a week        evolocumab (REPATHA) 140 MG/ML prefilled autoinjector Inject 140 mg Subcutaneous every 14 days        ezetimibe (ZETIA) 10 MG tablet Take 10 mg by mouth At Bedtime        [] fluconazole (DIFLUCAN) 200 MG tablet Take 1 tablet (200 mg) by mouth every 24 hours for 10 days 10 tablet 0      LORazepam (ATIVAN) 0.5 MG tablet Take 1-2 tablets by mouth twice daily as needed for anxiety        montelukast (SINGULAIR) 10 MG tablet Take 10 mg by mouth At Bedtime         oxyCODONE (ROXICODONE) 5 MG tablet Take 1 tablet (5 mg) by mouth every 6 hours as needed for moderate to severe  "pain (take 5 mg for 5-7/10 pain, 10 mg for 8-10/10 pain) 20 tablet 0      polyethylene glycol (MIRALAX) 17 GM/Dose powder Take 17 g by mouth 2 times daily as needed 510 g 3      senna-docusate (SENOKOT-S/PERICOLACE) 8.6-50 MG tablet Take 1 tablet by mouth 2 times daily as needed for constipation 30 tablet 3      valACYclovir (VALTREX) 1000 mg tablet Take 1,000 mg by mouth daily as needed (cold sore)         VITAMIN A PO Take 3,000 Units by mouth daily        zolpidem (AMBIEN) 10 MG tablet Take 1 tablet by mouth nightly as needed for sleep                Physical Exam:   /58 (BP Location: Left arm)   Pulse 68   Temp 98.1  F (36.7  C) (Oral)   Resp 18   Ht 1.727 m (5' 8\")   Wt 65.8 kg (145 lb)   SpO2 98%   BMI 22.05 kg/m    Wt:   Wt Readings from Last 2 Encounters:   06/05/22 65.8 kg (145 lb)   05/25/22 69.3 kg (152 lb 12.5 oz)      Constitutional: cooperative, pleasant, not dyspneic/diaphoretic, in pain  Eyes: Sclera anicteric  Ears/nose/mouth/throat:  mucus membranes moist, hearing intact  Neck: supple,   CV: No edema, RRR  Respiratory: Unlabored breathing  Abd: Soft, tenderness all over the abdomen more on the upper half  Skin: warm, perfused, no jaundice  Neuro: AAO x 3, No asterixis  Psych: Normal affect  MSK: No gross deformities          Past Medical History:   Reviewed and edited as appropriate  Past Medical History:   Diagnosis Date     Asthma     pt.states no longer has symptoms     CAD (coronary artery disease)      HLD (hyperlipidemia)             Past Surgical History:   Reviewed and edited as appropriate   Past Surgical History:   Procedure Laterality Date     APPENDECTOMY       ARTHROPLASTY HIP Left 6/15/2016    Procedure: ARTHROPLASTY HIP;  Surgeon: Jeffy Wolff MD;  Location: [facility] OR     COLONOSCOPY  10/3/2013    Procedure: COMBINED COLONOSCOPY, SINGLE BIOPSY/POLYPECTOMY BY BIOPSY;;  Surgeon: Kenney Walls MD;  Location:  GI     ENDOSCOPIC ULTRASOUND UPPER GASTROINTESTINAL " TRACT (GI) N/A 5/12/2022    Procedure: ENDOSCOPIC ULTRASOUND WITH PANCREATICOGASTROSTOMY CREATION, TRACT DILATION, STENT PLACEMENT;  Surgeon: Romaine Oates MD;  Location: [facility] OR     ESOPHAGOSCOPY, GASTROSCOPY, DUODENOSCOPY (EGD), COMBINED N/A 5/12/2022    Procedure: ESOPHAGOGASTRODUODENOSCOPY WITH ENDOSCOPIC CLIP PLACEMENT;  Surgeon: Arnaldo Noguera MD;  Location: [facility] OR     FOOT SURGERY       HC TOOTH EXTRACTION W/FORCEP       HYSTERECTOMY       INCISION AND DRAINAGE BREAST Left 2/18/2022    Procedure: evacuate hematoma left  BREAST;  Surgeon: Dayron Garcia MD;  Location: [facility] OR     IR FOLLOW UP VISIT OUTPATIENT  5/31/2022     IR SINOGRAM INJECTION DIAGNOSTIC  5/31/2022            Social History:   Alcohol use: No  Smoking history: Former smoker  Living situation: Lives in [place]          Family History:   Reviewed and edited as appropriate  No family history on file.    Brother and father had colon cancer         Allergies:   Reviewed and edited as appropriate     Allergies   Allergen Reactions     Tramadol Other (See Comments)     Has a hyperactive reaction and can't sleep               Review of Systems:     A complete 10 point review of systems was performed and is negative except as noted in the HPI

## 2022-06-06 NOTE — PROGRESS NOTES
"/58 (BP Location: Left arm)   Pulse 68   Temp 98.1  F (36.7  C) (Oral)   Resp 18   Ht 1.727 m (5' 8\")   Wt 65.8 kg (145 lb)   SpO2 98%   BMI 22.05 kg/m       Time: 2911-4585  Admitting Diagnosis: Abdominal pain (acute pancreatitis)  NEURO: A&Ox4, calls approprately   RESP: RA, sating >92%, denies SOB  CARDIAC: WNL, denies cardiac chest pain   GI/: Voiding spontaneously, no bm this shift   DIET: full liquid diet   PAIN: C/O abdominal pain managed with prn dilaudid and oxycodone   SKIN: no new skin deficit   IV ACCESS: 2 r piv (upperforearm infusing LR @ 200ml/hr, lower forearm SL)  LAB: reviewed   ACTIVITY: up ad benito   New Changes this shift: prn IV Dilaudid 0.8mg ordered, prn oxycodone 5-10mg ordered, MIVF increasted to 200ml/hr.   PLAN: Continue POC   "

## 2022-06-07 VITALS
RESPIRATION RATE: 16 BRPM | BODY MASS INDEX: 21.98 KG/M2 | WEIGHT: 145 LBS | HEART RATE: 68 BPM | HEIGHT: 68 IN | DIASTOLIC BLOOD PRESSURE: 55 MMHG | TEMPERATURE: 99.9 F | SYSTOLIC BLOOD PRESSURE: 126 MMHG | OXYGEN SATURATION: 94 %

## 2022-06-07 LAB
ALBUMIN SERPL-MCNC: 2.1 G/DL (ref 3.4–5)
ALP SERPL-CCNC: 128 U/L (ref 40–150)
ALT SERPL W P-5'-P-CCNC: 23 U/L (ref 0–50)
ANION GAP SERPL CALCULATED.3IONS-SCNC: 4 MMOL/L (ref 3–14)
AST SERPL W P-5'-P-CCNC: 26 U/L (ref 0–45)
BILIRUB SERPL-MCNC: 0.4 MG/DL (ref 0.2–1.3)
BUN SERPL-MCNC: 7 MG/DL (ref 7–30)
CALCIUM SERPL-MCNC: 8.2 MG/DL (ref 8.5–10.1)
CHLORIDE BLD-SCNC: 108 MMOL/L (ref 94–109)
CO2 SERPL-SCNC: 28 MMOL/L (ref 20–32)
CREAT SERPL-MCNC: 0.72 MG/DL (ref 0.52–1.04)
ERYTHROCYTE [DISTWIDTH] IN BLOOD BY AUTOMATED COUNT: 17.9 % (ref 10–15)
GFR SERPL CREATININE-BSD FRML MDRD: 88 ML/MIN/1.73M2
GLUCOSE BLD-MCNC: 84 MG/DL (ref 70–99)
HCT VFR BLD AUTO: 29.4 % (ref 35–47)
HGB BLD-MCNC: 9.6 G/DL (ref 11.7–15.7)
MCH RBC QN AUTO: 28.3 PG (ref 26.5–33)
MCHC RBC AUTO-ENTMCNC: 32.7 G/DL (ref 31.5–36.5)
MCV RBC AUTO: 87 FL (ref 78–100)
PLATELET # BLD AUTO: 310 10E3/UL (ref 150–450)
POTASSIUM BLD-SCNC: 3.5 MMOL/L (ref 3.4–5.3)
PROT SERPL-MCNC: 5.4 G/DL (ref 6.8–8.8)
RBC # BLD AUTO: 3.39 10E6/UL (ref 3.8–5.2)
SODIUM SERPL-SCNC: 140 MMOL/L (ref 133–144)
WBC # BLD AUTO: 9.2 10E3/UL (ref 4–11)

## 2022-06-07 PROCEDURE — 96376 TX/PRO/DX INJ SAME DRUG ADON: CPT

## 2022-06-07 PROCEDURE — 85027 COMPLETE CBC AUTOMATED: CPT | Performed by: HOSPITALIST

## 2022-06-07 PROCEDURE — 80053 COMPREHEN METABOLIC PANEL: CPT | Performed by: HOSPITALIST

## 2022-06-07 PROCEDURE — 258N000003 HC RX IP 258 OP 636: Performed by: INTERNAL MEDICINE

## 2022-06-07 PROCEDURE — 99239 HOSP IP/OBS DSCHRG MGMT >30: CPT | Performed by: INTERNAL MEDICINE

## 2022-06-07 PROCEDURE — 250N000011 HC RX IP 250 OP 636: Performed by: INTERNAL MEDICINE

## 2022-06-07 PROCEDURE — 250N000013 HC RX MED GY IP 250 OP 250 PS 637: Performed by: INTERNAL MEDICINE

## 2022-06-07 PROCEDURE — 96361 HYDRATE IV INFUSION ADD-ON: CPT

## 2022-06-07 PROCEDURE — 250N000013 HC RX MED GY IP 250 OP 250 PS 637: Performed by: NURSE PRACTITIONER

## 2022-06-07 PROCEDURE — G0378 HOSPITAL OBSERVATION PER HR: HCPCS

## 2022-06-07 PROCEDURE — 36415 COLL VENOUS BLD VENIPUNCTURE: CPT | Performed by: HOSPITALIST

## 2022-06-07 RX ORDER — ONDANSETRON 4 MG/1
4 TABLET, ORALLY DISINTEGRATING ORAL EVERY 6 HOURS PRN
Qty: 20 TABLET | Refills: 0 | Status: SHIPPED | OUTPATIENT
Start: 2022-06-07 | End: 2023-03-29

## 2022-06-07 RX ORDER — OXYCODONE HYDROCHLORIDE 5 MG/1
5 TABLET ORAL EVERY 6 HOURS PRN
Qty: 15 TABLET | Refills: 0 | Status: ON HOLD | OUTPATIENT
Start: 2022-06-07 | End: 2022-06-17

## 2022-06-07 RX ORDER — LORAZEPAM 0.5 MG/1
0.5 TABLET ORAL 2 TIMES DAILY PRN
Qty: 15 TABLET | Refills: 0 | Status: SHIPPED | OUTPATIENT
Start: 2022-06-07 | End: 2023-03-29

## 2022-06-07 RX ADMIN — OXYCODONE HYDROCHLORIDE 5 MG: 5 TABLET ORAL at 08:22

## 2022-06-07 RX ADMIN — POLYETHYLENE GLYCOL 3350 17 G: 17 POWDER, FOR SOLUTION ORAL at 07:56

## 2022-06-07 RX ADMIN — OXYCODONE HYDROCHLORIDE 5 MG: 5 TABLET ORAL at 12:24

## 2022-06-07 RX ADMIN — HYDROMORPHONE HYDROCHLORIDE 0.8 MG: 1 INJECTION, SOLUTION INTRAMUSCULAR; INTRAVENOUS; SUBCUTANEOUS at 03:47

## 2022-06-07 RX ADMIN — PANCRELIPASE 2 CAPSULE: 24000; 76000; 120000 CAPSULE, DELAYED RELEASE PELLETS ORAL at 07:57

## 2022-06-07 RX ADMIN — HYDROMORPHONE HYDROCHLORIDE 0.8 MG: 1 INJECTION, SOLUTION INTRAMUSCULAR; INTRAVENOUS; SUBCUTANEOUS at 00:17

## 2022-06-07 RX ADMIN — SODIUM CHLORIDE, POTASSIUM CHLORIDE, SODIUM LACTATE AND CALCIUM CHLORIDE: 600; 310; 30; 20 INJECTION, SOLUTION INTRAVENOUS at 03:51

## 2022-06-07 ASSESSMENT — ACTIVITIES OF DAILY LIVING (ADL)
ADLS_ACUITY_SCORE: 31
DEPENDENT_IADLS:: INDEPENDENT
ADLS_ACUITY_SCORE: 31

## 2022-06-07 NOTE — DISCHARGE SUMMARY
[facility]  Hospitalist Discharge Summary      Date of Admission:  6/5/2022  Date of Discharge:  6/7/2022  3:31 PM  Discharging Provider: CISCO KIMBROUGH MD  Discharge Service: Hospitalist Service, GOLD TEAM 7    Discharge Diagnoses   Recurrent acute pancreatitis with an acute episode          Follow-ups Needed After Discharge   Follow-up Appointments     Follow Up ([facility])      Follow up with primary care provider, MIQUEL ANDERS, within 7 days for   hospital follow- up.  No follow up labs or test are needed.      Appointments on [place] and/or [facility] (with [facility] or [facility]   provider or service). Call [phone] if you haven't heard regarding   these appointments within 7 days of discharge.             Unresulted Labs Ordered in the Past 30 Days of this Admission       Date and Time Order Name Status Description    6/5/2022  3:49 PM Blood Culture Peripheral Blood Preliminary     6/5/2022  3:49 PM Blood Culture Peripheral Blood Preliminary             Discharge Disposition   Discharged to home  Condition at discharge: Stable      Hospital Course     Laura Gonzalez is a 71 year old woman admitted on 6/5/2022. 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 She was recently admitted 5/13-5/15 for EUS guided pancreaticogastrostomy complicated by pancreatitis and an infected fluid collection requiring admission again from 5/20-5/25 as well as drain placement. This time she presented with abdominal pain and found to have another episode of acute pancreatitis, whcich was managed with supportive treatment. She imrpoved, her diet was advanced which she tolerated well and deemed ready for dischrage. GI team was also followed patient over the course and recommended outpatient follow up regarding plan for  stenosis management.   Patient agreed with plan and discharged in stable condition.          Consultations This Hospital Stay   GI PANCREATICOBILIARY ADULT IP CONSULT  CARE MANAGEMENT / SOCIAL WORK IP CONSULT    Code Status   Full Code    Time Spent on this Encounter   I, CISCO KIMBROUGH MD, personally saw the patient today and spent greater than 30 minutes discharging this patient.       CISCO KIMBROUGH MD  [facility] [address] [address]  Phone: [phone]  ______________________________________________________________________    Physical Exam   Vital Signs: Temp: 99.9  F (37.7  C) Temp src: Oral BP: 126/55 Pulse: 68   Resp: 16 SpO2: 94 % O2 Device: None (Room air)    Weight: 145 lbs 0 oz  General Appearance: AO x3, no distress  Respiratory: cleart to auscultation, no wheezing  Cardiovascular: S1 and S2 normal  GI: mild epigastric tenderness, no distention, normal BS  Skin: normal turgor, no rash  Other: No pedal edema        Primary Care Physician   MIQUEL ANDERS    Discharge Orders      Reason for your hospital stay    You were diagnosed and treated for acute pancreatitis with supportive management. Please seek urgent medication attention if you develop any worsening of symptoms such as severe nausea, vomiting, abdominal pain or fevers.     Activity    Your activity upon discharge: activity as tolerated     Follow Up ([facility])    Follow up with primary care provider, MIQUEL ANDERS, within 7 days for hospital follow- up.  No follow up labs or test are needed.      Appointments on [place] and/or [facility] (with [facility] or [facility] provider or service). Call [phone] if you haven't heard regarding these appointments within 7 days of discharge.     Diet    Follow this diet upon discharge: Orders Placed This Encounter      Regular Diet Adult       Significant Results and Procedures   Results for orders placed or performed during the hospital encounter of  06/05/22   CT Abdomen Pelvis w Contrast    Narrative    EXAMINATION: CT ABDOMEN PELVIS W CONTRAST, 6/5/2022 5:18 PM    TECHNIQUE:  Helical CT images from the lung bases through the  symphysis pubis were obtained with IV contrast. Contrast dose:  iopamidol (ISOVUE-370) solution 89 mL    COMPARISON: CT 5/31/2022    HISTORY: severe abd pain, Abdominal pain prior pancreatic process.  History of Whipple pancreatectomy    FINDINGS:    Liver: Normal attenuation of the hepatic parenchyma. Unchanged  subcentimeter cystic hypodensities since 8/27/2021, too small to  further characterize but likely benign cysts.  Biliary system: The gallbladder surgically absent.. No intrahepatic or  extrahepatic biliary ductal dilatation.  Pancreas: Mild amount of fat stranding and peripancreatic fluid about  the residual pancreas as well as heterogeneous enhancement pattern.  Stomach: Changes of Whipple surgery with patent gastrojejunal  anastomosis.  Spleen: Within normal limits.  Adrenal glands: Within normal limits.  Kidneys: Subcentimeter simple appearing renal cysts. No  hydronephrosis. No focal renal mass. Distal left ureter is difficult  to visualize due to beam hardening artifact from adjacent left total  hip prosthesis..  Bladder: Within normal limits.  Reproductive organs: Within normal limits.  Colon: Within normal limits.  Appendix: Surgically absent  Small Bowel: Scattered mildly dilated loops small bowel.  Lymph nodes: Mildly prominent periaortic nodes are likely reactive..  Vasculature: Aortobiiliac atherosclerosis. Major branches appear  patent  Peritoneum: No intraperitoneal free air. Trace intraperitoneal free  fluid mostly peripancreatic.     Lung bases: Bibasilar atelectasis. Calcified granuloma in the left  lower lobe..    Bones and soft tissues: No suspicious soft tissue mass or fluid  collection. No suspicious osseous lesion. Left total hip arthroplasty.  Bilateral silicone breast implants.      Impression     IMPRESSION:   1. Peripancreatic fat stranding and fluid with heterogeneous  attenuation of the pancreatic parenchyma suspicious for acute  pancreatitis. Postoperative changes of Whipple procedure.  2. Scattered dilated loops of small bowel are likely ileus related to  adjacent pancreatic inflammation.  3. Subcentimeter periaortic lymph nodes likely reactive.     I have personally reviewed the examination and initial interpretation  and I agree with the findings.    MILES BARBOZA MD         SYSTEM ID:  [id number]       Discharge Medications   Discharge Medication List as of 6/7/2022  1:37 PM        START taking these medications    Details   ondansetron (ZOFRAN ODT) 4 MG ODT tab Take 1 tablet (4 mg) by mouth every 6 hours as needed for nausea or vomiting, Disp-20 tablet, R-0, E-Prescribe           CONTINUE these medications which have CHANGED    Details   LORazepam (ATIVAN) 0.5 MG tablet Take 1 tablet (0.5 mg) by mouth 2 times daily as needed for anxiety Take 1-2 tablets by mouth twice daily as needed for anxiety, Disp-15 tablet, R-0, Local Print      oxyCODONE (ROXICODONE) 5 MG tablet Take 1 tablet (5 mg) by mouth every 6 hours as needed for moderate to severe pain (take 5 mg for 5-7/10 pain, 10 mg for 8-10/10 pain), Disp-15 tablet, R-0, Local Print           CONTINUE these medications which have NOT CHANGED    Details   amylase-lipase-protease (CREON) 97637-47[address] units CPEP per EC capsule Take 2-3 with meals / 1-2 with snacks, up to 15 per day., Disp-450 capsule, R-6, E-Prescribe      atorvastatin (LIPITOR) 40 MG tablet Take 40 mg by mouth At Bedtime , Historical      Calcium Carb-Cholecalciferol (CALCIUM 1000 + D) 1000-800 MG-UNIT TABS Historical      calcium-magnesium (CALMAG) 500-250 MG TABS Take 2 tablets by mouth daily, Historical      cholecalciferol (VITAMIN D3) 25 mcg (1000 units) capsule Take 1 capsule by mouth daily, Historical      estradiol (ESTRACE) 0.1 MG/GM vaginal cream Place 1 g vaginally See Admin  Instructions Insert 1 gram vaginally as directed by providerHistorical      estradiol (VIVELLE-DOT) 0.075 MG/24HR BIW patch Place 1 patch onto the skin twice a week, Historical      evolocumab (REPATHA) 140 MG/ML prefilled autoinjector Inject 140 mg Subcutaneous every 14 days, Historical      ezetimibe (ZETIA) 10 MG tablet Take 10 mg by mouth At Bedtime, Historical      montelukast (SINGULAIR) 10 MG tablet Take 10 mg by mouth At Bedtime , Historical      polyethylene glycol (MIRALAX) 17 GM/Dose powder Take 17 g by mouth 2 times daily as needed, Disp-510 g, R-3, E-Prescribe      senna-docusate (SENOKOT-S/PERICOLACE) 8.6-50 MG tablet Take 1 tablet by mouth 2 times daily as needed for constipation, Disp-30 tablet, R-3, E-Prescribe      valACYclovir (VALTREX) 1000 mg tablet Take 1,000 mg by mouth daily as needed (cold sore) , Historical      VITAMIN A PO Take 3,000 Units by mouth daily, Historical      zolpidem (AMBIEN) 10 MG tablet Take 1 tablet by mouth nightly as needed for sleep, Historical           STOP taking these medications       amoxicillin-clavulanate (AUGMENTIN) 875-125 MG tablet Comments:   Reason for Stopping:         fluconazole (DIFLUCAN) 200 MG tablet Comments:   Reason for Stopping:             Allergies   Allergies   Allergen Reactions    Tramadol Other (See Comments)     Has a hyperactive reaction and can't sleep

## 2022-06-07 NOTE — PROGRESS NOTES
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 She had few interventions done in the past at [facility] ( see below).  She presented to [facility] pancreas clinic for a second opinion and underwent  EUS guided pancreaticogastrostomy (may 2022)  recently but unfortunately the stent has migrated. In may 2022, since the procedure she had an acute pancreatitis episode followed by infected peripancreatic collections. These collections were managed with antibiotics and percutaneous drain, the drain has since then been remvoed.     She now presented with abd pain and meets criteria for pancreatitis viz: abd pain, elevated lipase (9900 range) and CT imaging with peripancreatic stranding and heterogeneous attenuation suggestive of acute pancreatitis. She also had evidence of elevated liver tests which have improved now.      Pancreatitis history: Recurrent acute   Onset: 12/2019  Etiology: Presumed to be due to pancreaticjejunal anastamotic stenosis after Whipple  BIPSAP score:  2  Severity: Mild   Procedures: see below   Last cross sectional imaging: CT Abdomen 6/5/2022  Fluid collections: None  PERT: Yes , prior to this episode. Started empirically by previous provider at [place].     Today pt reports that pain has resolved, she has tolerated break fast and lunch.           Recommendations  - Dr. Noguera's RN Esau informed of discharge. Plan for outpatient follow up  "in early July 2022 for discussion of long term management of the pancreaticojejunal stenosis, this would potentially be a rendezvous approach.  This was discussed at length with the patient by Dr. Noguera and she is in agreement with this plan.      Patient was discussed with Dr. Edgar and Dr. Chuckie Acosta MD   Fellow   Gastroenterology & Hepatology     __________________________________________________________________________________  Subjective:  Nursing notes reviewed and noted.  Abd pain has improved to a great deal  She has tolerated lunch and breakfast  Afebrile     Physical Examination:  Vital Signs:  /55 (BP Location: Right arm)   Pulse 68   Temp 99.9  F (37.7  C) (Oral)   Resp 16   Ht 1.727 m (5' 8\")   Wt 65.8 kg (145 lb)   SpO2 94%   BMI 22.05 kg/m       General:  NAD  HEENT:  Sclera anciteric  Neck:  Supple.   Chest:  Non labored breathing   Cardiovascular: RRR.   Abdomen:  Soft, non tender, non distended  Extremities:  No peripheral edema.   Skin:  No jaundice   Neurological: Alert, calm and oriented, following commands     DATA:  Labs:    Reviewed and noted            "

## 2022-06-07 NOTE — PLAN OF CARE
"  Problem: Plan of Care - These are the overarching goals to be used throughout the patient stay.    Goal: Optimal Comfort and Wellbeing  Outcome: Ongoing, Progressing     Problem: Fluid Imbalance (Pancreatitis)  Goal: Fluid Balance  Outcome: Ongoing, Progressing     Problem: Pain (Pancreatitis)  Goal: Acceptable Pain Control  Outcome: Ongoing, Progressing     Pt was given PO Oxy and IV dilaudid for abdominal pain. Pain increases with activities. IV fluid infusing at 200 ml/hr. Independent with activities. Denies nausea or vomiting. Call light appropriate. BP (!) 151/50 (BP Location: Right arm)   Pulse 68   Temp 98.2  F (36.8  C) (Oral)   Resp 18   Ht 1.727 m (5' 8\")   Wt 65.8 kg (145 lb)   SpO2 97%. On RA. Pt is alert and oriented X 4. Tolerating full liquid diet. Will continue to monitor.   "

## 2022-06-07 NOTE — PLAN OF CARE
Patient discharged at 1500 PM. Discharge instructions reviewed with patient, opportunity offered to ask questions. Prescriptions sent to discharge pharmacy and patient's home pharmacy. All belongings sent with patient. IV access taken out.     Avani Power RN

## 2022-06-07 NOTE — CONSULTS
Care Management Initial Consult    General Information  Assessment completed with: Patient,    Type of CM/SW Visit: Offer D/C Planning    Primary Care Provider verified and updated as needed: Yes   Readmission within the last 30 days: no previous admission in last 30 days      Reason for Consult: discharge planning  Advance Care Planning: Advance Care Planning Reviewed: no concerns identified          Communication Assessment  Patient's communication style: spoken language (English or Bilingual)    Hearing Difficulty or Deaf: no        Cognitive  Cognitive/Neuro/Behavioral: WDL                      Living Environment:   People in home: spouse     Current living Arrangements: house      Able to return to prior arrangements: yes       Family/Social Support:  Care provided by: self  Provides care for:    Marital Status:   , Other (specify) (family and friends)          Description of Support System: Supportive, Involved    Support Assessment: Adequate family and caregiver support    Current Resources:   Patient receiving home care services: No     Community Resources: None  Equipment currently used at home: none  Supplies currently used at home: None    Employment/Financial:  Employment Status: retired        Financial Concerns: No concerns identified           Lifestyle & Psychosocial Needs:  Social Determinants of Health     Tobacco Use: Medium Risk     Smoking Tobacco Use: Former Smoker     Smokeless Tobacco Use: Never Used   Alcohol Use: Not on file   Financial Resource Strain: Not on file   Food Insecurity: Not on file   Transportation Needs: Not on file   Physical Activity: Not on file   Stress: Not on file   Social Connections: Not on file   Intimate Partner Violence: Not on file   Depression: Not at risk     PHQ-2 Score: 0   Housing Stability: Not on file       Functional Status:  Prior to admission patient needed assistance:   Dependent ADLs:: Independent  Dependent IADLs::  Independent  Assesssment of Functional Status: At functional baseline    Mental Health Status:  Mental Health Status: No Current Concerns       Chemical Dependency Status: N/A                Values/Beliefs:  Spiritual, Cultural Beliefs, Alevism Practices, Values that affect care: no               Care Management Discharge Note    Discharge Date: 06/07/2022       Discharge Disposition:  Home    Discharge Services:  N/A    Discharge DME:  N/A    Discharge Transportation: family or friend will provide    Private pay costs discussed: Not applicable    PAS Confirmation Code:  N/A  Patient/family educated on Medicare website which has current facility and service quality ratings:  N/A    Education Provided on the Discharge Plan:  yes  Persons Notified of Discharge Plans: patient  Patient/Family in Agreement with the Plan:  yes    Handoff Referral Completed: external.     Additional Information:    RNCC met with the patient at the bedside to introduce RNCC role and help with discharge planning as the patient had an elevated readmission risk score.     The patient is independent and does not have any concerns/needs. RNCC will remain available and help with discharge planning as needed.     Yohana Garza RN, BSN, PHN  Float Care Coordinator  Covering 7B RNCC   Pager: [phone]    For Weekend & Holiday on call RN Care Coordinator:  (Home discharge with needs including home care, Assisted living facility returns, Durable medical equip, IV antibiotics)   [place]  Units: 4A, 4C, 4E, 5A and 5B- Pager 1: [phone]  Units: 6A, 6B, 6C, 6D- Pager 2: [phone]  Units: 7A, 7B, 7C, 7D, and 5C-Pager 3: [phone]  [facility]  Units: 5 Ortho, 8A, 10 ICU, & Pediatric Units-Pager 4: [phone]

## 2022-06-08 ENCOUNTER — PATIENT OUTREACH (OUTPATIENT)
Dept: CARE COORDINATION | Facility: CLINIC | Age: 72
End: 2022-06-08
Payer: COMMERCIAL

## 2022-06-08 DIAGNOSIS — Z71.89 OTHER SPECIFIED COUNSELING: ICD-10-CM

## 2022-06-08 NOTE — PROGRESS NOTES
Clinic Care Coordination Contact  [facility]: Post-Discharge Note  SITUATION                                                      Admission:    Admission Date: 06/05/22   Reason for Admission: Recurrent acute pancreatitis with an acute episode  Discharge:   Discharge Date: 06/07/22  Discharge Diagnosis: Recurrent acute pancreatitis with an acute episode    BACKGROUND                                                      Per hospital discharge summary and inpatient provider notes:    Laura Gonzalez is a 71 year old woman admitted on 6/5/2022. 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 She was recently admitted 5/13-5/15 for EUS guided pancreaticogastrostomy complicated by pancreatitis and an infected fluid collection requiring admission again from 5/20-5/25 as well as drain placement. This time she presented with abdominal pain and found to have another       ASSESSMENT      Enrollment  Primary Care Care Coordination Status: Not a Candidate    Discharge Assessment  How are you doing now that you are home?: Patient reports she is doing fine, no questions  How are your symptoms? (Red Flag symptoms escalate to triage hotline per guidelines): Improved  Do you feel your condition is stable enough to be safe at home until your provider visit?: Yes  Does the patient have their discharge instructions? : Yes  Does the patient have questions regarding their discharge instructions? : No  Were you started on any new medications or were there changes to any of your previous medications? : No  Discharge follow-up appointment scheduled within 14 calendar days? : No  Is patient agreeable to assistance with scheduling? : No (writer advised pt to make f/u with pcp)                  PLAN                                                      Outpatient Plan:       Follow up with primary care provider, MIQUEL ANDERS, within 7 days for   hospital follow- up.  No follow up labs or test are needed.     No future appointments.      For any urgent concerns, please contact our 24 hour nurse triage line: [phone] ([phone])         CHRISTIANO Martinez  [phone]  [facility]

## 2022-06-10 ENCOUNTER — TELEPHONE (OUTPATIENT)
Dept: MULTI SPECIALTY CLINIC | Facility: CLINIC | Age: 72
End: 2022-06-10
Payer: COMMERCIAL

## 2022-06-10 ENCOUNTER — PATIENT OUTREACH (OUTPATIENT)
Dept: GASTROENTEROLOGY | Facility: CLINIC | Age: 72
End: 2022-06-10
Payer: COMMERCIAL

## 2022-06-10 ENCOUNTER — PREP FOR PROCEDURE (OUTPATIENT)
Dept: GASTROENTEROLOGY | Facility: CLINIC | Age: 72
End: 2022-06-10
Payer: COMMERCIAL

## 2022-06-10 LAB
BACTERIA BLD CULT: NO GROWTH
BACTERIA BLD CULT: NO GROWTH

## 2022-06-10 NOTE — TELEPHONE ENCOUNTER
Called to review plan with patient, per Dr. Noguera come to ER for support over weekend/admission, procedure Monday afternoon as inpatient add on.  Left message    ML

## 2022-06-10 NOTE — PROGRESS NOTES
Please assist in scheduling:     Procedure/Imaging/Clinic: EUS/ERCP  Physician: Deonna  Timin/30  Procedure length:provider average  Anesthesia:gen  Dx: recurrent pancreatitis  Tier:2  Location: [facility]

## 2022-06-10 NOTE — TELEPHONE ENCOUNTER
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    She called Dr. Noguera about having symptoms typical of \"flare\" of pancreatitis, She was recently here as an inpatient for similar symptoms. She was advised to come to the ER for further evaluation. In addition to regular blood work, pt will need CT abdomen and pelvis with IV contrast to look for any peripancreatic collections.  "

## 2022-06-11 ENCOUNTER — HEALTH MAINTENANCE LETTER (OUTPATIENT)
Age: 72
End: 2022-06-11

## 2022-06-12 ENCOUNTER — MYC MEDICAL ADVICE (OUTPATIENT)
Dept: GASTROENTEROLOGY | Facility: CLINIC | Age: 72
End: 2022-06-12
Payer: COMMERCIAL

## 2022-06-15 ENCOUNTER — PREP FOR PROCEDURE (OUTPATIENT)
Dept: GASTROENTEROLOGY | Facility: CLINIC | Age: 72
End: 2022-06-15
Payer: COMMERCIAL

## 2022-06-16 ENCOUNTER — APPOINTMENT (OUTPATIENT)
Dept: GENERAL RADIOLOGY | Facility: CLINIC | Age: 72
End: 2022-06-16
Attending: INTERNAL MEDICINE
Payer: COMMERCIAL

## 2022-06-16 ENCOUNTER — ANESTHESIA (OUTPATIENT)
Dept: SURGERY | Facility: CLINIC | Age: 72
End: 2022-06-16
Payer: COMMERCIAL

## 2022-06-16 ENCOUNTER — HOSPITAL ENCOUNTER (OUTPATIENT)
Facility: CLINIC | Age: 72
Setting detail: OBSERVATION
Discharge: HOME | End: 2022-06-17
Attending: INTERNAL MEDICINE | Admitting: INTERNAL MEDICINE
Payer: COMMERCIAL

## 2022-06-16 ENCOUNTER — ANESTHESIA EVENT (OUTPATIENT)
Dept: SURGERY | Facility: CLINIC | Age: 72
End: 2022-06-16
Payer: COMMERCIAL

## 2022-06-16 DIAGNOSIS — B99.9 INTRA-ABDOMINAL INFECTION: ICD-10-CM

## 2022-06-16 PROBLEM — K85.90 ACUTE ON CHRONIC PANCREATITIS (H): Status: ACTIVE | Noted: 2022-06-16

## 2022-06-16 PROBLEM — K86.1 ACUTE ON CHRONIC PANCREATITIS (H): Status: ACTIVE | Noted: 2022-06-16

## 2022-06-16 LAB
ALBUMIN SERPL-MCNC: 3.2 G/DL (ref 3.4–5)
ALP SERPL-CCNC: 178 U/L (ref 40–150)
ALT SERPL W P-5'-P-CCNC: 28 U/L (ref 0–50)
AMYLASE SERPL-CCNC: 147 U/L (ref 30–110)
ANION GAP SERPL CALCULATED.3IONS-SCNC: 7 MMOL/L (ref 3–14)
AST SERPL W P-5'-P-CCNC: 26 U/L (ref 0–45)
BILIRUB SERPL-MCNC: 0.4 MG/DL (ref 0.2–1.3)
BUN SERPL-MCNC: 14 MG/DL (ref 7–30)
CALCIUM SERPL-MCNC: 9.1 MG/DL (ref 8.5–10.1)
CHLORIDE BLD-SCNC: 104 MMOL/L (ref 94–109)
CO2 SERPL-SCNC: 28 MMOL/L (ref 20–32)
CREAT SERPL-MCNC: 0.66 MG/DL (ref 0.52–1.04)
ERCP: NORMAL
ERYTHROCYTE [DISTWIDTH] IN BLOOD BY AUTOMATED COUNT: 17.3 % (ref 10–15)
GFR SERPL CREATININE-BSD FRML MDRD: >90 ML/MIN/1.73M2
GLUCOSE BLD-MCNC: 94 MG/DL (ref 70–99)
GLUCOSE BLDC GLUCOMTR-MCNC: 105 MG/DL (ref 70–99)
HCT VFR BLD AUTO: 38.6 % (ref 35–47)
HGB BLD-MCNC: 12.3 G/DL (ref 11.7–15.7)
INR PPP: 1.07 (ref 0.85–1.15)
LIPASE SERPL-CCNC: 441 U/L (ref 73–393)
MCH RBC QN AUTO: 28.1 PG (ref 26.5–33)
MCHC RBC AUTO-ENTMCNC: 31.9 G/DL (ref 31.5–36.5)
MCV RBC AUTO: 88 FL (ref 78–100)
PLATELET # BLD AUTO: 443 10E3/UL (ref 150–450)
POTASSIUM BLD-SCNC: 4.3 MMOL/L (ref 3.4–5.3)
PROT SERPL-MCNC: 7.6 G/DL (ref 6.8–8.8)
RBC # BLD AUTO: 4.37 10E6/UL (ref 3.8–5.2)
SODIUM SERPL-SCNC: 139 MMOL/L (ref 133–144)
WBC # BLD AUTO: 7.8 10E3/UL (ref 4–11)

## 2022-06-16 PROCEDURE — 85610 PROTHROMBIN TIME: CPT | Performed by: INTERNAL MEDICINE

## 2022-06-16 PROCEDURE — 82150 ASSAY OF AMYLASE: CPT | Performed by: INTERNAL MEDICINE

## 2022-06-16 PROCEDURE — 258N000003 HC RX IP 258 OP 636: Performed by: NURSE ANESTHETIST, CERTIFIED REGISTERED

## 2022-06-16 PROCEDURE — 96374 THER/PROPH/DIAG INJ IV PUSH: CPT | Mod: 59

## 2022-06-16 PROCEDURE — 250N000011 HC RX IP 250 OP 636: Performed by: NURSE ANESTHETIST, CERTIFIED REGISTERED

## 2022-06-16 PROCEDURE — 85027 COMPLETE CBC AUTOMATED: CPT | Performed by: INTERNAL MEDICINE

## 2022-06-16 PROCEDURE — 360N000082 HC SURGERY LEVEL 2 W/ FLUORO, PER MIN: Performed by: INTERNAL MEDICINE

## 2022-06-16 PROCEDURE — C1726 CATH, BAL DIL, NON-VASCULAR: HCPCS | Performed by: INTERNAL MEDICINE

## 2022-06-16 PROCEDURE — 250N000011 HC RX IP 250 OP 636: Performed by: HOSPITALIST

## 2022-06-16 PROCEDURE — 250N000009 HC RX 250: Performed by: INTERNAL MEDICINE

## 2022-06-16 PROCEDURE — 999N000181 XR SURGERY CARM FLUORO GREATER THAN 5 MIN W STILLS: Mod: TC

## 2022-06-16 PROCEDURE — 82962 GLUCOSE BLOOD TEST: CPT

## 2022-06-16 PROCEDURE — 250N000025 HC SEVOFLURANE, PER MIN: Performed by: INTERNAL MEDICINE

## 2022-06-16 PROCEDURE — 250N000013 HC RX MED GY IP 250 OP 250 PS 637: Performed by: ANESTHESIOLOGY

## 2022-06-16 PROCEDURE — P9041 ALBUMIN (HUMAN),5%, 50ML: HCPCS | Mod: JG | Performed by: NURSE ANESTHETIST, CERTIFIED REGISTERED

## 2022-06-16 PROCEDURE — 83690 ASSAY OF LIPASE: CPT | Performed by: INTERNAL MEDICINE

## 2022-06-16 PROCEDURE — 999N000141 HC STATISTIC PRE-PROCEDURE NURSING ASSESSMENT: Performed by: INTERNAL MEDICINE

## 2022-06-16 PROCEDURE — 250N000011 HC RX IP 250 OP 636: Performed by: ANESTHESIOLOGY

## 2022-06-16 PROCEDURE — C1877 STENT, NON-COAT/COV W/O DEL: HCPCS | Performed by: INTERNAL MEDICINE

## 2022-06-16 PROCEDURE — 250N000009 HC RX 250: Performed by: NURSE ANESTHETIST, CERTIFIED REGISTERED

## 2022-06-16 PROCEDURE — 258N000003 HC RX IP 258 OP 636: Performed by: ANESTHESIOLOGY

## 2022-06-16 PROCEDURE — 82040 ASSAY OF SERUM ALBUMIN: CPT | Performed by: INTERNAL MEDICINE

## 2022-06-16 PROCEDURE — 258N000003 HC RX IP 258 OP 636: Performed by: HOSPITALIST

## 2022-06-16 PROCEDURE — 250N000013 HC RX MED GY IP 250 OP 250 PS 637: Performed by: HOSPITALIST

## 2022-06-16 PROCEDURE — 255N000002 HC RX 255 OP 636: Performed by: INTERNAL MEDICINE

## 2022-06-16 PROCEDURE — 272N000001 HC OR GENERAL SUPPLY STERILE: Performed by: INTERNAL MEDICINE

## 2022-06-16 PROCEDURE — 80053 COMPREHEN METABOLIC PANEL: CPT | Performed by: INTERNAL MEDICINE

## 2022-06-16 PROCEDURE — G0378 HOSPITAL OBSERVATION PER HR: HCPCS

## 2022-06-16 PROCEDURE — 370N000017 HC ANESTHESIA TECHNICAL FEE, PER MIN: Performed by: INTERNAL MEDICINE

## 2022-06-16 PROCEDURE — 710N000010 HC RECOVERY PHASE 1, LEVEL 2, PER MIN: Performed by: INTERNAL MEDICINE

## 2022-06-16 PROCEDURE — C1769 GUIDE WIRE: HCPCS | Performed by: INTERNAL MEDICINE

## 2022-06-16 DEVICE — STENT FREEMAN PANCREA FLEX 4FRX3CM W/O FLANGE SGL PIGTAIL
Type: IMPLANTABLE DEVICE | Site: PANCREATIC DUCT | Status: NON-FUNCTIONAL
Removed: 2023-03-30

## 2022-06-16 DEVICE — STENT FREEMAN PANCREA FLEX 5FRX9CM SGL PIGTAIL
Type: IMPLANTABLE DEVICE | Site: PANCREATIC DUCT | Status: NON-FUNCTIONAL
Removed: 2023-03-30

## 2022-06-16 RX ORDER — ATORVASTATIN CALCIUM 40 MG/1
40 TABLET, FILM COATED ORAL AT BEDTIME
Status: DISCONTINUED | OUTPATIENT
Start: 2022-06-16 | End: 2022-06-17 | Stop reason: HOSPADM

## 2022-06-16 RX ORDER — LIDOCAINE 40 MG/G
CREAM TOPICAL
Status: DISCONTINUED | OUTPATIENT
Start: 2022-06-16 | End: 2022-06-16

## 2022-06-16 RX ORDER — SODIUM CHLORIDE, SODIUM LACTATE, POTASSIUM CHLORIDE, CALCIUM CHLORIDE 600; 310; 30; 20 MG/100ML; MG/100ML; MG/100ML; MG/100ML
INJECTION, SOLUTION INTRAVENOUS CONTINUOUS
Status: ACTIVE | OUTPATIENT
Start: 2022-06-16 | End: 2022-06-17

## 2022-06-16 RX ORDER — ONDANSETRON 2 MG/ML
4 INJECTION INTRAMUSCULAR; INTRAVENOUS EVERY 6 HOURS PRN
Status: DISCONTINUED | OUTPATIENT
Start: 2022-06-16 | End: 2022-06-16

## 2022-06-16 RX ORDER — HYDROMORPHONE HYDROCHLORIDE 1 MG/ML
0.5 INJECTION, SOLUTION INTRAMUSCULAR; INTRAVENOUS; SUBCUTANEOUS
Status: DISCONTINUED | OUTPATIENT
Start: 2022-06-16 | End: 2022-06-17

## 2022-06-16 RX ORDER — ONDANSETRON 4 MG/1
4 TABLET, ORALLY DISINTEGRATING ORAL EVERY 6 HOURS PRN
Status: DISCONTINUED | OUTPATIENT
Start: 2022-06-16 | End: 2022-06-16

## 2022-06-16 RX ORDER — MONTELUKAST SODIUM 10 MG/1
10 TABLET ORAL AT BEDTIME
Status: DISCONTINUED | OUTPATIENT
Start: 2022-06-16 | End: 2022-06-17 | Stop reason: HOSPADM

## 2022-06-16 RX ORDER — ONDANSETRON 4 MG/1
4 TABLET, ORALLY DISINTEGRATING ORAL EVERY 30 MIN PRN
Status: DISCONTINUED | OUTPATIENT
Start: 2022-06-16 | End: 2022-06-16

## 2022-06-16 RX ORDER — DEXAMETHASONE SODIUM PHOSPHATE 4 MG/ML
INJECTION, SOLUTION INTRA-ARTICULAR; INTRALESIONAL; INTRAMUSCULAR; INTRAVENOUS; SOFT TISSUE PRN
Status: DISCONTINUED | OUTPATIENT
Start: 2022-06-16 | End: 2022-06-16

## 2022-06-16 RX ORDER — LEVOFLOXACIN 5 MG/ML
INJECTION, SOLUTION INTRAVENOUS PRN
Status: DISCONTINUED | OUTPATIENT
Start: 2022-06-16 | End: 2022-06-16

## 2022-06-16 RX ORDER — ESTRADIOL 0.07 MG/D
1 FILM, EXTENDED RELEASE TRANSDERMAL
Status: DISCONTINUED | OUTPATIENT
Start: 2022-06-20 | End: 2022-06-17 | Stop reason: HOSPADM

## 2022-06-16 RX ORDER — SODIUM CHLORIDE, SODIUM LACTATE, POTASSIUM CHLORIDE, CALCIUM CHLORIDE 600; 310; 30; 20 MG/100ML; MG/100ML; MG/100ML; MG/100ML
INJECTION, SOLUTION INTRAVENOUS CONTINUOUS
Status: DISCONTINUED | OUTPATIENT
Start: 2022-06-16 | End: 2022-06-16

## 2022-06-16 RX ORDER — VITAMIN B COMPLEX
25 TABLET ORAL DAILY
Status: DISCONTINUED | OUTPATIENT
Start: 2022-06-17 | End: 2022-06-17 | Stop reason: HOSPADM

## 2022-06-16 RX ORDER — ONDANSETRON 4 MG/1
4 TABLET, ORALLY DISINTEGRATING ORAL EVERY 6 HOURS PRN
Status: DISCONTINUED | OUTPATIENT
Start: 2022-06-16 | End: 2022-06-17 | Stop reason: HOSPADM

## 2022-06-16 RX ORDER — LORAZEPAM 0.5 MG/1
0.5 TABLET ORAL 2 TIMES DAILY PRN
Status: DISCONTINUED | OUTPATIENT
Start: 2022-06-16 | End: 2022-06-17 | Stop reason: HOSPADM

## 2022-06-16 RX ORDER — ONDANSETRON 2 MG/ML
4 INJECTION INTRAMUSCULAR; INTRAVENOUS EVERY 30 MIN PRN
Status: DISCONTINUED | OUTPATIENT
Start: 2022-06-16 | End: 2022-06-16

## 2022-06-16 RX ORDER — NALOXONE HYDROCHLORIDE 0.4 MG/ML
0.4 INJECTION, SOLUTION INTRAMUSCULAR; INTRAVENOUS; SUBCUTANEOUS
Status: DISCONTINUED | OUTPATIENT
Start: 2022-06-16 | End: 2022-06-17 | Stop reason: HOSPADM

## 2022-06-16 RX ORDER — LIDOCAINE HYDROCHLORIDE 20 MG/ML
INJECTION, SOLUTION INFILTRATION; PERINEURAL PRN
Status: DISCONTINUED | OUTPATIENT
Start: 2022-06-16 | End: 2022-06-16

## 2022-06-16 RX ORDER — INDOMETHACIN 50 MG/1
100 SUPPOSITORY RECTAL
Status: DISCONTINUED | OUTPATIENT
Start: 2022-06-16 | End: 2022-06-16

## 2022-06-16 RX ORDER — AMOXICILLIN 250 MG
1 CAPSULE ORAL 2 TIMES DAILY PRN
Status: DISCONTINUED | OUTPATIENT
Start: 2022-06-16 | End: 2022-06-17 | Stop reason: HOSPADM

## 2022-06-16 RX ORDER — EZETIMIBE 10 MG/1
10 TABLET ORAL AT BEDTIME
Status: DISCONTINUED | OUTPATIENT
Start: 2022-06-16 | End: 2022-06-17 | Stop reason: HOSPADM

## 2022-06-16 RX ORDER — PROPOFOL 10 MG/ML
INJECTION, EMULSION INTRAVENOUS PRN
Status: DISCONTINUED | OUTPATIENT
Start: 2022-06-16 | End: 2022-06-16

## 2022-06-16 RX ORDER — HYDROMORPHONE HYDROCHLORIDE 1 MG/ML
0.2 INJECTION, SOLUTION INTRAMUSCULAR; INTRAVENOUS; SUBCUTANEOUS EVERY 5 MIN PRN
Status: DISCONTINUED | OUTPATIENT
Start: 2022-06-16 | End: 2022-06-16

## 2022-06-16 RX ORDER — SODIUM CHLORIDE, SODIUM LACTATE, POTASSIUM CHLORIDE, CALCIUM CHLORIDE 600; 310; 30; 20 MG/100ML; MG/100ML; MG/100ML; MG/100ML
INJECTION, SOLUTION INTRAVENOUS CONTINUOUS PRN
Status: DISCONTINUED | OUTPATIENT
Start: 2022-06-16 | End: 2022-06-16

## 2022-06-16 RX ORDER — FENTANYL CITRATE 50 UG/ML
INJECTION, SOLUTION INTRAMUSCULAR; INTRAVENOUS PRN
Status: DISCONTINUED | OUTPATIENT
Start: 2022-06-16 | End: 2022-06-16

## 2022-06-16 RX ORDER — ONDANSETRON 2 MG/ML
4 INJECTION INTRAMUSCULAR; INTRAVENOUS EVERY 6 HOURS PRN
Status: DISCONTINUED | OUTPATIENT
Start: 2022-06-16 | End: 2022-06-17 | Stop reason: HOSPADM

## 2022-06-16 RX ORDER — NALOXONE HYDROCHLORIDE 0.4 MG/ML
0.2 INJECTION, SOLUTION INTRAMUSCULAR; INTRAVENOUS; SUBCUTANEOUS
Status: DISCONTINUED | OUTPATIENT
Start: 2022-06-16 | End: 2022-06-17 | Stop reason: HOSPADM

## 2022-06-16 RX ORDER — INDOMETHACIN 50 MG/1
SUPPOSITORY RECTAL PRN
Status: DISCONTINUED | OUTPATIENT
Start: 2022-06-16 | End: 2022-06-16 | Stop reason: HOSPADM

## 2022-06-16 RX ORDER — FLUMAZENIL 0.1 MG/ML
0.2 INJECTION, SOLUTION INTRAVENOUS
Status: DISCONTINUED | OUTPATIENT
Start: 2022-06-16 | End: 2022-06-17

## 2022-06-16 RX ORDER — IOPAMIDOL 510 MG/ML
INJECTION, SOLUTION INTRAVASCULAR PRN
Status: DISCONTINUED | OUTPATIENT
Start: 2022-06-16 | End: 2022-06-16 | Stop reason: HOSPADM

## 2022-06-16 RX ORDER — FENTANYL CITRATE 50 UG/ML
25 INJECTION, SOLUTION INTRAMUSCULAR; INTRAVENOUS EVERY 5 MIN PRN
Status: DISCONTINUED | OUTPATIENT
Start: 2022-06-16 | End: 2022-06-16

## 2022-06-16 RX ORDER — POLYETHYLENE GLYCOL 3350 17 G/17G
17 POWDER, FOR SOLUTION ORAL 2 TIMES DAILY PRN
Status: DISCONTINUED | OUTPATIENT
Start: 2022-06-16 | End: 2022-06-17 | Stop reason: HOSPADM

## 2022-06-16 RX ORDER — ZOLPIDEM TARTRATE 5 MG/1
10 TABLET ORAL
Status: DISCONTINUED | OUTPATIENT
Start: 2022-06-16 | End: 2022-06-17 | Stop reason: HOSPADM

## 2022-06-16 RX ORDER — ALBUMIN, HUMAN INJ 5% 5 %
SOLUTION INTRAVENOUS CONTINUOUS PRN
Status: DISCONTINUED | OUTPATIENT
Start: 2022-06-16 | End: 2022-06-16

## 2022-06-16 RX ORDER — OXYCODONE HYDROCHLORIDE 5 MG/1
5 TABLET ORAL EVERY 4 HOURS PRN
Status: DISCONTINUED | OUTPATIENT
Start: 2022-06-16 | End: 2022-06-16

## 2022-06-16 RX ORDER — ONDANSETRON 2 MG/ML
INJECTION INTRAMUSCULAR; INTRAVENOUS PRN
Status: DISCONTINUED | OUTPATIENT
Start: 2022-06-16 | End: 2022-06-16

## 2022-06-16 RX ADMIN — ZOLPIDEM TARTRATE 10 MG: 5 TABLET ORAL at 23:22

## 2022-06-16 RX ADMIN — PHENYLEPHRINE HYDROCHLORIDE 100 MCG: 10 INJECTION INTRAVENOUS at 14:31

## 2022-06-16 RX ADMIN — SODIUM CHLORIDE, POTASSIUM CHLORIDE, SODIUM LACTATE AND CALCIUM CHLORIDE: 600; 310; 30; 20 INJECTION, SOLUTION INTRAVENOUS at 23:20

## 2022-06-16 RX ADMIN — ONDANSETRON 4 MG: 2 INJECTION INTRAMUSCULAR; INTRAVENOUS at 15:15

## 2022-06-16 RX ADMIN — HYDROMORPHONE HYDROCHLORIDE 0.5 MG: 1 INJECTION, SOLUTION INTRAMUSCULAR; INTRAVENOUS; SUBCUTANEOUS at 23:14

## 2022-06-16 RX ADMIN — FENTANYL CITRATE 100 MCG: 50 INJECTION, SOLUTION INTRAMUSCULAR; INTRAVENOUS at 14:20

## 2022-06-16 RX ADMIN — PHENYLEPHRINE HYDROCHLORIDE 100 MCG: 10 INJECTION INTRAVENOUS at 14:27

## 2022-06-16 RX ADMIN — HYDROMORPHONE HYDROCHLORIDE 0.2 MG: 1 INJECTION, SOLUTION INTRAMUSCULAR; INTRAVENOUS; SUBCUTANEOUS at 16:30

## 2022-06-16 RX ADMIN — FENTANYL CITRATE 25 MCG: 50 INJECTION INTRAMUSCULAR; INTRAVENOUS at 16:15

## 2022-06-16 RX ADMIN — LIDOCAINE HYDROCHLORIDE 40 MG: 20 INJECTION, SOLUTION INFILTRATION; PERINEURAL at 14:27

## 2022-06-16 RX ADMIN — SODIUM CHLORIDE, POTASSIUM CHLORIDE, SODIUM LACTATE AND CALCIUM CHLORIDE: 600; 310; 30; 20 INJECTION, SOLUTION INTRAVENOUS at 13:30

## 2022-06-16 RX ADMIN — ALBUMIN (HUMAN): 12.5 SOLUTION INTRAVENOUS at 14:33

## 2022-06-16 RX ADMIN — NOREPINEPHRINE BITARTRATE 6.4 MCG: 1 INJECTION, SOLUTION, CONCENTRATE INTRAVENOUS at 14:31

## 2022-06-16 RX ADMIN — LEVOFLOXACIN 500 MG: 5 INJECTION, SOLUTION INTRAVENOUS at 14:15

## 2022-06-16 RX ADMIN — PROCHLORPERAZINE EDISYLATE 5 MG: 5 INJECTION INTRAMUSCULAR; INTRAVENOUS at 16:15

## 2022-06-16 RX ADMIN — OXYCODONE HYDROCHLORIDE 5 MG: 5 TABLET ORAL at 20:44

## 2022-06-16 RX ADMIN — SUGAMMADEX 200 MG: 100 INJECTION, SOLUTION INTRAVENOUS at 15:20

## 2022-06-16 RX ADMIN — HYDROMORPHONE HYDROCHLORIDE 0.2 MG: 1 INJECTION, SOLUTION INTRAMUSCULAR; INTRAVENOUS; SUBCUTANEOUS at 18:15

## 2022-06-16 RX ADMIN — HYDROMORPHONE HYDROCHLORIDE 0.2 MG: 1 INJECTION, SOLUTION INTRAMUSCULAR; INTRAVENOUS; SUBCUTANEOUS at 17:25

## 2022-06-16 RX ADMIN — HYDROMORPHONE HYDROCHLORIDE 0.2 MG: 1 INJECTION, SOLUTION INTRAMUSCULAR; INTRAVENOUS; SUBCUTANEOUS at 18:05

## 2022-06-16 RX ADMIN — PROPOFOL 120 MG: 10 INJECTION, EMULSION INTRAVENOUS at 14:26

## 2022-06-16 RX ADMIN — ONDANSETRON 4 MG: 2 INJECTION INTRAMUSCULAR; INTRAVENOUS at 15:59

## 2022-06-16 RX ADMIN — Medication 50 MG: at 14:28

## 2022-06-16 RX ADMIN — FENTANYL CITRATE 25 MCG: 50 INJECTION INTRAMUSCULAR; INTRAVENOUS at 16:10

## 2022-06-16 RX ADMIN — DEXAMETHASONE SODIUM PHOSPHATE 6 MG: 4 INJECTION, SOLUTION INTRA-ARTICULAR; INTRALESIONAL; INTRAMUSCULAR; INTRAVENOUS; SOFT TISSUE at 14:48

## 2022-06-16 RX ADMIN — ATORVASTATIN CALCIUM 40 MG: 40 TABLET, FILM COATED ORAL at 23:14

## 2022-06-16 RX ADMIN — FENTANYL CITRATE 25 MCG: 50 INJECTION INTRAMUSCULAR; INTRAVENOUS at 16:00

## 2022-06-16 RX ADMIN — HYDROMORPHONE HYDROCHLORIDE 0.2 MG: 1 INJECTION, SOLUTION INTRAMUSCULAR; INTRAVENOUS; SUBCUTANEOUS at 18:44

## 2022-06-16 RX ADMIN — SODIUM CHLORIDE, POTASSIUM CHLORIDE, SODIUM LACTATE AND CALCIUM CHLORIDE: 600; 310; 30; 20 INJECTION, SOLUTION INTRAVENOUS at 13:00

## 2022-06-16 RX ADMIN — FENTANYL CITRATE 25 MCG: 50 INJECTION INTRAMUSCULAR; INTRAVENOUS at 16:25

## 2022-06-16 RX ADMIN — MONTELUKAST 10 MG: 10 TABLET, FILM COATED ORAL at 23:14

## 2022-06-16 NOTE — PROGRESS NOTES
"RN RECOVERY NOTE  Assigned as pt nurse while pt in PACU post procedure   Pt arrive to pacu and handoff completed by 1540  Anesthesia orders reviewed and released   Tele strip printed and placed in pt written chart    @ 1645 Dr. Lynne @ pt bedside in PACU confirms she will place sign out   Dr. Noguera @ pt bedside in PACU @ 1655. Confirms plan is for pt to stay overnight and register pt to obs. Transfer patient order requested att his time.   IPI 10  @ 5701 Dr. Noguera txt paged the following:  \"When you have time will you please place a patient transfer order for Laura Gonzalez so that the process for transferring out of PACU can be initiated. Please and thank you! Cass RN *[id number]\"  Pure wick in place to assist pt with voiding while in PACU  "

## 2022-06-16 NOTE — ANESTHESIA PROCEDURE NOTES
Airway       Patient location during procedure: OR       Procedure Start/Stop Times: 6/16/2022 2:24 PM  Staff -        CRNA: Alessia Samuel APRN CRNA       Performed By: CRNAIndications and Patient Condition       Indications for airway management: amy-procedural       Induction type:intravenous       Mask difficulty assessment: 1 - vent by mask    Final Airway Details       Final airway type: endotracheal airway       Successful airway: ETT - single and Oral  Endotracheal Airway Details        ETT size (mm): 7.0       Cuffed: yes       Cuff volume (mL): 8       Successful intubation technique: direct laryngoscopy       DL Blade Type: [place] 2       Grade View of Cords: 1       Adjucts: stylet       Position: Right       Measured from: lips       Secured at (cm): 22       Bite Block used: endo bite block.    Post intubation assessment        Placement verified by: capnometry and chest rise        Number of attempts at approach: 1       Secured with: silk tape       Ease of procedure: easy       Dentition: Intact and Unchanged    Medication(s) Administered   Medication Administration Time: 6/16/2022 2:24 PM

## 2022-06-16 NOTE — ANESTHESIA PREPROCEDURE EVALUATION
Anesthesia Pre-Procedure Evaluation    Patient: Laura Gonzalez   MRN: [id number] : 1950        Procedure : Procedure(s):  ENDOSCOPIC RETROGRADE CHOLANGIOPANCREATOGRAPHY  POSSIBLE ENDOSCOPIC ULTRASOUND WITH RENDEZVOUS          Past Medical History:   Diagnosis Date     Asthma     pt.states no longer has symptoms     CAD (coronary artery disease)      HLD (hyperlipidemia)       Past Surgical History:   Procedure Laterality Date     APPENDECTOMY       ARTHROPLASTY HIP Left 6/15/2016    Procedure: ARTHROPLASTY HIP;  Surgeon: Jeffy Wolff MD;  Location: [facility]     COLONOSCOPY  10/3/2013    Procedure: COMBINED COLONOSCOPY, SINGLE BIOPSY/POLYPECTOMY BY BIOPSY;;  Surgeon: Kenney Walls MD;  Location:  GI     ENDOSCOPIC ULTRASOUND UPPER GASTROINTESTINAL TRACT (GI) N/A 2022    Procedure: ENDOSCOPIC ULTRASOUND WITH PANCREATICOGASTROSTOMY CREATION, TRACT DILATION, STENT PLACEMENT;  Surgeon: Romaine Oates MD;  Location: [facility] OR     ESOPHAGOSCOPY, GASTROSCOPY, DUODENOSCOPY (EGD), COMBINED N/A 2022    Procedure: ESOPHAGOGASTRODUODENOSCOPY WITH ENDOSCOPIC CLIP PLACEMENT;  Surgeon: Arnaldo Noguera MD;  Location: [facility] OR     FOOT SURGERY       HC TOOTH EXTRACTION W/FORCEP       HYSTERECTOMY       INCISION AND DRAINAGE BREAST Left 2022    Procedure: evacuate hematoma left  BREAST;  Surgeon: Dayron Garcia MD;  Location: [facility] OR     IR FOLLOW UP VISIT OUTPATIENT  2022     IR SINOGRAM INJECTION DIAGNOSTIC  2022      Allergies   Allergen Reactions     Tramadol Other (See Comments)     Has a hyperactive reaction and can't sleep       Social History     Tobacco Use     Smoking status: Former Smoker     Quit date: 10/3/1988     Years since quittin.7     Smokeless tobacco: Never Used   Substance Use Topics     Alcohol use: No      Wt Readings from Last 1 Encounters:   22 65.8 kg (145 lb)        Anesthesia Evaluation            ROS/MED HX  ENT/Pulmonary:     (+)  Intermittent, asthma     Neurologic:  - neg neurologic ROS     Cardiovascular:     (+) --CAD ---    METS/Exercise Tolerance: >4 METS    Hematologic:  - neg hematologic  ROS     Musculoskeletal:  - neg musculoskeletal ROS     GI/Hepatic: Comment: Recurrent Pancreatitis      Renal/Genitourinary:  - neg Renal ROS     Endo:  - neg endo ROS     Psychiatric/Substance Use:  - neg psychiatric ROS     Infectious Disease:       Malignancy:  - neg malignancy ROS     Other:            Physical Exam    Airway        Mallampati: II   TM distance: > 3 FB   Neck ROM: full   Mouth opening: > 3 cm    Respiratory Devices and Support         Dental  no notable dental history         Cardiovascular   cardiovascular exam normal          Pulmonary   pulmonary exam normal                OUTSIDE LABS:  CBC:   Lab Results   Component Value Date    WBC 9.2 06/07/2022    WBC 10.5 06/06/2022    HGB 9.6 (L) 06/07/2022    HGB 10.9 (L) 06/06/2022    HCT 29.4 (L) 06/07/2022    HCT 34.4 (L) 06/06/2022     06/07/2022     (H) 06/06/2022     BMP:   Lab Results   Component Value Date     06/07/2022     06/06/2022    POTASSIUM 3.5 06/07/2022    POTASSIUM 3.7 06/06/2022    CHLORIDE 108 06/07/2022    CHLORIDE 108 06/06/2022    CO2 28 06/07/2022    CO2 28 06/06/2022    BUN 7 06/07/2022    BUN 12 06/06/2022    CR 0.72 06/07/2022    CR 0.66 06/06/2022     (H) 06/16/2022    GLC 84 06/07/2022     COAGS:   Lab Results   Component Value Date    PTT 41 (H) 02/18/2022    INR 1.15 06/06/2022     POC:   Lab Results   Component Value Date     (H) 06/16/2016     HEPATIC:   Lab Results   Component Value Date    ALBUMIN 2.1 (L) 06/07/2022    PROTTOTAL 5.4 (L) 06/07/2022    ALT 23 06/07/2022    AST 26 06/07/2022    ALKPHOS 128 06/07/2022    BILITOTAL 0.4 06/07/2022     OTHER:   Lab Results   Component Value Date    PH 7.53 (H) 06/05/2022    LACT 0.7 06/05/2022    GLEN 8.2 (L) 06/07/2022    LIPASE 9,968 (H) 06/05/2022    AMYLASE 803  (H) 06/05/2022    TSH 0.96 06/05/2022    CRP 5.0 06/05/2022    SED 40 (H) 06/05/2022       Anesthesia Plan    ASA Status:  3      Anesthesia Type: General.   Induction: Intravenous.   Maintenance: Balanced.   Techniques and Equipment:     - Lines/Monitors: 2nd IV     Consents    Anesthesia Plan(s) and associated risks, benefits, and realistic alternatives discussed. Questions answered and patient/representative(s) expressed understanding.    - Discussed:     - Discussed with:  Patient      - Extended Intubation/Ventilatory Support Discussed: Yes.      - Patient is DNR/DNI Status: No    Use of blood products discussed: No .     Postoperative Care    Pain management: IV analgesics, Oral pain medications.   PONV prophylaxis: Ondansetron (or other 5HT-3), Dexamethasone or Solumedrol     Comments:              PAC Discussion and Assessment    ASA Classification: 3    Anesthetic techniques and relevant risks discussed: GA  Invasive monitoring and risk discussed: Yes    Possibility and Risk of blood transfusion discussed: No  NPO instructions given: NPO after midnight    Needs early admission to pre-op area: No                                             Laura Vance MD

## 2022-06-16 NOTE — ANESTHESIA POSTPROCEDURE EVALUATION
Patient: Laura Gonzalez    Procedure: Procedure(s):  ENDOSCOPIC RETROGRADE CHOLANGIOPANCREATOGRAPHY WITH PANCREATIC DUCT DILATION, DEBRIS REMOVAL AND STENT PLACEMENT       Anesthesia Type:  General    Note:  Disposition: Inpatient   Postop Pain Control: Uneventful            Sign Out: Well controlled pain   PONV: No   Neuro/Psych: Uneventful            Sign Out: Acceptable/Baseline neuro status   Airway/Respiratory: Uneventful            Sign Out: Acceptable/Baseline resp. status   CV/Hemodynamics: Uneventful            Sign Out: Acceptable CV status; No obvious hypovolemia; No obvious fluid overload   Other NRE: NONE   DID A NON-ROUTINE EVENT OCCUR? No           Last vitals:  Vitals Value Taken Time   /70 06/16/22 1535   Temp     Pulse 53 06/16/22 1549   Resp 10 06/16/22 1549   SpO2 100 % 06/16/22 1549   Vitals shown include unvalidated device data.    Electronically Signed By: Laura Vance MD  June 16, 2022  3:51 PM

## 2022-06-16 NOTE — BRIEF OP NOTE
[facility]    Brief Operative Note    Pre-operative diagnosis: Recurrent pancreatitis [K85.90]  Post-operative diagnosis Same as pre-operative diagnosis    Procedure: Procedure(s):  ENDOSCOPIC RETROGRADE CHOLANGIOPANCREATOGRAPHY WITH PANCREATIC DUCT DILATION, DEBRIS REMOVAL AND STENT PLACEMENT  Surgeon: Surgeon(s) and Role:     * Arnaldo Noguera MD - Primary     * Sebastian Edgar MD  Anesthesia: General   Estimated Blood Loss: None    Drains: None  Specimens: * No specimens in log *  Findings:   None.  Complications: None.  Implants:   Implant Name Type Inv. Item Serial No.  Lot No. LRB No. Used Action   STENT ZIMMON PANCREA [id number] SGL PIGTAIL [id number] - [id number] Stent STENT ZIMMON PANCREA [id number] SGL PIGTAIL [id number]  [facility] [employer] [id number] N/A 1 Wasted   STENT NOGUERA PANCREA FLEX [id number] SGL PIGTAIL - [id number] Stent STENT NOGUERA PANCREA FLEX [id number] SGL PIGTAIL  [employer] [id number] N/A 1 Implanted   STENT NOGUERA PANCREA FLEX [id number] W/O FLANGE SGL PIGTAIL - [id number] Stent STENT NOGUERA PANCREA FLEX [id number] W/O FLANGE SGL PIGTAIL  [employer] [id number] N/A 1 Implanted   Cap fitted pediatric colonoscope. Easily advanced to afferent limb. Patulous biliary anastamosis. Scar of pancreaticojejunal anastamosis easily found, could see partial stenosis w small lumen, draining cloudy fluid. Easily cannulated w SwingTip and visiglide, switched for 018 Nov passed to tail. Mildly dilated irregular duct no strictures, no leak, clip in stomach still there.   Balloon dilated w 6mm balloon to 2 keyla, waist gone,   Two Noguera/Tillman stents placed  5F 9cm single flange  4F 3cm single flange    IMP: Partially stenosed pancreaticojejunostomy, dilated and stented  Unclear whether obstructive pancreatitis but will treat to completion  REC: Given past history, overnight obs, check lipase in AM, PancBili to follow  Repeat ERCP in 2 months  Clinic follow-up 3  weeks, brief virtual

## 2022-06-16 NOTE — ANESTHESIA CARE TRANSFER NOTE
Patient: Laura Gonzalez    Procedure: Procedure(s):  ENDOSCOPIC RETROGRADE CHOLANGIOPANCREATOGRAPHY WITH PANCREATIC DUCT DILATION, DEBRIS REMOVAL AND STENT PLACEMENT       Diagnosis: Recurrent pancreatitis [K85.90]  Diagnosis Additional Information: No value filed.    Anesthesia Type:   General     Note:    Oropharynx: oropharynx clear of all foreign objects and spontaneously breathing  Level of Consciousness: awake  Oxygen Supplementation: nasal cannula  Level of Supplemental Oxygen (L/min / FiO2): 2  Independent Airway: airway patency satisfactory and stable  Dentition: dentition unchanged  Vital Signs Stable: post-procedure vital signs reviewed and stable  Report to RN Given: handoff report given  Patient transferred to: PACU    Handoff Report: Identifed the Patient, Identified the Reponsible Provider, Reviewed the pertinent medical history, Discussed the surgical course, Reviewed Intra-OP anesthesia mangement and issues during anesthesia, Set expectations for post-procedure period and Allowed opportunity for questions and acknowledgement of understanding      Vitals:  Vitals Value Taken Time   /70 06/16/22 1533   Temp     Pulse 60 06/16/22 1536   Resp 38 06/16/22 1536   SpO2 100 % 06/16/22 1536   Vitals shown include unvalidated device data.    Electronically Signed By: ITZ Kelly CRNA  June 16, 2022  3:37 PM   Admission

## 2022-06-17 VITALS
RESPIRATION RATE: 16 BRPM | BODY MASS INDEX: 21.89 KG/M2 | SYSTOLIC BLOOD PRESSURE: 128 MMHG | OXYGEN SATURATION: 98 % | HEIGHT: 68 IN | DIASTOLIC BLOOD PRESSURE: 55 MMHG | TEMPERATURE: 96.7 F | WEIGHT: 144.4 LBS | HEART RATE: 59 BPM

## 2022-06-17 LAB
ALBUMIN SERPL-MCNC: 2.7 G/DL (ref 3.4–5)
ALP SERPL-CCNC: 146 U/L (ref 40–150)
ALT SERPL W P-5'-P-CCNC: 22 U/L (ref 0–50)
AMYLASE SERPL-CCNC: 95 U/L (ref 30–110)
ANION GAP SERPL CALCULATED.3IONS-SCNC: 8 MMOL/L (ref 3–14)
AST SERPL W P-5'-P-CCNC: 18 U/L (ref 0–45)
BASOPHILS # BLD AUTO: 0 10E3/UL (ref 0–0.2)
BASOPHILS NFR BLD AUTO: 0 %
BILIRUB SERPL-MCNC: 0.4 MG/DL (ref 0.2–1.3)
BUN SERPL-MCNC: 11 MG/DL (ref 7–30)
CALCIUM SERPL-MCNC: 8.8 MG/DL (ref 8.5–10.1)
CHLORIDE BLD-SCNC: 105 MMOL/L (ref 94–109)
CO2 SERPL-SCNC: 26 MMOL/L (ref 20–32)
CREAT SERPL-MCNC: 0.61 MG/DL (ref 0.52–1.04)
EOSINOPHIL # BLD AUTO: 0 10E3/UL (ref 0–0.7)
EOSINOPHIL NFR BLD AUTO: 0 %
ERYTHROCYTE [DISTWIDTH] IN BLOOD BY AUTOMATED COUNT: 17.3 % (ref 10–15)
GFR SERPL CREATININE-BSD FRML MDRD: >90 ML/MIN/1.73M2
GLUCOSE BLD-MCNC: 122 MG/DL (ref 70–99)
HCT VFR BLD AUTO: 33.9 % (ref 35–47)
HGB BLD-MCNC: 11.1 G/DL (ref 11.7–15.7)
IMM GRANULOCYTES # BLD: 0 10E3/UL
IMM GRANULOCYTES NFR BLD: 0 %
LIPASE SERPL-CCNC: 281 U/L (ref 73–393)
LYMPHOCYTES # BLD AUTO: 1.1 10E3/UL (ref 0.8–5.3)
LYMPHOCYTES NFR BLD AUTO: 14 %
MCH RBC QN AUTO: 29.1 PG (ref 26.5–33)
MCHC RBC AUTO-ENTMCNC: 32.7 G/DL (ref 31.5–36.5)
MCV RBC AUTO: 89 FL (ref 78–100)
MONOCYTES # BLD AUTO: 0.4 10E3/UL (ref 0–1.3)
MONOCYTES NFR BLD AUTO: 5 %
NEUTROPHILS # BLD AUTO: 6.7 10E3/UL (ref 1.6–8.3)
NEUTROPHILS NFR BLD AUTO: 81 %
NRBC # BLD AUTO: 0 10E3/UL
NRBC BLD AUTO-RTO: 0 /100
PLATELET # BLD AUTO: 373 10E3/UL (ref 150–450)
POTASSIUM BLD-SCNC: 4.2 MMOL/L (ref 3.4–5.3)
PROT SERPL-MCNC: 6.4 G/DL (ref 6.8–8.8)
RBC # BLD AUTO: 3.81 10E6/UL (ref 3.8–5.2)
SARS-COV-2 RNA RESP QL NAA+PROBE: NEGATIVE
SODIUM SERPL-SCNC: 139 MMOL/L (ref 133–144)
WBC # BLD AUTO: 8.2 10E3/UL (ref 4–11)

## 2022-06-17 PROCEDURE — 250N000013 HC RX MED GY IP 250 OP 250 PS 637: Performed by: HOSPITALIST

## 2022-06-17 PROCEDURE — 85025 COMPLETE CBC W/AUTO DIFF WBC: CPT | Performed by: HOSPITALIST

## 2022-06-17 PROCEDURE — G0378 HOSPITAL OBSERVATION PER HR: HCPCS

## 2022-06-17 PROCEDURE — 250N000011 HC RX IP 250 OP 636: Performed by: HOSPITALIST

## 2022-06-17 PROCEDURE — 96376 TX/PRO/DX INJ SAME DRUG ADON: CPT

## 2022-06-17 PROCEDURE — 83690 ASSAY OF LIPASE: CPT | Performed by: HOSPITALIST

## 2022-06-17 PROCEDURE — 82150 ASSAY OF AMYLASE: CPT | Performed by: HOSPITALIST

## 2022-06-17 PROCEDURE — 80053 COMPREHEN METABOLIC PANEL: CPT | Performed by: HOSPITALIST

## 2022-06-17 PROCEDURE — 36415 COLL VENOUS BLD VENIPUNCTURE: CPT | Performed by: HOSPITALIST

## 2022-06-17 PROCEDURE — 99217 PR OBSERVATION CARE DISCHARGE: CPT | Performed by: HOSPITALIST

## 2022-06-17 PROCEDURE — 258N000003 HC RX IP 258 OP 636: Performed by: HOSPITALIST

## 2022-06-17 PROCEDURE — 99214 OFFICE O/P EST MOD 30 MIN: CPT | Performed by: PHYSICIAN ASSISTANT

## 2022-06-17 PROCEDURE — U0003 INFECTIOUS AGENT DETECTION BY NUCLEIC ACID (DNA OR RNA); SEVERE ACUTE RESPIRATORY SYNDROME CORONAVIRUS 2 (SARS-COV-2) (CORONAVIRUS DISEASE [COVID-19]), AMPLIFIED PROBE TECHNIQUE, MAKING USE OF HIGH THROUGHPUT TECHNOLOGIES AS DESCRIBED BY CMS-2020-01-R: HCPCS | Performed by: HOSPITALIST

## 2022-06-17 RX ORDER — OXYCODONE HYDROCHLORIDE 5 MG/1
5 TABLET ORAL EVERY 6 HOURS PRN
Status: DISCONTINUED | OUTPATIENT
Start: 2022-06-17 | End: 2022-06-17 | Stop reason: HOSPADM

## 2022-06-17 RX ORDER — OXYCODONE HYDROCHLORIDE 5 MG/1
5 TABLET ORAL EVERY 6 HOURS PRN
Qty: 15 TABLET | Refills: 0 | Status: ON HOLD | OUTPATIENT
Start: 2022-06-17 | End: 2022-10-13

## 2022-06-17 RX ADMIN — HYDROMORPHONE HYDROCHLORIDE 0.5 MG: 1 INJECTION, SOLUTION INTRAMUSCULAR; INTRAVENOUS; SUBCUTANEOUS at 03:54

## 2022-06-17 RX ADMIN — HYDROMORPHONE HYDROCHLORIDE 0.5 MG: 1 INJECTION, SOLUTION INTRAMUSCULAR; INTRAVENOUS; SUBCUTANEOUS at 08:24

## 2022-06-17 RX ADMIN — SODIUM CHLORIDE, POTASSIUM CHLORIDE, SODIUM LACTATE AND CALCIUM CHLORIDE: 600; 310; 30; 20 INJECTION, SOLUTION INTRAVENOUS at 03:54

## 2022-06-17 RX ADMIN — OXYCODONE HYDROCHLORIDE 5 MG: 5 TABLET ORAL at 10:32

## 2022-06-17 RX ADMIN — PANCRELIPASE 2 CAPSULE: 24000; 76000; 120000 CAPSULE, DELAYED RELEASE PELLETS ORAL at 14:00

## 2022-06-17 RX ADMIN — Medication 25 MCG: at 08:25

## 2022-06-17 RX ADMIN — HYDROMORPHONE HYDROCHLORIDE 0.5 MG: 1 INJECTION, SOLUTION INTRAMUSCULAR; INTRAVENOUS; SUBCUTANEOUS at 06:09

## 2022-06-17 RX ADMIN — EZETIMIBE 10 MG: 10 TABLET ORAL at 00:13

## 2022-06-17 RX ADMIN — HYDROMORPHONE HYDROCHLORIDE 0.5 MG: 1 INJECTION, SOLUTION INTRAMUSCULAR; INTRAVENOUS; SUBCUTANEOUS at 01:48

## 2022-06-17 NOTE — PROGRESS NOTES
RN RECOVERY NOTE   Assigned to take back over pt care   Report from Dipika MERCEDES @ pt bedside in PACU @ 1925  Informed that report already provided to 7B  Report provided to Jia MERCEDES

## 2022-06-17 NOTE — DISCHARGE SUMMARY
[facility]  Hospitalist Discharge Summary      Date of Admission:  6/16/2022  Date of Discharge:  6/17/2022  Discharging Provider: Jatinder Taylor MD  Discharge Service: Hospitalist Service, GOLD TEAM 9    Discharge Diagnoses       # ERCP s/p pancreatic duct dilation, debris removal, and stent placement  # Post-procedure LUQ pain w concern for acute pancreatitis  #History of atherosclerosis  # History of mild asthma    Follow-ups Needed After Discharge   Follow-up Appointments     Adult [facility] Follow-up and recommended labs and tests      Follow up with primary care provider, MIQUEL ANDERS, within 7 days for   hospital follow- up.  No follow up labs or test are needed.    Follow up with GI:   Repeat ERCP in 2 months  Clinic follow-up 3 weeks, brief virtual    Appointments on [place] and/or [facility] (with [facility] or [facility]   provider or service). Call [phone] if you haven't heard regarding   these appointments within 7 days of discharge.             Unresulted Labs Ordered in the Past 30 Days of this Admission     No orders found for last 31 day(s).      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 Recent guided pancreaticogastrostomy in May 2022 c/b pancreatitis and stent displacement w infected fluid collection requiring admission and drain placement.   She then had an admission in June 2022 for pancreatitis.      # ERCP s/p pancreatic duct dilation, debris removal, and stent placement  # Post-procedure LUQ pain w concern for acute pancreatitis  - Spoke with  . During overnight Obs, plan is to keep NPO, ok to have ice chips and some sips of clear fluid.   - IVF at 200cc/hr LR  - Pain control with Q2h prn, 0.5mg IV dilaudid  - GI Panc bili c/s in AM  - Plan for labs in AM- amylase and lipase  - Creon for when she starts to eat    6/17: Pt advanced to CLD and then ADAT, tolerated CLD and if she tolerates REGULAR diet, then she will discharge home. Prn oxycodone available for her to use.     #History of atherosclerosis  - Continue home atorvastatin     # History of mild asthma  - Continue home monteluikast    Consultations This Hospital Stay   GI PANCREATICOBILIARY ADULT IP CONSULT    Code Status   Full Code    Time Spent on this Encounter   I, Jtainder Taylor MD, personally saw the patient today and spent greater than 30 minutes discharging this patient.       Jatinder Taylor MD  [facility] [address] [address]  Phone: [phone]  ______________________________________________________________________    Physical Exam   Vital Signs: Temp: (!) 96.7  F (35.9  C) Temp src: Oral BP: 128/55 Pulse: 59   Resp: 16 SpO2: 98 % O2 Device: None (Room air) Oxygen Delivery: 1 LPM  Weight: 144 lbs 6.42 oz    Alert, awake, abdomen soft and non-tender, well spoken, good mood and spirits  Good conversation       Primary Care Physician   MIQUEL ANDERS    Discharge Orders      Reason for your hospital stay    ERCP and stenting x2  Pain control     Activity    Your activity upon discharge: activity as tolerated     Adult [facility] Follow-up and recommended labs and tests    Follow up with primary care provider, MIQUEL ANDERS, within 7 days for hospital follow- up.  No follow up labs or test are needed.    Follow up with GI:   Repeat ERCP in 2 months  Clinic follow-up 3 weeks, brief virtual    Appointments on [place] and/or [facility] (with [facility] or [facility] provider or service). Call [phone] if you haven't heard regarding these  appointments within 7 days of discharge.     Diet    Follow this diet upon discharge: Orders Placed This Encounter      Regular Diet Adult       Significant Results and Procedures   Results for orders placed or performed during the hospital encounter of 06/16/22   XR Surgery SONNY G/T 5 Min Fluoro w Stills    Narrative    This exam was marked as non-reportable because it will not be read by a   radiologist or a [place] non-radiologist provider.             Discharge Medications   Current Discharge Medication List      CONTINUE these medications which have CHANGED    Details   oxyCODONE (ROXICODONE) 5 MG tablet Take 1 tablet (5 mg) by mouth every 6 hours as needed for moderate to severe pain (take 5 mg for 5-7/10 pain, 10 mg for 8-10/10 pain)  Qty: 15 tablet, Refills: 0    Associated Diagnoses: Intra-abdominal infection         CONTINUE these medications which have NOT CHANGED    Details   amylase-lipase-protease (CREON) 24113-60303 units CPEP per EC capsule Take 2-3 with meals / 1-2 with snacks, up to 15 per day.  Qty: 450 capsule, Refills: 6    Associated Diagnoses: Acute pancreatitis, unspecified complication status, unspecified pancreatitis type      atorvastatin (LIPITOR) 40 MG tablet Take 40 mg by mouth At Bedtime       Calcium Carb-Cholecalciferol (CALCIUM 1000 + D) 1000-800 MG-UNIT TABS       calcium-magnesium (CALMAG) 500-250 MG TABS Take 2 tablets by mouth daily      cholecalciferol (VITAMIN D3) 25 mcg (1000 units) capsule Take 1 capsule by mouth daily      estradiol (ESTRACE) 0.1 MG/GM vaginal cream Place 1 g vaginally See Admin Instructions Insert 1 gram vaginally as directed by provider      estradiol (VIVELLE-DOT) 0.075 MG/24HR BIW patch Place 1 patch onto the skin twice a week      evolocumab (REPATHA) 140 MG/ML prefilled autoinjector Inject 140 mg Subcutaneous every 14 days      ezetimibe (ZETIA) 10 MG tablet Take 10 mg by mouth At Bedtime      LORazepam (ATIVAN) 0.5 MG tablet Take 1 tablet (0.5 mg)  by mouth 2 times daily as needed for anxiety Take 1-2 tablets by mouth twice daily as needed for anxiety  Qty: 15 tablet, Refills: 0    Associated Diagnoses: Acute pancreatitis, unspecified complication status, unspecified pancreatitis type      montelukast (SINGULAIR) 10 MG tablet Take 10 mg by mouth At Bedtime       ondansetron (ZOFRAN ODT) 4 MG ODT tab Take 1 tablet (4 mg) by mouth every 6 hours as needed for nausea or vomiting  Qty: 20 tablet, Refills: 0    Associated Diagnoses: Acute pancreatitis, unspecified complication status, unspecified pancreatitis type; Nausea and vomiting, intractability of vomiting not specified, unspecified vomiting type      polyethylene glycol (MIRALAX) 17 GM/Dose powder Take 17 g by mouth 2 times daily as needed  Qty: 510 g, Refills: 3    Associated Diagnoses: Acute pancreatitis, unspecified complication status, unspecified pancreatitis type; SBO (small bowel obstruction) (H)      senna-docusate (SENOKOT-S/PERICOLACE) 8.6-50 MG tablet Take 1 tablet by mouth 2 times daily as needed for constipation  Qty: 30 tablet, Refills: 3    Associated Diagnoses: Acute pancreatitis, unspecified complication status, unspecified pancreatitis type      valACYclovir (VALTREX) 1000 mg tablet Take 1,000 mg by mouth daily as needed (cold sore)       VITAMIN A PO Take 3,000 Units by mouth daily      zolpidem (AMBIEN) 10 MG tablet Take 1 tablet by mouth nightly as needed for sleep           Allergies   Allergies   Allergen Reactions     Tramadol Other (See Comments)     Has a hyperactive reaction and can't sleep

## 2022-06-17 NOTE — CONSULTS
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 In may 2022, since the procedure she had an acute pancreatitis episode followed by infected peripancreatic collections. These collections were managed with antibiotics and percutaneous drain, the drain has since been removed. She has had recurrent acute pancreatitis thought to be due to PJ anastamotic stricture and is now s/p ERCP on 6/16/22 with evidence of partially stenosed pancreaticojejunostomy, dilated and stented with 2 Tillman stents. Patient admitted for observation post procedure. Labs post procedure stable with LFTs, lipase, amylase WNL. Pain is controlled with oral narcotics.     # Recurrent acute pancreatitis  # S/p lap Whipple for duodenal adenoma (tubuvillous with low grade dysplasia)   # Pancreaticojejunostomy partial stenosis s/p ERCP with dilation and stent placement x2  # HLD on Repatha therapy     Recommendations:  - CLD, ADAT  - Analgesia/Antiemetics per primary team  - Virtual clinic follow up with Dr. Noguera in 2 weeks (message sent to coordinate)  - Repeat ERCP in 2 months to exchange stent (message sent to coordinate)  - CBC, BMP daily while admitted     COVID status negative    Thank you for involving us in this patient's care. Please do not hesitate to  contact the GI service with any questions or concerns.     Pt seen and care plan discussed with Dr. Oates, GI staff physician.    Overall time spent discussing, thinking, reviewing the chart (including available notes, clinical status events, imaging and labs) and evaluating this patient who is hospitalized for multiple complex medical problems, was 85 minutes, of which greater than 50% of the time was spent on counseling and education (explaining treatment options, disease processes, laboratory/imaging results, prognosis, risks and benefits of treatment options, medication side effects, importance of compliance with treatment, risk factor reduction, follow up with primary care physician), and coordination of care.       Kaycee Keys PA-C  GI Service  [facility]  Text Page  -------------------------------------------------------------------------------------------------------------------       Reason for Consultation:   We were asked by Jatinder Taylor MD of medicine to evaluate this patient s/p ERCP     History is obtained from the patient and the medical record.            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 In may 2022, since the procedure she had an acute pancreatitis episode followed by infected peripancreatic collections. These collections were managed with antibiotics and percutaneous drain, the drain has since been removed. She has had recurrent acute  pancreatitis thought to be due to PJ anastamotic stricture and is now s/p ERCP on 6/16/22 with evidence of partially stenosed pancreaticojejunostomy, dilated and stented with 2 Tillman stents. Patient admitted for observation post procedure.   She reports overnight she had a lot of pain that was relieved with IV dilaudid. She did not really have much pain prior to the procedure and this morning reports that the pain has improved. She is rating the pain a 5/10. She has drank a little clear liquids without worsening of abdominal pain and no nausea/vomiting. She had a bowel movement yesterday evening and has been passing gas. No fevers or chills.     Previous Procedures:  Reviewed in EMR            Past Medical History:   Reviewed and edited as appropriate  Past Medical History:   Diagnosis Date     Asthma     pt.states no longer has symptoms     CAD (coronary artery disease)      HLD (hyperlipidemia)             Past Surgical History:   Reviewed and edited as appropriate   Past Surgical History:   Procedure Laterality Date     APPENDECTOMY       ARTHROPLASTY HIP Left 6/15/2016    Procedure: ARTHROPLASTY HIP;  Surgeon: Jeffy Wolff MD;  Location: [facility]     COLONOSCOPY  10/3/2013    Procedure: COMBINED COLONOSCOPY, SINGLE BIOPSY/POLYPECTOMY BY BIOPSY;;  Surgeon: Kenney Walls MD;  Location:  GI     ENDOSCOPIC ULTRASOUND UPPER GASTROINTESTINAL TRACT (GI) N/A 5/12/2022    Procedure: ENDOSCOPIC ULTRASOUND WITH PANCREATICOGASTROSTOMY CREATION, TRACT DILATION, STENT PLACEMENT;  Surgeon: Romaine Oates MD;  Location: [facility] OR     ESOPHAGOSCOPY, GASTROSCOPY, DUODENOSCOPY (EGD), COMBINED N/A 5/12/2022    Procedure: ESOPHAGOGASTRODUODENOSCOPY WITH ENDOSCOPIC CLIP PLACEMENT;  Surgeon: Arnaldo Noguera MD;  Location: [facility] OR     FOOT SURGERY       HC TOOTH EXTRACTION W/FORCEP       HYSTERECTOMY       INCISION AND DRAINAGE BREAST Left 2/18/2022    Procedure: evacuate hematoma left  BREAST;  Surgeon: Radha  Dayron Da Silva MD;  Location: [facility] OR     IR FOLLOW UP VISIT OUTPATIENT  2022     IR SINOGRAM INJECTION DIAGNOSTIC  2022              Social History:   Reviewed and edited as appropriate  Social History     Socioeconomic History     Marital status:      Spouse name: Not on file     Number of children: Not on file     Years of education: Not on file     Highest education level: Not on file   Occupational History     Not on file   Tobacco Use     Smoking status: Former Smoker     Quit date: 10/3/1988     Years since quittin.7     Smokeless tobacco: Never Used   Substance and Sexual Activity     Alcohol use: No     Drug use: No     Sexual activity: Not on file   Other Topics Concern     Parent/sibling w/ CABG, MI or angioplasty before 65F 55M? Not Asked   Social History Narrative     Not on file     Social Determinants of Health     Financial Resource Strain: Not on file   Food Insecurity: Not on file   Transportation Needs: Not on file   Physical Activity: Not on file   Stress: Not on file   Social Connections: Not on file   Intimate Partner Violence: Not on file   Housing Stability: Not on file              Family History:   Patient's family history is reviewed today and is non-contributory  Brother and father have had colon cancer          Allergies:   Reviewed and edited as appropriate     Allergies   Allergen Reactions     Tramadol Other (See Comments)     Has a hyperactive reaction and can't sleep             Medications:     Medications Prior to Admission   Medication Sig Dispense Refill Last Dose     amylase-lipase-protease (CREON) 78965-85486 units CPEP per EC capsule Take 2-3 with meals / 1-2 with snacks, up to 15 per day. (Patient taking differently: Take 2 capsules by mouth 3 times daily (with meals) Take 2 with meals / 1-2 with snacks, up to 15 per day.) 450 capsule 6 6/15/2022 at 1900     atorvastatin (LIPITOR) 40 MG tablet Take 40 mg by mouth At Bedtime    6/15/2022 at 2100     Calcium  Carb-Cholecalciferol (CALCIUM 1000 + D) 1000-800 MG-UNIT TABS    6/15/2022 at 2300     calcium-magnesium (CALMAG) 500-250 MG TABS Take 2 tablets by mouth daily   6/15/2022 at 2300     cholecalciferol (VITAMIN D3) 25 mcg (1000 units) capsule Take 1 capsule by mouth daily   Past Week at Unknown time     estradiol (ESTRACE) 0.1 MG/GM vaginal cream Place 1 g vaginally See Admin Instructions Insert 1 gram vaginally as directed by provider   More than a month at Unknown time     estradiol (VIVELLE-DOT) 0.075 MG/24HR BIW patch Place 1 patch onto the skin twice a week   6/15/2022 at 1700     evolocumab (REPATHA) 140 MG/ML prefilled autoinjector Inject 140 mg Subcutaneous every 14 days   6/15/2022 at 2100     ezetimibe (ZETIA) 10 MG tablet Take 10 mg by mouth At Bedtime   6/15/2022 at 2100     LORazepam (ATIVAN) 0.5 MG tablet Take 1 tablet (0.5 mg) by mouth 2 times daily as needed for anxiety Take 1-2 tablets by mouth twice daily as needed for anxiety 15 tablet 0 Past Month at Unknown time     montelukast (SINGULAIR) 10 MG tablet Take 10 mg by mouth At Bedtime    6/15/2022 at 2100     ondansetron (ZOFRAN ODT) 4 MG ODT tab Take 1 tablet (4 mg) by mouth every 6 hours as needed for nausea or vomiting 20 tablet 0 Past Week at Unknown time     polyethylene glycol (MIRALAX) 17 GM/Dose powder Take 17 g by mouth 2 times daily as needed 510 g 3 More than a month at Unknown time     senna-docusate (SENOKOT-S/PERICOLACE) 8.6-50 MG tablet Take 1 tablet by mouth 2 times daily as needed for constipation 30 tablet 3 More than a month at Unknown time     valACYclovir (VALTREX) 1000 mg tablet Take 1,000 mg by mouth daily as needed (cold sore)    More than a month at Unknown time     VITAMIN A PO Take 3,000 Units by mouth daily   6/15/2022 at 2100     zolpidem (AMBIEN) 10 MG tablet Take 1 tablet by mouth nightly as needed for sleep   6/15/2022 at 2300     [DISCONTINUED] oxyCODONE (ROXICODONE) 5 MG tablet Take 1 tablet (5 mg) by mouth every 6  hours as needed for moderate to severe pain (take 5 mg for 5-7/10 pain, 10 mg for 8-10/10 pain) 15 tablet 0 6/15/2022 at 1700             Review of Systems:     A complete review of systems was performed and is negative except as noted in the HPI             Physical Exam:   Temp: 96.8  F (36  C) Temp src: Oral BP: (!) 148/52 Pulse: 54   Resp: 16 SpO2: 98 % O2 Device: None (Room air) Oxygen Delivery: 1 LPM  Wt:   Wt Readings from Last 2 Encounters:   06/16/22 65.5 kg (144 lb 6.4 oz)   06/05/22 65.8 kg (145 lb)        General: 71 year old female in NAD.  Answers appropriately.    HEENT: Head is AT/NC. Sclera anicteric. No conjunctival injection.  Oropharynx is moist   Neck: Supple  Lungs: Clear to auscultation bilaterally.  No wheezes, rhonchi or crackles.    Heart: Bradycardic  Abdomen: Soft, mild RUQ tenderness, non-distended.  BS +. No rebound or peritoneal signs   Extremities: No pedal edema.  Heme/Lymph: No cervical adenopathy.   Skin: No jaundice or rash  Neurologic: Grossly non-focal.            Data:   Labs and imaging below were independently reviewed and interpreted    LAB WORK:    BMP  Recent Labs   Lab 06/16/22  1305 06/16/22  1302     --    POTASSIUM 4.3  --    CHLORIDE 104  --    GLEN 9.1  --    CO2 28  --    BUN 14  --    CR 0.66  --    GLC 94 105*     CBC  Recent Labs   Lab 06/17/22  0738 06/16/22  1305   WBC 8.2 7.8   RBC 3.81 4.37   HGB 11.1* 12.3   HCT 33.9* 38.6   MCV 89 88   MCH 29.1 28.1   MCHC 32.7 31.9   RDW 17.3* 17.3*    443     INR  Recent Labs   Lab 06/16/22  1305   INR 1.07     LFTs  Recent Labs   Lab 06/16/22  1305   ALKPHOS 178*   AST 26   ALT 28   BILITOTAL 0.4   PROTTOTAL 7.6   ALBUMIN 3.2*      PANC  Recent Labs   Lab 06/16/22  1305   LIPASE 441*   AMYLASE 147*       IMAGING:  Reviewed in EMR         =======================================================================

## 2022-06-17 NOTE — H&P
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 Recent guided pancreaticogastrostomy in May 2022 c/b pancreatitis and stent displacement w infected fluid collection requiring admission and drain placement.   She then had an admission in June 2022 for pancreatitis.     # ERCP s/p pancreatic duct dilation, debris removal, and stent placement  # Post-procedure LUQ pain w concern for acute pancreatitis  - Spoke with . During overnight Obs, plan is to keep NPO, ok to have ice chips and some sips of clear fluid.   - IVF at 200cc/hr LR  - Pain control with Q2h prn, 0.5mg IV dilaudid  - GI Panc bili c/s in AM  - Plan for labs in AM- amylase and lipase  - Creon for when she starts to eat    #History of atherosclerosis  - Continue home atorvastatin     # History of mild asthma  - Continue home monteluikast       Diet: NPO for Medical/Clinical Reasons Except for: Ice Chips, Meds    DVT Prophylaxis: Pneumatic Compression Devices  Condon Catheter: Not present  Central Lines: None  Cardiac Monitoring: None  Code Status: Full Code      Clinically Significant Risk Factors Present on Admission             # Hypoalbuminemia: Albumin = 3.2 g/dL (Ref range: 3.4 - 5.0 g/dL) on admission, will monitor as appropriate        The patient's care was discussed with the Bedside Nurse and Patient.    Lobo Bay MD  Hospitalist Service, GOLD TEAM 9  [facility]  Securely message with the Vocera Web Console (learn more here)  Text page via [employer]  Paging/Directory   Please see signed in provider for up to date coverage information      ______________________________________________________________________    Chief Complaint   Observation for post-procedure    History is obtained from the patient    History of Present Illness   Laura Gonzalez is a 71 year old female who presents for monitoring post-procedure.  She c/o severe LUQ pain. She is so uncomfortable that she cannot sit down and is preferring to stand. No associated nausea or vomiting    ROS is negative for HA, chest pain, dyspnea, fever, cough, dysuria,  Hematuria, diarrhea or blood in stool    Review of Systems    The 10 point Review of Systems is negative other than noted in the HPI or here.     Past Medical History    I have reviewed this patient's medical history and updated it with pertinent information if needed.   Past Medical History:   Diagnosis Date     Asthma     pt.states no longer has symptoms     CAD (coronary artery disease)      HLD (hyperlipidemia)        Past Surgical History   I have reviewed this patient's surgical history and updated it with pertinent information if needed.  Past Surgical History:   Procedure Laterality Date     APPENDECTOMY       ARTHROPLASTY HIP Left 6/15/2016    Procedure: ARTHROPLASTY HIP;  Surgeon: Jeffy Wolff MD;  [facility]     COLONOSCOPY  10/3/2013    Procedure: COMBINED COLONOSCOPY, SINGLE BIOPSY/POLYPECTOMY BY BIOPSY;;  Surgeon: Kenney Walls MD;  Location:  GI     ENDOSCOPIC ULTRASOUND UPPER GASTROINTESTINAL TRACT (GI) N/A 5/12/2022    Procedure: ENDOSCOPIC ULTRASOUND WITH PANCREATICOGASTROSTOMY CREATION, TRACT DILATION, STENT PLACEMENT;  Surgeon: Romaine Oates MD;  Location:  OR     ESOPHAGOSCOPY, GASTROSCOPY, DUODENOSCOPY (EGD), COMBINED N/A 5/12/2022    Procedure: ESOPHAGOGASTRODUODENOSCOPY WITH ENDOSCOPIC CLIP PLACEMENT;  Surgeon: Arnaldo Noguera MD;  Location:  OR     FOOT SURGERY       HC TOOTH EXTRACTION  W/FORCEP       HYSTERECTOMY       INCISION AND DRAINAGE BREAST Left 2022    Procedure: evacuate hematoma left  BREAST;  Surgeon: Dayron Garcia MD;  Location: [facility] OR     IR FOLLOW UP VISIT OUTPATIENT  2022     IR SINOGRAM INJECTION DIAGNOSTIC  2022       Social History   I have reviewed this patient's social history and updated it with pertinent information if needed.  Social History     Tobacco Use     Smoking status: Former Smoker     Quit date: 10/3/1988     Years since quittin.7     Smokeless tobacco: Never Used   Substance Use Topics     Alcohol use: No     Drug use: No       Family History         Prior to Admission Medications   Prior to Admission Medications   Prescriptions Last Dose Informant Patient Reported? Taking?   Calcium Carb-Cholecalciferol (CALCIUM 1000 + D) 1000-800 MG-UNIT TABS 6/15/2022 at 2300  Yes Yes   LORazepam (ATIVAN) 0.5 MG tablet Past Month at Unknown time  No Yes   Sig: Take 1 tablet (0.5 mg) by mouth 2 times daily as needed for anxiety Take 1-2 tablets by mouth twice daily as needed for anxiety   VITAMIN A PO 6/15/2022 at 2100  Yes Yes   Sig: Take 3,000 Units by mouth daily   amylase-lipase-protease (CREON) 13201-87846 units CPEP per EC capsule 6/15/2022 at 1900  No Yes   Sig: Take 2-3 with meals / 1-2 with snacks, up to 15 per day.   Patient taking differently: Take 2 capsules by mouth 3 times daily (with meals) Take 2 with meals / 1-2 with snacks, up to 15 per day.   atorvastatin (LIPITOR) 40 MG tablet 6/15/2022 at 2100  Yes Yes   Sig: Take 40 mg by mouth At Bedtime    calcium-magnesium (CALMAG) 500-250 MG TABS 6/15/2022 at 2300  Yes Yes   Sig: Take 2 tablets by mouth daily   cholecalciferol (VITAMIN D3) 25 mcg (1000 units) capsule Past Week at Unknown time  Yes Yes   Sig: Take 1 capsule by mouth daily   estradiol (ESTRACE) 0.1 MG/GM vaginal cream More than a month at Unknown time  Yes Yes   Sig: Place 1 g vaginally See Admin Instructions Insert 1 gram  vaginally as directed by provider   estradiol (VIVELLE-DOT) 0.075 MG/24HR BIW patch 6/15/2022 at 1700  Yes Yes   Sig: Place 1 patch onto the skin twice a week   evolocumab (REPATHA) 140 MG/ML prefilled autoinjector 6/15/2022 at 2100  Yes Yes   Sig: Inject 140 mg Subcutaneous every 14 days   ezetimibe (ZETIA) 10 MG tablet 6/15/2022 at 2100  Yes Yes   Sig: Take 10 mg by mouth At Bedtime   montelukast (SINGULAIR) 10 MG tablet 6/15/2022 at 2100  Yes Yes   Sig: Take 10 mg by mouth At Bedtime    ondansetron (ZOFRAN ODT) 4 MG ODT tab Past Week at Unknown time  No Yes   Sig: Take 1 tablet (4 mg) by mouth every 6 hours as needed for nausea or vomiting   oxyCODONE (ROXICODONE) 5 MG tablet 6/15/2022 at 1700  No Yes   Sig: Take 1 tablet (5 mg) by mouth every 6 hours as needed for moderate to severe pain (take 5 mg for 5-7/10 pain, 10 mg for 8-10/10 pain)   polyethylene glycol (MIRALAX) 17 GM/Dose powder More than a month at Unknown time  No Yes   Sig: Take 17 g by mouth 2 times daily as needed   senna-docusate (SENOKOT-S/PERICOLACE) 8.6-50 MG tablet More than a month at Unknown time  No Yes   Sig: Take 1 tablet by mouth 2 times daily as needed for constipation   valACYclovir (VALTREX) 1000 mg tablet More than a month at Unknown time  Yes Yes   Sig: Take 1,000 mg by mouth daily as needed (cold sore)    zolpidem (AMBIEN) 10 MG tablet 6/15/2022 at 2300  Yes Yes   Sig: Take 1 tablet by mouth nightly as needed for sleep      Facility-Administered Medications: None     Allergies   Allergies   Allergen Reactions     Tramadol Other (See Comments)     Has a hyperactive reaction and can't sleep        Physical Exam   Vital Signs: Temp: 97.8  F (36.6  C) Temp src: Oral BP: 139/48 Pulse: 60   Resp: 17 SpO2: 98 % O2 Device: None (Room air) Oxygen Delivery: 1 LPM  Weight: 144 lbs 6.42 oz    General Appearance: Alert, well appearing, and in distress from pain  Mental Status: Oriented to person, place, and time  HEENT: No pallor or icterus.  Pupils equal and reactive, extraocular eye movements intact.  Chest: Clear to auscultation bilaterally, no wheezes, rales or rhonchi or crackels, symmetric air entry  Heart: Normal S1, S2. No murmurs, rubs, clicks or gallops. Normal rate, regular rhythm  Abdomen: Soft, tender in the LUQ nondistended, no masses or organomegaly, Bowel sounds heard  Neurological: Alert, oriented, normal speech, CNS: CN 3-12 intact, Strength bilaterally intact in UE and LE 5/5  Skin and Extremities: Peripheral pulses normal, no pedal edema    Data   Data reviewed today: I reviewed all medications, new labs and imaging results over the last 24 hours.

## 2022-06-17 NOTE — PROGRESS NOTES
Admitted/transferred from: PACU     2 RN full   skin assessment completed by Ambar Valenzuela, RN and Daniel Scruggs RN.    Skin assessment finding: issues found old surgical scars on abd from previous surgeries, old scar on back. L & R PIV.      Interventions/actions: fluid maintenance, encourage frqequent ambulation.     Will continue to monitor.

## 2022-06-17 NOTE — PLAN OF CARE
Goal Outcome Evaluation:    Plan of Care Reviewed With: patient     Overall Patient Progress: improving    Pt tolerated a clear liquid breakfast and a regular diet lunch.  Pt up independently.  Pain well controlled with prn oxycodone.  Pt anxious to go home this pm.  Discharge orders put in and pt will discharge home late afternoon.

## 2022-06-18 NOTE — PLAN OF CARE
Goal Outcome Evaluation: Patient tolerated food intake without apparent   GI upset.Reviewed discharge orders.Has no further questions.ready for d/c

## 2022-06-20 ENCOUNTER — PREP FOR PROCEDURE (OUTPATIENT)
Dept: GASTROENTEROLOGY | Facility: CLINIC | Age: 72
End: 2022-06-20
Payer: COMMERCIAL

## 2022-06-20 ENCOUNTER — PATIENT OUTREACH (OUTPATIENT)
Dept: GASTROENTEROLOGY | Facility: CLINIC | Age: 72
End: 2022-06-20
Payer: COMMERCIAL

## 2022-06-20 DIAGNOSIS — Z46.59 ENCOUNTER FOR PANCREATIC DUCT STENT EXCHANGE: Primary | ICD-10-CM

## 2022-06-20 NOTE — TELEPHONE ENCOUNTER
Called pt post procedure/admission to check in, plan is for:  Repeat ERCP in 2 months to exchange stent.                          - [facility] fu in 2 weeks to assess response scheduled     Pain on right side of abdomen- does not come in waves/ spasms- no fever/chills/nausea/vomiting, is getting better. Reviewed not concerning if improving.    Clinic scheduled     Please assist in scheduling:     Procedure/Imaging/Clinic: ERCP  Physician: Dr. Noguera  Timin months  Procedure length:provider average  Anesthesia:gen  Dx: pancreatic stent exchange  Tier:2  Location: [facility]     ML

## 2022-06-22 ENCOUNTER — PATIENT OUTREACH (OUTPATIENT)
Dept: GASTROENTEROLOGY | Facility: CLINIC | Age: 72
End: 2022-06-22

## 2022-06-22 DIAGNOSIS — K85.90 ACUTE ON CHRONIC PANCREATITIS (H): Primary | ICD-10-CM

## 2022-06-22 DIAGNOSIS — K86.1 ACUTE ON CHRONIC PANCREATITIS (H): Primary | ICD-10-CM

## 2022-06-22 RX ORDER — ONDANSETRON 2 MG/ML
4 INJECTION INTRAMUSCULAR; INTRAVENOUS ONCE
Status: CANCELLED | OUTPATIENT
Start: 2022-06-22 | End: 2022-06-22

## 2022-06-22 RX ORDER — HEPARIN SODIUM (PORCINE) LOCK FLUSH IV SOLN 100 UNIT/ML 100 UNIT/ML
5 SOLUTION INTRAVENOUS
Status: CANCELLED | OUTPATIENT
Start: 2022-06-22

## 2022-06-22 RX ORDER — HEPARIN SODIUM,PORCINE 10 UNIT/ML
5 VIAL (ML) INTRAVENOUS
Status: CANCELLED | OUTPATIENT
Start: 2022-06-22

## 2022-06-23 ENCOUNTER — INFUSION THERAPY VISIT (OUTPATIENT)
Dept: INFUSION THERAPY | Facility: CLINIC | Age: 72
End: 2022-06-23
Attending: INTERNAL MEDICINE
Payer: COMMERCIAL

## 2022-06-23 VITALS
OXYGEN SATURATION: 96 % | HEART RATE: 65 BPM | RESPIRATION RATE: 16 BRPM | BODY MASS INDEX: 21.86 KG/M2 | DIASTOLIC BLOOD PRESSURE: 64 MMHG | TEMPERATURE: 98.4 F | SYSTOLIC BLOOD PRESSURE: 118 MMHG | WEIGHT: 143.8 LBS

## 2022-06-23 DIAGNOSIS — K85.90 ACUTE ON CHRONIC PANCREATITIS (H): Primary | ICD-10-CM

## 2022-06-23 DIAGNOSIS — K86.1 ACUTE ON CHRONIC PANCREATITIS (H): Primary | ICD-10-CM

## 2022-06-23 LAB — LIPASE SERPL-CCNC: 358 U/L (ref 73–393)

## 2022-06-23 PROCEDURE — 83690 ASSAY OF LIPASE: CPT | Performed by: INTERNAL MEDICINE

## 2022-06-23 PROCEDURE — 258N000003 HC RX IP 258 OP 636: Performed by: INTERNAL MEDICINE

## 2022-06-23 PROCEDURE — 36415 COLL VENOUS BLD VENIPUNCTURE: CPT | Performed by: INTERNAL MEDICINE

## 2022-06-23 PROCEDURE — 96360 HYDRATION IV INFUSION INIT: CPT

## 2022-06-23 RX ORDER — ONDANSETRON 2 MG/ML
4 INJECTION INTRAMUSCULAR; INTRAVENOUS ONCE
Status: CANCELLED | OUTPATIENT
Start: 2022-06-24 | End: 2022-06-24

## 2022-06-23 RX ORDER — HEPARIN SODIUM,PORCINE 10 UNIT/ML
5 VIAL (ML) INTRAVENOUS
Status: CANCELLED | OUTPATIENT
Start: 2022-06-24

## 2022-06-23 RX ORDER — HEPARIN SODIUM (PORCINE) LOCK FLUSH IV SOLN 100 UNIT/ML 100 UNIT/ML
5 SOLUTION INTRAVENOUS
Status: CANCELLED | OUTPATIENT
Start: 2022-06-24

## 2022-06-23 RX ADMIN — SODIUM CHLORIDE, POTASSIUM CHLORIDE, SODIUM LACTATE AND CALCIUM CHLORIDE 1000 ML: 600; 310; 30; 20 INJECTION, SOLUTION INTRAVENOUS at 12:36

## 2022-06-23 ASSESSMENT — PAIN SCALES - GENERAL: PAINLEVEL: SEVERE PAIN (7)

## 2022-06-23 NOTE — PROGRESS NOTES
Infusion Nursing Note:  Laura Gonzalez presents today for IVF.    Patient seen by provider today: No   present during visit today: Not Applicable.    Note: Declined Zofran. Felt intense abd pain once during today's infusion. After 1L of fluids finished, patient preferred to be done rather than continue with an additional 500 mL. Discussed future appointments: Patient has an appointment with her PCP tomorrow and with Dr Noguera next Wednesday. Informed her she is able to have IVF 3x/week per current order. Request sent to Schedulers for appointments at [facility], either Saturday or Sunday, and Monday or Tuesday. Requested they call patient to schedule - verified phone number is correct in chart, and OK to leave a voicemail if no answer, per patient.    Intravenous Access:  Labs drawn without difficulty.  Peripheral IV placed.    Treatment Conditions:  Lipase   Date Value Ref Range Status   06/23/2022 358 73 - 393 U/L Final   09/24/2018 138 73 - 393 U/L Final   .    Post Infusion Assessment:  Patient tolerated infusion without incident.  Blood return noted pre and post infusion.  Site patent and intact, free from redness, edema or discomfort.  No evidence of extravasations.  Access discontinued per protocol.     Discharge Plan:   AVS to patient via MYCHART.  Patient will return as schedule for next appointment - awaiting appointments.   Patient discharged in stable condition accompanied by: self,  to drive home.  Departure Mode: Ambulatory.    Cat Horvath RN

## 2022-06-29 ENCOUNTER — VIRTUAL VISIT (OUTPATIENT)
Dept: GASTROENTEROLOGY | Facility: CLINIC | Age: 72
End: 2022-06-29
Payer: COMMERCIAL

## 2022-06-29 VITALS — WEIGHT: 142 LBS | BODY MASS INDEX: 21.59 KG/M2

## 2022-06-29 DIAGNOSIS — K86.1 ACUTE ON CHRONIC PANCREATITIS (H): Primary | ICD-10-CM

## 2022-06-29 DIAGNOSIS — K85.90 ACUTE ON CHRONIC PANCREATITIS (H): Primary | ICD-10-CM

## 2022-06-29 PROCEDURE — 99214 OFFICE O/P EST MOD 30 MIN: CPT | Mod: GT | Performed by: INTERNAL MEDICINE

## 2022-06-29 ASSESSMENT — PAIN SCALES - GENERAL: PAINLEVEL: MILD PAIN (2)

## 2022-06-29 NOTE — PATIENT INSTRUCTIONS
Follow up:    Dr. Noguera has outlined the following steps after your recent clinic visit:      1) Xray 2 views abd today at [facility]- orders are in for this imaging- call [phone] to schedule the xray    2) Stop creon (elastase in March was normal)    3) Follow-up one month to assess need for repeat ERCP- video visit scheduled on 8/8/22 at 1:30 pm- let us know if this time/date does not work for you    4) If pain flare, get lipase and abd xray - there are standing orders in to get xray and lipase done during flares- call to schedule xray as above and labs can be drawn at any [facility] lab, or during infusion visit.       Please call with any questions or concerns regarding your clinic visit today.    It is a pleasure being involved in your health care.    Contacts post-consultation depending on your need:    Schedule Clinic Appointments            [phone] # 1   M-F 7:30 - 5 pm    Emily Cifuentes RN Care Coordinator  [phone]     OR Procedure Scheduling                             [phone]    For urgent/emergent questions after business hours, you may reach the on-call GI Fellow by contacting the [facility]  at [phone].    How do I schedule labs, imaging studies, or procedures that were ordered in clinic today?     Labs: To schedule lab appointment at the [facility], use my chart or call [phone]. If you have a [facility] lab closer to home where you are regularly seen you can give them a call.     Procedures: If a colonoscopy, upper endoscopy, breath test, esophageal manometry, or pH impedence was ordered today, our endoscopy team will call you to schedule this. If you have not heard from our endoscopy team within a week, please call [phone] to schedule.     Imaging Studies: If you were scheduled for a CT scan, X-ray, MRI, ultrasound, HIDA scan or other imaging study, please call [phone] to have this scheduled.     Referral: If a referral to another  specialty was ordered, expect a phone call or follow instructions above. If you have not heard from anyone regarding your referral in a week, please call our clinic to check the status.     How to I schedule a follow-up visit?  If you did not schedule a follow-up visit today, please call [phone] to schedule a follow-up office visit.

## 2022-06-29 NOTE — LETTER
6/29/2022         RE: Laura Gonzalez  [address]  [place] [address]        Dear Colleague,    Thank you for referring your patient, Laura Gonzalez, to the [facility] [place]. Please see a copy of my visit note below.    Doing gradually much better. Looks and feels well. One OP infusion. Lipase was normal, about a week ago. Had one 7 minute episode of severe abd pain at home, but resolved.       1) Xray 2 views abd today at [facility]  2) STop creon (elastase in Mar was normal)  3) Follow-up one month to assess need for repeat ERCP  4) If pain flare, get lipase and abd xray          View Image     Component Collected Lab   ERCP 06/16/2022  2:43 PM [facility]   [address] [place] [address] [phone]     Endoscopy Department   _______________________________________________________________________________   Patient Name: Laura Gonzalez          Procedure Date: 6/16/2022 2:43 PM   MRN: [id number]                       Account Number: [id number]   YOB: 1950             Admit Type: Outpatient   Age: 71                               Room: [place] [address]   Gender: Female                        Note Status: Finalized   Attending MD: MAGUI BRADY MD  Total Sedation Time:   _______________________________________________________________________________       Procedure:             ERCP   Indications:           Pancreatic duct stricture. 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 One month ago had EUS guided                          pancreaticogastrostomy, w transgastric access into                          pancreatic duct, dilation of mildly stenotic                          pancreaticojejunal anastamosis, and palcement of                          through and through stent from stomach through                           pancreatic duct into jejunum. Admitted for post                          procedure pancreatitis, CT showed stent in place                          without any collection, then discharged only to be                          radmitted w peripancreatic retrograstic                          collection/abcess requiring percutaneous drainage, and                          also with migrated pancreatic stent. Eventually                          recovered, then w recurrent episode of pancreatitis,                          now comes from home, doing better, now presents for                          attempt at retrograde ERCP using enteroscopic luminal                          approach, and if fails, EUS rendezvous rather than                          transgastric pancreaticogastrostomy. Preprocedure                          lipase barely elevated over 400 (nl up to 393)   Providers:             MAGUI BRADY MD   Referring MD:          Rob Jansen MD, GORDON BABIN MD   Medicines:             Indomethacin 100 mg NE, General Anesthesia   Complications:         No immediate complications.   _______________________________________________________________________________   Procedure:             Pre-Anesthesia Assessment:                          - Prior to the procedure, a History and Physical was                          performed, and patient medications and allergies were                          reviewed. The patient is competent. The risks and                          benefits of the procedure and the sedation options and                          risks were discussed with the patient. All questions                          were answered and informed consent was obtained.                          Patient identification and proposed procedure were                          verified by the physician in the pre-procedure area.                          Mental Status Examination: alert and oriented.  Airway                          Examination: normal oropharyngeal airway and neck                          mobility. Respiratory Examination: clear to                          auscultation. Prophylactic Antibiotics: The patient                          does not require prophylactic antibiotics. Prior                          Anticoagulants: The patient has taken no anticoagulant                          or antiplatelet agents. ASA Grade Assessment: II - A                          patient with mild systemic disease. After reviewing                          the risks and benefits, the patient was deemed in                          satisfactory condition to undergo the procedure. The                          anesthesia plan was to use general anesthesia.                          Immediately prior to administration of medications,                          the patient was re-assessed for adequacy to receive                          sedatives. The heart rate, respiratory rate, oxygen                          saturations, blood pressure, adequacy of pulmonary                          ventilation, and response to care were monitored                          throughout the procedure. The physical status of the                          patient was re-assessed after the procedure.                          After obtaining informed consent, the scope was passed                          under direct vision. Throughout the procedure, the                          patient's blood pressure, pulse, and oxygen                          saturations were monitored continuously. The                          Colonoscope was introduced through the mouth, and used                          to inject contrast into and used to inject contrast                          into the ventral pancreatic duct. The ERCP was                          accomplished without difficulty. The patient tolerated                          the procedure well.                                                                                      Findings:        A  film of the abdomen was obtained. Surgical clips, consistent        with a previous cholecystectomy, were seen in the area of the right        upper quadrant of the abdomen. The esophagus was successfully intubated        under direct vision using the device assisted enteroscope without        detailed examination of the pharynx, larynx, and associated structures.        The scope was passed under direct vision through the upper GI tract.        There was evidence of a pylorus-sparing Whipple in the duodenal bulb.        This was characterized by healthy appearing mucosa. Post surgical        anatomy, The ventral pancreatic duct was deeply cannulated with the        regular tipped cannula with guidewire. Contrast was injected. I        personally interpreted the pancreatic duct images. The in the pancreas        was dilated mildly and upstream of a stenosis. NovaGold passed        successfully into the tail of the pancreatic duct from the minor        papilla. Dilation of pancreatic duct orifice with a 6 mm balloon dilator        was successful. One 5 Fr by 9 cm pancreatic stent with a 3/4 external        pigtail and a single internal flap was placed 9 cm into the ventral        pancreatic duct. Sludge flowed through the stent. The stent was in good        position. One 4 Fr by 3 cm pancreatic stent with a 3/4 external pigtail        and a single internal flap was placed 3.5 cm into the ventral pancreatic        duct. Clear fluid flowed through the stent. The stent was in good        position.        IMP: Partially stenosed pancreaticojejunostomy, dilated and stented        Unclear whether obstructive pancreatitis but will treat to completion                                                                                     Impression:            Post pylorus sparing Whipple                          Widely patent  biliary anastamosis                          Partially stenosed pancreaticojejunostomy, accessed                          retrograde w cap fitted pediatric coloonoscope,                          balloon dilated, double stented with soft                          Chuckie/.Primo stents to keep anastamosis patent                          Unclear whether obstructive pancreatitis, but will                          treat to completion and/or clinical response   Recommendation:        - [facility] the [facility],                          given readmission with pancreatitis through ED after                          last procedure.                          - Repeat ERCP in 2 months to exchange stent.                          - [facility] fu in 2 weeks to assess response.                                                                                       Electronically signed by MARKELL Noguera   ________________________   ARNALDO NOGUERA MD   6/16/2022 4:39:57 PM   I was physically present for the entire viewing portion of the exam.   __________________________   Signature of teaching physician   [email or web address]/[email or web address] NOGUERA MD   Number of Addenda: 0     Note Initiated On: 6/16/2022 2:43 PM   Scope In:   Scope Out:           Sincerely,    Arnaldo Noguera MD

## 2022-06-29 NOTE — PROGRESS NOTES
Laura is a 71 year old who is being evaluated via a billable video visit.      How would you like to obtain your AVS? MyChart  If the video visit is dropped, the invitation should be resent by: Send to e-mail at: [email or web address]  Will anyone else be joining your video visit? No      Video-Visit Details    Video Start Time: 12:18 PM    Type of service:  Video Visit    Video End Time:12:18 PM    Originating Location (pt. Location): Home    Distant Location (provider location):  [facility] [place]     Platform used for Video Visit: Users[employer]     You were on a call for 25 minutes 04 seconds    Doing gradually much better. Looks and feels well. One OP infusion. Lipase was normal, about a week ago. Had one 7 minute episode of severe abd pain at home, but resolved.       1) Xray 2 views abd today at [facility]  2) STop creon (elastase in Mar was normal)  3) Follow-up one month to assess need for repeat ERCP  4) If pain flare, get lipase and abd xray          View Image     Component Collected Lab   ERCP 06/16/2022  2:43 PM [facility]   [address] [place] [address] [phone]     Endoscopy Department   _______________________________________________________________________________   Patient Name: Laura Gonzalez          Procedure Date: 6/16/2022 2:43 PM   MRN: [id number]                       Account Number: [id number]   YOB: 1950             Admit Type: Outpatient   Age: 71                               Room: [place] [address]   Gender: Female                        Note Status: Finalized   Attending MD: MAGUI BRADY MD  Total Sedation Time:   _______________________________________________________________________________       Procedure:             ERCP   Indications:           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 One month ago had EUS guided                          pancreaticogastrostomy, w transgastric access into                          pancreatic duct, dilation of mildly stenotic                          pancreaticojejunal anastamosis, and palcement of                          through and through stent from stomach through                          pancreatic duct into jejunum. Admitted for post                          procedure pancreatitis, CT showed stent in place                          without any collection, then discharged only to be                          radmitted w peripancreatic retrograstic                          collection/abcess requiring percutaneous drainage, and                          also with migrated pancreatic stent. Eventually                          recovered, then w recurrent episode of pancreatitis,                          now comes from home, doing better, now presents for                          attempt at retrograde ERCP using enteroscopic luminal                          approach, and if fails, EUS rendezvous rather than                          transgastric pancreaticogastrostomy. Preprocedure                          lipase barely elevated over 400 (nl up to 393)   Providers:             MAGUI BRADY MD   Referring MD:          Rob Jansen MD, GORDON BABIN MD   Medicines:             Indomethacin 100 mg VT, General Anesthesia   Complications:         No immediate complications.   _______________________________________________________________________________   Procedure:             Pre-Anesthesia Assessment:                          - Prior to the procedure, a History and Physical was                          performed, and patient medications and allergies were                          reviewed. The patient is competent. The risks and                          benefits of the procedure and the sedation options and                           risks were discussed with the patient. All questions                          were answered and informed consent was obtained.                          Patient identification and proposed procedure were                          verified by the physician in the pre-procedure area.                          Mental Status Examination: alert and oriented. Airway                          Examination: normal oropharyngeal airway and neck                          mobility. Respiratory Examination: clear to                          auscultation. Prophylactic Antibiotics: The patient                          does not require prophylactic antibiotics. Prior                          Anticoagulants: The patient has taken no anticoagulant                          or antiplatelet agents. ASA Grade Assessment: II - A                          patient with mild systemic disease. After reviewing                          the risks and benefits, the patient was deemed in                          satisfactory condition to undergo the procedure. The                          anesthesia plan was to use general anesthesia.                          Immediately prior to administration of medications,                          the patient was re-assessed for adequacy to receive                          sedatives. The heart rate, respiratory rate, oxygen                          saturations, blood pressure, adequacy of pulmonary                          ventilation, and response to care were monitored                          throughout the procedure. The physical status of the                          patient was re-assessed after the procedure.                          After obtaining informed consent, the scope was passed                          under direct vision. Throughout the procedure, the                          patient's blood pressure, pulse, and oxygen                          saturations were monitored continuously. The                           Colonoscope was introduced through the mouth, and used                          to inject contrast into and used to inject contrast                          into the ventral pancreatic duct. The ERCP was                          accomplished without difficulty. The patient tolerated                          the procedure well.                                                                                     Findings:        A  film of the abdomen was obtained. Surgical clips, consistent        with a previous cholecystectomy, were seen in the area of the right        upper quadrant of the abdomen. The esophagus was successfully intubated        under direct vision using the device assisted enteroscope without        detailed examination of the pharynx, larynx, and associated structures.        The scope was passed under direct vision through the upper GI tract.        There was evidence of a pylorus-sparing Whipple in the duodenal bulb.        This was characterized by healthy appearing mucosa. Post surgical        anatomy, The ventral pancreatic duct was deeply cannulated with the        regular tipped cannula with guidewire. Contrast was injected. I        personally interpreted the pancreatic duct images. The in the pancreas        was dilated mildly and upstream of a stenosis. NovaGold passed        successfully into the tail of the pancreatic duct from the minor        papilla. Dilation of pancreatic duct orifice with a 6 mm balloon dilator        was successful. One 5 Fr by 9 cm pancreatic stent with a 3/4 external        pigtail and a single internal flap was placed 9 cm into the ventral        pancreatic duct. Sludge flowed through the stent. The stent was in good        position. One 4 Fr by 3 cm pancreatic stent with a 3/4 external pigtail        and a single internal flap was placed 3.5 cm into the ventral pancreatic        duct. Clear fluid flowed through the stent. The stent was  in good        position.        IMP: Partially stenosed pancreaticojejunostomy, dilated and stented        Unclear whether obstructive pancreatitis but will treat to completion                                                                                     Impression:            Post pylorus sparing Whipple                          Widely patent biliary anastamosis                          Partially stenosed pancreaticojejunostomy, accessed                          retrograde w cap fitted pediatric coloonoscope,                          balloon dilated, double stented with soft                          Noguera/.Primo stents to keep anastamosis patent                          Unclear whether obstructive pancreatitis, but will                          treat to completion and/or clinical response   Recommendation:        - [facility],                          given readmission with pancreatitis through ED after                          last procedure.                          - Repeat ERCP in 2 months to exchange stent.                          - [facility] in 2 weeks to assess response.                                                                                       Electronically signed by MARKELL Noguera   ________________________   MAGUI NOGUERA MD   6/16/2022 4:39:57 PM   I was physically present for the entire viewing portion of the exam.   __________________________   Signature of teaching physician   B4demarco/Dimitry NOGUERA MD   Number of Addenda: 0     Note Initiated On: 6/16/2022 2:43 PM   Scope In:   Scope Out:

## 2022-06-30 ENCOUNTER — ANCILLARY PROCEDURE (OUTPATIENT)
Dept: GENERAL RADIOLOGY | Facility: CLINIC | Age: 72
End: 2022-06-30
Attending: INTERNAL MEDICINE
Payer: COMMERCIAL

## 2022-06-30 DIAGNOSIS — K86.1 ACUTE ON CHRONIC PANCREATITIS (H): ICD-10-CM

## 2022-06-30 DIAGNOSIS — K85.90 ACUTE ON CHRONIC PANCREATITIS (H): ICD-10-CM

## 2022-06-30 PROCEDURE — 74019 RADEX ABDOMEN 2 VIEWS: CPT

## 2022-07-01 ENCOUNTER — PATIENT OUTREACH (OUTPATIENT)
Dept: GASTROENTEROLOGY | Facility: CLINIC | Age: 72
End: 2022-07-01

## 2022-07-01 DIAGNOSIS — R19.7 DIARRHEA: Primary | ICD-10-CM

## 2022-07-01 NOTE — PROGRESS NOTES
"Per Dr. Noguera  Xray shows both stents long gone.   Not much stenosis at the anastamosis.   Will have to see how she is doing   Asked her to call tomorrow       Pt called in, is feeling ok, having \"burning diarrhea\"    Asked that she let us know if diarrhea persists, can check for cdiff. Advised that likely no procedure now, will watch and wait before next procedure.Encouratgd her to use infusion protocol with symptoms/flares, discussed challenges of getting appointments.  Update sent to Dr. Noguera    ML  "

## 2022-07-05 ENCOUNTER — PATIENT OUTREACH (OUTPATIENT)
Dept: GASTROENTEROLOGY | Facility: CLINIC | Age: 72
End: 2022-07-05

## 2022-07-06 ENCOUNTER — VIRTUAL VISIT (OUTPATIENT)
Dept: GASTROENTEROLOGY | Facility: CLINIC | Age: 72
End: 2022-07-06
Payer: COMMERCIAL

## 2022-07-06 VITALS — WEIGHT: 140 LBS | BODY MASS INDEX: 21.29 KG/M2

## 2022-07-06 DIAGNOSIS — Z90.410 HISTORY OF WHIPPLE PROCEDURE: Primary | ICD-10-CM

## 2022-07-06 DIAGNOSIS — Z90.49 HISTORY OF WHIPPLE PROCEDURE: Primary | ICD-10-CM

## 2022-07-06 PROCEDURE — 99214 OFFICE O/P EST MOD 30 MIN: CPT | Mod: GT | Performed by: INTERNAL MEDICINE

## 2022-07-06 ASSESSMENT — PAIN SCALES - GENERAL: PAINLEVEL: NO PAIN (0)

## 2022-07-06 NOTE — PROGRESS NOTES
SUBJECTIVE:   Laura Gonzalez is a 71 year old year old female here for:  Follow up for history of recurrent pancreatitis s/p Whipple procedure.    Since last stents performed June 16th - had them 11 days - pain requiring oxycodone BID. Since passing stents, she feels  okay . Reports not feeling ill but has some discomfort in mid abdomen much more mild than previous pain. Has been eating small meals but has lost ~20 pounds and weight loss has leveled off. Is now 140 pounds with baseline around 158.    Reports energy level is returning to normal.  is currently hospitalized with pharyngeal abscesses.    Review of systems:  10 point ROS negative except as noted above.    Past Medical History:   Diagnosis Date     Asthma     pt.states no longer has symptoms     CAD (coronary artery disease)      HLD (hyperlipidemia)         amylase-lipase-protease (CREON) 22314-13028 units CPEP per EC capsule, Take 2-3 with meals / 1-2 with snacks, up to 15 per day. (Patient taking differently: Take 2 capsules by mouth 3 times daily (with meals) Take 2 with meals / 1-2 with snacks, up to 15 per day.)  atorvastatin (LIPITOR) 40 MG tablet, Take 40 mg by mouth At Bedtime   Calcium Carb-Cholecalciferol (CALCIUM 1000 + D) 1000-800 MG-UNIT TABS,   calcium-magnesium (CALMAG) 500-250 MG TABS, Take 2 tablets by mouth daily  cholecalciferol (VITAMIN D3) 25 mcg (1000 units) capsule, Take 1 capsule by mouth daily  estradiol (ESTRACE) 0.1 MG/GM vaginal cream, Place 1 g vaginally See Admin Instructions Insert 1 gram vaginally as directed by provider  estradiol (VIVELLE-DOT) 0.075 MG/24HR BIW patch, Place 1 patch onto the skin twice a week  evolocumab (REPATHA) 140 MG/ML prefilled autoinjector, Inject 140 mg Subcutaneous every 14 days  ezetimibe (ZETIA) 10 MG tablet, Take 10 mg by mouth At Bedtime  LORazepam (ATIVAN) 0.5 MG tablet, Take 1 tablet (0.5 mg) by mouth 2 times daily as needed for anxiety Take 1-2 tablets by mouth twice daily as  needed for anxiety  montelukast (SINGULAIR) 10 MG tablet, Take 10 mg by mouth At Bedtime   ondansetron (ZOFRAN ODT) 4 MG ODT tab, Take 1 tablet (4 mg) by mouth every 6 hours as needed for nausea or vomiting (Patient not taking: Reported on 6/23/2022)  oxyCODONE (ROXICODONE) 5 MG tablet, Take 1 tablet (5 mg) by mouth every 6 hours as needed for moderate to severe pain (take 5 mg for 5-7/10 pain, 10 mg for 8-10/10 pain)  polyethylene glycol (MIRALAX) 17 GM/Dose powder, Take 17 g by mouth 2 times daily as needed  senna-docusate (SENOKOT-S/PERICOLACE) 8.6-50 MG tablet, Take 1 tablet by mouth 2 times daily as needed for constipation  valACYclovir (VALTREX) 1000 mg tablet, Take 1,000 mg by mouth daily as needed (cold sore)  (Patient not taking: Reported on 6/23/2022)  VITAMIN A PO, Take 3,000 Units by mouth daily  zolpidem (AMBIEN) 10 MG tablet, Take 1 tablet by mouth nightly as needed for sleep  [DISCONTINUED] hydrochlorothiazide (HYDRODIURIL) 25 MG tablet, Take 25 mg by mouth daily    No current facility-administered medications on file prior to visit.       OBJECTIVE:  Physical exam deferred due to virtual visit.    Recent Labs: CBC stable, BMP WNL    Recent Imaging:   Abdominal X-ray 6/30  IMPRESSION: Supine and upright views of the abdomen and pelvis were obtained. Nonobstructive bowel gas pattern. No free peritoneal or portal venous gas. A few surgical clips in the abdomen from prior surgeries. Postsurgical changes of left hip   arthroplasty. Multilevel degenerative changes of the spine.    Impression:     Ms. Gonzalez is a 71 year old woman with history of recurrent pancreatitis s/p Whipple who presents to discuss management of chronic pancreatitis. Her symptoms have been relatively stable since passing her previous stents. She has been able to manage her symptoms and be present for her  who is sick at the moment. Because she is clinically stable, there is no acute need for a stenting procedure at this  time.    Plan:  - No indication to repeat pancreatic duct stenting at the moment  - Can reassess if she becomes symptomatic again    Return to clinic scheduled for 8/8/22.    Patient seen and discussed with Dr. Noguera who agrees with this plan.    Nathaniel Flores MD  PGY-1 Internal Medicine  [id number]    Seen and examined with GI fellow, agree with findings and recommendations.  30 mins reviewing that she is overall feeling better than previous to our interventions. Looks best ever in terms of color, vitality and overall wellness. Still some discomfort but not interfering w daily living. Concerned that stents out so soon, but we agreed that likely that anastamosis significantly wider now. Indwelling rigid stents likely to cause discomfort. Agreed to follow her, only reintervene if symptomatic       Arnaldo Noguera MD GI Staff  30 minute video

## 2022-07-06 NOTE — PROGRESS NOTES
Laura is a 71 year old who is being evaluated via a billable video visit.      How would you like to obtain your AVS? MyChart  If the video visit is dropped, the invitation should be resent by: Send to e-mail at: [email or web address]  Will anyone else be joining your video visit? No      Video-Visit Details      Type of service:  Video Visit      Originating Location (pt. Location): Home    Distant Location (provider location):  [facility] [place]     Platform used for Video Visit: [employer]

## 2022-07-06 NOTE — LETTER
7/6/2022         RE: Laura Gonzalez  [address]  [place] [address]        Dear Colleague,    Thank you for referring your patient, Laura Gonzalez, to the [facility] [place]. Please see a copy of my visit note below.    SUBJECTIVE:   Laura Gonzalez is a 71 year old year old female here for:  Follow up for history of recurrent pancreatitis s/p Whipple procedure.    Since last stents performed June 16th - had them 11 days - pain requiring oxycodone BID. Since passing stents, she feels  okay . Reports not feeling ill but has some discomfort in mid abdomen much more mild than previous pain. Has been eating small meals but has lost ~20 pounds and weight loss has leveled off. Is now 140 pounds with baseline around 158.    Reports energy level is returning to normal.  is currently hospitalized with pharyngeal abscesses.    Review of systems:  10 point ROS negative except as noted above.    Past Medical History:   Diagnosis Date     Asthma     pt.states no longer has symptoms     CAD (coronary artery disease)      HLD (hyperlipidemia)         amylase-lipase-protease (CREON) 14191-32544 units CPEP per EC capsule, Take 2-3 with meals / 1-2 with snacks, up to 15 per day. (Patient taking differently: Take 2 capsules by mouth 3 times daily (with meals) Take 2 with meals / 1-2 with snacks, up to 15 per day.)  atorvastatin (LIPITOR) 40 MG tablet, Take 40 mg by mouth At Bedtime   Calcium Carb-Cholecalciferol (CALCIUM 1000 + D) 1000-800 MG-UNIT TABS,   calcium-magnesium (CALMAG) 500-250 MG TABS, Take 2 tablets by mouth daily  cholecalciferol (VITAMIN D3) 25 mcg (1000 units) capsule, Take 1 capsule by mouth daily  estradiol (ESTRACE) 0.1 MG/GM vaginal cream, Place 1 g vaginally See Admin Instructions Insert 1 gram vaginally as directed by provider  estradiol (VIVELLE-DOT) 0.075 MG/24HR BIW patch, Place 1 patch onto the skin twice a week  evolocumab (REPATHA) 140  MG/ML prefilled autoinjector, Inject 140 mg Subcutaneous every 14 days  ezetimibe (ZETIA) 10 MG tablet, Take 10 mg by mouth At Bedtime  LORazepam (ATIVAN) 0.5 MG tablet, Take 1 tablet (0.5 mg) by mouth 2 times daily as needed for anxiety Take 1-2 tablets by mouth twice daily as needed for anxiety  montelukast (SINGULAIR) 10 MG tablet, Take 10 mg by mouth At Bedtime   ondansetron (ZOFRAN ODT) 4 MG ODT tab, Take 1 tablet (4 mg) by mouth every 6 hours as needed for nausea or vomiting (Patient not taking: Reported on 6/23/2022)  oxyCODONE (ROXICODONE) 5 MG tablet, Take 1 tablet (5 mg) by mouth every 6 hours as needed for moderate to severe pain (take 5 mg for 5-7/10 pain, 10 mg for 8-10/10 pain)  polyethylene glycol (MIRALAX) 17 GM/Dose powder, Take 17 g by mouth 2 times daily as needed  senna-docusate (SENOKOT-S/PERICOLACE) 8.6-50 MG tablet, Take 1 tablet by mouth 2 times daily as needed for constipation  valACYclovir (VALTREX) 1000 mg tablet, Take 1,000 mg by mouth daily as needed (cold sore)  (Patient not taking: Reported on 6/23/2022)  VITAMIN A PO, Take 3,000 Units by mouth daily  zolpidem (AMBIEN) 10 MG tablet, Take 1 tablet by mouth nightly as needed for sleep  [DISCONTINUED] hydrochlorothiazide (HYDRODIURIL) 25 MG tablet, Take 25 mg by mouth daily    No current facility-administered medications on file prior to visit.       OBJECTIVE:  Physical exam deferred due to virtual visit.    Recent Labs: CBC stable, BMP WNL    Recent Imaging:   Abdominal X-ray 6/30  IMPRESSION: Supine and upright views of the abdomen and pelvis were obtained. Nonobstructive bowel gas pattern. No free peritoneal or portal venous gas. A few surgical clips in the abdomen from prior surgeries. Postsurgical changes of left hip   arthroplasty. Multilevel degenerative changes of the spine.    Impression:     Ms. Gonzalez is a 71 year old woman with history of recurrent pancreatitis s/p Whipple who presents to discuss management of chronic  pancreatitis. Her symptoms have been relatively stable since passing her previous stents. She has been able to manage her symptoms and be present for her  who is sick at the moment. Because she is clinically stable, there is no acute need for a stenting procedure at this time.    Plan:  - No indication to repeat pancreatic duct stenting at the moment  - Can reassess if she becomes symptomatic again    Return to clinic scheduled for 8/8/22.    Patient seen and discussed with Dr. Noguera who agrees with this plan.    Nathaniel Flores MD  PGY-1 Internal Medicine  [id number]    Seen and examined with GI fellow, agree with findings and recommendations.  30 mins reviewing that she is overall feeling better than previous to our interventions. Looks best ever in terms of color, vitality and overall wellness. Still some discomfort but not interfering w daily living. Concerned that stents out so soon, but we agreed that likely that anastamosis significantly wider now. Indwelling rigid stents likely to cause discomfort. Agreed to follow her, only reintervene if symptomatic       Arnaldo Noguera MD GI Staff  30 minute video

## 2022-07-07 NOTE — PATIENT INSTRUCTIONS
Follow up:    Dr. Noguera has outlined the following steps after your recent clinic visit:    Plan:  - No indication to repeat pancreatic duct stenting at the moment  - Can reassess if she becomes symptomatic again     Return to clinic scheduled for 8/8/22.        Please call with any questions or concerns regarding your clinic visit today.    It is a pleasure being involved in your health care.    Contacts post-consultation depending on your need:    Schedule Clinic Appointments            [phone] # 1   M-F 7:30 - 5 pm    Emily Cifuentes RN Care Coordinator  [phone]     OR Procedure Scheduling                             [phone]    For urgent/emergent questions after business hours, you may reach the on-call GI Fellow by contacting the [facility]  at [phone].    How do I schedule labs, imaging studies, or procedures that were ordered in clinic today?     Labs: To schedule lab appointment at the [facility], use my chart or call [phone]. If you have a [facility] lab closer to home where you are regularly seen you can give them a call.     Procedures: If a colonoscopy, upper endoscopy, breath test, esophageal manometry, or pH impedence was ordered today, our endoscopy team will call you to schedule this. If you have not heard from our endoscopy team within a week, please call [phone] to schedule.     Imaging Studies: If you were scheduled for a CT scan, X-ray, MRI, ultrasound, HIDA scan or other imaging study, please call [phone] to have this scheduled.     Referral: If a referral to another specialty was ordered, expect a phone call or follow instructions above. If you have not heard from anyone regarding your referral in a week, please call our clinic to check the status.     How to I schedule a follow-up visit?  If you did not schedule a follow-up visit today, please call [phone] to schedule a follow-up office visit.

## 2022-08-08 ENCOUNTER — VIRTUAL VISIT (OUTPATIENT)
Dept: GASTROENTEROLOGY | Facility: CLINIC | Age: 72
End: 2022-08-08
Payer: COMMERCIAL

## 2022-08-08 VITALS — BODY MASS INDEX: 21.9 KG/M2 | WEIGHT: 144 LBS

## 2022-08-08 DIAGNOSIS — K85.90 ACUTE PANCREATITIS, UNSPECIFIED COMPLICATION STATUS, UNSPECIFIED PANCREATITIS TYPE: Primary | ICD-10-CM

## 2022-08-08 PROCEDURE — 99214 OFFICE O/P EST MOD 30 MIN: CPT | Mod: GT | Performed by: INTERNAL MEDICINE

## 2022-08-08 ASSESSMENT — PAIN SCALES - GENERAL: PAINLEVEL: NO PAIN (0)

## 2022-08-08 NOTE — PATIENT INSTRUCTIONS
Follow up:    Dr. Noguera has outlined the following steps after your recent clinic visit:    Follow-up in 3 months, sooner if issues    A video visit as been scheduled for 11/9/2022 at 1:30 pm- let us know if this date/time doesn't work for you        Please call with any questions or concerns regarding your clinic visit today.    It is a pleasure being involved in your health care.    Contacts post-consultation depending on your need:    Schedule Clinic Appointments            [phone] # 1   M-F 7:30 - 5 pm    Emily Cifuentes RN Care Coordinator  [phone]    Becka Cullen, RN Care Coordinator [phone]    Ling Gurrola, RN Care Coordinator [phone]     OR Procedure Scheduling                             [phone]    For urgent/emergent questions after business hours, you may reach the on-call GI Fellow by contacting the [facility]  at [phone].    How do I schedule labs, imaging studies, or procedures that were ordered in clinic today?     Labs: To schedule lab appointment at the [facility], use my chart or call [phone]. If you have a [facility] lab closer to home where you are regularly seen you can give them a call.     Procedures: If a colonoscopy, upper endoscopy, breath test, esophageal manometry, or pH impedence was ordered today, our endoscopy team will call you to schedule this. If you have not heard from our endoscopy team within a week, please call [phone] to schedule.     Imaging Studies: If you were scheduled for a CT scan, X-ray, MRI, ultrasound, HIDA scan or other imaging study, please call [phone] to have this scheduled.     Referral: If a referral to another specialty was ordered, expect a phone call or follow instructions above. If you have not heard from anyone regarding your referral in a week, please call our clinic to check the status.     How to I schedule a follow-up visit?  If you did not schedule a follow-up visit today,  please call [phone] to schedule a follow-up office visit.

## 2022-08-08 NOTE — PROGRESS NOTES
Laura is a 71 year old who is being evaluated via a billable video visit.      How would you like to obtain your AVS? MyChart  If the video visit is dropped, the invitation should be resent by: Text to cell phone: [phone]  Will anyone else be joining your video visit? Yes: Spouse - Rubens. How would they like to receive their invitation? Text to cell phone: [phone]       Sarita Gallego      Video-Visit Details    Video Start Time: 2:48 PM    Type of service:  Video Visit    Video End Time:2:48 PM    Originating Location (pt. Location): Home    Distant Location (provider location):  [facility] [place]     Platform used for Video Visit: [employer]     You were on a call for 36 minutes 35 seconds    Doing very well. 90-95% back to pre illness baseline wrt energy, working out, well being, w minor discomfort princess w fatty meals, which she avoids. We reviewed options and all agreed should defer any interventions until such time as she has recurrent sx or pancreatitis.    Follow-up in 3 months, sooner if issues

## 2022-08-08 NOTE — LETTER
8/8/2022         RE: Laura Gonzalez  [address]  [place] [address]        Dear Colleague,    Thank you for referring your patient, Laura Gonzalez, to the [facility] [place]. Please see a copy of my visit note below.      You were on a call for 36 minutes 35 seconds    Doing very well. 90-95% back to pre illness baseline wrt energy, working out, well being, w minor discomfort princess w fatty meals, which she avoids. We reviewed options and all agreed should defer any interventions until such time as she has recurrent sx or pancreatitis.    Follow-up in 3 months, sooner if issues        Sincerely,    Arnaldo Noguera MD

## 2022-08-29 ENCOUNTER — PATIENT OUTREACH (OUTPATIENT)
Dept: GASTROENTEROLOGY | Facility: CLINIC | Age: 72
End: 2022-08-29

## 2022-08-29 DIAGNOSIS — K85.90 ACUTE PANCREATITIS, UNSPECIFIED COMPLICATION STATUS, UNSPECIFIED PANCREATITIS TYPE: Primary | ICD-10-CM

## 2022-08-29 NOTE — TELEPHONE ENCOUNTER
Per Dr. Noguera  Lipase.amylase. LFTs. CBC   If persistent time for another CT scan I suspect     Pt will get labs drawn    ML

## 2022-08-29 NOTE — TELEPHONE ENCOUNTER
Pt had pain attack x2 with radiation to back/shoulder. Nausea during pain episodes. More general pain on right side.  Message sent to Dr. Noguera regarding next steps in outpatient work up.  ML

## 2022-08-30 ENCOUNTER — LAB (OUTPATIENT)
Dept: LAB | Facility: CLINIC | Age: 72
End: 2022-08-30
Payer: COMMERCIAL

## 2022-08-30 DIAGNOSIS — K85.90 ACUTE PANCREATITIS, UNSPECIFIED COMPLICATION STATUS, UNSPECIFIED PANCREATITIS TYPE: ICD-10-CM

## 2022-08-30 LAB
ALBUMIN SERPL-MCNC: 3.5 G/DL (ref 3.4–5)
ALP SERPL-CCNC: 131 U/L (ref 40–150)
ALT SERPL W P-5'-P-CCNC: 35 U/L (ref 0–50)
AMYLASE SERPL-CCNC: 65 U/L (ref 30–110)
AST SERPL W P-5'-P-CCNC: 31 U/L (ref 0–45)
BILIRUB DIRECT SERPL-MCNC: 0.1 MG/DL (ref 0–0.2)
BILIRUB SERPL-MCNC: 0.6 MG/DL (ref 0.2–1.3)
ERYTHROCYTE [DISTWIDTH] IN BLOOD BY AUTOMATED COUNT: 14 % (ref 10–15)
HCT VFR BLD AUTO: 41.9 % (ref 35–47)
HGB BLD-MCNC: 13 G/DL (ref 11.7–15.7)
MCH RBC QN AUTO: 30.2 PG (ref 26.5–33)
MCHC RBC AUTO-ENTMCNC: 31 G/DL (ref 31.5–36.5)
MCV RBC AUTO: 97 FL (ref 78–100)
PLATELET # BLD AUTO: 303 10E3/UL (ref 150–450)
PROT SERPL-MCNC: 7.3 G/DL (ref 6.8–8.8)
RBC # BLD AUTO: 4.31 10E6/UL (ref 3.8–5.2)
WBC # BLD AUTO: 6 10E3/UL (ref 4–11)

## 2022-08-30 PROCEDURE — 82040 ASSAY OF SERUM ALBUMIN: CPT

## 2022-08-30 PROCEDURE — 82150 ASSAY OF AMYLASE: CPT

## 2022-08-30 PROCEDURE — 85027 COMPLETE CBC AUTOMATED: CPT

## 2022-08-30 PROCEDURE — 36415 COLL VENOUS BLD VENIPUNCTURE: CPT

## 2022-08-30 PROCEDURE — 83690 ASSAY OF LIPASE: CPT

## 2022-08-31 DIAGNOSIS — K85.90 ACUTE PANCREATITIS, UNSPECIFIED COMPLICATION STATUS, UNSPECIFIED PANCREATITIS TYPE: Primary | ICD-10-CM

## 2022-08-31 LAB — LIPASE SERPL-CCNC: 93 U/L (ref 73–393)

## 2022-09-03 ENCOUNTER — TELEPHONE (OUTPATIENT)
Dept: GASTROENTEROLOGY | Facility: CLINIC | Age: 72
End: 2022-09-03

## 2022-09-04 NOTE — TELEPHONE ENCOUNTER
I got call frm Ms Gonzalez regarding persistent RUQ pain. 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 Recent guided pancreaticogastrostomy in May 2022 c/b pancreatitis and stent displacement w infected fluid collection requiring admission and drain placement in June. She is under the care of Dr. Noguera.     On 8/29, she developed RUQ pain. She contacted Dr. Noguera and was recommended to have labs done. Lipase,amylase, LFT and CBC were normal on 8/30.     She continues to have pain. She took 2.5 mg of oxycodone which improved her pain.     I recommended further evaluation at ED with repeat labs and CT imaging. At this point, patient would like to continue to observe at home. I recommended tylenol 1g TID (no liver disease) and heat/cold compression. She expressed that she will present to either [facility] or [facility] ED if pain is not getting better.    Will notify Dr. Noguera regarding this call.

## 2022-09-06 ENCOUNTER — PATIENT OUTREACH (OUTPATIENT)
Dept: GASTROENTEROLOGY | Facility: CLINIC | Age: 72
End: 2022-09-06

## 2022-09-06 ENCOUNTER — PREP FOR PROCEDURE (OUTPATIENT)
Dept: GASTROENTEROLOGY | Facility: CLINIC | Age: 72
End: 2022-09-06

## 2022-09-06 NOTE — TELEPHONE ENCOUNTER
Called to discuss next steps with patient, per Dr. Noguera  Sounds like time for a tune up dilation. Is post Whipple at [place], with stenotic pancreaticojejunostomy. Recurrent pancreatitis. We have dilated her panc j by EUS then ERCP.     Per patient, tylenol, NPO and rest helped.      Please assist in scheduling:     Procedure/Imaging/Clinic: EUS/ERCP  Physician: Deonna  Timing: 10/13  Procedure length:provider average  Anesthesia:gen  Dx: recurrent pancreatitis  Tier:2  Location: [facility]     Reviewed above plan with patient- she will get preop and covid test. Pt understands plan, encouraged to come to [facility] ER with urgent needs.    ML

## 2022-09-09 ENCOUNTER — PREP FOR PROCEDURE (OUTPATIENT)
Dept: GASTROENTEROLOGY | Facility: CLINIC | Age: 72
End: 2022-09-09

## 2022-09-30 ENCOUNTER — TRANSFERRED RECORDS (OUTPATIENT)
Dept: HEALTH INFORMATION MANAGEMENT | Facility: CLINIC | Age: 72
End: 2022-09-30

## 2022-09-30 LAB
ALT SERPL-CCNC: 41 U/L (ref 9–52)
AST SERPL-CCNC: 51 U/L (ref 17–45)
CREATININE (EXTERNAL): 0.8 MG/DL (ref 0.7–1.5)
GLUCOSE (EXTERNAL): 106 MG/DL (ref 70–110)
POTASSIUM (EXTERNAL): 5.5 MMOL/L (ref 3.5–5.5)

## 2022-10-11 RX ORDER — TRAMADOL HYDROCHLORIDE 50 MG/1
TABLET ORAL
COMMUNITY
Start: 2022-05-06 | End: 2022-10-11

## 2022-10-11 RX ORDER — MAGNESIUM ASCORBATE
POWDER (GRAM) MISCELLANEOUS
COMMUNITY

## 2022-10-13 ENCOUNTER — ANESTHESIA EVENT (OUTPATIENT)
Dept: SURGERY | Facility: CLINIC | Age: 72
End: 2022-10-13
Payer: COMMERCIAL

## 2022-10-13 ENCOUNTER — HOSPITAL ENCOUNTER (OUTPATIENT)
Facility: CLINIC | Age: 72
Setting detail: OBSERVATION
Discharge: HOME | End: 2022-10-13
Attending: INTERNAL MEDICINE | Admitting: INTERNAL MEDICINE
Payer: COMMERCIAL

## 2022-10-13 ENCOUNTER — APPOINTMENT (OUTPATIENT)
Dept: GENERAL RADIOLOGY | Facility: CLINIC | Age: 72
End: 2022-10-13
Attending: INTERNAL MEDICINE
Payer: COMMERCIAL

## 2022-10-13 ENCOUNTER — ANESTHESIA (OUTPATIENT)
Dept: SURGERY | Facility: CLINIC | Age: 72
End: 2022-10-13
Payer: COMMERCIAL

## 2022-10-13 VITALS
RESPIRATION RATE: 14 BRPM | HEIGHT: 68 IN | DIASTOLIC BLOOD PRESSURE: 68 MMHG | TEMPERATURE: 98 F | HEART RATE: 52 BPM | WEIGHT: 152.12 LBS | SYSTOLIC BLOOD PRESSURE: 146 MMHG | BODY MASS INDEX: 23.05 KG/M2 | OXYGEN SATURATION: 98 %

## 2022-10-13 DIAGNOSIS — K86.1 ACUTE ON CHRONIC PANCREATITIS (H): Primary | ICD-10-CM

## 2022-10-13 DIAGNOSIS — K85.90 ACUTE ON CHRONIC PANCREATITIS (H): Primary | ICD-10-CM

## 2022-10-13 DIAGNOSIS — B99.9 INTRA-ABDOMINAL INFECTION: ICD-10-CM

## 2022-10-13 LAB
ALBUMIN SERPL BCG-MCNC: 3.8 G/DL (ref 3.5–5.2)
ALP SERPL-CCNC: 121 U/L (ref 35–104)
ALT SERPL W P-5'-P-CCNC: 23 U/L (ref 10–35)
AMYLASE SERPL-CCNC: 85 U/L (ref 28–100)
ANION GAP SERPL CALCULATED.3IONS-SCNC: 6 MMOL/L (ref 7–15)
AST SERPL W P-5'-P-CCNC: 34 U/L (ref 10–35)
BILIRUB SERPL-MCNC: 0.5 MG/DL
BUN SERPL-MCNC: 14.5 MG/DL (ref 8–23)
CALCIUM SERPL-MCNC: 9 MG/DL (ref 8.8–10.2)
CHLORIDE SERPL-SCNC: 103 MMOL/L (ref 98–107)
CREAT SERPL-MCNC: 0.8 MG/DL (ref 0.51–0.95)
DEPRECATED HCO3 PLAS-SCNC: 29 MMOL/L (ref 22–29)
ERYTHROCYTE [DISTWIDTH] IN BLOOD BY AUTOMATED COUNT: 13.8 % (ref 10–15)
GFR SERPL CREATININE-BSD FRML MDRD: 78 ML/MIN/1.73M2
GLUCOSE BLDC GLUCOMTR-MCNC: 95 MG/DL (ref 70–99)
GLUCOSE SERPL-MCNC: 99 MG/DL (ref 70–99)
HCT VFR BLD AUTO: 38.9 % (ref 35–47)
HGB BLD-MCNC: 12.2 G/DL (ref 11.7–15.7)
INR PPP: 1.07 (ref 0.85–1.15)
LIPASE SERPL-CCNC: 27 U/L (ref 13–60)
MCH RBC QN AUTO: 29.3 PG (ref 26.5–33)
MCHC RBC AUTO-ENTMCNC: 31.4 G/DL (ref 31.5–36.5)
MCV RBC AUTO: 93 FL (ref 78–100)
PLATELET # BLD AUTO: 305 10E3/UL (ref 150–450)
POTASSIUM SERPL-SCNC: 4.4 MMOL/L (ref 3.4–5.3)
PROT SERPL-MCNC: 6.4 G/DL (ref 6.4–8.3)
RBC # BLD AUTO: 4.17 10E6/UL (ref 3.8–5.2)
SODIUM SERPL-SCNC: 138 MMOL/L (ref 136–145)
WBC # BLD AUTO: 7.3 10E3/UL (ref 4–11)

## 2022-10-13 PROCEDURE — 258N000003 HC RX IP 258 OP 636: Performed by: STUDENT IN AN ORGANIZED HEALTH CARE EDUCATION/TRAINING PROGRAM

## 2022-10-13 PROCEDURE — 36415 COLL VENOUS BLD VENIPUNCTURE: CPT | Performed by: INTERNAL MEDICINE

## 2022-10-13 PROCEDURE — 250N000013 HC RX MED GY IP 250 OP 250 PS 637: Performed by: ANESTHESIOLOGY

## 2022-10-13 PROCEDURE — 85610 PROTHROMBIN TIME: CPT | Performed by: INTERNAL MEDICINE

## 2022-10-13 PROCEDURE — 250N000009 HC RX 250: Performed by: STUDENT IN AN ORGANIZED HEALTH CARE EDUCATION/TRAINING PROGRAM

## 2022-10-13 PROCEDURE — 82150 ASSAY OF AMYLASE: CPT | Performed by: INTERNAL MEDICINE

## 2022-10-13 PROCEDURE — 272N000001 HC OR GENERAL SUPPLY STERILE: Performed by: INTERNAL MEDICINE

## 2022-10-13 PROCEDURE — 258N000003 HC RX IP 258 OP 636: Performed by: INTERNAL MEDICINE

## 2022-10-13 PROCEDURE — C1769 GUIDE WIRE: HCPCS | Performed by: INTERNAL MEDICINE

## 2022-10-13 PROCEDURE — 85027 COMPLETE CBC AUTOMATED: CPT | Performed by: INTERNAL MEDICINE

## 2022-10-13 PROCEDURE — 250N000025 HC SEVOFLURANE, PER MIN: Performed by: INTERNAL MEDICINE

## 2022-10-13 PROCEDURE — 370N000017 HC ANESTHESIA TECHNICAL FEE, PER MIN: Performed by: INTERNAL MEDICINE

## 2022-10-13 PROCEDURE — 999N000141 HC STATISTIC PRE-PROCEDURE NURSING ASSESSMENT: Performed by: INTERNAL MEDICINE

## 2022-10-13 PROCEDURE — 250N000011 HC RX IP 250 OP 636: Performed by: STUDENT IN AN ORGANIZED HEALTH CARE EDUCATION/TRAINING PROGRAM

## 2022-10-13 PROCEDURE — 82962 GLUCOSE BLOOD TEST: CPT

## 2022-10-13 PROCEDURE — 999N000181 XR SURGERY CARM FLUORO GREATER THAN 5 MIN W STILLS: Mod: TC

## 2022-10-13 PROCEDURE — 83690 ASSAY OF LIPASE: CPT | Performed by: INTERNAL MEDICINE

## 2022-10-13 PROCEDURE — 250N000009 HC RX 250: Performed by: INTERNAL MEDICINE

## 2022-10-13 PROCEDURE — 82040 ASSAY OF SERUM ALBUMIN: CPT | Performed by: INTERNAL MEDICINE

## 2022-10-13 PROCEDURE — 710N000010 HC RECOVERY PHASE 1, LEVEL 2, PER MIN: Performed by: INTERNAL MEDICINE

## 2022-10-13 PROCEDURE — 255N000002 HC RX 255 OP 636: Performed by: INTERNAL MEDICINE

## 2022-10-13 PROCEDURE — 80053 COMPREHEN METABOLIC PANEL: CPT | Performed by: INTERNAL MEDICINE

## 2022-10-13 PROCEDURE — 360N000083 HC SURGERY LEVEL 3 W/ FLUORO, PER MIN: Performed by: INTERNAL MEDICINE

## 2022-10-13 PROCEDURE — C1877 STENT, NON-COAT/COV W/O DEL: HCPCS | Performed by: INTERNAL MEDICINE

## 2022-10-13 PROCEDURE — 710N000012 HC RECOVERY PHASE 2, PER MINUTE: Performed by: INTERNAL MEDICINE

## 2022-10-13 DEVICE — STENT FREEMAN PANCREA FLEX 5FRX3CM SGL PIGTAIL
Type: IMPLANTABLE DEVICE | Site: PANCREAS | Status: NON-FUNCTIONAL
Removed: 2023-03-30

## 2022-10-13 DEVICE — IMPLANTABLE DEVICE
Type: IMPLANTABLE DEVICE | Site: PANCREAS | Status: NON-FUNCTIONAL
Removed: 2023-03-30

## 2022-10-13 RX ORDER — ONDANSETRON 2 MG/ML
4 INJECTION INTRAMUSCULAR; INTRAVENOUS EVERY 6 HOURS PRN
Status: DISCONTINUED | OUTPATIENT
Start: 2022-10-13 | End: 2022-10-14 | Stop reason: HOSPADM

## 2022-10-13 RX ORDER — METOPROLOL TARTRATE 1 MG/ML
1-2 INJECTION, SOLUTION INTRAVENOUS EVERY 5 MIN PRN
Status: DISCONTINUED | OUTPATIENT
Start: 2022-10-13 | End: 2022-10-13 | Stop reason: HOSPADM

## 2022-10-13 RX ORDER — SODIUM CHLORIDE, SODIUM LACTATE, POTASSIUM CHLORIDE, CALCIUM CHLORIDE 600; 310; 30; 20 MG/100ML; MG/100ML; MG/100ML; MG/100ML
INJECTION, SOLUTION INTRAVENOUS CONTINUOUS PRN
Status: DISCONTINUED | OUTPATIENT
Start: 2022-10-13 | End: 2022-10-13

## 2022-10-13 RX ORDER — HYDROMORPHONE HCL IN WATER/PF 6 MG/30 ML
0.2 PATIENT CONTROLLED ANALGESIA SYRINGE INTRAVENOUS EVERY 5 MIN PRN
Status: DISCONTINUED | OUTPATIENT
Start: 2022-10-13 | End: 2022-10-13 | Stop reason: HOSPADM

## 2022-10-13 RX ORDER — ACETAMINOPHEN 325 MG/1
975 TABLET ORAL ONCE
Status: COMPLETED | OUTPATIENT
Start: 2022-10-13 | End: 2022-10-13

## 2022-10-13 RX ORDER — FENTANYL CITRATE 50 UG/ML
INJECTION, SOLUTION INTRAMUSCULAR; INTRAVENOUS PRN
Status: DISCONTINUED | OUTPATIENT
Start: 2022-10-13 | End: 2022-10-13

## 2022-10-13 RX ORDER — NALOXONE HYDROCHLORIDE 0.4 MG/ML
0.4 INJECTION, SOLUTION INTRAMUSCULAR; INTRAVENOUS; SUBCUTANEOUS
Status: DISCONTINUED | OUTPATIENT
Start: 2022-10-13 | End: 2022-10-14 | Stop reason: HOSPADM

## 2022-10-13 RX ORDER — NALOXONE HYDROCHLORIDE 0.4 MG/ML
0.2 INJECTION, SOLUTION INTRAMUSCULAR; INTRAVENOUS; SUBCUTANEOUS
Status: DISCONTINUED | OUTPATIENT
Start: 2022-10-13 | End: 2022-10-14 | Stop reason: HOSPADM

## 2022-10-13 RX ORDER — INDOMETHACIN 50 MG/1
SUPPOSITORY RECTAL PRN
Status: DISCONTINUED | OUTPATIENT
Start: 2022-10-13 | End: 2022-10-13 | Stop reason: HOSPADM

## 2022-10-13 RX ORDER — IOPAMIDOL 510 MG/ML
INJECTION, SOLUTION INTRAVASCULAR PRN
Status: DISCONTINUED | OUTPATIENT
Start: 2022-10-13 | End: 2022-10-13 | Stop reason: HOSPADM

## 2022-10-13 RX ORDER — EPHEDRINE SULFATE 50 MG/ML
INJECTION, SOLUTION INTRAMUSCULAR; INTRAVENOUS; SUBCUTANEOUS PRN
Status: DISCONTINUED | OUTPATIENT
Start: 2022-10-13 | End: 2022-10-13

## 2022-10-13 RX ORDER — LIDOCAINE HYDROCHLORIDE 20 MG/ML
INJECTION, SOLUTION INFILTRATION; PERINEURAL PRN
Status: DISCONTINUED | OUTPATIENT
Start: 2022-10-13 | End: 2022-10-13

## 2022-10-13 RX ORDER — FLUMAZENIL 0.1 MG/ML
0.2 INJECTION, SOLUTION INTRAVENOUS
Status: DISCONTINUED | OUTPATIENT
Start: 2022-10-13 | End: 2022-10-14 | Stop reason: HOSPADM

## 2022-10-13 RX ORDER — ONDANSETRON 4 MG/1
4 TABLET, ORALLY DISINTEGRATING ORAL EVERY 6 HOURS PRN
Status: DISCONTINUED | OUTPATIENT
Start: 2022-10-13 | End: 2022-10-14 | Stop reason: HOSPADM

## 2022-10-13 RX ORDER — PROPOFOL 10 MG/ML
INJECTION, EMULSION INTRAVENOUS PRN
Status: DISCONTINUED | OUTPATIENT
Start: 2022-10-13 | End: 2022-10-13

## 2022-10-13 RX ORDER — HALOPERIDOL 5 MG/ML
1 INJECTION INTRAMUSCULAR
Status: DISCONTINUED | OUTPATIENT
Start: 2022-10-13 | End: 2022-10-14 | Stop reason: HOSPADM

## 2022-10-13 RX ORDER — OXYCODONE HYDROCHLORIDE 5 MG/1
5 TABLET ORAL EVERY 6 HOURS PRN
Qty: 12 TABLET | Refills: 0 | Status: ON HOLD | OUTPATIENT
Start: 2022-10-13 | End: 2022-11-19

## 2022-10-13 RX ORDER — SODIUM CHLORIDE, SODIUM LACTATE, POTASSIUM CHLORIDE, CALCIUM CHLORIDE 600; 310; 30; 20 MG/100ML; MG/100ML; MG/100ML; MG/100ML
INJECTION, SOLUTION INTRAVENOUS CONTINUOUS
Status: DISCONTINUED | OUTPATIENT
Start: 2022-10-13 | End: 2022-10-14 | Stop reason: HOSPADM

## 2022-10-13 RX ORDER — HYDRALAZINE HYDROCHLORIDE 20 MG/ML
2.5-5 INJECTION INTRAMUSCULAR; INTRAVENOUS EVERY 10 MIN PRN
Status: DISCONTINUED | OUTPATIENT
Start: 2022-10-13 | End: 2022-10-13 | Stop reason: HOSPADM

## 2022-10-13 RX ORDER — FENTANYL CITRATE 50 UG/ML
25 INJECTION, SOLUTION INTRAMUSCULAR; INTRAVENOUS EVERY 5 MIN PRN
Status: DISCONTINUED | OUTPATIENT
Start: 2022-10-13 | End: 2022-10-13 | Stop reason: HOSPADM

## 2022-10-13 RX ORDER — LIDOCAINE 40 MG/G
CREAM TOPICAL
Status: DISCONTINUED | OUTPATIENT
Start: 2022-10-13 | End: 2022-10-13 | Stop reason: HOSPADM

## 2022-10-13 RX ORDER — DEXAMETHASONE SODIUM PHOSPHATE 4 MG/ML
INJECTION, SOLUTION INTRA-ARTICULAR; INTRALESIONAL; INTRAMUSCULAR; INTRAVENOUS; SOFT TISSUE PRN
Status: DISCONTINUED | OUTPATIENT
Start: 2022-10-13 | End: 2022-10-13

## 2022-10-13 RX ORDER — LEVOFLOXACIN 5 MG/ML
INJECTION, SOLUTION INTRAVENOUS PRN
Status: DISCONTINUED | OUTPATIENT
Start: 2022-10-13 | End: 2022-10-13

## 2022-10-13 RX ORDER — ONDANSETRON 2 MG/ML
INJECTION INTRAMUSCULAR; INTRAVENOUS PRN
Status: DISCONTINUED | OUTPATIENT
Start: 2022-10-13 | End: 2022-10-13

## 2022-10-13 RX ADMIN — LEVOFLOXACIN 500 MG: 5 INJECTION, SOLUTION INTRAVENOUS at 17:52

## 2022-10-13 RX ADMIN — Medication 5 MG: at 17:55

## 2022-10-13 RX ADMIN — PROPOFOL 20 MG: 10 INJECTION, EMULSION INTRAVENOUS at 18:21

## 2022-10-13 RX ADMIN — ACETAMINOPHEN 975 MG: 325 TABLET, FILM COATED ORAL at 21:15

## 2022-10-13 RX ADMIN — Medication 10 MG: at 17:59

## 2022-10-13 RX ADMIN — Medication 5 MG: at 17:43

## 2022-10-13 RX ADMIN — FENTANYL CITRATE 100 MCG: 50 INJECTION, SOLUTION INTRAMUSCULAR; INTRAVENOUS at 17:35

## 2022-10-13 RX ADMIN — FENTANYL CITRATE 25 MCG: 50 INJECTION, SOLUTION INTRAMUSCULAR; INTRAVENOUS at 18:22

## 2022-10-13 RX ADMIN — SUGAMMADEX 200 MG: 100 INJECTION, SOLUTION INTRAVENOUS at 18:57

## 2022-10-13 RX ADMIN — PROPOFOL 120 MG: 10 INJECTION, EMULSION INTRAVENOUS at 17:35

## 2022-10-13 RX ADMIN — PROPOFOL 30 MG: 10 INJECTION, EMULSION INTRAVENOUS at 18:08

## 2022-10-13 RX ADMIN — LIDOCAINE HYDROCHLORIDE 60 MG: 20 INJECTION, SOLUTION INFILTRATION; PERINEURAL at 17:35

## 2022-10-13 RX ADMIN — SODIUM CHLORIDE, POTASSIUM CHLORIDE, SODIUM LACTATE AND CALCIUM CHLORIDE: 600; 310; 30; 20 INJECTION, SOLUTION INTRAVENOUS at 17:26

## 2022-10-13 RX ADMIN — SODIUM CHLORIDE, POTASSIUM CHLORIDE, SODIUM LACTATE AND CALCIUM CHLORIDE 1000 ML: 600; 310; 30; 20 INJECTION, SOLUTION INTRAVENOUS at 17:07

## 2022-10-13 RX ADMIN — Medication 50 MG: at 17:35

## 2022-10-13 RX ADMIN — ONDANSETRON 4 MG: 2 INJECTION INTRAMUSCULAR; INTRAVENOUS at 18:46

## 2022-10-13 RX ADMIN — DEXAMETHASONE SODIUM PHOSPHATE 6 MG: 4 INJECTION, SOLUTION INTRA-ARTICULAR; INTRALESIONAL; INTRAMUSCULAR; INTRAVENOUS; SOFT TISSUE at 17:35

## 2022-10-13 ASSESSMENT — ACTIVITIES OF DAILY LIVING (ADL)
ADLS_ACUITY_SCORE: 35
ADLS_ACUITY_SCORE: 33
ADLS_ACUITY_SCORE: 35

## 2022-10-13 ASSESSMENT — LIFESTYLE VARIABLES: TOBACCO_USE: 1

## 2022-10-13 NOTE — ANESTHESIA PROCEDURE NOTES
Airway       Patient location during procedure: OR       Procedure Start/Stop Times: 10/13/2022 5:38 PM  Staff -        Anesthesiologist:  Elisa Schaefer MD       Resident/Fellow: Mason Dale MD       Performed By: resident  Consent for Airway        Urgency: elective  Indications and Patient Condition       Indications for airway management: amy-procedural       Induction type:intravenous       Mask difficulty assessment: 1 - vent by mask    Final Airway Details       Final airway type: endotracheal airway       Successful airway: ETT - single  Endotracheal Airway Details        ETT size (mm): 7.0       Cuffed: yes       Successful intubation technique: direct laryngoscopy       DL Blade Type: MAC 4       Grade View of Cords: 1       Adjucts: stylet       Position: Right       Measured from: lips       Secured at (cm): 22       Bite block used: Molar    Post intubation assessment        Placement verified by: capnometry, equal breath sounds and chest rise        Number of attempts at approach: 1       Number of other approaches attempted: 0       Secured with: pink tape       Ease of procedure: easy       Dentition: Unchanged    Medication(s) Administered   Medication Administration Time: 10/13/2022 5:38 PM

## 2022-10-13 NOTE — ANESTHESIA PREPROCEDURE EVALUATION
Anesthesia Pre-Procedure Evaluation    Patient: Laura Gonzalez   MRN: [id number] : 1950        Procedure : Procedure(s):  ENDOSCOPIC RETROGRADE CHOLANGIOPANCREATOGRAPHY          Past Medical History:   Diagnosis Date     Asthma     pt.states no longer has symptoms     CAD (coronary artery disease)      HLD (hyperlipidemia)       Past Surgical History:   Procedure Laterality Date     APPENDECTOMY       ARTHROPLASTY HIP Left 6/15/2016    Procedure: ARTHROPLASTY HIP;  Surgeon: Jeffy Wolff MD;  Location: [facility]     COLONOSCOPY  10/3/2013    Procedure: COMBINED COLONOSCOPY, SINGLE BIOPSY/POLYPECTOMY BY BIOPSY;;  Surgeon: Kenney Walls MD;  Location: [facility]     ENDOSCOPIC RETROGRADE CHOLANGIOPANCREATOGRAM N/A 2022    Procedure: ENDOSCOPIC RETROGRADE CHOLANGIOPANCREATOGRAPHY WITH PANCREATIC DUCT DILATION, DEBRIS REMOVAL AND STENT PLACEMENT;  Surgeon: Arnaldo Noguera MD;  Location: [facility]     ENDOSCOPIC ULTRASOUND UPPER GASTROINTESTINAL TRACT (GI) N/A 2022    Procedure: ENDOSCOPIC ULTRASOUND WITH PANCREATICOGASTROSTOMY CREATION, TRACT DILATION, STENT PLACEMENT;  Surgeon: Romaine Oates MD;  Location: [facility]     ESOPHAGOSCOPY, GASTROSCOPY, DUODENOSCOPY (EGD), COMBINED N/A 2022    Procedure: ESOPHAGOGASTRODUODENOSCOPY WITH ENDOSCOPIC CLIP PLACEMENT;  Surgeon: Arnaldo Noguera MD;  Location: [facility]     FOOT SURGERY       HC TOOTH EXTRACTION W/FORCEP       HYSTERECTOMY       INCISION AND DRAINAGE BREAST Left 2022    Procedure: evacuate hematoma left  BREAST;  Surgeon: Dayron Garcia MD;  Location: [facility] OR     IR FOLLOW UP VISIT OUTPATIENT  2022     IR SINOGRAM INJECTION DIAGNOSTIC  2022      Allergies   Allergen Reactions     Tramadol Other (See Comments)     Has a hyperactive reaction and can't sleep       Social History     Tobacco Use     Smoking status: Former     Types: Cigarettes     Quit date: 10/3/1988     Years since quittin.0     Smokeless  tobacco: Never   Substance Use Topics     Alcohol use: No      Wt Readings from Last 1 Encounters:   10/13/22 69 kg (152 lb 1.9 oz)        Anesthesia Evaluation   Pt has had prior anesthetic. Type: General and Regional.    No history of anesthetic complications       ROS/MED HX  ENT/Pulmonary:     (+) tobacco use, Past use, Intermittent, asthma     Neurologic:  - neg neurologic ROS  (-) no CVA   Cardiovascular:     (+) --CAD --- (-) angina, past MI, angina and past MI   METS/Exercise Tolerance: >4 METS    Hematologic:       Musculoskeletal:  - neg musculoskeletal ROS     GI/Hepatic: Comment: Recurrent Pancreatitis in setting of stricture s/p Whipple 2019    (-) GERD   Renal/Genitourinary:  - neg Renal ROS  (-) renal disease   Endo:  - neg endo ROS  (-) Type II DM and thyroid disease   Psychiatric/Substance Use:  - neg psychiatric ROS     Infectious Disease:    (-) Recent Fever   Malignancy:  - neg malignancy ROS     Other:            Physical Exam    Airway        Mallampati: II   TM distance: > 3 FB   Neck ROM: full   Mouth opening: > 3 cm    Respiratory Devices and Support         Dental  no notable dental history         Cardiovascular          Rhythm and rate: regular and normal     Pulmonary           breath sounds clear to auscultation           OUTSIDE LABS:  CBC:   Lab Results   Component Value Date    WBC 7.3 10/13/2022    WBC 6.0 08/30/2022    HGB 12.2 10/13/2022    HGB 13.0 08/30/2022    HCT 38.9 10/13/2022    HCT 41.9 08/30/2022     10/13/2022     08/30/2022     BMP:   Lab Results   Component Value Date     06/17/2022     06/16/2022    POTASSIUM 4.2 06/17/2022    POTASSIUM 4.3 06/16/2022    CHLORIDE 105 06/17/2022    CHLORIDE 104 06/16/2022    CO2 26 06/17/2022    CO2 28 06/16/2022    BUN 11 06/17/2022    BUN 14 06/16/2022    CR 0.61 06/17/2022    CR 0.66 06/16/2022    GLC 95 10/13/2022     (H) 06/17/2022     COAGS:   Lab Results   Component Value Date    PTT 41 (H)  02/18/2022    INR 1.07 10/13/2022     POC:   Lab Results   Component Value Date     (H) 06/16/2016     HEPATIC:   Lab Results   Component Value Date    ALBUMIN 3.5 08/30/2022    PROTTOTAL 7.3 08/30/2022    ALT 35 08/30/2022    AST 31 08/30/2022    ALKPHOS 131 08/30/2022    BILITOTAL 0.6 08/30/2022     OTHER:   Lab Results   Component Value Date    PH 7.53 (H) 06/05/2022    LACT 0.7 06/05/2022    GLEN 8.8 06/17/2022    LIPASE 93 08/30/2022    AMYLASE 65 08/30/2022    TSH 0.96 06/05/2022    CRP 5.0 06/05/2022    SED 40 (H) 06/05/2022       Anesthesia Plan    ASA Status:  3   NPO Status:  NPO Appropriate    Anesthesia Type: General.     - Airway: ETT   Induction: Intravenous.   Maintenance: Balanced.   Techniques and Equipment:     - Lines/Monitors: BIS     Consents    Anesthesia Plan(s) and associated risks, benefits, and realistic alternatives discussed. Questions answered and patient/representative(s) expressed understanding.    - Discussed:     - Discussed with:  Patient, Spouse         Postoperative Care    Pain management: Multi-modal analgesia, IV analgesics, Oral pain medications.   PONV prophylaxis: Dexamethasone or Solumedrol, Ondansetron (or other 5HT-3)     Comments:                Stephanie Wilson MD

## 2022-10-14 ENCOUNTER — PATIENT OUTREACH (OUTPATIENT)
Dept: CARE COORDINATION | Facility: CLINIC | Age: 72
End: 2022-10-14

## 2022-10-14 LAB — ERCP: NORMAL

## 2022-10-14 NOTE — ANESTHESIA POSTPROCEDURE EVALUATION
Patient: Laura Gonzalez    Procedure: Procedure(s):  ENDOSCOPIC RETROGRADE CHOLANGIOPANCREATOGRAPHY, biliary stent placement       Anesthesia Type:  General    Note:  Disposition: Admission   Postop Pain Control: Uneventful            Sign Out: Well controlled pain   PONV: No   Neuro/Psych: Uneventful            Sign Out: Acceptable/Baseline neuro status   Airway/Respiratory: Uneventful            Sign Out: Acceptable/Baseline resp. status   CV/Hemodynamics: Uneventful            Sign Out: Acceptable CV status   Other NRE: NONE   DID A NON-ROUTINE EVENT OCCUR? No           Last vitals:  Vitals Value Taken Time   /66 10/13/22 1950   Temp 36.1  C (97  F) 10/13/22 1930   Pulse 57 10/13/22 1951   Resp 16 10/13/22 1951   SpO2 95 % 10/13/22 1952   Vitals shown include unvalidated device data.    Electronically Signed By: Christopher J. Behrens, MD  October 13, 2022  10:37 PM

## 2022-10-14 NOTE — BRIEF OP NOTE
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 One month ago had EUS guided pancreaticogastrostomy, w transgastric access into pancreatic duct, dilation of mildly stenotic pancreaticojejunal anastamosis, and palcement of through and through stent from stomach through pancreatic duct into jejunum. Admitted for post procedure pancreatitis, CT showed stent in place without any collection, then discharged only to be radmitted w peripancreatic retrograstic collection/abcess requiring percutaneous drainage, and also with migrated pancreatic stent. Eventually recovered, then w recurrent episode of pancreatitis. She had a successful retrograde ERCP using enteroscopic luminal approach on 6/16/2022 and had a 5 Fr by 9 cm pancreatic stent and a 4 Fr by 3 cm pancreatic stent which since self ejected. She reportedly did well while stents were in place, however, recurrent episodes of epigastric/abdominal pain in the last few months. She now presents for an ERCP for further evaluation.     Pre-operative diagnosis: Recurrent pancreatitis [K85.90]  Post-operative diagnosis Same as pre-operative diagnosis    Procedure: Procedure(s):  ENDOSCOPIC RETROGRADE CHOLANGIOPANCREATOGRAPHY, biliary stent placement  Surgeon: Surgeon(s) and Role:     * Arnaldo Noguera MD - Primary     * Rene Sun MD - Fellow - Assisting  Anesthesia: General   Estimated Blood Loss: None    Drains: None  Specimens: * No specimens in log *  Findings:   - Post pylorus sparing Whipple Widely patent biliary anastamosis  - Partially stenosed pancreaticojejunostomy, accessed with a retrograde cap fitted pediatric coloonoscope  - Decision was made to place pancreatic stents without dilation  - One 4 Fr by 4 cm temporary Zimmon pancreatic plastic stent   - One 5 Fr by 3 cm  temporary Colmenares pancreatic plastic stent     Complications: None.  Implants:   Implant Name Type Inv. Item Serial No.  Lot No. LRB No. Used Action   STENT ZIMMON PANCREA [id number] SGL PIGTAIL [id number] - [id number] Stent STENT ZIMMON PANCREA [id number] SGL PIGTAIL [id number]  [facility] [id number]- N/A 1 Implanted   STENT BRADY PANCREA FLEX [id number] SGL PIGTAIL - [id number] Stent STENT BRADY PANCREA FLEX [id number] SGL PIGTAIL  [employer] [id number]- N/A 1 Implanted   STENT BRADY PANCREA FLEX [id number] SGL PIGTAIL - [id number] Stent STENT BRADY PANCREA FLEX [id number] SGL PIGTAIL  COLMENARES  N/A 1 Implanted and Explanted       Recommendations  - Plan was to admit patient to the hospital, however, patient prefers to go home instead  - She denies any abdominal pain at this point   - Will observe patient in the PACU for two hours   - Will send her home with Oxycodone   - The findings and recommendations were discussed with the patient's family

## 2022-10-14 NOTE — PROGRESS NOTES
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 One month ago had EUS guided pancreaticogastrostomy, w transgastric access into pancreatic duct, dilation of mildly stenotic pancreaticojejunal anastamosis, and palcement of through and through stent from stomach through pancreatic duct into jejunum.     ASSESSMENT           Discharge Assessment  How are you doing now that you are home?: doing well, reviewed with Pt AVS  How are your symptoms? (Red Flag symptoms escalate to triage hotline per guidelines): Improved  Do you feel your condition is stable enough to be safe at home until your provider visit?: Yes  Does the patient have their discharge instructions? : Yes  Does the patient have questions regarding their discharge instructions? : No  Were you started on any new medications or were there changes to any of your previous medications? : Yes  Does the patient have all of their medications?: Yes  Do you have questions regarding any of your medications? : No  Do you have all of your needed medical supplies or equipment (DME)?  (i.e. oxygen tank, CPAP, cane, etc.): Yes  Discharge follow-up appointment scheduled within 14 calendar days? : Yes  Discharge Follow Up Appointment Date: 11/09/22  Discharge Follow Up Appointment Scheduled with?: Specialty Care Provider    Post-op ([facility] CTA Only)  If the patient had a surgery or procedure, do they have  any questions for a nurse?: No         PLAN                                                      Outpatient Plan:     NOV 9  Return Patient with Arnaldo Noguera MD  Wednesday Nov 9, 2022 1:15 PM  Please Note: This is a virtual visit; there is  no need to come to the facility.  Future Appointments   Date Time Provider Department Center   11/9/2022  1:30 PM Arnaldo Noguera MD [facility]         For any urgent concerns, please contact our 24 hour nurse triage line: [phone] ([phone])       REYNALDO Morgan  [phone]  [facility]

## 2022-10-14 NOTE — ANESTHESIA CARE TRANSFER NOTE
Patient: Laura Gonzalez    Procedure: Procedure(s):  ENDOSCOPIC RETROGRADE CHOLANGIOPANCREATOGRAPHY, biliary stent placement       Diagnosis: Recurrent pancreatitis [K85.90]  Diagnosis Additional Information: No value filed.    Anesthesia Type:   General     Note:    Oropharynx: oropharynx clear of all foreign objects and spontaneously breathing  Level of Consciousness: awake  Oxygen Supplementation: face mask  Level of Supplemental Oxygen (L/min / FiO2): 6  Independent Airway: airway patency satisfactory and stable  Dentition: dentition unchanged  Vital Signs Stable: post-procedure vital signs reviewed and stable  Report to RN Given: handoff report given  Patient transferred to: PACU    Handoff Report: Identifed the Patient, Identified the Reponsible Provider, Reviewed the pertinent medical history, Discussed the surgical course, Reviewed Intra-OP anesthesia mangement and issues during anesthesia, Set expectations for post-procedure period and Allowed opportunity for questions and acknowledgement of understanding      Vitals:  Vitals Value Taken Time   /71 10/13/22 1910   Temp     Pulse 58 10/13/22 1911   Resp 10 10/13/22 1911   SpO2 100 % 10/13/22 1911   Vitals shown include unvalidated device data.    Electronically Signed By: Mason Dale MD  October 13, 2022  7:12 PM

## 2022-10-14 NOTE — DISCHARGE INSTRUCTIONS
[facility], [place]  Same-Day Surgery ERCP Procedure   Adult Discharge Orders & Instructions     You had a procedure known as an Endoscopic Retrograde Cholangiopancreatography (ERCP). Your healthcare provider performed the ERCP to look at your bile or pancreatic ducts, and to locate and/or treat blockages (dilation, stenting, removal, etc.) in these ducts. Often biopsies, small samples of tissue, are taken to help diagnose and/or classify stages of disease growth. This procedure is used to diagnose diseases of the pancreas, bile ducts, pancreatic duct, liver, and gallbladder.     After your procedure   Make sure to clarify with your healthcare provider any diet restrictions (For example, clear liquid, low fat, no caffeine, etc.)   Do NOT take aspirin containing medications or any other blood-thinning medicines (anticoagulants) until your healthcare provider says it's OK.     For 24 hours after surgery  Get plenty of rest.  A responsible adult must stay with you for at least 24 hours after you leave the hospital.   Do not drive or use heavy equipment.  If you have weakness or tingling, don't drive or use heavy equipment until this feeling goes away.  Do not drink alcohol.  Avoid strenuous or risky activities (gym, yoga, cycling, etc.).  Ask for help when climbing stairs.   You may feel lightheaded.  IF so, sit for a few minutes before standing.  Have someone help you get up.   If you have nausea (feel sick to your stomach): Drink only clear liquids such as apple juice, ginger ale, broth or 7-Up.  Rest may also help.  Be sure to drink enough fluids.  Move to a regular diet as you feel able.  If you feel bloated or have too much gas, use a heating pad on your belly to help reduce the discomfort. This should help you feel better.   You may have a slight fever. This is normal for the first 24 hours.   You may have a dry mouth, a sore throat, muscle aches or trouble sleeping.  These are normal  and will go away after 24 hours. A sore throat is most common. Use lozenges or gargle with salt water to ease the discomfort.   Do not make important or legal decisions.      Call your doctor for any of the following:  Chest pain, and/or shortness of breath  Abdominal  pain, bloating or cramping that has not improved or does not respond to pain reliving medications (Tylenol or narcotics if prescribed)   Difficulty swallowing or feeling as though food or liquids are stuck in your throat   Sore throat lasting more than 2 days or pain that has worsened over time   Black or tarry stools   Nausea and/or vomiting that is not resolving or has not responded to anti-nausea medications prescribed to you   It has been over 8 to 10 hours since surgery and you are still not able to urinate (pass water)   Headache for over 24 hours   Fever over 100.5 F (38 C) lasting more than 24 hours after the procedure   Signs of jaundice or blockage (fever, chills, abdominal pain, yellowing of the whites of your eyes, yellowing of your skin, and/or passing darker than normal urine)     To contact a doctor, call:   [ ] Dr Noguera's clinic at [phone] (Monday thru Friday 8:00am to 4:30pm)   [ ] [phone] and ask for the Gastroenterology resident on call (answered 24 hours a day)   [ ] Emergency Department: [facility]: [phone]     Take it easy when you get home.  Remember, same day surgery DOES NOT MEAN SAME DAY RECOVERY!  Healing is a gradual process.  You will need some time to recover - you may be more tired than you realize at first.  Rest and relax for at least the first 24 hours at home.  You'll feel better and heal faster if you take good care of yourself.

## 2022-10-17 ENCOUNTER — CARE COORDINATION (OUTPATIENT)
Dept: GASTROENTEROLOGY | Facility: CLINIC | Age: 72
End: 2022-10-17

## 2022-10-17 DIAGNOSIS — Z46.59 ENCOUNTER FOR PANCREATIC DUCT STENT EXCHANGE: Primary | ICD-10-CM

## 2022-10-17 NOTE — PROGRESS NOTES
Post ERCP with 10-13-22  Xray and clinic fu in one month - see how she is doing and whether stents still in place     - Long term plan is to find the right combination of multiple small caliber stents that will stay in place   for up to two months, but not damage remaining duct     Per patient, feeling ok- KUB ordered will get done on 11-1, clinic scheduled on 11-2, pt leaves for vacation on 11/4, wants to check in prior to leaving town    Pt declines need for labs today, amylase/lipase added in case symptoms occur prior to next scheduled check in.    ML

## 2022-10-22 ENCOUNTER — HEALTH MAINTENANCE LETTER (OUTPATIENT)
Age: 72
End: 2022-10-22

## 2022-10-31 ENCOUNTER — ANCILLARY PROCEDURE (OUTPATIENT)
Dept: GENERAL RADIOLOGY | Facility: CLINIC | Age: 72
End: 2022-10-31
Attending: INTERNAL MEDICINE
Payer: COMMERCIAL

## 2022-10-31 DIAGNOSIS — Z46.59 ENCOUNTER FOR PANCREATIC DUCT STENT EXCHANGE: ICD-10-CM

## 2022-10-31 PROCEDURE — 74019 RADEX ABDOMEN 2 VIEWS: CPT

## 2022-11-02 ENCOUNTER — VIRTUAL VISIT (OUTPATIENT)
Dept: GASTROENTEROLOGY | Facility: CLINIC | Age: 72
End: 2022-11-02
Payer: COMMERCIAL

## 2022-11-02 DIAGNOSIS — Z90.410 HISTORY OF WHIPPLE PROCEDURE: ICD-10-CM

## 2022-11-02 DIAGNOSIS — Z90.49 HISTORY OF WHIPPLE PROCEDURE: ICD-10-CM

## 2022-11-02 PROCEDURE — 99214 OFFICE O/P EST MOD 30 MIN: CPT | Mod: 95 | Performed by: INTERNAL MEDICINE

## 2022-11-02 NOTE — LETTER
11/2/2022         RE: Laura Gonzalez  [address]  [place] [address]        Dear Colleague,    Thank you for referring your patient, Laura Gonzalez, to the [facility] [place]. Please see a copy of my visit note below.    Laura is a 72 year old who is being evaluated via a billable video visit.       How would you like to obtain your AVS? MyChart  If the video visit is dropped, the invitation should be resent by: Text to cell phone: [phone]  Will anyone else be joining your video visit?     Sintia Becerril      Video-Visit Details         Type of service:  Video Visit       Originating Location (pt. Location): Home    Distant Location (provider location):  On-site    Platform used for Video Visit: Chu Shu    SUBJECTIVE:   Laura Gonzalez is a 72 year old year old female here for:  Follow up for history of recurrent pancreatitis s/p Whipple procedure.    Ms Gonzalez says that she has been feeling good, best after a long time at this point. Her energy has been up as well. She denies current symptoms of pancreatitis, gastroparesis, or other GI issues.     Review of systems:  10 point ROS negative except as noted above.    OBJECTIVE:  Physical exam deferred due to virtual visit.  10/31/22 Abdominal XRay  IMPRESSION: Supine upright views of abdomen and pelvis were obtained. Nonobstructive bowel gas pattern. No free peritoneal or portal venous gas. Surgical clips in the upper and mid abdomen. No pancreatic duct stent visualized. Postsurgical changes of   left hip arthroplasty.    10/13/22 ERCP  Impression:                   - Post pylorus sparing Whipple with widely patent                          biliary anastamosis                          - Partially stenosed pancreaticojejunostomy, accessed                          with a retrograde cap fitted pediatric coloonoscope                          - Dual stents placed without balloon dilation, meant                           to stay in place for more extended period                          - One 4 Fr by 4 cm single pgtail polyethylene Zimmon                          pancreatic plastic stent with firm internal flange                          - Second 5 Fr by 3 cm Chuckie ( [facility]) single                          pigtail, inner flanged pancreatic plastic stent for                          drainage, dilation    A/P: Ms. Gonzalez is a 71 year old woman with history of recurrent pancreatitis s/p Whipple who presents to discuss management of chronic pancreatitis.   She has made major improvement and continues to be stable. Radiographic studies show passing of stent and complete anastomosis.  May need a subsequent dilation if symptoms come back, but not forseeable at the moment.   - No indication for ERCP at the moment    -Reassess if she becomes symptomatic again  -Consider use of Ursodiol before intervention if symptoms persist    Seen and examined with GI fellow, agree with findings and recommendations.    Arnaldo Noguera MD GI Staff  Video visit 25 minutes  Doing gratifyingly well after serial interventions here on stenotic pancreaticojejunostomy post Whipple at [place] - first EUS guided w complications, resolved, and two subequent retrograde ERCP w dilation, multiple stents which have passed.   Feeling best in a long time  Stents have passed.   Suggest expand activities, and may need prn repeat procedure over tome    RT in 2-3 months     Patient seen and discussed with Dr. Noguera who agrees with this plan.    German Velez Reyes, MD, PhD  Internal Medicine, PGY1      Again, thank you for allowing me to participate in the care of your patient.        Sincerely,        Arnaldo Noguera MD

## 2022-11-02 NOTE — PROGRESS NOTES
SUBJECTIVE:   Laura Gonzalez is a 72 year old year old female here for:  Follow up for history of recurrent pancreatitis s/p Whipple procedure.    Ms Gonzalez says that she has been feeling good, best after a long time at this point. Her energy has been up as well. She denies current symptoms of pancreatitis, gastroparesis, or other GI issues.     Review of systems:  10 point ROS negative except as noted above.    OBJECTIVE:  Physical exam deferred due to virtual visit.  10/31/22 Abdominal XRay  IMPRESSION: Supine upright views of abdomen and pelvis were obtained. Nonobstructive bowel gas pattern. No free peritoneal or portal venous gas. Surgical clips in the upper and mid abdomen. No pancreatic duct stent visualized. Postsurgical changes of   left hip arthroplasty.    10/13/22 ERCP  Impression:                   - Post pylorus sparing Whipple with widely patent                          biliary anastamosis                          - Partially stenosed pancreaticojejunostomy, accessed                          with a retrograde cap fitted pediatric coloonoscope                          - Dual stents placed without balloon dilation, meant                          to stay in place for more extended period                          - One 4 Fr by 4 cm single pgtail polyethylene Zimmon                          pancreatic plastic stent with firm internal flange                          - Second 5 Fr by 3 cm Noguera ( [employer]) single                          pigtail, inner flanged pancreatic plastic stent for                          drainage, dilation    A/P: Ms. Gonzalez is a 71 year old woman with history of recurrent pancreatitis s/p Whipple who presents to discuss management of chronic pancreatitis.   She has made major improvement and continues to be stable. Radiographic studies show passing of stent and complete anastomosis.  May need a subsequent dilation if symptoms come back, but not forseeable at the moment.   - No  indication for ERCP at the moment    -Reassess if she becomes symptomatic again  -Consider use of Ursodiol before intervention if symptoms persist    Seen and examined with GI fellow, agree with findings and recommendations.    Arnaldo Noguera MD GI Staff  Video visit 25 minutes  Doing gratifyingly well after serial interventions here on stenotic pancreaticojejunostomy post Whipple at [place] - first EUS guided w complications, resolved, and two subequent retrograde ERCP w dilation, multiple stents which have passed.   Feeling best in a long time  Stents have passed.   Suggest expand activities, and may need prn repeat procedure over tome    RTC in 2-3 months     Patient seen and discussed with Dr. Noguera who agrees with this plan.    German Velez Reyes, MD, PhD  Internal Medicine, PGY1

## 2022-11-02 NOTE — PROGRESS NOTES
Laura is a 72 year old who is being evaluated via a billable video visit.       How would you like to obtain your AVS? MyChart  If the video visit is dropped, the invitation should be resent by: Text to cell phone: [phone]  Will anyone else be joining your video visit?     Sintia GUILLAUME Mannyhamiltonrenee      Video-Visit Details         Type of service:  Video Visit       Originating Location (pt. Location): Home    Distant Location (provider location):  On-site    Platform used for Video Visit: Tarah

## 2022-11-02 NOTE — LETTER
11/2/2022         RE: Laura Gonzalez  [address] [facility] [place] [address]        SUBJECTIVE:   Laura Gonzalez is a 72 year old year old female here for:  Follow up for history of recurrent pancreatitis s/p Whipple procedure.    Ms Gonzalez says that she has been feeling good, best after a long time at this point. Her energy has been up as well. She denies current symptoms of pancreatitis, gastroparesis, or other GI issues.     Review of systems:  10 point ROS negative except as noted above.    OBJECTIVE:  Physical exam deferred due to virtual visit.  10/31/22 Abdominal XRay  IMPRESSION: Supine upright views of abdomen and pelvis were obtained. Nonobstructive bowel gas pattern. No free peritoneal or portal venous gas. Surgical clips in the upper and mid abdomen. No pancreatic duct stent visualized. Postsurgical changes of   left hip arthroplasty.    10/13/22 ERCP  Impression:                   - Post pylorus sparing Whipple with widely patent                          biliary anastamosis                          - Partially stenosed pancreaticojejunostomy, accessed                          with a retrograde cap fitted pediatric coloonoscope                          - Dual stents placed without balloon dilation, meant                          to stay in place for more extended period                          - One 4 Fr by 4 cm single pgtail polyethylene Zimmon                          pancreatic plastic stent with firm internal flange                          - Second 5 Fr by 3 cm Noguera ( [facility]) single                          pigtail, inner flanged pancreatic plastic stent for                          drainage, dilation    A/P: Ms. Gonzalez is a 71 year old woman with history of recurrent pancreatitis s/p Whipple who presents to discuss management of chronic pancreatitis.   She has made major improvement and continues to be stable. Radiographic studies show passing of stent and complete anastomosis.   May need a subsequent dilation if symptoms come back, but not forseeable at the moment.   - No indication for ERCP at the moment    -Reassess if she becomes symptomatic again  -Consider use of Ursodiol before intervention if symptoms persist    Seen and examined with GI fellow, agree with findings and recommendations.    Arnaldo Noguera MD GI Staff  Video visit 25 minutes  Doing gratifyingly well after serial interventions here on stenotic pancreaticojejunostomy post Whipple at [facility] - first EUS guided w complications, resolved, and two subequent retrograde ERCP w dilation, multiple stents which have passed.   Feeling best in a long time  Stents have passed.   Suggest expand activities, and may need prn repeat procedure over tome    RTC in 2-3 months     Patient seen and discussed with Dr. Noguera who agrees with this plan.    German Velez Reyes, MD, PhD  Internal Medicine, PGY1        Arnaldo Noguera MD

## 2022-11-04 ENCOUNTER — TELEPHONE (OUTPATIENT)
Dept: GASTROENTEROLOGY | Facility: CLINIC | Age: 72
End: 2022-11-04

## 2022-11-16 ENCOUNTER — PATIENT OUTREACH (OUTPATIENT)
Dept: GASTROENTEROLOGY | Facility: CLINIC | Age: 72
End: 2022-11-16

## 2022-11-16 NOTE — TELEPHONE ENCOUNTER
Per patient, having severe  abdominal pain, vomiting, cold sweats. Similar to prior episodes of pancreas issues.    Asked pt to present to  ER for triage and assessment, message sent to Dr. Noguera    ML

## 2022-11-17 ENCOUNTER — LAB (OUTPATIENT)
Dept: LAB | Facility: CLINIC | Age: 72
End: 2022-11-17
Payer: COMMERCIAL

## 2022-11-17 ENCOUNTER — HOSPITAL ENCOUNTER (INPATIENT)
Facility: CLINIC | Age: 72
LOS: 2 days | Discharge: HOME | DRG: 440 | End: 2022-11-19
Attending: EMERGENCY MEDICINE | Admitting: STUDENT IN AN ORGANIZED HEALTH CARE EDUCATION/TRAINING PROGRAM
Payer: COMMERCIAL

## 2022-11-17 DIAGNOSIS — B99.9 INTRA-ABDOMINAL INFECTION: ICD-10-CM

## 2022-11-17 DIAGNOSIS — K85.90 ACUTE PANCREATITIS, UNSPECIFIED COMPLICATION STATUS, UNSPECIFIED PANCREATITIS TYPE: ICD-10-CM

## 2022-11-17 DIAGNOSIS — K86.1 ACUTE ON CHRONIC PANCREATITIS (H): Primary | ICD-10-CM

## 2022-11-17 DIAGNOSIS — K85.90 ACUTE ON CHRONIC PANCREATITIS (H): Primary | ICD-10-CM

## 2022-11-17 LAB
ALBUMIN SERPL-MCNC: 3 G/DL (ref 3.4–5)
ALP SERPL-CCNC: 152 U/L (ref 40–150)
ALT SERPL W P-5'-P-CCNC: 33 U/L (ref 0–50)
AMYLASE SERPL-CCNC: 264 U/L (ref 30–110)
ANION GAP SERPL CALCULATED.3IONS-SCNC: 2 MMOL/L (ref 3–14)
AST SERPL W P-5'-P-CCNC: 33 U/L (ref 0–45)
BILIRUB SERPL-MCNC: 0.8 MG/DL (ref 0.2–1.3)
BUN SERPL-MCNC: 13 MG/DL (ref 7–30)
CALCIUM SERPL-MCNC: 8.7 MG/DL (ref 8.5–10.1)
CHLORIDE BLD-SCNC: 106 MMOL/L (ref 94–109)
CO2 SERPL-SCNC: 28 MMOL/L (ref 20–32)
CREAT SERPL-MCNC: 0.75 MG/DL (ref 0.52–1.04)
ERYTHROCYTE [DISTWIDTH] IN BLOOD BY AUTOMATED COUNT: 14.3 % (ref 10–15)
GFR SERPL CREATININE-BSD FRML MDRD: 84 ML/MIN/1.73M2
GLUCOSE BLD-MCNC: 145 MG/DL (ref 70–99)
HCT VFR BLD AUTO: 37.2 % (ref 35–47)
HGB BLD-MCNC: 11.8 G/DL (ref 11.7–15.7)
HOLD SPECIMEN: NORMAL
LIPASE SERPL-CCNC: 860 U/L (ref 73–393)
MCH RBC QN AUTO: 29.4 PG (ref 26.5–33)
MCHC RBC AUTO-ENTMCNC: 31.7 G/DL (ref 31.5–36.5)
MCV RBC AUTO: 93 FL (ref 78–100)
PLATELET # BLD AUTO: 328 10E3/UL (ref 150–450)
POTASSIUM BLD-SCNC: 3.6 MMOL/L (ref 3.4–5.3)
PROT SERPL-MCNC: 7 G/DL (ref 6.8–8.8)
RBC # BLD AUTO: 4.01 10E6/UL (ref 3.8–5.2)
SODIUM SERPL-SCNC: 136 MMOL/L (ref 133–144)
WBC # BLD AUTO: 8.6 10E3/UL (ref 4–11)

## 2022-11-17 PROCEDURE — 36415 COLL VENOUS BLD VENIPUNCTURE: CPT

## 2022-11-17 PROCEDURE — 82310 ASSAY OF CALCIUM: CPT

## 2022-11-17 PROCEDURE — 120N000002 HC R&B MED SURG/OB UMMC

## 2022-11-17 PROCEDURE — 82040 ASSAY OF SERUM ALBUMIN: CPT

## 2022-11-17 PROCEDURE — 250N000013 HC RX MED GY IP 250 OP 250 PS 637: Performed by: EMERGENCY MEDICINE

## 2022-11-17 PROCEDURE — 80053 COMPREHEN METABOLIC PANEL: CPT

## 2022-11-17 PROCEDURE — 84478 ASSAY OF TRIGLYCERIDES: CPT

## 2022-11-17 PROCEDURE — 85027 COMPLETE CBC AUTOMATED: CPT

## 2022-11-17 PROCEDURE — 36415 COLL VENOUS BLD VENIPUNCTURE: CPT | Performed by: EMERGENCY MEDICINE

## 2022-11-17 PROCEDURE — 82150 ASSAY OF AMYLASE: CPT

## 2022-11-17 PROCEDURE — 99285 EMERGENCY DEPT VISIT HI MDM: CPT | Performed by: EMERGENCY MEDICINE

## 2022-11-17 PROCEDURE — 83690 ASSAY OF LIPASE: CPT

## 2022-11-17 PROCEDURE — 86140 C-REACTIVE PROTEIN: CPT

## 2022-11-17 RX ORDER — OXYCODONE HYDROCHLORIDE 5 MG/1
5 TABLET ORAL ONCE
Status: COMPLETED | OUTPATIENT
Start: 2022-11-17 | End: 2022-11-17

## 2022-11-17 RX ORDER — IOPAMIDOL 755 MG/ML
93 INJECTION, SOLUTION INTRAVASCULAR ONCE
Status: COMPLETED | OUTPATIENT
Start: 2022-11-18 | End: 2022-11-18

## 2022-11-17 RX ORDER — SODIUM CHLORIDE, SODIUM LACTATE, POTASSIUM CHLORIDE, CALCIUM CHLORIDE 600; 310; 30; 20 MG/100ML; MG/100ML; MG/100ML; MG/100ML
INJECTION, SOLUTION INTRAVENOUS CONTINUOUS
Status: DISCONTINUED | OUTPATIENT
Start: 2022-11-18 | End: 2022-11-18

## 2022-11-17 RX ADMIN — OXYCODONE HYDROCHLORIDE 5 MG: 5 TABLET ORAL at 22:08

## 2022-11-17 RX ADMIN — OXYCODONE HYDROCHLORIDE 5 MG: 5 TABLET ORAL at 23:06

## 2022-11-17 ASSESSMENT — ENCOUNTER SYMPTOMS
WEAKNESS: 0
SORE THROAT: 0
SHORTNESS OF BREATH: 0
DIFFICULTY URINATING: 0
NAUSEA: 0
ABDOMINAL DISTENTION: 0
EYE PAIN: 0
NECK PAIN: 0
CONFUSION: 0
DIARRHEA: 0
CONSTIPATION: 0
CHEST TIGHTNESS: 0
VOMITING: 0
MYALGIAS: 0
DIZZINESS: 0
BACK PAIN: 0
FATIGUE: 0
COUGH: 0
CHILLS: 0
ARTHRALGIAS: 0
FEVER: 0
PALPITATIONS: 0
COLOR CHANGE: 0
FREQUENCY: 0
ABDOMINAL PAIN: 1
HEADACHES: 0
DYSURIA: 0

## 2022-11-17 ASSESSMENT — ACTIVITIES OF DAILY LIVING (ADL): ADLS_ACUITY_SCORE: 35

## 2022-11-17 NOTE — TELEPHONE ENCOUNTER
"Pt did not present to ER as discussed, called to check in on symptoms.   \"I don't know what this is, getting better, feels like something muscular, cannot sit up, have to roll to my side. Having diarrhea. Was vomiting yesterday, just saliva/clear fluid. Not sure if its the flu. Decided not to go in to ER.\" \"Feels like I ran a marathon\" No fever.     Pt has had right flank pain before, thinks it as something do to with her liver. Has hx of elevated LFTs per patient. Keeping small amounts of food/fluid down since yesterday, no further vomiting. Pt will call in in a few hours with update on how she's feeling, wondering if Dr. Noguera has recommendation on imaging or labs.     Message sent to Dr. Noguera      ML  "

## 2022-11-17 NOTE — TELEPHONE ENCOUNTER
Per Dr. Noguera  Should get CMP, CBC, lipase amylase today then call back   She had cholangitis and biliary obstruction in past, treated at [facility].   Could be that again, or something unrelated to pancreas      Labs ordered, message sent via [employer]    ML

## 2022-11-18 ENCOUNTER — APPOINTMENT (OUTPATIENT)
Dept: CT IMAGING | Facility: CLINIC | Age: 72
DRG: 440 | End: 2022-11-18
Attending: EMERGENCY MEDICINE
Payer: COMMERCIAL

## 2022-11-18 LAB
ALBUMIN UR-MCNC: 10 MG/DL
APPEARANCE UR: CLEAR
BILIRUB UR QL STRIP: NEGATIVE
CALCIUM SERPL-MCNC: 9 MG/DL (ref 8.8–10.2)
COLOR UR AUTO: ABNORMAL
CRP SERPL-MCNC: 55.4 MG/L
GLUCOSE UR STRIP-MCNC: NEGATIVE MG/DL
HGB UR QL STRIP: NEGATIVE
KETONES UR STRIP-MCNC: NEGATIVE MG/DL
LEUKOCYTE ESTERASE UR QL STRIP: NEGATIVE
MUCOUS THREADS #/AREA URNS LPF: PRESENT /LPF
NITRATE UR QL: NEGATIVE
PH UR STRIP: 6 [PH] (ref 5–7)
RBC URINE: 1 /HPF
SARS-COV-2 RNA RESP QL NAA+PROBE: NEGATIVE
SP GR UR STRIP: 1.02 (ref 1–1.03)
SQUAMOUS EPITHELIAL: 16 /HPF
TRIGL SERPL-MCNC: 73 MG/DL
UROBILINOGEN UR STRIP-MCNC: NORMAL MG/DL
WBC URINE: 1 /HPF

## 2022-11-18 PROCEDURE — 74177 CT ABD & PELVIS W/CONTRAST: CPT

## 2022-11-18 PROCEDURE — 250N000009 HC RX 250: Performed by: EMERGENCY MEDICINE

## 2022-11-18 PROCEDURE — 258N000003 HC RX IP 258 OP 636

## 2022-11-18 PROCEDURE — 74177 CT ABD & PELVIS W/CONTRAST: CPT | Mod: 26 | Performed by: RADIOLOGY

## 2022-11-18 PROCEDURE — 99223 1ST HOSP IP/OBS HIGH 75: CPT | Mod: AI | Performed by: STUDENT IN AN ORGANIZED HEALTH CARE EDUCATION/TRAINING PROGRAM

## 2022-11-18 PROCEDURE — 250N000013 HC RX MED GY IP 250 OP 250 PS 637

## 2022-11-18 PROCEDURE — 81001 URINALYSIS AUTO W/SCOPE: CPT

## 2022-11-18 PROCEDURE — 250N000011 HC RX IP 250 OP 636

## 2022-11-18 PROCEDURE — 120N000002 HC R&B MED SURG/OB UMMC

## 2022-11-18 PROCEDURE — U0005 INFEC AGEN DETEC AMPLI PROBE: HCPCS | Performed by: EMERGENCY MEDICINE

## 2022-11-18 PROCEDURE — 250N000011 HC RX IP 250 OP 636: Performed by: EMERGENCY MEDICINE

## 2022-11-18 RX ORDER — VITAMIN B COMPLEX
25 TABLET ORAL DAILY
Status: DISCONTINUED | OUTPATIENT
Start: 2022-11-18 | End: 2022-11-19 | Stop reason: HOSPADM

## 2022-11-18 RX ORDER — MONTELUKAST SODIUM 10 MG/1
10 TABLET ORAL AT BEDTIME
Status: DISCONTINUED | OUTPATIENT
Start: 2022-11-18 | End: 2022-11-19 | Stop reason: HOSPADM

## 2022-11-18 RX ORDER — EZETIMIBE 10 MG/1
10 TABLET ORAL AT BEDTIME
Status: DISCONTINUED | OUTPATIENT
Start: 2022-11-18 | End: 2022-11-19 | Stop reason: HOSPADM

## 2022-11-18 RX ORDER — ATORVASTATIN CALCIUM 40 MG/1
40 TABLET, FILM COATED ORAL AT BEDTIME
Status: DISCONTINUED | OUTPATIENT
Start: 2022-11-18 | End: 2022-11-19 | Stop reason: HOSPADM

## 2022-11-18 RX ORDER — SODIUM CHLORIDE, SODIUM LACTATE, POTASSIUM CHLORIDE, CALCIUM CHLORIDE 600; 310; 30; 20 MG/100ML; MG/100ML; MG/100ML; MG/100ML
INJECTION, SOLUTION INTRAVENOUS CONTINUOUS
Status: DISCONTINUED | OUTPATIENT
Start: 2022-11-18 | End: 2022-11-19

## 2022-11-18 RX ORDER — LIDOCAINE 40 MG/G
CREAM TOPICAL
Status: DISCONTINUED | OUTPATIENT
Start: 2022-11-18 | End: 2022-11-19 | Stop reason: HOSPADM

## 2022-11-18 RX ORDER — SODIUM CHLORIDE, SODIUM LACTATE, POTASSIUM CHLORIDE, CALCIUM CHLORIDE 600; 310; 30; 20 MG/100ML; MG/100ML; MG/100ML; MG/100ML
INJECTION, SOLUTION INTRAVENOUS CONTINUOUS
Status: DISCONTINUED | OUTPATIENT
Start: 2022-11-18 | End: 2022-11-18

## 2022-11-18 RX ORDER — ZOLPIDEM TARTRATE 5 MG/1
5 TABLET ORAL
Status: DISCONTINUED | OUTPATIENT
Start: 2022-11-18 | End: 2022-11-19 | Stop reason: HOSPADM

## 2022-11-18 RX ORDER — OXYCODONE HYDROCHLORIDE 5 MG/1
5 TABLET ORAL EVERY 6 HOURS PRN
Status: DISCONTINUED | OUTPATIENT
Start: 2022-11-18 | End: 2022-11-19 | Stop reason: HOSPADM

## 2022-11-18 RX ORDER — HYDROMORPHONE HYDROCHLORIDE 1 MG/ML
0.5 INJECTION, SOLUTION INTRAMUSCULAR; INTRAVENOUS; SUBCUTANEOUS
Status: DISCONTINUED | OUTPATIENT
Start: 2022-11-18 | End: 2022-11-19 | Stop reason: HOSPADM

## 2022-11-18 RX ORDER — CHOLECALCIFEROL (VITAMIN D3) 125 MCG
10000 CAPSULE ORAL DAILY
Status: DISCONTINUED | OUTPATIENT
Start: 2022-11-18 | End: 2022-11-19 | Stop reason: HOSPADM

## 2022-11-18 RX ADMIN — EZETIMIBE 10 MG: 10 TABLET ORAL at 03:19

## 2022-11-18 RX ADMIN — HYDROMORPHONE HYDROCHLORIDE 0.5 MG: 1 INJECTION, SOLUTION INTRAMUSCULAR; INTRAVENOUS; SUBCUTANEOUS at 02:51

## 2022-11-18 RX ADMIN — SODIUM CHLORIDE, POTASSIUM CHLORIDE, SODIUM LACTATE AND CALCIUM CHLORIDE: 600; 310; 30; 20 INJECTION, SOLUTION INTRAVENOUS at 10:46

## 2022-11-18 RX ADMIN — MONTELUKAST 10 MG: 10 TABLET, FILM COATED ORAL at 22:03

## 2022-11-18 RX ADMIN — EZETIMIBE 10 MG: 10 TABLET ORAL at 22:03

## 2022-11-18 RX ADMIN — SODIUM CHLORIDE, POTASSIUM CHLORIDE, SODIUM LACTATE AND CALCIUM CHLORIDE: 600; 310; 30; 20 INJECTION, SOLUTION INTRAVENOUS at 14:05

## 2022-11-18 RX ADMIN — IOPAMIDOL 93 ML: 755 INJECTION, SOLUTION INTRAVENOUS at 00:05

## 2022-11-18 RX ADMIN — SODIUM CHLORIDE, PRESERVATIVE FREE 73 ML: 5 INJECTION INTRAVENOUS at 00:12

## 2022-11-18 RX ADMIN — MONTELUKAST 10 MG: 10 TABLET, FILM COATED ORAL at 03:18

## 2022-11-18 RX ADMIN — HYDROMORPHONE HYDROCHLORIDE 0.5 MG: 1 INJECTION, SOLUTION INTRAMUSCULAR; INTRAVENOUS; SUBCUTANEOUS at 18:46

## 2022-11-18 RX ADMIN — ATORVASTATIN CALCIUM 40 MG: 40 TABLET, FILM COATED ORAL at 02:54

## 2022-11-18 RX ADMIN — Medication 25 MCG: at 09:01

## 2022-11-18 RX ADMIN — ATORVASTATIN CALCIUM 40 MG: 40 TABLET, FILM COATED ORAL at 22:03

## 2022-11-18 RX ADMIN — ZOLPIDEM TARTRATE 5 MG: 5 TABLET ORAL at 23:25

## 2022-11-18 RX ADMIN — OXYCODONE HYDROCHLORIDE 5 MG: 5 TABLET ORAL at 18:14

## 2022-11-18 RX ADMIN — Medication 10000 UNITS: at 09:01

## 2022-11-18 RX ADMIN — SODIUM CHLORIDE, POTASSIUM CHLORIDE, SODIUM LACTATE AND CALCIUM CHLORIDE: 600; 310; 30; 20 INJECTION, SOLUTION INTRAVENOUS at 19:52

## 2022-11-18 RX ADMIN — HYDROMORPHONE HYDROCHLORIDE 0.5 MG: 1 INJECTION, SOLUTION INTRAMUSCULAR; INTRAVENOUS; SUBCUTANEOUS at 22:12

## 2022-11-18 RX ADMIN — HYDROMORPHONE HYDROCHLORIDE 0.5 MG: 1 INJECTION, SOLUTION INTRAMUSCULAR; INTRAVENOUS; SUBCUTANEOUS at 10:47

## 2022-11-18 RX ADMIN — ZOLPIDEM TARTRATE 5 MG: 5 TABLET ORAL at 03:18

## 2022-11-18 RX ADMIN — HYDROMORPHONE HYDROCHLORIDE 0.5 MG: 1 INJECTION, SOLUTION INTRAMUSCULAR; INTRAVENOUS; SUBCUTANEOUS at 14:14

## 2022-11-18 RX ADMIN — OXYCODONE HYDROCHLORIDE 5 MG: 5 TABLET ORAL at 09:01

## 2022-11-18 RX ADMIN — HYDROMORPHONE HYDROCHLORIDE 0.5 MG: 1 INJECTION, SOLUTION INTRAMUSCULAR; INTRAVENOUS; SUBCUTANEOUS at 06:15

## 2022-11-18 RX ADMIN — SODIUM CHLORIDE, POTASSIUM CHLORIDE, SODIUM LACTATE AND CALCIUM CHLORIDE: 600; 310; 30; 20 INJECTION, SOLUTION INTRAVENOUS at 02:37

## 2022-11-18 ASSESSMENT — ACTIVITIES OF DAILY LIVING (ADL)
ADLS_ACUITY_SCORE: 35

## 2022-11-18 NOTE — PROVIDER NOTIFICATION
pt rates pain 8/10 to R flank. pt has oxy q6h 5mg ordered but states that during a previous panc flare dilaudid was more effective. Had 5mg oxy at 2200 and 2300 that brought pain to 5/10.

## 2022-11-18 NOTE — PROGRESS NOTES
Internal Medicine Progress Note     Laura Gonzalez MRN:[id number]   YOB: 1950           Assessment and Plan:   Laura Gonzalez is a 72 year old female with history of acute-on-chronic pancreatitis status post pylorus-preserving whipple 5/2019 as well as ERCP and stenting x 3 on 10/13/22 who presented with abdominal pain and admitted with acute on chronic pancreatitis.      # Acute on Chronic Pancreatitis  # Hx ERCP s/p pancreatic duct dilation, debris removal, and stent placement  Abdominal pain x 3 days, spoke with her primary GI doc Dr. Noguera who recommended amylase and lipase to be drawn as outpt both of which elevated 11/17 so recommended she come to ED for admission. She has had cholangitis and biliary obstruction in the past. CT w/o inflammation around pancreatitis but lipase + abdominal pain meets criteria.  - GI Panc bili:   - Advance diet as tolerated   - IVF 150cc/ 24 hours   - no ERCP   - check CRP , calcium and TGs  - Pain control with 5 mg oxycodone q6 PRN  - Trend CMP, amylase and lipase daily     #History of atherosclerosis  - Continue home atorvastatin  - Continue home ezetemibe      # History of mild asthma  - Continue home monteluikast     Diet:   ADAT to low fat/low residue  DVT Prophylaxis: Low Risk/Ambulatory with no VTE prophylaxis indicated  Condon Catheter: Not present  Fluids: mIVFs  Central Lines: None  Cardiac Monitoring: None  Code Status:   Full    Carlos Zhou,   Internal Medicine PGY-2    This patient was staffed with the attending, Dr. Rothman, who agrees with the above assessment and plan.     SUBJECTIVE  Admitted overnight. Still has mild abdominal pain but significantly improved since admission. Denies fever, chills, n/v/d.     No other concerns    MEDICATIONS:  PTA Meds  Prior to Admission medications    Medication Sig Last Dose Taking? Auth Provider Long Term End Date   atorvastatin (LIPITOR) 40 MG tablet Take 40 mg by mouth At Bedtime   11/17/2022 at pm Yes Reported, Patient Yes    calcium-magnesium (CALMAG) 500-250 MG TABS Take 2 tablets by mouth daily 11/17/2022 at am Yes Reported, Patient     cholecalciferol (VITAMIN D3) 25 mcg (1000 units) capsule Take 1 capsule by mouth daily 11/17/2022 at am Yes Unknown, Entered By History     evolocumab (REPATHA) 140 MG/ML prefilled autoinjector Inject 140 mg Subcutaneous every 14 days 11/17/2022 at am Yes Unknown, Entered By History     ezetimibe (ZETIA) 10 MG tablet Take 10 mg by mouth At Bedtime 11/17/2022 at pm Yes Reported, Patient Yes    LORazepam (ATIVAN) 0.5 MG tablet Take 1 tablet (0.5 mg) by mouth 2 times daily as needed for anxiety Take 1-2 tablets by mouth twice daily as needed for anxiety More than a month at prn Yes Thomas Motley MD     Magnesium Ascorbate POWD One teaspoon at bedtime 11/17/2022 at pm Yes Reported, Patient     montelukast (SINGULAIR) 10 MG tablet Take 10 mg by mouth At Bedtime  11/17/2022 at pm Yes Reported, Patient Yes    oxyCODONE (ROXICODONE) 5 MG tablet Take 1 tablet (5 mg) by mouth every 6 hours as needed for moderate to severe pain (take 5 mg for 5-7/10 pain, 10 mg for 8-10/10 pain) More than a month at prn Yes Rene Sun MD     valACYclovir (VALTREX) 1000 mg tablet Take 1,000 mg by mouth daily as needed (cold sore) More than a month Yes Reported, Patient Yes    VITAMIN A PO Take 3,000 Units by mouth daily 11/17/2022 at am Yes Unknown, Entered By History     zolpidem (AMBIEN) 10 MG tablet Take 1 tablet by mouth nightly as needed for sleep 11/16/2022 at pm Yes Unknown, Entered By History No    amylase-lipase-protease (CREON) 68958-27289 units CPEP per EC capsule Take 2-3 with meals / 1-2 with snacks, up to 15 per day.  Patient taking differently: Take 2 capsules by mouth 3 times daily (with meals) Take 2 with meals / 1-2 with snacks, up to 15 per day.   Arnaldo Noguera MD     estradiol (VIVELLE-DOT) 0.075 MG/24HR BIW patch Place 1 patch onto the skin  twice a week   Unknown, Entered By History No    ondansetron (ZOFRAN ODT) 4 MG ODT tab Take 1 tablet (4 mg) by mouth every 6 hours as needed for nausea or vomiting  Patient not taking: Reported on 6/23/2022   Thomas Motley MD     polyethylene glycol (MIRALAX) 17 GM/Dose powder Take 17 g by mouth 2 times daily as needed   Trav Garcia MD     senna-docusate (SENOKOT-S/PERICOLACE) 8.6-50 MG tablet Take 1 tablet by mouth 2 times daily as needed for constipation   Trav Garcia MD     hydrochlorothiazide (HYDRODIURIL) 25 MG tablet Take 25 mg by mouth daily   Reported, Patient Yes 5/20/22      Current Meds    atorvastatin  40 mg Oral At Bedtime     ezetimibe  10 mg Oral At Bedtime     montelukast  10 mg Oral At Bedtime     sodium chloride (PF)  3 mL Intracatheter Q8H     vitamin A  10,000 Units Oral Daily     Vitamin D3  25 mcg Oral Daily           PHYSICAL EXAM:   /64 (BP Location: Right arm)   Pulse (!) 49   Temp 98.4  F (36.9  C) (Oral)   Resp 18   Wt 68.9 kg (152 lb)   SpO2 97%   BMI 23.11 kg/m          General: alert and no distress  Head: NC/AT  Eyes: non-icteric sclera, EOMI  Pulmonary: CTAB, no inc wob  CV: RRR, +s1/s2, no murmurs  GI: Soft, moderately tender to palpation RUQ and RLQ  Skin: no rashes seen  EXT: no edema, WWP  Neuro: A&Ox3, no focal deficits      LABS:   CMP  Recent Labs   Lab 11/17/22  1451      POTASSIUM 3.6   CHLORIDE 106   CO2 28   ANIONGAP 2*   *   BUN 13   CR 0.75   GFRESTIMATED 84   GLEN 8.7   PROTTOTAL 7.0   ALBUMIN 3.0*   BILITOTAL 0.8   ALKPHOS 152*   AST 33   ALT 33     CBC  Recent Labs   Lab 11/17/22  1451   HGB 11.8   WBC 8.6   RBC 4.01   HCT 37.2   MCV 93   MCH 29.4   MCHC 31.7   RDW 14.3        INRNo lab results found in last 7 days.

## 2022-11-18 NOTE — ED PROVIDER NOTES
[place] EMERGENCY DEPARTMENT ([facility])  11/17/22   History     Chief Complaint   Patient presents with     Abdominal Pain     Pancreatitis     HPI  Laura Gonzalez is a 72 year old female with history of acute-on-chronic pancreatitis status post ERCP and stenting x 3 on 10/13/22 who presents with abdominal pain similar to prior episode of pancreatitis.  Symptoms started approximately 2 days ago.  She has severe pain in her epigastric area and bilateral upper quadrants.  Feels similar to prior episodes of pancreatitis.  She talked to her GI team today, particularly Dr. Noguera.  Outpatient labs were ordered and were notable for an elevated lipase and amylase of 860 and 264, respectively.  She was told to come to the ED for admission.  She denies any fever/chills, and has otherwise been feeling well.  No dysuria, frequency, hematuria, melena/hematochezia, diarrhea, and has no other medical complaints    I have reviewed the Medications, Allergies, Past Medical and Surgical History, and Social History in the [employer] system.    PAST MEDICAL HISTORY:   Past Medical History:   Diagnosis Date     Asthma     pt.states no longer has symptoms     CAD (coronary artery disease)      HLD (hyperlipidemia)        PAST SURGICAL HISTORY:   Past Surgical History:   Procedure Laterality Date     APPENDECTOMY       ARTHROPLASTY HIP Left 6/15/2016    Procedure: ARTHROPLASTY HIP;  Surgeon: Jeffy Wolff MD;  Location: [facility] OR     COLONOSCOPY  10/3/2013    Procedure: COMBINED COLONOSCOPY, SINGLE BIOPSY/POLYPECTOMY BY BIOPSY;;  Surgeon: Kenney Walls MD;  Location:  GI     ENDOSCOPIC RETROGRADE CHOLANGIOPANCREATOGRAM N/A 6/16/2022    Procedure: ENDOSCOPIC RETROGRADE CHOLANGIOPANCREATOGRAPHY WITH PANCREATIC DUCT DILATION, DEBRIS REMOVAL AND STENT PLACEMENT;  Surgeon: Arnaldo Noguera MD;  Location: [facility] OR     ENDOSCOPIC RETROGRADE CHOLANGIOPANCREATOGRAM N/A 10/13/2022    Procedure: ENDOSCOPIC RETROGRADE  CHOLANGIOPANCREATOGRAPHY, biliary stent placement;  Surgeon: Arnaldo Noguera MD;  Location: [facility] OR     ENDOSCOPIC ULTRASOUND UPPER GASTROINTESTINAL TRACT (GI) N/A 2022    Procedure: ENDOSCOPIC ULTRASOUND WITH PANCREATICOGASTROSTOMY CREATION, TRACT DILATION, STENT PLACEMENT;  Surgeon: Romaine Oates MD;  Location: [facility] OR     ESOPHAGOSCOPY, GASTROSCOPY, DUODENOSCOPY (EGD), COMBINED N/A 2022    Procedure: ESOPHAGOGASTRODUODENOSCOPY WITH ENDOSCOPIC CLIP PLACEMENT;  Surgeon: Arnaldo Noguera MD;  Location: [facility] OR     FOOT SURGERY       HC TOOTH EXTRACTION W/FORCEP       HYSTERECTOMY       INCISION AND DRAINAGE BREAST Left 2022    Procedure: evacuate hematoma left  BREAST;  Surgeon: Dayron Garcia MD;  Location: [facility] OR     IR FOLLOW UP VISIT OUTPATIENT  2022     IR SINOGRAM INJECTION DIAGNOSTIC  2022       Past medical history, past surgical history, medications, and allergies were reviewed with the patient. Additional pertinent items: None    FAMILY HISTORY: No family history on file.    SOCIAL HISTORY:   Social History     Tobacco Use     Smoking status: Former     Types: Cigarettes     Quit date: 10/3/1988     Years since quittin.1     Smokeless tobacco: Never   Substance Use Topics     Alcohol use: No       Patient's Medications   New Prescriptions    No medications on file   Previous Medications    AMYLASE-LIPASE-PROTEASE (CREON) 40257-44632 UNITS CPEP PER EC CAPSULE    Take 2-3 with meals / 1-2 with snacks, up to 15 per day.    ATORVASTATIN (LIPITOR) 40 MG TABLET    Take 40 mg by mouth At Bedtime     CALCIUM CARB-CHOLECALCIFEROL (CALCIUM 1000 + D) 1000-800 MG-UNIT TABS        CALCIUM-MAGNESIUM (CALMAG) 500-250 MG TABS    Take 2 tablets by mouth daily    CHOLECALCIFEROL (VITAMIN D3) 25 MCG (1000 UNITS) CAPSULE    Take 1 capsule by mouth daily    ESTRADIOL (ESTRACE) 0.1 MG/GM VAGINAL CREAM    Place 1 g vaginally See Admin Instructions Insert 1 gram vaginally as  directed by provider    ESTRADIOL (VIVELLE-DOT) 0.075 MG/24HR BIW PATCH    Place 1 patch onto the skin twice a week    EVOLOCUMAB (REPATHA) 140 MG/ML PREFILLED AUTOINJECTOR    Inject 140 mg Subcutaneous every 14 days    EZETIMIBE (ZETIA) 10 MG TABLET    Take 10 mg by mouth At Bedtime    LORAZEPAM (ATIVAN) 0.5 MG TABLET    Take 1 tablet (0.5 mg) by mouth 2 times daily as needed for anxiety Take 1-2 tablets by mouth twice daily as needed for anxiety    MAGNESIUM ASCORBATE POWD    One teaspoon at bedtime    MONTELUKAST (SINGULAIR) 10 MG TABLET    Take 10 mg by mouth At Bedtime     ONDANSETRON (ZOFRAN ODT) 4 MG ODT TAB    Take 1 tablet (4 mg) by mouth every 6 hours as needed for nausea or vomiting    OXYCODONE (ROXICODONE) 5 MG TABLET    Take 1 tablet (5 mg) by mouth every 6 hours as needed for moderate to severe pain (take 5 mg for 5-7/10 pain, 10 mg for 8-10/10 pain)    POLYETHYLENE GLYCOL (MIRALAX) 17 GM/DOSE POWDER    Take 17 g by mouth 2 times daily as needed    SENNA-DOCUSATE (SENOKOT-S/PERICOLACE) 8.6-50 MG TABLET    Take 1 tablet by mouth 2 times daily as needed for constipation    VALACYCLOVIR (VALTREX) 1000 MG TABLET    Take 1,000 mg by mouth daily as needed (cold sore)    VITAMIN A PO    Take 3,000 Units by mouth daily    ZOLPIDEM (AMBIEN) 10 MG TABLET    Take 1 tablet by mouth nightly as needed for sleep   Modified Medications    No medications on file   Discontinued Medications    No medications on file          Allergies   Allergen Reactions     Tramadol Other (See Comments)     Has a hyperactive reaction and can't sleep         Social history was reviewed with the patient. Additional pertinent items: None        Review of Systems   Constitutional: Negative for chills, fatigue and fever.   HENT: Negative for congestion and sore throat.    Eyes: Negative for pain and visual disturbance.   Respiratory: Negative for cough, chest tightness and shortness of breath.    Cardiovascular: Negative for chest pain and  palpitations.   Gastrointestinal: Positive for abdominal pain. Negative for abdominal distention, constipation, diarrhea, nausea and vomiting.   Genitourinary: Negative for difficulty urinating, dysuria, frequency and urgency.   Musculoskeletal: Negative for arthralgias, back pain, myalgias and neck pain.   Skin: Negative for color change and rash.   Neurological: Negative for dizziness, weakness and headaches.   Psychiatric/Behavioral: Negative for confusion.     A complete review of systems was performed with pertinent positives and negatives noted in the HPI, and all other systems negative.  Physical Exam   BP: 118/65  Pulse: 70  Temp: 98.4  F (36.9  C)  Resp: 20  Weight: 68.9 kg (152 lb)  SpO2: 95 %      Physical Exam  Vitals and nursing note reviewed.   Constitutional:       General: She is not in acute distress.     Appearance: Normal appearance. She is not ill-appearing or toxic-appearing.   HENT:      Head: Normocephalic and atraumatic.      Nose: Nose normal.      Mouth/Throat:      Mouth: Mucous membranes are moist.   Eyes:      Pupils: Pupils are equal, round, and reactive to light.   Cardiovascular:      Rate and Rhythm: Normal rate.      Pulses: Normal pulses.      Heart sounds: Normal heart sounds.   Pulmonary:      Effort: Pulmonary effort is normal. No respiratory distress.      Breath sounds: Normal breath sounds.   Abdominal:      General: Abdomen is flat. There is no distension.       Musculoskeletal:         General: No swelling or deformity. Normal range of motion.      Cervical back: Normal range of motion. No rigidity.   Skin:     General: Skin is warm.      Capillary Refill: Capillary refill takes less than 2 seconds.   Neurological:      Mental Status: She is alert and oriented to person, place, and time.   Psychiatric:         Mood and Affect: Mood normal.         ED Course     Results for orders placed or performed during the hospital encounter of 11/17/22 (from the past 24 hour(s))    [facility] Draw    Narrative    The following orders were created for panel order [facility] Draw.  Procedure                               Abnormality         Status                     ---------                               -----------         ------                     Extra Blue Top Tube[[id number]]                              In process                 Extra Red Top Tube[[id number]]                               In process                 Extra Green Top (Lithium...[[id number]]                      In process                 Extra Purple Top Tube[[id number]]                            In process                   Please view results for these tests on the individual orders.     Medications   oxyCODONE (ROXICODONE) tablet 5 mg (5 mg Oral Given 11/17/22 2208)             Assessments & Plan (with Medical Decision Making)   Lipase is 860, amylase is 264.    CT scan has been ordered to rule out choledocholithiasis, other obstructive process, pancreatic pseudocyst/abscess.  Results are pending, to be followed by admitting team.    Case discussed with hospitalist.  Will admit for symptomatic treatment of acute pancreatitis.    I have reviewed the nursing notes.    I have reviewed the findings, diagnosis, plan and need for follow up with the patient.            New Prescriptions    No medications on file       Final diagnoses:   Acute pancreatitis, unspecified complication status, unspecified pancreatitis type       11/17/2022   [facility] EMERGENCY DEPARTMENT     Mack Padron,   11/17/22 6017

## 2022-11-18 NOTE — H&P
[facility]    History and Physical - Medicine Service, CIND[facility] TEAM        Date of Admission:  11/17/2022    Assessment & Plan      Laura Gonzalez is a 72 year old female with history of acute-on-chronic pancreatitis status post pylorus-preserving whipple 5/2019 as well as ERCP and stenting x 3 on 10/13/22 who presents with abdominal pain similar to prior episode of pancreatitis found to have eleavted lipase and amylase being admitted for pain management and further workup.     # Acute on Chronic Pancreatitis  # Hx ERCP s/p pancreatic duct dilation, debris removal, and stent placement  Abdominal pain x 3 days, spoke with her primary GI doc Dr. Noguera who recommended amylase and lipase to be drawn as outpt both of which elevated 11/17 so recommended she come to ED for admission. She has had cholangitis and biliary obstruction in the past and given elevated amylase and lipase pancreatitis is most likely but will f/u CT A/P to assess for alternative etiologies. 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   - GI Panc bili c/s in AM  - NPO given pain as well as in case she needs ERCP  - IVF at 100cc/hr LR  - Pain control with 5 mg oxycodone q6 PRN  - Trend CMP, amylase and lipase daily    #History of atherosclerosis  - Continue home atorvastatin  - Continue home ezetemibe      # History of mild asthma  - Continue home monteluikast       Diet:   NPO  DVT Prophylaxis: Low Risk/Ambulatory with no VTE prophylaxis indicated  Condon Catheter: Not present  Fluids: mIVFs  Central Lines: None  Cardiac Monitoring: None  Code Status:   Full    Disposition Plan      Expected Discharge Date: 11/19/2022                The patient's care to be staffed with AM team    Ashok Oh MD  Medicine Service, [facility]  Securely message with the Vocera Web Console (learn more here)  Text  page via [facility] Paging/Directory   Please see signed in provider for up to date coverage information    ______________________________________________________________________    Chief Complaint   Abdominal pain    History is obtained from the patient    History of Present Illness   Laura Gonzalez is a 72 year old female with history of acute-on-chronic pancreatitis status post pylorus-preserving whipple 5/2019 as well as ERCP and stenting x 3 on 10/13/22 who presents with abdominal pain similar to prior episode of pancreatitis found to have eleavted lipase and amylase being admitted for pain management and further workup.     Following her ERCP with stenting 10/13 she had been feeling the best she has ever felt and even was able to go on vacation to the [place] to which she returned from last weekend. She denies any trauma or exertion while on vacation and had been feeling fine until Wednesday morning at 3 am when she woke with severe epigastric abdominal pain. She notes that this pain felt slightly different than her previous episodes of pancreatitis, all of which were initially more localized to her right upper and lower quadrants. She also experienced nausea and vomiting Wednesday morning which does not usually occur with her prior episodes either. Her emesis was clear, non-bilious, non-bloody. She denies fevers, chills, cough. She does endorse fatigue as well as cloudy urine. Given her abdominal pain she called her GI doctor, DR. Noguera who recommended getting labs that were s/f elevated lipase and amylase so she was recommended to come to the hospital for admission.     Review of Systems    The 10 point Review of Systems is negative other than noted in the HPI or here.     Past Medical History    I have reviewed this patient's medical history and updated it with pertinent information if needed.   Past Medical History:   Diagnosis Date     Asthma     pt.states no longer has symptoms     CAD (coronary  artery disease)      HLD (hyperlipidemia)         Past Surgical History   I have reviewed this patient's surgical history and updated it with pertinent information if needed.  Past Surgical History:   Procedure Laterality Date     APPENDECTOMY       ARTHROPLASTY HIP Left 6/15/2016    Procedure: ARTHROPLASTY HIP;  Surgeon: Jeffy Wolff MD;  Location: [facility] OR     COLONOSCOPY  10/3/2013    Procedure: COMBINED COLONOSCOPY, SINGLE BIOPSY/POLYPECTOMY BY BIOPSY;;  Surgeon: Kenney Walls MD;  Location: [facility]     ENDOSCOPIC RETROGRADE CHOLANGIOPANCREATOGRAM N/A 6/16/2022    Procedure: ENDOSCOPIC RETROGRADE CHOLANGIOPANCREATOGRAPHY WITH PANCREATIC DUCT DILATION, DEBRIS REMOVAL AND STENT PLACEMENT;  Surgeon: Arnaldo oNguera MD;  Location: [facility] OR     ENDOSCOPIC RETROGRADE CHOLANGIOPANCREATOGRAM N/A 10/13/2022    Procedure: ENDOSCOPIC RETROGRADE CHOLANGIOPANCREATOGRAPHY, biliary stent placement;  Surgeon: Arnaldo Noguera MD;  Location: [facility] OR     ENDOSCOPIC ULTRASOUND UPPER GASTROINTESTINAL TRACT (GI) N/A 5/12/2022    Procedure: ENDOSCOPIC ULTRASOUND WITH PANCREATICOGASTROSTOMY CREATION, TRACT DILATION, STENT PLACEMENT;  Surgeon: Romaine Oates MD;  Location: [facility] OR     ESOPHAGOSCOPY, GASTROSCOPY, DUODENOSCOPY (EGD), COMBINED N/A 5/12/2022    Procedure: ESOPHAGOGASTRODUODENOSCOPY WITH ENDOSCOPIC CLIP PLACEMENT;  Surgeon: Arnaldo Noguera MD;  Location: [facility] OR     FOOT SURGERY       HC TOOTH EXTRACTION W/FORCEP       HYSTERECTOMY       INCISION AND DRAINAGE BREAST Left 2/18/2022    Procedure: evacuate hematoma left  BREAST;  Surgeon: Dayron Garcia MD;  Location: [facility] OR     IR FOLLOW UP VISIT OUTPATIENT  5/31/2022     IR SINOGRAM INJECTION DIAGNOSTIC  5/31/2022        Social History   I have reviewed this patient's social history and updated it with pertinent information if needed. Laura Gonzalez  reports that she quit smoking about 34 years ago. She has never used smokeless tobacco. She  reports that she does not drink alcohol and does not use drugs.    Family History   No significant family history    Prior to Admission Medications   Prior to Admission Medications   Prescriptions Last Dose Informant Patient Reported? Taking?   Calcium Carb-Cholecalciferol (CALCIUM 1000 + D) 1000-800 MG-UNIT TABS   Yes No   LORazepam (ATIVAN) 0.5 MG tablet   No No   Sig: Take 1 tablet (0.5 mg) by mouth 2 times daily as needed for anxiety Take 1-2 tablets by mouth twice daily as needed for anxiety   Patient not taking: Reported on 11/2/2022   Magnesium Ascorbate POWD   Yes No   Sig: One teaspoon at bedtime   VITAMIN A PO   Yes No   Sig: Take 3,000 Units by mouth daily   amylase-lipase-protease (CREON) 52968-15953 units CPEP per EC capsule   No No   Sig: Take 2-3 with meals / 1-2 with snacks, up to 15 per day.   Patient taking differently: Take 2 capsules by mouth 3 times daily (with meals) Take 2 with meals / 1-2 with snacks, up to 15 per day.   atorvastatin (LIPITOR) 40 MG tablet   Yes No   Sig: Take 40 mg by mouth At Bedtime    calcium-magnesium (CALMAG) 500-250 MG TABS   Yes No   Sig: Take 2 tablets by mouth daily   cholecalciferol (VITAMIN D3) 25 mcg (1000 units) capsule   Yes No   Sig: Take 1 capsule by mouth daily   estradiol (ESTRACE) 0.1 MG/GM vaginal cream   Yes No   Sig: Place 1 g vaginally See Admin Instructions Insert 1 gram vaginally as directed by provider   Patient not taking: Reported on 11/2/2022   estradiol (VIVELLE-DOT) 0.075 MG/24HR BIW patch   Yes No   Sig: Place 1 patch onto the skin twice a week   evolocumab (REPATHA) 140 MG/ML prefilled autoinjector   Yes No   Sig: Inject 140 mg Subcutaneous every 14 days   ezetimibe (ZETIA) 10 MG tablet   Yes No   Sig: Take 10 mg by mouth At Bedtime   montelukast (SINGULAIR) 10 MG tablet   Yes No   Sig: Take 10 mg by mouth At Bedtime    ondansetron (ZOFRAN ODT) 4 MG ODT tab   No No   Sig: Take 1 tablet (4 mg) by mouth every 6 hours as needed for nausea or  vomiting   Patient not taking: Reported on 6/23/2022   oxyCODONE (ROXICODONE) 5 MG tablet   No No   Sig: Take 1 tablet (5 mg) by mouth every 6 hours as needed for moderate to severe pain (take 5 mg for 5-7/10 pain, 10 mg for 8-10/10 pain)   Patient not taking: Reported on 11/2/2022   polyethylene glycol (MIRALAX) 17 GM/Dose powder   No No   Sig: Take 17 g by mouth 2 times daily as needed   senna-docusate (SENOKOT-S/PERICOLACE) 8.6-50 MG tablet   No No   Sig: Take 1 tablet by mouth 2 times daily as needed for constipation   valACYclovir (VALTREX) 1000 mg tablet   Yes No   Sig: Take 1,000 mg by mouth daily as needed (cold sore)   Patient not taking: Reported on 11/2/2022   zolpidem (AMBIEN) 10 MG tablet   Yes No   Sig: Take 1 tablet by mouth nightly as needed for sleep      Facility-Administered Medications: None     Allergies   Allergies   Allergen Reactions     Tramadol Other (See Comments)     Has a hyperactive reaction and can't sleep        Physical Exam   Vital Signs: Temp: 98.4  F (36.9  C) Temp src: Oral BP: 118/65 Pulse: 70   Resp: 20 SpO2: 95 % O2 Device: None (Room air)    Weight: 152 lbs 0 oz    Constitutional: awake, alert, cooperative, no apparent distress, and appears stated age  Eyes: Lids and lashes normal, pupils equal, round and reactive to light, extra ocular muscles intact, sclera clear, conjunctiva normal  ENT: Normocephalic, without obvious abnormality, atraumatic, sinuses nontender on palpation, external ears without lesions, oral pharynx with moist mucous membranes, tonsils without erythema or exudates, gums normal and good dentition.  Respiratory: No increased work of breathing, good air exchange, clear to auscultation bilaterally, no crackles or wheezing  Cardiovascular: Normal apical impulse, regular rate and rhythm, normal S1 and S2, no S3 or S4, and no murmur noted  GI: Epigastric tenderness to palpation, non-distended  Skin: no bruising or bleeding, normal skin color, texture, turgor and  no redness, warmth, or swelling  Musculoskeletal: no lower extremity pitting edema present  Neurologic: Awake, alert, oriented to name, place and time.  Cranial nerves II-XII are grossly intact.      Data   Data reviewed today: I reviewed all medications, new labs and imaging results over the last 24 hours. I personally reviewed no images or EKG's today.    Recent Labs   Lab 11/17/22  1451   WBC 8.6   HGB 11.8   MCV 93         POTASSIUM 3.6   CHLORIDE 106   CO2 28   BUN 13   CR 0.75   ANIONGAP 2*   GLEN 8.7   *   ALBUMIN 3.0*   PROTTOTAL 7.0   BILITOTAL 0.8   ALKPHOS 152*   ALT 33   AST 33   LIPASE 860*

## 2022-11-18 NOTE — CONSULTS
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 Admitted for Acute Pancreatitis. GI-PancBili Consulted for recommendations on mgmt.          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 The residual pancreas appears normal. No peripancreatic fat stranding. No dilatation of the main pancreatic duct.  -BISAP on admission: 1  -Organ Failure: (-)   -Fluid collection: None   -Thrombosis: None           Recommendations:  -Ok to  advance diet slowly as tolerated to low fat and low residue diet.    -IV fluids: Recommend LR at 250 cc/hr for 2 hours and can de-escelate to 150cc/hr therafter for the next 24hrs  >>Aim for HR <120, UOP: 0.5-1 ml/kg/hr, BUN < 25 (or - Hematocrit reduction by at least 35 %, BUN decrease by about 35%)   -Pain control with opioids as needed per primary team. Avoid constipation with scheduled bowel regimen.        >>Pain control, anti-emetics per primary team.  -Closely monitor electrolytes and replete PRN.   -Check CRP, calcium, and triglyceride level.   -Encourage PO intake in the next 24 - 48 hours, nutritional support should be provided if not able to meet nutritional goals.Nasojejunal feeds are preferred over TPN.    "-No urgent indication for endoscopic intervention at this time. -->Please make NPO at midnight on 11/20 for possible ERCP on 11/21.         >>Pre-procedural INR, CBC   -Obtain UA/Cx given concern for unit(s) underlying UTI.     Gastroenterology follow up recommendations: TBD pending clinical course       Patient care plan discussed with Dr. Gotti, GI staff physician. Thank you for involving us in this patient's care. Please do not hesitate to contact the GI service with any questions or concerns.       Bryan Vu M.D  GI fellow  Department of Gastroenterology   -------------------------------------------------------------------------------------------------------------------   History is obtained from the patient and the medical record.          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 Admitted for Acute Pancreatitis. GI-PancBili Consulted for recommendations on mgmt.     Briefly, patient reports that she was at her baseline level of good health until ~2 days PTA when she developed acute onset mid abdominal pain radiating to her right side and into her groin with associated nausea, NBNB emesis, poor PO intake, subjective fever/chills. She denies any EtOH intake or other med use( including OTC). Denies any Tobacco Use. Denies any known sick contacts. Just note that she has been experiencing urinary urgency, frequency, dysuria, and noticed cloudy appearing urine since returning from vacation in the \"[place] earlier in the week. Given concern for AP, patient instructed to get labs drawn OP;  labs significant for Lipase 860 and Amylase 164. " She subsequently came to [facility] ED for further mgmt. On Presentation here, VSS. Additional labs notable for hematocrit 37.2, , Albumin 3.0, BUN 13. CT A/P done that postsurgical changes from Whipple procedure. The residual pancreas appears normal. No peripancreatic fat stranding. No dilatation of the main pancreatic duct.Since her initial presentation, patient reports that she is feeling somewhat better with IVF initiation and prn administration of Analgesia/Antiemetics.           Past Medical History:   Reviewed and edited as appropriate  Past Medical History:   Diagnosis Date     Asthma     pt.states no longer has symptoms     CAD (coronary artery disease)      HLD (hyperlipidemia)             Past Surgical History:   Reviewed and edited as appropriate   Past Surgical History:   Procedure Laterality Date     APPENDECTOMY       ARTHROPLASTY HIP Left 6/15/2016    Procedure: ARTHROPLASTY HIP;  Surgeon: Jeffy Wolff MD;  Location: [facility] OR     COLONOSCOPY  10/3/2013    Procedure: COMBINED COLONOSCOPY, SINGLE BIOPSY/POLYPECTOMY BY BIOPSY;;  Surgeon: Kenney Walls MD;  Location:  GI     ENDOSCOPIC RETROGRADE CHOLANGIOPANCREATOGRAM N/A 6/16/2022    Procedure: ENDOSCOPIC RETROGRADE CHOLANGIOPANCREATOGRAPHY WITH PANCREATIC DUCT DILATION, DEBRIS REMOVAL AND STENT PLACEMENT;  Surgeon: Arnaldo Noguera MD;  Location: [facility] OR     ENDOSCOPIC RETROGRADE CHOLANGIOPANCREATOGRAM N/A 10/13/2022    Procedure: ENDOSCOPIC RETROGRADE CHOLANGIOPANCREATOGRAPHY, biliary stent placement;  Surgeon: Arnaldo Noguera MD;  Location: [facility] OR     ENDOSCOPIC ULTRASOUND UPPER GASTROINTESTINAL TRACT (GI) N/A 5/12/2022    Procedure: ENDOSCOPIC ULTRASOUND WITH PANCREATICOGASTROSTOMY CREATION, TRACT DILATION, STENT PLACEMENT;  Surgeon: Romaine Oates MD;  Location: [facility] OR     ESOPHAGOSCOPY, GASTROSCOPY, DUODENOSCOPY (EGD), COMBINED N/A 5/12/2022    Procedure: ESOPHAGOGASTRODUODENOSCOPY WITH ENDOSCOPIC CLIP PLACEMENT;   Surgeon: Arnaldo Noguera MD;  Location: [facility] OR     FOOT SURGERY       HC TOOTH EXTRACTION W/FORCEP       HYSTERECTOMY       INCISION AND DRAINAGE BREAST Left 2/18/2022    Procedure: evacuate hematoma left  BREAST;  Surgeon: Dayron Garcia MD;  Location: [facility] OR     IR FOLLOW UP VISIT OUTPATIENT  5/31/2022     IR SINOGRAM INJECTION DIAGNOSTIC  5/31/2022            Previous Endoscopy:     Results for orders placed or performed during the hospital encounter of 10/03/13   COLONOSCOPY   Result Value Ref Range    COLONOSCOPY       [facility] Endoscopy Department  _______________________________________________________________________________  Patient Name: Laura Gonzalez          Procedure Date: 10/3/2013 11:45:20 AM       MRN: [id number]                       Account Number: [id number]                  YOB: 1950             Admit Type: Outpatient                      Age: 62                               Room: 2                                     Note Status: Finalized                Attending MD: Kenney Conroy MD                _______________________________________________________________________________     Procedure:                Colonoscopy  Indications:              FH of Colon Cancer - 1st degree relatives,father                             and brother.  Providers:                Kenney Walls MD, Zehra Forbes RN  Referring MD:             Rob Jansen MD  Medicines:                Fentanyl 150 micrograms IV, Midazolam 2 mg IV,                             Glucagon 0.5 mg IV  Complications:            No immediate complications  _______________________________________________________________________________  Procedure:                Pre-Anesthesia Assessment:                            - Prior to the procedure, a History and Physical                             was performed, and patient medications and                             allergies  were reviewed. The patient is competent.                             The risks and benefits of the procedure and the                             sedation options and risks were discussed with the                             patient. All questions were answered and informed                             consent was obtained. Patient identification and                             proposed procedure were verified by the physician                             in the pre-procedure area. Mental Status                             Examination: alert and oriented. Airway                             Examination: normal oropharyngeal airway and neck                             mobility. Respiratory Examination: clear to                             auscultation. CV Examination: normal. Prophylactic                             Antibiotics: The patient does not require                             prophylactic antibiotics. Prior Anticoagulants: The                             patient has taken no previous anticoagulant or                             antiplatelet agents. ASA Grade Assessment: II - A                             patient with mild systemic disease. After reviewing                             the risks and benefits, the patient was deemed in                             satisfactory condition to undergo the procedure.                             The anesthesia plan was to use moderate sedation /                             analgesia (conscious sedation). Immediately prior                             to administration of medications, the patient was                             re-assessed for adequacy to receive sedatives. The                             heart rate, respiratory rate, oxygen saturations,                             blood pressure, adequacy of pulmonary ventilation,                             and response to care were monitored throughout the                             procedure. The physical status of the  patient was                             re-assessed after the procedure.                            After obtaining informed consent, the colonoscope                             was passed under direct vision. Throughout the                             procedure, the patient's blood pressure, pulse, and                             oxygen saturations were monitored continuously. The                             Colonoscope was introduced through the anus and                             advanced to the cecum, identified by the                             appendiceal orifice, IC valve and                             transillumination. The colonoscopy was performed                             without difficulty. The patient tolerated the                             procedure well. The quality of the bowel                             preparation was good.                                                                                   Findings:       The perianal and digital rectal examinations were normal. Pertinent        negatives include normal sphincter tone. The colon (entire examined        portion) appeared normal. Biopsies were taken with a cold forceps from        the ascending colon and descending colon for evaluation of microscopic        colitis.                                                                                   Impression:               - The entire examined colon is normal. This was                             biopsied.  Recommendation:           - High fiber diet indefinitely.                            - Imodium 1 tablet by mouth daily.or prn.                                                                                     ALDAIR Walls M.D.  ________________  Kenney Walls MD  Signed Date: 10/3/2013 12:45:54 PM  Number of Addenda: 0  I was physically present for the entire viewing portion of the exam.  Note Initiated On: 10/3/2013 11:45:20 AM  Scope Withdrawal Time: 0 hours 0 minutes 0  seconds   Scope Withdrawal Time: 0 hours 0 minutes 0 seconds   Total Procedure Duration: 0 hours 21 minutes 29 seconds   Total Procedure Duration: 0 hours 21 minutes 29 seconds             Social History:   Reviewed and edited as appropriate  Social History     Socioeconomic History     Marital status:      Spouse name: Not on file     Number of children: Not on file     Years of education: Not on file     Highest education level: Not on file   Occupational History     Not on file   Tobacco Use     Smoking status: Former     Types: Cigarettes     Quit date: 10/3/1988     Years since quittin.1     Smokeless tobacco: Never   Substance and Sexual Activity     Alcohol use: No     Drug use: No     Sexual activity: Not on file   Other Topics Concern     Parent/sibling w/ CABG, MI or angioplasty before 65F 55M? Not Asked   Social History Narrative     Not on file     Social Determinants of Health     Financial Resource Strain: Not on file   Food Insecurity: Not on file   Transportation Needs: Not on file   Physical Activity: Not on file   Stress: Not on file   Social Connections: Not on file   Intimate Partner Violence: Not on file   Housing Stability: Not on file            Family History:   Reviewed and edited as appropriate  No family history on file.        Allergies:   Reviewed and edited as appropriate     Allergies   Allergen Reactions     Tramadol Other (See Comments)     Has a hyperactive reaction and can't sleep             Medications:     Current Facility-Administered Medications   Medication     atorvastatin (LIPITOR) tablet 40 mg     ezetimibe (ZETIA) tablet 10 mg     HYDROmorphone (PF) (DILAUDID) injection 0.5 mg     lidocaine (LMX4) cream     lidocaine 1 % 0.1-1 mL     melatonin tablet 1 mg     montelukast (SINGULAIR) tablet 10 mg     oxyCODONE (ROXICODONE) tablet 5 mg     sodium chloride (PF) 0.9% PF flush 3 mL     sodium chloride (PF) 0.9% PF flush 3 mL     vitamin A capsule 10,000 Units      Vitamin D3 (CHOLECALCIFEROL) tablet 25 mcg     zolpidem (AMBIEN) tablet 5 mg     Current Outpatient Medications   Medication Sig     atorvastatin (LIPITOR) 40 MG tablet Take 40 mg by mouth At Bedtime      calcium-magnesium (CALMAG) 500-250 MG TABS Take 2 tablets by mouth daily     cholecalciferol (VITAMIN D3) 25 mcg (1000 units) capsule Take 1 capsule by mouth daily     evolocumab (REPATHA) 140 MG/ML prefilled autoinjector Inject 140 mg Subcutaneous every 14 days     ezetimibe (ZETIA) 10 MG tablet Take 10 mg by mouth At Bedtime     LORazepam (ATIVAN) 0.5 MG tablet Take 1 tablet (0.5 mg) by mouth 2 times daily as needed for anxiety Take 1-2 tablets by mouth twice daily as needed for anxiety     Magnesium Ascorbate POWD One teaspoon at bedtime     montelukast (SINGULAIR) 10 MG tablet Take 10 mg by mouth At Bedtime      oxyCODONE (ROXICODONE) 5 MG tablet Take 1 tablet (5 mg) by mouth every 6 hours as needed for moderate to severe pain (take 5 mg for 5-7/10 pain, 10 mg for 8-10/10 pain)     valACYclovir (VALTREX) 1000 mg tablet Take 1,000 mg by mouth daily as needed (cold sore)     VITAMIN A PO Take 3,000 Units by mouth daily     zolpidem (AMBIEN) 10 MG tablet Take 1 tablet by mouth nightly as needed for sleep     amylase-lipase-protease (CREON) 91265-53128 units CPEP per EC capsule Take 2-3 with meals / 1-2 with snacks, up to 15 per day. (Patient taking differently: Take 2 capsules by mouth 3 times daily (with meals) Take 2 with meals / 1-2 with snacks, up to 15 per day.)     estradiol (VIVELLE-DOT) 0.075 MG/24HR BIW patch Place 1 patch onto the skin twice a week     ondansetron (ZOFRAN ODT) 4 MG ODT tab Take 1 tablet (4 mg) by mouth every 6 hours as needed for nausea or vomiting (Patient not taking: Reported on 6/23/2022)     polyethylene glycol (MIRALAX) 17 GM/Dose powder Take 17 g by mouth 2 times daily as needed     senna-docusate (SENOKOT-S/PERICOLACE) 8.6-50 MG tablet Take 1 tablet by mouth 2 times daily as  needed for constipation             Review of Systems:     A complete review of systems was performed and is negative except as noted in the HPI           Physical Exam:   /64 (BP Location: Right arm)   Pulse (!) 49   Temp 98.4  F (36.9  C) (Oral)   Resp 18   Wt 68.9 kg (152 lb)   SpO2 97%   BMI 23.11 kg/m    Wt:   Wt Readings from Last 2 Encounters:   11/17/22 68.9 kg (152 lb)   10/13/22 69 kg (152 lb 1.9 oz)      Constitutional: cooperative, pleasant, not dyspneic/diaphoretic, no acute distress  Eyes: Sclera anicteric  Neck: supple  CV: No edema  Respiratory: Unlabored breathing  Abd: Nondistended, +bs, no hepatosplenomegaly+ ttp with voluntary guarding in epigastrium and RUQ    Skin: warm, perfused, no jaundice  Neuro: AAO  Psych: Normal affect         Data:   Labs and imaging below were independently reviewed and interpreted    BMP  Recent Labs   Lab 11/17/22  1451      POTASSIUM 3.6   CHLORIDE 106   GLEN 8.7   CO2 28   BUN 13   CR 0.75   *     CBC  Recent Labs   Lab 11/17/22  1451   WBC 8.6   RBC 4.01   HGB 11.8   HCT 37.2   MCV 93   MCH 29.4   MCHC 31.7   RDW 14.3        INRNo lab results found in last 7 days.  LFTs  Recent Labs   Lab 11/17/22  1451   ALKPHOS 152*   AST 33   ALT 33   BILITOTAL 0.8   PROTTOTAL 7.0   ALBUMIN 3.0*      PANC  Recent Labs   Lab 11/17/22  1451   LIPASE 860*   AMYLASE 264*

## 2022-11-18 NOTE — PROGRESS NOTES
Pt has been abd having abd pain all day.She is receiving diluadid IV with oxycodone oral which seems to be controlling her pain.She is tolerating clear liquids.

## 2022-11-18 NOTE — ED TRIAGE NOTES
History of acute pancreatitis.  Spoke with her MD went in and had labs drawn and was told to come to the ED for admission.  Started having severe abdominal 2 days ago.

## 2022-11-18 NOTE — DISCHARGE SUMMARY
[facility]  Medicine Discharge Summary  Laura Gonzalez MRN: [id number]  1950  Primary care provider: Rob Jansen  ___________________________________          Date of Admission:  11/17/2022  Date of Discharge:  11/19/2022   Admitting Physician:  Leola Lewis MD  Discharge Physician:  Garrett Rothman DO   Discharging Service:  Internal Medicine, [place] [address]     Primary Provider: Rob Jansen         Reason for Admission:   Acute on chronic pancreatitis          Discharge Diagnosis:   Acute on chronic pancreatitis  Hx ERCP s/p pancreatic duct dilation, debris removal, and stent placement  HLD  Asthma  Normocytic anemia         Follow Up:   1.  ERCP per gastroenterology on Monday, 11/21/2022.  2.  Follow-up with your primary care provider within 1 month           Pending Results:   None         Hospital Course by Problem:    Laura Gonzalez is a 72 year old female with history of acute-on-chronic pancreatitis status post pylorus-preserving whipple 5/2019 as well as ERCP and stenting x 3 on 10/13/22 who presented with abdominal pain and admitted with acute on chronic pancreatitis.      # Acute on Chronic Pancreatitis  # Hx ERCP s/p pancreatic duct dilation, debris removal, and stent placement  Patient presented due with abdominal pain of 3 days at the recommendation from her primary gastroenterologist Dr. Noguera and was found to have elevated amylase and lipase consistent with acute on chronic pancreatitis.  Gastroenterology is concern for restenosis of P-J and planning ERCP on Monday, 11/21/2022.  Patient's pain has significantly improved and she is felt to be stable for discharge home prior to procedure.  -Plan for ERCP on Monday, 11/21/2022 per gastroenterology (important to be n.p.o. starting midnight prior to procedure)  - Pain control with 5 mg oxycodone q6 PRN  -Repeat CMP and CBC prior to procedure on Monday  - Follow-up with primary care provider within 1  month     #History of atherosclerosis  - Continue home atorvastatin  - Continue home ezetemibe      # History of mild asthma  - Continue home montelukast    #Normocytic anemia  Patient had a low hemoglobin of 9.8 at discharge.  This is suspected to be due to dilution from IV fluids.  - Repeat CBC prior to procedure on Monday  - Follow-up with primary care provider within 1 month     Physical Exam on day of Discharge:  Blood pressure 132/57, pulse 55, temperature (!) 95.4  F (35.2  C), temperature source Oral, resp. rate 20, weight 68.9 kg (152 lb), SpO2 99 %, not currently breastfeeding.  General:  Alert, not in respiratory or painful distress, Not toxic looking, afebrile, not pale, not dehydrated.  HEENT: Normocephalic/atraumatic  CV: RRR, nl S1/S2, no S3/S4 appreciated, no m/r/g; trace bilateral lower extremity edema  Lungs: CTAB, no wheezing/crackles, no cough. Patient breathing comfortably on room air without increased respiratory effort or accessory muscle use.  Abd: Bowel sounds present, soft nondistended with epigastric tenderness to palpation without rebound   Skin: no rashes, cyanosis, or jaundice on exposed skin regions  Psych: Appropriately conversant         Procedures & Significant Findings:   None         Consultations:   GI         Discharge Medications:     Current Discharge Medication List      CONTINUE these medications which have CHANGED    Details   oxyCODONE (ROXICODONE) 5 MG tablet Take 1 tablet (5 mg) by mouth every 6 hours as needed for moderate to severe pain (take 5 mg for 5-7/10 pain, 10 mg for 8-10/10 pain)  Qty: 12 tablet, Refills: 0    Associated Diagnoses: Intra-abdominal infection         CONTINUE these medications which have NOT CHANGED    Details   atorvastatin (LIPITOR) 40 MG tablet Take 40 mg by mouth At Bedtime       calcium-magnesium (CALMAG) 500-250 MG TABS Take 2 tablets by mouth daily      cholecalciferol (VITAMIN D3) 25 mcg (1000 units) capsule Take 1 capsule by mouth daily       evolocumab (REPATHA) 140 MG/ML prefilled autoinjector Inject 140 mg Subcutaneous every 14 days      ezetimibe (ZETIA) 10 MG tablet Take 10 mg by mouth At Bedtime      LORazepam (ATIVAN) 0.5 MG tablet Take 1 tablet (0.5 mg) by mouth 2 times daily as needed for anxiety Take 1-2 tablets by mouth twice daily as needed for anxiety  Qty: 15 tablet, Refills: 0    Associated Diagnoses: Acute pancreatitis, unspecified complication status, unspecified pancreatitis type      Magnesium Ascorbate POWD One teaspoon at bedtime      montelukast (SINGULAIR) 10 MG tablet Take 10 mg by mouth At Bedtime       valACYclovir (VALTREX) 1000 mg tablet Take 1,000 mg by mouth daily as needed (cold sore)      VITAMIN A PO Take 3,000 Units by mouth daily      zolpidem (AMBIEN) 10 MG tablet Take 1 tablet by mouth nightly as needed for sleep      amylase-lipase-protease (CREON) 32052-58691 units CPEP per EC capsule Take 2-3 with meals / 1-2 with snacks, up to 15 per day.  Qty: 450 capsule, Refills: 6    Associated Diagnoses: Acute pancreatitis, unspecified complication status, unspecified pancreatitis type      estradiol (VIVELLE-DOT) 0.075 MG/24HR BIW patch Place 1 patch onto the skin twice a week      ondansetron (ZOFRAN ODT) 4 MG ODT tab Take 1 tablet (4 mg) by mouth every 6 hours as needed for nausea or vomiting  Qty: 20 tablet, Refills: 0    Associated Diagnoses: Acute pancreatitis, unspecified complication status, unspecified pancreatitis type; Nausea and vomiting, intractability of vomiting not specified, unspecified vomiting type      polyethylene glycol (MIRALAX) 17 GM/Dose powder Take 17 g by mouth 2 times daily as needed  Qty: 510 g, Refills: 3    Associated Diagnoses: Acute pancreatitis, unspecified complication status, unspecified pancreatitis type; SBO (small bowel obstruction) (H)      senna-docusate (SENOKOT-S/PERICOLACE) 8.6-50 MG tablet Take 1 tablet by mouth 2 times daily as needed for constipation  Qty: 30 tablet,  Refills: 3    Associated Diagnoses: Acute pancreatitis, unspecified complication status, unspecified pancreatitis type                  Discharge Instructions and Follow-Up:     Discharge Procedure Orders   Comprehensive metabolic panel   Standing Status: Future Standing Exp. Date: 11/19/23   Order Comments: Prior to ERCP procedure     CBC with platelets   Standing Status: Future Standing Exp. Date: 11/19/23   Order Comments: Prior to ERCP procedure     Reason for your hospital stay   Order Comments: You were admitted to [facility] for acute pancreatitis. You were treated with intravenous fluids, oxycodone for pain control and your diet was slowly advanced as tolerated. You are scheduled to have a procedure for your pancreatitis on Monday 11/21/22 at 9am. It is important for you to not eat starting at midnight before the procedure. Please come back to the hospital if you are having any worsening of your abdominal pain or other concerning new symptoms such as, but not limited to, fever, bloody stool, worsening nausea or worsening shortness of breath.     Activity   Order Comments: Your activity upon discharge: activity as tolerated     Order Specific Question Answer Comments   Is discharge order? Yes      Adult [facility] Follow-up and recommended labs and tests   Order Comments: Follow up with primary care provider, MIQUEL ANDERS, within one month for a hospital follow up. Consider a CMP and CBC.     Appointments on [place] and/or [facility] (with [facility] or [facility] provider or service). Call [phone] if you haven't heard regarding these appointments within 7 days of discharge.     Diet   Order Comments: Follow this diet upon discharge: Low fat diet     Order Specific Question Answer Comments   Is discharge order? Yes            Discharge Disposition:   Home         Condition on Discharge:     Discharge condition: Stable   Code status on discharge: Full Code     Date of service: 11/19/2022    The patient was discussed  with Dr. Rothman who agrees with the above assessment and plan.    Sung Dickens, DO  PGY-2, Internal Medicine  [facility]

## 2022-11-19 VITALS
HEART RATE: 55 BPM | BODY MASS INDEX: 23.11 KG/M2 | WEIGHT: 152 LBS | RESPIRATION RATE: 20 BRPM | SYSTOLIC BLOOD PRESSURE: 132 MMHG | OXYGEN SATURATION: 99 % | DIASTOLIC BLOOD PRESSURE: 57 MMHG | TEMPERATURE: 95.4 F

## 2022-11-19 LAB
ANION GAP SERPL CALCULATED.3IONS-SCNC: 8 MMOL/L (ref 7–15)
BUN SERPL-MCNC: 7.7 MG/DL (ref 8–23)
CALCIUM SERPL-MCNC: 8.2 MG/DL (ref 8.8–10.2)
CHLORIDE SERPL-SCNC: 105 MMOL/L (ref 98–107)
CREAT SERPL-MCNC: 0.76 MG/DL (ref 0.51–0.95)
DEPRECATED HCO3 PLAS-SCNC: 26 MMOL/L (ref 22–29)
ERYTHROCYTE [DISTWIDTH] IN BLOOD BY AUTOMATED COUNT: 14.6 % (ref 10–15)
GFR SERPL CREATININE-BSD FRML MDRD: 82 ML/MIN/1.73M2
GLUCOSE SERPL-MCNC: 107 MG/DL (ref 70–99)
HCT VFR BLD AUTO: 30.5 % (ref 35–47)
HGB BLD-MCNC: 9.8 G/DL (ref 11.7–15.7)
MCH RBC QN AUTO: 29.6 PG (ref 26.5–33)
MCHC RBC AUTO-ENTMCNC: 32.1 G/DL (ref 31.5–36.5)
MCV RBC AUTO: 92 FL (ref 78–100)
PLATELET # BLD AUTO: 256 10E3/UL (ref 150–450)
POTASSIUM SERPL-SCNC: 3.7 MMOL/L (ref 3.4–5.3)
RBC # BLD AUTO: 3.31 10E6/UL (ref 3.8–5.2)
SARS-COV-2 RNA RESP QL NAA+PROBE: NEGATIVE
SODIUM SERPL-SCNC: 139 MMOL/L (ref 136–145)
WBC # BLD AUTO: 5.6 10E3/UL (ref 4–11)

## 2022-11-19 PROCEDURE — 258N000003 HC RX IP 258 OP 636

## 2022-11-19 PROCEDURE — 99238 HOSP IP/OBS DSCHRG MGMT 30/<: CPT | Mod: GC | Performed by: STUDENT IN AN ORGANIZED HEALTH CARE EDUCATION/TRAINING PROGRAM

## 2022-11-19 PROCEDURE — 82310 ASSAY OF CALCIUM: CPT

## 2022-11-19 PROCEDURE — 36415 COLL VENOUS BLD VENIPUNCTURE: CPT

## 2022-11-19 PROCEDURE — U0005 INFEC AGEN DETEC AMPLI PROBE: HCPCS

## 2022-11-19 PROCEDURE — 250N000013 HC RX MED GY IP 250 OP 250 PS 637

## 2022-11-19 PROCEDURE — 85027 COMPLETE CBC AUTOMATED: CPT

## 2022-11-19 RX ORDER — OXYCODONE HYDROCHLORIDE 5 MG/1
5 TABLET ORAL EVERY 6 HOURS PRN
Qty: 12 TABLET | Refills: 0 | Status: ON HOLD | OUTPATIENT
Start: 2022-11-19 | End: 2023-01-24

## 2022-11-19 RX ADMIN — OXYCODONE HYDROCHLORIDE 5 MG: 5 TABLET ORAL at 12:54

## 2022-11-19 RX ADMIN — OXYCODONE HYDROCHLORIDE 5 MG: 5 TABLET ORAL at 06:20

## 2022-11-19 RX ADMIN — Medication 10000 UNITS: at 09:17

## 2022-11-19 RX ADMIN — Medication 25 MCG: at 09:17

## 2022-11-19 RX ADMIN — SODIUM CHLORIDE, POTASSIUM CHLORIDE, SODIUM LACTATE AND CALCIUM CHLORIDE: 600; 310; 30; 20 INJECTION, SOLUTION INTRAVENOUS at 02:15

## 2022-11-19 ASSESSMENT — ACTIVITIES OF DAILY LIVING (ADL)
ADLS_ACUITY_SCORE: 35

## 2022-11-19 NOTE — PLAN OF CARE
Assumed Care: [id number]  Reason for Admission: Acute pancreatitis, unspecified complication status, unspecified pancreatitis type   Procedures: None  IV Access/Incisions/Drains/Wounds: None  IVF: LR @ 150 ml/hr  VS: /57 (BP Location: Right arm)   Pulse 55   Temp (!) 95.4  F (35.2  C) (Oral)   Resp 20   Wt 68.9 kg (152 lb)   SpO2 99%   BMI 23.11 kg/m    Diet: Regular Diet  Activity: UAL  Pain Management: Oxycodone  GI/: Voiding spontaneously, +BM, +Flatus, -Nausea  Neuro: A&Ox4  Team: Aaron Sandoval  Pertinent Labs/Imaging: None  Shift Updates: Patient discharged home and will be back for ERCP on Monday @ 11 am.

## 2022-11-19 NOTE — PLAN OF CARE
Assumed Care: [id number]  Reason for Admission: Acute pancreatitis, unspecified complication status, unspecified pancreatitis type   Procedures: None  IV Access/Incisions/Drains/Wounds:   Peripheral IV 11/17/22 Anterior;Left Upper forearm (Active)   Site Assessment WDL 11/18/22 1700   Line Status Infusing 11/18/22 1700   Phlebitis Scale 0-->no symptoms 11/18/22 1700   Infiltration Scale 0 11/18/22 1700   Number of days: 1   IVF: LR @ 150 ml/hr  VS: /64 (BP Location: Right arm)   Pulse (!) 49   Temp 98.4  F (36.9  C) (Oral)   Resp 18   Wt 68.9 kg (152 lb)   SpO2 97%   BMI 23.11 kg/m    Diet: Clear Liquid Diet    Activity: UAL  Pain Management: IV Dilaudid and Oxycodone  GI/: Voiding spontaneously, +BM, +Flatus, -Nausea  Neuro: A&Ox4  Team: Aaron Sandoval  Pertinent Labs/Imaging: None  Shift Updates: Patient arrived to the floor and 2RN skin check completed with no abnormal findings outside of PIV.  Plan: TBD tomorrow

## 2022-11-19 NOTE — PROGRESS NOTES
Admitted/transferred from: ED  2 RN full skin assessment completed by Duc Day, RN and Katrin VIDALES RN.  Skin assessment finding: PIVx1   Interventions/actions: None    Will continue to monitor.

## 2022-11-19 NOTE — PLAN OF CARE
Time: 1900-0730    Reason for Admission: acute on chronic pancreatitis.     Activity: Independent    Neuro: A&O x4. Calm and cooperative.     GI/: Voiding spontaneously without difficulty. Not saving. Last BM 11/18.    Diet: Diet advanced overnight. Pt ate some crackers and tolerated.     Incisions/Drains: None    IV Access: L PIV infusing LR at 150mL/hr.     Vitals: VSS on RA    Pain: Pt reporting a constant pain in R flank. PRN IV dilaudid given x1.     New changes this shift: None    Plan: Continue with plan of care.

## 2022-11-20 ENCOUNTER — ANESTHESIA (OUTPATIENT)
Dept: SURGERY | Facility: CLINIC | Age: 72
End: 2022-11-20
Payer: COMMERCIAL

## 2022-11-20 ENCOUNTER — ANESTHESIA EVENT (OUTPATIENT)
Dept: SURGERY | Facility: CLINIC | Age: 72
End: 2022-11-20
Payer: COMMERCIAL

## 2022-11-20 DIAGNOSIS — K86.1 OTHER CHRONIC PANCREATITIS (H): Primary | ICD-10-CM

## 2022-11-20 ASSESSMENT — LIFESTYLE VARIABLES: TOBACCO_USE: 1

## 2022-11-21 ENCOUNTER — APPOINTMENT (OUTPATIENT)
Dept: GENERAL RADIOLOGY | Facility: CLINIC | Age: 72
End: 2022-11-21
Attending: INTERNAL MEDICINE
Payer: COMMERCIAL

## 2022-11-21 ENCOUNTER — PATIENT OUTREACH (OUTPATIENT)
Dept: CARE COORDINATION | Facility: CLINIC | Age: 72
End: 2022-11-21

## 2022-11-21 ENCOUNTER — HOSPITAL ENCOUNTER (OUTPATIENT)
Facility: CLINIC | Age: 72
Discharge: HOME | End: 2022-11-21
Attending: INTERNAL MEDICINE | Admitting: INTERNAL MEDICINE
Payer: COMMERCIAL

## 2022-11-21 VITALS
OXYGEN SATURATION: 96 % | BODY MASS INDEX: 23.57 KG/M2 | SYSTOLIC BLOOD PRESSURE: 142 MMHG | HEART RATE: 56 BPM | WEIGHT: 154.98 LBS | TEMPERATURE: 98.7 F | DIASTOLIC BLOOD PRESSURE: 62 MMHG | RESPIRATION RATE: 18 BRPM

## 2022-11-21 DIAGNOSIS — K85.90 ACUTE ON CHRONIC PANCREATITIS (H): Primary | ICD-10-CM

## 2022-11-21 DIAGNOSIS — K86.1 ACUTE ON CHRONIC PANCREATITIS (H): Primary | ICD-10-CM

## 2022-11-21 LAB
ALBUMIN SERPL BCG-MCNC: 3.9 G/DL (ref 3.5–5.2)
ALP SERPL-CCNC: 184 U/L (ref 35–104)
ALT SERPL W P-5'-P-CCNC: 24 U/L (ref 10–35)
AMYLASE SERPL-CCNC: 65 U/L (ref 28–100)
ANION GAP SERPL CALCULATED.3IONS-SCNC: 9 MMOL/L (ref 7–15)
AST SERPL W P-5'-P-CCNC: 44 U/L (ref 10–35)
BILIRUB SERPL-MCNC: 0.4 MG/DL
BUN SERPL-MCNC: 7.9 MG/DL (ref 8–23)
CALCIUM SERPL-MCNC: 8.8 MG/DL (ref 8.8–10.2)
CHLORIDE SERPL-SCNC: 104 MMOL/L (ref 98–107)
CREAT SERPL-MCNC: 0.8 MG/DL (ref 0.51–0.95)
DEPRECATED HCO3 PLAS-SCNC: 26 MMOL/L (ref 22–29)
ERYTHROCYTE [DISTWIDTH] IN BLOOD BY AUTOMATED COUNT: 14.4 % (ref 10–15)
GFR SERPL CREATININE-BSD FRML MDRD: 77 ML/MIN/1.73M2
GLUCOSE BLDC GLUCOMTR-MCNC: 104 MG/DL (ref 70–99)
GLUCOSE SERPL-MCNC: 98 MG/DL (ref 70–99)
HCT VFR BLD AUTO: 36.7 % (ref 35–47)
HGB BLD-MCNC: 11.5 G/DL (ref 11.7–15.7)
INR PPP: 1.09 (ref 0.85–1.15)
LIPASE SERPL-CCNC: 63 U/L (ref 13–60)
MCH RBC QN AUTO: 29.3 PG (ref 26.5–33)
MCHC RBC AUTO-ENTMCNC: 31.3 G/DL (ref 31.5–36.5)
MCV RBC AUTO: 93 FL (ref 78–100)
PLATELET # BLD AUTO: 322 10E3/UL (ref 150–450)
POTASSIUM SERPL-SCNC: 4.4 MMOL/L (ref 3.4–5.3)
PROT SERPL-MCNC: 6.8 G/DL (ref 6.4–8.3)
RBC # BLD AUTO: 3.93 10E6/UL (ref 3.8–5.2)
SODIUM SERPL-SCNC: 139 MMOL/L (ref 136–145)
WBC # BLD AUTO: 6.2 10E3/UL (ref 4–11)

## 2022-11-21 PROCEDURE — 250N000011 HC RX IP 250 OP 636: Performed by: STUDENT IN AN ORGANIZED HEALTH CARE EDUCATION/TRAINING PROGRAM

## 2022-11-21 PROCEDURE — 250N000009 HC RX 250: Performed by: INTERNAL MEDICINE

## 2022-11-21 PROCEDURE — C1769 GUIDE WIRE: HCPCS | Performed by: INTERNAL MEDICINE

## 2022-11-21 PROCEDURE — 85610 PROTHROMBIN TIME: CPT | Performed by: INTERNAL MEDICINE

## 2022-11-21 PROCEDURE — C2617 STENT, NON-COR, TEM W/O DEL: HCPCS | Performed by: INTERNAL MEDICINE

## 2022-11-21 PROCEDURE — 250N000011 HC RX IP 250 OP 636: Performed by: ANESTHESIOLOGY

## 2022-11-21 PROCEDURE — 710N000012 HC RECOVERY PHASE 2, PER MINUTE: Performed by: INTERNAL MEDICINE

## 2022-11-21 PROCEDURE — 250N000009 HC RX 250: Performed by: ANESTHESIOLOGY

## 2022-11-21 PROCEDURE — 255N000002 HC RX 255 OP 636: Performed by: INTERNAL MEDICINE

## 2022-11-21 PROCEDURE — 999N000141 HC STATISTIC PRE-PROCEDURE NURSING ASSESSMENT: Performed by: INTERNAL MEDICINE

## 2022-11-21 PROCEDURE — 85014 HEMATOCRIT: CPT | Performed by: INTERNAL MEDICINE

## 2022-11-21 PROCEDURE — 80053 COMPREHEN METABOLIC PANEL: CPT | Performed by: INTERNAL MEDICINE

## 2022-11-21 PROCEDURE — 710N000010 HC RECOVERY PHASE 1, LEVEL 2, PER MIN: Performed by: INTERNAL MEDICINE

## 2022-11-21 PROCEDURE — C1726 CATH, BAL DIL, NON-VASCULAR: HCPCS | Performed by: INTERNAL MEDICINE

## 2022-11-21 PROCEDURE — 258N000003 HC RX IP 258 OP 636: Performed by: STUDENT IN AN ORGANIZED HEALTH CARE EDUCATION/TRAINING PROGRAM

## 2022-11-21 PROCEDURE — 36415 COLL VENOUS BLD VENIPUNCTURE: CPT | Performed by: INTERNAL MEDICINE

## 2022-11-21 PROCEDURE — 360N000082 HC SURGERY LEVEL 2 W/ FLUORO, PER MIN: Performed by: INTERNAL MEDICINE

## 2022-11-21 PROCEDURE — C1877 STENT, NON-COAT/COV W/O DEL: HCPCS | Performed by: INTERNAL MEDICINE

## 2022-11-21 PROCEDURE — 250N000025 HC SEVOFLURANE, PER MIN: Performed by: INTERNAL MEDICINE

## 2022-11-21 PROCEDURE — 250N000013 HC RX MED GY IP 250 OP 250 PS 637: Performed by: INTERNAL MEDICINE

## 2022-11-21 PROCEDURE — 258N000003 HC RX IP 258 OP 636: Performed by: ANESTHESIOLOGY

## 2022-11-21 PROCEDURE — 370N000017 HC ANESTHESIA TECHNICAL FEE, PER MIN: Performed by: INTERNAL MEDICINE

## 2022-11-21 PROCEDURE — 999N000179 XR SURGERY CARM FLUORO LESS THAN 5 MIN W STILLS: Mod: TC

## 2022-11-21 PROCEDURE — 272N000001 HC OR GENERAL SUPPLY STERILE: Performed by: INTERNAL MEDICINE

## 2022-11-21 PROCEDURE — 82150 ASSAY OF AMYLASE: CPT | Performed by: INTERNAL MEDICINE

## 2022-11-21 PROCEDURE — 83690 ASSAY OF LIPASE: CPT | Performed by: INTERNAL MEDICINE

## 2022-11-21 PROCEDURE — 258N000003 HC RX IP 258 OP 636: Performed by: INTERNAL MEDICINE

## 2022-11-21 PROCEDURE — 82962 GLUCOSE BLOOD TEST: CPT

## 2022-11-21 DEVICE — IMPLANTABLE DEVICE
Type: IMPLANTABLE DEVICE | Site: PANCREATIC DUCT | Status: NON-FUNCTIONAL
Removed: 2023-03-30

## 2022-11-21 DEVICE — STENT GEENEN PANCREA 5FRX3CM G22107 GPSO-5-3
Type: IMPLANTABLE DEVICE | Site: PANCREATIC DUCT | Status: NON-FUNCTIONAL
Removed: 2023-03-30

## 2022-11-21 RX ORDER — NALOXONE HYDROCHLORIDE 0.4 MG/ML
0.2 INJECTION, SOLUTION INTRAMUSCULAR; INTRAVENOUS; SUBCUTANEOUS
Status: DISCONTINUED | OUTPATIENT
Start: 2022-11-21 | End: 2022-11-21 | Stop reason: HOSPADM

## 2022-11-21 RX ORDER — FENTANYL CITRATE 50 UG/ML
50 INJECTION, SOLUTION INTRAMUSCULAR; INTRAVENOUS EVERY 5 MIN PRN
Status: DISCONTINUED | OUTPATIENT
Start: 2022-11-21 | End: 2022-11-21 | Stop reason: HOSPADM

## 2022-11-21 RX ORDER — LIDOCAINE 40 MG/G
CREAM TOPICAL
Status: DISCONTINUED | OUTPATIENT
Start: 2022-11-21 | End: 2022-11-21 | Stop reason: HOSPADM

## 2022-11-21 RX ORDER — INDOMETHACIN 50 MG/1
SUPPOSITORY RECTAL PRN
Status: DISCONTINUED | OUTPATIENT
Start: 2022-11-21 | End: 2022-11-21 | Stop reason: HOSPADM

## 2022-11-21 RX ORDER — LABETALOL HYDROCHLORIDE 5 MG/ML
10 INJECTION, SOLUTION INTRAVENOUS
Status: DISCONTINUED | OUTPATIENT
Start: 2022-11-21 | End: 2022-11-21 | Stop reason: HOSPADM

## 2022-11-21 RX ORDER — SODIUM CHLORIDE, SODIUM LACTATE, POTASSIUM CHLORIDE, CALCIUM CHLORIDE 600; 310; 30; 20 MG/100ML; MG/100ML; MG/100ML; MG/100ML
INJECTION, SOLUTION INTRAVENOUS CONTINUOUS
Status: DISCONTINUED | OUTPATIENT
Start: 2022-11-21 | End: 2022-11-21 | Stop reason: HOSPADM

## 2022-11-21 RX ORDER — INDOMETHACIN 50 MG/1
100 SUPPOSITORY RECTAL
Status: DISCONTINUED | OUTPATIENT
Start: 2022-11-21 | End: 2022-11-21 | Stop reason: HOSPADM

## 2022-11-21 RX ORDER — ONDANSETRON 2 MG/ML
4 INJECTION INTRAMUSCULAR; INTRAVENOUS EVERY 6 HOURS PRN
Status: DISCONTINUED | OUTPATIENT
Start: 2022-11-21 | End: 2022-11-21 | Stop reason: HOSPADM

## 2022-11-21 RX ORDER — NALOXONE HYDROCHLORIDE 0.4 MG/ML
0.4 INJECTION, SOLUTION INTRAMUSCULAR; INTRAVENOUS; SUBCUTANEOUS
Status: DISCONTINUED | OUTPATIENT
Start: 2022-11-21 | End: 2022-11-21 | Stop reason: HOSPADM

## 2022-11-21 RX ORDER — ONDANSETRON 2 MG/ML
4 INJECTION INTRAMUSCULAR; INTRAVENOUS EVERY 30 MIN PRN
Status: DISCONTINUED | OUTPATIENT
Start: 2022-11-21 | End: 2022-11-21 | Stop reason: HOSPADM

## 2022-11-21 RX ORDER — ONDANSETRON 2 MG/ML
INJECTION INTRAMUSCULAR; INTRAVENOUS PRN
Status: DISCONTINUED | OUTPATIENT
Start: 2022-11-21 | End: 2022-11-21

## 2022-11-21 RX ORDER — HYDROMORPHONE HYDROCHLORIDE 1 MG/ML
0.2 INJECTION, SOLUTION INTRAMUSCULAR; INTRAVENOUS; SUBCUTANEOUS EVERY 5 MIN PRN
Status: DISCONTINUED | OUTPATIENT
Start: 2022-11-21 | End: 2022-11-21 | Stop reason: HOSPADM

## 2022-11-21 RX ORDER — IOPAMIDOL 510 MG/ML
INJECTION, SOLUTION INTRAVASCULAR PRN
Status: DISCONTINUED | OUTPATIENT
Start: 2022-11-21 | End: 2022-11-21 | Stop reason: HOSPADM

## 2022-11-21 RX ORDER — ONDANSETRON 4 MG/1
4 TABLET, ORALLY DISINTEGRATING ORAL EVERY 6 HOURS PRN
Status: DISCONTINUED | OUTPATIENT
Start: 2022-11-21 | End: 2022-11-21 | Stop reason: HOSPADM

## 2022-11-21 RX ORDER — EPHEDRINE SULFATE 50 MG/ML
INJECTION, SOLUTION INTRAMUSCULAR; INTRAVENOUS; SUBCUTANEOUS PRN
Status: DISCONTINUED | OUTPATIENT
Start: 2022-11-21 | End: 2022-11-21

## 2022-11-21 RX ORDER — OXYCODONE HYDROCHLORIDE 5 MG/1
5 TABLET ORAL EVERY 6 HOURS PRN
Qty: 16 TABLET | Refills: 0 | Status: SHIPPED | OUTPATIENT
Start: 2022-11-21 | End: 2022-11-24

## 2022-11-21 RX ORDER — HYDROMORPHONE HYDROCHLORIDE 1 MG/ML
0.4 INJECTION, SOLUTION INTRAMUSCULAR; INTRAVENOUS; SUBCUTANEOUS EVERY 5 MIN PRN
Status: DISCONTINUED | OUTPATIENT
Start: 2022-11-21 | End: 2022-11-21 | Stop reason: HOSPADM

## 2022-11-21 RX ORDER — LIDOCAINE HYDROCHLORIDE 20 MG/ML
INJECTION, SOLUTION INFILTRATION; PERINEURAL PRN
Status: DISCONTINUED | OUTPATIENT
Start: 2022-11-21 | End: 2022-11-21

## 2022-11-21 RX ORDER — ONDANSETRON 4 MG/1
4 TABLET, ORALLY DISINTEGRATING ORAL EVERY 30 MIN PRN
Status: DISCONTINUED | OUTPATIENT
Start: 2022-11-21 | End: 2022-11-21 | Stop reason: HOSPADM

## 2022-11-21 RX ORDER — DEXAMETHASONE SODIUM PHOSPHATE 4 MG/ML
INJECTION, SOLUTION INTRA-ARTICULAR; INTRALESIONAL; INTRAMUSCULAR; INTRAVENOUS; SOFT TISSUE PRN
Status: DISCONTINUED | OUTPATIENT
Start: 2022-11-21 | End: 2022-11-21

## 2022-11-21 RX ORDER — PROPOFOL 10 MG/ML
INJECTION, EMULSION INTRAVENOUS PRN
Status: DISCONTINUED | OUTPATIENT
Start: 2022-11-21 | End: 2022-11-21

## 2022-11-21 RX ORDER — FENTANYL CITRATE 50 UG/ML
INJECTION, SOLUTION INTRAMUSCULAR; INTRAVENOUS PRN
Status: DISCONTINUED | OUTPATIENT
Start: 2022-11-21 | End: 2022-11-21

## 2022-11-21 RX ORDER — OXYCODONE HYDROCHLORIDE 5 MG/1
5 TABLET ORAL EVERY 4 HOURS PRN
Status: DISCONTINUED | OUTPATIENT
Start: 2022-11-21 | End: 2022-11-21 | Stop reason: HOSPADM

## 2022-11-21 RX ORDER — FENTANYL CITRATE 50 UG/ML
25 INJECTION, SOLUTION INTRAMUSCULAR; INTRAVENOUS EVERY 5 MIN PRN
Status: DISCONTINUED | OUTPATIENT
Start: 2022-11-21 | End: 2022-11-21 | Stop reason: HOSPADM

## 2022-11-21 RX ORDER — FLUMAZENIL 0.1 MG/ML
0.2 INJECTION, SOLUTION INTRAVENOUS
Status: DISCONTINUED | OUTPATIENT
Start: 2022-11-21 | End: 2022-11-21 | Stop reason: HOSPADM

## 2022-11-21 RX ADMIN — SODIUM CHLORIDE, POTASSIUM CHLORIDE, SODIUM LACTATE AND CALCIUM CHLORIDE: 600; 310; 30; 20 INJECTION, SOLUTION INTRAVENOUS at 11:54

## 2022-11-21 RX ADMIN — FENTANYL CITRATE 50 MCG: 50 INJECTION, SOLUTION INTRAMUSCULAR; INTRAVENOUS at 13:45

## 2022-11-21 RX ADMIN — Medication 50 MG: at 11:57

## 2022-11-21 RX ADMIN — FENTANYL CITRATE 50 MCG: 50 INJECTION, SOLUTION INTRAMUSCULAR; INTRAVENOUS at 13:30

## 2022-11-21 RX ADMIN — LIDOCAINE HYDROCHLORIDE 80 MG: 20 INJECTION, SOLUTION INFILTRATION; PERINEURAL at 11:56

## 2022-11-21 RX ADMIN — OXYCODONE HYDROCHLORIDE 5 MG: 5 TABLET ORAL at 14:52

## 2022-11-21 RX ADMIN — HYDROMORPHONE HYDROCHLORIDE 0.2 MG: 1 INJECTION, SOLUTION INTRAMUSCULAR; INTRAVENOUS; SUBCUTANEOUS at 14:34

## 2022-11-21 RX ADMIN — SODIUM CHLORIDE, POTASSIUM CHLORIDE, SODIUM LACTATE AND CALCIUM CHLORIDE 1000 ML: 600; 310; 30; 20 INJECTION, SOLUTION INTRAVENOUS at 13:40

## 2022-11-21 RX ADMIN — Medication 5 MG: at 12:08

## 2022-11-21 RX ADMIN — ONDANSETRON 4 MG: 2 INJECTION INTRAMUSCULAR; INTRAVENOUS at 13:10

## 2022-11-21 RX ADMIN — PROPOFOL 30 MG: 10 INJECTION, EMULSION INTRAVENOUS at 12:19

## 2022-11-21 RX ADMIN — SUGAMMADEX 200 MG: 100 INJECTION, SOLUTION INTRAVENOUS at 13:10

## 2022-11-21 RX ADMIN — HYDROMORPHONE HYDROCHLORIDE 0.2 MG: 1 INJECTION, SOLUTION INTRAMUSCULAR; INTRAVENOUS; SUBCUTANEOUS at 15:46

## 2022-11-21 RX ADMIN — FENTANYL CITRATE 100 MCG: 50 INJECTION, SOLUTION INTRAMUSCULAR; INTRAVENOUS at 11:56

## 2022-11-21 RX ADMIN — PHENYLEPHRINE HYDROCHLORIDE 150 MCG: 10 INJECTION INTRAVENOUS at 12:08

## 2022-11-21 RX ADMIN — DEXAMETHASONE SODIUM PHOSPHATE 4 MG: 4 INJECTION, SOLUTION INTRA-ARTICULAR; INTRALESIONAL; INTRAMUSCULAR; INTRAVENOUS; SOFT TISSUE at 12:04

## 2022-11-21 RX ADMIN — PROPOFOL 150 MG: 10 INJECTION, EMULSION INTRAVENOUS at 11:56

## 2022-11-21 RX ADMIN — PHENYLEPHRINE HYDROCHLORIDE 150 MCG: 10 INJECTION INTRAVENOUS at 12:05

## 2022-11-21 ASSESSMENT — ACTIVITIES OF DAILY LIVING (ADL)
ADLS_ACUITY_SCORE: 35

## 2022-11-21 NOTE — ANESTHESIA POSTPROCEDURE EVALUATION
Patient: Laura Gonzalez    Procedure: Procedure(s):  ENDOSCOPIC RETROGRADE CHOLANGIOPANCREATOGRAPHY, WITH TUBE OR STENT INSERTION, with balloon dialation       Anesthesia Type:  General    Note:  Disposition: Outpatient   Postop Pain Control: Uneventful            Sign Out: Well controlled pain   PONV: No   Neuro/Psych: Uneventful            Sign Out: Acceptable/Baseline neuro status   Airway/Respiratory: Uneventful            Sign Out: Acceptable/Baseline resp. status   CV/Hemodynamics: Uneventful            Sign Out: Acceptable CV status; No obvious hypovolemia; No obvious fluid overload   Other NRE: NONE   DID A NON-ROUTINE EVENT OCCUR? No           Last vitals:  Vitals Value Taken Time   /67 11/21/22 1330   Temp     Pulse 64 11/21/22 1331   Resp     SpO2 98 % 11/21/22 1331   Vitals shown include unvalidated device data.    Electronically Signed By: Young Vidal MD  November 21, 2022  1:32 PM

## 2022-11-21 NOTE — ANESTHESIA CARE TRANSFER NOTE
Patient: Laura Gonzalez    Procedure: Procedure(s):  ENDOSCOPIC RETROGRADE CHOLANGIOPANCREATOGRAPHY, WITH TUBE OR STENT INSERTION, with balloon dialation       Diagnosis: Chronic pancreatitis, unspecified pancreatitis type (H) [K86.1]  Diagnosis Additional Information: No value filed.    Anesthesia Type:   General     Note:    Oropharynx: oropharynx clear of all foreign objects and spontaneously breathing  Level of Consciousness: awake  Oxygen Supplementation: nasal cannula  Level of Supplemental Oxygen (L/min / FiO2): 2  Independent Airway: airway patency satisfactory and stable  Dentition: dentition unchanged  Vital Signs Stable: post-procedure vital signs reviewed and stable  Report to RN Given: handoff report given  Patient transferred to: PACU    Handoff Report: Identifed the Patient, Identified the Reponsible Provider, Reviewed the pertinent medical history, Discussed the surgical course, Reviewed Intra-OP anesthesia mangement and issues during anesthesia, Set expectations for post-procedure period and Allowed opportunity for questions and acknowledgement of understanding      Vitals:  Vitals Value Taken Time   /70 11/21/22 1323   Temp     Pulse 58 11/21/22 1327   Resp     SpO2 100 % 11/21/22 1327   Vitals shown include unvalidated device data.    Electronically Signed By: ITZ Zarate CRNA  November 21, 2022  1:28 PM

## 2022-11-21 NOTE — PROGRESS NOTES
[facility]    Background: Transitional Care Management program identified per system criteria and reviewed by [facility] team for possible outreach.    Assessment: Upon chart review, [facility] Team member will not proceed with patient outreach related to this episode of Transitional Care Management program due to reason below:    Patient has presented to Emergency Department, been readmitted to hospital, or transferred to another hospital.    Plan: Transitional Care Management episode addressed appropriately per reason noted above.      Jeane Forbes MA  [facility], [facility]    *Connected Care Resource Team does NOT follow patient ongoing. Referrals are identified based on internal discharge reports and the outreach is to ensure patient has an understanding of their discharge instructions.

## 2022-11-21 NOTE — DISCHARGE INSTRUCTIONS
[facility], [place]  Same-Day Surgery   Adult Discharge Orders & Instructions     For 24 hours after surgery    Get plenty of rest.  A responsible adult must stay with you for at least 24 hours after you leave the hospital.   Do not drive or use heavy equipment.  If you have weakness or tingling, don't drive or use heavy equipment until this feeling goes away.  Do not drink alcohol.  Avoid strenuous or risky activities.  Ask for help when climbing stairs.   You may feel lightheaded.  IF so, sit for a few minutes before standing.  Have someone help you get up.   If you have nausea (feel sick to your stomach): Drink only clear liquids such as apple juice, ginger ale, broth or 7-Up.  Rest may also help.  Be sure to drink enough fluids.  Move to a regular diet as you feel able.  You may have a slight fever. Call the doctor if your fever is over 100 F (37.7 C) (taken under the tongue) or lasts longer than 24 hours.  You may have a dry mouth, a sore throat, muscle aches or trouble sleeping.  These should go away after 24 hours.  Do not make important or legal decisions.   Call your doctor for any of the followin.  Signs of infection (fever, growing tenderness at the surgery site, a large amount of drainage or bleeding, severe pain, foul-smelling drainage, redness, swelling).    2. It has been over 8 to 10 hours since surgery and you are still not able to urinate (pass water).    3.  Headache for over 24 hours.      To contact a doctor, call Dr Gotti's office at [phone] at the GI Clinic or:    '   [phone] and ask for the resident on call for Gastroenterology (answered 24 hours a day)  '   Emergency Department:    [facility]: [phone]       (TTY for hearing impaired: [phone])           Recommendations:  - Will prescribe short course of prn opiates  - Abdominal X-ray in ~2 weeks  - Repeat ERCP with Dr. Noguera in ~1 month   done

## 2022-11-21 NOTE — BRIEF OP NOTE
[facility]    Brief Operative Note    Pre-operative diagnosis: Chronic pancreatitis, unspecified pancreatitis type (H) [K86.1]  Post-operative diagnosis Same as pre-operative diagnosis    Procedure: Procedure(s):  ENDOSCOPIC RETROGRADE CHOLANGIOPANCREATOGRAPHY, WITH TUBE OR STENT INSERTION, with balloon dialation  Surgeon: Surgeon(s) and Role:     * Johnson Gotti MD - Primary  Anesthesia: General   Estimated Blood Loss: 0 mL from 11/21/2022 11:49 AM to 11/21/2022  1:21 PM      Drains: None  Specimens: * No specimens in log *  Findings:     Post-Whipple anatomy. Widely patent choledochojejunostomy. Stenotic pancreaticojejunostomy seen. Pancreatogram showed a dilated pancreatic duct with stenosis at the anastomosis. PJ anastomosis dilated to 4 mm to facilitate stent placement. One 4 Fr x 4 cm SPT Zimmon stent and one 5 Fr x 3 cm sofflex Geenen stent placed.    Complications: None.  Implants:   Implant Name Type Inv. Item Serial No.  Lot No. LRB No. Used Action   STENT GEENEN PANCREA [id number] GPSO-5-3 - [id number] Stent STENT GEENEN PANCREA [id number] GPSO-5-3  [employer] [id number] N/A 1 Implanted   STENT ZIMMON PANCREA [id number] SGL PIGTAIL [id number] - [id number] Stent STENT ZIMMON PANCREA [id number] SGL PIGTAIL [id number]  [employer] [id number] N/A 1 Implanted       Recommendations:  - Amylase/lipase in 2 hours  - Likely discharge home today  - Will prescribe short course of prn opiates  - Abdominal X-ray in ~2 weeks  - Repeat ERCP with Dr. Noguera in ~1 month

## 2022-11-21 NOTE — ANESTHESIA PREPROCEDURE EVALUATION
Anesthesia Pre-Procedure Evaluation    Patient: Laura Gonzalez   MRN: [id number] : 1950        Procedure : Procedure(s):  ENDOSCOPIC RETROGRADE CHOLANGIOPANCREATOGRAPHY          Past Medical History:   Diagnosis Date     Asthma     pt.states no longer has symptoms     CAD (coronary artery disease)      HLD (hyperlipidemia)       Past Surgical History:   Procedure Laterality Date     APPENDECTOMY       ARTHROPLASTY HIP Left 6/15/2016    Procedure: ARTHROPLASTY HIP;  Surgeon: Jeffy Wolff MD;  Location: [facility] OR     COLONOSCOPY  10/3/2013    Procedure: COMBINED COLONOSCOPY, SINGLE BIOPSY/POLYPECTOMY BY BIOPSY;;  Surgeon: Kenney Walls MD;  Location: [facility] GI     ENDOSCOPIC RETROGRADE CHOLANGIOPANCREATOGRAM N/A 2022    Procedure: ENDOSCOPIC RETROGRADE CHOLANGIOPANCREATOGRAPHY WITH PANCREATIC DUCT DILATION, DEBRIS REMOVAL AND STENT PLACEMENT;  Surgeon: Arnaldo Noguera MD;  Location: [facility] OR     ENDOSCOPIC RETROGRADE CHOLANGIOPANCREATOGRAM N/A 10/13/2022    Procedure: ENDOSCOPIC RETROGRADE CHOLANGIOPANCREATOGRAPHY, biliary stent placement;  Surgeon: Arnaldo Noguera MD;  Location: [facility] OR     ENDOSCOPIC ULTRASOUND UPPER GASTROINTESTINAL TRACT (GI) N/A 2022    Procedure: ENDOSCOPIC ULTRASOUND WITH PANCREATICOGASTROSTOMY CREATION, TRACT DILATION, STENT PLACEMENT;  Surgeon: Romaine Oates MD;  Location: [facility] OR     ESOPHAGOSCOPY, GASTROSCOPY, DUODENOSCOPY (EGD), COMBINED N/A 2022    Procedure: ESOPHAGOGASTRODUODENOSCOPY WITH ENDOSCOPIC CLIP PLACEMENT;  Surgeon: Arnaldo Noguera MD;  Location: [facility] OR     FOOT SURGERY       HC TOOTH EXTRACTION W/FORCEP       HYSTERECTOMY       INCISION AND DRAINAGE BREAST Left 2022    Procedure: evacuate hematoma left  BREAST;  Surgeon: Dayron Garcia MD;  Location: [facility] OR     IR FOLLOW UP VISIT OUTPATIENT  2022     IR SINOGRAM INJECTION DIAGNOSTIC  2022      Allergies   Allergen Reactions     Tramadol Other (See  Comments)     Has a hyperactive reaction and can't sleep       Social History     Tobacco Use     Smoking status: Former     Types: Cigarettes     Quit date: 10/3/1988     Years since quittin.1     Smokeless tobacco: Never   Substance Use Topics     Alcohol use: No      Wt Readings from Last 1 Encounters:   22 68.9 kg (152 lb)        Anesthesia Evaluation   Pt has had prior anesthetic. Type: General and Regional.    No history of anesthetic complications       ROS/MED HX  ENT/Pulmonary:     (+) tobacco use, Past use, Intermittent, asthma     Neurologic:  - neg neurologic ROS  (-) no CVA   Cardiovascular:     (+) Dyslipidemia --CAD ---Previous cardiac testing   Echo: Date: 22 Results:  No significant valvular abnormalities were noted. No pathologic basis for murmur identified.    Global and regional left ventricular function is normal with an EF of 60-65%.  Left ventricular wall thickness is normal. Left ventricular size is normal.  Left ventricular diastolic function is normal. No regional wall motion abnormalities are seen.  Stress Test: Date: Results:    ECG Reviewed: Date: 22 Results:  Sinus bradycardia, HR 53 bpm  Cath: Date: Results:   (-) angina, past MI, angina and past MI   METS/Exercise Tolerance: >4 METS    Hematologic:       Musculoskeletal:  - neg musculoskeletal ROS     GI/Hepatic: Comment: Recurrent Pancreatitis in setting of stricture s/p pylorus-preserving Whipple 2019 as well as ERCP and stenting x 3 on 10/13/22. Ltuydhye02/17/22 for acute on chronic pancreatitis.       Renal/Genitourinary:  - neg Renal ROS  (-) renal disease   Endo:  - neg endo ROS  (-) Type II DM and thyroid disease   Psychiatric/Substance Use:  - neg psychiatric ROS     Infectious Disease:    (-) Recent Fever   Malignancy:  - neg malignancy ROS     Other:            Physical Exam    Airway        Mallampati: II   TM distance: > 3 FB   Neck ROM: full   Mouth opening: > 3 cm    Respiratory Devices and  Support         Dental  no notable dental history         Cardiovascular   cardiovascular exam normal          Pulmonary                   OUTSIDE LABS:  CBC:   Lab Results   Component Value Date    WBC 5.6 11/19/2022    WBC 8.6 11/17/2022    HGB 9.8 (L) 11/19/2022    HGB 11.8 11/17/2022    HCT 30.5 (L) 11/19/2022    HCT 37.2 11/17/2022     11/19/2022     11/17/2022     BMP:   Lab Results   Component Value Date     11/19/2022     11/17/2022    POTASSIUM 3.7 11/19/2022    POTASSIUM 3.6 11/17/2022    CHLORIDE 105 11/19/2022    CHLORIDE 106 11/17/2022    CO2 26 11/19/2022    CO2 28 11/17/2022    BUN 7.7 (L) 11/19/2022    BUN 13 11/17/2022    CR 0.76 11/19/2022    CR 0.75 11/17/2022     (H) 11/19/2022     (H) 11/17/2022     COAGS:   Lab Results   Component Value Date    PTT 41 (H) 02/18/2022    INR 1.07 10/13/2022     POC:   Lab Results   Component Value Date     (H) 06/16/2016     HEPATIC:   Lab Results   Component Value Date    ALBUMIN 3.0 (L) 11/17/2022    PROTTOTAL 7.0 11/17/2022    ALT 33 11/17/2022    AST 33 11/17/2022    ALKPHOS 152 (H) 11/17/2022    BILITOTAL 0.8 11/17/2022     OTHER:   Lab Results   Component Value Date    PH 7.53 (H) 06/05/2022    LACT 0.7 06/05/2022    GLEN 8.2 (L) 11/19/2022    LIPASE 860 (H) 11/17/2022    AMYLASE 264 (H) 11/17/2022    TSH 0.96 06/05/2022    CRP 55.40 (H) 11/17/2022    SED 40 (H) 06/05/2022       Anesthesia Plan    ASA Status:  3   NPO Status:  NPO Appropriate    Anesthesia Type: General.     - Airway: ETT   Induction: Intravenous.   Maintenance: Balanced.        Consents    Anesthesia Plan(s) and associated risks, benefits, and realistic alternatives discussed. Questions answered and patient/representative(s) expressed understanding.     - Discussed: Risks, Benefits and Alternatives for BOTH SEDATION and the PROCEDURE were discussed     - Discussed with:  Patient         Postoperative Care    Pain management: IV analgesics,  Multi-modal analgesia.   PONV prophylaxis: Ondansetron (or other 5HT-3), Dexamethasone or Solumedrol     Comments:                Rubens Selby MD

## 2022-11-22 ENCOUNTER — PATIENT OUTREACH (OUTPATIENT)
Dept: GASTROENTEROLOGY | Facility: CLINIC | Age: 72
End: 2022-11-22

## 2022-11-22 ENCOUNTER — PREP FOR PROCEDURE (OUTPATIENT)
Dept: GASTROENTEROLOGY | Facility: CLINIC | Age: 72
End: 2022-11-22

## 2022-11-22 LAB — ERCP: NORMAL

## 2022-11-22 NOTE — TELEPHONE ENCOUNTER
Pt called in, plan for xray in 2 weeks, ERCP on 12/20 with Dr. Noguera. Imaging ordered, will confirm plan with Dr. Noguera for clinic follow up +/- ERCP scheduled on 12/20    ML

## 2022-12-01 ENCOUNTER — ANCILLARY PROCEDURE (OUTPATIENT)
Dept: GENERAL RADIOLOGY | Facility: CLINIC | Age: 72
End: 2022-12-01
Attending: INTERNAL MEDICINE
Payer: COMMERCIAL

## 2022-12-01 PROCEDURE — 74019 RADEX ABDOMEN 2 VIEWS: CPT

## 2022-12-02 ENCOUNTER — DOCUMENTATION ONLY (OUTPATIENT)
Dept: GASTROENTEROLOGY | Facility: CLINIC | Age: 72
End: 2022-12-02

## 2022-12-02 NOTE — PROGRESS NOTES
Called PT and confirmed Appt.    Called to remind patient of their upcoming appointment with our GI clinic, on 12/14/22 at 3:45 PM with Dr. Noguera. This appointment is scheduled as a video visit. You will receive a call approximately 30 minutes prior to check you in, you must be in [place] for this visit., if your appointment is virtual (video or telephone) you need to be in [place] for the visit. To reschedule or cancel patient to call [phone].    SK

## 2022-12-14 ENCOUNTER — VIRTUAL VISIT (OUTPATIENT)
Dept: GASTROENTEROLOGY | Facility: CLINIC | Age: 72
End: 2022-12-14
Payer: COMMERCIAL

## 2022-12-14 VITALS — WEIGHT: 147 LBS | BODY MASS INDEX: 22.35 KG/M2

## 2022-12-14 PROCEDURE — 99214 OFFICE O/P EST MOD 30 MIN: CPT | Mod: GT | Performed by: INTERNAL MEDICINE

## 2022-12-14 RX ORDER — LIDOCAINE 40 MG/G
CREAM TOPICAL
Status: CANCELLED | OUTPATIENT
Start: 2022-12-14

## 2022-12-14 RX ORDER — INDOMETHACIN 50 MG/1
100 SUPPOSITORY RECTAL
Status: CANCELLED | OUTPATIENT
Start: 2022-12-14

## 2022-12-14 ASSESSMENT — PAIN SCALES - GENERAL: PAINLEVEL: NO PAIN (0)

## 2022-12-14 NOTE — PROGRESS NOTES
Laura Gonzalez is a 72 year old female who is being evaluated via a billable  visit.      How would you like to obtain your AVS? MyChart  If the video visit is dropped, the invitation should be resent by: Text to cell phone: [phone]  Will anyone else be joining your video visit? No      Location of patient:   If not at home address below, please ask where they are in case of an emergency situation arises during the appointment.  [address]   [place] [address]  Any new over the counter medications: No  Have you weighed yourself lately: Yes  Recent BP/HR: No  Are you taking  your medications every day: Yes       Laura is very well-known to us with post Whipple pancreaticojejunostomy stenosis, recurrent acute pancreatitis and serial procedures to dilate and stent the pancreaticojejunostomy.  Her last procedure was 1121 which is over 3 weeks ago.  Since then she is actually done really well as best as she can remember for the last many years she spent some time describing in detail how tight clothes precipitated discomfort in her right upper quadrant which is similar to the precursor to her prior pancreatitis episodes however they do not evolve into pancreatitis and she is able to relieve them by wearing loose clothes.  We did go over some time that she was treated with superficial blocks for myofascial pain at [place] which she did not really think helped.  We discussed that origin of such pains can be enigmatic it may be adhesions myofascial pain or subtle transient pancreas pain.  Of note is that x-ray 12 to show that one of her 2 stents that we placed through the anastomosis had passed but that the 5 Spanish 3 cm double inner and upper outer flange soft stent was in place.  Is presumably in place now.  We spent most of our 32-minute phone call with her and her  discussing how she is feeling and that she is really adapted to wearing looser clothes and overall doing as well as she has in  years.   expressed some frustration that is taken for procedures almost 1 a month to get to the stage.  Explained that the first procedure obviously EUS guided as we usually do went very smoothly with dilation but she developed a large leak and had to be percutaneously drained.  The other 3 procedures have all involved serial stenting.  Our ultimate goal is to have either a stent free situation which we tried earlier but she relapsed or to have enough larger recurrent permanently anchored stents that we could reduce the frequency of interventions to every 4 to 6 months.  I did explain that her stenosis is more like her thick rubber band than it is a hard scar and thus despite dilation and large caliber multiple stents will tend to shrink back down.  The good news is that she is done relatively well and then resumed her life.    After much discussion her plan is  #1 obtain plain films of the abdomen the second week of January  #2 tentatively plan a repeat ERCP the end of January  #3 if she has acute or chronic worsening problems let us know over the holidays    Joined the call at 12/14/2022, 3:35:20 pm.  Left the call at 12/14/2022, 4:07:58 pm.  You were on the call for 32 minutes 37 seconds .

## 2022-12-14 NOTE — LETTER
12/14/2022         RE: Laura Gonzalez  [address]  [place] [address]        Dear Colleague,    Thank you for referring your patient, Laura Gonzalez, to the [facility] [place]. Please see a copy of my visit note below.    Laura is very well-known to us with post Whipple pancreaticojejunostomy stenosis, recurrent acute pancreatitis and serial procedures to dilate and stent the pancreaticojejunostomy.  Her last procedure was 1121 which is over 3 weeks ago.  Since then she is actually done really well as best as she can remember for the last many years she spent some time describing in detail how tight clothes precipitated discomfort in her right upper quadrant which is similar to the precursor to her prior pancreatitis episodes however they do not evolve into pancreatitis and she is able to relieve them by wearing loose clothes.  We did go over some time that she was treated with superficial blocks for myofascial pain at [place] which she did not really think helped.  We discussed that origin of such pains can be enigmatic it may be adhesions myofascial pain or subtle transient pancreas pain.  Of note is that x-ray 12 to show that one of her 2 stents that we placed through the anastomosis had passed but that the 5 Yi 3 cm double inner and upper outer flange soft stent was in place.  Is presumably in place now.  We spent most of our 32-minute phone call with her and her  discussing how she is feeling and that she is really adapted to wearing looser clothes and overall doing as well as she has in years.   expressed some frustration that is taken for procedures almost 1 a month to get to the stage.  Explained that the first procedure obviously EUS guided as we usually do went very smoothly with dilation but she developed a large leak and had to be percutaneously drained.  The other 3 procedures have all involved serial stenting.  Our ultimate  goal is to have either a stent free situation which we tried earlier but she relapsed or to have enough larger recurrent permanently anchored stents that we could reduce the frequency of interventions to every 4 to 6 months.  I did explain that her stenosis is more like her thick rubber band than it is a hard scar and thus despite dilation and large caliber multiple stents will tend to shrink back down.  The good news is that she is done relatively well and then resumed her life.    After much discussion her plan is  #1 obtain plain films of the abdomen the second week of January  #2 tentatively plan a repeat ERCP the end of January  #3 if she has acute or chronic worsening problems let us know over the holidays    Joined the call at 12/14/2022, 3:35:20 pm.  Left the call at 12/14/2022, 4:07:58 pm.  You were on the call for 32 minutes 37 seconds .        Sincerely,    Arnaldo Noguera MD

## 2022-12-15 NOTE — PATIENT INSTRUCTIONS
Follow up:    Dr. Noguera has outlined the following steps after your recent clinic visit:    After much discussion her plan is  #1 obtain plain films of the abdomen the second week of January- orders are in. Please call [phone] to schedule the imaging.      #2 tentatively plan a repeat ERCP the end of January- we'll contact you with a date    #3 if she has acute or chronic worsening problems let us know over the holidays        Please call with any questions or concerns regarding your clinic visit today.    It is a pleasure being involved in your health care.    Contacts post-consultation depending on your need:    Schedule Clinic Appointments            [phone] # 1   M-F 7:30 - 5 pm    Emily Cifuentes, RN Care Coordinator (Dr. Edgar/Dr. Noguera)  [phone]    Becka Cullen, RN Care Coordinator (Dr. Damon)   [phone]    Ling Gurrola RN Care Coordinator (Dr. Oates/Dr. Gotti)  [phone]     OR Procedure Scheduling                                 [phone]    For urgent/emergent questions after business hours, you may reach the on-call GI Fellow by contacting the [facility]  at [phone].    How do I schedule labs, imaging studies, or procedures that were ordered in clinic today?     Labs: To schedule lab appointment at the [facility] and [facility], use my chart or call [phone]. If you have a [facility] lab closer to home where you are regularly seen you can give them a call.     Procedures: If a colonoscopy, upper endoscopy, breath test, esophageal manometry, or pH impedence was ordered today, our endoscopy team will call you to schedule this. If you have not heard from our endoscopy team within a week, please call [phone] to schedule.     Imaging Studies: If you were scheduled for a CT scan, X-ray, MRI, ultrasound, HIDA scan or other imaging study, please call [phone] to have this scheduled.     Referral: If a referral to another specialty was  ordered, expect a phone call or follow instructions above. If you have not heard from anyone regarding your referral in a week, please call our clinic to check the status.     How to I schedule a follow-up visit?  If you did not schedule a follow-up visit today, please call [phone] to schedule a follow-up office visit.

## 2022-12-30 ENCOUNTER — DOCUMENTATION ONLY (OUTPATIENT)
Dept: GASTROENTEROLOGY | Facility: CLINIC | Age: 72
End: 2022-12-30

## 2022-12-30 ENCOUNTER — LAB (OUTPATIENT)
Dept: LAB | Facility: CLINIC | Age: 72
End: 2022-12-30
Payer: COMMERCIAL

## 2022-12-30 ENCOUNTER — ANCILLARY PROCEDURE (OUTPATIENT)
Dept: MAMMOGRAPHY | Facility: CLINIC | Age: 72
End: 2022-12-30
Payer: COMMERCIAL

## 2022-12-30 DIAGNOSIS — Z12.31 VISIT FOR SCREENING MAMMOGRAM: ICD-10-CM

## 2022-12-30 LAB
ALBUMIN SERPL BCG-MCNC: 4.1 G/DL (ref 3.5–5.2)
ALP SERPL-CCNC: 144 U/L (ref 35–104)
ALT SERPL W P-5'-P-CCNC: 31 U/L (ref 10–35)
AMYLASE SERPL-CCNC: 296 U/L (ref 28–100)
ANION GAP SERPL CALCULATED.3IONS-SCNC: 12 MMOL/L (ref 7–15)
AST SERPL W P-5'-P-CCNC: 41 U/L (ref 10–35)
BILIRUB SERPL-MCNC: 0.2 MG/DL
BUN SERPL-MCNC: 17.1 MG/DL (ref 8–23)
CALCIUM SERPL-MCNC: 9 MG/DL (ref 8.8–10.2)
CHLORIDE SERPL-SCNC: 103 MMOL/L (ref 98–107)
CREAT SERPL-MCNC: 0.86 MG/DL (ref 0.51–0.95)
DEPRECATED HCO3 PLAS-SCNC: 26 MMOL/L (ref 22–29)
GFR SERPL CREATININE-BSD FRML MDRD: 71 ML/MIN/1.73M2
GLUCOSE SERPL-MCNC: 90 MG/DL (ref 70–99)
LIPASE SERPL-CCNC: 283 U/L (ref 13–60)
POTASSIUM SERPL-SCNC: 4.5 MMOL/L (ref 3.4–5.3)
PROT SERPL-MCNC: 7.1 G/DL (ref 6.4–8.3)
SODIUM SERPL-SCNC: 141 MMOL/L (ref 136–145)

## 2022-12-30 PROCEDURE — 77063 BREAST TOMOSYNTHESIS BI: CPT | Mod: TC | Performed by: RADIOLOGY

## 2022-12-30 PROCEDURE — 80053 COMPREHEN METABOLIC PANEL: CPT

## 2022-12-30 PROCEDURE — 83690 ASSAY OF LIPASE: CPT

## 2022-12-30 PROCEDURE — 77067 SCR MAMMO BI INCL CAD: CPT | Mod: TC | Performed by: RADIOLOGY

## 2022-12-30 PROCEDURE — 82150 ASSAY OF AMYLASE: CPT

## 2022-12-30 PROCEDURE — 36415 COLL VENOUS BLD VENIPUNCTURE: CPT

## 2022-12-30 NOTE — PROGRESS NOTES
Pt called in w/ symptom complaints, asking if she can get labs drawn during existing appointment today at [facility]. Advised she could ask while there, otherwise can go across the street to [facility] as she's done previously.    ML

## 2022-12-31 NOTE — PROGRESS NOTES
Called by patient regarding labs that were drawn today showing elevated lipase. She has noted abdominal pain that feels similar to previous pancreatitis episodes. Managing pain at home with oxycodone and had been able to drink fluids. No nausea or vomiting. No fever or chills. She feels she is managing okay at home and would like to avoid coming to the ED at this time.   We discussed symptoms such as fever, worsening abdominal pain, inability to maintain hydration that would prompt ED visit. She will have low threshold to present to ED.      Will alert Dr. Noguera and his team regarding patient call.      New Noguera MD   GI fellow.

## 2023-01-09 ENCOUNTER — ANCILLARY PROCEDURE (OUTPATIENT)
Dept: GENERAL RADIOLOGY | Facility: CLINIC | Age: 73
End: 2023-01-09
Attending: INTERNAL MEDICINE
Payer: COMMERCIAL

## 2023-01-09 PROCEDURE — 74019 RADEX ABDOMEN 2 VIEWS: CPT

## 2023-01-20 RX ORDER — LIDOCAINE 40 MG/G
CREAM TOPICAL
Status: CANCELLED | OUTPATIENT
Start: 2023-01-20

## 2023-01-20 RX ORDER — ONDANSETRON 2 MG/ML
4 INJECTION INTRAMUSCULAR; INTRAVENOUS
Status: CANCELLED | OUTPATIENT
Start: 2023-01-20

## 2023-01-24 ENCOUNTER — ANESTHESIA EVENT (OUTPATIENT)
Dept: SURGERY | Facility: CLINIC | Age: 73
End: 2023-01-24
Payer: COMMERCIAL

## 2023-01-24 ENCOUNTER — HOSPITAL ENCOUNTER (OUTPATIENT)
Facility: CLINIC | Age: 73
Discharge: HOME | End: 2023-01-24
Attending: INTERNAL MEDICINE | Admitting: INTERNAL MEDICINE
Payer: COMMERCIAL

## 2023-01-24 ENCOUNTER — APPOINTMENT (OUTPATIENT)
Dept: GENERAL RADIOLOGY | Facility: CLINIC | Age: 73
End: 2023-01-24
Attending: INTERNAL MEDICINE
Payer: COMMERCIAL

## 2023-01-24 ENCOUNTER — ANESTHESIA (OUTPATIENT)
Dept: SURGERY | Facility: CLINIC | Age: 73
End: 2023-01-24
Payer: COMMERCIAL

## 2023-01-24 VITALS
OXYGEN SATURATION: 95 % | RESPIRATION RATE: 12 BRPM | SYSTOLIC BLOOD PRESSURE: 107 MMHG | HEART RATE: 55 BPM | DIASTOLIC BLOOD PRESSURE: 57 MMHG | WEIGHT: 151.9 LBS | TEMPERATURE: 97.9 F | HEIGHT: 68 IN | BODY MASS INDEX: 23.02 KG/M2

## 2023-01-24 DIAGNOSIS — K85.90 ACUTE ON CHRONIC PANCREATITIS (H): Primary | ICD-10-CM

## 2023-01-24 DIAGNOSIS — K86.1 ACUTE ON CHRONIC PANCREATITIS (H): Primary | ICD-10-CM

## 2023-01-24 LAB
ALBUMIN SERPL BCG-MCNC: 3.8 G/DL (ref 3.5–5.2)
ALP SERPL-CCNC: 148 U/L (ref 35–104)
ALT SERPL W P-5'-P-CCNC: 16 U/L (ref 10–35)
AMYLASE SERPL-CCNC: 77 U/L (ref 28–100)
AMYLASE SERPL-CCNC: 79 U/L (ref 28–100)
ANION GAP SERPL CALCULATED.3IONS-SCNC: 11 MMOL/L (ref 7–15)
AST SERPL W P-5'-P-CCNC: 34 U/L (ref 10–35)
BILIRUB SERPL-MCNC: 0.5 MG/DL
BUN SERPL-MCNC: 14.1 MG/DL (ref 8–23)
CALCIUM SERPL-MCNC: 8.6 MG/DL (ref 8.8–10.2)
CHLORIDE SERPL-SCNC: 104 MMOL/L (ref 98–107)
CREAT SERPL-MCNC: 0.83 MG/DL (ref 0.51–0.95)
DEPRECATED HCO3 PLAS-SCNC: 24 MMOL/L (ref 22–29)
ERCP: NORMAL
ERYTHROCYTE [DISTWIDTH] IN BLOOD BY AUTOMATED COUNT: 14.5 % (ref 10–15)
GFR SERPL CREATININE-BSD FRML MDRD: 74 ML/MIN/1.73M2
GLUCOSE SERPL-MCNC: 108 MG/DL (ref 70–99)
HCT VFR BLD AUTO: 40.7 % (ref 35–47)
HGB BLD-MCNC: 12.4 G/DL (ref 11.7–15.7)
INR PPP: 1.01 (ref 0.85–1.15)
LIPASE SERPL-CCNC: 21 U/L (ref 13–60)
LIPASE SERPL-CCNC: 95 U/L (ref 13–60)
MCH RBC QN AUTO: 28.3 PG (ref 26.5–33)
MCHC RBC AUTO-ENTMCNC: 30.5 G/DL (ref 31.5–36.5)
MCV RBC AUTO: 93 FL (ref 78–100)
PLATELET # BLD AUTO: 327 10E3/UL (ref 150–450)
POTASSIUM SERPL-SCNC: 3.9 MMOL/L (ref 3.4–5.3)
PROT SERPL-MCNC: 6.7 G/DL (ref 6.4–8.3)
RBC # BLD AUTO: 4.38 10E6/UL (ref 3.8–5.2)
SODIUM SERPL-SCNC: 139 MMOL/L (ref 136–145)
WBC # BLD AUTO: 8.1 10E3/UL (ref 4–11)

## 2023-01-24 PROCEDURE — 85027 COMPLETE CBC AUTOMATED: CPT | Performed by: INTERNAL MEDICINE

## 2023-01-24 PROCEDURE — 255N000002 HC RX 255 OP 636: Performed by: INTERNAL MEDICINE

## 2023-01-24 PROCEDURE — 80053 COMPREHEN METABOLIC PANEL: CPT | Performed by: INTERNAL MEDICINE

## 2023-01-24 PROCEDURE — 82150 ASSAY OF AMYLASE: CPT | Performed by: INTERNAL MEDICINE

## 2023-01-24 PROCEDURE — 250N000009 HC RX 250: Performed by: INTERNAL MEDICINE

## 2023-01-24 PROCEDURE — C2617 STENT, NON-COR, TEM W/O DEL: HCPCS | Performed by: INTERNAL MEDICINE

## 2023-01-24 PROCEDURE — 710N000012 HC RECOVERY PHASE 2, PER MINUTE: Performed by: INTERNAL MEDICINE

## 2023-01-24 PROCEDURE — 250N000011 HC RX IP 250 OP 636: Performed by: STUDENT IN AN ORGANIZED HEALTH CARE EDUCATION/TRAINING PROGRAM

## 2023-01-24 PROCEDURE — 272N000001 HC OR GENERAL SUPPLY STERILE: Performed by: INTERNAL MEDICINE

## 2023-01-24 PROCEDURE — 36415 COLL VENOUS BLD VENIPUNCTURE: CPT | Performed by: INTERNAL MEDICINE

## 2023-01-24 PROCEDURE — 85610 PROTHROMBIN TIME: CPT | Performed by: INTERNAL MEDICINE

## 2023-01-24 PROCEDURE — 258N000003 HC RX IP 258 OP 636: Performed by: NURSE ANESTHETIST, CERTIFIED REGISTERED

## 2023-01-24 PROCEDURE — 999N000181 XR SURGERY CARM FLUORO GREATER THAN 5 MIN W STILLS

## 2023-01-24 PROCEDURE — 250N000013 HC RX MED GY IP 250 OP 250 PS 637: Performed by: INTERNAL MEDICINE

## 2023-01-24 PROCEDURE — 250N000025 HC SEVOFLURANE, PER MIN: Performed by: INTERNAL MEDICINE

## 2023-01-24 PROCEDURE — 360N000082 HC SURGERY LEVEL 2 W/ FLUORO, PER MIN: Performed by: INTERNAL MEDICINE

## 2023-01-24 PROCEDURE — 272N000002 HC OR SUPPLY OTHER OPNP: Performed by: INTERNAL MEDICINE

## 2023-01-24 PROCEDURE — 370N000017 HC ANESTHESIA TECHNICAL FEE, PER MIN: Performed by: INTERNAL MEDICINE

## 2023-01-24 PROCEDURE — 710N000010 HC RECOVERY PHASE 1, LEVEL 2, PER MIN: Performed by: INTERNAL MEDICINE

## 2023-01-24 PROCEDURE — C1769 GUIDE WIRE: HCPCS | Performed by: INTERNAL MEDICINE

## 2023-01-24 PROCEDURE — 250N000009 HC RX 250: Performed by: NURSE ANESTHETIST, CERTIFIED REGISTERED

## 2023-01-24 PROCEDURE — 999N000141 HC STATISTIC PRE-PROCEDURE NURSING ASSESSMENT: Performed by: INTERNAL MEDICINE

## 2023-01-24 PROCEDURE — 83690 ASSAY OF LIPASE: CPT | Performed by: INTERNAL MEDICINE

## 2023-01-24 PROCEDURE — 250N000011 HC RX IP 250 OP 636: Performed by: NURSE ANESTHETIST, CERTIFIED REGISTERED

## 2023-01-24 DEVICE — IMPLANTABLE DEVICE
Type: IMPLANTABLE DEVICE | Site: PANCREATIC DUCT | Status: NON-FUNCTIONAL
Removed: 2023-03-30

## 2023-01-24 RX ORDER — NALOXONE HYDROCHLORIDE 0.4 MG/ML
0.4 INJECTION, SOLUTION INTRAMUSCULAR; INTRAVENOUS; SUBCUTANEOUS
Status: DISCONTINUED | OUTPATIENT
Start: 2023-01-24 | End: 2023-01-24 | Stop reason: HOSPADM

## 2023-01-24 RX ORDER — NALOXONE HYDROCHLORIDE 0.4 MG/ML
0.2 INJECTION, SOLUTION INTRAMUSCULAR; INTRAVENOUS; SUBCUTANEOUS
Status: DISCONTINUED | OUTPATIENT
Start: 2023-01-24 | End: 2023-01-24 | Stop reason: HOSPADM

## 2023-01-24 RX ORDER — PROPOFOL 10 MG/ML
INJECTION, EMULSION INTRAVENOUS PRN
Status: DISCONTINUED | OUTPATIENT
Start: 2023-01-24 | End: 2023-01-24

## 2023-01-24 RX ORDER — HYDROMORPHONE HCL IN WATER/PF 6 MG/30 ML
0.2 PATIENT CONTROLLED ANALGESIA SYRINGE INTRAVENOUS EVERY 5 MIN PRN
Status: DISCONTINUED | OUTPATIENT
Start: 2023-01-24 | End: 2023-01-24 | Stop reason: HOSPADM

## 2023-01-24 RX ORDER — MEPERIDINE HYDROCHLORIDE 25 MG/ML
12.5 INJECTION INTRAMUSCULAR; INTRAVENOUS; SUBCUTANEOUS EVERY 5 MIN PRN
Status: DISCONTINUED | OUTPATIENT
Start: 2023-01-24 | End: 2023-01-24 | Stop reason: HOSPADM

## 2023-01-24 RX ORDER — FLUMAZENIL 0.1 MG/ML
0.2 INJECTION, SOLUTION INTRAVENOUS
Status: DISCONTINUED | OUTPATIENT
Start: 2023-01-24 | End: 2023-01-24 | Stop reason: HOSPADM

## 2023-01-24 RX ORDER — FENTANYL CITRATE 50 UG/ML
INJECTION, SOLUTION INTRAMUSCULAR; INTRAVENOUS PRN
Status: DISCONTINUED | OUTPATIENT
Start: 2023-01-24 | End: 2023-01-24

## 2023-01-24 RX ORDER — INDOMETHACIN 50 MG/1
100 SUPPOSITORY RECTAL
Status: DISCONTINUED | OUTPATIENT
Start: 2023-01-24 | End: 2023-01-24 | Stop reason: HOSPADM

## 2023-01-24 RX ORDER — IOPAMIDOL 510 MG/ML
INJECTION, SOLUTION INTRAVASCULAR PRN
Status: DISCONTINUED | OUTPATIENT
Start: 2023-01-24 | End: 2023-01-24 | Stop reason: HOSPADM

## 2023-01-24 RX ORDER — INDOMETHACIN 50 MG/1
SUPPOSITORY RECTAL PRN
Status: DISCONTINUED | OUTPATIENT
Start: 2023-01-24 | End: 2023-01-24 | Stop reason: HOSPADM

## 2023-01-24 RX ORDER — ONDANSETRON 4 MG/1
4 TABLET, ORALLY DISINTEGRATING ORAL EVERY 30 MIN PRN
Status: DISCONTINUED | OUTPATIENT
Start: 2023-01-24 | End: 2023-01-24 | Stop reason: HOSPADM

## 2023-01-24 RX ORDER — EPHEDRINE SULFATE 50 MG/ML
INJECTION, SOLUTION INTRAMUSCULAR; INTRAVENOUS; SUBCUTANEOUS PRN
Status: DISCONTINUED | OUTPATIENT
Start: 2023-01-24 | End: 2023-01-24

## 2023-01-24 RX ORDER — FENTANYL CITRATE 50 UG/ML
25 INJECTION, SOLUTION INTRAMUSCULAR; INTRAVENOUS EVERY 5 MIN PRN
Status: DISCONTINUED | OUTPATIENT
Start: 2023-01-24 | End: 2023-01-24 | Stop reason: HOSPADM

## 2023-01-24 RX ORDER — LIDOCAINE HYDROCHLORIDE 20 MG/ML
INJECTION, SOLUTION INFILTRATION; PERINEURAL PRN
Status: DISCONTINUED | OUTPATIENT
Start: 2023-01-24 | End: 2023-01-24

## 2023-01-24 RX ORDER — ONDANSETRON 2 MG/ML
INJECTION INTRAMUSCULAR; INTRAVENOUS PRN
Status: DISCONTINUED | OUTPATIENT
Start: 2023-01-24 | End: 2023-01-24

## 2023-01-24 RX ORDER — HYDROMORPHONE HCL IN WATER/PF 6 MG/30 ML
0.4 PATIENT CONTROLLED ANALGESIA SYRINGE INTRAVENOUS EVERY 5 MIN PRN
Status: DISCONTINUED | OUTPATIENT
Start: 2023-01-24 | End: 2023-01-24 | Stop reason: HOSPADM

## 2023-01-24 RX ORDER — LIDOCAINE 40 MG/G
CREAM TOPICAL
Status: DISCONTINUED | OUTPATIENT
Start: 2023-01-24 | End: 2023-01-24 | Stop reason: HOSPADM

## 2023-01-24 RX ORDER — OXYCODONE HCL 10 MG/1
10 TABLET, FILM COATED, EXTENDED RELEASE ORAL EVERY 12 HOURS
Qty: 12 TABLET | Refills: 0 | Status: SHIPPED | OUTPATIENT
Start: 2023-01-24 | End: 2023-01-25 | Stop reason: ALTCHOICE

## 2023-01-24 RX ORDER — ONDANSETRON 4 MG/1
4 TABLET, ORALLY DISINTEGRATING ORAL EVERY 6 HOURS PRN
Status: DISCONTINUED | OUTPATIENT
Start: 2023-01-24 | End: 2023-01-24 | Stop reason: HOSPADM

## 2023-01-24 RX ORDER — SODIUM CHLORIDE, SODIUM LACTATE, POTASSIUM CHLORIDE, CALCIUM CHLORIDE 600; 310; 30; 20 MG/100ML; MG/100ML; MG/100ML; MG/100ML
INJECTION, SOLUTION INTRAVENOUS CONTINUOUS PRN
Status: DISCONTINUED | OUTPATIENT
Start: 2023-01-24 | End: 2023-01-24

## 2023-01-24 RX ORDER — ONDANSETRON 2 MG/ML
4 INJECTION INTRAMUSCULAR; INTRAVENOUS EVERY 6 HOURS PRN
Status: DISCONTINUED | OUTPATIENT
Start: 2023-01-24 | End: 2023-01-24 | Stop reason: HOSPADM

## 2023-01-24 RX ORDER — SODIUM CHLORIDE, SODIUM LACTATE, POTASSIUM CHLORIDE, CALCIUM CHLORIDE 600; 310; 30; 20 MG/100ML; MG/100ML; MG/100ML; MG/100ML
INJECTION, SOLUTION INTRAVENOUS CONTINUOUS
Status: DISCONTINUED | OUTPATIENT
Start: 2023-01-24 | End: 2023-01-24 | Stop reason: HOSPADM

## 2023-01-24 RX ORDER — DEXAMETHASONE SODIUM PHOSPHATE 4 MG/ML
INJECTION, SOLUTION INTRA-ARTICULAR; INTRALESIONAL; INTRAMUSCULAR; INTRAVENOUS; SOFT TISSUE PRN
Status: DISCONTINUED | OUTPATIENT
Start: 2023-01-24 | End: 2023-01-24

## 2023-01-24 RX ORDER — OXYCODONE HYDROCHLORIDE 10 MG/1
10 TABLET ORAL ONCE
Status: COMPLETED | OUTPATIENT
Start: 2023-01-24 | End: 2023-01-24

## 2023-01-24 RX ORDER — FENTANYL CITRATE 50 UG/ML
50 INJECTION, SOLUTION INTRAMUSCULAR; INTRAVENOUS EVERY 5 MIN PRN
Status: DISCONTINUED | OUTPATIENT
Start: 2023-01-24 | End: 2023-01-24 | Stop reason: HOSPADM

## 2023-01-24 RX ORDER — ONDANSETRON 2 MG/ML
4 INJECTION INTRAMUSCULAR; INTRAVENOUS EVERY 30 MIN PRN
Status: DISCONTINUED | OUTPATIENT
Start: 2023-01-24 | End: 2023-01-24 | Stop reason: HOSPADM

## 2023-01-24 RX ADMIN — OXYCODONE HYDROCHLORIDE 10 MG: 10 TABLET ORAL at 13:07

## 2023-01-24 RX ADMIN — FENTANYL CITRATE 25 MCG: 50 INJECTION, SOLUTION INTRAMUSCULAR; INTRAVENOUS at 11:50

## 2023-01-24 RX ADMIN — GLUCAGON 0.4 MG: KIT at 09:13

## 2023-01-24 RX ADMIN — FENTANYL CITRATE 25 MCG: 50 INJECTION, SOLUTION INTRAMUSCULAR; INTRAVENOUS at 11:34

## 2023-01-24 RX ADMIN — ONDANSETRON 4 MG: 2 INJECTION INTRAMUSCULAR; INTRAVENOUS at 11:00

## 2023-01-24 RX ADMIN — SUGAMMADEX 200 MG: 100 INJECTION, SOLUTION INTRAVENOUS at 10:02

## 2023-01-24 RX ADMIN — SODIUM CHLORIDE, POTASSIUM CHLORIDE, SODIUM LACTATE AND CALCIUM CHLORIDE: 600; 310; 30; 20 INJECTION, SOLUTION INTRAVENOUS at 08:21

## 2023-01-24 RX ADMIN — Medication 50 MG: at 08:24

## 2023-01-24 RX ADMIN — Medication 5 MG: at 08:44

## 2023-01-24 RX ADMIN — ONDANSETRON 4 MG: 2 INJECTION INTRAMUSCULAR; INTRAVENOUS at 08:49

## 2023-01-24 RX ADMIN — LIDOCAINE HYDROCHLORIDE 100 MG: 20 INJECTION, SOLUTION INFILTRATION; PERINEURAL at 08:23

## 2023-01-24 RX ADMIN — PROPOFOL 180 MG: 10 INJECTION, EMULSION INTRAVENOUS at 08:23

## 2023-01-24 RX ADMIN — DEXAMETHASONE SODIUM PHOSPHATE 4 MG: 4 INJECTION, SOLUTION INTRA-ARTICULAR; INTRALESIONAL; INTRAMUSCULAR; INTRAVENOUS; SOFT TISSUE at 08:30

## 2023-01-24 RX ADMIN — FENTANYL CITRATE 25 MCG: 50 INJECTION, SOLUTION INTRAMUSCULAR; INTRAVENOUS at 10:35

## 2023-01-24 RX ADMIN — Medication 5 MG: at 08:41

## 2023-01-24 RX ADMIN — FENTANYL CITRATE 100 MCG: 50 INJECTION, SOLUTION INTRAMUSCULAR; INTRAVENOUS at 08:23

## 2023-01-24 ASSESSMENT — ACTIVITIES OF DAILY LIVING (ADL)
ADLS_ACUITY_SCORE: 18

## 2023-01-24 ASSESSMENT — LIFESTYLE VARIABLES: TOBACCO_USE: 1

## 2023-01-24 NOTE — ANESTHESIA CARE TRANSFER NOTE
Patient: Laura Gonzalez    Procedure: Procedure(s):  enteroscopy assisted ENDOSCOPIC RETROGRADE CHOLANGIOPANCREATOGRAPHY, with pancreatic stents replaced times 2       Diagnosis: Encounter for pancreatic duct stent exchange [Z46.59]  Diagnosis Additional Information: No value filed.    Anesthesia Type:   General     Note:    Oropharynx: oropharynx clear of all foreign objects  Level of Consciousness: drowsy  Oxygen Supplementation: face mask  Level of Supplemental Oxygen (L/min / FiO2): 8  Independent Airway: airway patency satisfactory and stable  Dentition: dentition unchanged  Vital Signs Stable: post-procedure vital signs reviewed and stable  Report to RN Given: handoff report given  Patient transferred to: PACU    Handoff Report: Identifed the Patient, Identified the Reponsible Provider, Reviewed the pertinent medical history, Discussed the surgical course, Reviewed Intra-OP anesthesia mangement and issues during anesthesia, Set expectations for post-procedure period and Allowed opportunity for questions and acknowledgement of understanding      Vitals:  Vitals Value Taken Time   BP     Temp     Pulse     Resp     SpO2         Electronically Signed By: ITZ Chao CRNA  January 24, 2023  10:16 AM

## 2023-01-24 NOTE — DISCHARGE INSTRUCTIONS
[facility]  Same-Day Surgery   Adult Discharge Orders & Instructions     For 24 hours after surgery    Get plenty of rest.  A responsible adult must stay with you for at least 24 hours after you leave the hospital.   Do not drive or use heavy equipment.  If you have weakness or tingling, don't drive or use heavy equipment until this feeling goes away.  Do not drink alcohol.  Avoid strenuous or risky activities.  Ask for help when climbing stairs.   You may feel lightheaded.  IF so, sit for a few minutes before standing.  Have someone help you get up.   If you have nausea (feel sick to your stomach): Drink only clear liquids such as apple juice, ginger ale, broth or 7-Up.  Rest may also help.  Be sure to drink enough fluids.  Move to a regular diet as you feel able.  You may have a slight fever. Call the doctor if your fever is over 100 F (37.7 C) (taken under the tongue) or lasts longer than 24 hours.  You may have a dry mouth, a sore throat, muscle aches or trouble sleeping.  These should go away after 24 hours.  Do not make important or legal decisions.   Call your doctor for any of the followin.  Signs of infection (fever, growing tenderness at the surgery site, a large amount of drainage or bleeding, severe pain, foul-smelling drainage, redness, swelling).    2. It has been over 8 to 10 hours since surgery and you are still not able to urinate (pass water).    3.  Headache for over 24 hours.    4.  Numbness, tingling or weakness the day after surgery (if you had spinal anesthesia).  To contact a doctor, call Dr Noguera's clinic at [phone] or:    '   [phone] and ask for the resident on call for Gastroenterology    '   Emergency Department:    [facility]: [phone]       (TTY for hearing impaired: [phone])    [facility]: [phone]       (TTY for hearing impaired: [phone])

## 2023-01-24 NOTE — ANESTHESIA POSTPROCEDURE EVALUATION
Patient: Laura Gonzalez    Procedure: Procedure(s):  enteroscopy assisted ENDOSCOPIC RETROGRADE CHOLANGIOPANCREATOGRAPHY, with pancreatic stents replaced times 2       Anesthesia Type:  General    Note:  Disposition: Outpatient   Postop Pain Control: Uneventful            Sign Out: Well controlled pain   PONV: No   Neuro/Psych: Uneventful            Sign Out: Acceptable/Baseline neuro status   Airway/Respiratory: Uneventful            Sign Out: Acceptable/Baseline resp. status   CV/Hemodynamics: Uneventful            Sign Out: Acceptable CV status; No obvious hypovolemia; No obvious fluid overload   Other NRE: NONE   DID A NON-ROUTINE EVENT OCCUR? No           Last vitals:  Vitals Value Taken Time   /53 01/24/23 1134   Temp 36.5  C (97.7  F) 01/24/23 1100   Pulse 65 01/24/23 1144   Resp 12 01/24/23 1144   SpO2 96 % 01/24/23 1144   Vitals shown include unvalidated device data.    Electronically Signed By: Guy Liriano MD  January 24, 2023  11:46 AM

## 2023-01-24 NOTE — ANESTHESIA PREPROCEDURE EVALUATION
Anesthesia Pre-Procedure Evaluation    Patient: Laura Gonzalez   MRN: [id number] : 1950        Procedure : Procedure(s):  ENDOSCOPIC RETROGRADE CHOLANGIOPANCREATOGRAPHY          Past Medical History:   Diagnosis Date     Asthma     pt.states no longer has symptoms     CAD (coronary artery disease)      HLD (hyperlipidemia)       Past Surgical History:   Procedure Laterality Date     APPENDECTOMY       ARTHROPLASTY HIP Left 6/15/2016    Procedure: ARTHROPLASTY HIP;  Surgeon: Jeffy Wolff MD;  Location: [facility] OR     COLONOSCOPY  10/3/2013    Procedure: COMBINED COLONOSCOPY, SINGLE BIOPSY/POLYPECTOMY BY BIOPSY;;  Surgeon: Kenney Walls MD;  Location:  GI     ENDOSCOPIC RETROGRADE CHOLANGIOPANCREATOGRAM N/A 2022    Procedure: ENDOSCOPIC RETROGRADE CHOLANGIOPANCREATOGRAPHY WITH PANCREATIC DUCT DILATION, DEBRIS REMOVAL AND STENT PLACEMENT;  Surgeon: Arnaldo Noguera MD;  Location: [facility] OR     ENDOSCOPIC RETROGRADE CHOLANGIOPANCREATOGRAM N/A 10/13/2022    Procedure: ENDOSCOPIC RETROGRADE CHOLANGIOPANCREATOGRAPHY, biliary stent placement;  Surgeon: Arnaldo Noguera MD;  Location: [facility] OR     ENDOSCOPIC RETROGRADE CHOLANGIOPANCREATOGRAM N/A 2022    Procedure: ENDOSCOPIC RETROGRADE CHOLANGIOPANCREATOGRAPHY, WITH TUBE OR STENT INSERTION, with balloon dialation;  Surgeon: Johnson Gotti MD;  Location: [facility] OR     ENDOSCOPIC ULTRASOUND UPPER GASTROINTESTINAL TRACT (GI) N/A 2022    Procedure: ENDOSCOPIC ULTRASOUND WITH PANCREATICOGASTROSTOMY CREATION, TRACT DILATION, STENT PLACEMENT;  Surgeon: Romaine Oates MD;  Location: [facility] OR     ESOPHAGOSCOPY, GASTROSCOPY, DUODENOSCOPY (EGD), COMBINED N/A 2022    Procedure: ESOPHAGOGASTRODUODENOSCOPY WITH ENDOSCOPIC CLIP PLACEMENT;  Surgeon: Arnaldo Noguera MD;  Location: [facility] OR     FOOT SURGERY       HC TOOTH EXTRACTION W/FORCEP       HYSTERECTOMY       INCISION AND DRAINAGE BREAST Left 2022    Procedure: evacuate hematoma  left  BREAST;  Surgeon: Dayron Garcia MD;  Location: [facility] OR     IR FOLLOW UP VISIT OUTPATIENT  2022     IR SINOGRAM INJECTION DIAGNOSTIC  2022      No Known Allergies   Social History     Tobacco Use     Smoking status: Former     Types: Cigarettes     Quit date: 10/3/1988     Years since quittin.3     Smokeless tobacco: Never   Substance Use Topics     Alcohol use: No      Wt Readings from Last 1 Encounters:   23 68.9 kg (151 lb 14.4 oz)        Anesthesia Evaluation   Pt has had prior anesthetic. Type: General and Regional.    No history of anesthetic complications       ROS/MED HX  ENT/Pulmonary:     (+) tobacco use, Past use, Intermittent, asthma     Neurologic:  - neg neurologic ROS  (-) no CVA   Cardiovascular:     (+) Dyslipidemia --CAD ---Previous cardiac testing   Echo: Date: 22 Results:  No significant valvular abnormalities were noted. No pathologic basis for murmur identified.    Global and regional left ventricular function is normal with an EF of 60-65%.  Left ventricular wall thickness is normal. Left ventricular size is normal.  Left ventricular diastolic function is normal. No regional wall motion abnormalities are seen.  Stress Test: Date: Results:    ECG Reviewed: Date: 22 Results:  Sinus bradycardia, HR 53 bpm  Cath: Date: Results:   (-) angina, past MI, angina, past MI, murmur and wheezes   METS/Exercise Tolerance: >4 METS    Hematologic:       Musculoskeletal:  - neg musculoskeletal ROS     GI/Hepatic: Comment: Recurrent Pancreatitis in setting of stricture s/p pylorus-preserving Whipple 2019 as well as ERCP and stenting x 3 on 10/13/22. [place]31/17/22 for acute on chronic pancreatitis.       Renal/Genitourinary:  - neg Renal ROS  (-) renal disease   Endo:  - neg endo ROS  (-) Type II DM and thyroid disease   Psychiatric/Substance Use:  - neg psychiatric ROS     Infectious Disease:    (-) Recent Fever   Malignancy:  - neg malignancy ROS     Other:             Physical Exam    Airway        Mallampati: II   TM distance: > 3 FB   Neck ROM: full   Mouth opening: > 3 cm    Respiratory Devices and Support         Dental  no notable dental history         Cardiovascular   cardiovascular exam normal       Rhythm and rate: regular and normal (-) no systolic click and no murmur    Pulmonary   pulmonary exam normal        breath sounds clear to auscultation   (-) no wheezes        OUTSIDE LABS:  CBC:   Lab Results   Component Value Date    WBC 8.1 01/24/2023    WBC 6.2 11/21/2022    HGB 12.4 01/24/2023    HGB 11.5 (L) 11/21/2022    HCT 40.7 01/24/2023    HCT 36.7 11/21/2022     01/24/2023     11/21/2022     BMP:   Lab Results   Component Value Date     01/24/2023     12/30/2022    POTASSIUM 3.9 01/24/2023    POTASSIUM 4.5 12/30/2022    CHLORIDE 104 01/24/2023    CHLORIDE 103 12/30/2022    CO2 24 01/24/2023    CO2 26 12/30/2022    BUN 14.1 01/24/2023    BUN 17.1 12/30/2022    CR 0.83 01/24/2023    CR 0.86 12/30/2022     (H) 01/24/2023    GLC 90 12/30/2022     COAGS:   Lab Results   Component Value Date    PTT 41 (H) 02/18/2022    INR 1.01 01/24/2023     POC:   Lab Results   Component Value Date     (H) 06/16/2016     HEPATIC:   Lab Results   Component Value Date    ALBUMIN 3.8 01/24/2023    PROTTOTAL 6.7 01/24/2023    ALT 16 01/24/2023    AST 34 01/24/2023    ALKPHOS 148 (H) 01/24/2023    BILITOTAL 0.5 01/24/2023     OTHER:   Lab Results   Component Value Date    PH 7.53 (H) 06/05/2022    LACT 0.7 06/05/2022    GLEN 8.6 (L) 01/24/2023    LIPASE 21 01/24/2023    AMYLASE 77 01/24/2023    TSH 0.96 06/05/2022    CRP 55.40 (H) 11/17/2022    SED 40 (H) 06/05/2022       Anesthesia Plan    ASA Status:  3   NPO Status:  NPO Appropriate    Anesthesia Type: General.     - Airway: ETT   Induction: Intravenous.   Maintenance: Balanced.        Consents    Anesthesia Plan(s) and associated risks, benefits, and realistic alternatives discussed.  Questions answered and patient/representative(s) expressed understanding.     - Discussed: Risks, Benefits and Alternatives for BOTH SEDATION and the PROCEDURE were discussed     - Discussed with:  Patient         Postoperative Care    Pain management: IV analgesics, Multi-modal analgesia.   PONV prophylaxis: Ondansetron (or other 5HT-3), Dexamethasone or Solumedrol     Comments:                    Christopher J. Behrens, MD

## 2023-01-24 NOTE — BRIEF OP NOTE
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 Underwent EUS guided pancreaticogastrostomy, dilation of stenotic pancreaticojejunal anastamosis, and placement of through and through stent from stomach through pancreatic duct into jejunum. Developed post procedure pancreatitis, and a retrograstic collection/abcess requiring percutaneous drainage. Eventually recovered, then w spontaneous recurrent episode of pancreatitis. She had a successful retrograde ERCP and stent placement across the anastomosis subsequently. Stents have spontaneously passed out previously. Then she was recently admitted with acute pancreatitis due to likely reobstruction at the pancreaticojejunostomy anastomosis. She underwent an enteroscopy assisted ERCP with the aid of a pediatric colonoscope with a cap. She was found to have stenotic pancreaticojejunostomy which was dilated to 4 mm. She had 4 Fr x 4 cm SPT Zimmon stent and one 5 Fr x 3 cm sofflex Geenen stent placed across the pancreaticojejunostomy. She has done well since stent placement. Recent KUB showed that both stents have spontaneously passed. She presents today for further evaluation.     Pre-operative diagnosis: Encounter for pancreatic duct stent exchange [Z46.59]  Post-operative diagnosis Same as pre-operative diagnosis    Procedure: Procedure(s):  enteroscopy assisted ENDOSCOPIC RETROGRADE CHOLANGIOPANCREATOGRAPHY, with pancreatic stents replaced times 2  Surgeon: Surgeon(s) and Role:     * Arnaldo Noguera MD - Primary     * Rene Sun MD  Anesthesia: General   Estimated Blood Loss: None    Drains: None  Specimens: * No specimens in log *  Findings:   - Post pylorus sparing Whipple with widely patent biliary anastamosis   - Partially stenosed pancreaticojejunostomy, accessed with a retrograde cap fitted  pediatric coloonoscope   - Dual stents placed without balloon dilation, meant to stay in place for more extended period   - A 4 fr x 6 cm Zimmon pancreatic stent and 4 Fr x 4 cm Zimmon pancreatic stent were placed side by side into the ventral pancreatic duct        Complications:   None.  Implants:   Implant Name Type Inv. Item Serial No.  Lot No. LRB No. Used Action   STENT ZIMMON PANCREA [id number] SGL PIGTAIL [id number] - [id number] Stent STENT ZIMMON PANCREA [id number] SGL PIGTAIL [id number]  [employer] [id number] N/A 1 Implanted   STENT ZIMMON PANCREA [id number] SGL PIGTAIL [id number] - [id number] Stent STENT ZIMMON PANCREA [id number] SGL PIGTAIL [id number]  [employer] [id number] N/A 1 Implanted and Explanted   STENT ZIMMON PANCREA [id number] SGL PIGTAIL [id number] - [id number] Stent STENT ZIMMON PANCREA [id number] SGL PIGTAIL [id number]  [employer] [id number] N/A 2 Wasted   STENT ZIMMON PANCREA [id number] SGL PIGTAIL [id number] - [id number] Stent STENT ZIMMON PANCREA [id number] SGL PIGTAIL [id number]  [employer] [id number] N/A 1 Implanted       Recommendations  - Observe patient in same day observation unit for ongoing care with plans for discharge to home later today   - Will check Amylase and Lipase 2 hours post procedure  - Will give script for oxycodone PRN   - She will follow up with Dr. Noguera in clinic   - The findings and recommendations were discussed with the patient and their family

## 2023-01-24 NOTE — ANESTHESIA PROCEDURE NOTES
Airway       Patient location during procedure: OR       Procedure Start/Stop Times: 1/24/2023 8:26 AM  Staff -        CRNA: Lesia Bermeo APRN CRNA       Performed By: CRNA  Consent for Airway        Urgency: elective  Indications and Patient Condition       Indications for airway management: amy-procedural       Induction type:intravenous       Mask difficulty assessment: 1 - vent by mask    Final Airway Details       Final airway type: endotracheal airway       Successful airway: ETT - single  Endotracheal Airway Details        ETT size (mm): 7.0       Cuffed: yes       Successful intubation technique: direct laryngoscopy       DL Blade Type: De La Cruz 2       Grade View of Cords: 1       Position: Right       Measured from: lips       Secured at (cm): 21       Bite block used: None    Post intubation assessment        Placement verified by: capnometry        Number of attempts at approach: 1       Secured with: silk tape       Ease of procedure: easy       Dentition: Intact and Unchanged       Dental guard used and removed.    Medication(s) Administered   Medication Administration Time: 1/24/2023 8:26 AM

## 2023-01-25 ENCOUNTER — PATIENT OUTREACH (OUTPATIENT)
Dept: GASTROENTEROLOGY | Facility: CLINIC | Age: 73
End: 2023-01-25
Payer: COMMERCIAL

## 2023-01-25 ENCOUNTER — INFUSION THERAPY VISIT (OUTPATIENT)
Dept: INFUSION THERAPY | Facility: CLINIC | Age: 73
End: 2023-01-25
Attending: INTERNAL MEDICINE
Payer: COMMERCIAL

## 2023-01-25 VITALS
SYSTOLIC BLOOD PRESSURE: 143 MMHG | DIASTOLIC BLOOD PRESSURE: 72 MMHG | HEART RATE: 54 BPM | RESPIRATION RATE: 16 BRPM | OXYGEN SATURATION: 97 % | TEMPERATURE: 98.1 F

## 2023-01-25 DIAGNOSIS — K86.1 ACUTE ON CHRONIC PANCREATITIS (H): Primary | ICD-10-CM

## 2023-01-25 DIAGNOSIS — K85.90 ACUTE ON CHRONIC PANCREATITIS (H): Primary | ICD-10-CM

## 2023-01-25 PROCEDURE — 250N000011 HC RX IP 250 OP 636: Performed by: INTERNAL MEDICINE

## 2023-01-25 PROCEDURE — 96374 THER/PROPH/DIAG INJ IV PUSH: CPT

## 2023-01-25 PROCEDURE — 258N000003 HC RX IP 258 OP 636: Performed by: INTERNAL MEDICINE

## 2023-01-25 PROCEDURE — 96361 HYDRATE IV INFUSION ADD-ON: CPT

## 2023-01-25 RX ORDER — HEPARIN SODIUM (PORCINE) LOCK FLUSH IV SOLN 100 UNIT/ML 100 UNIT/ML
5 SOLUTION INTRAVENOUS
Status: CANCELLED | OUTPATIENT
Start: 2023-01-26

## 2023-01-25 RX ORDER — OXYCODONE HYDROCHLORIDE 5 MG/1
5 TABLET ORAL EVERY 6 HOURS PRN
Qty: 12 TABLET | Refills: 0 | Status: SHIPPED | OUTPATIENT
Start: 2023-01-25 | End: 2023-01-28

## 2023-01-25 RX ORDER — ONDANSETRON 2 MG/ML
4 INJECTION INTRAMUSCULAR; INTRAVENOUS ONCE
Status: CANCELLED | OUTPATIENT
Start: 2023-01-26 | End: 2023-01-26

## 2023-01-25 RX ORDER — ONDANSETRON 2 MG/ML
4 INJECTION INTRAMUSCULAR; INTRAVENOUS ONCE
Status: COMPLETED | OUTPATIENT
Start: 2023-01-25 | End: 2023-01-25

## 2023-01-25 RX ORDER — HEPARIN SODIUM,PORCINE 10 UNIT/ML
5 VIAL (ML) INTRAVENOUS
Status: CANCELLED | OUTPATIENT
Start: 2023-01-26

## 2023-01-25 RX ADMIN — ONDANSETRON 4 MG: 2 INJECTION INTRAMUSCULAR; INTRAVENOUS at 11:01

## 2023-01-25 RX ADMIN — SODIUM CHLORIDE, POTASSIUM CHLORIDE, SODIUM LACTATE AND CALCIUM CHLORIDE 1000 ML: 600; 310; 30; 20 INJECTION, SOLUTION INTRAVENOUS at 11:01

## 2023-01-25 NOTE — LETTER
Date:January 26, 2023      Provider requested that no letter be sent. Do not send.       [facility]

## 2023-01-25 NOTE — TELEPHONE ENCOUNTER
Plan after ERCP with Dr. Noguera 1/24/23    Needs   1) Follow-up xray in 6 weeks   2) Pain clinic referral - Dr Zambrano would be ideal   3) Health Psych referral   4) IV fluid protocol, renewed   5) CT in 6 weeks after xray and stents out, pancreas protocol   6) Follow-up in clinic in 2 months, sooner if issues       Discussed with patient. Oxycontin ordered post procedure not allowed per insurance. Med discontinued. 5mg oxy, 12 tabs, ordered and printed in clinic. Will deliver to [facility] pharmacy.     [facility]   [facility] [place] [facility]   [address], [address], [place] [address]   Phone: [phone] / Fax: [phone]   Pharmacy: [phone] / Fax: [phone]   Infusion Scheduling Phone: [phone]   Mon-Fri: 7:30 a.m.-5:30 p.m.   Sat: 7 a.m. - 3:30 p.m./ Sun: 7 a.m. - 3:30 p.m.     [facility]   [facility] [place] [id number], [address], [place] [address]   Phone: [phone] / Fax: [phone]   Pharmacy: [phone] / Fax: [phone]   Mon-Fri: 8 a.m. - 4 p.m.     [place]   [facility]   [facility], [address]   [address] [place] [address]   Phone: [phone] / Fax: [phone]   Pharmacy: [phone] / Fax: [phone]   Mon-Fri: 8 a.m. - 4:30 p.m.      [place]   [facility], [address]   [address] [place] [address]   Phone: [phone] / Fax: [phone]   Pharmacy: [phone] / Fax: [phone]   Mon-Fri: 8 a.m. - 4:30 p.m.     [facility]   [facility]    [address], [address], [place] [address]    Phone: [phone]    Pharmacy: [phone] / Fax: [phone]    Mon-Fri: 7 a.m. - 7 p.m.    Sat.-Sun. 7 a.m. - 2  p.m.

## 2023-01-25 NOTE — LETTER
1/25/2023         RE: Laura Gonzalez  [address]  [place] [address]        Dear Colleague,    Thank you for referring your patient, Laura Gonzalez, to the [facility]. Please see a copy of my visit note below.    Nursing Note  [facility]Lura Gonzalez [facility]:   Chief Complaint   Patient presents with     Infusion     IVF     During today's [facility] appointment, orders from Dr. Noguera were completed.  Frequency: 3 times weekly as needed    Progress note:  Patient identification verified by name and date of birth.  Assessment completed.  Vitals recorded in Doc Flowsheets.  Patient was provided with education regarding medication/procedure and possible side effects.  Patient verbalized understanding.     present during visit today: Not Applicable.    Treatment Conditions: Non-applicable.    Infusion length and rate:  infusion given over approximately 1.5 hours    Labs: were not ordered for this appointment. Lipase drawn yesterday    Vascular access: peripheral IV placed today.    Is the next appt scheduled? yes    Post Infusion Assessment:  Patient tolerated infusion without incident.     Discharge Plan:   Follow up plan of care with: ongoing infusions at [facility].  Discharge instructions were reviewed with patient.  Patient/representative verbalized understanding of discharge instructions and all questions answered.  Patient discharged from [facility] in stable condition.    Missy Lucia RN    Administrations This Visit     lactated ringers BOLUS 1,000-1,500 mL     Admin Date  01/25/2023 Action  New Bag Dose  1,000 mL Rate  999 mL/hr Route  Intravenous Administered By  Missy Lucia RN          ondansetron (ZOFRAN) injection 4 mg     Admin Date  01/25/2023 Action  Given Dose  4 mg  Route  Intravenous Administered By  Missy Lucia RN                Again, thank you for allowing me to participate in the care of your patient.        Sincerely,        Specialty Infusion Nurse

## 2023-01-26 ENCOUNTER — CARE COORDINATION (OUTPATIENT)
Dept: GASTROENTEROLOGY | Facility: CLINIC | Age: 73
End: 2023-01-26
Payer: COMMERCIAL

## 2023-01-26 ENCOUNTER — HOSPITAL ENCOUNTER (EMERGENCY)
Facility: CLINIC | Age: 73
Discharge: HOME | End: 2023-01-26
Attending: EMERGENCY MEDICINE | Admitting: EMERGENCY MEDICINE
Payer: COMMERCIAL

## 2023-01-26 ENCOUNTER — APPOINTMENT (OUTPATIENT)
Dept: CT IMAGING | Facility: CLINIC | Age: 73
End: 2023-01-26
Payer: COMMERCIAL

## 2023-01-26 VITALS
OXYGEN SATURATION: 98 % | SYSTOLIC BLOOD PRESSURE: 106 MMHG | HEART RATE: 57 BPM | RESPIRATION RATE: 18 BRPM | DIASTOLIC BLOOD PRESSURE: 64 MMHG | TEMPERATURE: 99.3 F

## 2023-01-26 DIAGNOSIS — U07.1 LAB TEST POSITIVE FOR DETECTION OF COVID-19 VIRUS: ICD-10-CM

## 2023-01-26 DIAGNOSIS — K83.09 CHOLANGITIS (H): Primary | ICD-10-CM

## 2023-01-26 DIAGNOSIS — R10.13 ABDOMINAL PAIN, EPIGASTRIC: ICD-10-CM

## 2023-01-26 DIAGNOSIS — Z98.890 HISTORY OF ERCP: ICD-10-CM

## 2023-01-26 LAB
ALBUMIN SERPL BCG-MCNC: 3.4 G/DL (ref 3.5–5.2)
ALBUMIN UR-MCNC: NEGATIVE MG/DL
ALP SERPL-CCNC: 132 U/L (ref 35–104)
ALT SERPL W P-5'-P-CCNC: 23 U/L (ref 10–35)
ANION GAP SERPL CALCULATED.3IONS-SCNC: 7 MMOL/L (ref 7–15)
APPEARANCE UR: CLEAR
AST SERPL W P-5'-P-CCNC: 34 U/L (ref 10–35)
BASOPHILS # BLD AUTO: 0 10E3/UL (ref 0–0.2)
BASOPHILS NFR BLD AUTO: 0 %
BILIRUB SERPL-MCNC: <0.2 MG/DL
BILIRUB UR QL STRIP: NEGATIVE
BUN SERPL-MCNC: 10.6 MG/DL (ref 8–23)
CALCIUM SERPL-MCNC: 8.2 MG/DL (ref 8.8–10.2)
CHLORIDE SERPL-SCNC: 103 MMOL/L (ref 98–107)
COLOR UR AUTO: ABNORMAL
CREAT SERPL-MCNC: 0.87 MG/DL (ref 0.51–0.95)
DEPRECATED HCO3 PLAS-SCNC: 28 MMOL/L (ref 22–29)
EOSINOPHIL # BLD AUTO: 0 10E3/UL (ref 0–0.7)
EOSINOPHIL NFR BLD AUTO: 0 %
ERYTHROCYTE [DISTWIDTH] IN BLOOD BY AUTOMATED COUNT: 14.7 % (ref 10–15)
GFR SERPL CREATININE-BSD FRML MDRD: 70 ML/MIN/1.73M2
GLUCOSE SERPL-MCNC: 92 MG/DL (ref 70–99)
GLUCOSE UR STRIP-MCNC: NEGATIVE MG/DL
HCT VFR BLD AUTO: 35.7 % (ref 35–47)
HGB BLD-MCNC: 11 G/DL (ref 11.7–15.7)
HGB UR QL STRIP: NEGATIVE
HOLD SPECIMEN: NORMAL
IMM GRANULOCYTES # BLD: 0 10E3/UL
IMM GRANULOCYTES NFR BLD: 0 %
KETONES UR STRIP-MCNC: NEGATIVE MG/DL
LACTATE SERPL-SCNC: 0.7 MMOL/L (ref 0.7–2)
LEUKOCYTE ESTERASE UR QL STRIP: NEGATIVE
LIPASE SERPL-CCNC: 44 U/L (ref 13–60)
LYMPHOCYTES # BLD AUTO: 0.9 10E3/UL (ref 0.8–5.3)
LYMPHOCYTES NFR BLD AUTO: 20 %
MCH RBC QN AUTO: 28.6 PG (ref 26.5–33)
MCHC RBC AUTO-ENTMCNC: 30.8 G/DL (ref 31.5–36.5)
MCV RBC AUTO: 93 FL (ref 78–100)
MONOCYTES # BLD AUTO: 0.7 10E3/UL (ref 0–1.3)
MONOCYTES NFR BLD AUTO: 15 %
NEUTROPHILS # BLD AUTO: 2.9 10E3/UL (ref 1.6–8.3)
NEUTROPHILS NFR BLD AUTO: 65 %
NITRATE UR QL: NEGATIVE
NRBC # BLD AUTO: 0 10E3/UL
NRBC BLD AUTO-RTO: 0 /100
PH UR STRIP: 7.5 [PH] (ref 5–7)
PLATELET # BLD AUTO: 229 10E3/UL (ref 150–450)
POTASSIUM SERPL-SCNC: 3.8 MMOL/L (ref 3.4–5.3)
PROT SERPL-MCNC: 6.1 G/DL (ref 6.4–8.3)
RBC # BLD AUTO: 3.84 10E6/UL (ref 3.8–5.2)
RBC URINE: 0 /HPF
SARS-COV-2 RNA RESP QL NAA+PROBE: POSITIVE
SODIUM SERPL-SCNC: 138 MMOL/L (ref 136–145)
SP GR UR STRIP: 1 (ref 1–1.03)
SQUAMOUS EPITHELIAL: 2 /HPF
UROBILINOGEN UR STRIP-MCNC: NORMAL MG/DL
WBC # BLD AUTO: 4.5 10E3/UL (ref 4–11)
WBC URINE: 0 /HPF

## 2023-01-26 PROCEDURE — 99207 PR APP CREDIT; MD BILLING SHARED VISIT: CPT

## 2023-01-26 PROCEDURE — 258N000003 HC RX IP 258 OP 636

## 2023-01-26 PROCEDURE — 74177 CT ABD & PELVIS W/CONTRAST: CPT | Mod: 26 | Performed by: RADIOLOGY

## 2023-01-26 PROCEDURE — 99239 HOSP IP/OBS DSCHRG MGMT >30: CPT | Mod: FS | Performed by: INTERNAL MEDICINE

## 2023-01-26 PROCEDURE — 85025 COMPLETE CBC W/AUTO DIFF WBC: CPT

## 2023-01-26 PROCEDURE — 96365 THER/PROPH/DIAG IV INF INIT: CPT | Performed by: EMERGENCY MEDICINE

## 2023-01-26 PROCEDURE — C9803 HOPD COVID-19 SPEC COLLECT: HCPCS | Performed by: EMERGENCY MEDICINE

## 2023-01-26 PROCEDURE — 120N000002 HC R&B MED SURG/OB UMMC

## 2023-01-26 PROCEDURE — 96375 TX/PRO/DX INJ NEW DRUG ADDON: CPT | Mod: 59 | Performed by: EMERGENCY MEDICINE

## 2023-01-26 PROCEDURE — 96361 HYDRATE IV INFUSION ADD-ON: CPT | Performed by: EMERGENCY MEDICINE

## 2023-01-26 PROCEDURE — 36415 COLL VENOUS BLD VENIPUNCTURE: CPT | Performed by: INTERNAL MEDICINE

## 2023-01-26 PROCEDURE — 71260 CT THORAX DX C+: CPT | Mod: 26 | Performed by: RADIOLOGY

## 2023-01-26 PROCEDURE — 99221 1ST HOSP IP/OBS SF/LOW 40: CPT | Mod: GC | Performed by: INTERNAL MEDICINE

## 2023-01-26 PROCEDURE — 250N000011 HC RX IP 250 OP 636

## 2023-01-26 PROCEDURE — 83605 ASSAY OF LACTIC ACID: CPT | Performed by: EMERGENCY MEDICINE

## 2023-01-26 PROCEDURE — 74177 CT ABD & PELVIS W/CONTRAST: CPT

## 2023-01-26 PROCEDURE — 81001 URINALYSIS AUTO W/SCOPE: CPT

## 2023-01-26 PROCEDURE — 96376 TX/PRO/DX INJ SAME DRUG ADON: CPT | Performed by: EMERGENCY MEDICINE

## 2023-01-26 PROCEDURE — 80053 COMPREHEN METABOLIC PANEL: CPT

## 2023-01-26 PROCEDURE — U0005 INFEC AGEN DETEC AMPLI PROBE: HCPCS | Performed by: EMERGENCY MEDICINE

## 2023-01-26 PROCEDURE — 96366 THER/PROPH/DIAG IV INF ADDON: CPT | Performed by: EMERGENCY MEDICINE

## 2023-01-26 PROCEDURE — 250N000011 HC RX IP 250 OP 636: Performed by: EMERGENCY MEDICINE

## 2023-01-26 PROCEDURE — 36415 COLL VENOUS BLD VENIPUNCTURE: CPT | Performed by: EMERGENCY MEDICINE

## 2023-01-26 PROCEDURE — 87040 BLOOD CULTURE FOR BACTERIA: CPT | Mod: XS | Performed by: INTERNAL MEDICINE

## 2023-01-26 PROCEDURE — 87040 BLOOD CULTURE FOR BACTERIA: CPT

## 2023-01-26 PROCEDURE — 99285 EMERGENCY DEPT VISIT HI MDM: CPT | Mod: CS,25 | Performed by: EMERGENCY MEDICINE

## 2023-01-26 PROCEDURE — 99285 EMERGENCY DEPT VISIT HI MDM: CPT | Mod: CS | Performed by: EMERGENCY MEDICINE

## 2023-01-26 PROCEDURE — 83690 ASSAY OF LIPASE: CPT

## 2023-01-26 RX ORDER — METRONIDAZOLE 500 MG/1
500 TABLET ORAL EVERY 8 HOURS
Qty: 30 TABLET | Refills: 0 | Status: CANCELLED | OUTPATIENT
Start: 2023-01-26 | End: 2023-02-05

## 2023-01-26 RX ORDER — IOPAMIDOL 755 MG/ML
85 INJECTION, SOLUTION INTRAVASCULAR ONCE
Status: COMPLETED | OUTPATIENT
Start: 2023-01-26 | End: 2023-01-26

## 2023-01-26 RX ORDER — ONDANSETRON 2 MG/ML
4 INJECTION INTRAMUSCULAR; INTRAVENOUS EVERY 6 HOURS PRN
Status: DISCONTINUED | OUTPATIENT
Start: 2023-01-26 | End: 2023-01-26 | Stop reason: HOSPADM

## 2023-01-26 RX ORDER — HYDROMORPHONE HYDROCHLORIDE 1 MG/ML
0.5 INJECTION, SOLUTION INTRAMUSCULAR; INTRAVENOUS; SUBCUTANEOUS
Status: DISCONTINUED | OUTPATIENT
Start: 2023-01-26 | End: 2023-01-26 | Stop reason: HOSPADM

## 2023-01-26 RX ORDER — CIPROFLOXACIN 500 MG/1
500 TABLET, FILM COATED ORAL 2 TIMES DAILY
Qty: 20 TABLET | Refills: 0 | Status: CANCELLED | OUTPATIENT
Start: 2023-01-26 | End: 2023-02-05

## 2023-01-26 RX ORDER — HYDROMORPHONE HYDROCHLORIDE 1 MG/ML
0.5 INJECTION, SOLUTION INTRAMUSCULAR; INTRAVENOUS; SUBCUTANEOUS ONCE
Status: COMPLETED | OUTPATIENT
Start: 2023-01-26 | End: 2023-01-26

## 2023-01-26 RX ORDER — PIPERACILLIN SODIUM, TAZOBACTAM SODIUM 3; .375 G/15ML; G/15ML
3.38 INJECTION, POWDER, LYOPHILIZED, FOR SOLUTION INTRAVENOUS EVERY 6 HOURS
Status: DISCONTINUED | OUTPATIENT
Start: 2023-01-26 | End: 2023-01-26 | Stop reason: HOSPADM

## 2023-01-26 RX ADMIN — IOPAMIDOL 85 ML: 755 INJECTION, SOLUTION INTRAVENOUS at 12:49

## 2023-01-26 RX ADMIN — HYDROMORPHONE HYDROCHLORIDE 0.5 MG: 1 INJECTION, SOLUTION INTRAMUSCULAR; INTRAVENOUS; SUBCUTANEOUS at 20:12

## 2023-01-26 RX ADMIN — HYDROMORPHONE HYDROCHLORIDE 0.5 MG: 1 INJECTION, SOLUTION INTRAMUSCULAR; INTRAVENOUS; SUBCUTANEOUS at 17:35

## 2023-01-26 RX ADMIN — PIPERACILLIN AND TAZOBACTAM 3.38 G: 3; .375 INJECTION, POWDER, FOR SOLUTION INTRAVENOUS at 12:26

## 2023-01-26 RX ADMIN — HYDROMORPHONE HYDROCHLORIDE 0.5 MG: 1 INJECTION, SOLUTION INTRAMUSCULAR; INTRAVENOUS; SUBCUTANEOUS at 12:27

## 2023-01-26 RX ADMIN — HYDROMORPHONE HYDROCHLORIDE 0.5 MG: 1 INJECTION, SOLUTION INTRAMUSCULAR; INTRAVENOUS; SUBCUTANEOUS at 15:42

## 2023-01-26 RX ADMIN — SODIUM CHLORIDE, POTASSIUM CHLORIDE, SODIUM LACTATE AND CALCIUM CHLORIDE 500 ML: 600; 310; 30; 20 INJECTION, SOLUTION INTRAVENOUS at 18:28

## 2023-01-26 RX ADMIN — HYDROMORPHONE HYDROCHLORIDE 0.5 MG: 1 INJECTION, SOLUTION INTRAMUSCULAR; INTRAVENOUS; SUBCUTANEOUS at 14:09

## 2023-01-26 ASSESSMENT — ACTIVITIES OF DAILY LIVING (ADL)
ADLS_ACUITY_SCORE: 35

## 2023-01-26 NOTE — CONSULTS
GASTROENTEROLOGY CONSULTATION      Date of Admission:  1/26/2023  Requesting physician: Preet Heredia DO           Reason for Consultation:   Fever and abdominal pain post-ERCP           ASSESSMENT AND RECOMMENDATIONS:   Assessment:  Laura Gonzalez is a 72 year old female with a very complex history of recurrent pancreatitis outlined below who presents with fever (102) and worsening abdominal pain 2 days after ERCP.     She had a pylorus sparing Whipple for a large duodenal polyp (5/2019) c/b pancreaticojejunum anastamosis stenosis causing recurrent pancreatitis requiring multiple endoscopic procedures for pancreatic duct dilation. In 5/2022, She underwent EUS guided pancreaticogastrostomy with transgastric access into pancreatic duct for dilation and palcement of through and through stent from stomach through pancreatic duct into jejunum. She subsequently developed pancreatitis with retrogastric collection/abcess requiring perc drainage. Since then, she has had recurrent epsodes of pancreatitis due to re-obstruction at the pancreaticojejunum anastomosis. Most recent ERCP was done on 1/24/2023 by Dr. Noguera during which side by side pancreatic stents were placed. After the patient was discharged, it was noted that a previous pancreatic stent placed on 11/21/22 had migrated to the biliary duct. Decision was made to repeat xray in 6 weeks and remove the biliary stent if remains.     Her fever and rigor are concerning for infection, either transient bacteriemia vs cholangitis due to retained stent in the bile duct. Labs were unremarkable. CT did not show new intra-abdominal abscess/fluid collection.        #.S/p Wipple for duodenal malignnacy  # Pancreaticojejunum anastomosis stenosis, chronic  # Recurrent pancreatitis due to stenosis as above   # Fever concerning for post-ERCP infection       Recommendations   - Continue zosyn   - Plan for ERCP to remove the retained stent in the biliary duct  (migrated from the pancreatic duct) and potentially place another pancreatic stent (the second pancreatic stent self-ejected already) tmr 1/27  - NPO at [place]  - Pain management per primary team       Thank you for involving us in this patient's care. Please do not hesitate to contact the GI service with any questions or concerns.     Pt care plan discussed with Dr. Gotti, GI staff physician and Dr. Noguera.      Madalyn Martinez MD PhD  GI Fellow   [phone]   -------------------------------------------------------------------------------------------------------------------           History of Present Illness:   Laura Gonzalez is a 72 year old female with a very complex history of recurrent pancreatitis outlined below who presents with fever (102) and worsening abdominal pain 2 days after ERCP.     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 In 5/2022, She underwent EUS guided pancreaticogastrostomy with transgastric access into pancreatic duct for dilation and palcement of through and through stent from stomach through pancreatic duct into jejunum. She subsequently developed pancreatitis with retrogastric collection/abcess requiring perc drainage. Since then, she has been recurrent epsodes of pancreatitis due to re-obstruction at the pancreaticojejunum anastomosis.     Most recent ERCP was done on 1/24/2023 by Dr. Noguera during which side by side pancreatic stents were placed. It was noted that a previous pancreatic stent placed on 11/21/22 had migrated to the biliary duct. Decision was made to repeat xray in 6 weeks and remove the biliary stent if remains.     On 1/25, she developed chills and rigor and then had a fever of 102.9. She did have some worsening abdominal pain but not unusual as she typically has worsening pain after ERCP. She took tylenol and  Aleve prior to presenting to the ED. No cough or sick contact.             Past Medical History:   Reviewed and edited as appropriate  Past Medical History:   Diagnosis Date     Asthma     pt.states no longer has symptoms     CAD (coronary artery disease)      HLD (hyperlipidemia)             Past Surgical History:   Reviewed and edited as appropriate   Past Surgical History:   Procedure Laterality Date     APPENDECTOMY       ARTHROPLASTY HIP Left 6/15/2016    Procedure: ARTHROPLASTY HIP;  Surgeon: Jeffy Wolff MD;  Location: [facility] OR     COLONOSCOPY  10/3/2013    Procedure: COMBINED COLONOSCOPY, SINGLE BIOPSY/POLYPECTOMY BY BIOPSY;;  Surgeon: Kenney Walls MD;  Location: [facility] GI     ENDOSCOPIC RETROGRADE CHOLANGIOPANCREATOGRAM N/A 6/16/2022    Procedure: ENDOSCOPIC RETROGRADE CHOLANGIOPANCREATOGRAPHY WITH PANCREATIC DUCT DILATION, DEBRIS REMOVAL AND STENT PLACEMENT;  Surgeon: Arnaldo Noguera MD;  Location: [facility] OR     ENDOSCOPIC RETROGRADE CHOLANGIOPANCREATOGRAM N/A 10/13/2022    Procedure: ENDOSCOPIC RETROGRADE CHOLANGIOPANCREATOGRAPHY, biliary stent placement;  Surgeon: Arnaldo Noguera MD;  Location: [facility] OR     ENDOSCOPIC RETROGRADE CHOLANGIOPANCREATOGRAM N/A 11/21/2022    Procedure: ENDOSCOPIC RETROGRADE CHOLANGIOPANCREATOGRAPHY, WITH TUBE OR STENT INSERTION, with balloon dialation;  Surgeon: Johnson Gotti MD;  Location: [facility]     ENDOSCOPIC RETROGRADE CHOLANGIOPANCREATOGRAPHY, EXCHANGE TUBE/STENT N/A 1/24/2023    Procedure: enteroscopy assisted ENDOSCOPIC RETROGRADE CHOLANGIOPANCREATOGRAPHY, with pancreatic stents replaced times 2;  Surgeon: Arnaldo Noguera MD;  Location: [facility] OR     ENDOSCOPIC ULTRASOUND UPPER GASTROINTESTINAL TRACT (GI) N/A 5/12/2022    Procedure: ENDOSCOPIC ULTRASOUND WITH PANCREATICOGASTROSTOMY CREATION, TRACT DILATION, STENT PLACEMENT;  Surgeon: Romaine Oates MD;  Location: [facility]     ESOPHAGOSCOPY, GASTROSCOPY, DUODENOSCOPY (EGD), COMBINED N/A 5/12/2022     Procedure: ESOPHAGOGASTRODUODENOSCOPY WITH ENDOSCOPIC CLIP PLACEMENT;  Surgeon: Arnaldo Noguera MD;  Location: [facility] OR     FOOT SURGERY       HC TOOTH EXTRACTION W/FORCEP       HYSTERECTOMY       INCISION AND DRAINAGE BREAST Left 2022    Procedure: evacuate hematoma left  BREAST;  Surgeon: Dayron Garcia MD;  Location: [facility] OR     IR FOLLOW UP VISIT OUTPATIENT  2022     IR SINOGRAM INJECTION DIAGNOSTIC  2022            Social History:   Reviewed and edited as appropriate  Social History     Socioeconomic History     Marital status:      Spouse name: Not on file     Number of children: Not on file     Years of education: Not on file     Highest education level: Not on file   Occupational History     Not on file   Tobacco Use     Smoking status: Former     Types: Cigarettes     Quit date: 10/3/1988     Years since quittin.3     Smokeless tobacco: Never   Substance and Sexual Activity     Alcohol use: No     Drug use: No     Sexual activity: Not on file   Other Topics Concern     Parent/sibling w/ CABG, MI or angioplasty before 65F 55M? Not Asked   Social History Narrative     Not on file     Social Determinants of Health     Financial Resource Strain: Not on file   Food Insecurity: Not on file   Transportation Needs: Not on file   Physical Activity: Not on file   Stress: Not on file   Social Connections: Not on file   Intimate Partner Violence: Not on file   Housing Stability: Not on file            Family History:   Reviewed and edited as appropriate  No family history on file.   No known history of colorectal cancer, liver disease, or inflammatory bowel disease.         Allergies:   Reviewed and edited as appropriate   No Known Allergies         Medications:   (Not in a hospital admission)            Review of Systems:     A complete 10 point review of systems was performed and is negative except as noted in the HPI           Physical Exam:   /64   Pulse 57   Temp 99.3   F (37.4  C) (Oral)   Resp 18   SpO2 98%   Wt:   Wt Readings from Last 2 Encounters:   01/24/23 68.9 kg (151 lb 14.4 oz)   12/14/22 66.7 kg (147 lb)      Constitutional: No acute distress, resting comfortably in bed  Eyes: Sclera anicteric  Ears/nose/mouth/throat: Moist mucus membranes, hearing intact  Neck: supple  CV: No edema  Respiratory: Breathing comfortably on room air  Abd: Soft, nontender, nondistended, bowel sounds present  Skin: warm, perfused, no jaundice  Neuro: AAO x 3  Psych: Normal affect  MSK: No gross deformities         Data:   Labs and imaging below were independently reviewed and interpreted    BMP  Recent Labs   Lab 01/24/23  0637      POTASSIUM 3.9   CHLORIDE 104   GLEN 8.6*   CO2 24   BUN 14.1   CR 0.83   *     CBC  Recent Labs   Lab 01/24/23  0637   WBC 8.1   RBC 4.38   HGB 12.4   HCT 40.7   MCV 93   MCH 28.3   MCHC 30.5*   RDW 14.5        INR  Recent Labs   Lab 01/24/23  0637   INR 1.01     LFTs  Recent Labs   Lab 01/24/23  0637   ALKPHOS 148*   AST 34   ALT 16   BILITOTAL 0.5   PROTTOTAL 6.7   ALBUMIN 3.8      PANC  Recent Labs   Lab 01/24/23  1043 01/24/23  0637   LIPASE 95* 21   AMYLASE 79 77       Imaging:   CT reviewed. Formal read pending.     Endoscopy:   ERCP 1/24/23  Impression:            - Post pylorus sparing Whipple with narrower limb to                          efferent limb, wide open limb to affernet                          biliary/pancreatic limb, with widely patent biliary                          anastamosis                          - Not appreciated until review of flouroscopy fiolms                          after procedure done and pt discharged, was a 5 F 3cm                          pancreatic stent placed 11/22/2022 had apparently                          migrated up into bile duct, surrounded by gas.                          - Partially stenosed pancreaticojejunostomy, accessed                          with a retrograde cap fitted pediatric  coloonoscope                          - Irregular remnant pancreatic duct with definite                          stricture mid body/tail junction, with narrower                          upstream duct, traversed w 018 wire and 4F Zimmon                          stent as below.                          - Dual stents placed without balloon dilation, longer                          one through mid duct stenosis, second would not pass                          deeply, meant to stay in place for more extended period                          - A 6 fr x 4 cm Zimmon pancreatic stent and 4 Fr x 4                          cm Zimmon pancreatic stent were placed side by side                          into the remnant post Whipple pancreatic duct

## 2023-01-26 NOTE — ED TRIAGE NOTES
Tuesday ERCP stent placement in pancreas   Last night experienced uncontrollable shakes, fever 102 - tried Tylenol and continued to feel chills.  Temp today 99.3

## 2023-01-26 NOTE — DISCHARGE SUMMARY
[facility]  Hospitalist Discharge Summary      Date of Admission:  1/26/2023  Date of Discharge:  1/26/2023  Discharging Provider: Alexi Geiger PA-C, Esteban Chaidez MD  Discharge Service: Hospitalist Service, GOLD TEAM     Discharge Diagnoses    COVID-19 Infection  Abdominal Pain, improving  S/P Wipple for duodenal malignnacy  Pancreaticojejunum anastomosis stenosis, chronic  Recurrent pancreatitis due to stenosis as above   Fever concerning for post-ERCP infection     Follow-ups Needed After Discharge   Follow-up with GI as directed after ERCP, now being rescheduled due to COVID.    Follow-up with PCP within the next week or two following brief hospitalization.    Unresulted Labs Ordered in the Past 30 Days of this Admission     Date and Time Order Name Status Description    1/26/2023  5:46 PM Blood Culture Arm, Right In process     1/26/2023 11:45 AM Blood Culture Peripheral Blood In process       These results will be followed up by GI and/or PCP.    Discharge Disposition   Discharged to home  Condition at discharge: Stable    Hospital Course   Laura Gonzalez is a 72 year old female with a very complex history of recurrent pancreatitis, CAD, HLD, asthma who presented to the ED with fever (102F) and worsening abdominal pain 2 days after ERCP. She was briefly admitted, but then discharged later that day given her clinical stability and wish to discharge home and to present for ERCP the following day.     She had a pylorus sparing Whipple for a large duodenal polyp (5/2019) c/b pancreaticojejunum anastamosis stenosis causing recurrent pancreatitis requiring multiple endoscopic procedures for pancreatic duct dilation. In 5/2022, She underwent EUS guided pancreaticogastrostomy with transgastric access into pancreatic duct for dilation and palcement of through and through stent from stomach through pancreatic duct into jejunum. She subsequently developed pancreatitis  with retrogastric collection/abcess requiring perc drainage. Since then, she has had recurrent epsodes of pancreatitis due to re-obstruction at the pancreaticojejunum anastomosis. Most recent ERCP was done on 1/24/2023 by Dr. Noguera during which side by side pancreatic stents were placed. After the patient was discharged, it was noted that a previous pancreatic stent placed on 11/21/22 had migrated to the biliary duct, so GI saw and recommend ERCP for stent removal (with potential exchange) on 1/27/23, however, this was complicated by the fact that she tested + for COVID just prior to discharge. Thus, ERCP was rescheduled and patient was discharged home in stable condition with presumed source of fever being COVID as opposed to cholangitis.      Labs were unremarkable. CT did not show new intra-abdominal abscess/fluid collection. BCx are pending at discharge, but given VSS, afebrile here, not tachycardic, not hypotensive, low suspicion for SIRS/sepsis, making discharge home less of a risky option. However, still informed patient to return as according to the AVS should her symptoms progress or she develops tachycardia or persistent fevers.    Consultations This Hospital Stay   GI PANCREATICOBILIARY ADULT IP CONSULT    Code Status   Prior    Time Spent on this Encounter   I, Alexi Geiger PA-C, personally saw the patient today and spent less than or equal to 30 minutes discharging this patient.       Alexi Geiger PA-C  [facility] EMERGENCY DEPARTMENT  [address] [address]  Phone: [phone]  ______________________________________________________________________    Physical Exam   Vital Signs: Temp: 99.3  F (37.4  C) Temp src: Oral BP: 106/64 Pulse: 57   Resp: 18 SpO2: 98 % O2 Device: None (Room air)    Weight: 0 lbs 0 oz  Constitutional: awake, alert, cooperative, no apparent distress, and appears stated age  Eyes: lids and lashes normal, sclera clear and conjunctiva normal  ENT:  normocepalic, without obvious abnormality  Respiratory: Nonlabored breathing on RA, no stridor, no wheezing.   Cardiovascular: Appears well-perfused, not in obvious cardiac distress.  GI: Non-distended.  Skin: no bruising or bleeding, no redness, warmth, or swelling, no rashes and no lesions on visualized skin.  Musculoskeletal: No deformities.  Neurologic: Moving all extremities equally and spontaneously. No obvious focal neuro deficits.  Neuropsychiatric: General: normal, calm and normal eye contact  Level of consciousness: alert / normal  Affect: normal and pleasant  Memory and insight: normal, memory for past and recent events intact and thought process normal       Primary Care Physician   MIQUEL ANDERS    Discharge Orders      Reason for your hospital stay    You were seen in the ER for acute onset abdominal pain. One of your pancreatic stents was found to have migrated into your biliary tract. GI saw you and recommended undergoing an ERCP for management of this, which is scheduled for tomorrow (1/27/23) at 4pm.     Activity    Your activity upon discharge: activity as tolerated     Adult [facility] Follow-up and recommended labs and tests    Follow up as scheduled with Gastroenterology regarding rescheduling your ERCP.    Follow up with your PCP within the next 7 days to evaluate following hospitalization.    Appointments on [place] and/or [facility] (with [facility] or [facility] provider or service). Call [phone] if you haven't heard regarding these appointments within 7 days of discharge.     Discharge Instructions    Call 911 or go to the ER immediately if you develop the following symptoms: persistent fevers, worsening abdominal pain, nausea and vomiting (that prevent you from keeping anything down), blood in stool, black tarry stools, chest pain, shortness of breath, dizziness, lightheadedness.     Diet    Follow this diet upon discharge: Regular diet.       Significant Results and Procedures   Most  Recent 3 CBC's:  Recent Labs   Lab Test 01/26/23  1108 01/24/23  0637 11/21/22  1051   WBC 4.5 8.1 6.2   HGB 11.0* 12.4 11.5*   MCV 93 93 93    327 322     Most Recent 3 BMP's:  Recent Labs   Lab Test 01/26/23  1108 01/24/23  0637 12/30/22  1130    139 141   POTASSIUM 3.8 3.9 4.5   CHLORIDE 103 104 103   CO2 28 24 26   BUN 10.6 14.1 17.1   CR 0.87 0.83 0.86   ANIONGAP 7 11 12   GLEN 8.2* 8.6* 9.0   GLC 92 108* 90     Most Recent 2 LFT's:  Recent Labs   Lab Test 01/26/23  1108 01/24/23  0637   AST 34 34   ALT 23 16   ALKPHOS 132* 148*   BILITOTAL <0.2 0.5   ,   Results for orders placed or performed during the hospital encounter of 01/26/23   CT Chest/Abdomen/Pelvis w Contrast    Narrative    EXAMINATION: CT CHEST/ABDOMEN/PELVIS W CONTRAST, 1/26/2023 1:05 PM    TECHNIQUE:  Helical CT images from the thoracic inlet through the  symphysis pubis were obtained with IV contrast. Contrast dose:    COMPARISON: 11/18/    HISTORY: 72 year old woman with abdominal pain and fever post ERCP    FINDINGS:  CHEST:  LUNGS: Central tracheobronchial tree is patent. Azygous lobe. No  pneumothorax or pleural effusion. No focal airspace opacity. No  suspicious pulmonary nodule.    MEDIASTINUM: Heart size is within normal limits. No pericardial  effusion. Ascending aorta and main pulmonary artery diameters are  within normal limits. Normal appearance and configuration of the great  vessels off of the aortic arch. No suspicious mediastinal, hilar, or  axillary lymph nodes.     Visualized thyroid and esophagus are unremarkable.    ABDOMEN/PELVIS:  LIVER: No suspicious focal hepatic lesion. Well-circumscribed too  small to characterize low-density lesion in the right dome, series 3  image 133 likely a cyst, stable.    BILIARY: No calcified intraluminal gallstone. No intrahepatic or  extrahepatic biliary ductal dilation. Pneumobilia.    PANCREAS: Pancreatic stent is in place. Normal parenchymal  enhancement. No peripancreatic fat  stranding. No peripancreatic fluid  collection. No focal pancreatic lesion. The main pancreatic duct is  not dilated.    SPLEEN: Within normal limits.    ADRENAL GLANDS: No focal adrenal nodule.    URINARY TRACT: No suspicious renal lesion. No hydronephrosis or  hydroureter. No renal stone. Small antidependent foci of gas within  the bladder, the bladder is otherwise unremarkable.    REPRODUCTIVE ORGANS: Within normal limits.    STOMACH: Within normal limits.    BOWEL: Surgical changes of Whipple procedure. Normal caliber large and  small bowel. No abnormal bowel wall thickening or enhancement.  Appendix is not visualized but no pericecal inflammatory changes are  present.    PERITONEUM/FLUID: No ascites or pelvic free fluid.    VESSELS: No aneurysmal dilatation of the abdominal aorta.  The portal,  splenic, and superior mesenteric veins are patent.  The origins of the  celiac and superior mesenteric arteries are patent. Moderate  atherosclerosis of the abdominal aorta.    LYMPH NODES: No lymphadenopathy.    BONES/SOFT TISSUES: No aggressive osseous lesions. Scoliosis on  coronal images. Left total hip arthroplasty. Bilateral breast  implants.      Impression    IMPRESSION:   1. Small foci of air within the bladder may represent sequela of  instrumentation or urinary tract infection. Recommend correlation with  urinalysis.  2. No acute findings in the chest or abdomen.   3. No findings suggestive of pancreatitis.  Pancreatic stent in place.  4. Postsurgical changes of Whipple procedure.    I have personally reviewed the examination and initial interpretation  and I agree with the findings.    MELODIE HATCH MD         SYSTEM ID:  [id number]       Discharge Medications   Discharge Medication List as of 1/26/2023  8:27 PM      CONTINUE these medications which have NOT CHANGED    Details   amylase-lipase-protease (CREON) 82794-68904 units CPEP per EC capsule Take 2-3 with meals / 1-2 with snacks, up to 15 per day.,  Disp-450 capsule, R-6, E-Prescribe      atorvastatin (LIPITOR) 40 MG tablet Take 40 mg by mouth At Bedtime , Historical      calcium-magnesium (CALMAG) 500-250 MG TABS Take 2 tablets by mouth daily, Historical      cholecalciferol (VITAMIN D3) 25 mcg (1000 units) capsule Take 1 capsule by mouth daily, Historical      evolocumab (REPATHA) 140 MG/ML prefilled autoinjector Inject 140 mg Subcutaneous every 14 days, Historical      ezetimibe (ZETIA) 10 MG tablet Take 10 mg by mouth At Bedtime, Historical      LORazepam (ATIVAN) 0.5 MG tablet Take 1 tablet (0.5 mg) by mouth 2 times daily as needed for anxiety Take 1-2 tablets by mouth twice daily as needed for anxiety, Disp-15 tablet, R-0, Local Print      Magnesium Ascorbate POWD One teaspoon at bedtime, Historical      montelukast (SINGULAIR) 10 MG tablet Take 10 mg by mouth At Bedtime , Historical      ondansetron (ZOFRAN ODT) 4 MG ODT tab Take 1 tablet (4 mg) by mouth every 6 hours as needed for nausea or vomiting, Disp-20 tablet, R-0, E-Prescribe      oxyCODONE (ROXICODONE) 5 MG tablet Take 1 tablet (5 mg) by mouth every 6 hours as needed for pain, Disp-12 tablet, R-0, Local Print      polyethylene glycol (MIRALAX) 17 GM/Dose powder Take 17 g by mouth 2 times daily as needed, Disp-510 g, R-3, E-Prescribe      senna-docusate (SENOKOT-S/PERICOLACE) 8.6-50 MG tablet Take 1 tablet by mouth 2 times daily as needed for constipation, Disp-30 tablet, R-3, E-Prescribe      valACYclovir (VALTREX) 1000 mg tablet Take 1,000 mg by mouth daily as needed (cold sore), Historical      VITAMIN A PO Take 3,000 Units by mouth daily, Historical      zolpidem (AMBIEN) 10 MG tablet Take 1 tablet by mouth nightly as needed for sleep, Historical           Allergies   No Known Allergies

## 2023-01-26 NOTE — PROGRESS NOTES
Pt called in, had rough night, shaking/chills, temp increasing, last temp 102.    ERCP On 1/24. Pt had IVF yesterday. Advised pt to come to ER for assessment/triage.    ML

## 2023-01-26 NOTE — ED PROVIDER NOTES
ED Provider Note  [facility]      History     Chief Complaint   Patient presents with     Post-op Problem     HPI  Laura Gonzalez is a 72 year old female with history of acute-on-chronic pancreatitis status post pylorus-preserving whipple 5/2019 as well as ERCP and stenting on 01/24/23 who presented with abdominal pain and fever following ERCP done on Tuesday.   According to patient, symptoms started early hours of this morning with fever and abdominal pain located in upper abdomen.  She describes  abdominal pain as a sharp pain in her upper abdomen that radiates to her back . No associated relieving or aggravating factor noted. There is  associated  nausea, denies vomiting diarrhea urinary symptoms.  She also denies chest pain shortness of breath cough or recent sore throats.       She denies alcohol intake or smoking cigarettes    Past Medical History  Past Medical History:   Diagnosis Date     Asthma     pt.states no longer has symptoms     CAD (coronary artery disease)      HLD (hyperlipidemia)      Past Surgical History:   Procedure Laterality Date     APPENDECTOMY       ARTHROPLASTY HIP Left 6/15/2016    Procedure: ARTHROPLASTY HIP;  Surgeon: Jeffy Wolff MD;  Location: [facility] OR     COLONOSCOPY  10/3/2013    Procedure: COMBINED COLONOSCOPY, SINGLE BIOPSY/POLYPECTOMY BY BIOPSY;;  Surgeon: Kenney Walls MD;  Location:  GI     ENDOSCOPIC RETROGRADE CHOLANGIOPANCREATOGRAM N/A 6/16/2022    Procedure: ENDOSCOPIC RETROGRADE CHOLANGIOPANCREATOGRAPHY WITH PANCREATIC DUCT DILATION, DEBRIS REMOVAL AND STENT PLACEMENT;  Surgeon: Arnaldo Noguera MD;  Location: [facility] OR     ENDOSCOPIC RETROGRADE CHOLANGIOPANCREATOGRAM N/A 10/13/2022    Procedure: ENDOSCOPIC RETROGRADE CHOLANGIOPANCREATOGRAPHY, biliary stent placement;  Surgeon: Arnaldo Noguera MD;  Location: [facility] OR     ENDOSCOPIC RETROGRADE CHOLANGIOPANCREATOGRAM N/A 11/21/2022    Procedure: ENDOSCOPIC RETROGRADE  CHOLANGIOPANCREATOGRAPHY, WITH TUBE OR STENT INSERTION, with balloon dialation;  Surgeon: Johnson Gotti MD;  Location: [facility] OR     ENDOSCOPIC RETROGRADE CHOLANGIOPANCREATOGRAPHY, EXCHANGE TUBE/STENT N/A 1/24/2023    Procedure: enteroscopy assisted ENDOSCOPIC RETROGRADE CHOLANGIOPANCREATOGRAPHY, with pancreatic stents replaced times 2;  Surgeon: Arnaldo Noguera MD;  Location: [facility] OR     ENDOSCOPIC ULTRASOUND UPPER GASTROINTESTINAL TRACT (GI) N/A 5/12/2022    Procedure: ENDOSCOPIC ULTRASOUND WITH PANCREATICOGASTROSTOMY CREATION, TRACT DILATION, STENT PLACEMENT;  Surgeon: Romaine Oates MD;  Location: [facility] OR     ESOPHAGOSCOPY, GASTROSCOPY, DUODENOSCOPY (EGD), COMBINED N/A 5/12/2022    Procedure: ESOPHAGOGASTRODUODENOSCOPY WITH ENDOSCOPIC CLIP PLACEMENT;  Surgeon: Arnaldo Noguera MD;  Location: [facility] OR     FOOT SURGERY       HC TOOTH EXTRACTION W/FORCEP       HYSTERECTOMY       INCISION AND DRAINAGE BREAST Left 2/18/2022    Procedure: evacuate hematoma left  BREAST;  Surgeon: Dayron Garcia MD;  Location: [facility] OR     IR FOLLOW UP VISIT OUTPATIENT  5/31/2022     IR SINOGRAM INJECTION DIAGNOSTIC  5/31/2022     oxyCODONE (ROXICODONE) 5 MG tablet  amylase-lipase-protease (CREON) 47385-99227 units CPEP per EC capsule  atorvastatin (LIPITOR) 40 MG tablet  calcium-magnesium (CALMAG) 500-250 MG TABS  cholecalciferol (VITAMIN D3) 25 mcg (1000 units) capsule  evolocumab (REPATHA) 140 MG/ML prefilled autoinjector  ezetimibe (ZETIA) 10 MG tablet  LORazepam (ATIVAN) 0.5 MG tablet  Magnesium Ascorbate POWD  montelukast (SINGULAIR) 10 MG tablet  ondansetron (ZOFRAN ODT) 4 MG ODT tab  polyethylene glycol (MIRALAX) 17 GM/Dose powder  senna-docusate (SENOKOT-S/PERICOLACE) 8.6-50 MG tablet  valACYclovir (VALTREX) 1000 mg tablet  VITAMIN A PO  zolpidem (AMBIEN) 10 MG tablet      No Known Allergies  Family History  No family history on file.  Social History   Social History     Tobacco Use     Smoking status: Former      Types: Cigarettes     Quit date: 10/3/1988     Years since quittin.3     Smokeless tobacco: Never   Substance Use Topics     Alcohol use: No     Drug use: No         A medically appropriate review of systems was performed with pertinent positives and negatives noted in the HPI, and all other systems negative.    Physical Exam   BP: 106/64  Pulse: 57  Temp: 99.3  F (37.4  C)  Resp: 18  SpO2: 98 %  Physical Exam  General:  Alert, not in respiratory or painful distress, Not toxic looking, afebrile, not pale, not dehydrated.  HEENT: Normocephalic/atraumatic  CV: RRR, S1S2, no mumur  Lungs: CTAB, no wheezing/crackles, no cough. Patient breathing comfortably on room air without increased respiratory effort or accessory muscle use.  Abd: Bowel sounds present, soft nondistended with RUQ/ epigastric tenderness to palpation without rebound   Skin: no rashes, cyanosis, or jaundice on exposed skin regions  Psych: Appropriately conversant    ED Course, Procedures, & Data      Procedures                Results for orders placed or performed during the hospital encounter of 23   CT Chest/Abdomen/Pelvis w Contrast     Status: None    Narrative    EXAMINATION: CT CHEST/ABDOMEN/PELVIS W CONTRAST, 2023 1:05 PM    TECHNIQUE:  Helical CT images from the thoracic inlet through the  symphysis pubis were obtained with IV contrast. Contrast dose:    COMPARISON: /    HISTORY: 72 year old woman with abdominal pain and fever post ERCP    FINDINGS:  CHEST:  LUNGS: Central tracheobronchial tree is patent. Azygous lobe. No  pneumothorax or pleural effusion. No focal airspace opacity. No  suspicious pulmonary nodule.    MEDIASTINUM: Heart size is within normal limits. No pericardial  effusion. Ascending aorta and main pulmonary artery diameters are  within normal limits. Normal appearance and configuration of the great  vessels off of the aortic arch. No suspicious mediastinal, hilar, or  axillary lymph nodes.      Visualized thyroid and esophagus are unremarkable.    ABDOMEN/PELVIS:  LIVER: No suspicious focal hepatic lesion. Well-circumscribed too  small to characterize low-density lesion in the right dome, series 3  image 133 likely a cyst, stable.    BILIARY: No calcified intraluminal gallstone. No intrahepatic or  extrahepatic biliary ductal dilation. Pneumobilia.    PANCREAS: Pancreatic stent is in place. Normal parenchymal  enhancement. No peripancreatic fat stranding. No peripancreatic fluid  collection. No focal pancreatic lesion. The main pancreatic duct is  not dilated.    SPLEEN: Within normal limits.    ADRENAL GLANDS: No focal adrenal nodule.    URINARY TRACT: No suspicious renal lesion. No hydronephrosis or  hydroureter. No renal stone. Small antidependent foci of gas within  the bladder, the bladder is otherwise unremarkable.    REPRODUCTIVE ORGANS: Within normal limits.    STOMACH: Within normal limits.    BOWEL: Surgical changes of Whipple procedure. Normal caliber large and  small bowel. No abnormal bowel wall thickening or enhancement.  Appendix is not visualized but no pericecal inflammatory changes are  present.    PERITONEUM/FLUID: No ascites or pelvic free fluid.    VESSELS: No aneurysmal dilatation of the abdominal aorta.  The portal,  splenic, and superior mesenteric veins are patent.  The origins of the  celiac and superior mesenteric arteries are patent. Moderate  atherosclerosis of the abdominal aorta.    LYMPH NODES: No lymphadenopathy.    BONES/SOFT TISSUES: No aggressive osseous lesions. Scoliosis on  coronal images. Left total hip arthroplasty. Bilateral breast  implants.      Impression    IMPRESSION:   1. Small foci of air within the bladder may represent sequela of  instrumentation or urinary tract infection. Recommend correlation with  urinalysis.  2. No acute findings in the chest or abdomen.   3. No findings suggestive of pancreatitis.  Pancreatic stent in place.  4. Postsurgical  changes of Whipple procedure.    I have personally reviewed the examination and initial interpretation  and I agree with the findings.    MELODIE HATCH MD         SYSTEM ID:  [id number]   [place] Draw     Status: None    Narrative    The following orders were created for panel order [place] Draw.  Procedure                               Abnormality         Status                     ---------                               -----------         ------                     Extra Blue Top Tube[[id number]]                              Final result               Extra Red Top Tube[[id number]]                               Final result               Extra Green Top (Lithium...[[id number]]                      Final result               Extra Purple Top Tube[[id number]]                            Final result                 Please view results for these tests on the individual orders.   Extra Blue Top Tube     Status: None   Result Value Ref Range    Hold Specimen [facility]    Extra Red Top Tube     Status: None   Result Value Ref Range    Hold Specimen J[facility]    Extra Green Top (Lithium Heparin) Tube     Status: None   Result Value Ref Range    Hold Specimen JIC    Extra Purple Top Tube     Status: None   Result Value Ref Range    Hold Specimen JIC    [place] Draw     Status: None    Narrative    The following orders were created for panel order [place] Draw.  Procedure                               Abnormality         Status                     ---------                               -----------         ------                     Extra Blood Culture Bottle[[id number]]                       Final result               Extra Green Top (Lithium...[[id number]]                      Final result                 Please view results for these tests on the individual orders.   Extra Blood Culture Bottle     Status: None   Result Value Ref Range    Hold Specimen JIC    Extra Green Top (Lithium Heparin) ON ICE     Status: None   Result Value Ref  Range    Hold Specimen [facility]    Lactic acid whole blood     Status: Normal   Result Value Ref Range    Lactic Acid 0.7 0.7 - 2.0 mmol/L   Comprehensive metabolic panel     Status: Abnormal   Result Value Ref Range    Sodium 138 136 - 145 mmol/L    Potassium 3.8 3.4 - 5.3 mmol/L    Chloride 103 98 - 107 mmol/L    Carbon Dioxide (CO2) 28 22 - 29 mmol/L    Anion Gap 7 7 - 15 mmol/L    Urea Nitrogen 10.6 8.0 - 23.0 mg/dL    Creatinine 0.87 0.51 - 0.95 mg/dL    Calcium 8.2 (L) 8.8 - 10.2 mg/dL    Glucose 92 70 - 99 mg/dL    Alkaline Phosphatase 132 (H) 35 - 104 U/L    AST 34 10 - 35 U/L    ALT 23 10 - 35 U/L    Protein Total 6.1 (L) 6.4 - 8.3 g/dL    Albumin 3.4 (L) 3.5 - 5.2 g/dL    Bilirubin Total <0.2 <=1.2 mg/dL    GFR Estimate 70 >60 mL/min/1.73m2   Lipase     Status: Normal   Result Value Ref Range    Lipase 44 13 - 60 U/L   UA with Microscopic reflex to Culture     Status: Abnormal    Specimen: Urine, Midstream   Result Value Ref Range    Color Urine Straw Colorless, Straw, Light Yellow, Yellow    Appearance Urine Clear Clear    Glucose Urine Negative Negative mg/dL    Bilirubin Urine Negative Negative    Ketones Urine Negative Negative mg/dL    Specific Gravity Urine 1.005 1.003 - 1.035    Blood Urine Negative Negative    pH Urine 7.5 (H) 5.0 - 7.0    Protein Albumin Urine Negative Negative mg/dL    Urobilinogen Urine Normal Normal, 2.0 mg/dL    Nitrite Urine Negative Negative    Leukocyte Esterase Urine Negative Negative    RBC Urine 0 <=2 /HPF    WBC Urine 0 <=5 /HPF    Squamous Epithelials Urine 2 (H) <=1 /HPF    Narrative    Urine Culture not indicated   CBC with platelets and differential     Status: Abnormal   Result Value Ref Range    WBC Count 4.5 4.0 - 11.0 10e3/uL    RBC Count 3.84 3.80 - 5.20 10e6/uL    Hemoglobin 11.0 (L) 11.7 - 15.7 g/dL    Hematocrit 35.7 35.0 - 47.0 %    MCV 93 78 - 100 fL    MCH 28.6 26.5 - 33.0 pg    MCHC 30.8 (L) 31.5 - 36.5 g/dL    RDW 14.7 10.0 - 15.0 %    Platelet Count 229 150  - 450 10e3/uL    % Neutrophils 65 %    % Lymphocytes 20 %    % Monocytes 15 %    % Eosinophils 0 %    % Basophils 0 %    % Immature Granulocytes 0 %    NRBCs per 100 WBC 0 <1 /100    Absolute Neutrophils 2.9 1.6 - 8.3 10e3/uL    Absolute Lymphocytes 0.9 0.8 - 5.3 10e3/uL    Absolute Monocytes 0.7 0.0 - 1.3 10e3/uL    Absolute Eosinophils 0.0 0.0 - 0.7 10e3/uL    Absolute Basophils 0.0 0.0 - 0.2 10e3/uL    Absolute Immature Granulocytes 0.0 <=0.4 10e3/uL    Absolute NRBCs 0.0 10e3/uL   Asymptomatic COVID-19 Virus (Coronavirus) by PCR Nasopharyngeal     Status: Abnormal    Specimen: Nasopharyngeal; Swab   Result Value Ref Range    SARS CoV2 PCR Positive (A) Negative    Narrative    Testing was performed using the Xpert Xpress SARS-CoV-2 Assay on the Cepheid Gene-Xpert Instrument Systems. Additional information about this Emergency Use Authorization (EUA) assay can be found via the Lab Guide. This test should be ordered for the detection of SARS-CoV-2 in individuals who meet SARS-CoV-2 clinical and/or epidemiological criteria as well as from individuals without symptoms or other reasons to suspect COVID-19. Test performance for asymptomatic patients has only been established in anterior nasal swab specimens. This test is for in vitro diagnostic use under the FDA EUA for laboratories certified under CLIA to perform high complexity testing. This test has not been FDA cleared or approved. A negative result does not rule out the presence of PCR inhibitors in the specimen or target RNA concentration below the limit of detection for the assay. The possibility of a false negative should be considered if the patient's recent exposure or clinical presentation suggests COVID-19. This test was validated by [facility]. These Laboratories are certified under the Clinical Laboratory Improvement Amendments (CLIA) as qualified to perform high complexity testing.     Blood Culture Peripheral Blood     Status: Normal  (Preliminary result)    Specimen: Peripheral Blood   Result Value Ref Range    Culture No growth after 1 day    Blood Culture Arm, Right     Status: Normal (Preliminary result)    Specimen: Arm, Right; Blood   Result Value Ref Range    Culture No growth after 12 hours    CBC with platelets differential     Status: Abnormal    Narrative    The following orders were created for panel order CBC with platelets differential.  Procedure                               Abnormality         Status                     ---------                               -----------         ------                     CBC with platelets and d...[[id number]]  Abnormal            Final result                 Please view results for these tests on the individual orders.     Medications   HYDROmorphone (PF) (DILAUDID) injection 0.5 mg (0.5 mg Intravenous Given 1/26/23 1227)   iopamidol (ISOVUE-370) solution 85 mL (85 mLs Intravenous Given 1/26/23 1249)   lactated ringers BOLUS 500 mL (0 mLs Intravenous Stopped 1/26/23 2002)     Labs Ordered and Resulted from Time of ED Arrival to Time of ED Departure   COMPREHENSIVE METABOLIC PANEL - Abnormal       Result Value    Sodium 138      Potassium 3.8      Chloride 103      Carbon Dioxide (CO2) 28      Anion Gap 7      Urea Nitrogen 10.6      Creatinine 0.87      Calcium 8.2 (*)     Glucose 92      Alkaline Phosphatase 132 (*)     AST 34      ALT 23      Protein Total 6.1 (*)     Albumin 3.4 (*)     Bilirubin Total <0.2      GFR Estimate 70     ROUTINE UA WITH MICROSCOPIC REFLEX TO CULTURE - Abnormal    Color Urine Straw      Appearance Urine Clear      Glucose Urine Negative      Bilirubin Urine Negative      Ketones Urine Negative      Specific Gravity Urine 1.005      Blood Urine Negative      pH Urine 7.5 (*)     Protein Albumin Urine Negative      Urobilinogen Urine Normal      Nitrite Urine Negative      Leukocyte Esterase Urine Negative      RBC Urine 0      WBC Urine 0      Squamous Epithelials  Urine 2 (*)    CBC WITH PLATELETS AND DIFFERENTIAL - Abnormal    WBC Count 4.5      RBC Count 3.84      Hemoglobin 11.0 (*)     Hematocrit 35.7      MCV 93      MCH 28.6      MCHC 30.8 (*)     RDW 14.7      Platelet Count 229      % Neutrophils 65      % Lymphocytes 20      % Monocytes 15      % Eosinophils 0      % Basophils 0      % Immature Granulocytes 0      NRBCs per 100 WBC 0      Absolute Neutrophils 2.9      Absolute Lymphocytes 0.9      Absolute Monocytes 0.7      Absolute Eosinophils 0.0      Absolute Basophils 0.0      Absolute Immature Granulocytes 0.0      Absolute NRBCs 0.0     COVID-19 VIRUS (CORONAVIRUS) BY PCR - Abnormal    SARS CoV2 PCR Positive (*)    LACTIC ACID WHOLE BLOOD - Normal    Lactic Acid 0.7     LIPASE - Normal    Lipase 44       CT Chest/Abdomen/Pelvis w Contrast   Final Result   IMPRESSION:    1. Small foci of air within the bladder may represent sequela of   instrumentation or urinary tract infection. Recommend correlation with   urinalysis.   2. No acute findings in the chest or abdomen.    3. No findings suggestive of pancreatitis.  Pancreatic stent in place.   4. Postsurgical changes of Whipple procedure.      I have personally reviewed the examination and initial interpretation   and I agree with the findings.      MELODIE HATCH MD            SYSTEM ID:  [id number]               Assessment & Plan    Laura Gonzalez is a 72 year old female with history of acute-on-chronic pancreatitis status post pylorus-preserving whipple 5/2019 as well as ERCP and stenting on 01/24/23 who presented with abdominal pain and fever following ERCP done on Tuesday.   Vitals signs on presentation was stable .     Differentials for presentation includes ERCP pancreatitis, acute cholangitis, gastritis, PUD etc.   Labs ordered includes CBC, BMP, lipase, lactate, which all came back normal. Awaiting results of blood culture. Abdominal CT was ordered which shows Small foci of air within the bladder may  represent sequela of instrumentation or urinary tract infection. Recommend correlation with  Urinalysis,No acute findings in the chest or abdomen No findings suggestive of pancreatitis.  Pancreatic stent in place.Postsurgical changes of Whipple procedure.    She was started on Zosyn based on possibility of infection in the setting of her recent ERCP. Also started on Zofran and Dilaudid. GI panc/bili team was consulted and are recommending inpatient admission.                 I have reviewed the nursing notes. I have reviewed the findings, diagnosis, plan and need for follow up with the patient.    Discharge Medication List as of 1/26/2023  8:27 PM          Final diagnoses:   Abdominal pain, epigastric       Preet Heredia  [facility] EMERGENCY DEPARTMENT  1/26/2023  ED Attending Physician Attestation    I Preet Heredia DO, cared for this patient with the Resident. I have performed a history and physical examination of the patient and discussed management with the resident. I reviewed the resident's documentation above and agree with the documented findings and plan of care.    Summary of HPI, PE, ED Course   Patient evaluated in the emergency department for abdominal pain and fever. Exam unremarkable.  ED course notable for CT scan showing likely stent migration.  Patient reported initial fever so Zosyn was given.  Previous CBC BMP and ERCP notes were reviewed.  Current labs are significant for normal lactic acid.  CT scan results were previously discussed.  CBC showed no leukocytosis.  Discussed with GI who recommended admission and likely repeat ERCP tomorrow.  Care was also discussed with internal medicine.  After the completion of care in the emergency department, the patient was admitted to inpatient.      Critical Care & Procedures  Not applicable.        Medical Decision Making  The patient's presentation is strongly suggestive of a chronic illness mild to moderate exacerbation,  progression, or side effect of treatment.    The patient's evaluation involved:  review of external note(s) from 3+ sources (see separate area of note for details)  ordering and/or review of 3+ test(s) in this encounter (see separate area of note for details)  review of 3+ test result(s) ordered prior to this encounter (see separate area of note for details)   care was also discussed with internal medicine and gastroenterology.    The patient's management involved prescription drug management (including medications given in the ED), a decision regarding emergency major surgery and a decision regarding hospitalization.      Preet Heredia,   Emergency Medicine          Preet Heredia DO  01/27/23 [id number]

## 2023-01-27 NOTE — PROGRESS NOTES
Pt discharged home with .   PIV removed  Discharge instructions went over and pt verbalized understanding.   Pt discharged @ [phone]

## 2023-01-27 NOTE — PROGRESS NOTES
Patient seen and evaluated at bedside.    Patient not currently septic and appears able to tolerate p.o. pain meds.  Per gastroenterology it appears that there is been migration of stents.  After further discussion with gastroenterology will discharge the patient to follow-up with them in the outpatient setting as per patient request.  COVID was incidentally diagnosed.  Only symptom appears to be associated with fever which is now resolved.  No respiratory symptoms.  Last oxygen saturation is 98% on room air.  No indication for remdesivir or dexamethasone at this time.  Initially was going to discharge on Cipro Flagyl for concerns of cholangitis however it appears symptoms are more related to underlying COVID infection.  Per the recommendations of gastroenterology over the phone will discharge without antibiotics for cholangitis.     Esteban Chaidez MD on 1/26/2023 at 7:01 PM

## 2023-01-28 ENCOUNTER — PATIENT OUTREACH (OUTPATIENT)
Dept: CARE COORDINATION | Facility: CLINIC | Age: 73
End: 2023-01-28
Payer: COMMERCIAL

## 2023-01-28 NOTE — PROGRESS NOTES
Clinic Care Coordination Contact  [facility]: Post-Discharge Note  SITUATION                                                      Admission:    Admission Date: 01/26/23   Reason for Admission: Abdominal pain, epigastric    Post-op Problem  Chief complaint  Discharge:   Discharge Date: 01/26/23  Discharge Diagnosis: COVID-19 Infection  Abdominal Pain, improving  S/P Wipple for duodenal malignnacy  Pancreaticojejunum anastomosis stenosis, chronic  Recurrent pancreatitis due to stenosis as above   Fever concerning for post-ERCP infection    BACKGROUND                                                      Per hospital discharge summary and inpatient provider notes:    Hospital Course     Laura Gonzalez is a 72 year old female with a very complex history of recurrent pancreatitis, CAD, HLD, asthma who presented to the ED with fever (102F) and worsening abdominal pain 2 days after ERCP. She was briefly admitted, but then discharged later that day given her clinical stability and wish to discharge home and to present for ERCP the following day.     She had a pylorus sparing Whipple for a large duodenal polyp (5/2019) c/b pancreaticojejunum anastamosis stenosis causing recurrent pancreatitis requiring multiple endoscopic procedures for pancreatic duct dilation. In 5/2022, She underwent EUS guided pancreaticogastrostomy with transgastric access into pancreatic duct for dilation and palcement of through and through stent from stomach through pancreatic duct into jejunum. She subsequently developed pancreatitis with retrogastric collection/abcess requiring perc drainage. Since then, she has had recurrent epsodes of pancreatitis due to re-obstruction at the pancreaticojejunum anastomosis. Most recent ERCP was done on 1/24/2023 by Dr. Noguera during which side by side pancreatic stents were placed. After the patient was discharged, it was noted that a previous pancreatic stent placed on 11/21/22 had migrated to the  "biliary duct, so GI saw and recommend ERCP for stent removal (with potential exchange) on 1/27/23, however, this was complicated by the fact that she tested + for COVID just prior to discharge. Thus, ERCP was rescheduled and patient was discharged home in stable condition with presumed source of fever being COVID as opposed to cholangitis.      Labs were unremarkable. CT did not show new intra-abdominal abscess/fluid collection. BCx are pending at discharge, but given VSS, afebrile here, not tachycardic, not hypotensive, low suspicion for SIRS/sepsis, making discharge home less of a risky option. However, still informed patient to return as according to the AVS should her symptoms progress or she develops tachycardia or persistent fevers.    ASSESSMENT           Discharge Assessment  How are you doing now that you are home?: \"Not so hot; I've got COVID and worse, but as far as from a GI perspective I'm ok.\"  Taking Paxlovid as prescribed, has a \"really awful dry mouth which is one of the side effects of the medicine.\"  Reports her  tested positive for COVID today and went to .  Pushing fluids.  Having \"a lot of diarrhea, bottom is bleeding, every BM is diarrhea; 4 times so far today.\"  Temp is \"up and down, vascilates between .\"  Since d/c has developed more URI sx including cough, \"spitting up a lot of phlegm, unsure if related to recent anethesia from ERCP,\" dry throat, nasal congestion.  Still has abd pain, \"the normal pain after my stents procedure, it usually takes about 6 days after the ERCP procedure for my body to get used to the stents.\"  Pt reports no questions/concerns and will follow up with GI after COVID recovered for repeat ERCP.  How are your symptoms? (Red Flag symptoms escalate to triage hotline per guidelines): Worsening  Do you feel your condition is stable enough to be safe at home until your provider visit?: Yes  Does the patient have their discharge instructions? : Yes  Does the " "patient have questions regarding their discharge instructions? : No  Were you started on any new medications or were there changes to any of your previous medications? : Yes (Paxlovid)  Does the patient have all of their medications?: Yes  Do you have questions regarding any of your medications? : No  Discharge follow-up appointment scheduled within 14 calendar days? : No (Pt states she will follow up with GI after COVID recovered for repeat ERCP)  Is patient agreeable to assistance with scheduling? : No         Post-op (Clinicians Only)  Did the patient have surgery or a procedure: No  Fever: No (States her temp 10 minutes ago was 96 but it \"vascilates, has been as high as 102.\")  Eating & Drinking: eating and drinking without complaints/concerns  Bowel Function: loose stools (Diarrhea - 4 bouts so far today)  Date of last BM: 01/28/23      PLAN                                                      Outpatient Plan:      Follow-up with GI as directed after ERCP, now being rescheduled due to COVID.     Follow-up with PCP within the next week or two following brief hospitalization.    Future Appointments   Date Time Provider Department Center   1/29/2023 10:00 AM  CANCER INFUSION NURSE FAUSTINO KAUFMAN [facility]   3/6/2023  2:30 PM Maribel Zambrano MD [facility]         For any urgent concerns, please contact our 24 hour nurse triage line: [phone] ([phone])         Michelle Flores RN                "

## 2023-01-31 LAB
BACTERIA BLD CULT: NO GROWTH
BACTERIA BLD CULT: NO GROWTH

## 2023-02-01 ENCOUNTER — TELEPHONE (OUTPATIENT)
Dept: GASTROENTEROLOGY | Facility: CLINIC | Age: 73
End: 2023-02-01
Payer: COMMERCIAL

## 2023-02-01 NOTE — TELEPHONE ENCOUNTER
LVM with pod numbers to offer RTC scheduling per RN. Schedule with Dr. Arnaldo Noguera, in about 2 months/next available. Sent [employer].   Negative

## 2023-02-02 ENCOUNTER — PATIENT OUTREACH (OUTPATIENT)
Dept: GASTROENTEROLOGY | Facility: CLINIC | Age: 73
End: 2023-02-02
Payer: COMMERCIAL

## 2023-02-02 NOTE — TELEPHONE ENCOUNTER
Pt called in, recovering from Covid  Per Dr. Noguera- Perhaps 6-8 weeks    In person clinic scheduled 3/22/23    ML

## 2023-02-27 ASSESSMENT — ANXIETY QUESTIONNAIRES
5. BEING SO RESTLESS THAT IT IS HARD TO SIT STILL: NOT AT ALL
3. WORRYING TOO MUCH ABOUT DIFFERENT THINGS: SEVERAL DAYS
8. IF YOU CHECKED OFF ANY PROBLEMS, HOW DIFFICULT HAVE THESE MADE IT FOR YOU TO DO YOUR WORK, TAKE CARE OF THINGS AT HOME, OR GET ALONG WITH OTHER PEOPLE?: NOT DIFFICULT AT ALL
6. BECOMING EASILY ANNOYED OR IRRITABLE: NOT AT ALL
1. FEELING NERVOUS, ANXIOUS, OR ON EDGE: NOT AT ALL
2. NOT BEING ABLE TO STOP OR CONTROL WORRYING: NOT AT ALL
GAD7 TOTAL SCORE: 1
7. FEELING AFRAID AS IF SOMETHING AWFUL MIGHT HAPPEN: NOT AT ALL
GAD7 TOTAL SCORE: 1
4. TROUBLE RELAXING: NOT AT ALL
7. FEELING AFRAID AS IF SOMETHING AWFUL MIGHT HAPPEN: NOT AT ALL
IF YOU CHECKED OFF ANY PROBLEMS ON THIS QUESTIONNAIRE, HOW DIFFICULT HAVE THESE PROBLEMS MADE IT FOR YOU TO DO YOUR WORK, TAKE CARE OF THINGS AT HOME, OR GET ALONG WITH OTHER PEOPLE: NOT DIFFICULT AT ALL

## 2023-02-27 ASSESSMENT — PAIN SCALES - PAIN ENJOYMENT GENERAL ACTIVITY SCALE (PEG)
INTERFERED_ENJOYMENT_LIFE: 3
INTERFERED_GENERAL_ACTIVITY: 3
PEG_TOTALSCORE: 4
AVG_PAIN_PASTWEEK: 6
INTERFERED_GENERAL_ACTIVITY: 3
PEG_TOTALSCORE: 4
AVG_PAIN_PASTWEEK: 6
INTERFERED_ENJOYMENT_LIFE: 3

## 2023-03-03 ENCOUNTER — LAB REQUISITION (OUTPATIENT)
Dept: LAB | Facility: CLINIC | Age: 73
End: 2023-03-03
Payer: COMMERCIAL

## 2023-03-03 DIAGNOSIS — N90.89 OTHER SPECIFIED NONINFLAMMATORY DISORDERS OF VULVA AND PERINEUM: ICD-10-CM

## 2023-03-03 PROCEDURE — 87070 CULTURE OTHR SPECIMN AEROBIC: CPT | Mod: ORL | Performed by: OBSTETRICS & GYNECOLOGY

## 2023-03-05 LAB — BACTERIA WND CULT: NORMAL

## 2023-03-06 ENCOUNTER — OFFICE VISIT (OUTPATIENT)
Dept: ANESTHESIOLOGY | Facility: CLINIC | Age: 73
End: 2023-03-06
Attending: INTERNAL MEDICINE
Payer: COMMERCIAL

## 2023-03-06 ENCOUNTER — ANCILLARY PROCEDURE (OUTPATIENT)
Dept: GENERAL RADIOLOGY | Facility: CLINIC | Age: 73
End: 2023-03-06
Attending: INTERNAL MEDICINE
Payer: COMMERCIAL

## 2023-03-06 VITALS
OXYGEN SATURATION: 99 % | SYSTOLIC BLOOD PRESSURE: 132 MMHG | HEART RATE: 56 BPM | DIASTOLIC BLOOD PRESSURE: 77 MMHG | HEIGHT: 68 IN | BODY MASS INDEX: 22.88 KG/M2 | WEIGHT: 151 LBS

## 2023-03-06 PROCEDURE — 74019 RADEX ABDOMEN 2 VIEWS: CPT | Performed by: STUDENT IN AN ORGANIZED HEALTH CARE EDUCATION/TRAINING PROGRAM

## 2023-03-06 PROCEDURE — 99204 OFFICE O/P NEW MOD 45 MIN: CPT | Mod: GC | Performed by: ANESTHESIOLOGY

## 2023-03-06 ASSESSMENT — PAIN SCALES - GENERAL: PAINLEVEL: EXTREME PAIN (8)

## 2023-03-06 NOTE — NURSING NOTE
Patient presents with:  Consult: Consult Pancreatitis Pain      Extreme Pain (8)         What medications are you using for pain? Oxycodone, Ibuprofen    (New patients only) Have you been seen by another pain clinic/ provider? no    (Return Patients only) What refills are you needing today? No    Expectation Not Sure

## 2023-03-06 NOTE — LETTER
3/6/2023       RE: Laura Gonzalez  [address]  [place] [address]     Dear Colleague,    Thank you for referring your patient, Laura Gonzalez, to the [facility] [place] at [facility]. Please see a copy of my visit note below.      Pain Clinic New Patient Consult Note:    Referring Provider: Chuckie   Primary care provider: Rob Jansen.    HPI:  Patient Supplied Answers To the [facility] Pain Questionnaire  [facility] Pain -  Patient Entered Questionnaire/Answers 2/27/2023   What number best describes your pain right now:  0 = No pain  to  10 = Worst pain imaginable 4   How would you describe the pain? burning, sharp, numbness, pressure   Which of the following worsen your pain? lying down, standing, sitting, walking, exercise   Which of the following improve or reduce your pain? relaxation, thinking about something else   What number best describes your average pain for the past week:  0 = No pain  to  10 = Worst pain imaginable 6   What number best describes your LOWEST pain in past 24 hours:  0 = No pain  to  10 = Worst pain imaginable 3   What number best describes your WORST pain in past 24 hours:  0 = No pain  to  10 = Worst pain imaginable 7   When is your pain worst? Night   What non-medicine treatments have you already had for your pain? none     Laura Gonzalez is a 72 year old y.o. old female who presents to the pain clinic with chronic abdominal pain s/p whipple procedure to remove a duodenal polyp. She has had 6 episodes of acute pancreatitis since that time. She has had 4 stenting procedures since that time. There is a migrated biliary duct that might need to be retrieved endoscopically.    Location: right quadrants, worsens and spreads to midline and most recently radiates around the back. Some days she has no pain, most days in the past 3 months she is a 2-4/10 and it rises to a 6-7. The pain is  worse with tight fitting clothing. The pain is worse with sitting and better with standing/walking. She has associated swelling that she feels to the touch.    The pain does not seem to be related to food but she gets flares with fatty foods.     Oxycodone: 5 mg daily    Attempted:  Tylenol  Ibuprofen    Pain treatments:    Herbal medicines: N  Physical therapy: N  Chiropractor: N  Pain physician: N  Surgery: N  Biofeedback: N  Acupuncture: N    TPI did not help.    Tests/Imaging reviewed with the patient:    Significant Medical History:   Past Medical History:   Diagnosis Date     Asthma     pt.states no longer has symptoms     CAD (coronary artery disease)      HLD (hyperlipidemia)           Past Surgical History:  Past Surgical History:   Procedure Laterality Date     APPENDECTOMY       ARTHROPLASTY HIP Left 6/15/2016    Procedure: ARTHROPLASTY HIP;  Surgeon: Jeffy Wolff MD;  Location: [facility] OR     COLONOSCOPY  10/3/2013    Procedure: COMBINED COLONOSCOPY, SINGLE BIOPSY/POLYPECTOMY BY BIOPSY;;  Surgeon: Kenney Walls MD;  Location:  GI     ENDOSCOPIC RETROGRADE CHOLANGIOPANCREATOGRAM N/A 6/16/2022    Procedure: ENDOSCOPIC RETROGRADE CHOLANGIOPANCREATOGRAPHY WITH PANCREATIC DUCT DILATION, DEBRIS REMOVAL AND STENT PLACEMENT;  Surgeon: Arnaldo Noguera MD;  Location: [facility] OR     ENDOSCOPIC RETROGRADE CHOLANGIOPANCREATOGRAM N/A 10/13/2022    Procedure: ENDOSCOPIC RETROGRADE CHOLANGIOPANCREATOGRAPHY, biliary stent placement;  Surgeon: Arnaldo Noguera MD;  [facility]: [facility] OR     ENDOSCOPIC RETROGRADE CHOLANGIOPANCREATOGRAM N/A 11/21/2022    Procedure: ENDOSCOPIC RETROGRADE CHOLANGIOPANCREATOGRAPHY, WITH TUBE OR STENT INSERTION, with balloon dialation;  Surgeon: Johnson Gotti MD;  [facility]     ENDOSCOPIC RETROGRADE CHOLANGIOPANCREATOGRAPHY, EXCHANGE TUBE/STENT N/A 1/24/2023    Procedure: enteroscopy assisted ENDOSCOPIC RETROGRADE CHOLANGIOPANCREATOGRAPHY, with pancreatic stents replaced times  2;  Surgeon: Arnaldo Noguera MD;  Location: [facility]     ENDOSCOPIC ULTRASOUND UPPER GASTROINTESTINAL TRACT (GI) N/A 2022    Procedure: ENDOSCOPIC ULTRASOUND WITH PANCREATICOGASTROSTOMY CREATION, TRACT DILATION, STENT PLACEMENT;  Surgeon: Romaine Oates MD;  Location: [facility]     ESOPHAGOSCOPY, GASTROSCOPY, DUODENOSCOPY (EGD), COMBINED N/A 2022    Procedure: ESOPHAGOGASTRODUODENOSCOPY WITH ENDOSCOPIC CLIP PLACEMENT;  Surgeon: Arnaldo Noguear MD;  Location: [facility] OR     FOOT SURGERY       HC TOOTH EXTRACTION W/FORCEP       HYSTERECTOMY       INCISION AND DRAINAGE BREAST Left 2022    Procedure: evacuate hematoma left  BREAST;  Surgeon: Dayron Garcia MD;  Location: [facility] OR     IR FOLLOW UP VISIT OUTPATIENT  2022     IR SINOGRAM INJECTION DIAGNOSTIC  2022          Family History:  No family history on file.       Social History:  Social History     Socioeconomic History     Marital status:      Spouse name: Not on file     Number of children: Not on file     Years of education: Not on file     Highest education level: Not on file   Occupational History     Not on file   Tobacco Use     Smoking status: Former     Types: Cigarettes     Quit date: 10/3/1988     Years since quittin.4     Smokeless tobacco: Never   Substance and Sexual Activity     Alcohol use: No     Drug use: No     Sexual activity: Not on file   Other Topics Concern     Parent/sibling w/ CABG, MI or angioplasty before 65F 55M? Not Asked   Social History Narrative     Not on file     Social Determinants of Health     Financial Resource Strain: Not on file   Food Insecurity: Not on file   Transportation Needs: Not on file   Physical Activity: Not on file   Stress: Not on file   Social Connections: Not on file   Intimate Partner Violence: Not on file   Housing Stability: Not on file     Social History     Social History Narrative     Not on file          Allergies:  No Known Allergies    Current  Medications:   Current Outpatient Medications   Medication Sig Dispense Refill     atorvastatin (LIPITOR) 40 MG tablet Take 40 mg by mouth At Bedtime        calcium-magnesium (CALMAG) 500-250 MG TABS Take 2 tablets by mouth daily       cholecalciferol (VITAMIN D3) 25 mcg (1000 units) capsule Take 1 capsule by mouth daily       evolocumab (REPATHA) 140 MG/ML prefilled autoinjector Inject 140 mg Subcutaneous every 14 days       ezetimibe (ZETIA) 10 MG tablet Take 10 mg by mouth At Bedtime       Magnesium Ascorbate POWD One teaspoon at bedtime       montelukast (SINGULAIR) 10 MG tablet Take 10 mg by mouth At Bedtime        valACYclovir (VALTREX) 1000 mg tablet Take 1,000 mg by mouth daily as needed (cold sore)       VITAMIN A PO Take 3,000 Units by mouth daily       zolpidem (AMBIEN) 10 MG tablet Take 1 tablet by mouth nightly as needed for sleep       amylase-lipase-protease (CREON) 37256-06766 units CPEP per EC capsule Take 2-3 with meals / 1-2 with snacks, up to 15 per day. (Patient taking differently: Take 2 capsules by mouth 3 times daily (with meals) Take 2 with meals / 1-2 with snacks, up to 15 per day.) 450 capsule 6     LORazepam (ATIVAN) 0.5 MG tablet Take 1 tablet (0.5 mg) by mouth 2 times daily as needed for anxiety Take 1-2 tablets by mouth twice daily as needed for anxiety 15 tablet 0     ondansetron (ZOFRAN ODT) 4 MG ODT tab Take 1 tablet (4 mg) by mouth every 6 hours as needed for nausea or vomiting 20 tablet 0     polyethylene glycol (MIRALAX) 17 GM/Dose powder Take 17 g by mouth 2 times daily as needed 510 g 3     senna-docusate (SENOKOT-S/PERICOLACE) 8.6-50 MG tablet Take 1 tablet by mouth 2 times daily as needed for constipation 30 tablet 3          Current Pain Medications:  Medications related to Pain Management (From now, onward)    None           Past Pain Medications:    na    Blood thinner:    none    Work History: [employer]    Current work status: retired    Psychosocial  "History:     History of treatment for behavioral disorder: N  History of suicidal ideation or suicidal attempt: N    Review of Systems:  Review of Systems   All other systems reviewed and are negative.        Physical Exam:     Vitals:    03/06/23 1419   BP: 132/77   BP Location: Right arm   Patient Position: Chair   Cuff Size: Adult Regular   Pulse: 56   SpO2: 99%   Weight: 68.5 kg (151 lb)   Height: 1.727 m (5' 8\")       General Appearance: No distress, seated comfortably  Mood: Euthymic  HE ENT: Non constricted pupils  Respiratory: Non labored breathing  CVS: acyanotic  GI: Mild tenderness at Left abdomen in upper and lower quadrants  Tenderness and guarding on right in upper > lower quadrant,   Skin: No rashes over exposed skin  MS: Carnett + , right lower back is nontender to palpation    Laboratory results:  Recent Labs   Lab Test 01/26/23  1108 01/24/23  0637    139   POTASSIUM 3.8 3.9   CHLORIDE 103 104   CO2 28 24   ANIONGAP 7 11   GLC 92 108*   BUN 10.6 14.1   CR 0.87 0.83   GLEN 8.2* 8.6*       CBC RESULTS:   Recent Labs   Lab Test 01/26/23  1108   WBC 4.5   RBC 3.84   HGB 11.0*   HCT 35.7   MCV 93   MCH 28.6   MCHC 30.8*   RDW 14.7            Imaging:       ASSESSMENT AND PLAN:     Encounter Diagnosis:  Abdominal Pain  Recurrent Pancreatitis    Laura Gonzalez is a 72 year old y.o. old female who presents to the pain clinic with chronic abdominal pain s/p whipple procedure to remove a duodenal polyp. She has had 6 episodes of acute pancreatitis since that time. She has had 4 stenting procedures since that time. There is a migrated biliary duct that might need to be retrieved endoscopically. Her pain is likely caused by a mixed etiology of visceral and abdominal wall pain.    I have summarized the patient s past medical history, discussed their clinical findings and the potential differential diagnosis with the patient. Significant past medical history pertinent to the patient s current " condition includes whipple procedure, recurrent pancreatitis, abdominal pain. The clinical findings reveal right > left abdominal tenderness, carnett +. The differential diagnosis discussed with the patient are listed above.    RECOMMENDATIONS:     1. Medications:   -Recommend a muscle relaxant. Could trial either methocarbamol or tizanidine.  -Recommend  An antineuropathic agent, could trial either gabapentin or duloxetine.    2. Procedure: no procedures at this time.     3. Physical therapy: I have refered the patient for outpatient pain physical therapy for stretching, strengthening and home exercise program. The patient will also discuss spine care and posture. Pool therapy and stretches can be considered if available.    4. Acupuncture: referral placed    5. Pain Psychology: referral placed for [facility]    Follow up: as needed      The patient's assessment and plan was discussed with my attending physician Dr. Zambrano.    Pranav Restrepo,   Pain medicine fellow    Answers for HPI/ROS submitted by the patient on 2/27/2023  SOCO 7 TOTAL SCORE: 1    ATTENDING ATTESTATION  I saw the patient along with the pain medicine fellow Dr. Pranav Restrepo. Please see his note above for full details. I was involved in gathering history, physical examination and development of the plan of care.         Sincerely,    Maribel Zambrano MD

## 2023-03-06 NOTE — NURSING NOTE
RN reviewed AVS with patient. Patient to contact clinic if any questions/concerns. Patient verbalized understanding.    Karen Phillips RN

## 2023-03-06 NOTE — PROGRESS NOTES
Pain Clinic New Patient Consult Note:    Referring Provider: Chuckie   Primary care provider: Rob Jansen.    HPI:  Patient Supplied Answers To the [facility] Pain Questionnaire  [facility] -  Patient Entered Questionnaire/Answers 2/27/2023   What number best describes your pain right now:  0 = No pain  to  10 = Worst pain imaginable 4   How would you describe the pain? burning, sharp, numbness, pressure   Which of the following worsen your pain? lying down, standing, sitting, walking, exercise   Which of the following improve or reduce your pain? relaxation, thinking about something else   What number best describes your average pain for the past week:  0 = No pain  to  10 = Worst pain imaginable 6   What number best describes your LOWEST pain in past 24 hours:  0 = No pain  to  10 = Worst pain imaginable 3   What number best describes your WORST pain in past 24 hours:  0 = No pain  to  10 = Worst pain imaginable 7   When is your pain worst? Night   What non-medicine treatments have you already had for your pain? none     Laura Gonzalez is a 72 year old y.o. old female who presents to the pain clinic with chronic abdominal pain s/p whipple procedure to remove a duodenal polyp. She has had 6 episodes of acute pancreatitis since that time. She has had 4 stenting procedures since that time. There is a migrated biliary duct that might need to be retrieved endoscopically.    Location: right quadrants, worsens and spreads to midline and most recently radiates around the back. Some days she has no pain, most days in the past 3 months she is a 2-4/10 and it rises to a 6-7. The pain is worse with tight fitting clothing. The pain is worse with sitting and better with standing/walking. She has associated swelling that she feels to the touch.    The pain does not seem to be related to food but she gets flares with fatty foods.     Oxycodone: 5 mg daily    Attempted:  Tylenol  Ibuprofen    Pain treatments:    Herbal medicines:  N  Physical therapy: N  Chiropractor: N  Pain physician: N  Surgery: N  Biofeedback: N  Acupuncture: N    TPI did not help.    Tests/Imaging reviewed with the patient:    Significant Medical History:   Past Medical History:   Diagnosis Date     Asthma     pt.states no longer has symptoms     CAD (coronary artery disease)      HLD (hyperlipidemia)           Past Surgical History:  Past Surgical History:   Procedure Laterality Date     APPENDECTOMY       ARTHROPLASTY HIP Left 6/15/2016    Procedure: ARTHROPLASTY HIP;  Surgeon: Jeffy Wolff MD;  Location: [facility]     COLONOSCOPY  10/3/2013    Procedure: COMBINED COLONOSCOPY, SINGLE BIOPSY/POLYPECTOMY BY BIOPSY;;  Surgeon: Kenney Walls MD;  Location:  GI     ENDOSCOPIC RETROGRADE CHOLANGIOPANCREATOGRAM N/A 6/16/2022    Procedure: ENDOSCOPIC RETROGRADE CHOLANGIOPANCREATOGRAPHY WITH PANCREATIC DUCT DILATION, DEBRIS REMOVAL AND STENT PLACEMENT;  Surgeon: Arnaldo Noguera MD;  [facility]: [facility]     ENDOSCOPIC RETROGRADE CHOLANGIOPANCREATOGRAM N/A 10/13/2022    Procedure: ENDOSCOPIC RETROGRADE CHOLANGIOPANCREATOGRAPHY, biliary stent placement;  Surgeon: Arnaldo Noguera MD;  [facility]     ENDOSCOPIC RETROGRADE CHOLANGIOPANCREATOGRAM N/A 11/21/2022    Procedure: ENDOSCOPIC RETROGRADE CHOLANGIOPANCREATOGRAPHY, WITH TUBE OR STENT INSERTION, with balloon dialation;  Surgeon: Johnson Gotti MD;  [facility]     ENDOSCOPIC RETROGRADE CHOLANGIOPANCREATOGRAPHY, EXCHANGE TUBE/STENT N/A 1/24/2023    Procedure: enteroscopy assisted ENDOSCOPIC RETROGRADE CHOLANGIOPANCREATOGRAPHY, with pancreatic stents replaced times 2;  Surgeon: Arnaldo Noguera MD;  [facility]     ENDOSCOPIC ULTRASOUND UPPER GASTROINTESTINAL TRACT (GI) N/A 5/12/2022    Procedure: ENDOSCOPIC ULTRASOUND WITH PANCREATICOGASTROSTOMY CREATION, TRACT DILATION, STENT PLACEMENT;  Surgeon: Romaine Oates MD;  [facility]     ESOPHAGOSCOPY, GASTROSCOPY, DUODENOSCOPY (EGD),  COMBINED N/A 2022    Procedure: ESOPHAGOGASTRODUODENOSCOPY WITH ENDOSCOPIC CLIP PLACEMENT;  Surgeon: Arnaldo Noguera MD;  [facility]     FOOT SURGERY       HC TOOTH EXTRACTION W/FORCEP       HYSTERECTOMY       INCISION AND DRAINAGE BREAST Left 2022    Procedure: evacuate hematoma left  BREAST;  Surgeon: Dayron Garcia MD;  Location: [facility]     IR FOLLOW UP VISIT OUTPATIENT  2022     IR SINOGRAM INJECTION DIAGNOSTIC  2022          Family History:  No family history on file.       Social History:  Social History     Socioeconomic History     Marital status:      Spouse name: Not on file     Number of children: Not on file     Years of education: Not on file     Highest education level: Not on file   Occupational History     Not on file   Tobacco Use     Smoking status: Former     Types: Cigarettes     Quit date: 10/3/1988     Years since quittin.4     Smokeless tobacco: Never   Substance and Sexual Activity     Alcohol use: No     Drug use: No     Sexual activity: Not on file   Other Topics Concern     Parent/sibling w/ CABG, MI or angioplasty before 65F 55M? Not Asked   Social History Narrative     Not on file     Social Determinants of Health     Financial Resource Strain: Not on file   Food Insecurity: Not on file   Transportation Needs: Not on file   Physical Activity: Not on file   Stress: Not on file   Social Connections: Not on file   Intimate Partner Violence: Not on file   Housing Stability: Not on file     Social History     Social History Narrative     Not on file          Allergies:  No Known Allergies    Current Medications:   Current Outpatient Medications   Medication Sig Dispense Refill     atorvastatin (LIPITOR) 40 MG tablet Take 40 mg by mouth At Bedtime        calcium-magnesium (CALMAG) 500-250 MG TABS Take 2 tablets by mouth daily       cholecalciferol (VITAMIN D3) 25 mcg (1000 units) capsule Take 1 capsule by mouth daily       evolocumab  (REPATHA) 140 MG/ML prefilled autoinjector Inject 140 mg Subcutaneous every 14 days       ezetimibe (ZETIA) 10 MG tablet Take 10 mg by mouth At Bedtime       Magnesium Ascorbate POWD One teaspoon at bedtime       montelukast (SINGULAIR) 10 MG tablet Take 10 mg by mouth At Bedtime        valACYclovir (VALTREX) 1000 mg tablet Take 1,000 mg by mouth daily as needed (cold sore)       VITAMIN A PO Take 3,000 Units by mouth daily       zolpidem (AMBIEN) 10 MG tablet Take 1 tablet by mouth nightly as needed for sleep       amylase-lipase-protease (CREON) 42325-81461 units CPEP per EC capsule Take 2-3 with meals / 1-2 with snacks, up to 15 per day. (Patient taking differently: Take 2 capsules by mouth 3 times daily (with meals) Take 2 with meals / 1-2 with snacks, up to 15 per day.) 450 capsule 6     LORazepam (ATIVAN) 0.5 MG tablet Take 1 tablet (0.5 mg) by mouth 2 times daily as needed for anxiety Take 1-2 tablets by mouth twice daily as needed for anxiety 15 tablet 0     ondansetron (ZOFRAN ODT) 4 MG ODT tab Take 1 tablet (4 mg) by mouth every 6 hours as needed for nausea or vomiting 20 tablet 0     polyethylene glycol (MIRALAX) 17 GM/Dose powder Take 17 g by mouth 2 times daily as needed 510 g 3     senna-docusate (SENOKOT-S/PERICOLACE) 8.6-50 MG tablet Take 1 tablet by mouth 2 times daily as needed for constipation 30 tablet 3          Current Pain Medications:  Medications related to Pain Management (From now, onward)    None           Past Pain Medications:    na    Blood thinner:    none    Work History: [employer]    Current work status: retired    Psychosocial History:     History of treatment for behavioral disorder: N  History of suicidal ideation or suicidal attempt: N    Review of Systems:  Review of Systems   All other systems reviewed and are negative.        Physical Exam:     Vitals:    03/06/23 1419   BP: 132/77   BP Location: Right arm   Patient Position: Chair   Cuff Size: Adult  "Regular   Pulse: 56   SpO2: 99%   Weight: 68.5 kg (151 lb)   Height: 1.727 m (5' 8\")       General Appearance: No distress, seated comfortably  Mood: Euthymic  HE ENT: Non constricted pupils  Respiratory: Non labored breathing  CVS: acyanotic  GI: Mild tenderness at Left abdomen in upper and lower quadrants  Tenderness and guarding on right in upper > lower quadrant,   Skin: No rashes over exposed skin  MS: Carnett + , right lower back is nontender to palpation    Laboratory results:  Recent Labs   Lab Test 01/26/23  1108 01/24/23  0637    139   POTASSIUM 3.8 3.9   CHLORIDE 103 104   CO2 28 24   ANIONGAP 7 11   GLC 92 108*   BUN 10.6 14.1   CR 0.87 0.83   GLEN 8.2* 8.6*       CBC RESULTS:   Recent Labs   Lab Test 01/26/23  1108   WBC 4.5   RBC 3.84   HGB 11.0*   HCT 35.7   MCV 93   MCH 28.6   MCHC 30.8*   RDW 14.7            Imaging:       ASSESSMENT AND PLAN:     Encounter Diagnosis:  Abdominal Pain  Recurrent Pancreatitis    Laura Gonzalez is a 72 year old y.o. old female who presents to the pain clinic with chronic abdominal pain s/p whipple procedure to remove a duodenal polyp. She has had 6 episodes of acute pancreatitis since that time. She has had 4 stenting procedures since that time. There is a migrated biliary duct that might need to be retrieved endoscopically. Her pain is likely caused by a mixed etiology of visceral and abdominal wall pain.    I have summarized the patient s past medical history, discussed their clinical findings and the potential differential diagnosis with the patient. Significant past medical history pertinent to the patient s current condition includes whipple procedure, recurrent pancreatitis, abdominal pain. The clinical findings reveal right > left abdominal tenderness, carnett +. The differential diagnosis discussed with the patient are listed above.    RECOMMENDATIONS:     1. Medications:   -Recommend a muscle relaxant. Could trial either methocarbamol or " tizanidine.  -Recommend  An antineuropathic agent, could trial either gabapentin or duloxetine.    2. Procedure: no procedures at this time.     3. Physical therapy: I have refered the patient for outpatient pain physical therapy for stretching, strengthening and home exercise program. The patient will also discuss spine care and posture. Pool therapy and stretches can be considered if available.    4. Acupuncture: referral placed    5. Pain Psychology: referral placed for [facility]    Follow up: as needed      The patient's assessment and plan was discussed with my attending physician Dr. Zambrano.    Prnaav Restrepo, DO  Pain medicine fellow    Answers for HPI/ROS submitted by the patient on 2/27/2023  SOCO 7 TOTAL SCORE: 1    ATTENDING ATTESTATION  I saw the patient along with the pain medicine fellow Dr. Pranav Restrepo. Please see his note above for full details. I was involved in gathering history, physical examination and development of the plan of care.

## 2023-03-06 NOTE — PATIENT INSTRUCTIONS
Referrals:    Pain Physical Therapy Referral placed-   Call [phone] to schedule your appointment.        Pain Psychology Referral placed-  [facility] Call to schedule your appointment.       Acupuncture Referral.  -Please call your insurance provider to find out about acupuncture coverage, being that not all policies cover acupuncture services.       Treatment planning:    Recommendations will be written in the providers note for your Primary Care provider (OR other providers in your care team) to review and make changes to your therapies based on their discretion.       Recommended Follow up:      Follow up as needed.           To speak with a nurse, schedule/reschedule/cancel a clinic appointment, or request a medication refill call: [phone]    You can also reach us by [facility]: [email or web address]

## 2023-03-07 ENCOUNTER — PREP FOR PROCEDURE (OUTPATIENT)
Dept: GASTROENTEROLOGY | Facility: CLINIC | Age: 73
End: 2023-03-07
Payer: COMMERCIAL

## 2023-03-07 ENCOUNTER — PATIENT OUTREACH (OUTPATIENT)
Dept: GASTROENTEROLOGY | Facility: CLINIC | Age: 73
End: 2023-03-07
Payer: COMMERCIAL

## 2023-03-07 DIAGNOSIS — Z46.89 ENCOUNTER FOR REMOVAL OF BILIARY STENT: ICD-10-CM

## 2023-03-07 DIAGNOSIS — Z46.89 ENCOUNTER FOR REMOVAL OF PANCREATIC STENT: Primary | ICD-10-CM

## 2023-03-07 NOTE — TELEPHONE ENCOUNTER
Per Dr. Noguera after stent review:    Pancreatic stent in place. Migrated biliary stent in place.   Will need ERCP to remove biliary stent and also PD stent at some point - within a month       Called to discuss plan with patient. CT and clinic w/ Chuckie canceled. ERCP on 3/21    Please assist in scheduling:     Procedure/Imaging/Clinic: ERCP  Physician: Dr. Noguera  Timing: 3/21  Procedure length:provider average  Anesthesia:gen  Dx: biliary and pancreatic stent removal  Tier:2  Location: [facility]

## 2023-03-09 ENCOUNTER — ANCILLARY PROCEDURE (OUTPATIENT)
Dept: GENERAL RADIOLOGY | Facility: CLINIC | Age: 73
End: 2023-03-09
Attending: ORTHOPAEDIC SURGERY
Payer: COMMERCIAL

## 2023-03-09 DIAGNOSIS — Z96.642 H/O TOTAL HIP ARTHROPLASTY, LEFT: Primary | ICD-10-CM

## 2023-03-09 DIAGNOSIS — Z96.642 H/O TOTAL HIP ARTHROPLASTY, LEFT: ICD-10-CM

## 2023-03-09 PROCEDURE — 73502 X-RAY EXAM HIP UNI 2-3 VIEWS: CPT | Mod: LT | Performed by: RADIOLOGY

## 2023-03-14 ENCOUNTER — PATIENT OUTREACH (OUTPATIENT)
Dept: GASTROENTEROLOGY | Facility: CLINIC | Age: 73
End: 2023-03-14

## 2023-03-14 ENCOUNTER — THERAPY VISIT (OUTPATIENT)
Dept: PHYSICAL THERAPY | Facility: CLINIC | Age: 73
End: 2023-03-14
Payer: COMMERCIAL

## 2023-03-14 DIAGNOSIS — R10.9 CHRONIC ABDOMINAL PAIN: Primary | ICD-10-CM

## 2023-03-14 DIAGNOSIS — G89.29 CHRONIC ABDOMINAL PAIN: Primary | ICD-10-CM

## 2023-03-14 PROCEDURE — 97110 THERAPEUTIC EXERCISES: CPT | Mod: GP | Performed by: PHYSICAL THERAPIST

## 2023-03-14 PROCEDURE — 97161 PT EVAL LOW COMPLEX 20 MIN: CPT | Mod: GP | Performed by: PHYSICAL THERAPIST

## 2023-03-14 PROCEDURE — 97112 NEUROMUSCULAR REEDUCATION: CPT | Mod: GP | Performed by: PHYSICAL THERAPIST

## 2023-03-14 NOTE — TELEPHONE ENCOUNTER
Pt called in to ask if we can move procedure so she can go see her Granddaughter, currently scheduled on 3/21, agreed to move to 3/30. Message sent to scheduling.     MODESTO

## 2023-03-14 NOTE — PROGRESS NOTES
Physical Therapy Initial Evaluation  Subjective:  The history is provided by the patient. No  was used.   Patient Health History  Laura Gonzalez being seen for chronic abdominal pain, recurrent pancreatitis.     Date of Onset: May 2019.   Problem occurred: had surgery for pancreatitis   Pain is reported as 7/10 (range 0-10/10) on pain scale.  General health as reported by patient is good.  Health conditions: L BRODIE, pancreatitis.   Red flags:  None as reported by patient.  Medical allergies: none.   Surgeries include:  Orthopedic surgery and other (stent for pancreas related issues).    Current medications: see EPIC.    Current occupation is Retired.                     Therapist Generated HPI Evaluation  Problem details: Laura Gonzalez is a 72 year old y.o. old female who presents to the pain clinic with chronic abdominal pain s/p whipple procedure to remove a duodenal polyp. She has had 6 episodes of acute pancreatitis since that time. She has had 4 stenting procedures since that time. There is a migrated biliary duct that might need to be retrieved endoscopically.     Location: right quadrants, worsens and spreads to midline and most recently radiates around the back. Some days she has no pain, most days in the past 3 months she is a 2-4/10 and it rises to a 6-7. The pain is worse with tight fitting clothing. The pain is worse with sitting and better with standing/walking. She has associated swelling that she feels to the touch.     The pain does not seem to be related to food but she gets flares with fatty foods. .         Affected Side: has some intermittent LBP R>L.    This is a chronic condition.  Condition occurred with:  Other reason.    Site of Pain: R>L abdominal pain.  Pain is described as sharp and cramping and is intermittent.  Pain radiates to:  No radiation. Pain is the same all the time.  Since onset symptoms are unchanged.  Associated symptoms:  Fatigue. Symptoms are  "exacerbated by sitting, carrying, stress and certain positions  and relieved by nothing.      Barriers include:  None as reported by patient.                        Objective:  Standing Alignment:        Lumbar:  Lordosis decr            Gait:    Gait Type:  Normal         Flexibility/Screens:     Upper Extremity:    Decreased left upper extremity flexibility at:  Pectoralis Minor    Decreased right upper extremity flexibility present at:  Pectoralis Minor                 Lumbar/SI Evaluation  ROM:    AROM Lumbar:   Flexion:        WFL  Ext:                    10%   Side Bend:        Left:     Right:   Rotation:           Left:     Right:   Side Glide:        Left:     Right:                                                                              General Evaluation:      Gross Strength:              Lower Extremity:   Significant findings:  SLS x 4\" R and L.  Able to heel walk, toe walk.                                                               ROS    Assessment/Plan:    Patient is a 72 year old female with Chronic abdominal pain, some LBP, recurrent pancreatitis complaints.    Patient has the following significant findings with corresponding treatment plan.                Diagnosis 1:  Chronic abdominal pain (recurrent pancreatitis)  Pain -  self management, education and home program  Decreased ROM/flexibility - manual therapy, therapeutic exercise and home program  Decreased strength - therapeutic exercise, therapeutic activities and home program  Decreased function - therapeutic activities and home program  Impaired posture - neuro re-education and home program    Therapy Evaluation Codes:     Cumulative Therapy Evaluation is: Low complexity.    Previous and current functional limitations:  (See Goal Flow Sheet for this information)    Short term and Long term goals: (See Goal Flow Sheet for this information)     Communication ability:  Patient appears to be able to clearly communicate and understand " verbal and written communication and follow directions correctly.  Treatment Explanation - The following has been discussed with the patient:   RX ordered/plan of care  Anticipated outcomes  Possible risks and side effects  This patient would benefit from PT intervention to resume normal activities.   Rehab potential is good.    Frequency:  2 X a month, once daily  Duration:  for 3 months  Discharge Plan:  Achieve all LTG.  Independent in home treatment program.  Reach maximal therapeutic benefit.    Please refer to the daily flowsheet for treatment today, total treatment time and time spent performing 1:1 timed codes.

## 2023-03-14 NOTE — PROGRESS NOTES
[facility]    OUTPATIENT Physical Therapy ORTHOPEDIC EVALUATION  PLAN OF TREATMENT FOR OUTPATIENT REHABILITATION  (COMPLETE FOR INITIAL CLAIMS ONLY)  Patient's Last Name, First Name, M.I.  YOB: 1950  Laura Gonzalez    Provider s Name:  [facility]   Medical Record No.  [id number]   Start of Care Date:  03/14/23   Onset Date:       Treatment Diagnosis:  Chronic abdominal pain (recurrent pancreatitis) Medical Diagnosis:     Recurrent pancreatitis  Chronic abdominal pain       Goals:     03/14/23 0500   Body Part   Goals listed below are for abdominal, lesser LBP   Goal #1   Goal #1 sitting   Previous Functional Level No restrictions   Current Functional Level Minutes patient can sit  (unsupported)   Performance level 2, pain 8/10   STG Target Performance Minutes patient will be able to sit  (unsupported)   Performance level 5, pain 6/10   Rationale for personal hygiene;to allow rest from standing;for community transportation   Due date 04/04/23   LTG Target Performance Minutes patient will be able to sit   Performance Level 20, pain 3/10   Rationale for personal hygiene;to allow rest from standing;for community transportation   Due date 06/11/23         Therapy Frequency:  2x month x 3 months  Predicted Duration of Therapy Intervention:  3 months    Walter Felder, PT                 I CERTIFY THE NEED FOR THESE SERVICES FURNISHED UNDER        THIS PLAN OF TREATMENT AND WHILE UNDER MY CARE     (Physician attestation of this document indicates review and certification of the therapy plan).                     Certification Date From:  03/14/23   Certification Date To:  06/11/23    Referring Provider:  Maribel Zambrano    Initial Assessment        See Epic Evaluation SOC Date: 03/14/23

## 2023-03-27 ENCOUNTER — TRANSFERRED RECORDS (OUTPATIENT)
Dept: HEALTH INFORMATION MANAGEMENT | Facility: CLINIC | Age: 73
End: 2023-03-27
Payer: COMMERCIAL

## 2023-03-30 ENCOUNTER — ANESTHESIA EVENT (OUTPATIENT)
Dept: SURGERY | Facility: CLINIC | Age: 73
End: 2023-03-30
Payer: COMMERCIAL

## 2023-03-30 ENCOUNTER — ANESTHESIA (OUTPATIENT)
Dept: SURGERY | Facility: CLINIC | Age: 73
End: 2023-03-30
Payer: COMMERCIAL

## 2023-03-30 ENCOUNTER — APPOINTMENT (OUTPATIENT)
Dept: GENERAL RADIOLOGY | Facility: CLINIC | Age: 73
End: 2023-03-30
Attending: INTERNAL MEDICINE
Payer: COMMERCIAL

## 2023-03-30 ENCOUNTER — HOSPITAL ENCOUNTER (OUTPATIENT)
Facility: CLINIC | Age: 73
Discharge: HOME | End: 2023-03-30
Attending: INTERNAL MEDICINE | Admitting: INTERNAL MEDICINE
Payer: COMMERCIAL

## 2023-03-30 VITALS
RESPIRATION RATE: 14 BRPM | HEART RATE: 51 BPM | WEIGHT: 152 LBS | OXYGEN SATURATION: 97 % | HEIGHT: 68 IN | BODY MASS INDEX: 23.04 KG/M2 | SYSTOLIC BLOOD PRESSURE: 140 MMHG | TEMPERATURE: 98.2 F | DIASTOLIC BLOOD PRESSURE: 13 MMHG

## 2023-03-30 DIAGNOSIS — K86.1 ACUTE ON CHRONIC PANCREATITIS (H): Primary | ICD-10-CM

## 2023-03-30 DIAGNOSIS — K85.90 ACUTE ON CHRONIC PANCREATITIS (H): Primary | ICD-10-CM

## 2023-03-30 LAB
ALBUMIN SERPL BCG-MCNC: 4 G/DL (ref 3.5–5.2)
ALP SERPL-CCNC: 152 U/L (ref 35–104)
ALT SERPL W P-5'-P-CCNC: 17 U/L (ref 10–35)
ANION GAP SERPL CALCULATED.3IONS-SCNC: 9 MMOL/L (ref 7–15)
AST SERPL W P-5'-P-CCNC: 34 U/L (ref 10–35)
BILIRUB SERPL-MCNC: 0.5 MG/DL
BUN SERPL-MCNC: 14.1 MG/DL (ref 8–23)
CALCIUM SERPL-MCNC: 8.8 MG/DL (ref 8.8–10.2)
CHLORIDE SERPL-SCNC: 103 MMOL/L (ref 98–107)
CREAT SERPL-MCNC: 0.81 MG/DL (ref 0.51–0.95)
DEPRECATED HCO3 PLAS-SCNC: 27 MMOL/L (ref 22–29)
ERYTHROCYTE [DISTWIDTH] IN BLOOD BY AUTOMATED COUNT: 16.4 % (ref 10–15)
GFR SERPL CREATININE-BSD FRML MDRD: 76 ML/MIN/1.73M2
GLUCOSE SERPL-MCNC: 109 MG/DL (ref 70–99)
HCT VFR BLD AUTO: 39.1 % (ref 35–47)
HGB BLD-MCNC: 12.3 G/DL (ref 11.7–15.7)
INR PPP: 1.02 (ref 0.85–1.15)
MCH RBC QN AUTO: 28.7 PG (ref 26.5–33)
MCHC RBC AUTO-ENTMCNC: 31.5 G/DL (ref 31.5–36.5)
MCV RBC AUTO: 91 FL (ref 78–100)
PLATELET # BLD AUTO: 332 10E3/UL (ref 150–450)
POTASSIUM SERPL-SCNC: 4.5 MMOL/L (ref 3.4–5.3)
PROT SERPL-MCNC: 7.3 G/DL (ref 6.4–8.3)
RBC # BLD AUTO: 4.29 10E6/UL (ref 3.8–5.2)
SODIUM SERPL-SCNC: 139 MMOL/L (ref 136–145)
WBC # BLD AUTO: 7.5 10E3/UL (ref 4–11)

## 2023-03-30 PROCEDURE — 999N000181 XR SURGERY CARM FLUORO GREATER THAN 5 MIN W STILLS: Mod: TC

## 2023-03-30 PROCEDURE — C2617 STENT, NON-COR, TEM W/O DEL: HCPCS | Performed by: INTERNAL MEDICINE

## 2023-03-30 PROCEDURE — 85027 COMPLETE CBC AUTOMATED: CPT | Performed by: INTERNAL MEDICINE

## 2023-03-30 PROCEDURE — C1877 STENT, NON-COAT/COV W/O DEL: HCPCS | Performed by: INTERNAL MEDICINE

## 2023-03-30 PROCEDURE — 258N000003 HC RX IP 258 OP 636: Performed by: NURSE ANESTHETIST, CERTIFIED REGISTERED

## 2023-03-30 PROCEDURE — 80053 COMPREHEN METABOLIC PANEL: CPT | Performed by: INTERNAL MEDICINE

## 2023-03-30 PROCEDURE — C1726 CATH, BAL DIL, NON-VASCULAR: HCPCS | Performed by: INTERNAL MEDICINE

## 2023-03-30 PROCEDURE — 250N000024 HC ISOFLURANE, PER MIN: Performed by: INTERNAL MEDICINE

## 2023-03-30 PROCEDURE — 710N000012 HC RECOVERY PHASE 2, PER MINUTE: Performed by: INTERNAL MEDICINE

## 2023-03-30 PROCEDURE — 250N000009 HC RX 250: Performed by: NURSE ANESTHETIST, CERTIFIED REGISTERED

## 2023-03-30 PROCEDURE — 370N000017 HC ANESTHESIA TECHNICAL FEE, PER MIN: Performed by: INTERNAL MEDICINE

## 2023-03-30 PROCEDURE — 255N000002 HC RX 255 OP 636: Performed by: INTERNAL MEDICINE

## 2023-03-30 PROCEDURE — 250N000011 HC RX IP 250 OP 636

## 2023-03-30 PROCEDURE — 360N000083 HC SURGERY LEVEL 3 W/ FLUORO, PER MIN: Performed by: INTERNAL MEDICINE

## 2023-03-30 PROCEDURE — 999N000141 HC STATISTIC PRE-PROCEDURE NURSING ASSESSMENT: Performed by: INTERNAL MEDICINE

## 2023-03-30 PROCEDURE — C1769 GUIDE WIRE: HCPCS | Performed by: INTERNAL MEDICINE

## 2023-03-30 PROCEDURE — 250N000013 HC RX MED GY IP 250 OP 250 PS 637: Performed by: INTERNAL MEDICINE

## 2023-03-30 PROCEDURE — 710N000009 HC RECOVERY PHASE 1, LEVEL 1, PER MIN: Performed by: INTERNAL MEDICINE

## 2023-03-30 PROCEDURE — 250N000011 HC RX IP 250 OP 636: Performed by: NURSE ANESTHETIST, CERTIFIED REGISTERED

## 2023-03-30 PROCEDURE — 272N000001 HC OR GENERAL SUPPLY STERILE: Performed by: INTERNAL MEDICINE

## 2023-03-30 PROCEDURE — 85610 PROTHROMBIN TIME: CPT | Performed by: INTERNAL MEDICINE

## 2023-03-30 PROCEDURE — 250N000009 HC RX 250: Performed by: INTERNAL MEDICINE

## 2023-03-30 PROCEDURE — 36415 COLL VENOUS BLD VENIPUNCTURE: CPT | Performed by: INTERNAL MEDICINE

## 2023-03-30 DEVICE — PANCREATIC STENT
Type: IMPLANTABLE DEVICE | Site: PANCREATIC DUCT | Status: NON-FUNCTIONAL
Brand: ADVANIX™ PANCREATIC STENT
Removed: 2024-10-01

## 2023-03-30 RX ORDER — OXYCODONE HYDROCHLORIDE 5 MG/1
5 TABLET ORAL ONCE
Status: COMPLETED | OUTPATIENT
Start: 2023-03-30 | End: 2023-03-30

## 2023-03-30 RX ORDER — HYDROMORPHONE HCL IN WATER/PF 6 MG/30 ML
0.2 PATIENT CONTROLLED ANALGESIA SYRINGE INTRAVENOUS EVERY 5 MIN PRN
Status: DISCONTINUED | OUTPATIENT
Start: 2023-03-30 | End: 2023-03-30 | Stop reason: HOSPADM

## 2023-03-30 RX ORDER — NALOXONE HYDROCHLORIDE 0.4 MG/ML
0.2 INJECTION, SOLUTION INTRAMUSCULAR; INTRAVENOUS; SUBCUTANEOUS
Status: DISCONTINUED | OUTPATIENT
Start: 2023-03-30 | End: 2023-03-30 | Stop reason: HOSPADM

## 2023-03-30 RX ORDER — IOPAMIDOL 510 MG/ML
INJECTION, SOLUTION INTRAVASCULAR PRN
Status: DISCONTINUED | OUTPATIENT
Start: 2023-03-30 | End: 2023-03-30 | Stop reason: HOSPADM

## 2023-03-30 RX ORDER — PROPOFOL 10 MG/ML
INJECTION, EMULSION INTRAVENOUS PRN
Status: DISCONTINUED | OUTPATIENT
Start: 2023-03-30 | End: 2023-03-30

## 2023-03-30 RX ORDER — NALOXONE HYDROCHLORIDE 0.4 MG/ML
0.4 INJECTION, SOLUTION INTRAMUSCULAR; INTRAVENOUS; SUBCUTANEOUS
Status: DISCONTINUED | OUTPATIENT
Start: 2023-03-30 | End: 2023-03-30 | Stop reason: HOSPADM

## 2023-03-30 RX ORDER — FENTANYL CITRATE 50 UG/ML
50 INJECTION, SOLUTION INTRAMUSCULAR; INTRAVENOUS EVERY 5 MIN PRN
Status: DISCONTINUED | OUTPATIENT
Start: 2023-03-30 | End: 2023-03-30 | Stop reason: HOSPADM

## 2023-03-30 RX ORDER — LIDOCAINE 40 MG/G
CREAM TOPICAL
Status: DISCONTINUED | OUTPATIENT
Start: 2023-03-30 | End: 2023-03-30 | Stop reason: HOSPADM

## 2023-03-30 RX ORDER — FENTANYL CITRATE 50 UG/ML
25 INJECTION, SOLUTION INTRAMUSCULAR; INTRAVENOUS EVERY 5 MIN PRN
Status: DISCONTINUED | OUTPATIENT
Start: 2023-03-30 | End: 2023-03-30 | Stop reason: HOSPADM

## 2023-03-30 RX ORDER — FENTANYL CITRATE 50 UG/ML
INJECTION, SOLUTION INTRAMUSCULAR; INTRAVENOUS PRN
Status: DISCONTINUED | OUTPATIENT
Start: 2023-03-30 | End: 2023-03-30

## 2023-03-30 RX ORDER — LIDOCAINE HYDROCHLORIDE 20 MG/ML
INJECTION, SOLUTION INFILTRATION; PERINEURAL PRN
Status: DISCONTINUED | OUTPATIENT
Start: 2023-03-30 | End: 2023-03-30

## 2023-03-30 RX ORDER — FLUMAZENIL 0.1 MG/ML
0.2 INJECTION, SOLUTION INTRAVENOUS
Status: DISCONTINUED | OUTPATIENT
Start: 2023-03-30 | End: 2023-03-30 | Stop reason: HOSPADM

## 2023-03-30 RX ORDER — TRAMADOL HYDROCHLORIDE 50 MG/1
50 TABLET ORAL EVERY 6 HOURS PRN
Qty: 30 TABLET | Refills: 0 | Status: SHIPPED | OUTPATIENT
Start: 2023-03-30 | End: 2023-04-02

## 2023-03-30 RX ORDER — ONDANSETRON 4 MG/1
4 TABLET, ORALLY DISINTEGRATING ORAL EVERY 6 HOURS PRN
Status: DISCONTINUED | OUTPATIENT
Start: 2023-03-30 | End: 2023-03-30 | Stop reason: HOSPADM

## 2023-03-30 RX ORDER — SODIUM CHLORIDE, SODIUM LACTATE, POTASSIUM CHLORIDE, CALCIUM CHLORIDE 600; 310; 30; 20 MG/100ML; MG/100ML; MG/100ML; MG/100ML
INJECTION, SOLUTION INTRAVENOUS CONTINUOUS
Status: DISCONTINUED | OUTPATIENT
Start: 2023-03-30 | End: 2023-03-30 | Stop reason: HOSPADM

## 2023-03-30 RX ORDER — ONDANSETRON 2 MG/ML
4 INJECTION INTRAMUSCULAR; INTRAVENOUS EVERY 30 MIN PRN
Status: DISCONTINUED | OUTPATIENT
Start: 2023-03-30 | End: 2023-03-30 | Stop reason: HOSPADM

## 2023-03-30 RX ORDER — ONDANSETRON 4 MG/1
4 TABLET, ORALLY DISINTEGRATING ORAL EVERY 30 MIN PRN
Status: DISCONTINUED | OUTPATIENT
Start: 2023-03-30 | End: 2023-03-30 | Stop reason: HOSPADM

## 2023-03-30 RX ORDER — ONDANSETRON 2 MG/ML
4 INJECTION INTRAMUSCULAR; INTRAVENOUS EVERY 6 HOURS PRN
Status: DISCONTINUED | OUTPATIENT
Start: 2023-03-30 | End: 2023-03-30 | Stop reason: HOSPADM

## 2023-03-30 RX ORDER — SODIUM CHLORIDE, SODIUM LACTATE, POTASSIUM CHLORIDE, CALCIUM CHLORIDE 600; 310; 30; 20 MG/100ML; MG/100ML; MG/100ML; MG/100ML
INJECTION, SOLUTION INTRAVENOUS CONTINUOUS PRN
Status: DISCONTINUED | OUTPATIENT
Start: 2023-03-30 | End: 2023-03-30

## 2023-03-30 RX ORDER — ONDANSETRON 2 MG/ML
INJECTION INTRAMUSCULAR; INTRAVENOUS PRN
Status: DISCONTINUED | OUTPATIENT
Start: 2023-03-30 | End: 2023-03-30

## 2023-03-30 RX ORDER — HYDROMORPHONE HCL IN WATER/PF 6 MG/30 ML
0.4 PATIENT CONTROLLED ANALGESIA SYRINGE INTRAVENOUS EVERY 5 MIN PRN
Status: DISCONTINUED | OUTPATIENT
Start: 2023-03-30 | End: 2023-03-30 | Stop reason: HOSPADM

## 2023-03-30 RX ORDER — INDOMETHACIN 50 MG/1
SUPPOSITORY RECTAL PRN
Status: DISCONTINUED | OUTPATIENT
Start: 2023-03-30 | End: 2023-03-30 | Stop reason: HOSPADM

## 2023-03-30 RX ORDER — LABETALOL HYDROCHLORIDE 5 MG/ML
10 INJECTION, SOLUTION INTRAVENOUS
Status: DISCONTINUED | OUTPATIENT
Start: 2023-03-30 | End: 2023-03-30 | Stop reason: HOSPADM

## 2023-03-30 RX ADMIN — LIDOCAINE HYDROCHLORIDE 100 MG: 20 INJECTION, SOLUTION INFILTRATION; PERINEURAL at 12:43

## 2023-03-30 RX ADMIN — SODIUM CHLORIDE, POTASSIUM CHLORIDE, SODIUM LACTATE AND CALCIUM CHLORIDE: 600; 310; 30; 20 INJECTION, SOLUTION INTRAVENOUS at 14:21

## 2023-03-30 RX ADMIN — HYDROMORPHONE HYDROCHLORIDE 0.4 MG: 0.2 INJECTION, SOLUTION INTRAMUSCULAR; INTRAVENOUS; SUBCUTANEOUS at 15:43

## 2023-03-30 RX ADMIN — SODIUM CHLORIDE, POTASSIUM CHLORIDE, SODIUM LACTATE AND CALCIUM CHLORIDE: 600; 310; 30; 20 INJECTION, SOLUTION INTRAVENOUS at 12:25

## 2023-03-30 RX ADMIN — PROPOFOL 50 MG: 10 INJECTION, EMULSION INTRAVENOUS at 12:50

## 2023-03-30 RX ADMIN — ONDANSETRON 4 MG: 2 INJECTION INTRAMUSCULAR; INTRAVENOUS at 12:28

## 2023-03-30 RX ADMIN — PROPOFOL 150 MG: 10 INJECTION, EMULSION INTRAVENOUS at 12:43

## 2023-03-30 RX ADMIN — Medication 20 MG: at 12:50

## 2023-03-30 RX ADMIN — OXYCODONE HYDROCHLORIDE 5 MG: 5 TABLET ORAL at 16:17

## 2023-03-30 RX ADMIN — FENTANYL CITRATE 25 MCG: 50 INJECTION, SOLUTION INTRAMUSCULAR; INTRAVENOUS at 14:48

## 2023-03-30 RX ADMIN — Medication 30 MG: at 12:43

## 2023-03-30 RX ADMIN — FENTANYL CITRATE 50 MCG: 50 INJECTION, SOLUTION INTRAMUSCULAR; INTRAVENOUS at 12:45

## 2023-03-30 RX ADMIN — MIDAZOLAM 2 MG: 1 INJECTION INTRAMUSCULAR; INTRAVENOUS at 12:28

## 2023-03-30 RX ADMIN — PHENYLEPHRINE HYDROCHLORIDE 100 MCG: 10 INJECTION INTRAVENOUS at 12:55

## 2023-03-30 RX ADMIN — SUGAMMADEX 200 MG: 100 INJECTION, SOLUTION INTRAVENOUS at 14:14

## 2023-03-30 RX ADMIN — FENTANYL CITRATE 25 MCG: 50 INJECTION, SOLUTION INTRAMUSCULAR; INTRAVENOUS at 14:37

## 2023-03-30 ASSESSMENT — ACTIVITIES OF DAILY LIVING (ADL)
ADLS_ACUITY_SCORE: 35
ADLS_ACUITY_SCORE: 35

## 2023-03-30 ASSESSMENT — LIFESTYLE VARIABLES: TOBACCO_USE: 1

## 2023-03-30 NOTE — DISCHARGE INSTRUCTIONS
Recommendation:  Xray in 4 weeks to monitor for stent passage.    [facility], [place]  Same-Day Surgery   Adult Discharge Orders & Instructions     For 24 hours after surgery    Get plenty of rest.  A responsible adult must stay with you for at least 24 hours after you leave the hospital.   Do not drive or use heavy equipment.  If you have weakness or tingling, don't drive or use heavy equipment until this feeling goes away.  Do not drink alcohol.  Avoid strenuous or risky activities.  Ask for help when climbing stairs.   You may feel lightheaded.  IF so, sit for a few minutes before standing.  Have someone help you get up.   If you have nausea (feel sick to your stomach): Drink only clear liquids such as apple juice, ginger ale, broth or 7-Up.  Rest may also help.  Be sure to drink enough fluids.  Move to a regular diet as you feel able.  You may have a slight fever. Call the doctor if your fever is over 100 F (37.7 C) (taken under the tongue) or lasts longer than 24 hours.  You may have a dry mouth, a sore throat, muscle aches or trouble sleeping.  These should go away after 24 hours.  Do not make important or legal decisions.   Call your doctor for any of the followin.  Signs of infection (fever, growing tenderness at the surgery site, a large amount of drainage or bleeding, severe pain, foul-smelling drainage, redness, swelling).    2. It has been over 8 to 10 hours since surgery and you are still not able to urinate (pass water).    3.  Headache for over 24 hours.    4.  Numbness, tingling or weakness the day after surgery (if you had spinal anesthesia).  To contact a doctor, call the Gastroenterology clinic at [phone] or:    '   [phone] and ask for the resident on call for Gastroenterology (answered 24 hours a day)  '   Emergency Department:    [facility]: [phone]       (TTY for hearing impaired: [phone])    [facility]: [phone]        (TTY for hearing impaired: [phone])

## 2023-03-30 NOTE — ANESTHESIA PROCEDURE NOTES
Airway       Patient location during procedure: OR       Procedure Start/Stop Times: 3/30/2023 12:47 PM  Staff -        CRNA: Brandon Deras APRN CRNA       Performed By: CRNA  Consent for Airway        Urgency: elective  Indications and Patient Condition       Indications for airway management: amy-procedural       Induction type:intravenous       Mask difficulty assessment: 1 - vent by mask    Final Airway Details       Final airway type: endotracheal airway       Successful airway: ETT - single  Endotracheal Airway Details        ETT size (mm): 7.0       Cuffed: yes       Successful intubation technique: direct laryngoscopy       DL Blade Type: [place] 2       Grade View of Cords: 1       Position: Right       Measured from: gums/teeth       Secured at (cm): 24    Post intubation assessment        Placement verified by: capnometry, equal breath sounds and chest rise        Secured with: cloth tape       Ease of procedure: easy       Dentition: Intact       Dental guard used and removed (Foam strip on blade).    Medication(s) Administered   Medication Administration Time: 3/30/2023 12:47 PM

## 2023-03-30 NOTE — ANESTHESIA CARE TRANSFER NOTE
Patient: Laura Gonzalez    Procedure: Procedure(s):  ENDOSCOPIC RETROGRADE CHOLANGIOPANCREATOGRAPHY with bile duct stents removed x2 and replacement of one pancreatic stent       Diagnosis: Encounter for removal of pancreatic stent [Z46.89]  Encounter for removal of biliary stent [Z46.89]  Diagnosis Additional Information: No value filed.    Anesthesia Type:   General     Note:    Oropharynx: oropharynx clear of all foreign objects  Level of Consciousness: awake  Oxygen Supplementation: nasal cannula  Level of Supplemental Oxygen (L/min / FiO2): 3  Independent Airway: airway patency satisfactory and stable  Dentition: dentition unchanged  Vital Signs Stable: post-procedure vital signs reviewed and stable  Report to RN Given: handoff report given  Patient transferred to: PACU    Handoff Report: Identifed the Patient, Identified the Reponsible Provider, Reviewed the pertinent medical history, Discussed the surgical course, Reviewed Intra-OP anesthesia mangement and issues during anesthesia, Set expectations for post-procedure period and Allowed opportunity for questions and acknowledgement of understanding      Vitals:  Vitals Value Taken Time   /72 03/30/23 1424   Temp     Pulse 64 03/30/23 1427   Resp     SpO2 100 % 03/30/23 1427   Vitals shown include unvalidated device data.    Electronically Signed By: ITZ Galarza CRNA  March 30, 2023  2:29 PM

## 2023-03-30 NOTE — ANESTHESIA POSTPROCEDURE EVALUATION
Patient: Laura Gonzalez    Procedure: Procedure(s):  ENDOSCOPIC RETROGRADE CHOLANGIOPANCREATOGRAPHY with bile duct stents removed x2 and replacement of one pancreatic stent       Anesthesia Type:  General    Note:  Disposition: Outpatient   Postop Pain Control: Uneventful            Sign Out: Well controlled pain   PONV: No   Neuro/Psych: Uneventful            Sign Out: Acceptable/Baseline neuro status   Airway/Respiratory: Uneventful            Sign Out: Acceptable/Baseline resp. status   CV/Hemodynamics: Uneventful            Sign Out: Acceptable CV status; No obvious hypovolemia; No obvious fluid overload   Other NRE: NONE   DID A NON-ROUTINE EVENT OCCUR? No           Last vitals:  Vitals Value Taken Time   /61 03/30/23 1515   Temp 36.2  C (97.2  F) 03/30/23 1424   Pulse 53 03/30/23 1516   Resp 22 03/30/23 1516   SpO2 94 % 03/30/23 1516   Vitals shown include unvalidated device data.    Electronically Signed By: Júnior Moreon MD  March 30, 2023  3:17 PM

## 2023-03-30 NOTE — OR NURSING
Dr. Noguera at bedside in phase I of recovery. Updated patient in-person and spouse (Rubens) over the phone. Dr. Noguera states that he does not need to see her in Phase II.

## 2023-03-30 NOTE — ANESTHESIA PREPROCEDURE EVALUATION
Anesthesia Pre-Procedure Evaluation    Patient: Laura Gonzalez   MRN: [id number] : 1950        Procedure : Procedure(s):  ENDOSCOPIC RETROGRADE CHOLANGIOPANCREATOGRAPHY          Past Medical History:   Diagnosis Date     Asthma     pt.states no longer has symptoms     CAD (coronary artery disease)      HLD (hyperlipidemia)       Past Surgical History:   Procedure Laterality Date     APPENDECTOMY       ARTHROPLASTY HIP Left 6/15/2016    Procedure: ARTHROPLASTY HIP;  Surgeon: Jeffy Wolff MD;  Location: [facility]     COLONOSCOPY  10/3/2013    Procedure: COMBINED COLONOSCOPY, SINGLE BIOPSY/POLYPECTOMY BY BIOPSY;;  Surgeon: Kenney Walls MD;  Location: [facility]     ENDOSCOPIC RETROGRADE CHOLANGIOPANCREATOGRAM N/A 2022    Procedure: ENDOSCOPIC RETROGRADE CHOLANGIOPANCREATOGRAPHY WITH PANCREATIC DUCT DILATION, DEBRIS REMOVAL AND STENT PLACEMENT;  Surgeon: Arnaldo Noguera MD;  Location: [facility]     ENDOSCOPIC RETROGRADE CHOLANGIOPANCREATOGRAM N/A 10/13/2022    Procedure: ENDOSCOPIC RETROGRADE CHOLANGIOPANCREATOGRAPHY, biliary stent placement;  Surgeon: Arnaldo Noguera MD;  [facility]     ENDOSCOPIC RETROGRADE CHOLANGIOPANCREATOGRAM N/A 2022    Procedure: ENDOSCOPIC RETROGRADE CHOLANGIOPANCREATOGRAPHY, WITH TUBE OR STENT INSERTION, with balloon dialation;  Surgeon: Johnson Gotti MD;  [facility]     ENDOSCOPIC RETROGRADE CHOLANGIOPANCREATOGRAPHY, EXCHANGE TUBE/STENT N/A 2023    Procedure: enteroscopy assisted ENDOSCOPIC RETROGRADE CHOLANGIOPANCREATOGRAPHY, with pancreatic stents replaced times 2;  Surgeon: Arnaldo Noguera MD;  [facility]     ENDOSCOPIC ULTRASOUND UPPER GASTROINTESTINAL TRACT (GI) N/A 2022    Procedure: ENDOSCOPIC ULTRASOUND WITH PANCREATICOGASTROSTOMY CREATION, TRACT DILATION, STENT PLACEMENT;  Surgeon: Romaine Oates MD;  [facility]     ESOPHAGOSCOPY, GASTROSCOPY, DUODENOSCOPY (EGD), COMBINED N/A 2022    Procedure:  ESOPHAGOGASTRODUODENOSCOPY WITH ENDOSCOPIC CLIP PLACEMENT;  Surgeon: Arnaldo Noguera MD;  [facility]     FOOT SURGERY       HC TOOTH EXTRACTION W/FORCEP       HYSTERECTOMY       INCISION AND DRAINAGE BREAST Left 2022    Procedure: evacuate hematoma left  BREAST;  Surgeon: Dayron Garcia MD;  Location: [facility]     IR FOLLOW UP VISIT OUTPATIENT  2022     IR SINOGRAM INJECTION DIAGNOSTIC  2022      No Known Allergies   Social History     Tobacco Use     Smoking status: Former     Types: Cigarettes     Quit date: 10/3/1988     Years since quittin.5     Smokeless tobacco: Never   Substance Use Topics     Alcohol use: No      Wt Readings from Last 1 Encounters:   23 68.9 kg (152 lb)        Anesthesia Evaluation   Pt has had prior anesthetic. Type: General and Regional.    No history of anesthetic complications       ROS/MED HX  ENT/Pulmonary:     (+) tobacco use, Past use, Intermittent, asthma     Neurologic:  - neg neurologic ROS  (-) no CVA   Cardiovascular:     (+) Dyslipidemia --CAD ---Previous cardiac testing   Echo: Date: 22 Results:  No significant valvular abnormalities were noted. No pathologic basis for murmur identified.    Global and regional left ventricular function is normal with an EF of 60-65%.  Left ventricular wall thickness is normal. Left ventricular size is normal.  Left ventricular diastolic function is normal. No regional wall motion abnormalities are seen.  Stress Test: Date: Results:    ECG Reviewed: Date: 22 Results:  Sinus bradycardia, HR 53 bpm  Cath: Date: Results:   (-) angina, past MI, angina, past MI, murmur and wheezes   METS/Exercise Tolerance: >4 METS    Hematologic:       Musculoskeletal:  - neg musculoskeletal ROS     GI/Hepatic: Comment: s/p whipple procedure to remove a duodenal polyp. She has had 6 episodes of acute pancreatitis since that time. She has had 4 stenting procedures since that time. There is a migrated biliary duct  that might need to be retrieved endoscopically      Renal/Genitourinary:  - neg Renal ROS  (-) renal disease   Endo:  - neg endo ROS  (-) Type II DM and thyroid disease   Psychiatric/Substance Use:  - neg psychiatric ROS     Infectious Disease:    (-) Recent Fever   Malignancy:  - neg malignancy ROS     Other:            Physical Exam    Airway        Mallampati: II   TM distance: > 3 FB   Neck ROM: full   Mouth opening: > 3 cm    Respiratory Devices and Support         Dental  no notable dental history   Comment: Some dental work, crowns, caps        Cardiovascular   cardiovascular exam normal       Rhythm and rate: regular and normal (-) no systolic click and no murmur    Pulmonary   pulmonary exam normal        breath sounds clear to auscultation   (-) no wheezes        OUTSIDE LABS:  CBC:   Lab Results   Component Value Date    WBC 7.5 03/30/2023    WBC 4.5 01/26/2023    HGB 12.3 03/30/2023    HGB 11.0 (L) 01/26/2023    HCT 39.1 03/30/2023    HCT 35.7 01/26/2023     03/30/2023     01/26/2023     BMP:   Lab Results   Component Value Date     01/26/2023     01/24/2023    POTASSIUM 3.8 01/26/2023    POTASSIUM 3.9 01/24/2023    CHLORIDE 103 01/26/2023    CHLORIDE 104 01/24/2023    CO2 28 01/26/2023    CO2 24 01/24/2023    BUN 10.6 01/26/2023    BUN 14.1 01/24/2023    CR 0.87 01/26/2023    CR 0.83 01/24/2023    GLC 92 01/26/2023     (H) 01/24/2023     COAGS:   Lab Results   Component Value Date    PTT 41 (H) 02/18/2022    INR 1.01 01/24/2023     POC:   Lab Results   Component Value Date     (H) 06/16/2016     HEPATIC:   Lab Results   Component Value Date    ALBUMIN 3.4 (L) 01/26/2023    PROTTOTAL 6.1 (L) 01/26/2023    ALT 23 01/26/2023    AST 34 01/26/2023    ALKPHOS 132 (H) 01/26/2023    BILITOTAL <0.2 01/26/2023     OTHER:   Lab Results   Component Value Date    PH 7.53 (H) 06/05/2022    LACT 0.7 01/26/2023    GLEN 8.2 (L) 01/26/2023    LIPASE 44 01/26/2023    AMYLASE 79  01/24/2023    TSH 0.96 06/05/2022    CRP 5.0 06/05/2022    SED 40 (H) 06/05/2022       Anesthesia Plan    ASA Status:  3   NPO Status:  NPO Appropriate    Anesthesia Type: General.     - Airway: ETT   Induction: Intravenous.   Maintenance: Balanced.        Consents    Anesthesia Plan(s) and associated risks, benefits, and realistic alternatives discussed. Questions answered and patient/representative(s) expressed understanding.     - Discussed: Risks, Benefits and Alternatives for BOTH SEDATION and the PROCEDURE were discussed     - Discussed with:  Patient      - Extended Intubation/Ventilatory Support Discussed: No.      - Patient is DNR/DNI Status: No    Use of blood products discussed: Yes.     - Discussed with: Patient.     Postoperative Care    Pain management: IV analgesics, Multi-modal analgesia.   PONV prophylaxis: Ondansetron (or other 5HT-3), Dexamethasone or Solumedrol     Comments:                    Júnior Moreno MD

## 2023-03-30 NOTE — BRIEF OP NOTE
[facility]    Brief Operative Note    Pre-operative diagnosis: Encounter for removal of pancreatic stent [Z46.89]  Encounter for removal of biliary stent [Z46.89]  Post-operative diagnosis Same, pancreatic stent exchange  Procedure: Procedure(s):  ENDOSCOPIC RETROGRADE CHOLANGIOPANCREATOGRAPHY with bile duct stents removed x2 and replacement of one pancreatic stent  Surgeon: Surgeon(s) and Role:     * Arnaldo Noguera MD - Primary  Anesthesia: General   Estimated Blood Loss: None    Drains: None  Specimens: * No specimens in log *  Findings:   None.  Complications: None.  Implants:   Implant Name Type Inv. Item Serial No.  Lot No. LRB No. Used Action   STENT GEENEN PANCREA [id number] GPSO-5-3 - [id number] Stent STENT GEENEN PANCREA [id number] GPSO-5-3  [facility] [employer] [id number] N/A 1 Explanted   STENT ADVANIX PANCREA PIGTAIL [id number] - [id number] Stent STENT ADVANIX PANCREA PIGTAIL [id number]  [employer] [id number] N/A 1 Explanted   STENT NOGUERA PANCREA FLEX [id number] SGL PIGTAIL - [id number] Stent STENT NOGUERA PANCREA FLEX 4POG9PB SGL PIGTAIL  [employer] Q62- N/A 1 Explanted   STENT ZIMMON PANCREA [id number] SGL PIGTAIL NO FLAP [id number] - [id number] Stent STENT ZIMMON PANCREA [id number] SGL PIGTAIL NO FLAP [id number]  [facility] [employer] [id number] N/A 1 Implanted and Explanted   STENT ZIMMON PANCREA 7GXL0DN SGL PIGTAIL [id number] - [id number] Stent STENT ZIMMON PANCREA 3BON6BL SGL PIGTAIL [id number]  [facility] [employer] G57- N/A 1 Explanted   STENT NOGUERA PANCREA FLEX 0IGL9CS SGL PIGTAIL - [id number] Stent STENT NOGUERA PANCREA FLEX 9SND2PW SGL PIGTAIL  COLMENARES W33- N/A 1 Explanted   STENT NOGUERA PANCREA FLEX 5CZO5RH W/O FLANGE SGL PIGTAIL - [id number] Stent STENT NOGUERA PANCREA FLEX 1PXP6UW W/O FLANGE SGL PIGTAIL  COLMENARES C03- N/A 1 Explanted   STENT [facility] PANCREA 3PYT7VL SGL PIGTAIL [id number] - [id number] Stent  STENT ZIMMON PANCREA 1ADR2EW SGL PIGTAIL [id number]  [employer] [id number] N/A 1 Explanted   STENT ZIMMON PANCREA 7DAI0UM SGL PIGTAIL H05886 - [id number] Stent STENT ZIMMON PANCREA 7JVV7BQ SGL PIGTAIL [id number]  [employer] [id number] N/A 1 Explanted   STENT ZIMMON PANCREA 2VAJ2OT SGL PIGTAIL J67438 - [id number] Stent STENT ZIMMON PANCREA 8HUP5ND SGL PIGTAIL [id number]  [facility] [employer] [id number] N/A 1 Explanted   STENT ADVANIX PANCREA PIGTAIL 3PZS54GB [id number] - [id number] Stent STENT ADVANIX PANCREA PIGTAIL 7UJX23JM [id number]  [employer] [id number] N/A 1 Implanted     Pylorus sparing Whipple  Easily ascended limb to biliary anastamosis. Migrated pancreatic stent barely visible. Wire guided balloon failed to extract it, snare also, successfully grasped and removed w wire guided FLOWER basket.   Pancreatic stent in place, removed. Pancreatogram showed segmental edematous narrowing at panc jejunual anastamosis. 018 wire to tail, 3x11 stent placed, but too distal so removed. 3x10 Advanix stent placed about 2cm outside of duct    IMP:  Biliary stent removed, pancreatic stent replaced w 3F fallout stent.    REC  Observe for same day discontinue  Xray in 4 weeks to monitor for stent passage.

## 2023-03-31 ENCOUNTER — PATIENT OUTREACH (OUTPATIENT)
Dept: GASTROENTEROLOGY | Facility: CLINIC | Age: 73
End: 2023-03-31
Payer: COMMERCIAL

## 2023-03-31 DIAGNOSIS — Z46.89 ENCOUNTER FOR REMOVAL OF PANCREATIC STENT: Primary | ICD-10-CM

## 2023-03-31 LAB — ERCP: NORMAL

## 2023-03-31 NOTE — TELEPHONE ENCOUNTER
Dr requests a call to check on patient post ERCP from yesterday:  IMP:  Biliary stent removed, pancreatic stent replaced w 3F fallout stent.     REC  Observe for same day discontinue  Xray in 4 weeks to monitor for stent passage.    Pt reports feeling well after procedure today. Had some pain last night, better today. She's resting now. Reviewed plan for xray in 4 weeks, Mychart stent. Pt knows to call with questions/concerns and understands follow up.    ML

## 2023-04-18 ENCOUNTER — DOCUMENTATION ONLY (OUTPATIENT)
Dept: GASTROENTEROLOGY | Facility: CLINIC | Age: 73
End: 2023-04-18

## 2023-04-18 NOTE — PROGRESS NOTES
Called Patient to relay need for imaging appointment as a part of their stent follow-up.    X-rays orders are in place from Dr. Arnaldo Noguera .    Patient will need an abdominal x-ray around 4/26/2023, to see if the stent is still present.     Provided imaging scheduling phone #: [phone]. Told Patient to relay if they prefer to have the xray done outside the [facility] system.    SK

## 2023-04-26 ENCOUNTER — ANCILLARY PROCEDURE (OUTPATIENT)
Dept: GENERAL RADIOLOGY | Facility: CLINIC | Age: 73
End: 2023-04-26
Attending: INTERNAL MEDICINE
Payer: COMMERCIAL

## 2023-04-26 DIAGNOSIS — Z46.89 ENCOUNTER FOR REMOVAL OF PANCREATIC STENT: ICD-10-CM

## 2023-04-26 PROCEDURE — 74019 RADEX ABDOMEN 2 VIEWS: CPT

## 2023-05-11 ENCOUNTER — PATIENT OUTREACH (OUTPATIENT)
Dept: GASTROENTEROLOGY | Facility: CLINIC | Age: 73
End: 2023-05-11
Payer: COMMERCIAL

## 2023-05-11 DIAGNOSIS — K86.1 OTHER CHRONIC PANCREATITIS (H): Primary | ICD-10-CM

## 2023-05-11 RX ORDER — ONDANSETRON 2 MG/ML
4 INJECTION INTRAMUSCULAR; INTRAVENOUS ONCE
Status: CANCELLED | OUTPATIENT
Start: 2023-05-11 | End: 2023-05-11

## 2023-05-11 RX ORDER — TRAMADOL HYDROCHLORIDE 50 MG/1
50 TABLET ORAL 3 TIMES DAILY
Qty: 50 TABLET | Refills: 2
Start: 2023-05-11 | End: 2023-10-30

## 2023-05-11 NOTE — TELEPHONE ENCOUNTER
"Patient called in, states she feels like she's having a \"borderline flare\", swelling and a brief moment of bad pain, wants infusions. Refreshed orders.    Asking if Dr. Noguera will authorize tramadol in case she needs it, message sent to Dr. Noguera  ML  "

## 2023-05-12 ENCOUNTER — INFUSION THERAPY VISIT (OUTPATIENT)
Dept: INFUSION THERAPY | Facility: CLINIC | Age: 73
End: 2023-05-12
Attending: INTERNAL MEDICINE
Payer: COMMERCIAL

## 2023-05-12 VITALS
HEART RATE: 51 BPM | BODY MASS INDEX: 23.66 KG/M2 | DIASTOLIC BLOOD PRESSURE: 69 MMHG | WEIGHT: 155.6 LBS | SYSTOLIC BLOOD PRESSURE: 119 MMHG | RESPIRATION RATE: 16 BRPM | TEMPERATURE: 97 F | OXYGEN SATURATION: 97 %

## 2023-05-12 DIAGNOSIS — K85.90 ACUTE ON CHRONIC PANCREATITIS (H): Primary | ICD-10-CM

## 2023-05-12 DIAGNOSIS — K86.1 ACUTE ON CHRONIC PANCREATITIS (H): Primary | ICD-10-CM

## 2023-05-12 LAB — LIPASE SERPL-CCNC: 49 U/L (ref 13–60)

## 2023-05-12 PROCEDURE — 258N000003 HC RX IP 258 OP 636: Performed by: INTERNAL MEDICINE

## 2023-05-12 PROCEDURE — 83690 ASSAY OF LIPASE: CPT | Performed by: INTERNAL MEDICINE

## 2023-05-12 PROCEDURE — 96360 HYDRATION IV INFUSION INIT: CPT

## 2023-05-12 PROCEDURE — 36415 COLL VENOUS BLD VENIPUNCTURE: CPT | Performed by: INTERNAL MEDICINE

## 2023-05-12 RX ORDER — ONDANSETRON 2 MG/ML
4 INJECTION INTRAMUSCULAR; INTRAVENOUS ONCE
Status: CANCELLED | OUTPATIENT
Start: 2023-05-13 | End: 2023-05-13

## 2023-05-12 RX ORDER — HEPARIN SODIUM (PORCINE) LOCK FLUSH IV SOLN 100 UNIT/ML 100 UNIT/ML
5 SOLUTION INTRAVENOUS
Status: CANCELLED | OUTPATIENT
Start: 2023-05-13

## 2023-05-12 RX ORDER — HEPARIN SODIUM,PORCINE 10 UNIT/ML
5 VIAL (ML) INTRAVENOUS
Status: CANCELLED | OUTPATIENT
Start: 2023-05-13

## 2023-05-12 RX ADMIN — SODIUM CHLORIDE, POTASSIUM CHLORIDE, SODIUM LACTATE AND CALCIUM CHLORIDE 1000 ML: 600; 310; 30; 20 INJECTION, SOLUTION INTRAVENOUS at 14:15

## 2023-05-13 ENCOUNTER — INFUSION THERAPY VISIT (OUTPATIENT)
Dept: INFUSION THERAPY | Facility: CLINIC | Age: 73
End: 2023-05-13
Attending: INTERNAL MEDICINE
Payer: COMMERCIAL

## 2023-05-13 VITALS
TEMPERATURE: 98.1 F | OXYGEN SATURATION: 97 % | DIASTOLIC BLOOD PRESSURE: 75 MMHG | RESPIRATION RATE: 16 BRPM | HEART RATE: 52 BPM | SYSTOLIC BLOOD PRESSURE: 120 MMHG

## 2023-05-13 DIAGNOSIS — K86.1 ACUTE ON CHRONIC PANCREATITIS (H): Primary | ICD-10-CM

## 2023-05-13 DIAGNOSIS — K85.90 ACUTE ON CHRONIC PANCREATITIS (H): Primary | ICD-10-CM

## 2023-05-13 LAB — LIPASE SERPL-CCNC: 23 U/L (ref 13–60)

## 2023-05-13 PROCEDURE — 96374 THER/PROPH/DIAG INJ IV PUSH: CPT

## 2023-05-13 PROCEDURE — 258N000003 HC RX IP 258 OP 636: Performed by: INTERNAL MEDICINE

## 2023-05-13 PROCEDURE — 96361 HYDRATE IV INFUSION ADD-ON: CPT

## 2023-05-13 PROCEDURE — 83690 ASSAY OF LIPASE: CPT | Performed by: INTERNAL MEDICINE

## 2023-05-13 PROCEDURE — 36415 COLL VENOUS BLD VENIPUNCTURE: CPT | Performed by: INTERNAL MEDICINE

## 2023-05-13 PROCEDURE — 250N000011 HC RX IP 250 OP 636: Performed by: INTERNAL MEDICINE

## 2023-05-13 RX ORDER — ONDANSETRON 2 MG/ML
4 INJECTION INTRAMUSCULAR; INTRAVENOUS ONCE
Status: CANCELLED | OUTPATIENT
Start: 2023-05-14 | End: 2023-05-14

## 2023-05-13 RX ORDER — HEPARIN SODIUM,PORCINE 10 UNIT/ML
5 VIAL (ML) INTRAVENOUS
Status: CANCELLED | OUTPATIENT
Start: 2023-05-14

## 2023-05-13 RX ORDER — ONDANSETRON 2 MG/ML
4 INJECTION INTRAMUSCULAR; INTRAVENOUS ONCE
Status: COMPLETED | OUTPATIENT
Start: 2023-05-13 | End: 2023-05-13

## 2023-05-13 RX ORDER — HEPARIN SODIUM,PORCINE 10 UNIT/ML
5 VIAL (ML) INTRAVENOUS
Status: DISCONTINUED | OUTPATIENT
Start: 2023-05-13 | End: 2023-05-13 | Stop reason: HOSPADM

## 2023-05-13 RX ORDER — HEPARIN SODIUM (PORCINE) LOCK FLUSH IV SOLN 100 UNIT/ML 100 UNIT/ML
5 SOLUTION INTRAVENOUS
Status: CANCELLED | OUTPATIENT
Start: 2023-05-14

## 2023-05-13 RX ORDER — HEPARIN SODIUM (PORCINE) LOCK FLUSH IV SOLN 100 UNIT/ML 100 UNIT/ML
5 SOLUTION INTRAVENOUS
Status: DISCONTINUED | OUTPATIENT
Start: 2023-05-13 | End: 2023-05-13 | Stop reason: HOSPADM

## 2023-05-13 RX ADMIN — SODIUM CHLORIDE, POTASSIUM CHLORIDE, SODIUM LACTATE AND CALCIUM CHLORIDE 1000 ML: 600; 310; 30; 20 INJECTION, SOLUTION INTRAVENOUS at 12:50

## 2023-05-13 RX ADMIN — ONDANSETRON 4 MG: 2 INJECTION INTRAMUSCULAR; INTRAVENOUS at 12:50

## 2023-05-13 ASSESSMENT — PAIN SCALES - GENERAL: PAINLEVEL: SEVERE PAIN (7)

## 2023-05-13 NOTE — PROGRESS NOTES
Infusion Nursing Note:  Laura Gonzalez presents today for IVF plus zofran.    Patient seen by provider today: No   present during visit today: Not Applicable.    Note: N/A.      Intravenous Access:  Peripheral IV placed.    Treatment Conditions:  Not Applicable.      Post Infusion Assessment:  Patient tolerated infusion without incident.  Site patent and intact, free from redness, edema or discomfort.  No evidence of extravasations.  Access discontinued per protocol.       Discharge Plan:   Patient and/or family verbalized understanding of discharge instructions and all questions answered.  AVS to patient via [employer].  Patient will return tomorrow for next appointment.   Patient discharged in stable condition accompanied by: self.  Departure Mode: Ambulatory.      Missy Deutsch RN

## 2023-05-14 ENCOUNTER — INFUSION THERAPY VISIT (OUTPATIENT)
Dept: INFUSION THERAPY | Facility: CLINIC | Age: 73
End: 2023-05-14
Attending: INTERNAL MEDICINE
Payer: COMMERCIAL

## 2023-05-14 VITALS
RESPIRATION RATE: 16 BRPM | TEMPERATURE: 97.9 F | DIASTOLIC BLOOD PRESSURE: 57 MMHG | SYSTOLIC BLOOD PRESSURE: 128 MMHG | OXYGEN SATURATION: 99 % | HEART RATE: 53 BPM

## 2023-05-14 DIAGNOSIS — K86.1 ACUTE ON CHRONIC PANCREATITIS (H): Primary | ICD-10-CM

## 2023-05-14 DIAGNOSIS — K85.90 ACUTE ON CHRONIC PANCREATITIS (H): Primary | ICD-10-CM

## 2023-05-14 PROCEDURE — 96360 HYDRATION IV INFUSION INIT: CPT

## 2023-05-14 PROCEDURE — 258N000003 HC RX IP 258 OP 636: Performed by: INTERNAL MEDICINE

## 2023-05-14 RX ORDER — ONDANSETRON 2 MG/ML
4 INJECTION INTRAMUSCULAR; INTRAVENOUS ONCE
Status: CANCELLED | OUTPATIENT
Start: 2023-05-19 | End: 2023-05-19

## 2023-05-14 RX ORDER — ONDANSETRON 2 MG/ML
4 INJECTION INTRAMUSCULAR; INTRAVENOUS ONCE
Status: COMPLETED | OUTPATIENT
Start: 2023-05-14 | End: 2023-05-14

## 2023-05-14 RX ADMIN — SODIUM CHLORIDE, POTASSIUM CHLORIDE, SODIUM LACTATE AND CALCIUM CHLORIDE 1000 ML: 600; 310; 30; 20 INJECTION, SOLUTION INTRAVENOUS at 12:51

## 2023-05-14 NOTE — PROGRESS NOTES
Infusion Nursing Note:  Laura Gonzalez presents today for IV fluids+Zofran  Patient seen by provider today: No   present during visit today: Not Applicable.    Note: Pt reports no new health changes or concerns.      Intravenous Access:  Peripheral IV placed.    Treatment Conditions:  Not Applicable.      Post Infusion Assessment:  Patient tolerated infusion without incident.  Blood return noted pre and post infusion.  Site patent and intact, free from redness, edema or discomfort.  No evidence of extravasations.  Access discontinued per protocol.       Discharge Plan:   Patient declined prescription refills.  Discharge instructions reviewed with: Patient.  Patient and/or family verbalized understanding of discharge instructions and all questions answered.  AVS to patient via [employer].  Patient will return 5/24/23 for next appointment.   Patient discharged in stable condition accompanied by: self.  Departure Mode: Ambulatory.      Kim Dewitt RN

## 2023-05-25 ENCOUNTER — INFUSION THERAPY VISIT (OUTPATIENT)
Dept: INFUSION THERAPY | Facility: CLINIC | Age: 73
End: 2023-05-25
Attending: INTERNAL MEDICINE
Payer: COMMERCIAL

## 2023-05-25 VITALS — SYSTOLIC BLOOD PRESSURE: 122 MMHG | OXYGEN SATURATION: 97 % | HEART RATE: 52 BPM | DIASTOLIC BLOOD PRESSURE: 69 MMHG

## 2023-05-25 DIAGNOSIS — K86.1 ACUTE ON CHRONIC PANCREATITIS (H): Primary | ICD-10-CM

## 2023-05-25 DIAGNOSIS — K85.90 ACUTE ON CHRONIC PANCREATITIS (H): Primary | ICD-10-CM

## 2023-05-25 LAB — LIPASE SERPL-CCNC: 21 U/L (ref 13–60)

## 2023-05-25 PROCEDURE — 36415 COLL VENOUS BLD VENIPUNCTURE: CPT | Performed by: INTERNAL MEDICINE

## 2023-05-25 PROCEDURE — 258N000003 HC RX IP 258 OP 636: Performed by: INTERNAL MEDICINE

## 2023-05-25 PROCEDURE — 83690 ASSAY OF LIPASE: CPT | Performed by: INTERNAL MEDICINE

## 2023-05-25 RX ORDER — ONDANSETRON 2 MG/ML
4 INJECTION INTRAMUSCULAR; INTRAVENOUS ONCE
Status: CANCELLED | OUTPATIENT
Start: 2023-05-26 | End: 2023-05-26

## 2023-05-25 RX ADMIN — SODIUM CHLORIDE, POTASSIUM CHLORIDE, SODIUM LACTATE AND CALCIUM CHLORIDE 1000 ML: 600; 310; 30; 20 INJECTION, SOLUTION INTRAVENOUS at 09:12

## 2023-05-25 NOTE — PROGRESS NOTES
Infusion Nursing Note:  Laura Gonzalez presents today for labs/IVF.    Patient seen by provider today: No   present during visit today: Not Applicable.    Note: Pt reports an acute episode of nausea/indigestion last evening. Did not take any antinausea medications. Episode passed on its own. Denies need for antinausea medications today. Pt states she will likely just continue to receive IVF once/week for prevention of acute pancreatitis.  Lipase drawn today - WNL      Intravenous Access:  Peripheral IV placed.    Treatment Conditions:  Not Applicable.      Post Infusion Assessment:  Patient tolerated infusion without incident.  Blood return noted pre and post infusion.  Site patent and intact, free from redness, edema or discomfort.  No evidence of extravasations.  Access discontinued per protocol.       Discharge Plan:   AVS to patient via MYCHART.  Patient will return PRN for next appointment.   Patient discharged in stable condition accompanied by: self.  Departure Mode: Ambulatory.      Kaycee Ospina RN

## 2023-05-31 ENCOUNTER — APPOINTMENT (OUTPATIENT)
Dept: URBAN - METROPOLITAN AREA CLINIC 257 | Age: 73
Setting detail: DERMATOLOGY
End: 2023-05-31

## 2023-05-31 DIAGNOSIS — D22 MELANOCYTIC NEVI: ICD-10-CM

## 2023-05-31 DIAGNOSIS — D18.0 HEMANGIOMA: ICD-10-CM

## 2023-05-31 DIAGNOSIS — L82.1 OTHER SEBORRHEIC KERATOSIS: ICD-10-CM

## 2023-05-31 DIAGNOSIS — L57.8 OTHER SKIN CHANGES DUE TO CHRONIC EXPOSURE TO NONIONIZING RADIATION: ICD-10-CM

## 2023-05-31 PROBLEM — D18.01 HEMANGIOMA OF SKIN AND SUBCUTANEOUS TISSUE: Status: ACTIVE | Noted: 2023-05-31

## 2023-05-31 PROBLEM — D22.5 MELANOCYTIC NEVI OF TRUNK: Status: ACTIVE | Noted: 2023-05-31

## 2023-05-31 PROCEDURE — OTHER MIPS QUALITY: OTHER

## 2023-05-31 PROCEDURE — OTHER COUNSELING: OTHER

## 2023-05-31 PROCEDURE — 99212 OFFICE O/P EST SF 10 MIN: CPT

## 2023-05-31 ASSESSMENT — LOCATION DETAILED DESCRIPTION DERM
LOCATION DETAILED: RIGHT MEDIAL BREAST 2-3:00 REGION
LOCATION DETAILED: RIGHT MEDIAL FOREHEAD
LOCATION DETAILED: LOWER STERNUM
LOCATION DETAILED: LEFT MEDIAL UPPER BACK
LOCATION DETAILED: RIGHT INFERIOR MEDIAL UPPER BACK
LOCATION DETAILED: RIGHT MEDIAL SUPERIOR CHEST
LOCATION DETAILED: RIGHT SUPERIOR MEDIAL UPPER BACK
LOCATION DETAILED: INFERIOR THORACIC SPINE
LOCATION DETAILED: SUBXIPHOID

## 2023-05-31 ASSESSMENT — LOCATION SIMPLE DESCRIPTION DERM
LOCATION SIMPLE: ABDOMEN
LOCATION SIMPLE: RIGHT UPPER BACK
LOCATION SIMPLE: CHEST
LOCATION SIMPLE: RIGHT BREAST
LOCATION SIMPLE: LEFT UPPER BACK
LOCATION SIMPLE: UPPER BACK
LOCATION SIMPLE: RIGHT FOREHEAD

## 2023-05-31 ASSESSMENT — LOCATION ZONE DERM
LOCATION ZONE: TRUNK
LOCATION ZONE: FACE

## 2023-05-31 NOTE — PROCEDURE: MIPS QUALITY
Quality 111:Pneumonia Vaccination Status For Older Adults: Pneumococcal Vaccination not Administered or Previously Received, Reason not Otherwise Specified
Detail Level: Detailed
Quality 431: Preventive Care And Screening: Unhealthy Alcohol Use - Screening: Patient screened for unhealthy alcohol use using a single question and scores less than 2 times per year
Quality 130: Documentation Of Current Medications In The Medical Record: Current Medications Documented
Quality 110: Preventive Care And Screening: Influenza Immunization: Influenza Immunization Ordered or Recommended, but not Administered due to system reason
Quality 226: Preventive Care And Screening: Tobacco Use: Screening And Cessation Intervention: Patient screened for tobacco use and is an ex/non-smoker

## 2023-06-01 ENCOUNTER — HEALTH MAINTENANCE LETTER (OUTPATIENT)
Age: 73
End: 2023-06-01

## 2023-06-27 DIAGNOSIS — K85.90 ACUTE ON CHRONIC PANCREATITIS (H): Primary | ICD-10-CM

## 2023-06-27 DIAGNOSIS — K86.1 ACUTE ON CHRONIC PANCREATITIS (H): Primary | ICD-10-CM

## 2023-06-29 ENCOUNTER — PATIENT OUTREACH (OUTPATIENT)
Dept: GASTROENTEROLOGY | Facility: CLINIC | Age: 73
End: 2023-06-29
Payer: COMMERCIAL

## 2023-06-29 NOTE — TELEPHONE ENCOUNTER
"Patient called in, left message- wants feedback from Dr. Noguera on this provider and her work:    Nata Landrum [facility]  She's doing work for chronic pancreatitis called \"quantitative sensor testing\"- wants Dr. Noguera 's opinion on that, see if he thinks she would qualify for that- thinks she's had all the stents she can have, wondering if this would be next step for chronic pancreatitis.       Message sent to Dr. Noguera    ML  "

## 2023-07-18 RX ORDER — ONDANSETRON 2 MG/ML
4 INJECTION INTRAMUSCULAR; INTRAVENOUS ONCE
Status: CANCELLED | OUTPATIENT
Start: 2023-07-18 | End: 2023-07-18

## 2023-07-21 ENCOUNTER — TELEPHONE (OUTPATIENT)
Dept: GASTROENTEROLOGY | Facility: CLINIC | Age: 73
End: 2023-07-21
Payer: COMMERCIAL

## 2023-07-21 NOTE — TELEPHONE ENCOUNTER
RONEN and sent [employer] for scheduling w/ Dr. Damon.  When: Next available   New/Return patient: New   Virtual/In person: Patient preference   Reason for visit (please add to appointment notes): QST (Quantitative Sensory Testing) / Dr. Nata Duran   Referring provider: Dr. Noguera

## 2023-08-05 ENCOUNTER — INFUSION THERAPY VISIT (OUTPATIENT)
Dept: INFUSION THERAPY | Facility: CLINIC | Age: 73
End: 2023-08-05
Attending: INTERNAL MEDICINE
Payer: COMMERCIAL

## 2023-08-05 VITALS
DIASTOLIC BLOOD PRESSURE: 60 MMHG | RESPIRATION RATE: 18 BRPM | OXYGEN SATURATION: 97 % | TEMPERATURE: 98.6 F | SYSTOLIC BLOOD PRESSURE: 119 MMHG | HEART RATE: 62 BPM

## 2023-08-05 DIAGNOSIS — K85.90 ACUTE ON CHRONIC PANCREATITIS (H): Primary | ICD-10-CM

## 2023-08-05 DIAGNOSIS — K86.1 ACUTE ON CHRONIC PANCREATITIS (H): Primary | ICD-10-CM

## 2023-08-05 PROCEDURE — 96360 HYDRATION IV INFUSION INIT: CPT

## 2023-08-05 PROCEDURE — 96361 HYDRATE IV INFUSION ADD-ON: CPT

## 2023-08-05 PROCEDURE — 258N000003 HC RX IP 258 OP 636: Performed by: INTERNAL MEDICINE

## 2023-08-05 RX ORDER — ONDANSETRON 2 MG/ML
4 INJECTION INTRAMUSCULAR; INTRAVENOUS ONCE
Status: CANCELLED | OUTPATIENT
Start: 2023-08-05 | End: 2023-08-05

## 2023-08-05 RX ORDER — ONDANSETRON 2 MG/ML
4 INJECTION INTRAMUSCULAR; INTRAVENOUS ONCE
Status: DISCONTINUED | OUTPATIENT
Start: 2023-08-05 | End: 2023-08-05

## 2023-08-05 RX ADMIN — SODIUM CHLORIDE, POTASSIUM CHLORIDE, SODIUM LACTATE AND CALCIUM CHLORIDE 1500 ML: 600; 310; 30; 20 INJECTION, SOLUTION INTRAVENOUS at 12:23

## 2023-08-05 NOTE — PROGRESS NOTES
Infusion Nursing Note:  Laura Gonzalez presents today for IV fluids.    Patient seen by provider today: No   present during visit today: Not Applicable.    Note: N/A.      Intravenous Access:  Peripheral IV placed.    Treatment Conditions:  Not Applicable.      Post Infusion Assessment:  Patient tolerated infusion without incident.  Blood return noted pre and post infusion.  Site patent and intact, free from redness, edema or discomfort.  No evidence of extravasations.  Access discontinued per protocol.       Discharge Plan:   Patient declined prescription refills.  Discharge instructions reviewed with: Patient.  Patient and/or family verbalized understanding of discharge instructions and all questions answered.  AVS to patient via [employer].  Patient will return 8/7 for next appointment.   Patient discharged in stable condition accompanied by: self.  Departure Mode: Ambulatory.      Duarte Cadet RN

## 2023-08-09 ENCOUNTER — INFUSION THERAPY VISIT (OUTPATIENT)
Dept: INFUSION THERAPY | Facility: CLINIC | Age: 73
End: 2023-08-09
Attending: INTERNAL MEDICINE
Payer: COMMERCIAL

## 2023-08-09 VITALS
TEMPERATURE: 97.9 F | SYSTOLIC BLOOD PRESSURE: 129 MMHG | HEART RATE: 53 BPM | OXYGEN SATURATION: 97 % | DIASTOLIC BLOOD PRESSURE: 59 MMHG | RESPIRATION RATE: 18 BRPM

## 2023-08-09 DIAGNOSIS — K86.1 ACUTE ON CHRONIC PANCREATITIS (H): Primary | ICD-10-CM

## 2023-08-09 DIAGNOSIS — K85.90 ACUTE ON CHRONIC PANCREATITIS (H): Primary | ICD-10-CM

## 2023-08-09 PROCEDURE — 258N000003 HC RX IP 258 OP 636: Performed by: INTERNAL MEDICINE

## 2023-08-09 PROCEDURE — 96360 HYDRATION IV INFUSION INIT: CPT

## 2023-08-09 RX ORDER — ONDANSETRON 2 MG/ML
4 INJECTION INTRAMUSCULAR; INTRAVENOUS ONCE
Status: CANCELLED | OUTPATIENT
Start: 2023-08-09 | End: 2023-08-09

## 2023-08-09 RX ADMIN — SODIUM CHLORIDE, POTASSIUM CHLORIDE, SODIUM LACTATE AND CALCIUM CHLORIDE 1000 ML: 600; 310; 30; 20 INJECTION, SOLUTION INTRAVENOUS at 13:20

## 2023-08-09 NOTE — PROGRESS NOTES
Infusion Nursing Note:  Laura Freeddrew presents today for IV fluids.    Patient seen by provider today: No   present during visit today: Not Applicable.    Note: Pt reports no new health changes or concerns at this time. Pt reports experiencing some abdominal pain which pt manages with IV fluids and prn pain meds.  Pt requested no  Zofran today.      Intravenous Access:  Peripheral IV placed.    Treatment Conditions:  Not Applicable.      Post Infusion Assessment:  Patient tolerated infusion without incident.  Blood return noted pre and post infusion.  Site patent and intact, free from redness, edema or discomfort.  No evidence of extravasations.  Access discontinued per protocol.       Discharge Plan:   Patient declined prescription refills.  Discharge instructions reviewed with: Patient.  Patient and/or family verbalized understanding of discharge instructions and all questions answered.  AVS to patient via [employer].  Patient will return 8/16/23 for next appointment.   Patient discharged in stable condition accompanied by: self.  Departure Mode: Ambulatory.      Kim Dewitt RN

## 2023-08-16 ENCOUNTER — INFUSION THERAPY VISIT (OUTPATIENT)
Dept: INFUSION THERAPY | Facility: CLINIC | Age: 73
End: 2023-08-16
Attending: INTERNAL MEDICINE
Payer: COMMERCIAL

## 2023-08-16 VITALS
HEART RATE: 52 BPM | SYSTOLIC BLOOD PRESSURE: 117 MMHG | OXYGEN SATURATION: 100 % | RESPIRATION RATE: 20 BRPM | TEMPERATURE: 98.7 F | DIASTOLIC BLOOD PRESSURE: 69 MMHG

## 2023-08-16 DIAGNOSIS — K85.90 ACUTE ON CHRONIC PANCREATITIS (H): Primary | ICD-10-CM

## 2023-08-16 DIAGNOSIS — K86.1 ACUTE ON CHRONIC PANCREATITIS (H): Primary | ICD-10-CM

## 2023-08-16 PROCEDURE — 96360 HYDRATION IV INFUSION INIT: CPT

## 2023-08-16 PROCEDURE — 258N000003 HC RX IP 258 OP 636: Performed by: INTERNAL MEDICINE

## 2023-08-16 PROCEDURE — 96361 HYDRATE IV INFUSION ADD-ON: CPT

## 2023-08-16 RX ORDER — ONDANSETRON 2 MG/ML
4 INJECTION INTRAMUSCULAR; INTRAVENOUS ONCE
Status: CANCELLED | OUTPATIENT
Start: 2023-08-16 | End: 2023-08-16

## 2023-08-16 RX ADMIN — SODIUM CHLORIDE, POTASSIUM CHLORIDE, SODIUM LACTATE AND CALCIUM CHLORIDE 1000 ML: 600; 310; 30; 20 INJECTION, SOLUTION INTRAVENOUS at 12:57

## 2023-08-16 ASSESSMENT — PAIN SCALES - GENERAL: PAINLEVEL: NO PAIN (0)

## 2023-08-16 NOTE — PROGRESS NOTES
Infusion Nursing Note:  Laura Gonzalez presents today for IVF.    Patient seen by provider today: No   present during visit today: Not Applicable.    Note: pt to get 1500ml LR, no Zofran needed today.      Intravenous Access:  Peripheral IV placed.    Treatment Conditions:  Not Applicable.      Post Infusion Assessment:  Patient tolerated infusion without incident.  Access discontinued per protocol.       Discharge Plan:   Discharge instructions reviewed with: Patient.  Patient and/or family verbalized understanding of discharge instructions and all questions answered.  Patient discharged in stable condition accompanied by: self.  Departure Mode: Ambulatory.      Chioma Dozier RN

## 2023-08-18 ENCOUNTER — INFUSION THERAPY VISIT (OUTPATIENT)
Dept: INFUSION THERAPY | Facility: CLINIC | Age: 73
End: 2023-08-18
Attending: INTERNAL MEDICINE
Payer: COMMERCIAL

## 2023-08-18 VITALS
OXYGEN SATURATION: 98 % | SYSTOLIC BLOOD PRESSURE: 144 MMHG | RESPIRATION RATE: 18 BRPM | TEMPERATURE: 98.2 F | HEART RATE: 47 BPM | DIASTOLIC BLOOD PRESSURE: 65 MMHG

## 2023-08-18 DIAGNOSIS — K86.1 ACUTE ON CHRONIC PANCREATITIS (H): Primary | ICD-10-CM

## 2023-08-18 DIAGNOSIS — K85.90 ACUTE ON CHRONIC PANCREATITIS (H): Primary | ICD-10-CM

## 2023-08-18 PROCEDURE — 96360 HYDRATION IV INFUSION INIT: CPT

## 2023-08-18 PROCEDURE — 258N000003 HC RX IP 258 OP 636: Performed by: INTERNAL MEDICINE

## 2023-08-18 RX ORDER — ONDANSETRON 2 MG/ML
4 INJECTION INTRAMUSCULAR; INTRAVENOUS ONCE
Status: CANCELLED | OUTPATIENT
Start: 2023-08-23 | End: 2023-08-18

## 2023-08-18 RX ADMIN — SODIUM CHLORIDE, POTASSIUM CHLORIDE, SODIUM LACTATE AND CALCIUM CHLORIDE 1000 ML: 600; 310; 30; 20 INJECTION, SOLUTION INTRAVENOUS at 14:17

## 2023-08-18 NOTE — PROGRESS NOTES
Infusion Nursing Note:  Laura Gonzalez presents today for IV Fluids.    Patient seen by provider today: No   present during visit today: Not Applicable.    Note: Pt reports no new health changes or concerns. Pt noted to be Bradycardic HR of 47. Pt was notified and was encouraged to talk to her Cardiologist about low HR. Pt expressed understanding and said she will get in contact with her Cardiologist as soon as possible.       Intravenous Access:  Peripheral IV placed.    Treatment Conditions:  Not Applicable.      Post Infusion Assessment:  Patient tolerated infusion without incident.  Blood return noted pre and post infusion.  Site patent and intact, free from redness, edema or discomfort.  No evidence of extravasations.  Access discontinued per protocol.       Discharge Plan:   Patient declined prescription refills.  Discharge instructions reviewed with: Patient.  Patient and/or family verbalized understanding of discharge instructions and all questions answered.  AVS to patient via [employer].  Patient will return 8/23/23 for next appointment.   Patient discharged in stable condition accompanied by: self.  Departure Mode: Ambulatory.      Kim Dewitt RN

## 2023-09-08 ENCOUNTER — INFUSION THERAPY VISIT (OUTPATIENT)
Dept: INFUSION THERAPY | Facility: CLINIC | Age: 73
End: 2023-09-08
Attending: INTERNAL MEDICINE
Payer: COMMERCIAL

## 2023-09-08 VITALS — HEART RATE: 60 BPM | SYSTOLIC BLOOD PRESSURE: 127 MMHG | OXYGEN SATURATION: 98 % | DIASTOLIC BLOOD PRESSURE: 69 MMHG

## 2023-09-08 DIAGNOSIS — K86.1 ACUTE ON CHRONIC PANCREATITIS (H): Primary | ICD-10-CM

## 2023-09-08 DIAGNOSIS — K85.90 ACUTE ON CHRONIC PANCREATITIS (H): Primary | ICD-10-CM

## 2023-09-08 PROCEDURE — 258N000003 HC RX IP 258 OP 636: Performed by: INTERNAL MEDICINE

## 2023-09-08 PROCEDURE — 96360 HYDRATION IV INFUSION INIT: CPT

## 2023-09-08 PROCEDURE — 96361 HYDRATE IV INFUSION ADD-ON: CPT

## 2023-09-08 RX ORDER — ONDANSETRON 2 MG/ML
4 INJECTION INTRAMUSCULAR; INTRAVENOUS ONCE
Status: CANCELLED | OUTPATIENT
Start: 2023-09-11 | End: 2023-09-08

## 2023-09-08 RX ADMIN — SODIUM CHLORIDE, POTASSIUM CHLORIDE, SODIUM LACTATE AND CALCIUM CHLORIDE 1500 ML: 600; 310; 30; 20 INJECTION, SOLUTION INTRAVENOUS at 13:52

## 2023-09-08 NOTE — PROGRESS NOTES
"Infusion Nursing Note:  Laura Gonzalez presents today for IVF.    Patient seen by provider today: No   present during visit today: Not Applicable.    Note: Patient reports to having a pancreatitis \"flair\" yesterday evening with vomiting and severe pain.  Patient reports her pain has improved today and has last taken pain medications at 0530 this morning.  Patient has only been taking in liquids to try and rest her bowel.  Denies need for IV Zofran today.  Patient scheduled to return for IVF on Monday, 9/11/23.      Intravenous Access:  Peripheral IV placed.    Treatment Conditions:  Not Applicable.      Post Infusion Assessment:  Patient tolerated infusion without incident.  Blood return noted pre and post infusion.  Site patent and intact, free from redness, edema or discomfort.  No evidence of extravasations.  Access discontinued per protocol.       Discharge Plan:   Patient declined prescription refills.  Discharge instructions reviewed with: Patient.  Patient and/or family verbalized understanding of discharge instructions and all questions answered.  AVS to patient via [employer].  Patient will return 9/11/23 for IVF for next appointment.   Patient discharged in stable condition accompanied by: self.  Departure Mode: Ambulatory.      Lashonda Dill RN    "

## 2023-09-11 ENCOUNTER — INFUSION THERAPY VISIT (OUTPATIENT)
Dept: INFUSION THERAPY | Facility: CLINIC | Age: 73
End: 2023-09-11
Attending: INTERNAL MEDICINE
Payer: COMMERCIAL

## 2023-09-11 VITALS
OXYGEN SATURATION: 98 % | DIASTOLIC BLOOD PRESSURE: 60 MMHG | RESPIRATION RATE: 16 BRPM | TEMPERATURE: 98.1 F | HEART RATE: 59 BPM | SYSTOLIC BLOOD PRESSURE: 125 MMHG

## 2023-09-11 DIAGNOSIS — K85.90 ACUTE ON CHRONIC PANCREATITIS (H): Primary | ICD-10-CM

## 2023-09-11 DIAGNOSIS — K86.1 ACUTE ON CHRONIC PANCREATITIS (H): Primary | ICD-10-CM

## 2023-09-11 PROCEDURE — 96360 HYDRATION IV INFUSION INIT: CPT

## 2023-09-11 PROCEDURE — 96361 HYDRATE IV INFUSION ADD-ON: CPT

## 2023-09-11 PROCEDURE — 258N000003 HC RX IP 258 OP 636: Performed by: INTERNAL MEDICINE

## 2023-09-11 RX ORDER — ONDANSETRON 2 MG/ML
4 INJECTION INTRAMUSCULAR; INTRAVENOUS ONCE
Status: CANCELLED | OUTPATIENT
Start: 2023-09-11 | End: 2023-09-11

## 2023-09-11 RX ADMIN — SODIUM CHLORIDE, POTASSIUM CHLORIDE, SODIUM LACTATE AND CALCIUM CHLORIDE 1000 ML: 600; 310; 30; 20 INJECTION, SOLUTION INTRAVENOUS at 14:53

## 2023-09-11 ASSESSMENT — PAIN SCALES - GENERAL: PAINLEVEL: MILD PAIN (3)

## 2023-09-11 NOTE — PROGRESS NOTES
Infusion Nursing Note:  Lauraalphonso Freeddrew presents today for IV fluids.    Patient seen by provider today: No   present during visit today: Not Applicable.    Note: Patient reports feeling much improved after pancreatitis flare last week. Denies any recent nausea or vomiting. Continues with ongoing abdominal pain r/t pancreatitis, rates at 3/10 today.      Intravenous Access:  Peripheral IV placed.    Treatment Conditions:  Not Applicable.      Post Infusion Assessment:  Patient tolerated infusion without incident.  Blood return noted pre and post infusion.  Site patent and intact, free from redness, edema or discomfort.  No evidence of extravasations.  Access discontinued per protocol.       Discharge Plan:   Discharge instructions reviewed with: Patient.  Patient and/or family verbalized understanding of discharge instructions and all questions answered.  AVS to patient via [employer].  Patient will return 9/17/23 for next appointment.   Patient discharged in stable condition accompanied by: self.  Departure Mode: Ambulatory.      Guillermina Logan RN

## 2023-09-17 ENCOUNTER — INFUSION THERAPY VISIT (OUTPATIENT)
Dept: INFUSION THERAPY | Facility: CLINIC | Age: 73
End: 2023-09-17
Attending: INTERNAL MEDICINE
Payer: COMMERCIAL

## 2023-09-17 VITALS
DIASTOLIC BLOOD PRESSURE: 63 MMHG | TEMPERATURE: 97.9 F | RESPIRATION RATE: 18 BRPM | HEART RATE: 59 BPM | OXYGEN SATURATION: 98 % | SYSTOLIC BLOOD PRESSURE: 112 MMHG

## 2023-09-17 DIAGNOSIS — K85.90 ACUTE ON CHRONIC PANCREATITIS (H): Primary | ICD-10-CM

## 2023-09-17 DIAGNOSIS — K86.1 ACUTE ON CHRONIC PANCREATITIS (H): Primary | ICD-10-CM

## 2023-09-17 PROCEDURE — 258N000003 HC RX IP 258 OP 636: Performed by: INTERNAL MEDICINE

## 2023-09-17 PROCEDURE — 96360 HYDRATION IV INFUSION INIT: CPT

## 2023-09-17 RX ORDER — ONDANSETRON 2 MG/ML
4 INJECTION INTRAMUSCULAR; INTRAVENOUS ONCE
Status: CANCELLED | OUTPATIENT
Start: 2023-09-17 | End: 2023-09-17

## 2023-09-17 RX ADMIN — SODIUM CHLORIDE, POTASSIUM CHLORIDE, SODIUM LACTATE AND CALCIUM CHLORIDE 1000 ML: 600; 310; 30; 20 INJECTION, SOLUTION INTRAVENOUS at 09:47

## 2023-09-17 NOTE — PROGRESS NOTES
Infusion Nursing Note:  Laura Gonzalez presents today for IV fluids.    Patient seen by provider today: No   present during visit today: Not Applicable.    Note: N/A.      Intravenous Access:  Peripheral IV placed.    Treatment Conditions:  Not Applicable.      Post Infusion Assessment:  Patient tolerated infusion without incident.  Blood return noted pre and post infusion.  Site patent and intact, free from redness, edema or discomfort.  No evidence of extravasations.  Access discontinued per protocol.       Discharge Plan:   Patient declined prescription refills.  Discharge instructions reviewed with: Patient.  Patient and/or family verbalized understanding of discharge instructions and all questions answered.  AVS to patient via [employer].  Patient will return 9/23 for next appointment.   Patient discharged in stable condition accompanied by: self.  Departure Mode: Ambulatory.      Duarte Cadet RN

## 2023-09-18 ENCOUNTER — PATIENT OUTREACH (OUTPATIENT)
Dept: GASTROENTEROLOGY | Facility: CLINIC | Age: 73
End: 2023-09-18
Payer: COMMERCIAL

## 2023-09-18 NOTE — TELEPHONE ENCOUNTER
Pt left message that she had pain on 8/8/23, has had infusions and is using tramadol. Tramadol helping, pt asking for addition meds in case pain continues. Has follow up with Dr. Damon scheduled to discuss follow up at another facility.   Tramadol last ordered 5-11-23. Returned call to patient, explained Dr. Noguera is out of the country until the end of next week. Encouraged follow up with PCP for pain management. Left message    ML

## 2023-10-02 ENCOUNTER — INFUSION THERAPY VISIT (OUTPATIENT)
Dept: INFUSION THERAPY | Facility: CLINIC | Age: 73
End: 2023-10-02
Attending: INTERNAL MEDICINE
Payer: COMMERCIAL

## 2023-10-02 VITALS
SYSTOLIC BLOOD PRESSURE: 120 MMHG | OXYGEN SATURATION: 97 % | TEMPERATURE: 98.3 F | RESPIRATION RATE: 18 BRPM | DIASTOLIC BLOOD PRESSURE: 70 MMHG | HEART RATE: 57 BPM

## 2023-10-02 DIAGNOSIS — K86.1 ACUTE ON CHRONIC PANCREATITIS (H): Primary | ICD-10-CM

## 2023-10-02 DIAGNOSIS — K85.90 ACUTE ON CHRONIC PANCREATITIS (H): Primary | ICD-10-CM

## 2023-10-02 PROCEDURE — 258N000003 HC RX IP 258 OP 636: Performed by: INTERNAL MEDICINE

## 2023-10-02 PROCEDURE — 250N000011 HC RX IP 250 OP 636: Mod: JZ | Performed by: INTERNAL MEDICINE

## 2023-10-02 PROCEDURE — 96374 THER/PROPH/DIAG INJ IV PUSH: CPT

## 2023-10-02 PROCEDURE — 96361 HYDRATE IV INFUSION ADD-ON: CPT

## 2023-10-02 RX ORDER — ONDANSETRON 2 MG/ML
4 INJECTION INTRAMUSCULAR; INTRAVENOUS ONCE
Status: CANCELLED | OUTPATIENT
Start: 2023-10-02 | End: 2023-10-02

## 2023-10-02 RX ORDER — ONDANSETRON 2 MG/ML
4 INJECTION INTRAMUSCULAR; INTRAVENOUS ONCE
Status: COMPLETED | OUTPATIENT
Start: 2023-10-02 | End: 2023-10-02

## 2023-10-02 RX ADMIN — SODIUM CHLORIDE, POTASSIUM CHLORIDE, SODIUM LACTATE AND CALCIUM CHLORIDE 1000 ML: 600; 310; 30; 20 INJECTION, SOLUTION INTRAVENOUS at 14:29

## 2023-10-02 RX ADMIN — ONDANSETRON 4 MG: 2 INJECTION INTRAMUSCULAR; INTRAVENOUS at 14:39

## 2023-10-02 ASSESSMENT — PAIN SCALES - GENERAL: PAINLEVEL: MODERATE PAIN (5)

## 2023-10-02 NOTE — PROGRESS NOTES
Infusion Nursing Note:  Laura VINNY Gonzalez presents today for IVF and zofran.    Patient seen by provider today: No   present during visit today: Not Applicable.    Note: N/A.      Intravenous Access:  Peripheral IV placed.    Treatment Conditions:  Not Applicable.      Post Infusion Assessment:  Patient tolerated infusion without incident.  Blood return noted pre and post infusion.  Site patent and intact, free from redness, edema or discomfort.  No evidence of extravasations.  Access discontinued per protocol.       Discharge Plan:   Discharge instructions reviewed with: Patient.  Patient and/or family verbalized understanding of discharge instructions and all questions answered.  AVS to patient via NutraMedT.  Patient will return 10/9 for next appointment.   Patient discharged in stable condition accompanied by: self.  Departure Mode: Ambulatory.      Crystal Hill RN

## 2023-10-09 ENCOUNTER — INFUSION THERAPY VISIT (OUTPATIENT)
Dept: INFUSION THERAPY | Facility: CLINIC | Age: 73
End: 2023-10-09
Attending: INTERNAL MEDICINE
Payer: COMMERCIAL

## 2023-10-09 VITALS
RESPIRATION RATE: 18 BRPM | SYSTOLIC BLOOD PRESSURE: 125 MMHG | TEMPERATURE: 98 F | DIASTOLIC BLOOD PRESSURE: 60 MMHG | OXYGEN SATURATION: 98 % | HEART RATE: 57 BPM

## 2023-10-09 DIAGNOSIS — K85.90 ACUTE ON CHRONIC PANCREATITIS (H): Primary | ICD-10-CM

## 2023-10-09 DIAGNOSIS — K86.1 ACUTE ON CHRONIC PANCREATITIS (H): Primary | ICD-10-CM

## 2023-10-09 PROCEDURE — 250N000011 HC RX IP 250 OP 636: Mod: JZ | Performed by: INTERNAL MEDICINE

## 2023-10-09 PROCEDURE — 96374 THER/PROPH/DIAG INJ IV PUSH: CPT

## 2023-10-09 PROCEDURE — 258N000003 HC RX IP 258 OP 636: Performed by: INTERNAL MEDICINE

## 2023-10-09 PROCEDURE — 96361 HYDRATE IV INFUSION ADD-ON: CPT

## 2023-10-09 RX ORDER — ONDANSETRON 2 MG/ML
4 INJECTION INTRAMUSCULAR; INTRAVENOUS ONCE
Status: COMPLETED | OUTPATIENT
Start: 2023-10-09 | End: 2023-10-09

## 2023-10-09 RX ORDER — ONDANSETRON 2 MG/ML
4 INJECTION INTRAMUSCULAR; INTRAVENOUS ONCE
Status: CANCELLED | OUTPATIENT
Start: 2023-10-09 | End: 2023-10-09

## 2023-10-09 RX ADMIN — SODIUM CHLORIDE, POTASSIUM CHLORIDE, SODIUM LACTATE AND CALCIUM CHLORIDE 1500 ML: 600; 310; 30; 20 INJECTION, SOLUTION INTRAVENOUS at 14:51

## 2023-10-09 RX ADMIN — ONDANSETRON 4 MG: 2 INJECTION INTRAMUSCULAR; INTRAVENOUS at 14:47

## 2023-10-09 ASSESSMENT — PAIN SCALES - GENERAL: PAINLEVEL: NO PAIN (0)

## 2023-10-09 NOTE — PROGRESS NOTES
Infusion Nursing Note:  Laura VINNY Gonzalez presents today for IVF and zofran.    Patient seen by provider today: No   present during visit today: Not Applicable.    Note: N/A.      Intravenous Access:  Peripheral IV placed.    Treatment Conditions:  Not Applicable.      Post Infusion Assessment:  Patient tolerated infusion without incident.  Blood return noted pre and post infusion.  Site patent and intact, free from redness, edema or discomfort.  No evidence of extravasations.  Access discontinued per protocol.       Discharge Plan:   Discharge instructions reviewed with: Patient.  Patient and/or family verbalized understanding of discharge instructions and all questions answered.  AVS to patient via BotScanner.  Patient will return 10/14 for next appointment.   Patient discharged in stable condition accompanied by: self.  Departure Mode: Ambulatory.      Crystal Hill RN

## 2023-10-26 ENCOUNTER — OFFICE VISIT (OUTPATIENT)
Dept: GASTROENTEROLOGY | Facility: CLINIC | Age: 73
End: 2023-10-26
Payer: COMMERCIAL

## 2023-10-26 VITALS
BODY MASS INDEX: 24.34 KG/M2 | HEART RATE: 67 BPM | OXYGEN SATURATION: 99 % | DIASTOLIC BLOOD PRESSURE: 70 MMHG | SYSTOLIC BLOOD PRESSURE: 122 MMHG | WEIGHT: 155.1 LBS | HEIGHT: 67 IN

## 2023-10-26 DIAGNOSIS — K86.1 OTHER CHRONIC PANCREATITIS (H): Primary | ICD-10-CM

## 2023-10-26 PROCEDURE — 99214 OFFICE O/P EST MOD 30 MIN: CPT | Performed by: INTERNAL MEDICINE

## 2023-10-26 RX ORDER — TRAMADOL HYDROCHLORIDE 50 MG/1
50 TABLET ORAL 3 TIMES DAILY PRN
Qty: 30 TABLET | Refills: 2 | Status: CANCELLED | OUTPATIENT
Start: 2023-10-26 | End: 2024-01-24

## 2023-10-26 ASSESSMENT — PAIN SCALES - GENERAL: PAINLEVEL: MODERATE PAIN (4)

## 2023-10-26 NOTE — PROGRESS NOTES
"Chief Complaint   Patient presents with    New Patient       Vitals:    10/26/23 1106   BP: 122/70   BP Location: Left arm   Patient Position: Sitting   Cuff Size: Adult Regular   Pulse: 67   SpO2: 99%   Weight: 70.4 kg (155 lb 1.6 oz)   Height: 1.702 m (5' 7\")       Body mass index is 24.29 kg/m .    Molly Rogers"

## 2023-10-26 NOTE — LETTER
"    10/26/2023         RE: Laura Gonzalez  [id number]   [place] [address]        Dear Colleague,    Thank you for referring your patient, Laura Gonzalez, to the [facility] [place]. Please see a copy of my visit note below.    Chief Complaint   Patient presents with    New Patient       Vitals:    10/26/23 1106   BP: 122/70   BP Location: Left arm   Patient Position: Sitting   Cuff Size: Adult Regular   Pulse: 67   SpO2: 99%   Weight: 70.4 kg (155 lb 1.6 oz)   Height: 1.702 m (5' 7\")       Body mass index is 24.29 kg/m .    [facility]                                  Pancreas Clinic Consult Note    Referring provider: Dr. 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  She was initially referred to Dr. Noguera in March 2022.  She was initially managed on pancreatic enzyme replacement therapy.  Since she was not adequately responding to medical management she was offered EUS guided pancreatic gastrostomy.  This was performed in May 2022 by Dr. Oates with placement of 3 Taiwanese stent.  She was admitted postprocedure with pancreatitis.  CT scan showed a peripancreatic retrogastric abscess which required percutaneous drainage and also migrated pancreatic duct stent.  She underwent ERCP with a Assisted pediatric colonoscopy.  The partially stenosed pancreatic jejunostomy was accessed retrograde and was double stented with soft Noguera stent to keep the anastomosis patent.  She underwent a stent exchange in October 2022 with placement of a 5 Taiwanese and a 4 Taiwanese stent.  She however got " readmitted with acute pancreatitis in November 2022 after these stents had spontaneously passed out.  Dr. Gotti performed under ERCP in November 2022 with placement of a 5 Danish by 3 cm of flex Jameel stent and a 4 Danish by 4 cm single pigtail Zinman stent.  This was followed by placement of a 6 Danish by 4 cm and a 4 Danish by 4 cm in length single pigtail stent.  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  She subsequently sought second opinion with Dr. Treviño at [facility] for continued right upper quadrant pain in June 2023 at [place] she underwent another MRCP and then she was recommended pain rehabitation clinic for neuromodulator therapy and also trigger point injection besides cognitive behavioral therapy and acupuncture for her chronic pain.  MRCP performed showed normal pancreaticojejunostomy limb but was limited by the metal artifact.  Of note she has not had an episode of acute pancreatitis in over a year    Clinically she reports doing much better since the [facility] second opinion visit.  She reports taking Percocet 1 tablet every 3 or 4 days at bedtime.  She takes Tylenol ibuprofen or tramadol during the rest of the time.  She came here to discuss quantitative sensory testing as well as seek a third opinion about her ongoing symptoms.    Recommendations:  1.  Patient appears to be doing extremely well.  She has weaned herself off minimal narcotics and is having good quality of life with Tylenol ibuprofen and tramadol.  She requested a prescription for tramadol  2.  Discussed with patient that her pain is significantly improved.  It will continue to improve and she could eventually be weaned off narcotics and pain medications over the next 6 months.  I am particularly reassured by the  relatively normal appearing MRCP.  3.  Discussed briefly about quantitative sensory testing.  This is being done as a part of some of her research protocols.  QST helps in distinguishing central sensitization from localized causes of pain.  Discussed with the patient that if her pain does not improve over 6 months, she can consider neuromodulators such as tricyclic antidepressants, Lyrica or gabapentin.  Trigger point injection and cognitive behavioral therapy at other modalities to treat visceral hypersensitivity.  Patient currently is not comfortable pursuing these modalities.  4.  She has been encouraged to follow-up with Dr. Noguera who was performed all her outpatient interventions.  60 minutes spent on the date of the encounter doing chart review, review of outside records, review of test results, interpretation of tests, patient visit, documentation and discussion with other provider(s)        Again, thank you for allowing me to participate in the care of your patient.      Sincerely,    Guru Nelson MD

## 2023-10-26 NOTE — PATIENT INSTRUCTIONS
Redd Sanchez,     Dr. Damon has outlined the following steps after your recent clinic visit:    Follow up with Dr. Noguera's clinic in 6 months.     It is a pleasure being involved in your health care. Please call with any questions or concerns regarding your clinic visit.    Becka Cullen RN, BSN  Care Coordinator  [facility]   Phone: [phone]      Contacts post-consultation depending on your need:    Schedule Clinic Appointments            [phone]    OR Procedure Scheduling                             [phone]    For urgent/emergent questions after business hours, you may reach the on-call GI Fellow by contacting the [facility]  at [phone].    How do I schedule labs, imaging studies, or procedures that were ordered in clinic today?     Labs: To schedule lab appointment at the [facility], use [employer] or call [phone]. If you have a [facility] lab closer to home where you are regularly seen you can give them a call.     Procedures: If a colonoscopy, upper endoscopy, breath test, esophageal manometry, or pH impedence was ordered today, our endoscopy team will call you to schedule this. If you have not heard from our endoscopy team within a week, please call [phone] to schedule.     Imaging Studies: If you were scheduled for a CT scan, X-ray, MRI, ultrasound, HIDA scan or other imaging study, please call [phone] to have this scheduled.     Referral: If a referral to another specialty was ordered, expect a phone call or follow instructions above. If you have not heard from anyone regarding your referral in a week, please call our clinic to check the status.     How to I schedule a follow-up visit?  If you did not schedule a follow-up visit today, please call [phone] to schedule a follow-up office visit.

## 2023-10-27 DIAGNOSIS — K86.1 OTHER CHRONIC PANCREATITIS (H): ICD-10-CM

## 2023-10-27 RX ORDER — TRAMADOL HYDROCHLORIDE 50 MG/1
50 TABLET ORAL 3 TIMES DAILY
Qty: 50 TABLET | Refills: 2 | Status: CANCELLED | OUTPATIENT
Start: 2023-10-27

## 2023-10-27 NOTE — PROGRESS NOTES
Pancreas Clinic Consult Note    Referring provider: Dr. 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  She was initially referred to Dr. Noguera in March 2022.  She was initially managed on pancreatic enzyme replacement therapy.  Since she was not adequately responding to medical management she was offered EUS guided pancreatic gastrostomy.  This was performed in May 2022 by Dr. Oates with placement of 3 [place] Islander stent.  She was admitted postprocedure with pancreatitis.  CT scan showed a peripancreatic retrogastric abscess which required percutaneous drainage and also migrated pancreatic duct stent.  She underwent ERCP with a Assisted pediatric colonoscopy.  The partially stenosed pancreatic jejunostomy was accessed retrograde and was double stented with soft Noguera stent to keep the anastomosis patent.  She underwent a stent exchange in October 2022 with placement of a 5 [place] Islander and a 4 [place] Islander stent.  She however got readmitted with acute pancreatitis in November 2022 after these stents had spontaneously passed out.  Dr. Gotti performed under ERCP in November 2022 with placement of a 5 [place] Islander by 3 cm of flex Jameel stent and a 4 [place] Islander by 4 cm single pigtail Zinman stent.  This was followed by placement of a 6 [place] Islander by 4 cm and a 4 [place] Islander by 4 cm in length single pigtail stent.  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  She subsequently sought second opinion with Dr. Treviño at [facility] for continued right upper quadrant pain in June 2023 at [place] she underwent another MRCP and then she was recommended pain rehabitation clinic for neuromodulator therapy and also trigger point injection besides cognitive behavioral therapy and acupuncture for her chronic pain.  MRCP performed showed normal pancreaticojejunostomy limb but was limited by the metal artifact.  Of note she has not had an episode of acute pancreatitis in over a year    Clinically she reports doing much better since the [facility] second opinion visit.  She reports taking Percocet 1 tablet every 3 or 4 days at bedtime.  She takes Tylenol ibuprofen or tramadol during the rest of the time.  She came here to discuss quantitative sensory testing as well as seek a third opinion about her ongoing symptoms.    Recommendations:  1.  Patient appears to be doing extremely well.  She has weaned herself off minimal narcotics and is having good quality of life with Tylenol ibuprofen and tramadol.  She requested a prescription for tramadol  2.  Discussed with patient that her pain is significantly improved.  It will continue to improve and she could eventually be weaned off narcotics and pain medications over the next 6 months.  I am particularly reassured by the relatively normal appearing MRCP.  3.  Discussed briefly about quantitative sensory testing.  This is being done as a part of some of her research protocols.  QST helps in distinguishing central sensitization from localized causes of pain.  Discussed with the patient that if her pain does not improve over 6 months, she can consider neuromodulators such as tricyclic antidepressants, Lyrica or gabapentin.  Trigger point injection and cognitive behavioral therapy at other modalities to treat visceral hypersensitivity.  Patient currently is not comfortable pursuing these modalities.  4.  She has been  encouraged to follow-up with Dr. Noguera who was performed all her outpatient interventions.  60 minutes spent on the date of the encounter doing chart review, review of outside records, review of test results, interpretation of tests, patient visit, documentation and discussion with other provider(s)

## 2023-10-30 ENCOUNTER — PATIENT OUTREACH (OUTPATIENT)
Dept: GASTROENTEROLOGY | Facility: CLINIC | Age: 73
End: 2023-10-30
Payer: COMMERCIAL

## 2023-10-30 NOTE — PROGRESS NOTES
Called patient to discuss ultram prescription from Dr. Damon. Will plan to have filled at [facility] pharmacy, available for pickup by patient on Thursday 11/2/23. Patient agreeable to plan.     Becka Cullen RN Care Coordinator

## 2023-11-02 RX ORDER — TRAMADOL HYDROCHLORIDE 50 MG/1
50 TABLET ORAL 3 TIMES DAILY
Qty: 50 TABLET | Refills: 1 | Status: SHIPPED | OUTPATIENT
Start: 2023-11-02 | End: 2024-03-12

## 2023-11-30 ENCOUNTER — APPOINTMENT (OUTPATIENT)
Dept: URBAN - METROPOLITAN AREA CLINIC 257 | Age: 73
Setting detail: DERMATOLOGY
End: 2023-11-30

## 2023-11-30 DIAGNOSIS — L82.0 INFLAMED SEBORRHEIC KERATOSIS: ICD-10-CM

## 2023-11-30 DIAGNOSIS — L57.0 ACTINIC KERATOSIS: ICD-10-CM

## 2023-11-30 DIAGNOSIS — L57.8 OTHER SKIN CHANGES DUE TO CHRONIC EXPOSURE TO NONIONIZING RADIATION: ICD-10-CM

## 2023-11-30 PROCEDURE — OTHER COUNSELING: OTHER

## 2023-11-30 PROCEDURE — 99212 OFFICE O/P EST SF 10 MIN: CPT | Mod: 25

## 2023-11-30 PROCEDURE — OTHER LIQUID NITROGEN: OTHER

## 2023-11-30 PROCEDURE — 17000 DESTRUCT PREMALG LESION: CPT | Mod: 59

## 2023-11-30 PROCEDURE — 17110 DESTRUCT B9 LESION 1-14: CPT

## 2023-11-30 PROCEDURE — OTHER MIPS QUALITY: OTHER

## 2023-11-30 ASSESSMENT — LOCATION DETAILED DESCRIPTION DERM
LOCATION DETAILED: RIGHT SUPERIOR LATERAL BUCCAL CHEEK
LOCATION DETAILED: LEFT INFERIOR ANTERIOR NECK
LOCATION DETAILED: UPPER STERNUM

## 2023-11-30 ASSESSMENT — LOCATION ZONE DERM
LOCATION ZONE: TRUNK
LOCATION ZONE: FACE
LOCATION ZONE: NECK

## 2023-11-30 ASSESSMENT — LOCATION SIMPLE DESCRIPTION DERM
LOCATION SIMPLE: RIGHT CHEEK
LOCATION SIMPLE: LEFT ANTERIOR NECK
LOCATION SIMPLE: CHEST

## 2023-11-30 NOTE — PROCEDURE: LIQUID NITROGEN
Detail Level: Detailed
Show Topical Anesthesia Variable?: Yes
Render Post-Care Instructions In Note?: no
Duration Of Freeze Thaw-Cycle (Seconds): 5-10
Number Of Freeze-Thaw Cycles: 1 freeze-thaw cycle
Post-Care Instructions: I reviewed with the patient in detail post-care instructions. Patient is to wear sunprotection, and avoid picking at any of the treated lesions. Pt may apply Vaseline to crusted or scabbing areas.
Medical Necessity Information: It is in your best interest to select a reason for this procedure from the list below. All of these items fulfill various CMS LCD requirements except the new and changing color options.
Spray Paint Text: The liquid nitrogen was applied to the skin utilizing a spray paint frosting technique.
Medical Necessity Clause: This procedure was medically necessary because the lesions that were treated were:
Consent: The patient's consent was obtained including but not limited to risks of crusting, scabbing, blistering, scarring, darker or lighter pigmentary change, recurrence, incomplete removal and infection.
Duration Of Freeze Thaw-Cycle (Seconds): 5

## 2023-11-30 NOTE — PROCEDURE: MIPS QUALITY
Quality 226: Preventive Care And Screening: Tobacco Use: Screening And Cessation Intervention: Patient screened for tobacco use and is an ex/non-smoker
Detail Level: Detailed
Quality 130: Documentation Of Current Medications In The Medical Record: Current Medications Documented
Quality 110: Preventive Care And Screening: Influenza Immunization: Influenza Immunization Ordered or Recommended, but not Administered due to system reason
Quality 111:Pneumonia Vaccination Status For Older Adults: Patient did not receive any pneumococcal conjugate or polysaccharide vaccine on or after their 60th birthday and before the end of the measurement period
Quality 47: Advance Care Plan: Advance Care Planning discussed and documented; advance care plan or surrogate decision maker documented in the medical record.

## 2023-12-07 ENCOUNTER — APPOINTMENT (OUTPATIENT)
Dept: URBAN - METROPOLITAN AREA CLINIC 257 | Age: 73
Setting detail: DERMATOLOGY
End: 2023-12-11

## 2023-12-07 DIAGNOSIS — L57.0 ACTINIC KERATOSIS: ICD-10-CM

## 2023-12-07 DIAGNOSIS — L57.8 OTHER SKIN CHANGES DUE TO CHRONIC EXPOSURE TO NONIONIZING RADIATION: ICD-10-CM

## 2023-12-07 PROCEDURE — OTHER COUNSELING: OTHER

## 2023-12-07 PROCEDURE — 17000 DESTRUCT PREMALG LESION: CPT | Mod: 79

## 2023-12-07 PROCEDURE — OTHER MIPS QUALITY: OTHER

## 2023-12-07 PROCEDURE — OTHER LIQUID NITROGEN: OTHER

## 2023-12-07 PROCEDURE — 99212 OFFICE O/P EST SF 10 MIN: CPT | Mod: 24,25

## 2023-12-07 ASSESSMENT — LOCATION SIMPLE DESCRIPTION DERM: LOCATION SIMPLE: RIGHT FOREHEAD

## 2023-12-07 ASSESSMENT — LOCATION ZONE DERM: LOCATION ZONE: FACE

## 2023-12-07 ASSESSMENT — LOCATION DETAILED DESCRIPTION DERM: LOCATION DETAILED: RIGHT INFERIOR FOREHEAD

## 2023-12-07 NOTE — PROCEDURE: MIPS QUALITY
Quality 47: Advance Care Plan: Advance Care Planning discussed and documented; advance care plan or surrogate decision maker documented in the medical record.
Quality 226: Preventive Care And Screening: Tobacco Use: Screening And Cessation Intervention: Patient screened for tobacco use and is an ex/non-smoker
Quality 130: Documentation Of Current Medications In The Medical Record: Current Medications Documented
Quality 111:Pneumonia Vaccination Status For Older Adults: Patient did not receive any pneumococcal conjugate or polysaccharide vaccine on or after their 60th birthday and before the end of the measurement period
Detail Level: Detailed

## 2024-01-10 ENCOUNTER — OFFICE VISIT (OUTPATIENT)
Dept: PLASTIC SURGERY | Facility: CLINIC | Age: 74
End: 2024-01-10

## 2024-01-10 DIAGNOSIS — Z41.1 ENCOUNTER FOR COSMETIC PROCEDURE: Primary | ICD-10-CM

## 2024-01-10 NOTE — PROGRESS NOTES
Facial Plastic and Reconstructive Surgery Cosmetic Consultation  01/10/24     Referring Provider:   Referred Self, MD  No address on file    HPI:   I had the pleasure of seeing Laura Gonzalez today in clinic for consultation for surgical facial rejuvenation.    Laura Gonzalez is a 73 year old female.  She is a previous patient of Dr. Aguero.  He did a facelift on her in the early 2000's.  She is overall still happy with those results but is starting to get bothered by some changes that she is seeing.  She is most bothered by some standing lines she has in the lower face.  She also feels like she has hollowness to her cheeks that is new.  She does not want to add volume to her cheeks.  She reports having  her cheeks sometime ago and was not happy with how that looked.      PE:  Alert and Oriented, Answering Questions Appropriately  Atraumatic, Normocephalic, Face Symmetric  Skin: Lynch 2  Facial Nerve Intact and facial movement symmetric  She has a nice cervical mental angle with some skin laxity, right greater than left.  She has a nice well-defined jawline.  She has nice cheekbones.  She does have some hollowing of her cheeks bilaterally.  She has some standing rhytids in the lower face and some mild deepening of her pridinol sulcus.            IMPRESSION/PLAN: Laura Gonzalez is a 73 year old female here today in consult for surgical facial rejuvenation.  We discussed options of filler as well as surgery.  We discussed some filler to to the cheek hollows bilaterally to give them a little more fullness; would stay away from the cheek bones as she has nice fullness there.  This could be done in the office.  We also discussed the option for revision deep plane lower face and neck lift with fat grafting and a perioral chemical peel.  She is not sure she wants to do surgery but is going to think about it.  If she does want to do surgery I will bring her back for a surgical consult that we have the  time to discuss the procedure.  Otherwise, if she would like to do the filler I am happy to see her at any time to do that.    Photodocumentation was obtained.     Risks and benefits of the procedure were discussed, including but not limited to motor/sensory nerve damage (temporary

## 2024-01-17 ENCOUNTER — PATIENT OUTREACH (OUTPATIENT)
Dept: GASTROENTEROLOGY | Facility: CLINIC | Age: 74
End: 2024-01-17
Payer: COMMERCIAL

## 2024-01-17 RX ORDER — HEPARIN SODIUM (PORCINE) LOCK FLUSH IV SOLN 100 UNIT/ML 100 UNIT/ML
5 SOLUTION INTRAVENOUS
Status: CANCELLED | OUTPATIENT
Start: 2024-01-17

## 2024-01-17 RX ORDER — HEPARIN SODIUM,PORCINE 10 UNIT/ML
5-20 VIAL (ML) INTRAVENOUS DAILY PRN
Status: CANCELLED | OUTPATIENT
Start: 2024-01-17

## 2024-01-17 RX ORDER — ONDANSETRON 2 MG/ML
4 INJECTION INTRAMUSCULAR; INTRAVENOUS ONCE
Status: CANCELLED | OUTPATIENT
Start: 2024-01-17 | End: 2024-01-17

## 2024-01-17 NOTE — TELEPHONE ENCOUNTER
Returned call to patient, she's having additional flares. Wants clinic follow up with Dr. Noguera      Per patient she continues to have abdominal pain, had recent 3 day flare with pain and vomiting. Renewed her infusion plans and asked that she call to make an infusion appointment, orders refreshed, Mychart sent with contact info.   Pt wants in person visit but declines next open visit on 2/28 as she's leaving for [facility]. Reviewed I will message Dr. Noguera to see if he wants updated imaging or has other recommendations related to symptoms and RTC.    ML

## 2024-01-17 NOTE — PROGRESS NOTES
Updated photodocumentation obtained (see media tab).    Patient given quote for cosmetic procedures at appointment today. Quote was explained in detail, including but not limited to the $500.00 non-refundable deposit due at time of scheduling, when/where payment is due, facility fees being subject to change, and six weeks minimum cancellation notice. Patient verbalized understanding of quote. Signed quote was obtained (see media tab), a copy sent home with pt. Education for quoted procedures was provided both verbally and in educational take home folder including pre & post op care, medications to avoid before and after surgery, and more.     All questions answered at this time. Pt will reach out if questions arise.    Cammy Yang RN  1/17/2024 12:09 PM

## 2024-01-19 ENCOUNTER — DOCUMENTATION ONLY (OUTPATIENT)
Dept: GASTROENTEROLOGY | Facility: CLINIC | Age: 74
End: 2024-01-19
Payer: COMMERCIAL

## 2024-01-19 ENCOUNTER — INFUSION THERAPY VISIT (OUTPATIENT)
Dept: INFUSION THERAPY | Facility: CLINIC | Age: 74
End: 2024-01-19
Attending: INTERNAL MEDICINE
Payer: COMMERCIAL

## 2024-01-19 VITALS
DIASTOLIC BLOOD PRESSURE: 71 MMHG | OXYGEN SATURATION: 100 % | SYSTOLIC BLOOD PRESSURE: 124 MMHG | HEART RATE: 59 BPM | TEMPERATURE: 97.8 F

## 2024-01-19 DIAGNOSIS — K86.1 ACUTE ON CHRONIC PANCREATITIS (H): Primary | ICD-10-CM

## 2024-01-19 DIAGNOSIS — K85.90 ACUTE ON CHRONIC PANCREATITIS (H): Primary | ICD-10-CM

## 2024-01-19 PROCEDURE — 96361 HYDRATE IV INFUSION ADD-ON: CPT

## 2024-01-19 PROCEDURE — 250N000011 HC RX IP 250 OP 636: Performed by: INTERNAL MEDICINE

## 2024-01-19 PROCEDURE — 258N000003 HC RX IP 258 OP 636: Performed by: INTERNAL MEDICINE

## 2024-01-19 PROCEDURE — 96374 THER/PROPH/DIAG INJ IV PUSH: CPT

## 2024-01-19 RX ORDER — HEPARIN SODIUM,PORCINE 10 UNIT/ML
5-20 VIAL (ML) INTRAVENOUS DAILY PRN
Status: CANCELLED | OUTPATIENT
Start: 2024-01-19

## 2024-01-19 RX ORDER — ONDANSETRON 2 MG/ML
4 INJECTION INTRAMUSCULAR; INTRAVENOUS ONCE
Status: COMPLETED | OUTPATIENT
Start: 2024-01-19 | End: 2024-01-19

## 2024-01-19 RX ORDER — ONDANSETRON 2 MG/ML
4 INJECTION INTRAMUSCULAR; INTRAVENOUS ONCE
Status: CANCELLED | OUTPATIENT
Start: 2024-01-19 | End: 2024-01-19

## 2024-01-19 RX ORDER — HEPARIN SODIUM (PORCINE) LOCK FLUSH IV SOLN 100 UNIT/ML 100 UNIT/ML
5 SOLUTION INTRAVENOUS
Status: CANCELLED | OUTPATIENT
Start: 2024-01-19

## 2024-01-19 RX ADMIN — ONDANSETRON 4 MG: 2 INJECTION INTRAMUSCULAR; INTRAVENOUS at 11:21

## 2024-01-19 RX ADMIN — SODIUM CHLORIDE, POTASSIUM CHLORIDE, SODIUM LACTATE AND CALCIUM CHLORIDE 1000 ML: 600; 310; 30; 20 INJECTION, SOLUTION INTRAVENOUS at 11:20

## 2024-01-19 NOTE — PROGRESS NOTES
E-mailed imaging request per clinic protocol.    Images Requested  -- MR Abdomen MRCP without and with IV Contrast (09/01/2023 7:14 AM CDT)    Facility Information:  Department of Radiology, [facility], in [address] [address]   [phone]     Health Information Management Services  Release of Information  [facility]  [address] [place] [address]  Phone #: [phone]  Fax #: [phone]  Email: [email or web address]      SK

## 2024-01-19 NOTE — PROGRESS NOTES
Infusion Nursing Note:  Laura Gonzalez presents today for 1.5L IVF + Zofran    Patient seen by provider today: No   present during visit today: Not Applicable.    Note: Pancreatitis flare recently.      Intravenous Access:  Peripheral IV placed.    Treatment Conditions:  Not Applicable.      Post Infusion Assessment:  Patient tolerated infusion without incident.  Blood return noted pre and post infusion.  Site patent and intact, free from redness, edema or discomfort.  No evidence of extravasations.  Access discontinued per protocol.       Discharge Plan:   Discharge instructions reviewed with: Patient.  Patient and/or family verbalized understanding of discharge instructions and all questions answered.  AVS to patient via SIGKAT.  Patient will return 1/22 for next appointment.   Patient discharged in stable condition accompanied by: self.  Departure Mode: Ambulatory.      Glory Prince RN

## 2024-01-22 ENCOUNTER — VIRTUAL VISIT (OUTPATIENT)
Dept: GASTROENTEROLOGY | Facility: CLINIC | Age: 74
End: 2024-01-22
Payer: COMMERCIAL

## 2024-01-22 ENCOUNTER — INFUSION THERAPY VISIT (OUTPATIENT)
Dept: INFUSION THERAPY | Facility: CLINIC | Age: 74
End: 2024-01-22
Attending: INTERNAL MEDICINE
Payer: COMMERCIAL

## 2024-01-22 VITALS
RESPIRATION RATE: 18 BRPM | SYSTOLIC BLOOD PRESSURE: 105 MMHG | OXYGEN SATURATION: 99 % | DIASTOLIC BLOOD PRESSURE: 68 MMHG | HEART RATE: 59 BPM | TEMPERATURE: 98.6 F

## 2024-01-22 DIAGNOSIS — K86.1 ACUTE ON CHRONIC PANCREATITIS (H): Primary | ICD-10-CM

## 2024-01-22 DIAGNOSIS — K85.90 ACUTE ON CHRONIC PANCREATITIS (H): Primary | ICD-10-CM

## 2024-01-22 PROCEDURE — 99215 OFFICE O/P EST HI 40 MIN: CPT | Mod: 95 | Performed by: INTERNAL MEDICINE

## 2024-01-22 PROCEDURE — 258N000003 HC RX IP 258 OP 636: Performed by: INTERNAL MEDICINE

## 2024-01-22 PROCEDURE — 250N000011 HC RX IP 250 OP 636: Performed by: INTERNAL MEDICINE

## 2024-01-22 PROCEDURE — 99417 PROLNG OP E/M EACH 15 MIN: CPT | Mod: 95 | Performed by: INTERNAL MEDICINE

## 2024-01-22 PROCEDURE — 96361 HYDRATE IV INFUSION ADD-ON: CPT

## 2024-01-22 PROCEDURE — 96374 THER/PROPH/DIAG INJ IV PUSH: CPT

## 2024-01-22 RX ORDER — HEPARIN SODIUM (PORCINE) LOCK FLUSH IV SOLN 100 UNIT/ML 100 UNIT/ML
5 SOLUTION INTRAVENOUS
Status: CANCELLED | OUTPATIENT
Start: 2024-01-22

## 2024-01-22 RX ORDER — ONDANSETRON 2 MG/ML
4 INJECTION INTRAMUSCULAR; INTRAVENOUS ONCE
Status: COMPLETED | OUTPATIENT
Start: 2024-01-22 | End: 2024-01-22

## 2024-01-22 RX ORDER — HEPARIN SODIUM,PORCINE 10 UNIT/ML
5-20 VIAL (ML) INTRAVENOUS DAILY PRN
Status: CANCELLED | OUTPATIENT
Start: 2024-01-22

## 2024-01-22 RX ORDER — ONDANSETRON 2 MG/ML
4 INJECTION INTRAMUSCULAR; INTRAVENOUS ONCE
Status: CANCELLED | OUTPATIENT
Start: 2024-01-22 | End: 2024-01-22

## 2024-01-22 RX ADMIN — SODIUM CHLORIDE, POTASSIUM CHLORIDE, SODIUM LACTATE AND CALCIUM CHLORIDE 1000 ML: 600; 310; 30; 20 INJECTION, SOLUTION INTRAVENOUS at 12:45

## 2024-01-22 RX ADMIN — ONDANSETRON 4 MG: 2 INJECTION INTRAMUSCULAR; INTRAVENOUS at 12:56

## 2024-01-22 NOTE — PROGRESS NOTES
Called [facility] to request that images be pushed. Was informed that a push was attempted last week, requested that they re-push.     Images Requested  -- MR Abdomen MRCP without and with IV Contrast (09/01/2023 7:14 AM CDT)     Facility Information:  Department of Radiology, [facility], in [place], [place]   [address] [place] [address]   [phone]      Health Information Management Services  Release of Information  [facility]  [address] [place] [address]  Phone #: [phone]  Fax #: [phone]  Email: [email or web address]        SK

## 2024-01-22 NOTE — PROGRESS NOTES
Infusion Nursing Note:  Laura Gonzalez presents today for 1.5L LR.    Patient seen by provider today: No   present during visit today: Not Applicable.    Note: Feels as though she is on the tail end of a flare. Took the PRN Zofran & requested the 1.5L LR.       Intravenous Access:  Peripheral IV placed.    Treatment Conditions:  Not Applicable.      Post Infusion Assessment:  Patient tolerated infusion without incident.  Blood return noted pre and post infusion.  Site patent and intact, free from redness, edema or discomfort.  No evidence of extravasations.  Access discontinued per protocol.       Discharge Plan:   Discharge instructions reviewed with: Patient.  Patient and/or family verbalized understanding of discharge instructions and all questions answered.  AVS to patient via [employer].  Patient will return PRN for next appointment.   Patient discharged in stable condition accompanied by: self.  Departure Mode: Ambulatory.      Laura Le RN

## 2024-01-22 NOTE — LETTER
1/22/2024         RE: Laura Gonzalez  [address]  [place] [address]        Dear Colleague,    Thank you for referring your patient, Laura Gonzalez, to the [facility] [place]. Please see a copy of my visit note below.      Pt very well known to us, for follow-up.  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  This was complicated by biliary stenosis with cholangitis that was treated at [place] with retrograde placement of a lumen opposing metal stent.  She also developed recurrent acute pancreatitis related to stenosis of the pancreaticojejunostomy and a previously normal small duct healthy pancreas.  She elected to follow-up for that at [facility] where starting in May 2022 she underwent initially an EUS guided transgastric pancreaticojejunostomy which resulted in a leak treated percutaneously and then eventually retrograde ERCP with successful dilation and stenting of her stenosed pancreatic code jejunostomy.  Overall over the last couple of years she has done better but has had ongoing chronic pain.  Our main management strategy has been to offer her up to 3 times a week outpatient IV infusions which she feels has her helped her significantly.   She has been stent free for the last year but with lingering pain return to [facility] for a pancreas opinion.  There she saw .  There she was recommended to stop outpatient IV infusions but to have trigger point injections physical therapy and consider a neuromodulator.  She declined the neuromodulator and trigger point injections but has done the physical therapy.  She did see our colleague Dr. Damon for a very thoughtful consult which reiterated the therapy we had offered plus brought up the possibility of neurosensory testing at [facility] which she also declined to do.  She had  not had a significant flare until recently.  This was a pretty severe episode that she managed at home and with outpatient IV fluids and is now resolved back to her baseline.    She is elected to reestablish her continual care at the [facility] and the purpose of today's visit was to review her status go over any future imaging and/or intervention or diagnostic procedures.  We spent a full 59 minutes on the video visit plus an additional 30 minutes reviewing data.  We did screen sharing of her MRI at [place] which was very unrevealing, did not include see creatinine only commented on lack of visibility of the pancreatic duct due to artifact from clips post Whipple.  We discussed that it was adequate to show that there is not a significantly more dilated pancreatic duct and there was at the last ERCP a year ago which I screen shared with her and her  and showed an irregular tortuous dorsal pancreatic duct remnant.  We discussed that there is no value in repeating the CT or even a MRCP with secretin as morphology would not dictate need for reintervention.  We discussed that the main value of reintervention would be if her symptoms and/or evidence of recurrent acute pancreatitis become sufficiently troublesome to repeat a retrograde ERCP with dilation and medium-term stenting over stenosed pancreaticojejunostomy.  We discussed that dilating these stenoses is somewhat like dilating a rubber band and that they stay open for a while but eventually contracted back down.  We did explore the idea of starting a neuromodulator but she is pacifically declined to add more medicines to her fairly long list.  As she is very physically fit and health-conscious this seems like a very reasonable decision.    Thus we emphasized that she certainly has some degree of chronic pancreatitis but that symptoms of chronic pancreatitis do not correlate well at all with morphology as shown in multiple studies.  Therefore the main  goal would be to document whether or not she raises her lipase amylase during pain flares which could have some influence on decision for repeat intervention.  However would mostly depend on the fact that she had a fairly good response to repeated Endo-therapy's ending a year ago and that symptoms would drive the decision.  We also brought up the issue that when last checked that I could find her hemoglobin A1c was 5.9 post Whipple and that it has been at least a couple of years since it has been rechecked.  She is clearly at risk for diabetes given not only the post Whipple state but the repeated attacks of acute pancreatitis all of which render her at higher risk.    Therefore her recommendations are  #1 continue outpatient IV fluid therapy as needed as she feels strongly that its helped her as it has helped many of her patients and that we have demonstrated in a study submitted to digestive disease week showing a significant reduction in emergency visits and hospitalizations after institution of up to 3 times a week IV fluid therapy on demand  #2 hemoglobin A1c.when she comes to a [place] facility next which she plans tomorrow    #3 lipase and amylase checked during the next flare of pain at whichever facility she is seen but presumably [place], place a standing order   #4 follow-up with us in 2 months or sooner if clinical course demands.    Total time 60 minutes video visit    Past Medical, Surgical, Family, and Social Histories:    Past Medical History:   Diagnosis Date    Asthma     pt.states no longer has symptoms    CAD (coronary artery disease)     HLD (hyperlipidemia)        Past Surgical History:   Procedure Laterality Date    APPENDECTOMY      ARTHROPLASTY HIP Left 6/15/2016    Procedure: ARTHROPLASTY HIP;  Surgeon: Jeffy Wolff MD;  [facility]: [facility]    COLONOSCOPY  10/3/2013    Procedure: COMBINED COLONOSCOPY, SINGLE BIOPSY/POLYPECTOMY BY BIOPSY;;  Surgeon: Kenney Walls MD;  Location:   GI    ENDOSCOPIC RETROGRADE CHOLANGIOPANCREATOGRAM N/A 6/16/2022    Procedure: ENDOSCOPIC RETROGRADE CHOLANGIOPANCREATOGRAPHY WITH PANCREATIC DUCT DILATION, DEBRIS REMOVAL AND STENT PLACEMENT;  Surgeon: Arnaldo Noguera MD;  [facility]    ENDOSCOPIC RETROGRADE CHOLANGIOPANCREATOGRAM N/A 10/13/2022    Procedure: ENDOSCOPIC RETROGRADE CHOLANGIOPANCREATOGRAPHY, biliary stent placement;  Surgeon: Arnaldo Noguera MD;  [facility]    ENDOSCOPIC RETROGRADE CHOLANGIOPANCREATOGRAM N/A 11/21/2022    Procedure: ENDOSCOPIC RETROGRADE CHOLANGIOPANCREATOGRAPHY, WITH TUBE OR STENT INSERTION, with balloon dialation;  Surgeon: Johnson Gotti MD;  [facility]    ENDOSCOPIC RETROGRADE CHOLANGIOPANCREATOGRAM N/A 3/30/2023    Procedure: ENDOSCOPIC RETROGRADE CHOLANGIOPANCREATOGRAPHY with bile duct stents removed x2 and replacement of one pancreatic stent;  Surgeon: Arnaldo Noguera MD;  [facility]    ENDOSCOPIC RETROGRADE CHOLANGIOPANCREATOGRAPHY, EXCHANGE TUBE/STENT N/A 1/24/2023    Procedure: enteroscopy assisted ENDOSCOPIC RETROGRADE CHOLANGIOPANCREATOGRAPHY, with pancreatic stents replaced times 2;  Surgeon: Arnaldo Noguera MD;  [facility]    ENDOSCOPIC ULTRASOUND UPPER GASTROINTESTINAL TRACT (GI) N/A 5/12/2022    Procedure: ENDOSCOPIC ULTRASOUND WITH PANCREATICOGASTROSTOMY CREATION, TRACT DILATION, STENT PLACEMENT;  Surgeon: Romaine Oaets MD;  [facility]    ESOPHAGOSCOPY, GASTROSCOPY, DUODENOSCOPY (EGD), COMBINED N/A 5/12/2022    Procedure: ESOPHAGOGASTRODUODENOSCOPY WITH ENDOSCOPIC CLIP PLACEMENT;  Surgeon: Arnaldo Noguera MD;  [facility]    FOOT SURGERY      HC TOOTH EXTRACTION W/FORCEP      HYSTERECTOMY      INCISION AND DRAINAGE BREAST Left 2/18/2022    Procedure: evacuate hematoma left  BREAST;  Surgeon: Dayron Garcia MD;  Location: [facility] OR    IR FOLLOW UP VISIT OUTPATIENT  5/31/2022    IR SINOGRAM INJECTION DIAGNOSTIC  5/31/2022       No family  history on file.    Social History     Tobacco Use    Smoking status: Former     Types: Cigarettes     Quit date: 10/3/1988     Years since quittin.3     Passive exposure: Never    Smokeless tobacco: Never   Substance Use Topics    Alcohol use: No           Again, thank you for allowing me to participate in the care of your patient.      Sincerely,    Arnaldo Noguera MD

## 2024-01-22 NOTE — NURSING NOTE
Is the patient currently in the state of [place]? YES    Visit mode:VIDEO    If the visit is dropped, the patient can be reconnected by: VIDEO VISIT: Send to e-mail at: [email or web address]    Will anyone else be joining the visit? YES: How would they like to receive their invitation? Send to e-mail: [email or web address]  (If patient encounters technical issues they should call [phone])    How would you like to obtain your AVS? MyChart    Are changes needed to the allergy or medication list? No    Reason for visit: RECHECK    Mack FINLEYF

## 2024-01-22 NOTE — PROGRESS NOTES
Virtual Visit Details    Type of service:  Video Visit   See other    Originating Location (pt. Location): Home    Distant Location (provider location):  Off-site  Platform used for Video Visit: Tarah

## 2024-01-23 NOTE — PROGRESS NOTES
Pt very well known to us, for follow-up.  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  This was complicated by biliary stenosis with cholangitis that was treated at [facility] with retrograde placement of a lumen opposing metal stent.  She also developed recurrent acute pancreatitis related to stenosis of the pancreaticojejunostomy and a previously normal small duct healthy pancreas.  She elected to follow-up for that at [facility] where starting in May 2022 she underwent initially an EUS guided transgastric pancreaticojejunostomy which resulted in a leak treated percutaneously and then eventually retrograde ERCP with successful dilation and stenting of her stenosed pancreatic code jejunostomy.  Overall over the last couple of years she has done better but has had ongoing chronic pain.  Our main management strategy has been to offer her up to 3 times a week outpatient IV infusions which she feels has her helped her significantly.   She has been stent free for the last year but with lingering pain return to [facility] for a pancreas opinion.  There she saw .  There she was recommended to stop outpatient IV infusions but to have trigger point injections physical therapy and consider a neuromodulator.  She declined the neuromodulator and trigger point injections but has done the physical therapy.  She did see our colleague Dr. Damon for a very thoughtful consult which reiterated the therapy we had offered plus brought up the possibility of neurosensory testing at [facility] which she also declined to do.  She had not had a significant flare until recently.  This was a pretty severe episode that she managed at home and with outpatient IV fluids and is now resolved back to her baseline.    She is elected to reestablish her continual care at the [facility] and the purpose of today's  visit was to review her status go over any future imaging and/or intervention or diagnostic procedures.  We spent a full 59 minutes on the video visit plus an additional 30 minutes reviewing data.  We did screen sharing of her MRI at [place] which was very unrevealing, did not include see creatinine only commented on lack of visibility of the pancreatic duct due to artifact from clips post Whipple.  We discussed that it was adequate to show that there is not a significantly more dilated pancreatic duct and there was at the last ERCP a year ago which I screen shared with her and her  and showed an irregular tortuous dorsal pancreatic duct remnant.  We discussed that there is no value in repeating the CT or even a MRCP with secretin as morphology would not dictate need for reintervention.  We discussed that the main value of reintervention would be if her symptoms and/or evidence of recurrent acute pancreatitis become sufficiently troublesome to repeat a retrograde ERCP with dilation and medium-term stenting over stenosed pancreaticojejunostomy.  We discussed that dilating these stenoses is somewhat like dilating a rubber band and that they stay open for a while but eventually contracted back down.  We did explore the idea of starting a neuromodulator but she is pacifically declined to add more medicines to her fairly long list.  As she is very physically fit and health-conscious this seems like a very reasonable decision.    Thus we emphasized that she certainly has some degree of chronic pancreatitis but that symptoms of chronic pancreatitis do not correlate well at all with morphology as shown in multiple studies.  Therefore the main goal would be to document whether or not she raises her lipase amylase during pain flares which could have some influence on decision for repeat intervention.  However would mostly depend on the fact that she had a fairly good response to repeated Endo-therapy's ending a year ago  and that symptoms would drive the decision.  We also brought up the issue that when last checked that I could find her hemoglobin A1c was 5.9 post Whipple and that it has been at least a couple of years since it has been rechecked.  She is clearly at risk for diabetes given not only the post Whipple state but the repeated attacks of acute pancreatitis all of which render her at higher risk.    Therefore her recommendations are  #1 continue outpatient IV fluid therapy as needed as she feels strongly that its helped her as it has helped many of her patients and that we have demonstrated in a study submitted to digestive disease week showing a significant reduction in emergency visits and hospitalizations after institution of up to 3 times a week IV fluid therapy on demand  #2 hemoglobin A1c.when she comes to a [place] facility next which she plans tomorrow    #3 lipase and amylase checked during the next flare of pain at whichever facility she is seen but presumably [place], place a standing order   #4 follow-up with us in 2 months or sooner if clinical course demands.    Total time 60 minutes video visit    Past Medical, Surgical, Family, and Social Histories:    Past Medical History:   Diagnosis Date    Asthma     pt.states no longer has symptoms    CAD (coronary artery disease)     HLD (hyperlipidemia)        Past Surgical History:   Procedure Laterality Date    APPENDECTOMY      ARTHROPLASTY HIP Left 6/15/2016    Procedure: ARTHROPLASTY HIP;  Surgeon: Jeffy Wolff MD;  [facility]    COLONOSCOPY  10/3/2013    Procedure: COMBINED COLONOSCOPY, SINGLE BIOPSY/POLYPECTOMY BY BIOPSY;;  Surgeon: Kenney Walls MD;  Location:  GI    ENDOSCOPIC RETROGRADE CHOLANGIOPANCREATOGRAM N/A 6/16/2022    Procedure: ENDOSCOPIC RETROGRADE CHOLANGIOPANCREATOGRAPHY WITH PANCREATIC DUCT DILATION, DEBRIS REMOVAL AND STENT PLACEMENT;  Surgeon: Arnaldo Noguera MD;  [facility]    ENDOSCOPIC RETROGRADE  CHOLANGIOPANCREATOGRAM N/A 10/13/2022    Procedure: ENDOSCOPIC RETROGRADE CHOLANGIOPANCREATOGRAPHY, biliary stent placement;  Surgeon: Arnaldo Noguera MD;  [facility]    ENDOSCOPIC RETROGRADE CHOLANGIOPANCREATOGRAM N/A 2022    Procedure: ENDOSCOPIC RETROGRADE CHOLANGIOPANCREATOGRAPHY, WITH TUBE OR STENT INSERTION, with balloon dialation;  Surgeon: Johnson Gotti MD;  [facility]    ENDOSCOPIC RETROGRADE CHOLANGIOPANCREATOGRAM N/A 3/30/2023    Procedure: ENDOSCOPIC RETROGRADE CHOLANGIOPANCREATOGRAPHY with bile duct stents removed x2 and replacement of one pancreatic stent;  Surgeon: Arnaldo Noguera MD;  [facility]    ENDOSCOPIC RETROGRADE CHOLANGIOPANCREATOGRAPHY, EXCHANGE TUBE/STENT N/A 2023    Procedure: enteroscopy assisted ENDOSCOPIC RETROGRADE CHOLANGIOPANCREATOGRAPHY, with pancreatic stents replaced times 2;  Surgeon: Arnaldo Noguera MD;  [facility]    ENDOSCOPIC ULTRASOUND UPPER GASTROINTESTINAL TRACT (GI) N/A 2022    Procedure: ENDOSCOPIC ULTRASOUND WITH PANCREATICOGASTROSTOMY CREATION, TRACT DILATION, STENT PLACEMENT;  Surgeon: Romaine Oates MD;  [facility]    ESOPHAGOSCOPY, GASTROSCOPY, DUODENOSCOPY (EGD), COMBINED N/A 2022    Procedure: ESOPHAGOGASTRODUODENOSCOPY WITH ENDOSCOPIC CLIP PLACEMENT;  Surgeon: Arnaldo Noguera MD;  [facility]    FOOT SURGERY      HC TOOTH EXTRACTION W/FORCEP      HYSTERECTOMY      INCISION AND DRAINAGE BREAST Left 2022    Procedure: evacuate hematoma left  BREAST;  Surgeon: Dayron Garcia MD;  Location: [facility] OR    IR FOLLOW UP VISIT OUTPATIENT  2022    IR SINOGRAM INJECTION DIAGNOSTIC  2022       No family history on file.    Social History     Tobacco Use    Smoking status: Former     Types: Cigarettes     Quit date: 10/3/1988     Years since quittin.3     Passive exposure: Never    Smokeless tobacco: Never   Substance Use Topics    Alcohol use: No

## 2024-01-23 NOTE — PATIENT INSTRUCTIONS
Follow up:    Dr. Noguera has outlined the following steps after your recent clinic visit:      #1 continue outpatient IV fluid therapy as needed as she feels strongly that its helped her as it has helped many of her patients and that we have demonstrated in a study submitted to digestive disease week showing a significant reduction in emergency visits and hospitalizations after institution of up to 3 times a week IV fluid therapy on demand    #2 hemoglobin A1c.when she comes to a [facility] facility next which she plans tomorrow      #3 lipase and amylase checked during the next flare of pain. Standing orders have been placed and can be drawn at any [facility] location. You will usually need a lab appointment during clinic hours.    [facility] in [place] may be the closest for you, but check our website for alternate locations:[email or web address]    [facility]  [address] [address]   [phone]    #4 follow-up with us in 2 months or sooner if clinical course demands.       Please call with any questions or concerns regarding your clinic visit today.    It is a pleasure being involved in your health care.    Contacts post-consultation depending on your need:    Schedule Clinic Appointments            [phone] # 1   M-F 7:30 - 5 pm    Emily Cifuentes, RN Care Coordinator (Dr. Edgar/Dr. Noguera)  [phone]    Becka Cullen, RN Care Coordinator (Dr. Damon)   [phone]    Ling Gurrola, RN Care Coordinator (Dr. Oates/Dr. Gotti)  [phone]    [facility]  (all [facility])   [phone]      For urgent/emergent questions after business hours, you may reach the on-call GI Fellow by contacting the [facility]  at [phone].    How do I schedule labs, imaging studies, or procedures that were ordered in clinic today?     Labs: To schedule lab appointment at the [facility], use my chart or call  [phone]. If you have a [facility] lab closer to home where you are regularly seen you can give them a call.     Procedures: If a colonoscopy, upper endoscopy, breath test, esophageal manometry, or pH impedence was ordered today, our endoscopy team will call you to schedule this. If you have not heard from our endoscopy team within a week, please call [phone] to schedule.     Imaging Studies: If you were scheduled for a CT scan, X-ray, MRI, ultrasound, HIDA scan or other imaging study, please call [phone] to have this scheduled.     Referral: If a referral to another specialty was ordered, expect a phone call or follow instructions above. If you have not heard from anyone regarding your referral in a week, please call our clinic to check the status.     How to I schedule a follow-up visit?  If you did not schedule a follow-up visit today, please call [phone] to schedule a follow-up office visit.

## 2024-01-24 ENCOUNTER — INFUSION THERAPY VISIT (OUTPATIENT)
Dept: INFUSION THERAPY | Facility: CLINIC | Age: 74
End: 2024-01-24
Attending: INTERNAL MEDICINE
Payer: COMMERCIAL

## 2024-01-24 VITALS
TEMPERATURE: 98.4 F | DIASTOLIC BLOOD PRESSURE: 68 MMHG | HEART RATE: 59 BPM | OXYGEN SATURATION: 98 % | RESPIRATION RATE: 16 BRPM | SYSTOLIC BLOOD PRESSURE: 114 MMHG

## 2024-01-24 DIAGNOSIS — K85.90 ACUTE ON CHRONIC PANCREATITIS (H): Primary | ICD-10-CM

## 2024-01-24 DIAGNOSIS — K86.1 ACUTE ON CHRONIC PANCREATITIS (H): Primary | ICD-10-CM

## 2024-01-24 LAB — HBA1C MFR BLD: 6.1 %

## 2024-01-24 PROCEDURE — 250N000011 HC RX IP 250 OP 636: Performed by: INTERNAL MEDICINE

## 2024-01-24 PROCEDURE — 36415 COLL VENOUS BLD VENIPUNCTURE: CPT

## 2024-01-24 PROCEDURE — 83036 HEMOGLOBIN GLYCOSYLATED A1C: CPT

## 2024-01-24 PROCEDURE — 258N000003 HC RX IP 258 OP 636: Performed by: INTERNAL MEDICINE

## 2024-01-24 PROCEDURE — 96361 HYDRATE IV INFUSION ADD-ON: CPT

## 2024-01-24 PROCEDURE — 96374 THER/PROPH/DIAG INJ IV PUSH: CPT

## 2024-01-24 RX ORDER — HEPARIN SODIUM (PORCINE) LOCK FLUSH IV SOLN 100 UNIT/ML 100 UNIT/ML
5 SOLUTION INTRAVENOUS
Status: CANCELLED | OUTPATIENT
Start: 2024-01-24

## 2024-01-24 RX ORDER — ONDANSETRON 2 MG/ML
4 INJECTION INTRAMUSCULAR; INTRAVENOUS ONCE
Status: CANCELLED | OUTPATIENT
Start: 2024-01-24 | End: 2024-01-24

## 2024-01-24 RX ORDER — HEPARIN SODIUM,PORCINE 10 UNIT/ML
5-20 VIAL (ML) INTRAVENOUS DAILY PRN
Status: CANCELLED | OUTPATIENT
Start: 2024-01-24

## 2024-01-24 RX ORDER — ONDANSETRON 2 MG/ML
4 INJECTION INTRAMUSCULAR; INTRAVENOUS ONCE
Status: COMPLETED | OUTPATIENT
Start: 2024-01-24 | End: 2024-01-24

## 2024-01-24 RX ADMIN — SODIUM CHLORIDE, POTASSIUM CHLORIDE, SODIUM LACTATE AND CALCIUM CHLORIDE 1000 ML: 600; 310; 30; 20 INJECTION, SOLUTION INTRAVENOUS at 15:07

## 2024-01-24 RX ADMIN — ONDANSETRON 4 MG: 2 INJECTION INTRAMUSCULAR; INTRAVENOUS at 15:12

## 2024-01-24 NOTE — PROGRESS NOTES
Infusion Nursing Note:  Laura Gonzalez presents today for IVF and anti emetics.    Patient seen by provider today: No   present during visit today: Not Applicable.    Note: Per orders from the therapy plan and per patient request, below IVF and medication administered.    Administrations This Visit       lactated ringers BOLUS 1,000-1,500 mL       Admin Date  01/24/2024 Action  $New Bag Dose  1,000 mL Rate  1,500 mL/hr Route  Intravenous Documented By  Nata Velazquez RN              ondansetron (ZOFRAN) injection 4 mg       Admin Date  01/24/2024 Action  $Given Dose  4 mg Route  Intravenous Documented By  Nata Velazquez RN                   Intravenous Access:  Labs drawn without difficulty.  Peripheral IV placed.    Treatment Conditions:  None.      Post Infusion Assessment:  Patient tolerated infusion without incident.  Blood return noted pre and post infusion.  Site patent and intact, free from redness, edema or discomfort.  No evidence of extravasations.  Access discontinued per protocol.       Discharge Plan:   Discharge instructions reviewed with: Patient.  Patient and/or family verbalized understanding of discharge instructions and all questions answered.  AVS to patient via [employer].  Patient will return as needed for next appointment.   Patient discharged in stable condition accompanied by: self.  Departure Mode: Ambulatory.        /68 (BP Location: Right arm, Patient Position: Sitting, Cuff Size: Adult Regular)   Pulse 59   Temp 98.4  F (36.9  C) (Oral)   Resp 16   SpO2 98%      Nata Velazquez RN

## 2024-01-24 NOTE — PATIENT INSTRUCTIONS
Dear Laura Gonzalez    Thank you for choosing [facility] ([facility]) for your infusion.  The following information is a summary of our appointment as well as important reminders.          If you are a transplant patient and require transplant education, please click on this link: [email or web address].    We look forward in seeing you on your next appointment here at [facility] ([facility]).  Please don t hesitate to call us at [phone] to reschedule any of your appointments or to speak with one of the [facility] registered nurses.  It was a pleasure taking care of you today.    Sincerely,    [facility]  [address] [place] [address]  [address]  Phone:  [phone]

## 2024-01-26 NOTE — RESULT ENCOUNTER NOTE
Laura.     As you can see your A1C is just into diabetic range. I would recommend talking to your PCP about next step. Would definitely avoid ozempic or related meds as the principle side effect is pancreatitis. Other meds OK.

## 2024-02-13 ENCOUNTER — OFFICE VISIT (OUTPATIENT)
Dept: PLASTIC SURGERY | Facility: CLINIC | Age: 74
End: 2024-02-13

## 2024-02-13 DIAGNOSIS — Z41.1 ENCOUNTER FOR COSMETIC PROCEDURE: Primary | ICD-10-CM

## 2024-02-13 NOTE — PROGRESS NOTES
Facial Plastic and Reconstructive Surgery    Laura Gonzalez presents for evaluation for facial aging. The patient has noticed undesirable cosmetic changes in the both the development of wrinkles and the loss of volume to the face. Laura Gonzalez presents for a consultation for possible dermal filler and chemodenervation with Botox.    The patients face was evaluated with the use of a mirror and a white make up pencil to highlight the areas of concern. A recommendation was made for the optimal treatment regimen. The patient was educated on the nature of the procedures.    Dermal fillers were explained as well as the canula technique. Informed consent was obtained and noted in the chart. All questions were answered  prior to commencement of the procedure.     Botox treatment was explained and informed consent was obtained. This was documented in the chart. All questions were answered prior to proceeding.     Procedure: Dermal Filler for Facial Contouring  Indication: Facial volume deficit  Injector: Dr. Jessica Crews MD    The skin was cleaned with antimicrobial solution and a topical ice mask was placed.     A 25 gauge needle was used to establish the entry point of the 27 gauge canula. The canula was used to inject the  a subcutaneous plane.  Intermittent ice was used topically throughout the procedure. Hemostasis was obtained at the needle entry site with light digital pressure. Ice was then applied to the face by cool pack for 10 minutes. The skin was cleaned.    A total of 2 mls of RESTYLANE DEFYNE filler was used.     Please see scanned procedure log.    There were no complications and the patient tolerated the procedure well.    Procedure: Chemodenervation with Botulinum Toxin A  Indication: Undesirable Dynamic Rhytids  Injector: Dr. Jessica Crews MD    The skin was cleaned with antimicrobial solution and a topical ice mask was placed.     The patient was asked to systematically engage the  muscles in the area to be injected. The tuberculin needles were used for subdermal injection and hemostasis was obtained with digital pressure when needed. The skin was cleaned.     A total of 40 units was injected subdermally. Areas treated were :  -Glabella: 20u  Forehead: 10u  Lateral orbic: 10u  Please see scanned procedure log.    The patient tolerated the procedure well and there were no complications.     Going to see her son () then going to [place]. Discussed perioral filler with Syed.

## 2024-02-15 NOTE — PROGRESS NOTES
Updated photodocumentation obtained (see media tab).    Obtained signed informed consent for filler (see media tab).    Cammy Yang RN  2/15/2024 1:54 PM

## 2024-03-12 ENCOUNTER — HOSPITAL ENCOUNTER (INPATIENT)
Facility: CLINIC | Age: 74
LOS: 3 days | Discharge: HOME | DRG: 440 | End: 2024-03-15
Attending: EMERGENCY MEDICINE | Admitting: INTERNAL MEDICINE
Payer: COMMERCIAL

## 2024-03-12 ENCOUNTER — APPOINTMENT (OUTPATIENT)
Dept: CT IMAGING | Facility: CLINIC | Age: 74
DRG: 440 | End: 2024-03-12
Attending: EMERGENCY MEDICINE
Payer: COMMERCIAL

## 2024-03-12 DIAGNOSIS — K85.90 ACUTE PANCREATITIS, UNSPECIFIED COMPLICATION STATUS, UNSPECIFIED PANCREATITIS TYPE: ICD-10-CM

## 2024-03-12 DIAGNOSIS — K86.89 PANCREATIC INSUFFICIENCY: ICD-10-CM

## 2024-03-12 LAB
ALBUMIN SERPL BCG-MCNC: 3.8 G/DL (ref 3.5–5.2)
ALBUMIN UR-MCNC: NEGATIVE MG/DL
ALP SERPL-CCNC: 127 U/L (ref 40–150)
ALT SERPL W P-5'-P-CCNC: 19 U/L (ref 0–50)
AMYLASE SERPL-CCNC: 220 U/L (ref 28–100)
ANION GAP SERPL CALCULATED.3IONS-SCNC: 8 MMOL/L (ref 7–15)
APPEARANCE UR: CLEAR
AST SERPL W P-5'-P-CCNC: 32 U/L (ref 0–45)
BASOPHILS # BLD AUTO: 0 10E3/UL (ref 0–0.2)
BASOPHILS NFR BLD AUTO: 1 %
BILIRUB SERPL-MCNC: 0.3 MG/DL
BILIRUB UR QL STRIP: NEGATIVE
BUN SERPL-MCNC: 13.3 MG/DL (ref 8–23)
CALCIUM SERPL-MCNC: 8.6 MG/DL (ref 8.8–10.2)
CHLORIDE SERPL-SCNC: 105 MMOL/L (ref 98–107)
CHOLEST SERPL-MCNC: 73 MG/DL
COLOR UR AUTO: ABNORMAL
CREAT SERPL-MCNC: 0.83 MG/DL (ref 0.51–0.95)
DEPRECATED HCO3 PLAS-SCNC: 29 MMOL/L (ref 22–29)
EGFRCR SERPLBLD CKD-EPI 2021: 74 ML/MIN/1.73M2
EOSINOPHIL # BLD AUTO: 0.1 10E3/UL (ref 0–0.7)
EOSINOPHIL NFR BLD AUTO: 2 %
ERYTHROCYTE [DISTWIDTH] IN BLOOD BY AUTOMATED COUNT: 14.6 % (ref 10–15)
GLUCOSE BLDC GLUCOMTR-MCNC: 85 MG/DL (ref 70–99)
GLUCOSE SERPL-MCNC: 78 MG/DL (ref 70–99)
GLUCOSE UR STRIP-MCNC: NEGATIVE MG/DL
HCT VFR BLD AUTO: 38.1 % (ref 35–47)
HDLC SERPL-MCNC: 44 MG/DL
HGB BLD-MCNC: 12.4 G/DL (ref 11.7–15.7)
HGB UR QL STRIP: NEGATIVE
IMM GRANULOCYTES # BLD: 0 10E3/UL
IMM GRANULOCYTES NFR BLD: 0 %
KETONES UR STRIP-MCNC: NEGATIVE MG/DL
LDLC SERPL CALC-MCNC: 13 MG/DL
LEUKOCYTE ESTERASE UR QL STRIP: NEGATIVE
LIPASE SERPL-CCNC: 639 U/L (ref 13–60)
LYMPHOCYTES # BLD AUTO: 1.7 10E3/UL (ref 0.8–5.3)
LYMPHOCYTES NFR BLD AUTO: 22 %
MCH RBC QN AUTO: 29.7 PG (ref 26.5–33)
MCHC RBC AUTO-ENTMCNC: 32.5 G/DL (ref 31.5–36.5)
MCV RBC AUTO: 91 FL (ref 78–100)
MONOCYTES # BLD AUTO: 0.9 10E3/UL (ref 0–1.3)
MONOCYTES NFR BLD AUTO: 12 %
NEUTROPHILS # BLD AUTO: 5.1 10E3/UL (ref 1.6–8.3)
NEUTROPHILS NFR BLD AUTO: 63 %
NITRATE UR QL: NEGATIVE
NONHDLC SERPL-MCNC: 29 MG/DL
NRBC # BLD AUTO: 0 10E3/UL
NRBC BLD AUTO-RTO: 0 /100
PH UR STRIP: 7.5 [PH] (ref 5–7)
PLATELET # BLD AUTO: 319 10E3/UL (ref 150–450)
POTASSIUM SERPL-SCNC: 3.7 MMOL/L (ref 3.4–5.3)
PROT SERPL-MCNC: 6.7 G/DL (ref 6.4–8.3)
RBC # BLD AUTO: 4.17 10E6/UL (ref 3.8–5.2)
RBC URINE: <1 /HPF
SODIUM SERPL-SCNC: 142 MMOL/L (ref 135–145)
SP GR UR STRIP: 1 (ref 1–1.03)
SQUAMOUS EPITHELIAL: 1 /HPF
TRIGL SERPL-MCNC: 81 MG/DL
UROBILINOGEN UR STRIP-MCNC: NORMAL MG/DL
WBC # BLD AUTO: 7.9 10E3/UL (ref 4–11)
WBC URINE: <1 /HPF

## 2024-03-12 PROCEDURE — 250N000013 HC RX MED GY IP 250 OP 250 PS 637: Performed by: PHYSICIAN ASSISTANT

## 2024-03-12 PROCEDURE — 36415 COLL VENOUS BLD VENIPUNCTURE: CPT | Performed by: EMERGENCY MEDICINE

## 2024-03-12 PROCEDURE — 80053 COMPREHEN METABOLIC PANEL: CPT | Performed by: EMERGENCY MEDICINE

## 2024-03-12 PROCEDURE — 250N000009 HC RX 250: Performed by: EMERGENCY MEDICINE

## 2024-03-12 PROCEDURE — 96361 HYDRATE IV INFUSION ADD-ON: CPT | Performed by: EMERGENCY MEDICINE

## 2024-03-12 PROCEDURE — 258N000003 HC RX IP 258 OP 636: Performed by: EMERGENCY MEDICINE

## 2024-03-12 PROCEDURE — 120N000002 HC R&B MED SURG/OB UMMC

## 2024-03-12 PROCEDURE — 82150 ASSAY OF AMYLASE: CPT | Performed by: EMERGENCY MEDICINE

## 2024-03-12 PROCEDURE — 81001 URINALYSIS AUTO W/SCOPE: CPT | Performed by: INTERNAL MEDICINE

## 2024-03-12 PROCEDURE — 96376 TX/PRO/DX INJ SAME DRUG ADON: CPT | Performed by: EMERGENCY MEDICINE

## 2024-03-12 PROCEDURE — 258N000003 HC RX IP 258 OP 636: Performed by: INTERNAL MEDICINE

## 2024-03-12 PROCEDURE — 250N000011 HC RX IP 250 OP 636: Performed by: FAMILY MEDICINE

## 2024-03-12 PROCEDURE — 83690 ASSAY OF LIPASE: CPT | Performed by: EMERGENCY MEDICINE

## 2024-03-12 PROCEDURE — 250N000011 HC RX IP 250 OP 636: Performed by: EMERGENCY MEDICINE

## 2024-03-12 PROCEDURE — 99285 EMERGENCY DEPT VISIT HI MDM: CPT | Performed by: EMERGENCY MEDICINE

## 2024-03-12 PROCEDURE — 99285 EMERGENCY DEPT VISIT HI MDM: CPT | Mod: 25 | Performed by: EMERGENCY MEDICINE

## 2024-03-12 PROCEDURE — 99222 1ST HOSP IP/OBS MODERATE 55: CPT | Mod: FS | Performed by: INTERNAL MEDICINE

## 2024-03-12 PROCEDURE — 258N000003 HC RX IP 258 OP 636: Performed by: FAMILY MEDICINE

## 2024-03-12 PROCEDURE — 74177 CT ABD & PELVIS W/CONTRAST: CPT

## 2024-03-12 PROCEDURE — 96375 TX/PRO/DX INJ NEW DRUG ADDON: CPT | Performed by: EMERGENCY MEDICINE

## 2024-03-12 PROCEDURE — 96374 THER/PROPH/DIAG INJ IV PUSH: CPT | Mod: 59 | Performed by: EMERGENCY MEDICINE

## 2024-03-12 PROCEDURE — 99222 1ST HOSP IP/OBS MODERATE 55: CPT | Mod: GC | Performed by: INTERNAL MEDICINE

## 2024-03-12 PROCEDURE — 85025 COMPLETE CBC W/AUTO DIFF WBC: CPT | Performed by: EMERGENCY MEDICINE

## 2024-03-12 PROCEDURE — 99207 PR APP CREDIT; MD BILLING SHARED VISIT: CPT | Performed by: PHYSICIAN ASSISTANT

## 2024-03-12 PROCEDURE — 82465 ASSAY BLD/SERUM CHOLESTEROL: CPT | Performed by: PHYSICIAN ASSISTANT

## 2024-03-12 PROCEDURE — 258N000003 HC RX IP 258 OP 636: Performed by: PHYSICIAN ASSISTANT

## 2024-03-12 PROCEDURE — 250N000011 HC RX IP 250 OP 636: Performed by: PHYSICIAN ASSISTANT

## 2024-03-12 PROCEDURE — 74177 CT ABD & PELVIS W/CONTRAST: CPT | Mod: 26 | Performed by: RADIOLOGY

## 2024-03-12 RX ORDER — SODIUM CHLORIDE, SODIUM LACTATE, POTASSIUM CHLORIDE, CALCIUM CHLORIDE 600; 310; 30; 20 MG/100ML; MG/100ML; MG/100ML; MG/100ML
1000 INJECTION, SOLUTION INTRAVENOUS CONTINUOUS
Status: DISCONTINUED | OUTPATIENT
Start: 2024-03-12 | End: 2024-03-13

## 2024-03-12 RX ORDER — SODIUM CHLORIDE, SODIUM LACTATE, POTASSIUM CHLORIDE, CALCIUM CHLORIDE 600; 310; 30; 20 MG/100ML; MG/100ML; MG/100ML; MG/100ML
1000 INJECTION, SOLUTION INTRAVENOUS CONTINUOUS
Status: DISCONTINUED | OUTPATIENT
Start: 2024-03-12 | End: 2024-03-12

## 2024-03-12 RX ORDER — LIDOCAINE 40 MG/G
CREAM TOPICAL
Status: DISCONTINUED | OUTPATIENT
Start: 2024-03-12 | End: 2024-03-15 | Stop reason: HOSPADM

## 2024-03-12 RX ORDER — ZOLPIDEM TARTRATE 5 MG/1
10 TABLET ORAL
Status: DISCONTINUED | OUTPATIENT
Start: 2024-03-12 | End: 2024-03-15 | Stop reason: HOSPADM

## 2024-03-12 RX ORDER — CHOLECALCIFEROL (VITAMIN D3) 125 MCG
1 CAPSULE ORAL DAILY
COMMUNITY
Start: 2024-01-05

## 2024-03-12 RX ORDER — IOPAMIDOL 755 MG/ML
95 INJECTION, SOLUTION INTRAVASCULAR ONCE
Status: COMPLETED | OUTPATIENT
Start: 2024-03-12 | End: 2024-03-12

## 2024-03-12 RX ORDER — SALIVA STIMULANT COMB. NO.3
1 SPRAY, NON-AEROSOL (ML) MUCOUS MEMBRANE 4 TIMES DAILY
Status: DISCONTINUED | OUTPATIENT
Start: 2024-03-12 | End: 2024-03-15 | Stop reason: HOSPADM

## 2024-03-12 RX ORDER — OXYCODONE HYDROCHLORIDE 5 MG/1
5 TABLET ORAL EVERY 4 HOURS PRN
Status: DISCONTINUED | OUTPATIENT
Start: 2024-03-12 | End: 2024-03-15 | Stop reason: HOSPADM

## 2024-03-12 RX ORDER — PROCHLORPERAZINE MALEATE 5 MG
5 TABLET ORAL EVERY 6 HOURS PRN
Status: DISCONTINUED | OUTPATIENT
Start: 2024-03-12 | End: 2024-03-15 | Stop reason: HOSPADM

## 2024-03-12 RX ORDER — MONTELUKAST SODIUM 10 MG/1
10 TABLET ORAL AT BEDTIME
COMMUNITY

## 2024-03-12 RX ORDER — AMOXICILLIN 250 MG
2 CAPSULE ORAL 2 TIMES DAILY PRN
Status: DISCONTINUED | OUTPATIENT
Start: 2024-03-12 | End: 2024-03-15 | Stop reason: HOSPADM

## 2024-03-12 RX ORDER — LANOLIN ALCOHOL/MO/W.PET/CERES
3 CREAM (GRAM) TOPICAL
Status: DISCONTINUED | OUTPATIENT
Start: 2024-03-12 | End: 2024-03-15 | Stop reason: HOSPADM

## 2024-03-12 RX ORDER — ESTRADIOL 0.07 MG/D
1 FILM, EXTENDED RELEASE TRANSDERMAL
COMMUNITY

## 2024-03-12 RX ORDER — ONDANSETRON 2 MG/ML
4 INJECTION INTRAMUSCULAR; INTRAVENOUS EVERY 6 HOURS PRN
Status: DISCONTINUED | OUTPATIENT
Start: 2024-03-12 | End: 2024-03-15 | Stop reason: HOSPADM

## 2024-03-12 RX ORDER — AMOXICILLIN 250 MG
1 CAPSULE ORAL 2 TIMES DAILY PRN
Status: DISCONTINUED | OUTPATIENT
Start: 2024-03-12 | End: 2024-03-15 | Stop reason: HOSPADM

## 2024-03-12 RX ORDER — CALCIUM CARBONATE 500 MG/1
1000 TABLET, CHEWABLE ORAL 4 TIMES DAILY PRN
Status: DISCONTINUED | OUTPATIENT
Start: 2024-03-12 | End: 2024-03-15 | Stop reason: HOSPADM

## 2024-03-12 RX ORDER — MONTELUKAST SODIUM 10 MG/1
10 TABLET ORAL AT BEDTIME
Status: DISCONTINUED | OUTPATIENT
Start: 2024-03-12 | End: 2024-03-15 | Stop reason: HOSPADM

## 2024-03-12 RX ORDER — ONDANSETRON 2 MG/ML
4 INJECTION INTRAMUSCULAR; INTRAVENOUS ONCE
Status: COMPLETED | OUTPATIENT
Start: 2024-03-12 | End: 2024-03-12

## 2024-03-12 RX ORDER — HYDROMORPHONE HYDROCHLORIDE 1 MG/ML
0.5 INJECTION, SOLUTION INTRAMUSCULAR; INTRAVENOUS; SUBCUTANEOUS
Status: DISCONTINUED | OUTPATIENT
Start: 2024-03-12 | End: 2024-03-13

## 2024-03-12 RX ORDER — ONDANSETRON 4 MG/1
4 TABLET, ORALLY DISINTEGRATING ORAL EVERY 6 HOURS PRN
Status: DISCONTINUED | OUTPATIENT
Start: 2024-03-12 | End: 2024-03-15 | Stop reason: HOSPADM

## 2024-03-12 RX ORDER — PROCHLORPERAZINE 25 MG
12.5 SUPPOSITORY, RECTAL RECTAL EVERY 12 HOURS PRN
Status: DISCONTINUED | OUTPATIENT
Start: 2024-03-12 | End: 2024-03-15 | Stop reason: HOSPADM

## 2024-03-12 RX ORDER — HYDRALAZINE HYDROCHLORIDE 20 MG/ML
10 INJECTION INTRAMUSCULAR; INTRAVENOUS EVERY 6 HOURS PRN
Status: DISCONTINUED | OUTPATIENT
Start: 2024-03-12 | End: 2024-03-15 | Stop reason: HOSPADM

## 2024-03-12 RX ORDER — ENOXAPARIN SODIUM 100 MG/ML
40 INJECTION SUBCUTANEOUS EVERY 24 HOURS
Status: DISCONTINUED | OUTPATIENT
Start: 2024-03-12 | End: 2024-03-15 | Stop reason: HOSPADM

## 2024-03-12 RX ADMIN — SODIUM CHLORIDE, POTASSIUM CHLORIDE, SODIUM LACTATE AND CALCIUM CHLORIDE 1000 ML: 600; 310; 30; 20 INJECTION, SOLUTION INTRAVENOUS at 09:16

## 2024-03-12 RX ADMIN — MONTELUKAST 10 MG: 10 TABLET, FILM COATED ORAL at 22:21

## 2024-03-12 RX ADMIN — HYDROMORPHONE HYDROCHLORIDE 1 MG: 1 INJECTION, SOLUTION INTRAMUSCULAR; INTRAVENOUS; SUBCUTANEOUS at 05:11

## 2024-03-12 RX ADMIN — HYDROMORPHONE HYDROCHLORIDE 1 MG: 1 INJECTION, SOLUTION INTRAMUSCULAR; INTRAVENOUS; SUBCUTANEOUS at 07:51

## 2024-03-12 RX ADMIN — IOPAMIDOL 95 ML: 755 INJECTION, SOLUTION INTRAVENOUS at 06:29

## 2024-03-12 RX ADMIN — OXYCODONE HYDROCHLORIDE 5 MG: 5 TABLET ORAL at 19:48

## 2024-03-12 RX ADMIN — Medication 1 SPRAY: at 17:19

## 2024-03-12 RX ADMIN — Medication 1 SPRAY: at 10:30

## 2024-03-12 RX ADMIN — SODIUM CHLORIDE, POTASSIUM CHLORIDE, SODIUM LACTATE AND CALCIUM CHLORIDE 1000 ML: 600; 310; 30; 20 INJECTION, SOLUTION INTRAVENOUS at 07:51

## 2024-03-12 RX ADMIN — HYDROMORPHONE HYDROCHLORIDE 0.5 MG: 1 INJECTION, SOLUTION INTRAMUSCULAR; INTRAVENOUS; SUBCUTANEOUS at 16:22

## 2024-03-12 RX ADMIN — SODIUM CHLORIDE, PRESERVATIVE FREE 74 ML: 5 INJECTION INTRAVENOUS at 06:35

## 2024-03-12 RX ADMIN — SODIUM CHLORIDE 1000 ML: 9 INJECTION, SOLUTION INTRAVENOUS at 05:11

## 2024-03-12 RX ADMIN — HYDROMORPHONE HYDROCHLORIDE 1 MG: 1 INJECTION, SOLUTION INTRAMUSCULAR; INTRAVENOUS; SUBCUTANEOUS at 06:06

## 2024-03-12 RX ADMIN — ONDANSETRON 4 MG: 2 INJECTION INTRAMUSCULAR; INTRAVENOUS at 05:12

## 2024-03-12 RX ADMIN — HYDROMORPHONE HYDROCHLORIDE 0.5 MG: 1 INJECTION, SOLUTION INTRAMUSCULAR; INTRAVENOUS; SUBCUTANEOUS at 10:13

## 2024-03-12 RX ADMIN — ZOLPIDEM TARTRATE 10 MG: 5 TABLET ORAL at 23:41

## 2024-03-12 RX ADMIN — ENOXAPARIN SODIUM 40 MG: 40 INJECTION SUBCUTANEOUS at 10:54

## 2024-03-12 RX ADMIN — SODIUM CHLORIDE, POTASSIUM CHLORIDE, SODIUM LACTATE AND CALCIUM CHLORIDE 1000 ML: 600; 310; 30; 20 INJECTION, SOLUTION INTRAVENOUS at 12:55

## 2024-03-12 RX ADMIN — Medication 1 SPRAY: at 22:27

## 2024-03-12 RX ADMIN — Medication 1 SPRAY: at 12:48

## 2024-03-12 RX ADMIN — HYDROMORPHONE HYDROCHLORIDE 0.5 MG: 1 INJECTION, SOLUTION INTRAMUSCULAR; INTRAVENOUS; SUBCUTANEOUS at 22:21

## 2024-03-12 RX ADMIN — SODIUM CHLORIDE, POTASSIUM CHLORIDE, SODIUM LACTATE AND CALCIUM CHLORIDE 1000 ML: 600; 310; 30; 20 INJECTION, SOLUTION INTRAVENOUS at 17:53

## 2024-03-12 RX ADMIN — OXYCODONE HYDROCHLORIDE 5 MG: 5 TABLET ORAL at 13:04

## 2024-03-12 RX ADMIN — SODIUM CHLORIDE, POTASSIUM CHLORIDE, SODIUM LACTATE AND CALCIUM CHLORIDE 1000 ML: 600; 310; 30; 20 INJECTION, SOLUTION INTRAVENOUS at 09:17

## 2024-03-12 RX ADMIN — ONDANSETRON 4 MG: 2 INJECTION INTRAMUSCULAR; INTRAVENOUS at 21:41

## 2024-03-12 ASSESSMENT — ACTIVITIES OF DAILY LIVING (ADL)
ADLS_ACUITY_SCORE: 35
ADLS_ACUITY_SCORE: 20
ADLS_ACUITY_SCORE: 35
ADLS_ACUITY_SCORE: 33
ADLS_ACUITY_SCORE: 20
ADLS_ACUITY_SCORE: 35
ADLS_ACUITY_SCORE: 20

## 2024-03-12 NOTE — CONSULTS
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 No evidence of parenchymal necrosis     Lipase: 639, Amylase 220     Recommendations:  -- Fluid resuscitation: (based on \"Waterfall Trial\" N Engl J Med 2022; [id number])  -If e/o hypovolemia give LR bolus of 10ml/kg over 2 hours  -Followed by LR infusion of 1.5ml/kg/hr.  -Consider additional bolus of 10 ml/kg if ongoing hypovolemia    Hypovolemia Criteria (One criterion or more):  - Baseline creat >1.1 mg/dL or BUN >20  - Hematocrit >44%  - Increase in creat and/or BUN from previous  - Urine output <0.75 ml/kg/hr  - Systolic BP <90 mmHg without other explanation than hypovolemia    -- Reassess and adjust fluid requirement q12 hours  -- Monitor closely for evidence of hypovolemia and fluid overload.  -- Encourage po intake after 12 if pain improved and patient willing to eat   - Stop IV fluids after tolerating po intake for >8 hours  -- Trend CBC, CMP  --Patient wants to try PO intake. Would encourage early enteral nutrition.   -- Analgesia/antiemetics per primary team  --Long term plan to be discussed with her outpatient Gastroenterologist  --Keep NPO after midnight    Discussed with primary team      Gastroenterology outpatient follow up recommendations: TBD    Thank you for involving us in this patient's care. Please do " not hesitate to contact the GI service with any questions or concerns.     Pt care plan discussed with Dr. Edgar, GI staff physician.    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     She is well known to our service and has recurrent episodes of acute pancreatitis 2/2 stenoses on pancreatojejunostomy. She follows with Dr. Noguera in clinic and undergoes outpatient IV fluid infusions as needed. She has not got any IVFs for amonth as she was on vacation.      Patient reports worsening pain over the past few days with acute increase in pain and emesis overnight around 330 am. Last emesis was around 0730. She is starting to feel better. Took antiemetics and pain medication which seem to be helpful. Starting to have a minimal appetite which is an improvement. Denies fever or chills.     On presentation, She was HDS. She had imaging and biochemical evidence of pancreatitis and admitted for management of same to medicine service.          Data:   Key relevant labs:     Key relevant imaging:           Previous Endoscopy:   See HPI         Medications:     Current Facility-Administered Medications   Medication    artificial saliva (BIOTENE MT) solution 1 spray    benzocaine-menthol (CHLORASEPTIC MAX) 15-10 MG lozenge 1 lozenge    calcium carbonate (TUMS) chewable tablet 1,000 mg    enoxaparin ANTICOAGULANT (LOVENOX) injection 40 mg    hydrALAZINE (APRESOLINE) injection 10 mg    HYDROmorphone (PF) (DILAUDID) injection 0.5 mg    lactated ringers infusion 1,000 mL    lidocaine (LMX4) cream     lidocaine 1 % 0.1-1 mL    melatonin tablet 3 mg    montelukast (SINGULAIR) tablet 10 mg    ondansetron (ZOFRAN ODT) ODT tab 4 mg    Or    ondansetron (ZOFRAN) injection 4 mg    oxyCODONE (ROXICODONE) tablet 5 mg    prochlorperazine (COMPAZINE) injection 5 mg    Or    prochlorperazine (COMPAZINE) tablet 5 mg    Or    prochlorperazine (COMPAZINE) suppository 12.5 mg    senna-docusate (SENOKOT-S/PERICOLACE) 8.6-50 MG per tablet 1 tablet    Or    senna-docusate (SENOKOT-S/PERICOLACE) 8.6-50 MG per tablet 2 tablet    sodium chloride (PF) 0.9% PF flush 3 mL    sodium chloride (PF) 0.9% PF flush 3 mL    zolpidem (AMBIEN) tablet 10 mg     Current Outpatient Medications   Medication Sig    atorvastatin (LIPITOR) 40 MG tablet Take 40 mg by mouth At Bedtime     cholecalciferol (VITAMIN D3) 25 mcg (1000 units) capsule Take 1 capsule by mouth daily    estradiol (VIVELLE-DOT) 0.075 MG/24HR BIW patch Place 1 patch onto the skin twice a week    evolocumab (REPATHA) 140 MG/ML prefilled autoinjector Inject 140 mg Subcutaneous every 14 days    ezetimibe (ZETIA) 10 MG tablet Take 10 mg by mouth At Bedtime    Magnesium Ascorbate POWD One teaspoon at bedtime daily by mouth    montelukast (SINGULAIR) 10 MG tablet Take 10 mg by mouth at bedtime    valACYclovir (VALTREX) 1000 mg tablet Take 1,000 mg by mouth daily as needed (cold sore)    vitamin A 3 MG (99753 UNITS) capsule Take 1 capsule by mouth daily    zolpidem (AMBIEN) 10 MG tablet Take 1 tablet by mouth nightly as needed for sleep             Physical Exam:   BP (!) 150/50   Pulse 59   Temp (!) 96.5  F (35.8  C) (Oral)   Resp 18   Wt 65.3 kg (143 lb 15.4 oz)   SpO2 98%   BMI 22.55 kg/m    Wt:   Wt Readings from Last 2 Encounters:   03/12/24 65.3 kg (143 lb 15.4 oz)   10/26/23 70.4 kg (155 lb 1.6 oz)      Constitutional: cooperative, pleasant, not dyspneic/diaphoretic, no acute distress  Eyes: Sclera anicteric/injected  Ears/nose/mouth/throat: Normal oropharynx without ulcers or  exudate, mucus membranes moist, hearing intact  Neck: supple, thyroid normal size  CV: No edema  Respiratory: Unlabored breathing  Lymph: No axillary, submandibular, supraclavicular or inguinal lymphadenopathy  Abd:  Nondistended, +bs, no hepatosplenomegaly, tender +, no peritoneal signs  Skin: warm, perfused, no jaundice  Neuro: AAO x 3, No asterixis  Psych: Normal affect  MSK: No gross deformities          Past Medical History:   Reviewed and edited as appropriate  Past Medical History:   Diagnosis Date    Asthma     pt.states no longer has symptoms    Atherosclerosis     CAD (coronary artery disease)     HLD (hyperlipidemia)             Past Surgical History:   Reviewed and edited as appropriate   Past Surgical History:   Procedure Laterality Date    APPENDECTOMY      ARTHROPLASTY HIP Left 6/15/2016    Procedure: ARTHROPLASTY HIP;  Surgeon: Jeffy Wolff MD;  [facility]    COLONOSCOPY  10/3/2013    Procedure: COMBINED COLONOSCOPY, SINGLE BIOPSY/POLYPECTOMY BY BIOPSY;;  Surgeon: Kenney Walls MD;  Location:  GI    ENDOSCOPIC RETROGRADE CHOLANGIOPANCREATOGRAM N/A 6/16/2022    Procedure: ENDOSCOPIC RETROGRADE CHOLANGIOPANCREATOGRAPHY WITH PANCREATIC DUCT DILATION, DEBRIS REMOVAL AND STENT PLACEMENT;  Surgeon: Arnaldo Noguera MD;  [facility]    ENDOSCOPIC RETROGRADE CHOLANGIOPANCREATOGRAM N/A 10/13/2022    Procedure: ENDOSCOPIC RETROGRADE CHOLANGIOPANCREATOGRAPHY, biliary stent placement;  Surgeon: Arnaldo Noguera MD;  [facility]    ENDOSCOPIC RETROGRADE CHOLANGIOPANCREATOGRAM N/A 11/21/2022    Procedure: ENDOSCOPIC RETROGRADE CHOLANGIOPANCREATOGRAPHY, WITH TUBE OR STENT INSERTION, with balloon dialation;  Surgeon: Johnson Gotti MD;  [facility]    ENDOSCOPIC RETROGRADE CHOLANGIOPANCREATOGRAM N/A 3/30/2023    Procedure: ENDOSCOPIC RETROGRADE CHOLANGIOPANCREATOGRAPHY with bile duct stents removed x2 and replacement of one pancreatic stent;  Surgeon: Arnaldo Noguera  MD;  [facility]    ENDOSCOPIC RETROGRADE CHOLANGIOPANCREATOGRAPHY, EXCHANGE TUBE/STENT N/A 1/24/2023    Procedure: enteroscopy assisted ENDOSCOPIC RETROGRADE CHOLANGIOPANCREATOGRAPHY, with pancreatic stents replaced times 2;  Surgeon: Arnaldo Noguera MD;  [facility]    ENDOSCOPIC ULTRASOUND UPPER GASTROINTESTINAL TRACT (GI) N/A 5/12/2022    Procedure: ENDOSCOPIC ULTRASOUND WITH PANCREATICOGASTROSTOMY CREATION, TRACT DILATION, STENT PLACEMENT;  Surgeon: Romaine Oates MD;  [facility]    ESOPHAGOSCOPY, GASTROSCOPY, DUODENOSCOPY (EGD), COMBINED N/A 5/12/2022    Procedure: ESOPHAGOGASTRODUODENOSCOPY WITH ENDOSCOPIC CLIP PLACEMENT;  Surgeon: Arnaldo Noguera MD;  [facility]    FOOT SURGERY      HC TOOTH EXTRACTION W/FORCEP      HYSTERECTOMY      INCISION AND DRAINAGE BREAST Left 2/18/2022    Procedure: evacuate hematoma left  BREAST;  Surgeon: Dayron Garcia MD;  Location: [facility] OR    IR FOLLOW UP VISIT OUTPATIENT  5/31/2022    IR SINOGRAM INJECTION DIAGNOSTIC  5/31/2022            Social History:            Family History:   Reviewed and edited as appropriate  No family history on file.   No known history of colorectal cancer, liver disease, or inflammatory bowel disease.         Allergies:   Reviewed and edited as appropriate   No Known Allergies           Review of Systems:     A complete 10 point review of systems was performed and is negative except as noted in the HPI

## 2024-03-12 NOTE — ED TRIAGE NOTES
Pt arrived from home via POV w/ c/c of abd pain for 3 days and N/V that started yesterday. Pt states pain is intermittent and 10/10. Pt has hx of pancreatitis.

## 2024-03-12 NOTE — H&P
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 Acute onset abdominal pain, N/V PTA c/w prior pancreatitis. Follows OP with Dr. Noguera, last seen 1/22/24. Lipase 639. LFTs nor,al. CT AP 12 c/w pancreatitis of remaining remnant pancreas, no e/o necrosis, fluid collection, or ductal dilation. Treated with IVF, IV dilaudid in the ED. VSS  - GI consult  -  ml/hr, NPO  - Antiemetics prn  - Pain management: Oxycodone with IV dilaudid for breakthrough   ** Addendum: Per GI, ok for regular diet. 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 CT AP as above and with stable mild intrahepatic biliary ductal dilation  HLD: Hold statin, Ezetimibe, Evolocumab for now given pancreatitis. Check fasting lipid profile to ensure TGs stable  Insomnia: Ok to continue PTA Ambien after d/w patient, hold for sedation  Chronic pain: Hold PTA tramadol. Pain management as above  Asthma: Stable. Continue PTA Singulair      Diet:  NPO  DVT Prophylaxis: Enoxaparin (Lovenox) SQ  Condon Catheter: Not present  Lines: None     Cardiac Monitoring: None  Code Status:  Full Code    Clinically Significant Risk Factors Present on Admission                                  Disposition Plan      Expected Discharge Date: 03/14/2024                The patient's care  was discussed with the patient and attending physician, Dr. 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 Last emesis was around 0730. She is starting to feel better. Took antiemetics and pain medication which seem to be helpful. Starting to have a minimal appetite which is an improvement. Denies fever or chills. No AMS. No urinary symptoms or change in bowels. Ongoing abdominal discomfort.       Past Medical History    Past Medical History:   Diagnosis Date    Asthma     pt.states no longer has symptoms    Atherosclerosis     CAD (coronary artery disease)     HLD (hyperlipidemia)        Past Surgical History   Past Surgical History:   Procedure Laterality Date    APPENDECTOMY      ARTHROPLASTY HIP Left 6/15/2016    Procedure: ARTHROPLASTY HIP;  Surgeon: Jeffy Wolff MD;  [facility]    COLONOSCOPY  10/3/2013    Procedure: COMBINED COLONOSCOPY, SINGLE BIOPSY/POLYPECTOMY BY BIOPSY;;  Surgeon: Kenney Walls MD;  Location:  GI    ENDOSCOPIC RETROGRADE CHOLANGIOPANCREATOGRAM N/A 6/16/2022    Procedure: ENDOSCOPIC RETROGRADE CHOLANGIOPANCREATOGRAPHY  WITH PANCREATIC DUCT DILATION, DEBRIS REMOVAL AND STENT PLACEMENT;  Surgeon: Arnaldo Noguera MD;  [facility]    ENDOSCOPIC RETROGRADE CHOLANGIOPANCREATOGRAM N/A 10/13/2022    Procedure: ENDOSCOPIC RETROGRADE CHOLANGIOPANCREATOGRAPHY, biliary stent placement;  Surgeon: Arnaldo Noguera MD;  [facility]    ENDOSCOPIC RETROGRADE CHOLANGIOPANCREATOGRAM N/A 11/21/2022    Procedure: ENDOSCOPIC RETROGRADE CHOLANGIOPANCREATOGRAPHY, WITH TUBE OR STENT INSERTION, with balloon dialation;  Surgeon: Johnson Gotti MD;  [facility]    ENDOSCOPIC RETROGRADE CHOLANGIOPANCREATOGRAM N/A 3/30/2023    Procedure: ENDOSCOPIC RETROGRADE CHOLANGIOPANCREATOGRAPHY with bile duct stents removed x2 and replacement of one pancreatic stent;  Surgeon: Arnaldo Noguera MD;  [facility]    ENDOSCOPIC RETROGRADE CHOLANGIOPANCREATOGRAPHY, EXCHANGE TUBE/STENT N/A 1/24/2023    Procedure: enteroscopy assisted ENDOSCOPIC RETROGRADE CHOLANGIOPANCREATOGRAPHY, with pancreatic stents replaced times 2;  Surgeon: Arnaldo Noguera MD;  [facility]    ENDOSCOPIC ULTRASOUND UPPER GASTROINTESTINAL TRACT (GI) N/A 5/12/2022    Procedure: ENDOSCOPIC ULTRASOUND WITH PANCREATICOGASTROSTOMY CREATION, TRACT DILATION, STENT PLACEMENT;  Surgeon: Romaine Oates MD;  [facility]    ESOPHAGOSCOPY, GASTROSCOPY, DUODENOSCOPY (EGD), COMBINED N/A 5/12/2022    Procedure: ESOPHAGOGASTRODUODENOSCOPY WITH ENDOSCOPIC CLIP PLACEMENT;  Surgeon: Arnaldo Noguera MD;  [facility]    FOOT SURGERY      HC TOOTH EXTRACTION W/FORCEP      HYSTERECTOMY      INCISION AND DRAINAGE BREAST Left 2/18/2022    Procedure: evacuate hematoma left  BREAST;  Surgeon: Dayron Garcia MD;  Location: [facility]    IR FOLLOW UP VISIT OUTPATIENT  5/31/2022    IR SINOGRAM INJECTION DIAGNOSTIC  5/31/2022       Prior to Admission Medications   Prior to Admission Medications   Prescriptions Last Dose Informant Patient Reported? Taking?    Magnesium Ascorbate POWD   Yes No   Sig: One teaspoon at bedtime daily by mouth   atorvastatin (LIPITOR) 40 MG tablet   Yes No   Sig: Take 40 mg by mouth At Bedtime    calcium-magnesium (CALMAG) 500-250 MG TABS   Yes No   Sig: Take 2 tablets by mouth daily   cholecalciferol (VITAMIN D3) 25 mcg (1000 units) capsule   Yes No   Sig: Take 1 capsule by mouth daily   evolocumab (REPATHA) 140 MG/ML prefilled autoinjector   Yes No   Sig: Inject 140 mg Subcutaneous every 14 days   ezetimibe (ZETIA) 10 MG tablet   Yes No   Sig: Take 10 mg by mouth At Bedtime   traMADol (ULTRAM) 50 MG tablet   No No   Sig: Take 1 tablet (50 mg) by mouth 3 times daily   valACYclovir (VALTREX) 1000 mg tablet   Yes No   Sig: Take 1,000 mg by mouth daily as needed (cold sore)   Patient not taking: Reported on 1/19/2024   vitamin A 3 MG (94090 UNITS) capsule   Yes Yes   Sig: Take 1 capsule by mouth daily   zolpidem (AMBIEN) 10 MG tablet   Yes No   Sig: Take 1 tablet by mouth nightly as needed for sleep      Facility-Administered Medications: None        Review of Systems    The 10 point Review of Systems is negative other than noted in the HPI or here.      Physical Exam   Vital Signs: Temp: (!) 96.5  F (35.8  C) Temp src: Oral BP: (!) 150/50 Pulse: 59   Resp: 18 SpO2: 98 % O2 Device: None (Room air)    Weight: 0 lbs 0 oz  General Appearance: Alert and oriented x3, pleasant. NAD  HEENT: Anicteric sclera, MMM   Respiratory: Breathing comfortably on room air, lungs CTAB without wheezing or crackles   Cardiovascular: RRR, S1, S2. No murmur noted  GI: Abdomen soft, tenderness in the epigastrium and periumbilical areas. Bowel sounds active. No guarding or rebound  Lymph/Hematologic: No peripheral edema, distal pulses palpable   Skin: No rash or jaundice   Musculoskeletal: Moves all extremities   Neurologic: No focal deficits, CN II-XII grossly intact      Medical Decision Making   70 MINUTES SPENT BY ME on the date of service doing chart review,  history, exam, documentation & further activities per the note.      Data     I have personally reviewed the following data over the past 24 hrs:    7.9  \   12.4   / 319     142 105 13.3 /  78   3.7 29 0.83 \     ALT: 19 AST: 32 AP: 127 TBILI: 0.3   ALB: 3.8 TOT PROTEIN: 6.7 LIPASE: 639 (H)

## 2024-03-12 NOTE — MEDICATION SCRIBE - ADMISSION MEDICATION HISTORY
Medication Scribe Admission Medication History    Admission medication history is complete. The information provided in this note is only as accurate as the sources available at the time of the update.    Information Source(s): Patient via in-person    Pertinent Information: Spoke with patient in person and completed medication hx.    Changes made to PTA medication list:  Added: Estradiol 0.075 MG/ 24HR Patch         Montelukast 10 MG Tab  Deleted: Calcium-magnesium CalMag 500-250 MG Tab            Tramadol 50 MG Tab  Changed: None    Allergies reviewed with patient and updates made in EHR: no    Medication History Completed By: Nichol Philippe 3/12/2024 9:28 AM    PTA Med List   Medication Sig Last Dose    atorvastatin (LIPITOR) 40 MG tablet Take 40 mg by mouth At Bedtime  3/11/2024 at 9PM    cholecalciferol (VITAMIN D3) 25 mcg (1000 units) capsule Take 1 capsule by mouth daily Past Week    estradiol (VIVELLE-DOT) 0.075 MG/24HR BIW patch Place 1 patch onto the skin twice a week 3/11/2024    evolocumab (REPATHA) 140 MG/ML prefilled autoinjector Inject 140 mg Subcutaneous every 14 days 2/28/2024    ezetimibe (ZETIA) 10 MG tablet Take 10 mg by mouth At Bedtime 3/11/2024 at 9PM    Magnesium Ascorbate POWD One teaspoon at bedtime daily by mouth Past Week    montelukast (SINGULAIR) 10 MG tablet Take 10 mg by mouth at bedtime 3/11/2024 at 9PM    valACYclovir (VALTREX) 1000 mg tablet Take 1,000 mg by mouth daily as needed (cold sore) More than a month    vitamin A 3 MG (86397 UNITS) capsule Take 1 capsule by mouth daily Past Week at AM    zolpidem (AMBIEN) 10 MG tablet Take 1 tablet by mouth nightly as needed for sleep Past Week at PM

## 2024-03-12 NOTE — PROGRESS NOTES
Patient arrives stating she believes she is having an episode of acute pancreatitis, has been treated for it in the past. C/o abdominal pain in R side. N/V.   Hx of whipple.

## 2024-03-12 NOTE — ED PROVIDER NOTES
ED Provider Note  [facility]      History     Chief Complaint   Patient presents with    Nausea & Vomiting     HPI  Laura Gonzalez is a 73 year old female who has past med history of pancreatitis, cholangitis presenting with abdominal pain similar to multiple episodes of pancreatitis.  The patient states he is otherwise feeling well and has had a few flares over the past few days but this has been unrelenting with a couple episodes of vomiting that been nonbloody.  The patient has had a little bit of watery diarrhea.  No fevers but was quite sweaty overnight.  Denies recent procedures.  She has not had a flare in over a year.  Recent trip to [place] but has been feeling well otherwise.  No chest pain or shortness of breath.  She denies alcohol use.  Pain is mainly upper abdomen, no radiation.    Past Medical History  Past Medical History:   Diagnosis Date    Asthma     pt.states no longer has symptoms    Atherosclerosis     CAD (coronary artery disease)     HLD (hyperlipidemia)      Past Surgical History:   Procedure Laterality Date    APPENDECTOMY      ARTHROPLASTY HIP Left 6/15/2016    Procedure: ARTHROPLASTY HIP;  Surgeon: Jeffy Wolff MD;  Location: [facility] OR    COLONOSCOPY  10/3/2013    Procedure: COMBINED COLONOSCOPY, SINGLE BIOPSY/POLYPECTOMY BY BIOPSY;;  Surgeon: Kenney Walls MD;  Location:  GI    ENDOSCOPIC RETROGRADE CHOLANGIOPANCREATOGRAM N/A 6/16/2022    Procedure: ENDOSCOPIC RETROGRADE CHOLANGIOPANCREATOGRAPHY WITH PANCREATIC DUCT DILATION, DEBRIS REMOVAL AND STENT PLACEMENT;  Surgeon: Arnaldo Noguera MD;  Location: [facility] OR    ENDOSCOPIC RETROGRADE CHOLANGIOPANCREATOGRAM N/A 10/13/2022    Procedure: ENDOSCOPIC RETROGRADE CHOLANGIOPANCREATOGRAPHY, biliary stent placement;  Surgeon: Arnaldo Noguera MD;  Location: [facility] OR    ENDOSCOPIC RETROGRADE CHOLANGIOPANCREATOGRAM N/A 11/21/2022    Procedure: ENDOSCOPIC RETROGRADE CHOLANGIOPANCREATOGRAPHY, WITH TUBE OR  STENT INSERTION, with balloon dialation;  Surgeon: Johnson Gotti MD;  Location: [facility]    ENDOSCOPIC RETROGRADE CHOLANGIOPANCREATOGRAM N/A 3/30/2023    Procedure: ENDOSCOPIC RETROGRADE CHOLANGIOPANCREATOGRAPHY with bile duct stents removed x2 and replacement of one pancreatic stent;  Surgeon: Arnaldo Noguera MD;  Location: [facility]    ENDOSCOPIC RETROGRADE CHOLANGIOPANCREATOGRAPHY, EXCHANGE TUBE/STENT N/A 2023    Procedure: enteroscopy assisted ENDOSCOPIC RETROGRADE CHOLANGIOPANCREATOGRAPHY, with pancreatic stents replaced times 2;  Surgeon: Arnaldo Noguera MD;  Location: [facility]    ENDOSCOPIC ULTRASOUND UPPER GASTROINTESTINAL TRACT (GI) N/A 2022    Procedure: ENDOSCOPIC ULTRASOUND WITH PANCREATICOGASTROSTOMY CREATION, TRACT DILATION, STENT PLACEMENT;  Surgeon: Romaine Oates MD;  Location: [facility]    ESOPHAGOSCOPY, GASTROSCOPY, DUODENOSCOPY (EGD), COMBINED N/A 2022    Procedure: ESOPHAGOGASTRODUODENOSCOPY WITH ENDOSCOPIC CLIP PLACEMENT;  Surgeon: Arnaldo Noguera MD;  Location: [facility] OR    FOOT SURGERY      HC TOOTH EXTRACTION W/FORCEP      HYSTERECTOMY      INCISION AND DRAINAGE BREAST Left 2022    Procedure: evacuate hematoma left  BREAST;  Surgeon: Dayron Garcia MD;  Location: [facility] OR    IR FOLLOW UP VISIT OUTPATIENT  2022    IR SINOGRAM INJECTION DIAGNOSTIC  2022     atorvastatin (LIPITOR) 40 MG tablet  calcium-magnesium (CALMAG) 500-250 MG TABS  cholecalciferol (VITAMIN D3) 25 mcg (1000 units) capsule  evolocumab (REPATHA) 140 MG/ML prefilled autoinjector  ezetimibe (ZETIA) 10 MG tablet  Magnesium Ascorbate POWD  traMADol (ULTRAM) 50 MG tablet  valACYclovir (VALTREX) 1000 mg tablet  VITAMIN A PO  zolpidem (AMBIEN) 10 MG tablet      No Known Allergies  Family History  No family history on file.  Social History   Social History     Tobacco Use    Smoking status: Former     Types: Cigarettes     Quit date: 10/3/1988     Years since quittin.4     Passive  exposure: Never    Smokeless tobacco: Never   Substance Use Topics    Alcohol use: No    Drug use: No         A medically appropriate review of systems was performed with pertinent positives and negatives noted in the HPI, and all other systems negative.    Physical Exam   BP: 111/72  Pulse: 66  Temp: 97.6  F (36.4  C)  Resp: 16  SpO2: 98 %  Physical Exam  Physical Exam   Constitutional: oriented to person, place, and time. appears well-developed and well-nourished.   HENT:   Head: Normocephalic and atraumatic.   Neck: Normal range of motion.   Pulmonary/Chest: Effort normal. No respiratory distress.   Cardiac: No murmurs, rubs, gallops. RRR.  Abdominal: Abdomen soft, tender to palpation the epigastric area, nondistended. No rebound tenderness.  MSK: Long bones without deformity or evidence of trauma  Neurological: alert and oriented to person, place, and time.   Skin: Skin is warm and dry.   Psychiatric:  normal mood and affect.  behavior is normal. Thought content normal.       ED Course, Procedures, & Data      Procedures            No results found for any visits on 03/12/24.  Medications   sodium chloride 0.9% BOLUS 1,000 mL (has no administration in time range)   ondansetron (ZOFRAN) injection 4 mg (has no administration in time range)   HYDROmorphone (DILAUDID) injection 1 mg (has no administration in time range)     Labs Ordered and Resulted from Time of ED Arrival to Time of ED Departure - No data to display  CT Abdomen Pelvis w Contrast    (Results Pending)          Critical care was not performed.     Medical Decision Making  The patient's presentation was of high complexity (a chronic illness severe exacerbation, progression, or side effect of treatment).    The patient's evaluation involved:  ordering and/or review of 3+ test(s) in this encounter (see separate area of note for details)  independent interpretation of testing performed by another health professional (CT with pancreatitis)  discussion of  management or test interpretation with another health professional (hospitalist)    The patient's management necessitated high risk (a parenteral controlled substance) and high risk (a decision regarding hospitalization).    Assessment & Plan    MDM  Patient here with acute on chronic abdominal pain.  Labs suggest pancreatitis.  She is required multiple pain medications.  She is otherwise afebrile.  CT pending at this point.  She would be admitted to medicine for fluids, pain medications and further care.  I have reviewed the nursing notes. I have reviewed the findings, diagnosis, plan and need for follow up with the patient.    New Prescriptions    No medications on file       Final diagnoses:   Acute pancreatitis, unspecified complication status, unspecified pancreatitis type       Jatinder Martinez  [facility] EMERGENCY DEPARTMENT  3/12/2024     Jatinder Martinez MD  03/12/24 0711

## 2024-03-13 LAB
ALBUMIN SERPL BCG-MCNC: 3.3 G/DL (ref 3.5–5.2)
ALP SERPL-CCNC: 117 U/L (ref 40–150)
ALT SERPL W P-5'-P-CCNC: 19 U/L (ref 0–50)
ANION GAP SERPL CALCULATED.3IONS-SCNC: 5 MMOL/L (ref 7–15)
AST SERPL W P-5'-P-CCNC: 33 U/L (ref 0–45)
BILIRUB SERPL-MCNC: 0.4 MG/DL
BUN SERPL-MCNC: 8.4 MG/DL (ref 8–23)
CALCIUM SERPL-MCNC: 8.4 MG/DL (ref 8.8–10.2)
CHLORIDE SERPL-SCNC: 106 MMOL/L (ref 98–107)
CREAT SERPL-MCNC: 0.81 MG/DL (ref 0.51–0.95)
DEPRECATED HCO3 PLAS-SCNC: 27 MMOL/L (ref 22–29)
EGFRCR SERPLBLD CKD-EPI 2021: 76 ML/MIN/1.73M2
ERYTHROCYTE [DISTWIDTH] IN BLOOD BY AUTOMATED COUNT: 14.6 % (ref 10–15)
GLUCOSE SERPL-MCNC: 95 MG/DL (ref 70–99)
HCT VFR BLD AUTO: 32.9 % (ref 35–47)
HGB BLD-MCNC: 10.7 G/DL (ref 11.7–15.7)
MCH RBC QN AUTO: 29.7 PG (ref 26.5–33)
MCHC RBC AUTO-ENTMCNC: 32.5 G/DL (ref 31.5–36.5)
MCV RBC AUTO: 91 FL (ref 78–100)
PLATELET # BLD AUTO: 251 10E3/UL (ref 150–450)
POTASSIUM SERPL-SCNC: 4.3 MMOL/L (ref 3.4–5.3)
PROT SERPL-MCNC: 5.6 G/DL (ref 6.4–8.3)
RBC # BLD AUTO: 3.6 10E6/UL (ref 3.8–5.2)
SODIUM SERPL-SCNC: 138 MMOL/L (ref 135–145)
WBC # BLD AUTO: 6.4 10E3/UL (ref 4–11)

## 2024-03-13 PROCEDURE — 250N000013 HC RX MED GY IP 250 OP 250 PS 637: Performed by: PHYSICIAN ASSISTANT

## 2024-03-13 PROCEDURE — 120N000002 HC R&B MED SURG/OB UMMC

## 2024-03-13 PROCEDURE — 36415 COLL VENOUS BLD VENIPUNCTURE: CPT | Performed by: PHYSICIAN ASSISTANT

## 2024-03-13 PROCEDURE — 258N000003 HC RX IP 258 OP 636: Performed by: INTERNAL MEDICINE

## 2024-03-13 PROCEDURE — 250N000013 HC RX MED GY IP 250 OP 250 PS 637: Performed by: STUDENT IN AN ORGANIZED HEALTH CARE EDUCATION/TRAINING PROGRAM

## 2024-03-13 PROCEDURE — 80053 COMPREHEN METABOLIC PANEL: CPT | Performed by: PHYSICIAN ASSISTANT

## 2024-03-13 PROCEDURE — 999N000111 HC STATISTIC OT IP EVAL DEFER

## 2024-03-13 PROCEDURE — 82653 EL-1 FECAL QUANTITATIVE: CPT | Performed by: STUDENT IN AN ORGANIZED HEALTH CARE EDUCATION/TRAINING PROGRAM

## 2024-03-13 PROCEDURE — 99233 SBSQ HOSP IP/OBS HIGH 50: CPT | Performed by: STUDENT IN AN ORGANIZED HEALTH CARE EDUCATION/TRAINING PROGRAM

## 2024-03-13 PROCEDURE — 85027 COMPLETE CBC AUTOMATED: CPT | Performed by: PHYSICIAN ASSISTANT

## 2024-03-13 PROCEDURE — 999N000147 HC STATISTIC PT IP EVAL DEFER

## 2024-03-13 RX ORDER — NALOXONE HYDROCHLORIDE 0.4 MG/ML
0.2 INJECTION, SOLUTION INTRAMUSCULAR; INTRAVENOUS; SUBCUTANEOUS
Status: DISCONTINUED | OUTPATIENT
Start: 2024-03-13 | End: 2024-03-15 | Stop reason: HOSPADM

## 2024-03-13 RX ORDER — POLYETHYLENE GLYCOL 3350 17 G/17G
17 POWDER, FOR SOLUTION ORAL DAILY
Status: DISCONTINUED | OUTPATIENT
Start: 2024-03-13 | End: 2024-03-15 | Stop reason: HOSPADM

## 2024-03-13 RX ORDER — DIPHENHYDRAMINE HCL 25 MG
25 CAPSULE ORAL EVERY 6 HOURS PRN
Status: DISCONTINUED | OUTPATIENT
Start: 2024-03-13 | End: 2024-03-15 | Stop reason: HOSPADM

## 2024-03-13 RX ORDER — NALOXONE HYDROCHLORIDE 0.4 MG/ML
0.4 INJECTION, SOLUTION INTRAMUSCULAR; INTRAVENOUS; SUBCUTANEOUS
Status: DISCONTINUED | OUTPATIENT
Start: 2024-03-13 | End: 2024-03-15 | Stop reason: HOSPADM

## 2024-03-13 RX ORDER — HYDROXYZINE HYDROCHLORIDE 25 MG/1
25 TABLET, FILM COATED ORAL 3 TIMES DAILY PRN
Status: DISCONTINUED | OUTPATIENT
Start: 2024-03-13 | End: 2024-03-15 | Stop reason: HOSPADM

## 2024-03-13 RX ADMIN — OXYCODONE HYDROCHLORIDE 5 MG: 5 TABLET ORAL at 03:33

## 2024-03-13 RX ADMIN — HYDROXYZINE HYDROCHLORIDE 25 MG: 25 TABLET, FILM COATED ORAL at 21:31

## 2024-03-13 RX ADMIN — OXYCODONE HYDROCHLORIDE 5 MG: 5 TABLET ORAL at 07:53

## 2024-03-13 RX ADMIN — OXYCODONE HYDROCHLORIDE 5 MG: 5 TABLET ORAL at 12:10

## 2024-03-13 RX ADMIN — OXYCODONE HYDROCHLORIDE 5 MG: 5 TABLET ORAL at 21:31

## 2024-03-13 RX ADMIN — MONTELUKAST 10 MG: 10 TABLET, FILM COATED ORAL at 21:31

## 2024-03-13 RX ADMIN — HYDROXYZINE HYDROCHLORIDE 25 MG: 25 TABLET, FILM COATED ORAL at 10:38

## 2024-03-13 RX ADMIN — Medication 1 SPRAY: at 07:53

## 2024-03-13 RX ADMIN — Medication 1 SPRAY: at 16:27

## 2024-03-13 RX ADMIN — OXYCODONE HYDROCHLORIDE 5 MG: 5 TABLET ORAL at 16:34

## 2024-03-13 RX ADMIN — POLYETHYLENE GLYCOL 3350 17 G: 17 POWDER, FOR SOLUTION ORAL at 14:28

## 2024-03-13 RX ADMIN — Medication 1 SPRAY: at 12:10

## 2024-03-13 RX ADMIN — SODIUM CHLORIDE, POTASSIUM CHLORIDE, SODIUM LACTATE AND CALCIUM CHLORIDE 1000 ML: 600; 310; 30; 20 INJECTION, SOLUTION INTRAVENOUS at 04:43

## 2024-03-13 RX ADMIN — ZOLPIDEM TARTRATE 10 MG: 5 TABLET ORAL at 23:25

## 2024-03-13 RX ADMIN — Medication 1 SPRAY: at 21:32

## 2024-03-13 ASSESSMENT — ACTIVITIES OF DAILY LIVING (ADL)
ADLS_ACUITY_SCORE: 20

## 2024-03-13 NOTE — PROGRESS NOTES
Reason for admission: Pancreatitis   Admitted from:  ED   Report received from: Jia. RN       2 RN skin assessment completed by: Trav STARR RN and Andria MERCEDES       - Findings (add LDA if needed): None   Bed algorithm reevaluated: Yes  Was airflow pump ordered?:No  Suction set up in room? Yes  Care plan (primary problem) and education initiated: Yes  MDRO education done if applicable: N/A  Pt informed about policy regarding no IV pumps off unit: Yes  Flu shot ordered? (October-April only): Offered, Pt refused   Detailed Belongings: Phone, purse, clothes glasses, finger ring.       Admitted/transferred from: ED  2 RN full   skin assessment completed by Trav Aiken RN and Andria GUILLAUME RN.  Skin assessment finding: skin intact, no problems   Interventions/actions: skin interventions    None    Will continue to monitor.

## 2024-03-13 NOTE — PLAN OF CARE
Occupational Therapy: Orders received. Chart reviewed and discussed with care team.? Occupational Therapy not indicated due to per discussion with PT, pt reporting no concerns with mobility or ADLs/IADLs and currently performing with IND.? Defer discharge recommendations to medical team.? Will complete orders.

## 2024-03-13 NOTE — PLAN OF CARE
Goal Outcome Evaluation:      Plan of Care Reviewed With: patient    Overall Patient Progress: no changeOverall Patient Progress: no change    Outcome Evaluation: No acute events during shift.    Assumed care from 1900 to 0730.   Pt AAOX4, calm & cooperative. Able to make needs known. VSSO intermittent bradycardia on RA, Pt endorses abdominal pain. PRN oxy and dilaudid given and gave relief. PRN zofran given for nausea and gave relief. Pt tolerated reg diet, good appetite. NPO at midnight for possible ECRP ON 3/13. LR running @ 75ml/hr on RFA PIV. Pt cont to b/b, no BM during shift. Able to ambulate by self to bathroom and around room. No skin issues found. Plan for ECRP on 3/13, Continuing POC.

## 2024-03-13 NOTE — PLAN OF CARE
Shift: [id number]    D: See flowsheets for full assessment & vitals    PRNs: oxycodone for pain management, atarax for itching   Labs: no RN managed labs, stool sample sent  Running: R PIV infusing LR @ 75ml/h     A/R: no acute events this shift. Pt remains vitally stable on RA.  tolerating a regular diet w/ fair appetite w/ the plan to stop IVF, if tolerating po intake. Denies nausea this shift. Pain well managed today. No surgical plan this admission, will plan to follow up outpatient. Possible discharge home tomorrow. Q1 hour rounding completed, call light w/in reach and able to make needs known, will continue to monitor     P: continue w/ poc       Goal Outcome Evaluation:      Plan of Care Reviewed With: patient    Overall Patient Progress: no changeOverall Patient Progress: no change    Outcome Evaluation: pain managed w/ oxy, no surgical plan this admission, stool sample sent

## 2024-03-13 NOTE — PROVIDER NOTIFICATION
Paged Brandi Alfredo PAC to request social service consult. Pt wanting to make and sign advanced directives with .

## 2024-03-13 NOTE — PROGRESS NOTES
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 Dr. Noguera will help arrange next procedure outpatient.  - Clinically improving. Will advance diet and stop IVF this afternoon if tolerating PO.    Acute, recurrent pancreatitis: In the setting of h/o duodenal polyp s/p Whipple as below. Acute onset abdominal pain, N/V PTA c/w prior pancreatitis. Follows OP with Dr. Noguera, last seen 1/22/24. Lipase 639. LFTs nor,al. CT AP 12 c/w pancreatitis of remaining remnant pancreas, no e/o necrosis, fluid collection, or ductal dilation. Treated with IVF, IV dilaudid in the ED. VSS  - GI consult  - No plans for inpatient ERCP. Dr. Noguera to follow up outpatient.  - ADAT. Tolerated regular diet 3/12 PM, but didn't eat much  - Continue IVF until tolerating PO  - Antiemetics prn  - Pain management: PRN oxycodone. Stopped IV dilaudid 3/13 per pt request.    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 CT AP as above and with stable mild intrahepatic biliary ductal dilation  HLD: Hold statin, Ezetimibe, Evolocumab for now given pancreatitis. Check fasting lipid profile to ensure TGs stable  Insomnia: Ok to continue PTA Ambien after d/w patient, hold for sedation  Chronic pain: Hold PTA tramadol. Pain management as above  Asthma: Stable. Continue PTA Singulair      Diet:  NPO  DVT Prophylaxis:  Enoxaparin (Lovenox) SQ  Condon Catheter: Not present  Lines: None     Cardiac Monitoring: None  Code Status:  Full Code          Diet: NPO per Anesthesia Guidelines for Procedure/Surgery Except for: Meds    DVT Prophylaxis: Enoxaparin (Lovenox) SQ  Condon Catheter: Not present  Lines: None     Cardiac Monitoring: None  Code Status: Full Code      Clinically Significant Risk Factors          # Hypocalcemia: Lowest Ca = 8.4 mg/dL in last 2 days, will monitor and replace as appropriate     # Hypoalbuminemia: Lowest albumin = 3.3 g/dL at 3/13/2024  5:48 AM, will monitor as appropriate                       Disposition Plan     Expected Discharge Date: 03/14/2024      Destination: home;home with family              Laura Hilton MD  Hospitalist Service, [facility] TEAM 7  [facility]  Securely message with [employer] (more info)  Text page via [employer] Paging/Directory   See signed in provider for up to date coverage information  ______________________________________________________________________    Interval History   No acute events overnight. Feels much improved from admit. Pain now 5/10 down from 12/10. Epigastric/RUQ. No diarrhea. Hasn't tried much food yet.    Physical Exam   Vital Signs: Temp: 98.6  F (37  C) Temp src: Oral BP: 113/46 Pulse: 60   Resp: 16 SpO2: 94 % O2 Device: None (Room air)    Weight: 153 lbs 12.8 oz    General Appearance: NAD. Lying comfortably in bed.  Respiratory: CTAB. No increased WOB.  Cardiovascular: RRR. No m/r/g  GI: Soft. Tender in epigastrium without rebound. Non-distended.  Skin: No rash.  Other: AOx4. Moving all extremities. Normal affect.    Medical Decision Making       55 MINUTES SPENT BY ME on the date of service doing chart review, history, exam, documentation & further activities per the note.  MANAGEMENT DISCUSSED with the following over the past 24 hours: GI       Data     I have personally reviewed the following data over the past 24  hrs:    6.4  \   10.7 (L)   / 251     138 106 8.4 /  95   4.3 27 0.81 \     ALT: 19 AST: 33 AP: 117 TBILI: 0.4   ALB: 3.3 (L) TOT PROTEIN: 5.6 (L) LIPASE: N/A

## 2024-03-13 NOTE — PLAN OF CARE
PT 7C Cancel/Defer      Physical Therapy: Orders received. Chart reviewed and discussed with care team.? Physical Therapy not indicated due to current mobility status.?Pt is up independently and denies any need for physical therapy services. Defer discharge recommendations to Medical team, anticipate return to home once medically ready.? Will complete orders.

## 2024-03-14 ENCOUNTER — PATIENT OUTREACH (OUTPATIENT)
Dept: GASTROENTEROLOGY | Facility: CLINIC | Age: 74
End: 2024-03-14
Payer: COMMERCIAL

## 2024-03-14 PROCEDURE — 120N000002 HC R&B MED SURG/OB UMMC

## 2024-03-14 PROCEDURE — 250N000011 HC RX IP 250 OP 636: Performed by: STUDENT IN AN ORGANIZED HEALTH CARE EDUCATION/TRAINING PROGRAM

## 2024-03-14 PROCEDURE — 999N000128 HC STATISTIC PERIPHERAL IV START W/O US GUIDANCE

## 2024-03-14 PROCEDURE — 250N000013 HC RX MED GY IP 250 OP 250 PS 637: Performed by: PHYSICIAN ASSISTANT

## 2024-03-14 PROCEDURE — 250N000013 HC RX MED GY IP 250 OP 250 PS 637: Performed by: STUDENT IN AN ORGANIZED HEALTH CARE EDUCATION/TRAINING PROGRAM

## 2024-03-14 PROCEDURE — 99233 SBSQ HOSP IP/OBS HIGH 50: CPT | Performed by: STUDENT IN AN ORGANIZED HEALTH CARE EDUCATION/TRAINING PROGRAM

## 2024-03-14 RX ORDER — ATORVASTATIN CALCIUM 40 MG/1
40 TABLET, FILM COATED ORAL AT BEDTIME
Status: DISCONTINUED | OUTPATIENT
Start: 2024-03-14 | End: 2024-03-15 | Stop reason: HOSPADM

## 2024-03-14 RX ORDER — LIDOCAINE 40 MG/G
CREAM TOPICAL
Status: DISCONTINUED | OUTPATIENT
Start: 2024-03-14 | End: 2024-03-15 | Stop reason: HOSPADM

## 2024-03-14 RX ORDER — POLYETHYLENE GLYCOL 3350 17 G/17G
17 POWDER, FOR SOLUTION ORAL DAILY
Qty: 116 G | Refills: 0 | Status: CANCELLED | OUTPATIENT
Start: 2024-03-14 | End: 2024-03-21

## 2024-03-14 RX ORDER — ESTRADIOL 0.07 MG/D
1 FILM, EXTENDED RELEASE TRANSDERMAL
Status: DISCONTINUED | OUTPATIENT
Start: 2024-03-14 | End: 2024-03-14

## 2024-03-14 RX ORDER — HYDROMORPHONE HYDROCHLORIDE 1 MG/ML
0.5 INJECTION, SOLUTION INTRAMUSCULAR; INTRAVENOUS; SUBCUTANEOUS
Status: DISCONTINUED | OUTPATIENT
Start: 2024-03-14 | End: 2024-03-15 | Stop reason: HOSPADM

## 2024-03-14 RX ORDER — EZETIMIBE 10 MG/1
10 TABLET ORAL AT BEDTIME
Status: DISCONTINUED | OUTPATIENT
Start: 2024-03-14 | End: 2024-03-15 | Stop reason: HOSPADM

## 2024-03-14 RX ADMIN — EZETIMIBE 10 MG: 10 TABLET ORAL at 19:57

## 2024-03-14 RX ADMIN — POLYETHYLENE GLYCOL 3350 17 G: 17 POWDER, FOR SOLUTION ORAL at 08:40

## 2024-03-14 RX ADMIN — HYDROXYZINE HYDROCHLORIDE 25 MG: 25 TABLET, FILM COATED ORAL at 08:39

## 2024-03-14 RX ADMIN — DIPHENHYDRAMINE HYDROCHLORIDE 25 MG: 25 CAPSULE ORAL at 14:33

## 2024-03-14 RX ADMIN — OXYCODONE HYDROCHLORIDE 5 MG: 5 TABLET ORAL at 03:56

## 2024-03-14 RX ADMIN — OXYCODONE HYDROCHLORIDE 5 MG: 5 TABLET ORAL at 12:53

## 2024-03-14 RX ADMIN — HYDROXYZINE HYDROCHLORIDE 25 MG: 25 TABLET, FILM COATED ORAL at 17:20

## 2024-03-14 RX ADMIN — HYDROMORPHONE HYDROCHLORIDE 0.5 MG: 1 INJECTION, SOLUTION INTRAMUSCULAR; INTRAVENOUS; SUBCUTANEOUS at 11:02

## 2024-03-14 RX ADMIN — OXYCODONE HYDROCHLORIDE 5 MG: 5 TABLET ORAL at 17:20

## 2024-03-14 RX ADMIN — MONTELUKAST 10 MG: 10 TABLET, FILM COATED ORAL at 19:57

## 2024-03-14 RX ADMIN — ATORVASTATIN CALCIUM 40 MG: 40 TABLET, FILM COATED ORAL at 19:56

## 2024-03-14 RX ADMIN — HYDROMORPHONE HYDROCHLORIDE 0.5 MG: 1 INJECTION, SOLUTION INTRAMUSCULAR; INTRAVENOUS; SUBCUTANEOUS at 18:24

## 2024-03-14 RX ADMIN — OXYCODONE HYDROCHLORIDE 5 MG: 5 TABLET ORAL at 08:39

## 2024-03-14 RX ADMIN — ZOLPIDEM TARTRATE 10 MG: 5 TABLET ORAL at 23:44

## 2024-03-14 RX ADMIN — Medication 1 SPRAY: at 19:53

## 2024-03-14 RX ADMIN — OXYCODONE HYDROCHLORIDE 5 MG: 5 TABLET ORAL at 21:41

## 2024-03-14 RX ADMIN — Medication 1 SPRAY: at 08:40

## 2024-03-14 RX ADMIN — Medication 1 SPRAY: at 11:03

## 2024-03-14 RX ADMIN — HYDROMORPHONE HYDROCHLORIDE 0.5 MG: 1 INJECTION, SOLUTION INTRAMUSCULAR; INTRAVENOUS; SUBCUTANEOUS at 14:33

## 2024-03-14 RX ADMIN — DIPHENHYDRAMINE HYDROCHLORIDE 25 MG: 25 CAPSULE ORAL at 21:42

## 2024-03-14 ASSESSMENT — ACTIVITIES OF DAILY LIVING (ADL)
ADLS_ACUITY_SCORE: 20

## 2024-03-14 NOTE — PLAN OF CARE
Goal Outcome Evaluation:      Plan of Care Reviewed With: patient    Overall Patient Progress: no changeOverall Patient Progress: no change    Outcome Evaluation: No acute events during shift    Assumed care from 2300 to 0700    Pt AAOX4, calm & cooperative. Able to make needs known. Pt had soft BP this am but within parameters, intermittent bradycardia on RA, Pt endorses abdominal pain. PRN oxy given and gave relief. Pt tolerated reg diet, fluids encouraged. Pt cont to b/b, no BM during shift. Able to ambulate by self to bathroom and around room. No skin issues found. Plan for discharge In AM.

## 2024-03-14 NOTE — TELEPHONE ENCOUNTER
Patient admitted, asked to cancel visit for next week and will reschedule. Will move visit from 3-20 to overbook visit on 5/1/24 at 11:30am    ML

## 2024-03-14 NOTE — PROGRESS NOTES
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 No evidence of parenchymal necrosis      Lipase: 639, Amylase 220     Recommendations:   -- Encourage enteral nutrition, patient willing to eat  -- Encourage po intake after 12 if pain improved and patient willing to eat               - Stop IV fluids after tolerating po intake for >8 hours  -- Analgesia/antiemetics per primary team  --Interval ERCP to be performed after resolution of this flare of pancreatitis as outpatient       Discussed with primary team       Gastroenterology outpatient follow up recommendations: TBD at time of discharge     The patient was discussed and plan agreed upon with GI staff, Dr. Chuckie Reardon MD  [id number]  P:[phone]    _______________________________________________________________  S: No acute events overnight. Her pain is better than yesterday and she was able to tolerate food well.     O:  Blood pressure 128/40, pulse 58, temperature 98.4  F (36.9  C), temperature source Oral, resp. rate 16, weight 69.8 kg (153 lb 12.8 oz), SpO2 92%, not currently breastfeeding.    Constitutional: cooperative, pleasant, not dyspneic/diaphoretic, no acute distress  Eyes: Sclera anicteric/injected  Ears/nose/mouth/throat: Normal oropharynx without ulcers or exudate, mucus membranes moist, hearing intact  Neck: supple, thyroid normal size  CV: No edema  Respiratory: Unlabored  breathing  Lymph: No axillary, submandibular, supraclavicular or inguinal lymphadenopathy  Abd:  Nondistended, +bs, no hepatosplenomegaly, tender +, no peritoneal signs  Skin: warm, perfused, no jaundice  Neuro: AAO x 3, No asterixis  Psych: Normal affect  MSK: No gross deformities      LABS:  BMP  Recent Labs   Lab 03/13/24  0548 03/12/24  1941 03/12/24  0509     --  142   POTASSIUM 4.3  --  3.7   CHLORIDE 106  --  105   GLEN 8.4*  --  8.6*   CO2 27  --  29   BUN 8.4  --  13.3   CR 0.81  --  0.83   GLC 95 85 78     CBC  Recent Labs   Lab 03/13/24  0548 03/12/24  0509   WBC 6.4 7.9   RBC 3.60* 4.17   HGB 10.7* 12.4   HCT 32.9* 38.1   MCV 91 91   MCH 29.7 29.7   MCHC 32.5 32.5   RDW 14.6 14.6    319     INRNo lab results found in last 7 days.  LFTs  Recent Labs   Lab 03/13/24  0548 03/12/24  0509   ALKPHOS 117 127   AST 33 32   ALT 19 19   BILITOTAL 0.4 0.3   PROTTOTAL 5.6* 6.7   ALBUMIN 3.3* 3.8      PANC  Recent Labs   Lab 03/12/24  0509   LIPASE 639*   AMYLASE 220*

## 2024-03-14 NOTE — PROGRESS NOTES
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 Restart IV dilaudid 0.5mg q3h PRN. Continue PO oxycodone.  - Pt drinking well, would like to hold off on IV fluids. Will monitor intake today.  - Restart atorvastatin and ezetimibe with normal LFTs.    Acute, recurrent pancreatitis: In the setting of h/o duodenal polyp s/p Whipple as below. Acute onset abdominal pain, N/V PTA c/w prior pancreatitis. Follows OP with Dr. Noguera, last seen 1/22/24. Lipase 639. LFTs nor,al. CT AP 12 c/w pancreatitis of remaining remnant pancreas, no e/o necrosis, fluid collection, or ductal dilation. Treated with IVF, IV dilaudid in the ED. VSS  - GI consult  - No plans for inpatient ERCP. Dr. Noguera to follow up outpatient.  - ADAT. Tolerated regular diet 3/12 PM, but didn't eat much  - Continue IVF until tolerating PO  - Antiemetics prn  - Pain management: PRN oxycodone. Stopped IV dilaudid 3/13 per pt request. Restarted 3/14 due to worsening pain.     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 CT AP as above and with stable mild intrahepatic biliary ductal dilation  HLD: Restart PTA statin and ezetimibe   Insomnia: Ok to continue PTA Ambien after d/w patient, hold for sedation  Chronic pain: Hold PTA tramadol. Pain management as above  Asthma: Stable. Continue PTA Singulair      Diet:  NPO  DVT Prophylaxis: Enoxaparin  (Lovenox) SQ  Condon Catheter: Not present  Lines: None     Cardiac Monitoring: None  Code Status:  Full Code          Diet: Regular Diet Adult    DVT Prophylaxis: Enoxaparin (Lovenox) SQ  Condon Catheter: Not present  Lines: None     Cardiac Monitoring: None  Code Status: Full Code      Clinically Significant Risk Factors          # Hypocalcemia: Lowest Ca = 8.4 mg/dL in last 2 days, will monitor and replace as appropriate     # Hypoalbuminemia: Lowest albumin = 3.3 g/dL at 3/13/2024  5:48 AM, will monitor as appropriate                       Disposition Plan      Expected Discharge Date: 03/15/2024      Destination: home;home with family  Discharge Comments: Dispo: anticipated home, no new needs  Progress: GI consult; N/V + pain mgmt - PRN; Stop IV Dilaudid  Plan: recurrent acute pancreatitis; procedure TBD as OP; trial PO intake            Laura Hilton MD  Hospitalist Service, GOLD TEAM 7  [facility]  Securely message with [employer] (more info)  Text page via [facility] Paging/Directory   See signed in provider for up to date coverage information  ______________________________________________________________________    Interval History   Worsening pain overnight. Epigastric/central. Up to 8-10/10. Oxycodone is somewhat helpful, but not bringing down to a tolerable level. Did eat dinner last night and did not have increased pain after, but not particularly interested in food this morning. Has continued to drink fluids without issue and feels like she is maintaining adequate hydration.    Physical Exam   Vital Signs: Temp: 98.9  F (37.2  C) Temp src: Oral BP: 107/41 Pulse: 55   Resp: 18 SpO2: 93 % O2 Device: None (Room air)    Weight: 153 lbs 12.8 oz    General Appearance: NAD. Lying comfortably in bed.  Respiratory: CTAB. No increased WOB.  Cardiovascular: RRR. No m/r/g  GI: Soft. Epigastric/RUQ tenderness to palpation. Non-distended.  Skin: No rash.  Other: AOx4. Moving  all extremities. Normal affect.    Medical Decision Making       55 MINUTES SPENT BY ME on the date of service doing chart review, history, exam, documentation & further activities per the note.  MANAGEMENT DISCUSSED with the following over the past 24 hours: GI       Data

## 2024-03-14 NOTE — PLAN OF CARE
Problem: Adult Inpatient Plan of Care  Goal: Plan of Care Review  Flowsheets (Taken 3/14/2024 0918)  Outcome Evaluation: Increased pain today requiring restart of IV pain medication alongside regular dosing of PRN oxycodone. Atarax and benedryl helpful for itchiness associated with narcotic use. PO appetite okay, no c/o nausea. Tenderness reported with palpation. Up ad benito in [place]. Spouse visited this evening.  Plan of Care Reviewed With:   patient   spouse  Overall Patient Progress: no change

## 2024-03-14 NOTE — PLAN OF CARE
Goal Outcome Evaluation:    5/10 abd pain, radiates to back. Oxy given.  Atarax given once this shift.  IV fluids discontinued. PIV removed per pt request. MD ok with no PIV.  RA. Bradycardic in 50s-60s.  Good appetite.  Calls appropriately. Makes needs known.  Independent. Ambulates outside of room 1x this shift.  Voids without difficulty. No BM this shift.    Discharge home tomorrow?

## 2024-03-15 VITALS
WEIGHT: 153.8 LBS | SYSTOLIC BLOOD PRESSURE: 129 MMHG | HEART RATE: 54 BPM | RESPIRATION RATE: 16 BRPM | OXYGEN SATURATION: 97 % | TEMPERATURE: 98.8 F | DIASTOLIC BLOOD PRESSURE: 51 MMHG | BODY MASS INDEX: 24.09 KG/M2

## 2024-03-15 LAB — ELASTASE PANC STL-MCNT: 90.7 UG/G

## 2024-03-15 PROCEDURE — 250N000013 HC RX MED GY IP 250 OP 250 PS 637: Performed by: STUDENT IN AN ORGANIZED HEALTH CARE EDUCATION/TRAINING PROGRAM

## 2024-03-15 PROCEDURE — 99239 HOSP IP/OBS DSCHRG MGMT >30: CPT | Performed by: STUDENT IN AN ORGANIZED HEALTH CARE EDUCATION/TRAINING PROGRAM

## 2024-03-15 PROCEDURE — 250N000013 HC RX MED GY IP 250 OP 250 PS 637: Performed by: PHYSICIAN ASSISTANT

## 2024-03-15 RX ORDER — HYDROXYZINE HYDROCHLORIDE 25 MG/1
25 TABLET, FILM COATED ORAL 3 TIMES DAILY PRN
Qty: 30 TABLET | Refills: 1 | Status: SHIPPED | OUTPATIENT
Start: 2024-03-15

## 2024-03-15 RX ORDER — OXYCODONE HYDROCHLORIDE 5 MG/1
5 TABLET ORAL EVERY 4 HOURS PRN
Qty: 60 TABLET | Refills: 0 | Status: SHIPPED | OUTPATIENT
Start: 2024-03-15 | End: 2024-03-25

## 2024-03-15 RX ADMIN — Medication 1 LOZENGE: at 09:51

## 2024-03-15 RX ADMIN — PANCRELIPASE 4 CAPSULE: 60000; 12000; 38000 CAPSULE, DELAYED RELEASE PELLETS ORAL at 12:23

## 2024-03-15 RX ADMIN — OXYCODONE HYDROCHLORIDE 5 MG: 5 TABLET ORAL at 01:54

## 2024-03-15 RX ADMIN — Medication 1 SPRAY: at 09:51

## 2024-03-15 RX ADMIN — Medication 1 SPRAY: at 12:22

## 2024-03-15 RX ADMIN — OXYCODONE HYDROCHLORIDE 5 MG: 5 TABLET ORAL at 09:50

## 2024-03-15 RX ADMIN — OXYCODONE HYDROCHLORIDE 5 MG: 5 TABLET ORAL at 13:40

## 2024-03-15 RX ADMIN — OXYCODONE HYDROCHLORIDE 5 MG: 5 TABLET ORAL at 05:39

## 2024-03-15 RX ADMIN — HYDROXYZINE HYDROCHLORIDE 25 MG: 25 TABLET, FILM COATED ORAL at 09:50

## 2024-03-15 ASSESSMENT — ACTIVITIES OF DAILY LIVING (ADL)
ADLS_ACUITY_SCORE: 20

## 2024-03-15 NOTE — DISCHARGE SUMMARY
"[facility]  Hospitalist Discharge Summary      Date of Admission:  3/12/2024  Date of Discharge:  3/15/2024  Discharging Provider: Laura Hilton MD  Discharge Service: Hospitalist Service, GOLD TEAM 7    Discharge Diagnoses     Acute on chronic pancreatitis  History of tubulovillous duodenal adenoma s/p pylorus-sparing Whipple    See below for additional secondary diagnoses    Clinically Significant Risk Factors          Follow-ups Needed After Discharge   Follow-up Appointments     Adult [facility] Follow-up and recommended labs and tests      Follow up with Dr. Noguera in clinic in the next few weeks.    Appointments on [place] and/or [facility] (with [facility] or [facility]   provider or service). Call [phone] if you haven't heard regarding   these appointments within 7 days of discharge.            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 Acute onset abdominal pain, N/V PTA c/w prior pancreatitis. Follows OP with Dr. Noguera, last seen 1/22/24. Lipase 639. LFTs nor,al. CT AP  with \"Acute interstitial edematous pancreatitis involving the entire remnant pancreas. No evidence of parenchymal necrosis. No peripancreatic fluid collections or ductal dilation.\" Had initial improvement with IVF and IV dilaudid and transitioned to PO, however had worsening pain 3/14. Improved again 3/15 and tolerating diet on PO pain medications.  - Follow up with Dr. Noguera in clinic. He met with pt in the hospital and " will help arrange follow up and ERCP once acute event has resolved  - Continue oxycodone 5mg q4h PRN (10 day supply at discharge)  - Continue hydroxyzine TID PRN for itching    Pancreatic Insufficiency  Pt noting loose, fatty, foul smelling stools for past several months. Previous fecal elastase normal in 2022. Rechecked and now low at 90 c/w pancreatic insufficiency. Discussed with nutrition and will start on Creon replacement.  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 CT AP as above and with stable mild intrahepatic biliary ductal dilation  HLD: Restart PTA statin and ezetimibe   Insomnia: Ok to continue PTA Ambien after d/w patient, hold for sedation  Chronic pain: Hold PTA tramadol. Pain management as above  Asthma: Stable. Continue PTA Singulair      Diet:  NPO  DVT Prophylaxis: Enoxaparin (Lovenox) SQ  Condon Catheter: Not present  Lines: None     Cardiac Monitoring: None  Code Status:  Full Code    Consultations This Hospital Stay   PHYSICAL THERAPY ADULT IP CONSULT  OCCUPATIONAL THERAPY ADULT IP CONSULT  GI PANCREATICOBILIARY ADULT IP CONSULT  NURSING TO CONSULT FOR VASCULAR ACCESS CARE IP CONSULT  NUTRITION SERVICES ADULT IP CONSULT    Code Status   Full Code    Time Spent on this Encounter   ILaura MD, personally saw the patient today and spent greater than 30 minutes discharging this patient.       Laura Hilton MD  [address] MED SURG  [address] [facility] [address]  Phone: [phone]  ______________________________________________________________________    Physical Exam   Vital Signs: Temp: 98.4  F (36.9  C) Temp src: Oral BP: 107/42 Pulse: 50   Resp: 14 SpO2: 96 % O2 Device: None (Room air)    Weight: 153 lbs 12.8 oz    General Appearance: NAD. Lying comfortably in  bed.  Respiratory: CTAB. No increased WOB.  Cardiovascular: RRR. No m/r/g  GI: Soft. Mild epigastric tenderness. Non-distended.  Skin: No rash.  Other: AOx4. Moving all extremities. Normal affect.       Primary Care Physician   MIQUEL ANDERS    Discharge Orders      Reason for your hospital stay    You were hospitalized with acute on chronic pancreatitis which was treated with IV fluids and pain medication.     Activity    Your activity upon discharge: activity as tolerated and no driving while still taking narcotic pain medications.     Adult [facility] Follow-up and recommended labs and tests    Follow up with Dr. Noguera in clinic in the next few weeks.    Appointments on [place] and/or [facility] (with [facility] or [facility] provider or service). Call [phone] if you haven't heard regarding these appointments within 7 days of discharge.     Diet    Follow this diet upon discharge: Orders Placed This Encounter      Regular Diet Adult       Significant Results and Procedures   Most Recent 3 CBC's:  Recent Labs   Lab Test 03/13/24  0548 03/12/24  0509 03/30/23  1140   WBC 6.4 7.9 7.5   HGB 10.7* 12.4 12.3   MCV 91 91 91    319 332     Most Recent 3 BMP's:  Recent Labs   Lab Test 03/13/24  0548 03/12/24  1941 03/12/24  0509 03/30/23  1140     --  142 139   POTASSIUM 4.3  --  3.7 4.5   CHLORIDE 106  --  105 103   CO2 27  --  29 27   BUN 8.4  --  13.3 14.1   CR 0.81  --  0.83 0.81   ANIONGAP 5*  --  8 9   GLEN 8.4*  --  8.6* 8.8   GLC 95 85 78 109*     Most Recent 2 LFT's:  Recent Labs   Lab Test 03/13/24  0548 03/12/24  0509   AST 33 32   ALT 19 19   ALKPHOS 117 127   BILITOTAL 0.4 0.3   ,   Results for orders placed or performed during the hospital encounter of 03/12/24   CT Abdomen Pelvis w Contrast    Narrative    EXAM: CT ABDOMEN PELVIS W CONTRAST  LOCATION: [facility]  DATE: 3/12/2024    INDICATION: Abdominal pain, vomiting, fever.  COMPARISON: [facility]  09/01/2023. CT abdomen pelvis 11/18/2022.  TECHNIQUE: CT scan of the abdomen and pelvis was performed following injection of IV contrast. Multiplanar reformats were obtained. Dose reduction techniques were used.  CONTRAST: 95ml isovue 370    FINDINGS:     LOWER CHEST: Partially visualized breast implants. No pleural effusions.    HEPATOBILIARY: Noncirrhotic liver morphology. Few tiny hepatic cysts and mild intrahepatic biliary ductal dilation upstream from the hepaticojejunostomy is unchanged. No suspicious liver lesions. Gallbladder is absent.    PANCREAS: Status post Whipple pancreaticoduodenectomy. Acute interstitial edematous pancreatitis involving the entire residual pancreas, with peripancreatic fat stranding and edema extending into the lesser sac and central mesentery. No organized   peripancreatic fluid collections. No areas of parenchymal necrosis. No ductal dilation.    SPLEEN: Normal.    ADRENAL GLANDS: Normal.    KIDNEYS/BLADDER: Tiny left renal cyst, which does not require follow-up. No urinary calculi or hydronephrosis.    BOWEL: Patent gastrojejunostomy. No bowel obstruction or inflammation. Appendix is absent.    LYMPH NODES: No enlarged lymph nodes.    VASCULATURE: Patent portal, splenic, and superior mesenteric veins. Moderate aortobiiliac atherosclerosis. No abdominal aortic aneurysm.    PELVIC ORGANS: Absent uterus.    MUSCULOSKELETAL: Mild lumbar levoscoliosis. Multilevel degenerative changes of the spine. Left hip arthroplasty. Unchanged right iliac bone island. No destructive bone lesions.      Impression    IMPRESSION:     1.  Acute interstitial edematous pancreatitis involving the entire remnant pancreas. No evidence of parenchymal necrosis. No peripancreatic fluid collections or ductal dilation.    2.  Mild intrahepatic biliary ductal dilation upstream from the hepaticojejunostomy has not significantly changed since 09/01/2023.       Discharge Medications   Current Discharge Medication  List        START taking these medications    Details   hydrOXYzine HCl (ATARAX) 25 MG tablet Take 1 tablet (25 mg) by mouth 3 times daily as needed for itching  Qty: 30 tablet, Refills: 1    Associated Diagnoses: Acute pancreatitis, unspecified complication status, unspecified pancreatitis type      lipase-protease-amylase (CREON 12) 42283-90176-62895 units CPEP Take 4 capsules by mouth 3 times daily (with meals) for 90 days  Qty: 360 capsule, Refills: 2    Associated Diagnoses: Pancreatic insufficiency      oxyCODONE (ROXICODONE) 5 MG tablet Take 1 tablet (5 mg) by mouth every 4 hours as needed for moderate pain  Qty: 60 tablet, Refills: 0    Associated Diagnoses: Acute pancreatitis, unspecified complication status, unspecified pancreatitis type           CONTINUE these medications which have NOT CHANGED    Details   atorvastatin (LIPITOR) 40 MG tablet Take 40 mg by mouth At Bedtime       cholecalciferol (VITAMIN D3) 25 mcg (1000 units) capsule Take 1 capsule by mouth daily      estradiol (VIVELLE-DOT) 0.075 MG/24HR BIW patch Place 1 patch onto the skin twice a week      evolocumab (REPATHA) 140 MG/ML prefilled autoinjector Inject 140 mg Subcutaneous every 14 days      ezetimibe (ZETIA) 10 MG tablet Take 10 mg by mouth At Bedtime      Magnesium Ascorbate POWD One teaspoon at bedtime daily by mouth      montelukast (SINGULAIR) 10 MG tablet Take 10 mg by mouth at bedtime      valACYclovir (VALTREX) 1000 mg tablet Take 1,000 mg by mouth daily as needed (cold sore)      vitamin A 3 MG (31265 UNITS) capsule Take 1 capsule by mouth daily      zolpidem (AMBIEN) 10 MG tablet Take 1 tablet by mouth nightly as needed for sleep           Allergies   No Known Allergies

## 2024-03-15 NOTE — PROGRESS NOTES
Nutrition Brief - New pancreatic insufficiency (fecal elastase 90). Would appreciate assistance with starting dose for Creon.     Chart reviewed:  Diet: regular   Stool: 1 BM on 3/13      Recommendation:   - Calculating lower enzyme dosing based on 65.3 kg actual wt    - Recommend Creon 02435, 4 capsule with meals TID   -> (Provides 735 units lipase/kg/meal, which is within recommended guidelines (500-2500 units lipase/kg/meal).       Nichelle Simpson RD/LD  Unit [facility] Dietitian ([phone]) and ([phone])   Available on [employer] - can search by name or unit Dietitian  **Clinical Nutrition is no longer available via pager'

## 2024-03-15 NOTE — PLAN OF CARE
Goal Outcome Evaluation:      Plan of Care Reviewed With: patient    Overall Patient Progress: no changeOverall Patient Progress: no change    Outcome Evaluation: Pt A&Ox4 with VSS. pain managed with prn po oxycodone. tolerating po intake. Pt ambulated 3x on shift. Pt showered before bed, no BM on shift but passing gas. Plan is possible discharge this AM.

## 2024-03-15 NOTE — PROGRESS NOTES
Pt discharged to: Home.   Via: Care.   Time:1530.  Reason not before 11am or 2 hrs after order written: Patient is waiting her  to pick her up after he gets off from work.   Accompanied by: .   Belongings: With patient.   Teaching: Done by RN and patient verbalized understanding of the teaching.  Cap and line flushed with caregiver: N/A.   Central line teach back questions complete: N/A.   Pill box filled: N/A.   Clinic appointment: May 1.2024 at 11:15 am.   Report called/faxed: N/A.   Local housing: [place].

## 2024-03-15 NOTE — PLAN OF CARE
"  /42 (BP Location: Right arm, Patient Position: Supine)   Pulse 50   Temp 98.4  F (36.9  C) (Oral)   Resp 14   Wt 69.8 kg (153 lb 12.8 oz)   SpO2 96%   BMI 24.09 kg/m  . PIV is removed. Patient continue to c/o abdominal pain, cramping and received oxycodone 5 mg po x 1, atarax 25 mg po x 1 with some relief. Patient has good urine output, clear and bowel movement x 2, loose, brown. Patient is eating and drinking fair. Discharge instructions was explained to the patient and she verbalized understanding of the discharge teachings. Continue with plan of care and notify MD for status changes.     Problem: Adult Inpatient Plan of Care  Goal: Plan of Care Review  Description: The Plan of Care Review/Shift note should be completed every shift.  The Outcome Evaluation is a brief statement about your assessment that the patient is improving, declining, or no change.  This information will be displayed automatically on your shift  note.  Outcome: Met  Flowsheets (Taken 3/15/2024 1251)  Plan of Care Reviewed With: patient  Overall Patient Progress: improving     Problem: Adult Inpatient Plan of Care  Goal: Patient-Specific Goal (Individualized)  Description: You can add care plan individualizations to a care plan. Examples of Individualization might be:  \"Parent requests to be called daily at 9am for status\", \"I have a hard time hearing out of my right ear\", or \"Do not touch me to wake me up as it startles  me\".  Outcome: Met     Problem: Adult Inpatient Plan of Care  Goal: Absence of Hospital-Acquired Illness or Injury  Outcome: Met     Problem: Adult Inpatient Plan of Care  Goal: Absence of Hospital-Acquired Illness or Injury  Intervention: Identify and Manage Fall Risk  Recent Flowsheet Documentation  Taken 3/15/2024 0971 by Debra Michel RN  Safety Promotion/Fall Prevention:   clutter free environment maintained   increased rounding and observation   increase visualization of patient   lighting " adjusted     Problem: Adult Inpatient Plan of Care  Goal: Absence of Hospital-Acquired Illness or Injury  Intervention: Prevent Skin Injury  Recent Flowsheet Documentation  Taken 3/15/2024 0950 by Debra Michel RN  Body Position: position changed independently  Skin Protection: adhesive use limited  Device Skin Pressure Protection: adhesive use limited     Problem: Adult Inpatient Plan of Care  Goal: Absence of Hospital-Acquired Illness or Injury  Intervention: Prevent Infection  Recent Flowsheet Documentation  Taken 3/15/2024 0950 by Debra Michel RN  Infection Prevention:   cohorting utilized   hand hygiene promoted     Problem: Adult Inpatient Plan of Care  Goal: Optimal Comfort and Wellbeing  Intervention: Monitor Pain and Promote Comfort  Recent Flowsheet Documentation  Taken 3/15/2024 1221 by Debra Michel RN  Pain Management Interventions:   medication (see MAR)   MD notified (comment)  Taken 3/15/2024 0950 by Debra Michel RN  Pain Management Interventions:   medication (see MAR)   MD notified (comment)   Provider notified (comment)     Problem: Pain Acute  Goal: Optimal Pain Control and Function  Intervention: Develop Pain Management Plan  Recent Flowsheet Documentation  Taken 3/15/2024 1221 by Debra Michel RN  Pain Management Interventions:   medication (see MAR)   MD notified (comment)  Taken 3/15/2024 0950 by Debra Michel RN  Pain Management Interventions:   medication (see MAR)   MD notified (comment)   Provider notified (comment)     Problem: Pain Acute  Goal: Optimal Pain Control and Function  Intervention: Prevent or Manage Pain  Recent Flowsheet Documentation  Taken 3/15/2024 0950 by Debra Michel RN  Sleep/Rest Enhancement: relaxation techniques promoted  Bowel Elimination Promotion: ambulation promoted  Medication Review/Management: medications reviewed     Problem: Pain Acute  Goal: Optimal Pain Control and  Function  Intervention: Optimize Psychosocial Wellbeing  Recent Flowsheet Documentation  Taken 3/15/2024 0950 by Debra Michel, DAREN  Spiritual Activities Assistance: other (see comments)  Supportive Measures:   active listening utilized   relaxation techniques promoted      Goal Outcome Evaluation:      Plan of Care Reviewed With: patient    Overall Patient Progress: improvingOverall Patient Progress: improving

## 2024-04-09 ENCOUNTER — ANCILLARY PROCEDURE (OUTPATIENT)
Dept: MAMMOGRAPHY | Facility: CLINIC | Age: 74
End: 2024-04-09
Attending: INTERNAL MEDICINE
Payer: COMMERCIAL

## 2024-04-09 DIAGNOSIS — Z12.31 BREAST CANCER SCREENING BY MAMMOGRAM: ICD-10-CM

## 2024-04-09 PROCEDURE — 77067 SCR MAMMO BI INCL CAD: CPT | Mod: TC | Performed by: RADIOLOGY

## 2024-04-09 PROCEDURE — 77063 BREAST TOMOSYNTHESIS BI: CPT | Mod: TC | Performed by: RADIOLOGY

## 2024-05-01 ENCOUNTER — OFFICE VISIT (OUTPATIENT)
Dept: GASTROENTEROLOGY | Facility: CLINIC | Age: 74
End: 2024-05-01
Payer: COMMERCIAL

## 2024-05-01 ENCOUNTER — HOSPITAL ENCOUNTER (OUTPATIENT)
Facility: CLINIC | Age: 74
End: 2024-05-01
Attending: INTERNAL MEDICINE | Admitting: INTERNAL MEDICINE
Payer: COMMERCIAL

## 2024-05-01 ENCOUNTER — CARE COORDINATION (OUTPATIENT)
Dept: GASTROENTEROLOGY | Facility: CLINIC | Age: 74
End: 2024-05-01

## 2024-05-01 ENCOUNTER — PREP FOR PROCEDURE (OUTPATIENT)
Dept: GASTROENTEROLOGY | Facility: CLINIC | Age: 74
End: 2024-05-01

## 2024-05-01 VITALS
WEIGHT: 154.9 LBS | DIASTOLIC BLOOD PRESSURE: 70 MMHG | HEART RATE: 57 BPM | HEIGHT: 68 IN | BODY MASS INDEX: 23.48 KG/M2 | SYSTOLIC BLOOD PRESSURE: 115 MMHG | OXYGEN SATURATION: 97 %

## 2024-05-01 DIAGNOSIS — K86.1 ACUTE ON CHRONIC PANCREATITIS (H): ICD-10-CM

## 2024-05-01 DIAGNOSIS — K85.90 ACUTE ON CHRONIC PANCREATITIS (H): ICD-10-CM

## 2024-05-01 DIAGNOSIS — K86.1 CHRONIC PANCREATITIS (H): Primary | ICD-10-CM

## 2024-05-01 DIAGNOSIS — Z90.410 HISTORY OF WHIPPLE PROCEDURE: ICD-10-CM

## 2024-05-01 DIAGNOSIS — Z90.49 HISTORY OF WHIPPLE PROCEDURE: Primary | ICD-10-CM

## 2024-05-01 DIAGNOSIS — Z90.410 HISTORY OF WHIPPLE PROCEDURE: Primary | ICD-10-CM

## 2024-05-01 DIAGNOSIS — Z90.49 HISTORY OF WHIPPLE PROCEDURE: ICD-10-CM

## 2024-05-01 PROCEDURE — 99215 OFFICE O/P EST HI 40 MIN: CPT | Performed by: INTERNAL MEDICINE

## 2024-05-01 ASSESSMENT — PAIN SCALES - GENERAL: PAINLEVEL: NO PAIN (0)

## 2024-05-01 NOTE — LETTER
5/1/2024         RE: Laura Gonzalez  [address]  [place] [address]        Dear Colleague,    Thank you for referring your patient, Laura Gonzalez, to the [facility] [place]. Please see a copy of my visit note below.    Laura is very well-known to me with recurrent acute and chronic pancreatitis post Whipple resection at [place] 5 years ago.  Since her last visit she was admitted 6 weeks ago to the [place] with severe pain and obvious interstitial pancreatitis with 10 times lipase elevation and CT showing intact pancreatic parenchyma but a very noticeable rim of interstitial pancreatitis around the entire pancreas.  We spent 40 minutes in person reviewing her entire history of interventions responses and what might be going on.  She now has 2 out of 10 pain roughly in the morning which is right upper quadrant to midline and by evening particularly at bedtime is worse sometimes wakes her up out of sleep.  It is somewhat meal related.  She is on 12,000 lipase 4 with each meal with unclear effect on the pain she does have a low fecal elastase in the 90s.  The last flare was obviously distressing.  We talked about the fact that she seems to have temporarily responded pretty well to each stenting procedure for her stenotic pancreaticojejunostomy.  The problem has been that no stent stays in place for a long and 1 migrated out of the duct into the bile duct actually.  After much discussion we agreed that it would be worth a repeat ERCP but this time to place a large caliber firmly anchored short stent to see if that we will stay and give her some symptom relief we we spent some time going over various alternative strategies the other thing that we should try is increasing her Creon to 72,000 units with each meal for a brief time and if that does not work switch her to Viokase.  Her  was on speaker phone for all of the part of discussion regarding  interventions and therapies    Plan #1 schedule ERCP at the end of May with me 1 hour scope time  Plan #2 increase Creon to 72,000 units briefly and if no response switch to Viokase 20,000 units 3-4 with each meal 1-2 with snacks and PPI omeprazole 20 mg daily    Past Medical, Surgical, Family, and Social Histories:    Past Medical History:   Diagnosis Date    Asthma     pt.states no longer has symptoms    Atherosclerosis     CAD (coronary artery disease)     HLD (hyperlipidemia)        Past Surgical History:   Procedure Laterality Date    APPENDECTOMY      ARTHROPLASTY HIP Left 6/15/2016    Procedure: ARTHROPLASTY HIP;  Surgeon: Jeffy Wolff MD;  Location: [facility] OR    COLONOSCOPY  10/3/2013    Procedure: COMBINED COLONOSCOPY, SINGLE BIOPSY/POLYPECTOMY BY BIOPSY;;  Surgeon: Kenney Walls MD;  Location:  GI    ENDOSCOPIC RETROGRADE CHOLANGIOPANCREATOGRAM N/A 6/16/2022    Procedure: ENDOSCOPIC RETROGRADE CHOLANGIOPANCREATOGRAPHY WITH PANCREATIC DUCT DILATION, DEBRIS REMOVAL AND STENT PLACEMENT;  Surgeon: Arnaldo Noguera MD;  Location: [facility] OR    ENDOSCOPIC RETROGRADE CHOLANGIOPANCREATOGRAM N/A 10/13/2022    Procedure: ENDOSCOPIC RETROGRADE CHOLANGIOPANCREATOGRAPHY, biliary stent placement;  Surgeon: Arnaldo Noguera MD;  Location: [facility] OR    ENDOSCOPIC RETROGRADE CHOLANGIOPANCREATOGRAM N/A 11/21/2022    Procedure: ENDOSCOPIC RETROGRADE CHOLANGIOPANCREATOGRAPHY, WITH TUBE OR STENT INSERTION, with balloon dialation;  Surgeon: Johnson Gotti MD;  Location: [facility] OR    ENDOSCOPIC RETROGRADE CHOLANGIOPANCREATOGRAM N/A 3/30/2023    Procedure: ENDOSCOPIC RETROGRADE CHOLANGIOPANCREATOGRAPHY with bile duct stents removed x2 and replacement of one pancreatic stent;  Surgeon: Arnaldo Noguera MD;  [facility]: [facility] OR    ENDOSCOPIC RETROGRADE CHOLANGIOPANCREATOGRAPHY, EXCHANGE TUBE/STENT N/A 1/24/2023    Procedure: enteroscopy assisted ENDOSCOPIC RETROGRADE CHOLANGIOPANCREATOGRAPHY, with pancreatic stents  replaced times 2;  Surgeon: Arnaldo Noguera MD;  Location: [facility] OR    ENDOSCOPIC ULTRASOUND UPPER GASTROINTESTINAL TRACT (GI) N/A 2022    Procedure: ENDOSCOPIC ULTRASOUND WITH PANCREATICOGASTROSTOMY CREATION, TRACT DILATION, STENT PLACEMENT;  Surgeon: Romaine Oates MD;  [facility]: [facility] OR    ESOPHAGOSCOPY, GASTROSCOPY, DUODENOSCOPY (EGD), COMBINED N/A 2022    Procedure: ESOPHAGOGASTRODUODENOSCOPY WITH ENDOSCOPIC CLIP PLACEMENT;  Surgeon: Arnaldo Noguera MD;  Location: [facility] OR    FOOT SURGERY      HC TOOTH EXTRACTION W/FORCEP      HYSTERECTOMY      INCISION AND DRAINAGE BREAST Left 2022    Procedure: evacuate hematoma left  BREAST;  Surgeon: Dayron Garcia MD;  Location: [facility] OR    IR FOLLOW UP VISIT OUTPATIENT  2022    IR SINOGRAM INJECTION DIAGNOSTIC  2022       No family history on file.    Social History     Tobacco Use    Smoking status: Former     Current packs/day: 0.00     Types: Cigarettes     Quit date: 10/3/1988     Years since quittin.6     Passive exposure: Never    Smokeless tobacco: Never   Substance Use Topics    Alcohol use: No         Again, thank you for allowing me to participate in the care of your patient.      Sincerely,    Arnaldo Noguera MD

## 2024-05-01 NOTE — PROGRESS NOTES
Per Dr. Noguera  Please assist in scheduling:     Procedure/Imaging/Clinic: ERCP  Physician: Chuckie  [id number]   Scope time needed:tbd  Anesthesia:General  Dx: chronic pancreatitis  Tier:Tier 3 -   Surgeries/procedures that can be delayed  between 30 to 90 days with no significant  morbidity/mortality to patient or impact on  patient/disease outcome.   Location: [facility]  Header of letter for pt communication: Endoscopic Retrograde Cholangiopancreatography (ERCP)     Pt will see PCP for preop    ML

## 2024-05-01 NOTE — PROGRESS NOTES
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  Since her last visit she was admitted 6 weeks ago to the [facility] with severe pain and obvious interstitial pancreatitis with 10 times lipase elevation and CT showing intact pancreatic parenchyma but a very noticeable rim of interstitial pancreatitis around the entire pancreas.  We spent 40 minutes in person reviewing her entire history of interventions responses and what might be going on.  She now has 2 out of 10 pain roughly in the morning which is right upper quadrant to midline and by evening particularly at bedtime is worse sometimes wakes her up out of sleep.  It is somewhat meal related.  She is on 12,000 lipase 4 with each meal with unclear effect on the pain she does have a low fecal elastase in the 90s.  The last flare was obviously distressing.  We talked about the fact that she seems to have temporarily responded pretty well to each stenting procedure for her stenotic pancreaticojejunostomy.  The problem has been that no stent stays in place for a long and 1 migrated out of the duct into the bile duct actually.  After much discussion we agreed that it would be worth a repeat ERCP but this time to place a large caliber firmly anchored short stent to see if that we will stay and give her some symptom relief we we spent some time going over various alternative strategies the other thing that we should try is increasing her Creon to 72,000 units with each meal for a brief time and if that does not work switch her to Viokase.  Her  was on speaker phone for all of the part of discussion regarding interventions and therapies    Plan #1 schedule ERCP at the end of May with me 1 hour scope time  Plan #2 increase Creon to 72,000 units briefly and if no response switch to Viokase 20,000 units 3-4 with each meal 1-2 with snacks and PPI omeprazole 20 mg daily    Past Medical,  Surgical, Family, and Social Histories:    Past Medical History:   Diagnosis Date    Asthma     pt.states no longer has symptoms    Atherosclerosis     CAD (coronary artery disease)     HLD (hyperlipidemia)        Past Surgical History:   Procedure Laterality Date    APPENDECTOMY      ARTHROPLASTY HIP Left 6/15/2016    Procedure: ARTHROPLASTY HIP;  Surgeon: Jeffy Wolff MD;  [facility]    COLONOSCOPY  10/3/2013    Procedure: COMBINED COLONOSCOPY, SINGLE BIOPSY/POLYPECTOMY BY BIOPSY;;  Surgeon: Kenney Walls MD;  Location: [facility]    ENDOSCOPIC RETROGRADE CHOLANGIOPANCREATOGRAM N/A 6/16/2022    Procedure: ENDOSCOPIC RETROGRADE CHOLANGIOPANCREATOGRAPHY WITH PANCREATIC DUCT DILATION, DEBRIS REMOVAL AND STENT PLACEMENT;  Surgeon: Arnaldo Noguera MD;  [facility]    ENDOSCOPIC RETROGRADE CHOLANGIOPANCREATOGRAM N/A 10/13/2022    Procedure: ENDOSCOPIC RETROGRADE CHOLANGIOPANCREATOGRAPHY, biliary stent placement;  Surgeon: Arnaldo Noguera MD;  [facility]    ENDOSCOPIC RETROGRADE CHOLANGIOPANCREATOGRAM N/A 11/21/2022    Procedure: ENDOSCOPIC RETROGRADE CHOLANGIOPANCREATOGRAPHY, WITH TUBE OR STENT INSERTION, with balloon dialation;  Surgeon: Johnson Gotti MD;  [facility]    ENDOSCOPIC RETROGRADE CHOLANGIOPANCREATOGRAM N/A 3/30/2023    Procedure: ENDOSCOPIC RETROGRADE CHOLANGIOPANCREATOGRAPHY with bile duct stents removed x2 and replacement of one pancreatic stent;  Surgeon: Arnaldo Noguera MD;  [facility]    ENDOSCOPIC RETROGRADE CHOLANGIOPANCREATOGRAPHY, EXCHANGE TUBE/STENT N/A 1/24/2023    Procedure: enteroscopy assisted ENDOSCOPIC RETROGRADE CHOLANGIOPANCREATOGRAPHY, with pancreatic stents replaced times 2;  Surgeon: Arnaldo Noguera MD;  [facility]    ENDOSCOPIC ULTRASOUND UPPER GASTROINTESTINAL TRACT (GI) N/A 5/12/2022    Procedure: ENDOSCOPIC ULTRASOUND WITH PANCREATICOGASTROSTOMY CREATION, TRACT DILATION, STENT PLACEMENT;  Surgeon: Romaine Oates,  MD;  [facility]    ESOPHAGOSCOPY, GASTROSCOPY, DUODENOSCOPY (EGD), COMBINED N/A 2022    Procedure: ESOPHAGOGASTRODUODENOSCOPY WITH ENDOSCOPIC CLIP PLACEMENT;  Surgeon: Arnaldo Noguera MD;  [facility]    FOOT SURGERY      HC TOOTH EXTRACTION W/FORCEP      HYSTERECTOMY      INCISION AND DRAINAGE BREAST Left 2022    Procedure: evacuate hematoma left  BREAST;  Surgeon: Dayron Garcia MD;  Location: [facility]    IR FOLLOW UP VISIT OUTPATIENT  2022    IR SINOGRAM INJECTION DIAGNOSTIC  2022       No family history on file.    Social History     Tobacco Use    Smoking status: Former     Current packs/day: 0.00     Types: Cigarettes     Quit date: 10/3/1988     Years since quittin.6     Passive exposure: Never    Smokeless tobacco: Never   Substance Use Topics    Alcohol use: No

## 2024-05-01 NOTE — NURSING NOTE
"Chief Complaint   Patient presents with    Follow Up       Vitals:    05/01/24 1126   BP: 115/70   Pulse: 57   SpO2: 97%   Weight: 70.3 kg (154 lb 14.4 oz)   Height: 1.727 m (5' 8\")       Body mass index is 23.55 kg/m .    [employer]    "

## 2024-05-02 DIAGNOSIS — Z90.49 HISTORY OF WHIPPLE PROCEDURE: Primary | ICD-10-CM

## 2024-05-02 DIAGNOSIS — K85.90 ACUTE ON CHRONIC PANCREATITIS (H): ICD-10-CM

## 2024-05-02 DIAGNOSIS — K86.1 ACUTE ON CHRONIC PANCREATITIS (H): ICD-10-CM

## 2024-05-02 DIAGNOSIS — Z90.410 HISTORY OF WHIPPLE PROCEDURE: Primary | ICD-10-CM

## 2024-05-02 RX ORDER — PANCRELIPASE 20880; 78300; 78300 [USP'U]/1; [USP'U]/1; [USP'U]/1
TABLET ORAL
Qty: 450 TABLET | Refills: 0 | OUTPATIENT
Start: 2024-05-02

## 2024-05-21 ENCOUNTER — PATIENT OUTREACH (OUTPATIENT)
Dept: GASTROENTEROLOGY | Facility: CLINIC | Age: 74
End: 2024-05-21
Payer: COMMERCIAL

## 2024-05-22 NOTE — TELEPHONE ENCOUNTER
Patient called back in, says that she wants to cancel procedure 6/5, will call us back when she wants to schedule.  Message sent to scheduling    ML

## 2024-06-02 ENCOUNTER — HEALTH MAINTENANCE LETTER (OUTPATIENT)
Age: 74
End: 2024-06-02

## 2024-08-07 ENCOUNTER — PATIENT OUTREACH (OUTPATIENT)
Dept: GASTROENTEROLOGY | Facility: CLINIC | Age: 74
End: 2024-08-07
Payer: COMMERCIAL

## 2024-08-07 DIAGNOSIS — K86.1 ACUTE ON CHRONIC PANCREATITIS (H): ICD-10-CM

## 2024-08-07 DIAGNOSIS — Z90.410 HISTORY OF WHIPPLE PROCEDURE: Primary | ICD-10-CM

## 2024-08-07 DIAGNOSIS — K85.90 ACUTE ON CHRONIC PANCREATITIS (H): ICD-10-CM

## 2024-08-07 DIAGNOSIS — Z90.49 HISTORY OF WHIPPLE PROCEDURE: Primary | ICD-10-CM

## 2024-08-07 NOTE — TELEPHONE ENCOUNTER
Patient called in, having symptoms, had PCP run labs:    Specimen: Blood - Blood specimen (specimen)  Component  Ref Range & Units Today   Amylase  30 - 110 U/L 392 High      Component  Ref Range & Units Today   Lipase  23 - 300 U/L 3,292 High      Per Dr. Noguera   The anastamosis is like a rubber band that we keep dilating and pinching down. MRCP at our facility, plus minus secretin depending on availabiltiy , then clinic follow-up. And discuss ERCP, whether worth it.     STAT order placed, reviewed plan with patient, transferred to her scheduling    ML

## 2024-08-08 ENCOUNTER — ANCILLARY PROCEDURE (OUTPATIENT)
Dept: MRI IMAGING | Facility: CLINIC | Age: 74
End: 2024-08-08
Attending: INTERNAL MEDICINE
Payer: COMMERCIAL

## 2024-08-08 DIAGNOSIS — Z90.410 HISTORY OF WHIPPLE PROCEDURE: ICD-10-CM

## 2024-08-08 DIAGNOSIS — K85.90 ACUTE ON CHRONIC PANCREATITIS (H): ICD-10-CM

## 2024-08-08 DIAGNOSIS — K86.1 ACUTE ON CHRONIC PANCREATITIS (H): ICD-10-CM

## 2024-08-08 DIAGNOSIS — Z90.49 HISTORY OF WHIPPLE PROCEDURE: ICD-10-CM

## 2024-08-08 DIAGNOSIS — F41.9 ANXIETY: Primary | ICD-10-CM

## 2024-08-08 PROCEDURE — A9585 GADOBUTROL INJECTION: HCPCS | Performed by: STUDENT IN AN ORGANIZED HEALTH CARE EDUCATION/TRAINING PROGRAM

## 2024-08-08 PROCEDURE — 74183 MRI ABD W/O CNTR FLWD CNTR: CPT | Mod: GC | Performed by: STUDENT IN AN ORGANIZED HEALTH CARE EDUCATION/TRAINING PROGRAM

## 2024-08-08 RX ORDER — GADOBUTROL 604.72 MG/ML
7.5 INJECTION INTRAVENOUS ONCE
Status: COMPLETED | OUTPATIENT
Start: 2024-08-08 | End: 2024-08-08

## 2024-08-08 RX ORDER — DIAZEPAM 5 MG
TABLET ORAL
Status: SHIPPED
Start: 2024-08-08 | End: 2024-08-08

## 2024-08-08 RX ADMIN — GADOBUTROL 7 ML: 604.72 INJECTION INTRAVENOUS at 11:39

## 2024-08-09 RX ORDER — DIAZEPAM 5 MG
TABLET ORAL
Qty: 2 TABLET | Refills: 0 | Status: SHIPPED | OUTPATIENT
Start: 2024-08-09

## 2024-08-12 ENCOUNTER — PATIENT OUTREACH (OUTPATIENT)
Dept: GASTROENTEROLOGY | Facility: CLINIC | Age: 74
End: 2024-08-12
Payer: COMMERCIAL

## 2024-08-12 NOTE — TELEPHONE ENCOUNTER
Patient called in, still having pain, having diarrhea. Low grade temp 99.9, reports drinking water, denied being dehydrated, declined fluid infusion, really wants to know if Dr. Noguera recommending CT as follow up.     Message sent to Dr. Noguera      ML

## 2024-08-12 NOTE — TELEPHONE ENCOUNTER
Per Dr. Noguera  Unremarkable MRCP. Its clinical. Discuss ERCP in clinc     Discussed plan with patient, she's ok with heading right to procedure. Discussed procedure on 8-20, message sent to Dr. Noguera    ML

## 2024-08-13 ENCOUNTER — PREP FOR PROCEDURE (OUTPATIENT)
Dept: GASTROENTEROLOGY | Facility: CLINIC | Age: 74
End: 2024-08-13
Payer: COMMERCIAL

## 2024-08-13 DIAGNOSIS — K85.90 ACUTE RECURRENT PANCREATITIS: Primary | ICD-10-CM

## 2024-08-13 NOTE — TELEPHONE ENCOUNTER
Called patient to update on plan for ERCP 8-20 with Dr. Noguera  She knows the plan.    Dr. Noguera can update his last OVN from 5-1-24 DOS as H&P    ML

## 2024-08-13 NOTE — TELEPHONE ENCOUNTER
Per Dr. Noguera  Please assist in scheduling:     Procedure/Imaging/Clinic: Endoscopic Retrograde Cholangiopancreatography (ERCP)   Physician: Chuckie  Timin/20  Scope time needed:tbd  Anesthesia:General  Dx: recurrent acute pancreatitis  Tier:Tier 3 -   Surgeries/procedures that can be delayed  between 30 to 90 days with no significant  morbidity/mortality to patient or impact on  patient/disease outcome.   Location: [facility]  Header of letter for pt communication: Endoscopic Retrograde Cholangiopancreatography (ERCP)      Orders placed    ML

## 2024-08-20 ENCOUNTER — ANESTHESIA EVENT (OUTPATIENT)
Dept: SURGERY | Facility: CLINIC | Age: 74
End: 2024-08-20
Payer: COMMERCIAL

## 2024-08-20 ENCOUNTER — HOSPITAL ENCOUNTER (OUTPATIENT)
Facility: CLINIC | Age: 74
Discharge: HOME | End: 2024-08-20
Attending: INTERNAL MEDICINE | Admitting: INTERNAL MEDICINE
Payer: COMMERCIAL

## 2024-08-20 ENCOUNTER — ANESTHESIA (OUTPATIENT)
Dept: SURGERY | Facility: CLINIC | Age: 74
End: 2024-08-20
Payer: COMMERCIAL

## 2024-08-20 ENCOUNTER — APPOINTMENT (OUTPATIENT)
Dept: GENERAL RADIOLOGY | Facility: CLINIC | Age: 74
End: 2024-08-20
Attending: INTERNAL MEDICINE
Payer: COMMERCIAL

## 2024-08-20 ENCOUNTER — PATIENT OUTREACH (OUTPATIENT)
Dept: GASTROENTEROLOGY | Facility: CLINIC | Age: 74
End: 2024-08-20
Payer: COMMERCIAL

## 2024-08-20 VITALS
SYSTOLIC BLOOD PRESSURE: 135 MMHG | TEMPERATURE: 98.2 F | WEIGHT: 155.42 LBS | RESPIRATION RATE: 16 BRPM | OXYGEN SATURATION: 98 % | BODY MASS INDEX: 23.56 KG/M2 | HEART RATE: 89 BPM | HEIGHT: 68 IN | DIASTOLIC BLOOD PRESSURE: 71 MMHG

## 2024-08-20 DIAGNOSIS — R10.13 ABDOMINAL PAIN, EPIGASTRIC: Primary | ICD-10-CM

## 2024-08-20 LAB
ALBUMIN SERPL BCG-MCNC: 3.7 G/DL (ref 3.5–5.2)
ALP SERPL-CCNC: 148 U/L (ref 40–150)
ALT SERPL W P-5'-P-CCNC: 14 U/L (ref 0–50)
AMYLASE SERPL-CCNC: 65 U/L (ref 28–100)
ANION GAP SERPL CALCULATED.3IONS-SCNC: 10 MMOL/L (ref 7–15)
AST SERPL W P-5'-P-CCNC: 29 U/L (ref 0–45)
BILIRUB SERPL-MCNC: 0.4 MG/DL
BUN SERPL-MCNC: 10.9 MG/DL (ref 8–23)
CALCIUM SERPL-MCNC: 8.4 MG/DL (ref 8.8–10.4)
CHLORIDE SERPL-SCNC: 104 MMOL/L (ref 98–107)
CREAT SERPL-MCNC: 0.77 MG/DL (ref 0.51–0.95)
EGFRCR SERPLBLD CKD-EPI 2021: 80 ML/MIN/1.73M2
ERCP: NORMAL
ERYTHROCYTE [DISTWIDTH] IN BLOOD BY AUTOMATED COUNT: 15 % (ref 10–15)
GLUCOSE SERPL-MCNC: 114 MG/DL (ref 70–99)
HCO3 SERPL-SCNC: 25 MMOL/L (ref 22–29)
HCT VFR BLD AUTO: 35.7 % (ref 35–47)
HGB BLD-MCNC: 11.4 G/DL (ref 11.7–15.7)
INR PPP: 1.04 (ref 0.85–1.15)
LIPASE SERPL-CCNC: 18 U/L (ref 13–60)
MCH RBC QN AUTO: 28.4 PG (ref 26.5–33)
MCHC RBC AUTO-ENTMCNC: 31.9 G/DL (ref 31.5–36.5)
MCV RBC AUTO: 89 FL (ref 78–100)
PLATELET # BLD AUTO: 346 10E3/UL (ref 150–450)
POTASSIUM SERPL-SCNC: 4.2 MMOL/L (ref 3.4–5.3)
PROT SERPL-MCNC: 6.9 G/DL (ref 6.4–8.3)
RBC # BLD AUTO: 4.01 10E6/UL (ref 3.8–5.2)
SODIUM SERPL-SCNC: 139 MMOL/L (ref 135–145)
WBC # BLD AUTO: 7.4 10E3/UL (ref 4–11)

## 2024-08-20 PROCEDURE — 250N000011 HC RX IP 250 OP 636: Performed by: NURSE ANESTHETIST, CERTIFIED REGISTERED

## 2024-08-20 PROCEDURE — 43260 ERCP W/SPECIMEN COLLECTION: CPT | Performed by: ANESTHESIOLOGY

## 2024-08-20 PROCEDURE — 710N000010 HC RECOVERY PHASE 1, LEVEL 2, PER MIN: Performed by: INTERNAL MEDICINE

## 2024-08-20 PROCEDURE — 43260 ERCP W/SPECIMEN COLLECTION: CPT | Performed by: NURSE ANESTHETIST, CERTIFIED REGISTERED

## 2024-08-20 PROCEDURE — 250N000009 HC RX 250: Performed by: ANESTHESIOLOGY

## 2024-08-20 PROCEDURE — 360N000083 HC SURGERY LEVEL 3 W/ FLUORO, PER MIN: Performed by: INTERNAL MEDICINE

## 2024-08-20 PROCEDURE — 82247 BILIRUBIN TOTAL: CPT | Performed by: INTERNAL MEDICINE

## 2024-08-20 PROCEDURE — 250N000013 HC RX MED GY IP 250 OP 250 PS 637

## 2024-08-20 PROCEDURE — 36415 COLL VENOUS BLD VENIPUNCTURE: CPT | Performed by: INTERNAL MEDICINE

## 2024-08-20 PROCEDURE — 999N000181 XR SURGERY CARM FLUORO GREATER THAN 5 MIN W STILLS: Mod: TC

## 2024-08-20 PROCEDURE — 83690 ASSAY OF LIPASE: CPT | Performed by: INTERNAL MEDICINE

## 2024-08-20 PROCEDURE — 82150 ASSAY OF AMYLASE: CPT | Performed by: INTERNAL MEDICINE

## 2024-08-20 PROCEDURE — 255N000002 HC RX 255 OP 636: Performed by: INTERNAL MEDICINE

## 2024-08-20 PROCEDURE — 258N000003 HC RX IP 258 OP 636: Performed by: ANESTHESIOLOGY

## 2024-08-20 PROCEDURE — C1769 GUIDE WIRE: HCPCS | Performed by: INTERNAL MEDICINE

## 2024-08-20 PROCEDURE — 250N000009 HC RX 250: Performed by: NURSE ANESTHETIST, CERTIFIED REGISTERED

## 2024-08-20 PROCEDURE — 258N000003 HC RX IP 258 OP 636: Performed by: NURSE ANESTHETIST, CERTIFIED REGISTERED

## 2024-08-20 PROCEDURE — 250N000011 HC RX IP 250 OP 636: Performed by: ANESTHESIOLOGY

## 2024-08-20 PROCEDURE — 250N000009 HC RX 250: Performed by: INTERNAL MEDICINE

## 2024-08-20 PROCEDURE — 85610 PROTHROMBIN TIME: CPT | Performed by: INTERNAL MEDICINE

## 2024-08-20 PROCEDURE — 370N000017 HC ANESTHESIA TECHNICAL FEE, PER MIN: Performed by: INTERNAL MEDICINE

## 2024-08-20 PROCEDURE — 99100 ANES PT EXTEME AGE<1 YR&>70: CPT | Performed by: NURSE ANESTHETIST, CERTIFIED REGISTERED

## 2024-08-20 PROCEDURE — 272N000001 HC OR GENERAL SUPPLY STERILE: Performed by: INTERNAL MEDICINE

## 2024-08-20 PROCEDURE — 85027 COMPLETE CBC AUTOMATED: CPT | Performed by: INTERNAL MEDICINE

## 2024-08-20 PROCEDURE — 250N000025 HC SEVOFLURANE, PER MIN: Performed by: INTERNAL MEDICINE

## 2024-08-20 PROCEDURE — 99100 ANES PT EXTEME AGE<1 YR&>70: CPT | Performed by: ANESTHESIOLOGY

## 2024-08-20 PROCEDURE — 710N000012 HC RECOVERY PHASE 2, PER MINUTE: Performed by: INTERNAL MEDICINE

## 2024-08-20 PROCEDURE — 250N000013 HC RX MED GY IP 250 OP 250 PS 637: Performed by: ANESTHESIOLOGY

## 2024-08-20 PROCEDURE — 999N000141 HC STATISTIC PRE-PROCEDURE NURSING ASSESSMENT: Performed by: INTERNAL MEDICINE

## 2024-08-20 RX ORDER — LIDOCAINE HYDROCHLORIDE 20 MG/ML
INJECTION, SOLUTION INFILTRATION; PERINEURAL PRN
Status: DISCONTINUED | OUTPATIENT
Start: 2024-08-20 | End: 2024-08-20

## 2024-08-20 RX ORDER — HYDROMORPHONE HCL IN WATER/PF 6 MG/30 ML
0.2 PATIENT CONTROLLED ANALGESIA SYRINGE INTRAVENOUS EVERY 5 MIN PRN
Status: DISCONTINUED | OUTPATIENT
Start: 2024-08-20 | End: 2024-08-20 | Stop reason: HOSPADM

## 2024-08-20 RX ORDER — DEXAMETHASONE SODIUM PHOSPHATE 4 MG/ML
4 INJECTION, SOLUTION INTRA-ARTICULAR; INTRALESIONAL; INTRAMUSCULAR; INTRAVENOUS; SOFT TISSUE
Status: DISCONTINUED | OUTPATIENT
Start: 2024-08-20 | End: 2024-08-20 | Stop reason: HOSPADM

## 2024-08-20 RX ORDER — NALOXONE HYDROCHLORIDE 0.4 MG/ML
0.1 INJECTION, SOLUTION INTRAMUSCULAR; INTRAVENOUS; SUBCUTANEOUS
Status: DISCONTINUED | OUTPATIENT
Start: 2024-08-20 | End: 2024-08-20 | Stop reason: HOSPADM

## 2024-08-20 RX ORDER — IOPAMIDOL 510 MG/ML
INJECTION, SOLUTION INTRAVASCULAR PRN
Status: DISCONTINUED | OUTPATIENT
Start: 2024-08-20 | End: 2024-08-20 | Stop reason: HOSPADM

## 2024-08-20 RX ORDER — SODIUM CHLORIDE, SODIUM LACTATE, POTASSIUM CHLORIDE, CALCIUM CHLORIDE 600; 310; 30; 20 MG/100ML; MG/100ML; MG/100ML; MG/100ML
INJECTION, SOLUTION INTRAVENOUS CONTINUOUS
Status: DISCONTINUED | OUTPATIENT
Start: 2024-08-20 | End: 2024-08-20 | Stop reason: HOSPADM

## 2024-08-20 RX ORDER — DEXAMETHASONE SODIUM PHOSPHATE 4 MG/ML
INJECTION, SOLUTION INTRA-ARTICULAR; INTRALESIONAL; INTRAMUSCULAR; INTRAVENOUS; SOFT TISSUE PRN
Status: DISCONTINUED | OUTPATIENT
Start: 2024-08-20 | End: 2024-08-20

## 2024-08-20 RX ORDER — HYDROXYZINE HYDROCHLORIDE 25 MG/1
25 TABLET, FILM COATED ORAL ONCE
Status: COMPLETED | OUTPATIENT
Start: 2024-08-20 | End: 2024-08-20

## 2024-08-20 RX ORDER — ONDANSETRON 4 MG/1
4 TABLET, ORALLY DISINTEGRATING ORAL EVERY 30 MIN PRN
Status: DISCONTINUED | OUTPATIENT
Start: 2024-08-20 | End: 2024-08-20 | Stop reason: HOSPADM

## 2024-08-20 RX ORDER — OXYCODONE HYDROCHLORIDE 10 MG/1
10 TABLET ORAL
Status: COMPLETED | OUTPATIENT
Start: 2024-08-20 | End: 2024-08-20

## 2024-08-20 RX ORDER — FENTANYL CITRATE 50 UG/ML
25 INJECTION, SOLUTION INTRAMUSCULAR; INTRAVENOUS EVERY 5 MIN PRN
Status: DISCONTINUED | OUTPATIENT
Start: 2024-08-20 | End: 2024-08-20 | Stop reason: HOSPADM

## 2024-08-20 RX ORDER — FENTANYL CITRATE 50 UG/ML
50 INJECTION, SOLUTION INTRAMUSCULAR; INTRAVENOUS EVERY 5 MIN PRN
Status: DISCONTINUED | OUTPATIENT
Start: 2024-08-20 | End: 2024-08-20 | Stop reason: HOSPADM

## 2024-08-20 RX ORDER — ONDANSETRON 4 MG/1
4 TABLET, ORALLY DISINTEGRATING ORAL EVERY 6 HOURS PRN
Status: DISCONTINUED | OUTPATIENT
Start: 2024-08-20 | End: 2024-08-20 | Stop reason: HOSPADM

## 2024-08-20 RX ORDER — ONDANSETRON 2 MG/ML
4 INJECTION INTRAMUSCULAR; INTRAVENOUS EVERY 30 MIN PRN
Status: DISCONTINUED | OUTPATIENT
Start: 2024-08-20 | End: 2024-08-20 | Stop reason: HOSPADM

## 2024-08-20 RX ORDER — FLUMAZENIL 0.1 MG/ML
0.2 INJECTION, SOLUTION INTRAVENOUS
Status: DISCONTINUED | OUTPATIENT
Start: 2024-08-20 | End: 2024-08-20 | Stop reason: HOSPADM

## 2024-08-20 RX ORDER — LIDOCAINE 40 MG/G
CREAM TOPICAL
Status: DISCONTINUED | OUTPATIENT
Start: 2024-08-20 | End: 2024-08-20 | Stop reason: HOSPADM

## 2024-08-20 RX ORDER — OXYCODONE HYDROCHLORIDE 10 MG/1
10 TABLET ORAL 3 TIMES DAILY PRN
Qty: 12 TABLET | Refills: 0 | Status: SHIPPED | OUTPATIENT
Start: 2024-08-20 | End: 2024-09-23

## 2024-08-20 RX ORDER — INDOMETHACIN 50 MG/1
SUPPOSITORY RECTAL PRN
Status: DISCONTINUED | OUTPATIENT
Start: 2024-08-20 | End: 2024-08-20 | Stop reason: HOSPADM

## 2024-08-20 RX ORDER — SUCRALFATE 1 G/1
1 TABLET ORAL 2 TIMES DAILY
Qty: 60 TABLET | Refills: 0 | Status: SHIPPED | OUTPATIENT
Start: 2024-08-20 | End: 2024-09-19

## 2024-08-20 RX ORDER — OXYCODONE HYDROCHLORIDE 5 MG/1
5 TABLET ORAL
Status: DISCONTINUED | OUTPATIENT
Start: 2024-08-20 | End: 2024-08-20 | Stop reason: HOSPADM

## 2024-08-20 RX ORDER — FENTANYL CITRATE 50 UG/ML
INJECTION, SOLUTION INTRAMUSCULAR; INTRAVENOUS PRN
Status: DISCONTINUED | OUTPATIENT
Start: 2024-08-20 | End: 2024-08-20

## 2024-08-20 RX ORDER — HYDROMORPHONE HCL IN WATER/PF 6 MG/30 ML
0.4 PATIENT CONTROLLED ANALGESIA SYRINGE INTRAVENOUS EVERY 5 MIN PRN
Status: DISCONTINUED | OUTPATIENT
Start: 2024-08-20 | End: 2024-08-20 | Stop reason: HOSPADM

## 2024-08-20 RX ORDER — PROPOFOL 10 MG/ML
INJECTION, EMULSION INTRAVENOUS PRN
Status: DISCONTINUED | OUTPATIENT
Start: 2024-08-20 | End: 2024-08-20

## 2024-08-20 RX ORDER — ONDANSETRON 2 MG/ML
4 INJECTION INTRAMUSCULAR; INTRAVENOUS EVERY 6 HOURS PRN
Status: DISCONTINUED | OUTPATIENT
Start: 2024-08-20 | End: 2024-08-20 | Stop reason: HOSPADM

## 2024-08-20 RX ADMIN — HYDROXYZINE HYDROCHLORIDE 25 MG: 25 TABLET ORAL at 18:34

## 2024-08-20 RX ADMIN — FENTANYL CITRATE 50 MCG: 50 INJECTION, SOLUTION INTRAMUSCULAR; INTRAVENOUS at 15:49

## 2024-08-20 RX ADMIN — OXYCODONE HYDROCHLORIDE 10 MG: 10 TABLET ORAL at 17:11

## 2024-08-20 RX ADMIN — FENTANYL CITRATE 50 MCG: 50 INJECTION INTRAMUSCULAR; INTRAVENOUS at 14:04

## 2024-08-20 RX ADMIN — SODIUM CHLORIDE, POTASSIUM CHLORIDE, SODIUM LACTATE AND CALCIUM CHLORIDE: 600; 310; 30; 20 INJECTION, SOLUTION INTRAVENOUS at 14:06

## 2024-08-20 RX ADMIN — SUGAMMADEX 200 MG: 100 INJECTION, SOLUTION INTRAVENOUS at 15:29

## 2024-08-20 RX ADMIN — FENTANYL CITRATE 50 MCG: 50 INJECTION, SOLUTION INTRAMUSCULAR; INTRAVENOUS at 16:01

## 2024-08-20 RX ADMIN — LIDOCAINE HYDROCHLORIDE 100 MG: 20 INJECTION, SOLUTION INFILTRATION; PERINEURAL at 14:13

## 2024-08-20 RX ADMIN — PHENYLEPHRINE HYDROCHLORIDE 100 MCG: 10 INJECTION INTRAVENOUS at 14:59

## 2024-08-20 RX ADMIN — PROPOFOL 150 MG: 10 INJECTION, EMULSION INTRAVENOUS at 14:13

## 2024-08-20 RX ADMIN — FENTANYL CITRATE 50 MCG: 50 INJECTION INTRAMUSCULAR; INTRAVENOUS at 15:15

## 2024-08-20 RX ADMIN — HYDROMORPHONE HYDROCHLORIDE 0.4 MG: 0.2 INJECTION, SOLUTION INTRAMUSCULAR; INTRAVENOUS; SUBCUTANEOUS at 16:35

## 2024-08-20 RX ADMIN — Medication 50 MG: at 14:13

## 2024-08-20 RX ADMIN — ONDANSETRON 4 MG: 2 INJECTION INTRAMUSCULAR; INTRAVENOUS at 16:38

## 2024-08-20 RX ADMIN — DEXAMETHASONE SODIUM PHOSPHATE 4 MG: 4 INJECTION, SOLUTION INTRA-ARTICULAR; INTRALESIONAL; INTRAMUSCULAR; INTRAVENOUS; SOFT TISSUE at 14:21

## 2024-08-20 ASSESSMENT — ACTIVITIES OF DAILY LIVING (ADL)
ADLS_ACUITY_SCORE: 18
ADLS_ACUITY_SCORE: 19
ADLS_ACUITY_SCORE: 18

## 2024-08-20 NOTE — ANESTHESIA POSTPROCEDURE EVALUATION
Patient: Laura Gonzalez    Procedure: Procedure(s):  ENTEROSCOPY ASSISTED ENDOSCOPIC RETROGRADE CHOLANGIOPANCREATOGRAPHY       Anesthesia Type:  General    Note:  Disposition: Inpatient   Postop Pain Control: Uneventful            Sign Out: Well controlled pain   PONV: No   Neuro/Psych: Uneventful            Sign Out: Acceptable/Baseline neuro status   Airway/Respiratory: Uneventful            Sign Out: Acceptable/Baseline resp. status   CV/Hemodynamics: Uneventful            Sign Out: Acceptable CV status   Other NRE: NONE   DID A NON-ROUTINE EVENT OCCUR? No           Last vitals:  Vitals Value Taken Time   /62 08/20/24 1545   Temp 36.5  C (97.7  F) 08/20/24 1541   Pulse 59 08/20/24 1556   Resp 15 08/20/24 1556   SpO2 100 % 08/20/24 1556   Vitals shown include unfiled device data.    Electronically Signed By: Onel Benitez MD  August 20, 2024  3:56 PM

## 2024-08-20 NOTE — BRIEF OP NOTE
[facility]    Brief Operative Note    Pre-operative diagnosis: Acute recurrent pancreatitis [K85.90]  Post-operative diagnosis Same as pre-operative diagnosis    Procedure: ENTEROSCOPY ASSISTED ENDOSCOPIC RETROGRADE CHOLANGIOPANCREATOGRAPHY, N/A - Mouth    Surgeon: Surgeons and Role:     * Arnaldo Noguera MD - Primary  Anesthesia: General   Estimated Blood Loss: None    Drains: None  Specimens: * No specimens in log *  Findings:   None.  Complications: None.  Implants: * No implants in log *      Afferent limb easily ascended with cap fitted pediatric colonoscope, to level of biliary anastamosis, wide open. Mild edema in bowel wall, more than previously. As making the turn to end of limb and area of pancreatic anastamosis, noted mucosal tear. No extraluminal gas, despite gas filling of bile duct. Decided that pursuing endoscope access to pancreatic duct could risk enlarging that tear to full thickness at this session. Cholangiogram and hi volume luminal contrast filled afferent limb to end, without gas leak or extravasation, normal bile duct. Also noted significant erosive gastritis, with focal hemorrhage.    IMP  1) erosive gastritis  2) patent biliary enteric anastamosis, normal bile duct but  3) superficial mucosal tear while navigating endoscope towards pancreatic limb, without contrast or gas leak, but terminated procedure to avoid risk of extending tear to transmural.     REC  Rapid recovery, will discuss timing of repeat procedure w patient  Will use  duodenoscope next time, and hopefully edema will have resolved enough to allow less traumatic access  Sucralfate, PPI  Make sure we standard order preprocedure lipase and amylase for all ERCP patients.

## 2024-08-20 NOTE — H&P
Here for elective ERCP for repeat dilation and stenting of stenotic pancreatic-jejunal anastamosis.     ___         Past Medical History         Past Medical History:   Diagnosis Date    Asthma       pt.states no longer has symptoms    Atherosclerosis      CAD (coronary artery disease)      HLD (hyperlipidemia)           Past Surgical History         Past Surgical History:   Procedure Laterality Date    APPENDECTOMY        ARTHROPLASTY HIP Left 6/15/2016     Procedure: ARTHROPLASTY HIP;  Surgeon: Jeffy Wolff MD;  [facility]    COLONOSCOPY   10/3/2013     Procedure: COMBINED COLONOSCOPY, SINGLE BIOPSY/POLYPECTOMY BY BIOPSY;;  Surgeon: Kenney Walls MD;  Location:  GI    ENDOSCOPIC RETROGRADE CHOLANGIOPANCREATOGRAM N/A 6/16/2022     Procedure: ENDOSCOPIC RETROGRADE CHOLANGIOPANCREATOGRAPHY WITH PANCREATIC DUCT DILATION, DEBRIS REMOVAL AND STENT PLACEMENT;  Surgeon: Arnaldo Noguera MD;  [facility]    ENDOSCOPIC RETROGRADE CHOLANGIOPANCREATOGRAM N/A 10/13/2022     Procedure: ENDOSCOPIC RETROGRADE CHOLANGIOPANCREATOGRAPHY, biliary stent placement;  Surgeon: Arnaldo Noguera MD;  [facility]    ENDOSCOPIC RETROGRADE CHOLANGIOPANCREATOGRAM N/A 11/21/2022     Procedure: ENDOSCOPIC RETROGRADE CHOLANGIOPANCREATOGRAPHY, WITH TUBE OR STENT INSERTION, with balloon dialation;  Surgeon: Johnson Gotti MD;  [facility]    ENDOSCOPIC RETROGRADE CHOLANGIOPANCREATOGRAM N/A 3/30/2023     Procedure: ENDOSCOPIC RETROGRADE CHOLANGIOPANCREATOGRAPHY with bile duct stents removed x2 and replacement of one pancreatic stent;  Surgeon: Arnaldo Noguera MD;  [facility]    ENDOSCOPIC RETROGRADE CHOLANGIOPANCREATOGRAPHY, EXCHANGE TUBE/STENT N/A 1/24/2023     Procedure: enteroscopy assisted ENDOSCOPIC RETROGRADE CHOLANGIOPANCREATOGRAPHY, with pancreatic stents replaced times 2;  Surgeon: Arnaldo Noguera MD;  [facility]    ENDOSCOPIC ULTRASOUND UPPER GASTROINTESTINAL  TRACT (GI) N/A 5/12/2022     Procedure: ENDOSCOPIC ULTRASOUND WITH PANCREATICOGASTROSTOMY CREATION, TRACT DILATION, STENT PLACEMENT;  Surgeon: Romaine Oates MD;  [facility]    ESOPHAGOSCOPY, GASTROSCOPY, DUODENOSCOPY (EGD), COMBINED N/A 5/12/2022     Procedure: ESOPHAGOGASTRODUODENOSCOPY WITH ENDOSCOPIC CLIP PLACEMENT;  Surgeon: Arnaldo Noguera MD;  [facility]    FOOT SURGERY        HC TOOTH EXTRACTION W/FORCEP        HYSTERECTOMY        INCISION AND DRAINAGE BREAST Left 2/18/2022     Procedure: evacuate hematoma left  BREAST;  Surgeon: Dayron Garcia MD;  Location: [facility]    IR FOLLOW UP VISIT OUTPATIENT   5/31/2022    IR SINOGRAM INJECTION DIAGNOSTIC   5/31/2022         Prior to Admission Medications   Prior to Admission Medications   Prescriptions Last Dose Informant Patient Reported? Taking?   Magnesium Ascorbate POWD     Yes No   Sig: One teaspoon at bedtime daily by mouth   atorvastatin (LIPITOR) 40 MG tablet     Yes No   Sig: Take 40 mg by mouth At Bedtime    calcium-magnesium (CALMAG) 500-250 MG TABS     Yes No   Sig: Take 2 tablets by mouth daily   cholecalciferol (VITAMIN D3) 25 mcg (1000 units) capsule     Yes No   Sig: Take 1 capsule by mouth daily   evolocumab (REPATHA) 140 MG/ML prefilled autoinjector     Yes No   Sig: Inject 140 mg Subcutaneous every 14 days   ezetimibe (ZETIA) 10 MG tablet     Yes No   Sig: Take 10 mg by mouth At Bedtime   traMADol (ULTRAM) 50 MG tablet     No No   Sig: Take 1 tablet (50 mg) by mouth 3 times daily   valACYclovir (VALTREX) 1000 mg tablet     Yes No   Sig: Take 1,000 mg by mouth daily as needed (cold sore)   Patient not taking: Reported on 1/19/2024   vitamin A 3 MG (75590 UNITS) capsule     Yes Yes   Sig: Take 1 capsule by mouth daily   zolpidem (AMBIEN) 10 MG tablet     Yes No   Sig: Take 1 tablet by mouth nightly as needed for sleep      Facility-Administered Medications: None            Review of Systems  The 10 point Review of  Systems is negative other than noted in the HPI or here.        PEX  General Appearance: Alert and oriented x3, pleasant. NAD  HEENT: Anicteric sclera, MMM   Respiratory: Breathing comfortably on room air, lungs CTAB without wheezing or crackles   Cardiovascular: RRR, S1, S2. No murmur noted  GI: Abdomen soft, tenderness in the epigastrium and periumbilical areas. Bowel sounds active. No guarding or rebound  Lymph/Hematologic: No peripheral edema, distal pulses palpable   Skin: No rash or jaundice   Musculoskeletal: Moves all extremities   Neurologic: No focal deficits, CN II-XII grossly intact

## 2024-08-20 NOTE — ANESTHESIA PREPROCEDURE EVALUATION
Anesthesia Pre-Procedure Evaluation    Patient: Laura Gonzalez   MRN: [id number] : 1950        Procedure : Procedure(s):  ENDOSCOPIC RETROGRADE CHOLANGIOPANCREATOGRAPHY          Past Medical History:   Diagnosis Date     Asthma     pt.states no longer has symptoms     Atherosclerosis      CAD (coronary artery disease)      HLD (hyperlipidemia)       Past Surgical History:   Procedure Laterality Date     APPENDECTOMY       ARTHROPLASTY HIP Left 6/15/2016    Procedure: ARTHROPLASTY HIP;  Surgeon: Jeffy Wolff MD;  [facility]     COLONOSCOPY  10/3/2013    Procedure: COMBINED COLONOSCOPY, SINGLE BIOPSY/POLYPECTOMY BY BIOPSY;;  Surgeon: Kenney Walls MD;  [facility]     ENDOSCOPIC RETROGRADE CHOLANGIOPANCREATOGRAM N/A 2022    Procedure: ENDOSCOPIC RETROGRADE CHOLANGIOPANCREATOGRAPHY WITH PANCREATIC DUCT DILATION, DEBRIS REMOVAL AND STENT PLACEMENT;  Surgeon: Arnaldo Noguera MD;  [facility]     ENDOSCOPIC RETROGRADE CHOLANGIOPANCREATOGRAM N/A 10/13/2022    Procedure: ENDOSCOPIC RETROGRADE CHOLANGIOPANCREATOGRAPHY, biliary stent placement;  Surgeon: Arnaldo Noguera MD;  [facility]     ENDOSCOPIC RETROGRADE CHOLANGIOPANCREATOGRAM N/A 2022    Procedure: ENDOSCOPIC RETROGRADE CHOLANGIOPANCREATOGRAPHY, WITH TUBE OR STENT INSERTION, with balloon dialation;  Surgeon: Johnson Gotti MD;  [facility]     ENDOSCOPIC RETROGRADE CHOLANGIOPANCREATOGRAM N/A 3/30/2023    Procedure: ENDOSCOPIC RETROGRADE CHOLANGIOPANCREATOGRAPHY with bile duct stents removed x2 and replacement of one pancreatic stent;  Surgeon: Arnaldo Noguera MD;  [facility]     ENDOSCOPIC RETROGRADE CHOLANGIOPANCREATOGRAPHY, EXCHANGE TUBE/STENT N/A 2023    Procedure: enteroscopy assisted ENDOSCOPIC RETROGRADE CHOLANGIOPANCREATOGRAPHY, with pancreatic stents replaced times 2;  Surgeon: Arnaldo Noguera MD;  [facility]     ENDOSCOPIC ULTRASOUND UPPER GASTROINTESTINAL TRACT  (GI) N/A 2022    Procedure: ENDOSCOPIC ULTRASOUND WITH PANCREATICOGASTROSTOMY CREATION, TRACT DILATION, STENT PLACEMENT;  Surgeon: Romaine Oates MD;  [facility]     ESOPHAGOSCOPY, GASTROSCOPY, DUODENOSCOPY (EGD), COMBINED N/A 2022    Procedure: ESOPHAGOGASTRODUODENOSCOPY WITH ENDOSCOPIC CLIP PLACEMENT;  Surgeon: Arnaldo Noguera MD;  [facility]     FOOT SURGERY       HC TOOTH EXTRACTION W/FORCEP       HYSTERECTOMY       INCISION AND DRAINAGE BREAST Left 2022    Procedure: evacuate hematoma left  BREAST;  Surgeon: Dayron Garcia MD;  Location: [facility]     IR FOLLOW UP VISIT OUTPATIENT  2022     IR SINOGRAM INJECTION DIAGNOSTIC  2022      No Known Allergies   Social History     Tobacco Use     Smoking status: Former     Current packs/day: 0.00     Types: Cigarettes     Quit date: 10/3/1988     Years since quittin.9     Passive exposure: Never     Smokeless tobacco: Never   Substance Use Topics     Alcohol use: No      Wt Readings from Last 1 Encounters:   24 70.5 kg (155 lb 6.8 oz)        Anesthesia Evaluation   Pt has had prior anesthetic. Type: General.        ROS/MED HX  ENT/Pulmonary:     (+)                      asthma                  Neurologic:       Cardiovascular:     (+)  - -  CAD -  - -                                      METS/Exercise Tolerance:     Hematologic:       Musculoskeletal:       GI/Hepatic:       Renal/Genitourinary:       Endo:       Psychiatric/Substance Use:       Infectious Disease:       Malignancy:       Other:          Physical Exam    Airway        Mallampati: I    Neck ROM: full     Respiratory Devices and Support         Dental       (+) Minor Abnormalities - some fillings, tiny chips      Cardiovascular   cardiovascular exam normal          Pulmonary   pulmonary exam normal            OUTSIDE LABS:  CBC:   Lab Results   Component Value Date    WBC 6.4 2024    WBC 7.9 2024    HGB 10.7 (L) 2024     HGB 12.4 03/12/2024    HCT 32.9 (L) 03/13/2024    HCT 38.1 03/12/2024     03/13/2024     03/12/2024     BMP:   Lab Results   Component Value Date     03/13/2024     03/12/2024    POTASSIUM 4.3 03/13/2024    POTASSIUM 3.7 03/12/2024    CHLORIDE 106 03/13/2024    CHLORIDE 105 03/12/2024    CO2 27 03/13/2024    CO2 29 03/12/2024    BUN 8.4 03/13/2024    BUN 13.3 03/12/2024    CR 0.81 03/13/2024    CR 0.83 03/12/2024    GLC 95 03/13/2024    GLC 85 03/12/2024     COAGS:   Lab Results   Component Value Date    PTT 41 (H) 02/18/2022    INR 1.02 03/30/2023     POC:   Lab Results   Component Value Date     (H) 06/16/2016     HEPATIC:   Lab Results   Component Value Date    ALBUMIN 3.3 (L) 03/13/2024    PROTTOTAL 5.6 (L) 03/13/2024    ALT 19 03/13/2024    AST 33 03/13/2024    ALKPHOS 117 03/13/2024    BILITOTAL 0.4 03/13/2024     OTHER:   Lab Results   Component Value Date    PH 7.53 (H) 06/05/2022    LACT 0.7 01/26/2023    A1C 6.1 (H) 01/24/2024    GLEN 8.4 (L) 03/13/2024    LIPASE 639 (H) 03/12/2024    AMYLASE 220 (H) 03/12/2024    TSH 0.96 06/05/2022    CRP 5.0 06/05/2022    SED 40 (H) 06/05/2022       Anesthesia Plan    ASA Status:  3    NPO Status:  NPO Appropriate    Anesthesia Type: General.     - Airway: ETT   Induction: Intravenous.   Maintenance: Balanced.        Consents    Anesthesia Plan(s) and associated risks, benefits, and realistic alternatives discussed. Questions answered and patient/representative(s) expressed understanding.     - Discussed: Risks, Benefits and Alternatives for BOTH SEDATION and the PROCEDURE were discussed     - Discussed with:  Patient       Use of blood products discussed: Yes.     - Discussed with: Patient.     - Consented: consented to blood products     Postoperative Care    Pain management: IV analgesics.   PONV prophylaxis: Ondansetron (or other 5HT-3)     Comments:             Walter Willett MD    I have reviewed the pertinent notes and labs in  the chart from the past 30 days and (re)examined the patient.  Any updates or changes from those notes are reflected in this note.

## 2024-08-20 NOTE — DISCHARGE INSTRUCTIONS
Contacting your Doctor -   To contact a doctor, call Dr Noguera's clinic at [phone] or:  [phone] and ask for the resident on call for Gastroenterology (answered 24 hours a day)   Emergency Department:  [facility]: [phone]  [facility]: [phone] 911 if you are in need of immediate or emergent help

## 2024-08-20 NOTE — ANESTHESIA CARE TRANSFER NOTE
Patient: Laura Gonzalez    Procedure: Procedure(s):  ENTEROSCOPY ASSISTED ENDOSCOPIC RETROGRADE CHOLANGIOPANCREATOGRAPHY       Diagnosis: Acute recurrent pancreatitis [K85.90]  Diagnosis Additional Information: No value filed.    Anesthesia Type:   General     Note:    Oropharynx: oropharynx clear of all foreign objects and spontaneously breathing  Level of Consciousness: awake  Oxygen Supplementation: nasal cannula  Level of Supplemental Oxygen (L/min / FiO2): 2  Independent Airway: airway patency satisfactory and stable  Dentition: dentition unchanged  Vital Signs Stable: post-procedure vital signs reviewed and stable  Report to RN Given: handoff report given  Patient transferred to: PACU    Handoff Report: Identifed the Patient, Identified the Reponsible Provider, Reviewed the pertinent medical history, Discussed the surgical course, Reviewed Intra-OP anesthesia mangement and issues during anesthesia, Set expectations for post-procedure period and Allowed opportunity for questions and acknowledgement of understanding  Vitals:  Vitals Value Taken Time   BP     Temp     Pulse 62 08/20/24 1541   Resp 17 08/20/24 1541   SpO2 100 % 08/20/24 1541   Vitals shown include unfiled device data.    Electronically Signed By: ITZ Kelly CRNA  August 20, 2024  3:41 PM

## 2024-08-20 NOTE — ANESTHESIA PROCEDURE NOTES
Airway       Patient location during procedure: OR       Procedure Start/Stop Times: 8/20/2024 2:16 PM  Staff -        Anesthesiologist:  Walter Willett MD       CRNA: Effie Wilhelm APRN CRNA       Performed By: anesthesiologist  Consent for Airway        Urgency: elective  Indications and Patient Condition       Indications for airway management: amy-procedural       Induction type:intravenous       Mask difficulty assessment: 1 - vent by mask    Final Airway Details       Final airway type: endotracheal airway       Successful airway: ETT - single  Endotracheal Airway Details        ETT size (mm): 7.0       Successful intubation technique: direct laryngoscopy       DL Blade Type: MAC 3       Grade View of Cords: 1       Adjucts: stylet       Position: Right       Measured from: gums/teeth       Secured at (cm): 21       Bite block used: Molar    Post intubation assessment        Placement verified by: capnometry, equal breath sounds and chest rise        Number of attempts at approach: 1       Number of other approaches attempted: 0       Secured with: tape       Ease of procedure: easy       Dentition: Intact    Medication(s) Administered   Medication Administration Time: 8/20/2024 2:16 PM

## 2024-08-21 ENCOUNTER — PREP FOR PROCEDURE (OUTPATIENT)
Dept: GASTROENTEROLOGY | Facility: CLINIC | Age: 74
End: 2024-08-21
Payer: COMMERCIAL

## 2024-08-21 ENCOUNTER — PATIENT OUTREACH (OUTPATIENT)
Dept: GASTROENTEROLOGY | Facility: CLINIC | Age: 74
End: 2024-08-21
Payer: COMMERCIAL

## 2024-08-21 DIAGNOSIS — K85.90 RECURRENT ACUTE PANCREATITIS: Primary | ICD-10-CM

## 2024-08-21 NOTE — TELEPHONE ENCOUNTER
Post ERCP with Dr Noguera 8/20/24   - Repeat ERCP at appointment to be scheduled for   retreatment. -     - Will use  duodenoscope next time, and  hopefully edema will have resolved enough to allow less traumatic access     Called patient to discuss plan for next procedure. Per patient she's feeling ok. Temp last night, 100 to 101, normal is 97, had chills. Took tylenol. Feels ok today. No fever so far today.   Patient wonders if she should switch enzymes, is on Creon, patient says she hasn't been taking it about a month ago. Will talk to Dr Noguera about possible switching enzymes.   Also asks about Atarax, no orders written at discharge    Please assist in scheduling:     Procedure/Imaging/Clinic: Endoscopic Retrograde Cholangiopancreatography (ERCP)    Physician: Chuckie  Timing: 10-1  Scope time needed:tbd  Anesthesia:General  Dx: recurrent acute pancreatitis  Tier:Tier 3 -   Surgeries/procedures that can be delayed  between 30 to 90 days with no significant  morbidity/mortality to patient or impact on  patient/disease outcome.   Location: [facility]  Header of letter for pt communication: Endoscopic Retrograde Cholangiopancreatography (ERCP)

## 2024-08-22 ENCOUNTER — PATIENT OUTREACH (OUTPATIENT)
Dept: GASTROENTEROLOGY | Facility: CLINIC | Age: 74
End: 2024-08-22
Payer: COMMERCIAL

## 2024-08-22 ENCOUNTER — TELEPHONE (OUTPATIENT)
Dept: GASTROENTEROLOGY | Facility: CLINIC | Age: 74
End: 2024-08-22
Payer: COMMERCIAL

## 2024-08-22 DIAGNOSIS — K85.90 ACUTE ON CHRONIC PANCREATITIS (H): ICD-10-CM

## 2024-08-22 DIAGNOSIS — K85.90 RECURRENT ACUTE PANCREATITIS: ICD-10-CM

## 2024-08-22 DIAGNOSIS — Z90.49 HISTORY OF WHIPPLE PROCEDURE: ICD-10-CM

## 2024-08-22 DIAGNOSIS — K86.89 PANCREATIC INSUFFICIENCY: Primary | ICD-10-CM

## 2024-08-22 DIAGNOSIS — Z90.410 HISTORY OF WHIPPLE PROCEDURE: ICD-10-CM

## 2024-08-22 DIAGNOSIS — K86.89 PANCREATIC INSUFFICIENCY: ICD-10-CM

## 2024-08-22 DIAGNOSIS — K86.1 ACUTE ON CHRONIC PANCREATITIS (H): ICD-10-CM

## 2024-08-22 NOTE — TELEPHONE ENCOUNTER
Prior Authorization Retail Medication Request    Medication/Dose: lipase-protease-amylase (VIOKACE) [id number] units TABS tablet- 3 with each meal, up to  9 per day       Diagnosis and ICD code (if different than what is on RX):  K 86.81  New/renewal/insurance change PA/secondary ins. PA:  Previously Tried and Failed:  lipase-protease-amylase (CREON) 14862-68872-380623 units, 2-3 with meals, 1-2 with snacks  Rationale:  patient continues to have break through pain and recurrent acute pancreatitis while on Creon. Dr. Noguera recommend Viokace for better management of symptoms    Insurance   Primary: [employer]  Insurance ID:  [id number]       Secondary (if applicable):na  Insurance ID:  na    Pharmacy Information (if different than what is on RX)    [facility] [id number] [place] [facility] - [address] [address]  Phone: [phone] Fax: [phone]

## 2024-08-22 NOTE — TELEPHONE ENCOUNTER
Per Dr. Noguera  . Lets start VIOKACE, 20K lipase, 3 ea meal. We already put her on PPI as she had erosive gastritis. STOP NSAIDS.. Was taking quite a bit of ibuprofen.   Atarax something she should ask PCP for     Ordered Viokace, prior auth started. Called patient to discuss other recommendations.   Reviewed above, patient understands plan    ML

## 2024-08-26 NOTE — TELEPHONE ENCOUNTER
PA Initiation    Medication: VIOKACE 32050-98413 UNITS PO TABS  Insurance Company: [employer] Part D - Phone [phone] Fax [phone]  Pharmacy Filling the Rx: [facility] [id number] [place] [address]  Filling Pharmacy Phone: [phone]  Filling Pharmacy Fax:    Start Date: 8/26/2024  Retail Pharmacy Prior Authorization Team   Phone: [phone]

## 2024-08-27 NOTE — TELEPHONE ENCOUNTER
[facility] [id number] [place] [facility] - [address] lipase-protease-amylase (VIOKACE) 94255-52008-85626 units TABS [id number]/22/2024   Alternative Requested:NEEDS PA - ZENPEP/CREON PREFERRED.

## 2024-08-27 NOTE — TELEPHONE ENCOUNTER
Prior Authorization Approval    Medication: VIOKACE 20195-90172 UNITS PO TABS  Authorization Effective Date: 8/26/2024  Authorization Expiration Date: 12/31/2024  Approved Dose/Quantity:   Reference #:     Insurance Company: [employer] Part D - Phone [phone] Fax [phone]  Expected CoPay: $    CoPay Card Available:      Financial Assistance Needed: No  Which Pharmacy is filling the prescription: [facility] [address] [place] [address]  Pharmacy Notified: Yes  Patient Notified: The pharmacy will notify the patient.

## 2024-08-29 RX ORDER — PANCRELIPASE 20880; 78300; 78300 [USP'U]/1; [USP'U]/1; [USP'U]/1
TABLET ORAL
Qty: 450 TABLET | Refills: 6 | Status: SHIPPED | OUTPATIENT
Start: 2024-08-29

## 2024-09-03 ENCOUNTER — PATIENT OUTREACH (OUTPATIENT)
Dept: GASTROENTEROLOGY | Facility: CLINIC | Age: 74
End: 2024-09-03
Payer: COMMERCIAL

## 2024-09-03 NOTE — TELEPHONE ENCOUNTER
Pt called in, having  ongoing pain, has lost 6 lbs. Asking if she can get earlier outpatient ERCP. Last procedure aborted because of gastritis, planned reschedule.     Offered infusion, pt feels that this is too complex to manage, that she cannot get appointment timely and needs to travel all over the city. Encouraged her to try to get an infusion, dicussed ER as the emergency plan, assured her that there is always one of our ERCP staff available to do ERCP if indicated.    Patient understands plan    ML

## 2024-09-06 ENCOUNTER — INFUSION THERAPY VISIT (OUTPATIENT)
Dept: INFUSION THERAPY | Facility: CLINIC | Age: 74
End: 2024-09-06
Attending: INTERNAL MEDICINE
Payer: COMMERCIAL

## 2024-09-06 VITALS
TEMPERATURE: 98.3 F | DIASTOLIC BLOOD PRESSURE: 66 MMHG | SYSTOLIC BLOOD PRESSURE: 117 MMHG | HEART RATE: 55 BPM | OXYGEN SATURATION: 98 % | RESPIRATION RATE: 16 BRPM

## 2024-09-06 DIAGNOSIS — K86.1 ACUTE ON CHRONIC PANCREATITIS (H): Primary | ICD-10-CM

## 2024-09-06 DIAGNOSIS — K85.90 ACUTE ON CHRONIC PANCREATITIS (H): Primary | ICD-10-CM

## 2024-09-06 LAB
AMYLASE SERPL-CCNC: 64 U/L (ref 28–100)
LIPASE SERPL-CCNC: 22 U/L (ref 13–60)

## 2024-09-06 PROCEDURE — 96360 HYDRATION IV INFUSION INIT: CPT

## 2024-09-06 PROCEDURE — 83690 ASSAY OF LIPASE: CPT

## 2024-09-06 PROCEDURE — 36415 COLL VENOUS BLD VENIPUNCTURE: CPT

## 2024-09-06 PROCEDURE — 258N000003 HC RX IP 258 OP 636: Performed by: INTERNAL MEDICINE

## 2024-09-06 PROCEDURE — 82150 ASSAY OF AMYLASE: CPT

## 2024-09-06 RX ORDER — HEPARIN SODIUM,PORCINE 10 UNIT/ML
5-20 VIAL (ML) INTRAVENOUS DAILY PRN
Status: CANCELLED | OUTPATIENT
Start: 2024-09-06

## 2024-09-06 RX ORDER — HEPARIN SODIUM (PORCINE) LOCK FLUSH IV SOLN 100 UNIT/ML 100 UNIT/ML
5 SOLUTION INTRAVENOUS
Status: CANCELLED | OUTPATIENT
Start: 2024-09-06

## 2024-09-06 RX ORDER — ONDANSETRON 2 MG/ML
4 INJECTION INTRAMUSCULAR; INTRAVENOUS ONCE
Status: COMPLETED | OUTPATIENT
Start: 2024-09-06 | End: 2024-09-06

## 2024-09-06 RX ORDER — ONDANSETRON 2 MG/ML
4 INJECTION INTRAMUSCULAR; INTRAVENOUS ONCE
Status: CANCELLED | OUTPATIENT
Start: 2024-09-06 | End: 2024-09-06

## 2024-09-06 RX ADMIN — SODIUM CHLORIDE, POTASSIUM CHLORIDE, SODIUM LACTATE AND CALCIUM CHLORIDE 1000 ML: 600; 310; 30; 20 INJECTION, SOLUTION INTRAVENOUS at 10:46

## 2024-09-06 NOTE — PROGRESS NOTES
Infusion Nursing Note:  Laura Gonzalez presents today for IVF.    Patient seen by provider today: No   present during visit today: Not Applicable.    Note: Per orders and per patient request, 1 L LR infused.  Administrations This Visit       lactated ringers BOLUS 1,000-1,500 mL       Admin Date  09/06/2024 Action  $New Bag Dose  1,000 mL Rate  999 mL/hr Route  Intravenous Documented By  Nata Velazquez RN                     Intravenous Access:  Labs drawn without difficulty per patient request.  Peripheral IV placed.    Treatment Conditions:  None.      Post Infusion Assessment:  Patient tolerated infusion without incident.  Blood return noted pre and post infusion.  Site patent and intact, free from redness, edema or discomfort.  No evidence of extravasations.  Access discontinued per protocol.       Discharge Plan:   Discharge instructions reviewed with: Patient.  Patient and/or family verbalized understanding of discharge instructions and all questions answered.  AVS to patient via PayOrPassT.  Patient will return 9/17/24 for next appointment.   Patient discharged in stable condition accompanied by: self.  Departure Mode: Ambulatory.    /66   Pulse 55   Temp 98.3  F (36.8  C) (Oral)   Resp 16   SpO2 98%      Nata Velazquez, DAREN

## 2024-09-06 NOTE — PATIENT INSTRUCTIONS
Dear Laura Gonzalez    Thank you for choosing [facility] for your infusion.  The following information is a summary of our appointment as well as important reminders.          If you are a transplant patient and require transplant education, please click on this link: [email or web address].    If you have any questions on your upcoming Specialty Infusion appointments, please call scheduling at [phone].  It was a pleasure taking care of you today.    Sincerely,    [facility]  [address] [place] [address]  [address]  Phone:  [phone]

## 2024-09-17 ENCOUNTER — INFUSION THERAPY VISIT (OUTPATIENT)
Dept: INFUSION THERAPY | Facility: CLINIC | Age: 74
End: 2024-09-17
Attending: INTERNAL MEDICINE
Payer: COMMERCIAL

## 2024-09-17 VITALS
HEART RATE: 55 BPM | SYSTOLIC BLOOD PRESSURE: 128 MMHG | OXYGEN SATURATION: 97 % | RESPIRATION RATE: 16 BRPM | DIASTOLIC BLOOD PRESSURE: 70 MMHG | TEMPERATURE: 98 F

## 2024-09-17 DIAGNOSIS — K85.90 ACUTE ON CHRONIC PANCREATITIS (H): Primary | ICD-10-CM

## 2024-09-17 DIAGNOSIS — K86.1 ACUTE ON CHRONIC PANCREATITIS (H): Primary | ICD-10-CM

## 2024-09-17 PROCEDURE — 96360 HYDRATION IV INFUSION INIT: CPT

## 2024-09-17 PROCEDURE — 258N000003 HC RX IP 258 OP 636: Performed by: INTERNAL MEDICINE

## 2024-09-17 RX ORDER — HEPARIN SODIUM (PORCINE) LOCK FLUSH IV SOLN 100 UNIT/ML 100 UNIT/ML
5 SOLUTION INTRAVENOUS
OUTPATIENT
Start: 2024-09-17

## 2024-09-17 RX ORDER — ONDANSETRON 2 MG/ML
4 INJECTION INTRAMUSCULAR; INTRAVENOUS ONCE
Status: CANCELLED | OUTPATIENT
Start: 2024-09-17 | End: 2024-09-17

## 2024-09-17 RX ORDER — HEPARIN SODIUM,PORCINE 10 UNIT/ML
5-20 VIAL (ML) INTRAVENOUS DAILY PRN
OUTPATIENT
Start: 2024-09-17

## 2024-09-17 RX ADMIN — SODIUM CHLORIDE, POTASSIUM CHLORIDE, SODIUM LACTATE AND CALCIUM CHLORIDE 1000 ML: 600; 310; 30; 20 INJECTION, SOLUTION INTRAVENOUS at 15:18

## 2024-09-17 ASSESSMENT — PAIN SCALES - GENERAL: PAINLEVEL: MODERATE PAIN (4)

## 2024-09-17 NOTE — PATIENT INSTRUCTIONS
Dear Laura Gonzalez    Thank you for choosing [facility] ([facility] for your infusion.  The following information is a summary of our appointment as well as important reminders.      If you are a transplant patient and require transplant education, please click on this link: [email or web address].    If you have any questions on your upcoming Specialty Infusion appointments, please call scheduling at [phone].  It was a pleasure taking care of you today.    Sincerely,    [facility]  [address] [place] [address]  [address]  Phone:  [phone]

## 2024-09-17 NOTE — PROGRESS NOTES
Infusion Nursing Note:  Laura Gonzalez presents today for IV fluids.    Patient seen by provider today: No   present during visit today: Not Applicable.    Note: 1 L infused per pt preference. Pt did not need zofran today.     Intravenous Access:  Peripheral IV placed.    Treatment Conditions:  Not Applicable.    Post Infusion Assessment:  Patient tolerated infusion without incident.  Site patent and intact, free from redness, edema or discomfort.  Access discontinued per protocol.     Discharge Plan:   Patient and/or family verbalized understanding of discharge instructions and all questions answered.  Patient discharged in stable condition accompanied by: self.    Administrations This Visit       lactated ringers BOLUS 1,000-1,500 mL       Admin Date  09/17/2024 Action  $New Bag Dose  1,000 mL Rate  1,000 mL/hr Route  Intravenous Documented By  Claudia Adamson RN                  /70 (BP Location: Left arm, Patient Position: Semi-Monaco's, Cuff Size: Adult Regular)   Pulse 55   Temp 98  F (36.7  C) (Oral)   Resp 16   SpO2 97%     Claudia Adamson RN

## 2024-09-19 ENCOUNTER — INFUSION THERAPY VISIT (OUTPATIENT)
Dept: INFUSION THERAPY | Facility: CLINIC | Age: 74
End: 2024-09-19
Attending: INTERNAL MEDICINE
Payer: COMMERCIAL

## 2024-09-19 VITALS
DIASTOLIC BLOOD PRESSURE: 77 MMHG | TEMPERATURE: 97.8 F | HEART RATE: 60 BPM | RESPIRATION RATE: 16 BRPM | SYSTOLIC BLOOD PRESSURE: 143 MMHG | OXYGEN SATURATION: 100 %

## 2024-09-19 DIAGNOSIS — K86.1 ACUTE ON CHRONIC PANCREATITIS (H): Primary | ICD-10-CM

## 2024-09-19 DIAGNOSIS — K85.90 ACUTE ON CHRONIC PANCREATITIS (H): Primary | ICD-10-CM

## 2024-09-19 PROCEDURE — 258N000003 HC RX IP 258 OP 636: Performed by: INTERNAL MEDICINE

## 2024-09-19 PROCEDURE — 96360 HYDRATION IV INFUSION INIT: CPT

## 2024-09-19 RX ORDER — HEPARIN SODIUM (PORCINE) LOCK FLUSH IV SOLN 100 UNIT/ML 100 UNIT/ML
5 SOLUTION INTRAVENOUS
OUTPATIENT
Start: 2024-09-19

## 2024-09-19 RX ORDER — HEPARIN SODIUM,PORCINE 10 UNIT/ML
5-20 VIAL (ML) INTRAVENOUS DAILY PRN
OUTPATIENT
Start: 2024-09-19

## 2024-09-19 RX ORDER — ONDANSETRON 2 MG/ML
4 INJECTION INTRAMUSCULAR; INTRAVENOUS ONCE
Status: DISCONTINUED | OUTPATIENT
Start: 2024-09-19 | End: 2024-09-19 | Stop reason: HOSPADM

## 2024-09-19 RX ORDER — ONDANSETRON 2 MG/ML
4 INJECTION INTRAMUSCULAR; INTRAVENOUS ONCE
OUTPATIENT
Start: 2024-09-19 | End: 2024-09-19

## 2024-09-19 RX ADMIN — SODIUM CHLORIDE, POTASSIUM CHLORIDE, SODIUM LACTATE AND CALCIUM CHLORIDE 1000 ML: 600; 310; 30; 20 INJECTION, SOLUTION INTRAVENOUS at 15:19

## 2024-09-19 NOTE — PROGRESS NOTES
Infusion Nursing Note:  Laura Gonzalez presents today for IV fluids.    Patient seen by provider today: No   present during visit today: Not Applicable.    Note: 1L LR infused over 60 mins per pt preference. Pt declines IV Zofran today.    At 1540, approx 20 mins into infusion, pt noted scattered itchy red spots on bilat hands and wrists. Pt denies itching or red spots above the IV or anywhere else on her body. No SOB or chest tightness. Pt denies new medications, changes in skin products at home, although she did wash her hands in the bathroom prior to coming in for the infusion. Pt doesn't feel the need for treatment at this time, but will continue to monitor. By the end of the infusion, red spots and itching has faded.    Intravenous Access:  Peripheral IV placed.    Treatment Conditions:  Not Applicable.    BP (!) 157/81 (BP Location: Left arm, Patient Position: Semi-Monaco's, Cuff Size: Adult Regular)   Pulse 56   Temp 97.8  F (36.6  C) (Oral)   Resp 16   SpO2 100%     Administrations This Visit       lactated ringers BOLUS 1,000-1,500 mL       Admin Date  09/19/2024 Action  $New Bag Dose  1,000 mL Rate  999 mL/hr Route  Intravenous Documented By  Sarita Garza RN                  Post Infusion Assessment:  Patient tolerated infusion without incident.  Site patent and intact, free from redness, edema or discomfort.  No evidence of extravasations.  Access discontinued per protocol.     Discharge Plan:   AVS to patient via MYCHART.  Patient will return 9/24/24 for next appointment.   Patient discharged in stable condition accompanied by: self.  Departure Mode: Ambulatory.    Sarita Garza, DAREN

## 2024-09-19 NOTE — PATIENT INSTRUCTIONS
Dear Laura Gonzalez    Thank you for choosing [facility] ([facility] for your infusion.  The following information is a summary of our appointment as well as important reminders.      If you have any questions on your upcoming Specialty Infusion appointments, please call scheduling at [phone].  It was a pleasure taking care of you today.    Sincerely,    [facility]  [address] [place] [address]  [address]  Phone:  [phone]

## 2024-09-20 ENCOUNTER — PATIENT OUTREACH (OUTPATIENT)
Dept: GASTROENTEROLOGY | Facility: CLINIC | Age: 74
End: 2024-09-20
Payer: COMMERCIAL

## 2024-09-20 NOTE — TELEPHONE ENCOUNTER
Patient called in to report urgent diarrhea, explosive and undigested food. Mucousy stool today. Is on PPI, omperazole, recently increased to 40mg daily by PCP. On Viokace. Last dose of carafate today as its at the end of 30 days. Getting IV infusions. Had hives during a recent IV infusion, she thinks related to the soap she washed her hand with that day. Abdominal pain worse in the evening.     Discussed symptoms. Asked that she report any additional incidents of diarrhea.     ML

## 2024-09-23 ENCOUNTER — HOSPITAL ENCOUNTER (EMERGENCY)
Facility: CLINIC | Age: 74
Discharge: HOME | End: 2024-09-23
Attending: STUDENT IN AN ORGANIZED HEALTH CARE EDUCATION/TRAINING PROGRAM | Admitting: STUDENT IN AN ORGANIZED HEALTH CARE EDUCATION/TRAINING PROGRAM
Payer: COMMERCIAL

## 2024-09-23 ENCOUNTER — APPOINTMENT (OUTPATIENT)
Dept: CT IMAGING | Facility: CLINIC | Age: 74
End: 2024-09-23
Attending: STUDENT IN AN ORGANIZED HEALTH CARE EDUCATION/TRAINING PROGRAM
Payer: COMMERCIAL

## 2024-09-23 VITALS
DIASTOLIC BLOOD PRESSURE: 69 MMHG | RESPIRATION RATE: 18 BRPM | SYSTOLIC BLOOD PRESSURE: 118 MMHG | TEMPERATURE: 97.8 F | HEART RATE: 55 BPM | OXYGEN SATURATION: 98 %

## 2024-09-23 DIAGNOSIS — R10.9 ABDOMINAL PAIN, UNSPECIFIED ABDOMINAL LOCATION: ICD-10-CM

## 2024-09-23 LAB
ALBUMIN SERPL BCG-MCNC: 4.1 G/DL (ref 3.5–5.2)
ALBUMIN UR-MCNC: NEGATIVE MG/DL
ALP SERPL-CCNC: 147 U/L (ref 40–150)
ALT SERPL W P-5'-P-CCNC: 19 U/L (ref 0–50)
ANION GAP SERPL CALCULATED.3IONS-SCNC: 10 MMOL/L (ref 7–15)
APPEARANCE UR: CLEAR
AST SERPL W P-5'-P-CCNC: 33 U/L (ref 0–45)
BACTERIA #/AREA URNS HPF: ABNORMAL /HPF
BASOPHILS # BLD AUTO: 0 10E3/UL (ref 0–0.2)
BASOPHILS NFR BLD AUTO: 0 %
BILIRUB SERPL-MCNC: 0.6 MG/DL
BILIRUB UR QL STRIP: NEGATIVE
BUN SERPL-MCNC: 13.2 MG/DL (ref 8–23)
CALCIUM SERPL-MCNC: 8.8 MG/DL (ref 8.8–10.4)
CHLORIDE SERPL-SCNC: 102 MMOL/L (ref 98–107)
COLOR UR AUTO: ABNORMAL
CREAT SERPL-MCNC: 0.87 MG/DL (ref 0.51–0.95)
EGFRCR SERPLBLD CKD-EPI 2021: 70 ML/MIN/1.73M2
EOSINOPHIL # BLD AUTO: 0.1 10E3/UL (ref 0–0.7)
EOSINOPHIL NFR BLD AUTO: 1 %
ERYTHROCYTE [DISTWIDTH] IN BLOOD BY AUTOMATED COUNT: 15.4 % (ref 10–15)
GLUCOSE SERPL-MCNC: 107 MG/DL (ref 70–99)
GLUCOSE UR STRIP-MCNC: NEGATIVE MG/DL
HCO3 SERPL-SCNC: 26 MMOL/L (ref 22–29)
HCT VFR BLD AUTO: 39.2 % (ref 35–47)
HGB BLD-MCNC: 12.4 G/DL (ref 11.7–15.7)
HGB UR QL STRIP: NEGATIVE
IMM GRANULOCYTES # BLD: 0 10E3/UL
IMM GRANULOCYTES NFR BLD: 0 %
INR PPP: 1.04 (ref 0.85–1.15)
KETONES UR STRIP-MCNC: NEGATIVE MG/DL
LEUKOCYTE ESTERASE UR QL STRIP: NEGATIVE
LIPASE SERPL-CCNC: 33 U/L (ref 13–60)
LYMPHOCYTES # BLD AUTO: 1.5 10E3/UL (ref 0.8–5.3)
LYMPHOCYTES NFR BLD AUTO: 17 %
MCH RBC QN AUTO: 28.5 PG (ref 26.5–33)
MCHC RBC AUTO-ENTMCNC: 31.6 G/DL (ref 31.5–36.5)
MCV RBC AUTO: 90 FL (ref 78–100)
MONOCYTES # BLD AUTO: 0.5 10E3/UL (ref 0–1.3)
MONOCYTES NFR BLD AUTO: 6 %
MUCOUS THREADS #/AREA URNS LPF: PRESENT /LPF
NEUTROPHILS # BLD AUTO: 6.9 10E3/UL (ref 1.6–8.3)
NEUTROPHILS NFR BLD AUTO: 76 %
NITRATE UR QL: NEGATIVE
NRBC # BLD AUTO: 0 10E3/UL
NRBC BLD AUTO-RTO: 0 /100
PH UR STRIP: 7 [PH] (ref 5–7)
PLATELET # BLD AUTO: 342 10E3/UL (ref 150–450)
POTASSIUM SERPL-SCNC: 4.4 MMOL/L (ref 3.4–5.3)
PROT SERPL-MCNC: 7.5 G/DL (ref 6.4–8.3)
RBC # BLD AUTO: 4.35 10E6/UL (ref 3.8–5.2)
RBC URINE: <1 /HPF
SODIUM SERPL-SCNC: 138 MMOL/L (ref 135–145)
SP GR UR STRIP: 1.01 (ref 1–1.03)
SQUAMOUS EPITHELIAL: 3 /HPF
UROBILINOGEN UR STRIP-MCNC: NORMAL MG/DL
WBC # BLD AUTO: 9 10E3/UL (ref 4–11)
WBC URINE: 1 /HPF

## 2024-09-23 PROCEDURE — 96374 THER/PROPH/DIAG INJ IV PUSH: CPT | Mod: 59 | Performed by: STUDENT IN AN ORGANIZED HEALTH CARE EDUCATION/TRAINING PROGRAM

## 2024-09-23 PROCEDURE — 250N000013 HC RX MED GY IP 250 OP 250 PS 637: Performed by: STUDENT IN AN ORGANIZED HEALTH CARE EDUCATION/TRAINING PROGRAM

## 2024-09-23 PROCEDURE — 85041 AUTOMATED RBC COUNT: CPT | Performed by: STUDENT IN AN ORGANIZED HEALTH CARE EDUCATION/TRAINING PROGRAM

## 2024-09-23 PROCEDURE — 99285 EMERGENCY DEPT VISIT HI MDM: CPT | Performed by: STUDENT IN AN ORGANIZED HEALTH CARE EDUCATION/TRAINING PROGRAM

## 2024-09-23 PROCEDURE — 80053 COMPREHEN METABOLIC PANEL: CPT | Performed by: STUDENT IN AN ORGANIZED HEALTH CARE EDUCATION/TRAINING PROGRAM

## 2024-09-23 PROCEDURE — 250N000011 HC RX IP 250 OP 636

## 2024-09-23 PROCEDURE — 96375 TX/PRO/DX INJ NEW DRUG ADDON: CPT | Performed by: STUDENT IN AN ORGANIZED HEALTH CARE EDUCATION/TRAINING PROGRAM

## 2024-09-23 PROCEDURE — 81001 URINALYSIS AUTO W/SCOPE: CPT | Performed by: STUDENT IN AN ORGANIZED HEALTH CARE EDUCATION/TRAINING PROGRAM

## 2024-09-23 PROCEDURE — 74177 CT ABD & PELVIS W/CONTRAST: CPT

## 2024-09-23 PROCEDURE — 85610 PROTHROMBIN TIME: CPT | Performed by: STUDENT IN AN ORGANIZED HEALTH CARE EDUCATION/TRAINING PROGRAM

## 2024-09-23 PROCEDURE — 83690 ASSAY OF LIPASE: CPT | Performed by: STUDENT IN AN ORGANIZED HEALTH CARE EDUCATION/TRAINING PROGRAM

## 2024-09-23 PROCEDURE — 74177 CT ABD & PELVIS W/CONTRAST: CPT | Mod: 26 | Performed by: RADIOLOGY

## 2024-09-23 PROCEDURE — 99285 EMERGENCY DEPT VISIT HI MDM: CPT | Mod: 25 | Performed by: STUDENT IN AN ORGANIZED HEALTH CARE EDUCATION/TRAINING PROGRAM

## 2024-09-23 PROCEDURE — 36415 COLL VENOUS BLD VENIPUNCTURE: CPT | Performed by: STUDENT IN AN ORGANIZED HEALTH CARE EDUCATION/TRAINING PROGRAM

## 2024-09-23 PROCEDURE — 250N000011 HC RX IP 250 OP 636: Performed by: STUDENT IN AN ORGANIZED HEALTH CARE EDUCATION/TRAINING PROGRAM

## 2024-09-23 RX ORDER — OXYCODONE HYDROCHLORIDE 5 MG/1
5 TABLET ORAL EVERY 6 HOURS PRN
Qty: 12 TABLET | Refills: 0 | Status: SHIPPED | OUTPATIENT
Start: 2024-09-23 | End: 2024-09-26

## 2024-09-23 RX ORDER — OXYCODONE HYDROCHLORIDE 5 MG/1
5 TABLET ORAL ONCE
Status: COMPLETED | OUTPATIENT
Start: 2024-09-23 | End: 2024-09-23

## 2024-09-23 RX ORDER — MORPHINE SULFATE 4 MG/ML
4 INJECTION, SOLUTION INTRAMUSCULAR; INTRAVENOUS
Status: COMPLETED | OUTPATIENT
Start: 2024-09-23 | End: 2024-09-23

## 2024-09-23 RX ORDER — IOPAMIDOL 755 MG/ML
95 INJECTION, SOLUTION INTRAVASCULAR ONCE
Status: COMPLETED | OUTPATIENT
Start: 2024-09-23 | End: 2024-09-23

## 2024-09-23 RX ORDER — HYDROMORPHONE HYDROCHLORIDE 1 MG/ML
0.5 INJECTION, SOLUTION INTRAMUSCULAR; INTRAVENOUS; SUBCUTANEOUS ONCE
Status: DISCONTINUED | OUTPATIENT
Start: 2024-09-23 | End: 2024-09-23

## 2024-09-23 RX ORDER — ONDANSETRON 2 MG/ML
4 INJECTION INTRAMUSCULAR; INTRAVENOUS EVERY 30 MIN PRN
Status: DISCONTINUED | OUTPATIENT
Start: 2024-09-23 | End: 2024-09-23 | Stop reason: HOSPADM

## 2024-09-23 RX ADMIN — MORPHINE SULFATE 4 MG: 4 INJECTION INTRAVENOUS at 14:52

## 2024-09-23 RX ADMIN — ONDANSETRON 4 MG: 2 INJECTION INTRAMUSCULAR; INTRAVENOUS at 14:52

## 2024-09-23 RX ADMIN — OXYCODONE HYDROCHLORIDE 5 MG: 5 TABLET ORAL at 16:17

## 2024-09-23 RX ADMIN — IOPAMIDOL 95 ML: 755 INJECTION, SOLUTION INTRAVENOUS at 17:23

## 2024-09-23 ASSESSMENT — ACTIVITIES OF DAILY LIVING (ADL)
ADLS_ACUITY_SCORE: 36

## 2024-09-23 ASSESSMENT — COLUMBIA-SUICIDE SEVERITY RATING SCALE - C-SSRS
2. HAVE YOU ACTUALLY HAD ANY THOUGHTS OF KILLING YOURSELF IN THE PAST MONTH?: NO
6. HAVE YOU EVER DONE ANYTHING, STARTED TO DO ANYTHING, OR PREPARED TO DO ANYTHING TO END YOUR LIFE?: NO
1. IN THE PAST MONTH, HAVE YOU WISHED YOU WERE DEAD OR WISHED YOU COULD GO TO SLEEP AND NOT WAKE UP?: NO

## 2024-09-23 NOTE — ED PROVIDER NOTES
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    Patient reports that she started experiencing abdominal pain and vomiting last night.  The pain radiates to her back and she notes that she has been vomiting bile.  She denies fever.    Patient reports that she had a Whipple procedure at [facility] in 2019, where they took one third of her pancreas. After this procedure, she has had chronic pancreatitis leading to several hospitalizations.  She reports that on 8/20/2024 her doctor attempted to place a stent in her pancreatic duct, but this procedure was not completed because she had gastritis.  She reports she was put on omeprazole and cephalexin to heal her gastritis.      Past Medical History  Past Medical History:   Diagnosis Date    Asthma     pt.states no longer has symptoms    Atherosclerosis     CAD (coronary artery disease)     HLD (hyperlipidemia)      Past Surgical History:   Procedure Laterality Date    APPENDECTOMY      ARTHROPLASTY HIP Left 6/15/2016    Procedure: ARTHROPLASTY HIP;  Surgeon: Jeffy Wolff MD;  Location: [facility] OR    COLONOSCOPY  10/3/2013    Procedure: COMBINED COLONOSCOPY, SINGLE BIOPSY/POLYPECTOMY BY BIOPSY;;  Surgeon: Kenney Walls MD;  Location:  GI    ENDOSCOPIC RETROGRADE CHOLANGIOPANCREATOGRAM N/A 6/16/2022    Procedure: ENDOSCOPIC RETROGRADE CHOLANGIOPANCREATOGRAPHY WITH PANCREATIC DUCT DILATION, DEBRIS REMOVAL AND STENT PLACEMENT;  Surgeon: Arnaldo Noguera MD;  Location: [facility] OR    ENDOSCOPIC RETROGRADE CHOLANGIOPANCREATOGRAM N/A 10/13/2022    Procedure: ENDOSCOPIC RETROGRADE  CHOLANGIOPANCREATOGRAPHY, biliary stent placement;  Surgeon: Arnaldo Noguera MD;  Location: [facility] OR    ENDOSCOPIC RETROGRADE CHOLANGIOPANCREATOGRAM N/A 11/21/2022    Procedure: ENDOSCOPIC RETROGRADE CHOLANGIOPANCREATOGRAPHY, WITH TUBE OR STENT INSERTION, with balloon dialation;  Surgeon: Johnson Gotti MD;  Location: [facility]    ENDOSCOPIC RETROGRADE CHOLANGIOPANCREATOGRAM N/A 3/30/2023    Procedure: ENDOSCOPIC RETROGRADE CHOLANGIOPANCREATOGRAPHY with bile duct stents removed x2 and replacement of one pancreatic stent;  Surgeon: Arnaldo Noguera MD;  Location: [facility]    ENDOSCOPIC RETROGRADE CHOLANGIOPANCREATOGRAM N/A 8/20/2024    Procedure: ENTEROSCOPY ASSISTED ENDOSCOPIC RETROGRADE CHOLANGIOPANCREATOGRAPHY;  Surgeon: Arnaldo Noguera MD;  Location[facility]    ENDOSCOPIC RETROGRADE CHOLANGIOPANCREATOGRAPHY, EXCHANGE TUBE/STENT N/A 1/24/2023    Procedure: enteroscopy assisted ENDOSCOPIC RETROGRADE CHOLANGIOPANCREATOGRAPHY, with pancreatic stents replaced times 2;  Surgeon: Arnaldo Noguera MD;  Location[facility]    ENDOSCOPIC ULTRASOUND UPPER GASTROINTESTINAL TRACT (GI) N/A 5/12/2022    Procedure: ENDOSCOPIC ULTRASOUND WITH PANCREATICOGASTROSTOMY CREATION, TRACT DILATION, STENT PLACEMENT;  Surgeon: Romaine Oates MD;  [facility]    ESOPHAGOSCOPY, GASTROSCOPY, DUODENOSCOPY (EGD), COMBINED N/A 5/12/2022    Procedure: ESOPHAGOGASTRODUODENOSCOPY WITH ENDOSCOPIC CLIP PLACEMENT;  Surgeon: Arnaldo Noguera MD;  Location: [facility] OR    FOOT SURGERY      HC TOOTH EXTRACTION W/FORCEP      HYSTERECTOMY      INCISION AND DRAINAGE BREAST Left 2/18/2022    Procedure: evacuate hematoma left  BREAST;  Surgeon: Dayron Garcia MD;  Location: [facility] OR    IR FOLLOW UP VISIT OUTPATIENT  5/31/2022    IR SINOGRAM INJECTION DIAGNOSTIC  5/31/2022     atorvastatin (LIPITOR) 40 MG tablet  cholecalciferol (VITAMIN D3) 25 mcg (1000 units) capsule  diazepam (VALIUM) 5 MG tablet  diazepam (VALIUM) 5 MG  tablet  estradiol (VIVELLE-DOT) 0.075 MG/24HR BIW patch  evolocumab (REPATHA) 140 MG/ML prefilled autoinjector  ezetimibe (ZETIA) 10 MG tablet  hydrOXYzine HCl (ATARAX) 25 MG tablet  lipase-protease-amylase (CREON) 82915-57899-172974 units CPEP per EC capsule  Magnesium Ascorbate POWD  montelukast (SINGULAIR) 10 MG tablet  oxyCODONE IR (ROXICODONE) 10 MG tablet  valACYclovir (VALTREX) 1000 mg tablet  VIOKACE 84794-07600 units TABS per tablet  vitamin A 3 MG (57794 UNITS) capsule  zolpidem (AMBIEN) 10 MG tablet      No Known Allergies  Family History  No family history on file.  Social History   Social History     Tobacco Use    Smoking status: Former     Current packs/day: 0.00     Types: Cigarettes     Quit date: 10/3/1988     Years since quittin.9     Passive exposure: Never    Smokeless tobacco: Never   Substance Use Topics    Alcohol use: No    Drug use: No      A medically appropriate review of systems was performed with pertinent positives and negatives noted in the HPI, and all other systems negative.    Physical Exam   BP: 118/67  Pulse: 64  Temp: 98.1  F (36.7  C)  Resp: 18  SpO2: 98 %  Physical Exam  Constitutional:       General: She is not in acute distress.     Appearance: Normal appearance. She is not diaphoretic.   HENT:      Head: Atraumatic.      Mouth/Throat:      Mouth: Mucous membranes are moist.   Eyes:      General: No scleral icterus.     Conjunctiva/sclera: Conjunctivae normal.   Cardiovascular:      Rate and Rhythm: Normal rate.      Heart sounds: Normal heart sounds.   Pulmonary:      Effort: No respiratory distress.      Breath sounds: Normal breath sounds.   Abdominal:      General: Abdomen is flat.      Comments: Abdomen soft and nondistended with mild right upper quadrant tenderness to palpation   Musculoskeletal:      Cervical back: Neck supple.   Skin:     General: Skin is warm.      Findings: No rash.   Neurological:      Mental Status: She is alert.           ED Course,  Procedures, & Data      Procedures                No results found for any visits on 09/23/24.  Medications - No data to display  Labs Ordered and Resulted from Time of ED Arrival to Time of ED Departure - No data to display  No orders to display          Critical care was not performed.     Medical Decision Making  The patient's presentation was of high complexity (an acute health issue posing potential threat to life or bodily function).    The patient's evaluation involved:  review of external note(s) from 3+ sources (see separate area of note for details)  review of 3+ test result(s) ordered prior to this encounter (see separate area of note for details)  ordering and/or review of 3+ test(s) in this encounter (see separate area of note for details)  discussion of management or test interpretation with another health professional (Dr. Mcintosh, gastroenterology)    The patient's management necessitated high risk (ordering review of labs, CT scan, discussion with consultant, disposition home with narcotic pain medications).    Assessment & Plan    Labs as reviewed and interpreted by me are reassuring with a reassuring CBC, CMP, UA, lipase  CT scan on prelim read is reassuring, shows mild prominence of pancreatic duct  Discussed case with patient's GI doctor Dr. Noguera who reviewed case and recommended discharge patient home, he will follow-up with them as scheduled for ERCP, and recommended narcotic pain medication  Discussed case with patient at this point given her reassuring CT scan, reassuring labs, reassuring vital signs and reassuring physical exam less likely patient with acute life threatening emergency at this point  Patient discharged home after questions answered with strict return precaution to come back to the emergency room for new or worsening symptoms as well as pain medications and instructed to follow-up with GI scheduled.    I have reviewed the nursing notes. I have reviewed the findings,  diagnosis, plan and need for follow up with the patient.    New Prescriptions    No medications on file       Final diagnoses:   None   I, Ruthie Dumont, am serving as a trained medical scribe to document services personally performed by Rinku Ahn MD, based on the provider's statements to me.     Rinku ARAIZA MD, was physically present and have reviewed and verified the accuracy of this note documented by Ruthie Dumont.     Rinku Ahn MD  [facility] EMERGENCY DEPARTMENT  9/23/2024     Rinku Ahn MD  09/23/24 9296

## 2024-09-23 NOTE — DISCHARGE INSTRUCTIONS
Here in the emergency room your labs and the prelim read of your CT scan were reassuring  As we discussed I encourage you to follow-up on the final read of your CT scan at home online  We prescribed you a pain medication to your pharmacy  We did speak with Dr. Noguera, so please make the appointment that you already have with him  If you have any new or worsening symptoms please return immediately to the emergency room

## 2024-09-23 NOTE — ED TRIAGE NOTES
Arrives ambulatory with upper bilateral abdominal pain. Patient reports last night she vomited bile x2.    Hx pancreatitis      Triage Assessment (Adult)       Row Name 09/23/24 1401          Triage Assessment    Airway WDL WDL        Respiratory WDL    Respiratory WDL WDL        Skin Circulation/Temperature WDL    Skin Circulation/Temperature WDL WDL        Cardiac WDL    Cardiac WDL WDL        Peripheral/Neurovascular WDL    Peripheral Neurovascular WDL WDL        Cognitive/Neuro/Behavioral WDL    Cognitive/Neuro/Behavioral WDL WDL

## 2024-10-01 ENCOUNTER — ANESTHESIA (OUTPATIENT)
Dept: SURGERY | Facility: CLINIC | Age: 74
End: 2024-10-01
Payer: COMMERCIAL

## 2024-10-01 ENCOUNTER — ANESTHESIA EVENT (OUTPATIENT)
Dept: SURGERY | Facility: CLINIC | Age: 74
End: 2024-10-01
Payer: COMMERCIAL

## 2024-10-01 ENCOUNTER — APPOINTMENT (OUTPATIENT)
Dept: GENERAL RADIOLOGY | Facility: CLINIC | Age: 74
End: 2024-10-01
Attending: INTERNAL MEDICINE
Payer: COMMERCIAL

## 2024-10-01 ENCOUNTER — HOSPITAL ENCOUNTER (OUTPATIENT)
Facility: CLINIC | Age: 74
Discharge: HOME | End: 2024-10-01
Attending: INTERNAL MEDICINE | Admitting: INTERNAL MEDICINE
Payer: COMMERCIAL

## 2024-10-01 VITALS
OXYGEN SATURATION: 93 % | SYSTOLIC BLOOD PRESSURE: 126 MMHG | WEIGHT: 154.54 LBS | DIASTOLIC BLOOD PRESSURE: 63 MMHG | TEMPERATURE: 97.6 F | BODY MASS INDEX: 23.42 KG/M2 | HEART RATE: 58 BPM | RESPIRATION RATE: 18 BRPM | HEIGHT: 68 IN

## 2024-10-01 DIAGNOSIS — G89.18 PAIN FOLLOWING SURGERY OR PROCEDURE: Primary | ICD-10-CM

## 2024-10-01 LAB
ALBUMIN SERPL BCG-MCNC: 3.9 G/DL (ref 3.5–5.2)
ALP SERPL-CCNC: 195 U/L (ref 40–150)
ALT SERPL W P-5'-P-CCNC: 17 U/L (ref 0–50)
ANION GAP SERPL CALCULATED.3IONS-SCNC: 10 MMOL/L (ref 7–15)
AST SERPL W P-5'-P-CCNC: 35 U/L (ref 0–45)
BILIRUB SERPL-MCNC: 0.4 MG/DL
BUN SERPL-MCNC: 15.2 MG/DL (ref 8–23)
CALCIUM SERPL-MCNC: 9.2 MG/DL (ref 8.8–10.4)
CHLORIDE SERPL-SCNC: 104 MMOL/L (ref 98–107)
CREAT SERPL-MCNC: 0.83 MG/DL (ref 0.51–0.95)
EGFRCR SERPLBLD CKD-EPI 2021: 74 ML/MIN/1.73M2
ERCP: NORMAL
ERYTHROCYTE [DISTWIDTH] IN BLOOD BY AUTOMATED COUNT: 15.5 % (ref 10–15)
GLUCOSE SERPL-MCNC: 99 MG/DL (ref 70–99)
HCO3 SERPL-SCNC: 25 MMOL/L (ref 22–29)
HCT VFR BLD AUTO: 39.5 % (ref 35–47)
HGB BLD-MCNC: 12.4 G/DL (ref 11.7–15.7)
INR PPP: 0.99 (ref 0.85–1.15)
LIPASE SERPL-CCNC: 430 U/L (ref 13–60)
MCH RBC QN AUTO: 28.4 PG (ref 26.5–33)
MCHC RBC AUTO-ENTMCNC: 31.4 G/DL (ref 31.5–36.5)
MCV RBC AUTO: 90 FL (ref 78–100)
PLATELET # BLD AUTO: 327 10E3/UL (ref 150–450)
POTASSIUM SERPL-SCNC: 4.5 MMOL/L (ref 3.4–5.3)
PROT SERPL-MCNC: 7.6 G/DL (ref 6.4–8.3)
RBC # BLD AUTO: 4.37 10E6/UL (ref 3.8–5.2)
SODIUM SERPL-SCNC: 139 MMOL/L (ref 135–145)
WBC # BLD AUTO: 8.3 10E3/UL (ref 4–11)

## 2024-10-01 PROCEDURE — 43260 ERCP W/SPECIMEN COLLECTION: CPT | Performed by: STUDENT IN AN ORGANIZED HEALTH CARE EDUCATION/TRAINING PROGRAM

## 2024-10-01 PROCEDURE — 250N000011 HC RX IP 250 OP 636

## 2024-10-01 PROCEDURE — 85610 PROTHROMBIN TIME: CPT | Performed by: INTERNAL MEDICINE

## 2024-10-01 PROCEDURE — 250N000009 HC RX 250: Performed by: STUDENT IN AN ORGANIZED HEALTH CARE EDUCATION/TRAINING PROGRAM

## 2024-10-01 PROCEDURE — 80053 COMPREHEN METABOLIC PANEL: CPT | Performed by: INTERNAL MEDICINE

## 2024-10-01 PROCEDURE — 258N000003 HC RX IP 258 OP 636: Performed by: STUDENT IN AN ORGANIZED HEALTH CARE EDUCATION/TRAINING PROGRAM

## 2024-10-01 PROCEDURE — 710N000009 HC RECOVERY PHASE 1, LEVEL 1, PER MIN: Performed by: INTERNAL MEDICINE

## 2024-10-01 PROCEDURE — 360N000083 HC SURGERY LEVEL 3 W/ FLUORO, PER MIN: Performed by: INTERNAL MEDICINE

## 2024-10-01 PROCEDURE — 250N000009 HC RX 250: Performed by: INTERNAL MEDICINE

## 2024-10-01 PROCEDURE — 250N000009 HC RX 250

## 2024-10-01 PROCEDURE — 250N000013 HC RX MED GY IP 250 OP 250 PS 637: Performed by: STUDENT IN AN ORGANIZED HEALTH CARE EDUCATION/TRAINING PROGRAM

## 2024-10-01 PROCEDURE — 710N000012 HC RECOVERY PHASE 2, PER MINUTE: Performed by: INTERNAL MEDICINE

## 2024-10-01 PROCEDURE — 250N000011 HC RX IP 250 OP 636: Performed by: STUDENT IN AN ORGANIZED HEALTH CARE EDUCATION/TRAINING PROGRAM

## 2024-10-01 PROCEDURE — 272N000001 HC OR GENERAL SUPPLY STERILE: Performed by: INTERNAL MEDICINE

## 2024-10-01 PROCEDURE — 99100 ANES PT EXTEME AGE<1 YR&>70: CPT

## 2024-10-01 PROCEDURE — 99100 ANES PT EXTEME AGE<1 YR&>70: CPT | Performed by: STUDENT IN AN ORGANIZED HEALTH CARE EDUCATION/TRAINING PROGRAM

## 2024-10-01 PROCEDURE — 370N000017 HC ANESTHESIA TECHNICAL FEE, PER MIN: Performed by: INTERNAL MEDICINE

## 2024-10-01 PROCEDURE — 999N000141 HC STATISTIC PRE-PROCEDURE NURSING ASSESSMENT: Performed by: INTERNAL MEDICINE

## 2024-10-01 PROCEDURE — 36415 COLL VENOUS BLD VENIPUNCTURE: CPT | Performed by: INTERNAL MEDICINE

## 2024-10-01 PROCEDURE — 83690 ASSAY OF LIPASE: CPT | Performed by: INTERNAL MEDICINE

## 2024-10-01 PROCEDURE — 85027 COMPLETE CBC AUTOMATED: CPT | Performed by: INTERNAL MEDICINE

## 2024-10-01 PROCEDURE — C1769 GUIDE WIRE: HCPCS | Performed by: INTERNAL MEDICINE

## 2024-10-01 PROCEDURE — 255N000002 HC RX 255 OP 636: Performed by: INTERNAL MEDICINE

## 2024-10-01 PROCEDURE — C1726 CATH, BAL DIL, NON-VASCULAR: HCPCS | Performed by: INTERNAL MEDICINE

## 2024-10-01 PROCEDURE — 43260 ERCP W/SPECIMEN COLLECTION: CPT

## 2024-10-01 PROCEDURE — 999N000181 XR SURGERY CARM FLUORO GREATER THAN 5 MIN W STILLS: Mod: TC

## 2024-10-01 PROCEDURE — C1877 STENT, NON-COAT/COV W/O DEL: HCPCS | Performed by: INTERNAL MEDICINE

## 2024-10-01 PROCEDURE — 250N000025 HC SEVOFLURANE, PER MIN: Performed by: INTERNAL MEDICINE

## 2024-10-01 DEVICE — STENT FREEMAN PANCREA FLEX 7FRX7CM SGL PIGTAIL: Type: IMPLANTABLE DEVICE | Site: PANCREATIC DUCT | Status: FUNCTIONAL

## 2024-10-01 RX ORDER — OXYCODONE HYDROCHLORIDE 5 MG/1
5 TABLET ORAL EVERY 6 HOURS PRN
Qty: 12 TABLET | Refills: 0 | Status: SHIPPED | OUTPATIENT
Start: 2024-10-01 | End: 2024-10-01

## 2024-10-01 RX ORDER — OXYCODONE HYDROCHLORIDE 5 MG/1
5 TABLET ORAL EVERY 6 HOURS PRN
COMMUNITY

## 2024-10-01 RX ORDER — FENTANYL CITRATE 50 UG/ML
50 INJECTION, SOLUTION INTRAMUSCULAR; INTRAVENOUS EVERY 5 MIN PRN
Status: DISCONTINUED | OUTPATIENT
Start: 2024-10-01 | End: 2024-10-01 | Stop reason: HOSPADM

## 2024-10-01 RX ORDER — SODIUM CHLORIDE, SODIUM LACTATE, POTASSIUM CHLORIDE, CALCIUM CHLORIDE 600; 310; 30; 20 MG/100ML; MG/100ML; MG/100ML; MG/100ML
INJECTION, SOLUTION INTRAVENOUS CONTINUOUS PRN
Status: DISCONTINUED | OUTPATIENT
Start: 2024-10-01 | End: 2024-10-01

## 2024-10-01 RX ORDER — EPHEDRINE SULFATE 50 MG/ML
INJECTION, SOLUTION INTRAMUSCULAR; INTRAVENOUS; SUBCUTANEOUS PRN
Status: DISCONTINUED | OUTPATIENT
Start: 2024-10-01 | End: 2024-10-01

## 2024-10-01 RX ORDER — SODIUM CHLORIDE, SODIUM LACTATE, POTASSIUM CHLORIDE, CALCIUM CHLORIDE 600; 310; 30; 20 MG/100ML; MG/100ML; MG/100ML; MG/100ML
INJECTION, SOLUTION INTRAVENOUS CONTINUOUS
Status: DISCONTINUED | OUTPATIENT
Start: 2024-10-01 | End: 2024-10-01 | Stop reason: HOSPADM

## 2024-10-01 RX ORDER — ONDANSETRON 4 MG/1
4 TABLET, ORALLY DISINTEGRATING ORAL EVERY 30 MIN PRN
Status: DISCONTINUED | OUTPATIENT
Start: 2024-10-01 | End: 2024-10-01 | Stop reason: HOSPADM

## 2024-10-01 RX ORDER — FENTANYL CITRATE 50 UG/ML
25 INJECTION, SOLUTION INTRAMUSCULAR; INTRAVENOUS EVERY 5 MIN PRN
Status: DISCONTINUED | OUTPATIENT
Start: 2024-10-01 | End: 2024-10-01 | Stop reason: HOSPADM

## 2024-10-01 RX ORDER — ONDANSETRON 2 MG/ML
4 INJECTION INTRAMUSCULAR; INTRAVENOUS EVERY 30 MIN PRN
Status: DISCONTINUED | OUTPATIENT
Start: 2024-10-01 | End: 2024-10-01 | Stop reason: HOSPADM

## 2024-10-01 RX ORDER — INDOMETHACIN 50 MG/1
SUPPOSITORY RECTAL PRN
Status: DISCONTINUED | OUTPATIENT
Start: 2024-10-01 | End: 2024-10-01 | Stop reason: HOSPADM

## 2024-10-01 RX ORDER — DEXAMETHASONE SODIUM PHOSPHATE 4 MG/ML
INJECTION, SOLUTION INTRA-ARTICULAR; INTRALESIONAL; INTRAMUSCULAR; INTRAVENOUS; SOFT TISSUE PRN
Status: DISCONTINUED | OUTPATIENT
Start: 2024-10-01 | End: 2024-10-01

## 2024-10-01 RX ORDER — ONDANSETRON 2 MG/ML
INJECTION INTRAMUSCULAR; INTRAVENOUS PRN
Status: DISCONTINUED | OUTPATIENT
Start: 2024-10-01 | End: 2024-10-01

## 2024-10-01 RX ORDER — NALOXONE HYDROCHLORIDE 0.4 MG/ML
0.1 INJECTION, SOLUTION INTRAMUSCULAR; INTRAVENOUS; SUBCUTANEOUS
Status: DISCONTINUED | OUTPATIENT
Start: 2024-10-01 | End: 2024-10-01 | Stop reason: HOSPADM

## 2024-10-01 RX ORDER — PROPOFOL 10 MG/ML
INJECTION, EMULSION INTRAVENOUS PRN
Status: DISCONTINUED | OUTPATIENT
Start: 2024-10-01 | End: 2024-10-01

## 2024-10-01 RX ORDER — LIDOCAINE HYDROCHLORIDE 20 MG/ML
INJECTION, SOLUTION INFILTRATION; PERINEURAL PRN
Status: DISCONTINUED | OUTPATIENT
Start: 2024-10-01 | End: 2024-10-01

## 2024-10-01 RX ORDER — HYDROMORPHONE HCL IN WATER/PF 6 MG/30 ML
0.4 PATIENT CONTROLLED ANALGESIA SYRINGE INTRAVENOUS EVERY 5 MIN PRN
Status: DISCONTINUED | OUTPATIENT
Start: 2024-10-01 | End: 2024-10-01 | Stop reason: HOSPADM

## 2024-10-01 RX ORDER — OXYCODONE HYDROCHLORIDE 5 MG/1
5 TABLET ORAL EVERY 6 HOURS PRN
Qty: 12 TABLET | Refills: 0 | Status: SHIPPED | OUTPATIENT
Start: 2024-10-01

## 2024-10-01 RX ORDER — FENTANYL CITRATE 50 UG/ML
INJECTION, SOLUTION INTRAMUSCULAR; INTRAVENOUS PRN
Status: DISCONTINUED | OUTPATIENT
Start: 2024-10-01 | End: 2024-10-01

## 2024-10-01 RX ORDER — ONDANSETRON 4 MG/1
4 TABLET, ORALLY DISINTEGRATING ORAL EVERY 6 HOURS PRN
Status: DISCONTINUED | OUTPATIENT
Start: 2024-10-01 | End: 2024-10-01 | Stop reason: HOSPADM

## 2024-10-01 RX ORDER — INDOMETHACIN 50 MG/1
100 SUPPOSITORY RECTAL
Status: DISCONTINUED | OUTPATIENT
Start: 2024-10-01 | End: 2024-10-01 | Stop reason: HOSPADM

## 2024-10-01 RX ORDER — LIDOCAINE 40 MG/G
CREAM TOPICAL
Status: DISCONTINUED | OUTPATIENT
Start: 2024-10-01 | End: 2024-10-01 | Stop reason: HOSPADM

## 2024-10-01 RX ORDER — HYDROMORPHONE HCL IN WATER/PF 6 MG/30 ML
0.2 PATIENT CONTROLLED ANALGESIA SYRINGE INTRAVENOUS EVERY 5 MIN PRN
Status: DISCONTINUED | OUTPATIENT
Start: 2024-10-01 | End: 2024-10-01 | Stop reason: HOSPADM

## 2024-10-01 RX ORDER — ONDANSETRON 2 MG/ML
4 INJECTION INTRAMUSCULAR; INTRAVENOUS EVERY 6 HOURS PRN
Status: DISCONTINUED | OUTPATIENT
Start: 2024-10-01 | End: 2024-10-01 | Stop reason: HOSPADM

## 2024-10-01 RX ORDER — FLUMAZENIL 0.1 MG/ML
0.2 INJECTION, SOLUTION INTRAVENOUS
Status: DISCONTINUED | OUTPATIENT
Start: 2024-10-01 | End: 2024-10-01 | Stop reason: HOSPADM

## 2024-10-01 RX ORDER — OXYCODONE HYDROCHLORIDE 5 MG/1
5 TABLET ORAL
Status: COMPLETED | OUTPATIENT
Start: 2024-10-01 | End: 2024-10-01

## 2024-10-01 RX ORDER — IOPAMIDOL 510 MG/ML
INJECTION, SOLUTION INTRAVASCULAR PRN
Status: DISCONTINUED | OUTPATIENT
Start: 2024-10-01 | End: 2024-10-01 | Stop reason: HOSPADM

## 2024-10-01 RX ADMIN — FENTANYL CITRATE 50 MCG: 50 INJECTION, SOLUTION INTRAMUSCULAR; INTRAVENOUS at 14:12

## 2024-10-01 RX ADMIN — FENTANYL CITRATE 50 MCG: 50 INJECTION, SOLUTION INTRAMUSCULAR; INTRAVENOUS at 13:54

## 2024-10-01 RX ADMIN — FENTANYL CITRATE 25 MCG: 50 INJECTION INTRAMUSCULAR; INTRAVENOUS at 13:30

## 2024-10-01 RX ADMIN — PROPOFOL 100 MG: 10 INJECTION, EMULSION INTRAVENOUS at 12:38

## 2024-10-01 RX ADMIN — SODIUM CHLORIDE, POTASSIUM CHLORIDE, SODIUM LACTATE AND CALCIUM CHLORIDE: 600; 310; 30; 20 INJECTION, SOLUTION INTRAVENOUS at 12:28

## 2024-10-01 RX ADMIN — OXYCODONE HYDROCHLORIDE 5 MG: 5 TABLET ORAL at 15:13

## 2024-10-01 RX ADMIN — Medication 200 MG: at 13:34

## 2024-10-01 RX ADMIN — MIDAZOLAM 1 MG: 1 INJECTION INTRAMUSCULAR; INTRAVENOUS at 12:28

## 2024-10-01 RX ADMIN — ONDANSETRON 4 MG: 2 INJECTION INTRAMUSCULAR; INTRAVENOUS at 14:02

## 2024-10-01 RX ADMIN — FENTANYL CITRATE 50 MCG: 50 INJECTION INTRAMUSCULAR; INTRAVENOUS at 13:12

## 2024-10-01 RX ADMIN — ONDANSETRON 4 MG: 2 INJECTION INTRAMUSCULAR; INTRAVENOUS at 12:56

## 2024-10-01 RX ADMIN — EPHEDRINE SULFATE 10 MG: 5 INJECTION INTRAVENOUS at 13:04

## 2024-10-01 RX ADMIN — DEXAMETHASONE SODIUM PHOSPHATE 4 MG: 4 INJECTION, SOLUTION INTRA-ARTICULAR; INTRALESIONAL; INTRAMUSCULAR; INTRAVENOUS; SOFT TISSUE at 12:38

## 2024-10-01 RX ADMIN — LIDOCAINE HYDROCHLORIDE 80 MG: 20 INJECTION, SOLUTION INFILTRATION; PERINEURAL at 12:37

## 2024-10-01 RX ADMIN — Medication 50 MG: at 12:38

## 2024-10-01 RX ADMIN — FENTANYL CITRATE 100 MCG: 50 INJECTION INTRAMUSCULAR; INTRAVENOUS at 12:32

## 2024-10-01 RX ADMIN — FENTANYL CITRATE 25 MCG: 50 INJECTION INTRAMUSCULAR; INTRAVENOUS at 13:37

## 2024-10-01 ASSESSMENT — ACTIVITIES OF DAILY LIVING (ADL)
ADLS_ACUITY_SCORE: 18
ADLS_ACUITY_SCORE: 19
ADLS_ACUITY_SCORE: 18

## 2024-10-01 NOTE — ANESTHESIA PROCEDURE NOTES
Airway       Patient location during procedure: OR       Procedure Start/Stop Times: 10/1/2024 12:42 PM  Staff -        Anesthesiologist:  Cynthia Warren MD       Performed By: anesthesiologist  Consent for Airway        Urgency: elective  Indications and Patient Condition       Indications for airway management: amy-procedural       Induction type:intravenous       Mask difficulty assessment: 2 - vent by mask + OA or adjuvant +/- NMBA    Final Airway Details       Final airway type: endotracheal airway       Successful airway: ETT - single  Endotracheal Airway Details        ETT size (mm): 7.0       Cuffed: yes       Cuff volume (mL): 10       Successful intubation technique: direct laryngoscopy       DL Blade Type: MAC 3       Grade View of Cords: 1       Adjucts: stylet       Position: Right       Measured from: gums/teeth       Secured at (cm): 22       Bite block used: None    Post intubation assessment        Placement verified by: capnometry, equal breath sounds and chest rise        Number of attempts at approach: 1       Number of other approaches attempted: 0       Secured with: commercial tube [email or web address]       Ease of procedure: easy       Dentition: Intact and Unchanged       Dental guard used and removed.    Medication(s) Administered   Medication Administration Time: 10/1/2024 12:42 PM    Additional Comments       Intubated by Erin Moreno DMD

## 2024-10-01 NOTE — DISCHARGE INSTRUCTIONS
Contacting your Doctor -   To contact a doctor, call Dr Noguera's clinic at [phone]  or:  [phone] and ask for the resident on call for Gastroenterology (answered 24 hours a day)   Emergency Department:  [facility]: [phone]  [facility]: [phone] 911 if you are in need of immediate or emergent help

## 2024-10-01 NOTE — BRIEF OP NOTE
[facility]    Brief Operative Note    Pre-operative diagnosis: Recurrent acute pancreatitis [K85.90]  Post-operative diagnosis Same as pre-operative diagnosis    Procedure: ENDOSCOPIC RETROGRADE CHOLANGIOPANCREATOGRAPHY with pancreatic stent placement x1, N/A - Mouth    Surgeon: Surgeons and Role:     * Arnaldo Noguera MD - Primary     * Irais Shirley MD - Fellow - Assisting  Anesthesia: General   Estimated Blood Loss: None    Drains: None  Specimens: * No specimens in log *    Findings:     - Enteroscopy assisted ERCP with successful cannulation of PD   - Patent pancreaticojejunostomy with placement of 7 Fr x 7 cm Tillman plastic stent    Recommendations:   - Monitor patient in PACU before anticipated discharge   - Clear liquid diet   - Check lipase in 2 hours   - Obtain KUB in 4 weeks to check if stent has fallen out   - Will monitor clinical condition and aim for repeat ERCP for removal/exchange in 8-10 weeks if KUB shows stent is still in place     Complications: None.  Implants:   Implant Name Type Inv. Item Serial No.  Lot No. LRB No. Used Action   STENT ADVANIX PANCREA PIGTAIL [id number] - [id number] Stent STENT ADVANIX PANCREA PIGTAIL [id number]  [employer] [id number] N/A 1 Explanted   STENT NOGUERA PANCREA FLEX [id number] SGL PIGTAIL - [id number] Stent STENT NOGUERA PANCREA FLEX [id number] SGL PIGTAIL  [employer] [id number] N/A 1 Implanted

## 2024-10-01 NOTE — ANESTHESIA PREPROCEDURE EVALUATION
Anesthesia Pre-Procedure Evaluation    Patient: Laura Gonzalez   MRN: [id number] : 1950        Procedure : Procedure(s):  ENDOSCOPIC RETROGRADE CHOLANGIOPANCREATOGRAPHY          Past Medical History:   Diagnosis Date    Asthma     pt.states no longer has symptoms    Atherosclerosis     CAD (coronary artery disease)     HLD (hyperlipidemia)       Past Surgical History:   Procedure Laterality Date    APPENDECTOMY      ARTHROPLASTY HIP Left 6/15/2016    Procedure: ARTHROPLASTY HIP;  Surgeon: Jeffy Wolff MD;  Location: [facility]    COLONOSCOPY  10/3/2013    Procedure: COMBINED COLONOSCOPY, SINGLE BIOPSY/POLYPECTOMY BY BIOPSY;;  Surgeon: Kenney Walls MD;  Location: [facility]    ENDOSCOPIC RETROGRADE CHOLANGIOPANCREATOGRAM N/A 2022    Procedure: ENDOSCOPIC RETROGRADE CHOLANGIOPANCREATOGRAPHY WITH PANCREATIC DUCT DILATION, DEBRIS REMOVAL AND STENT PLACEMENT;  Surgeon: Arnaldo Noguera MD;  Location: [facility]    ENDOSCOPIC RETROGRADE CHOLANGIOPANCREATOGRAM N/A 10/13/2022    Procedure: ENDOSCOPIC RETROGRADE CHOLANGIOPANCREATOGRAPHY, biliary stent placement;  Surgeon: Arnaldo Noguera MD;  [facility]    ENDOSCOPIC RETROGRADE CHOLANGIOPANCREATOGRAM N/A 2022    Procedure: ENDOSCOPIC RETROGRADE CHOLANGIOPANCREATOGRAPHY, WITH TUBE OR STENT INSERTION, with balloon dialation;  Surgeon: Johnson Gotti MD;  [facility]    ENDOSCOPIC RETROGRADE CHOLANGIOPANCREATOGRAM N/A 3/30/2023    Procedure: ENDOSCOPIC RETROGRADE CHOLANGIOPANCREATOGRAPHY with bile duct stents removed x2 and replacement of one pancreatic stent;  Surgeon: Arnaldo Noguera MD;  [facility]    ENDOSCOPIC RETROGRADE CHOLANGIOPANCREATOGRAM N/A 2024    Procedure: ENTEROSCOPY ASSISTED ENDOSCOPIC RETROGRADE CHOLANGIOPANCREATOGRAPHY;  Surgeon: Arnaldo Noguera MD;  [facility]    ENDOSCOPIC RETROGRADE CHOLANGIOPANCREATOGRAPHY, EXCHANGE TUBE/STENT N/A 2023    Procedure: enteroscopy assisted ENDOSCOPIC  RETROGRADE CHOLANGIOPANCREATOGRAPHY, with pancreatic stents replaced times 2;  Surgeon: Arnaldo Noguera MD;  [facility]    ENDOSCOPIC ULTRASOUND UPPER GASTROINTESTINAL TRACT (GI) N/A 2022    Procedure: ENDOSCOPIC ULTRASOUND WITH PANCREATICOGASTROSTOMY CREATION, TRACT DILATION, STENT PLACEMENT;  Surgeon: Romaine Oates MD;  [facility]    ESOPHAGOSCOPY, GASTROSCOPY, DUODENOSCOPY (EGD), COMBINED N/A 2022    Procedure: ESOPHAGOGASTRODUODENOSCOPY WITH ENDOSCOPIC CLIP PLACEMENT;  Surgeon: Arnaldo Noguera MD;  [facility]    FOOT SURGERY      HC TOOTH EXTRACTION W/FORCEP      HYSTERECTOMY      INCISION AND DRAINAGE BREAST Left 2022    Procedure: evacuate hematoma left  BREAST;  Surgeon: Dayron Garcia MD;  Location: [facility]    IR FOLLOW UP VISIT OUTPATIENT  2022    IR SINOGRAM INJECTION DIAGNOSTIC  2022      No Known Allergies   Social History     Tobacco Use    Smoking status: Former     Current packs/day: 0.00     Types: Cigarettes     Quit date: 10/3/1988     Years since quittin.0     Passive exposure: Never    Smokeless tobacco: Never   Substance Use Topics    Alcohol use: No      Wt Readings from Last 1 Encounters:   10/01/24 70.1 kg (154 lb 8.7 oz)        Anesthesia Evaluation   Pt has had prior anesthetic. Type: General.        ROS/MED HX  ENT/Pulmonary:     (+)                      asthma                  Neurologic:       Cardiovascular:     (+) Dyslipidemia - -  CAD -  - -                                      METS/Exercise Tolerance: >4 METS    Hematologic:       Musculoskeletal:       GI/Hepatic: Comment: Acute on chronic pancreatitis  Hx of SBO    Denies any recent history of nausea/vomiting   (-) GERD   Renal/Genitourinary:       Endo:       Psychiatric/Substance Use:       Infectious Disease:       Malignancy:       Other:            Physical Exam    Airway        Mallampati: I   TM distance: > 3 FB   Neck ROM: full   Mouth opening: < 3  cm    Respiratory Devices and Support         Dental       (+) Minor Abnormalities - some fillings, tiny chips    B=Bridge, C=Chipped, L=Loose, M=Missing    Cardiovascular   cardiovascular exam normal          Pulmonary   pulmonary exam normal              OUTSIDE LABS:  CBC:   Lab Results   Component Value Date    WBC 8.3 10/01/2024    WBC 9.0 09/23/2024    HGB 12.4 10/01/2024    HGB 12.4 09/23/2024    HCT 39.5 10/01/2024    HCT 39.2 09/23/2024     10/01/2024     09/23/2024     BMP:   Lab Results   Component Value Date     09/23/2024     08/20/2024    POTASSIUM 4.4 09/23/2024    POTASSIUM 4.2 08/20/2024    CHLORIDE 102 09/23/2024    CHLORIDE 104 08/20/2024    CO2 26 09/23/2024    CO2 25 08/20/2024    BUN 13.2 09/23/2024    BUN 10.9 08/20/2024    CR 0.87 09/23/2024    CR 0.77 08/20/2024     (H) 09/23/2024     (H) 08/20/2024     COAGS:   Lab Results   Component Value Date    PTT 41 (H) 02/18/2022    INR 0.99 10/01/2024     POC:   Lab Results   Component Value Date     (H) 06/16/2016     HEPATIC:   Lab Results   Component Value Date    ALBUMIN 4.1 09/23/2024    PROTTOTAL 7.5 09/23/2024    ALT 19 09/23/2024    AST 33 09/23/2024    ALKPHOS 147 09/23/2024    BILITOTAL 0.6 09/23/2024     OTHER:   Lab Results   Component Value Date    PH 7.53 (H) 06/05/2022    LACT 0.7 01/26/2023    A1C 6.1 (H) 01/24/2024    GLEN 8.8 09/23/2024    LIPASE 33 09/23/2024    AMYLASE 64 09/06/2024    TSH 0.96 06/05/2022    CRP 5.0 06/05/2022    SED 40 (H) 06/05/2022       Anesthesia Plan    ASA Status:  3    NPO Status:  NPO Appropriate    Anesthesia Type: General.     - Airway: ETT   Induction: Intravenous, Propofol.   Maintenance: Inhalation.   Techniques and Equipment:     - Lines/Monitors: BIS     Consents    Anesthesia Plan(s) and associated risks, benefits, and realistic alternatives discussed. Questions answered and patient/representative(s) expressed understanding.     - Discussed: Risks,  Benefits and Alternatives for BOTH SEDATION and the PROCEDURE were discussed     - Discussed with:  Patient      - Extended Intubation/Ventilatory Support Discussed: No.      - Patient is DNR/DNI Status: No     Use of blood products discussed: No .     Postoperative Care    Pain management: IV analgesics, Oral pain medications.   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 Plan GETA with standard ASA monitors.            Sharron Saenz MD    I have reviewed the pertinent notes and labs in the chart from the past 30 days and (re)examined the patient.  Any updates or changes from those notes are reflected in this note.

## 2024-10-01 NOTE — ANESTHESIA CARE TRANSFER NOTE
Patient: Laura Gonzalez    Procedure: Procedure(s):  ENDOSCOPIC RETROGRADE CHOLANGIOPANCREATOGRAPHY with pancreatic stent placement x1       Diagnosis: Recurrent acute pancreatitis [K85.90]  Diagnosis Additional Information: No value filed.    Anesthesia Type:   General     Note:    Oropharynx: oropharynx clear of all foreign objects  Level of Consciousness: awake  Oxygen Supplementation: room air    Independent Airway: airway patency satisfactory and stable  Dentition: dentition unchanged  Vital Signs Stable: post-procedure vital signs reviewed and stable  Report to RN Given: handoff report given  Patient transferred to: PACU    Handoff Report: Identifed the Patient, Identified the Reponsible Provider, Reviewed the pertinent medical history, Discussed the surgical course, Reviewed Intra-OP anesthesia mangement and issues during anesthesia, Set expectations for post-procedure period and Allowed opportunity for questions and acknowledgement of understanding  Vitals:  Vitals Value Taken Time   BP     Temp     Pulse 70 10/01/24 1350   Resp 12 10/01/24 1350   SpO2 100 % 10/01/24 1350   Vitals shown include unfiled device data.    Electronically Signed By: ITZ Agustin CRNA  October 1, 2024  1:50 PM

## 2024-10-01 NOTE — ANESTHESIA POSTPROCEDURE EVALUATION
Patient: Laura Gonzalez    Procedure: Procedure(s):  ENDOSCOPIC RETROGRADE CHOLANGIOPANCREATOGRAPHY with pancreatic stent placement x1       Anesthesia Type:  General    Note:  Disposition: Outpatient   Postop Pain Control: Uneventful            Sign Out: Well controlled pain   PONV:    Neuro/Psych: Uneventful            Sign Out: Acceptable/Baseline neuro status   Airway/Respiratory: Uneventful            Sign Out: Acceptable/Baseline resp. status   CV/Hemodynamics: Uneventful            Sign Out: Acceptable CV status; No obvious hypovolemia; No obvious fluid overload   Other NRE: NONE   DID A NON-ROUTINE EVENT OCCUR? No           Last vitals:  Vitals Value Taken Time   /59 10/01/24 1445   Temp     Pulse 62 10/01/24 1459   Resp 17 10/01/24 1459   SpO2 94 % 10/01/24 1459   Vitals shown include unfiled device data.    Electronically Signed By: Franc Boyd MD  October 1, 2024  3:00 PM

## 2024-10-08 ENCOUNTER — CARE COORDINATION (OUTPATIENT)
Dept: GASTROENTEROLOGY | Facility: CLINIC | Age: 74
End: 2024-10-08
Payer: COMMERCIAL

## 2024-10-08 DIAGNOSIS — K86.1 ACUTE ON CHRONIC PANCREATITIS (H): ICD-10-CM

## 2024-10-08 DIAGNOSIS — K85.90 ACUTE ON CHRONIC PANCREATITIS (H): ICD-10-CM

## 2024-10-08 DIAGNOSIS — K85.90 ACUTE RECURRENT PANCREATITIS: Primary | ICD-10-CM

## 2024-10-08 NOTE — PROGRESS NOTES
Post ERCP with Dr Noguera    Xray in 4 weeks      - Will monitor clinical condition and aim for repeat       ERCP for removal/exchange in 2-3 months weeks if KUB shows stent is still in place and clinically stable     Please assist in scheduling:     Procedure/Imaging/Clinic: Endoscopic Retrograde Cholangiopancreatography (ERCP)   Physician: Chuckie  Timin-3 months, tbd after xray around   Scope time needed:tbd  Anesthesia:General  Dx: acute on chronic pancreatitis  Tier:Tier 3 -   Surgeries/procedures that can be delayed  between 30 to 90 days with no significant  morbidity/mortality to patient or impact on  patient/disease outcome.   Location: [facility]  OK to schedule while attending: yes  Header of letter for pt communication:   Endoscopic Retrograde Cholangiopancreatography (ERCP)     Comments:

## 2024-10-28 ENCOUNTER — HOSPITAL ENCOUNTER (OUTPATIENT)
Dept: GENERAL RADIOLOGY | Facility: CLINIC | Age: 74
Discharge: HOME | End: 2024-10-28
Attending: INTERNAL MEDICINE | Admitting: INTERNAL MEDICINE
Payer: COMMERCIAL

## 2024-10-28 DIAGNOSIS — K86.1 ACUTE ON CHRONIC PANCREATITIS (H): ICD-10-CM

## 2024-10-28 DIAGNOSIS — K85.90 ACUTE ON CHRONIC PANCREATITIS (H): ICD-10-CM

## 2024-10-28 PROCEDURE — 74019 RADEX ABDOMEN 2 VIEWS: CPT

## 2024-11-12 ENCOUNTER — PATIENT OUTREACH (OUTPATIENT)
Dept: GASTROENTEROLOGY | Facility: CLINIC | Age: 74
End: 2024-11-12
Payer: COMMERCIAL

## 2024-11-12 NOTE — TELEPHONE ENCOUNTER
Per Dr. Noguera post xray review:    Stents are all gone on xray.  The anastamosis is wide open  Need to follow-up in clinic next month or two       Discussed plan with patient, clinic scheduled 2/5/25 as patient prefers in person visit, visit held on 2/25/25 at 12:30    ML

## 2025-02-04 ENCOUNTER — DOCUMENTATION ONLY (OUTPATIENT)
Dept: GASTROENTEROLOGY | Facility: CLINIC | Age: 75
End: 2025-02-04
Payer: COMMERCIAL

## 2025-02-04 ENCOUNTER — PATIENT OUTREACH (OUTPATIENT)
Dept: GASTROENTEROLOGY | Facility: CLINIC | Age: 75
End: 2025-02-04
Payer: COMMERCIAL

## 2025-02-04 NOTE — PROGRESS NOTES
Requested CT done on 02/2/25 to be mailed overnight. Faxed request with overnight shipping label.       Rosalee FITZPATRICK MA

## 2025-02-04 NOTE — TELEPHONE ENCOUNTER
"Pt admitted in [facility] with pancreatitis. She gets back to town on 2/12 has in person visit on 3/5 (Prefers in person visits)    Ct requested. Update sent to Dr. 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 She denies hematemesis, hematochezia, or melena. This feels similar to prior episodes of pancreatitis. The pain is currently radiating into her back. She denies chest pain, shortness of breath, pleuritic pain, fevers, or chills. She does not have any local specialist or PCP     CT 2-2-25  Impression:   1. Subtle parenchymal hypodensity in the region of the pancreatic neck; cannot exclude pancreatitis. Recommend correlation with biochemical markers. No peripancreatic collection.   2. Slightly prominent fluid-filled small and large bowel, non-specific, but may represent enterocolitis in the appropriate clinical context.   3. Other findings as above.     Total Bilirubin  0.20 - 1.00 mg/dL 0.5   Bilirubin, Direct  0 - 0.2 mg/dL 0.2   Alkaline Phosphatase  45 - 117 U/L 190 High    AST  15 - 37 U/L 91 High    ALT (SGPT)  13 - 61 U/L 52     Component  Ref Range & Units 2 d ago   Lipase  13 - 75 U/L 106 High      "

## 2025-02-11 ENCOUNTER — DOCUMENTATION ONLY (OUTPATIENT)
Dept: GASTROENTEROLOGY | Facility: CLINIC | Age: 75
End: 2025-02-11
Payer: COMMERCIAL

## 2025-02-12 ENCOUNTER — PATIENT OUTREACH (OUTPATIENT)
Dept: GASTROENTEROLOGY | Facility: CLINIC | Age: 75
End: 2025-02-12
Payer: COMMERCIAL

## 2025-02-12 NOTE — TELEPHONE ENCOUNTER
Returned call to patient,  unfortunately we got CT but could not upload images. Per patient, they are back in town soon and wondering what follow up is recommended.  Reviewed that we're waiting for new CT, that Dr. Noguera recommends infusion, will follow up after imaging is reviewed with other recommendations.     Impression:   1. Subtle parenchymal hypodensity in the region of the pancreatic neck; cannot exclude pancreatitis. Recommend correlation with biochemical markers. No peripancreatic collection.   2. Slightly prominent fluid-filled small and large bowel, non-specific, but may represent enterocolitis in the appropriate clinical context.   3. Other findings as above.

## 2025-02-18 ENCOUNTER — DOCUMENTATION ONLY (OUTPATIENT)
Dept: GASTROENTEROLOGY | Facility: CLINIC | Age: 75
End: 2025-02-18
Payer: COMMERCIAL

## 2025-02-18 NOTE — PROGRESS NOTES
Called [facility] to get status on disc images . Disc was sent out a few days ago. Should be here this week.     Rosalee FITZPATRICK MA

## 2025-02-24 ENCOUNTER — PATIENT OUTREACH (OUTPATIENT)
Dept: GASTROENTEROLOGY | Facility: CLINIC | Age: 75
End: 2025-02-24
Payer: COMMERCIAL

## 2025-02-24 NOTE — TELEPHONE ENCOUNTER
Pt called in, states she feels like she needs to go to ER. Has not recovered from flare that happened in [place] last month. Still having pain/nausea.  We still have not received imaging yet. Pt wondering if she should come to ER or have other outpatient work done now.    Message sent to Dr. Noguera regarding next steps.   NK

## 2025-02-24 NOTE — TELEPHONE ENCOUNTER
Shared recommendation from Dr. Noguera to come to [facility] ER for assessment/triage.  ML

## 2025-03-04 ENCOUNTER — HOSPITAL ENCOUNTER (INPATIENT)
Facility: CLINIC | Age: 75
DRG: 440 | End: 2025-03-04
Attending: STUDENT IN AN ORGANIZED HEALTH CARE EDUCATION/TRAINING PROGRAM | Admitting: STUDENT IN AN ORGANIZED HEALTH CARE EDUCATION/TRAINING PROGRAM
Payer: COMMERCIAL

## 2025-03-04 DIAGNOSIS — K91.89: ICD-10-CM

## 2025-03-04 DIAGNOSIS — K86.1 ACUTE ON CHRONIC PANCREATITIS (H): Primary | ICD-10-CM

## 2025-03-04 DIAGNOSIS — R10.13 ABDOMINAL PAIN, EPIGASTRIC: ICD-10-CM

## 2025-03-04 DIAGNOSIS — K85.90 ACUTE ON CHRONIC PANCREATITIS (H): Primary | ICD-10-CM

## 2025-03-04 DIAGNOSIS — R10.10 UPPER ABDOMINAL PAIN: ICD-10-CM

## 2025-03-04 LAB
ALBUMIN SERPL BCG-MCNC: 3.7 G/DL (ref 3.5–5.2)
ALP SERPL-CCNC: 161 U/L (ref 40–150)
ALT SERPL W P-5'-P-CCNC: 24 U/L (ref 0–50)
ANION GAP SERPL CALCULATED.3IONS-SCNC: 9 MMOL/L (ref 7–15)
AST SERPL W P-5'-P-CCNC: 47 U/L (ref 0–45)
ATRIAL RATE - MUSE: 60 BPM
BASOPHILS # BLD AUTO: 0 10E3/UL (ref 0–0.2)
BASOPHILS NFR BLD AUTO: 0 %
BILIRUB SERPL-MCNC: 0.4 MG/DL
BUN SERPL-MCNC: 16.2 MG/DL (ref 8–23)
CALCIUM SERPL-MCNC: 8.6 MG/DL (ref 8.8–10.4)
CHLORIDE SERPL-SCNC: 106 MMOL/L (ref 98–107)
CREAT SERPL-MCNC: 0.78 MG/DL (ref 0.51–0.95)
DIASTOLIC BLOOD PRESSURE - MUSE: NORMAL MMHG
EGFRCR SERPLBLD CKD-EPI 2021: 79 ML/MIN/1.73M2
EOSINOPHIL # BLD AUTO: 0 10E3/UL (ref 0–0.7)
EOSINOPHIL NFR BLD AUTO: 0 %
ERYTHROCYTE [DISTWIDTH] IN BLOOD BY AUTOMATED COUNT: 15.7 % (ref 10–15)
EST. AVERAGE GLUCOSE BLD GHB EST-MCNC: 123 MG/DL
GLUCOSE SERPL-MCNC: 96 MG/DL (ref 70–99)
HBA1C MFR BLD: 5.9 %
HCO3 SERPL-SCNC: 27 MMOL/L (ref 22–29)
HCT VFR BLD AUTO: 39.8 % (ref 35–47)
HGB BLD-MCNC: 12.2 G/DL (ref 11.7–15.7)
IMM GRANULOCYTES # BLD: 0.1 10E3/UL
IMM GRANULOCYTES NFR BLD: 0 %
INTERPRETATION ECG - MUSE: NORMAL
LACTATE SERPL-SCNC: 1 MMOL/L (ref 0.7–2)
LIPASE SERPL-CCNC: 292 U/L (ref 13–60)
LYMPHOCYTES # BLD AUTO: 2.9 10E3/UL (ref 0.8–5.3)
LYMPHOCYTES NFR BLD AUTO: 22 %
MCH RBC QN AUTO: 27.8 PG (ref 26.5–33)
MCHC RBC AUTO-ENTMCNC: 30.7 G/DL (ref 31.5–36.5)
MCV RBC AUTO: 91 FL (ref 78–100)
MONOCYTES # BLD AUTO: 0.9 10E3/UL (ref 0–1.3)
MONOCYTES NFR BLD AUTO: 7 %
NEUTROPHILS # BLD AUTO: 9.4 10E3/UL (ref 1.6–8.3)
NEUTROPHILS NFR BLD AUTO: 71 %
NRBC # BLD AUTO: 0 10E3/UL
NRBC BLD AUTO-RTO: 0 /100
P AXIS - MUSE: 69 DEGREES
PLATELET # BLD AUTO: 290 10E3/UL (ref 150–450)
POTASSIUM SERPL-SCNC: 4.4 MMOL/L (ref 3.4–5.3)
PR INTERVAL - MUSE: 136 MS
PROT SERPL-MCNC: 7.2 G/DL (ref 6.4–8.3)
QRS DURATION - MUSE: 84 MS
QT - MUSE: 430 MS
QTC - MUSE: 430 MS
R AXIS - MUSE: 33 DEGREES
RBC # BLD AUTO: 4.39 10E6/UL (ref 3.8–5.2)
SODIUM SERPL-SCNC: 142 MMOL/L (ref 135–145)
SYSTOLIC BLOOD PRESSURE - MUSE: NORMAL MMHG
T AXIS - MUSE: 64 DEGREES
TROPONIN T SERPL HS-MCNC: <6 NG/L
VENTRICULAR RATE- MUSE: 60 BPM
WBC # BLD AUTO: 13.4 10E3/UL (ref 4–11)

## 2025-03-04 PROCEDURE — 84484 ASSAY OF TROPONIN QUANT: CPT | Performed by: STUDENT IN AN ORGANIZED HEALTH CARE EDUCATION/TRAINING PROGRAM

## 2025-03-04 PROCEDURE — 85041 AUTOMATED RBC COUNT: CPT | Performed by: STUDENT IN AN ORGANIZED HEALTH CARE EDUCATION/TRAINING PROGRAM

## 2025-03-04 PROCEDURE — 258N000003 HC RX IP 258 OP 636: Performed by: STUDENT IN AN ORGANIZED HEALTH CARE EDUCATION/TRAINING PROGRAM

## 2025-03-04 PROCEDURE — 82040 ASSAY OF SERUM ALBUMIN: CPT | Performed by: STUDENT IN AN ORGANIZED HEALTH CARE EDUCATION/TRAINING PROGRAM

## 2025-03-04 PROCEDURE — 96375 TX/PRO/DX INJ NEW DRUG ADDON: CPT | Performed by: STUDENT IN AN ORGANIZED HEALTH CARE EDUCATION/TRAINING PROGRAM

## 2025-03-04 PROCEDURE — 99285 EMERGENCY DEPT VISIT HI MDM: CPT | Mod: 25 | Performed by: STUDENT IN AN ORGANIZED HEALTH CARE EDUCATION/TRAINING PROGRAM

## 2025-03-04 PROCEDURE — 99222 1ST HOSP IP/OBS MODERATE 55: CPT | Mod: GC | Performed by: SURGERY

## 2025-03-04 PROCEDURE — 85025 COMPLETE CBC W/AUTO DIFF WBC: CPT | Performed by: STUDENT IN AN ORGANIZED HEALTH CARE EDUCATION/TRAINING PROGRAM

## 2025-03-04 PROCEDURE — 96374 THER/PROPH/DIAG INJ IV PUSH: CPT | Mod: 59 | Performed by: STUDENT IN AN ORGANIZED HEALTH CARE EDUCATION/TRAINING PROGRAM

## 2025-03-04 PROCEDURE — 250N000011 HC RX IP 250 OP 636: Performed by: STUDENT IN AN ORGANIZED HEALTH CARE EDUCATION/TRAINING PROGRAM

## 2025-03-04 PROCEDURE — 96376 TX/PRO/DX INJ SAME DRUG ADON: CPT | Performed by: STUDENT IN AN ORGANIZED HEALTH CARE EDUCATION/TRAINING PROGRAM

## 2025-03-04 PROCEDURE — 258N000003 HC RX IP 258 OP 636: Performed by: PEDIATRICS

## 2025-03-04 PROCEDURE — 83605 ASSAY OF LACTIC ACID: CPT | Performed by: STUDENT IN AN ORGANIZED HEALTH CARE EDUCATION/TRAINING PROGRAM

## 2025-03-04 PROCEDURE — 83690 ASSAY OF LIPASE: CPT | Performed by: STUDENT IN AN ORGANIZED HEALTH CARE EDUCATION/TRAINING PROGRAM

## 2025-03-04 PROCEDURE — 99223 1ST HOSP IP/OBS HIGH 75: CPT | Mod: GC | Performed by: PEDIATRICS

## 2025-03-04 PROCEDURE — 250N000011 HC RX IP 250 OP 636: Mod: JZ

## 2025-03-04 PROCEDURE — 93010 ELECTROCARDIOGRAM REPORT: CPT | Performed by: STUDENT IN AN ORGANIZED HEALTH CARE EDUCATION/TRAINING PROGRAM

## 2025-03-04 PROCEDURE — 120N000002 HC R&B MED SURG/OB UMMC

## 2025-03-04 PROCEDURE — 99285 EMERGENCY DEPT VISIT HI MDM: CPT | Performed by: STUDENT IN AN ORGANIZED HEALTH CARE EDUCATION/TRAINING PROGRAM

## 2025-03-04 PROCEDURE — 250N000013 HC RX MED GY IP 250 OP 250 PS 637

## 2025-03-04 PROCEDURE — 36415 COLL VENOUS BLD VENIPUNCTURE: CPT | Performed by: STUDENT IN AN ORGANIZED HEALTH CARE EDUCATION/TRAINING PROGRAM

## 2025-03-04 PROCEDURE — 99222 1ST HOSP IP/OBS MODERATE 55: CPT | Mod: GC | Performed by: INTERNAL MEDICINE

## 2025-03-04 PROCEDURE — 85014 HEMATOCRIT: CPT | Performed by: STUDENT IN AN ORGANIZED HEALTH CARE EDUCATION/TRAINING PROGRAM

## 2025-03-04 PROCEDURE — 82435 ASSAY OF BLOOD CHLORIDE: CPT | Performed by: STUDENT IN AN ORGANIZED HEALTH CARE EDUCATION/TRAINING PROGRAM

## 2025-03-04 PROCEDURE — 93005 ELECTROCARDIOGRAM TRACING: CPT | Performed by: STUDENT IN AN ORGANIZED HEALTH CARE EDUCATION/TRAINING PROGRAM

## 2025-03-04 PROCEDURE — 83036 HEMOGLOBIN GLYCOSYLATED A1C: CPT | Performed by: INTERNAL MEDICINE

## 2025-03-04 PROCEDURE — 250N000011 HC RX IP 250 OP 636: Performed by: PEDIATRICS

## 2025-03-04 PROCEDURE — 258N000003 HC RX IP 258 OP 636

## 2025-03-04 RX ORDER — HYDROXYZINE HYDROCHLORIDE 50 MG/1
50 TABLET, FILM COATED ORAL EVERY 6 HOURS PRN
Status: DISCONTINUED | OUTPATIENT
Start: 2025-03-04 | End: 2025-03-07 | Stop reason: HOSPADM

## 2025-03-04 RX ORDER — HYDROMORPHONE HYDROCHLORIDE 1 MG/ML
0.5 INJECTION, SOLUTION INTRAMUSCULAR; INTRAVENOUS; SUBCUTANEOUS EVERY 30 MIN PRN
Status: COMPLETED | OUTPATIENT
Start: 2025-03-04 | End: 2025-03-04

## 2025-03-04 RX ORDER — AMOXICILLIN 250 MG
1 CAPSULE ORAL 2 TIMES DAILY PRN
Status: DISCONTINUED | OUTPATIENT
Start: 2025-03-04 | End: 2025-03-06

## 2025-03-04 RX ORDER — OXYCODONE HYDROCHLORIDE 5 MG/1
5 TABLET ORAL EVERY 4 HOURS PRN
Status: DISCONTINUED | OUTPATIENT
Start: 2025-03-04 | End: 2025-03-07 | Stop reason: HOSPADM

## 2025-03-04 RX ORDER — OXYCODONE HYDROCHLORIDE 10 MG/1
10 TABLET ORAL EVERY 4 HOURS PRN
Status: DISCONTINUED | OUTPATIENT
Start: 2025-03-04 | End: 2025-03-07 | Stop reason: HOSPADM

## 2025-03-04 RX ORDER — ONDANSETRON 2 MG/ML
4 INJECTION INTRAMUSCULAR; INTRAVENOUS EVERY 6 HOURS PRN
Status: DISCONTINUED | OUTPATIENT
Start: 2025-03-04 | End: 2025-03-07 | Stop reason: HOSPADM

## 2025-03-04 RX ORDER — ACETAMINOPHEN 325 MG/1
650 TABLET ORAL EVERY 4 HOURS PRN
Status: DISCONTINUED | OUTPATIENT
Start: 2025-03-04 | End: 2025-03-06

## 2025-03-04 RX ORDER — PROCHLORPERAZINE MALEATE 5 MG/1
5 TABLET ORAL EVERY 6 HOURS PRN
Status: DISCONTINUED | OUTPATIENT
Start: 2025-03-04 | End: 2025-03-07 | Stop reason: HOSPADM

## 2025-03-04 RX ORDER — AMOXICILLIN 250 MG
2 CAPSULE ORAL 2 TIMES DAILY PRN
Status: DISCONTINUED | OUTPATIENT
Start: 2025-03-04 | End: 2025-03-06

## 2025-03-04 RX ORDER — EZETIMIBE 10 MG/1
10 TABLET ORAL AT BEDTIME
Status: DISCONTINUED | OUTPATIENT
Start: 2025-03-04 | End: 2025-03-07 | Stop reason: HOSPADM

## 2025-03-04 RX ORDER — ENOXAPARIN SODIUM 100 MG/ML
40 INJECTION SUBCUTANEOUS EVERY 24 HOURS
Status: DISCONTINUED | OUTPATIENT
Start: 2025-03-04 | End: 2025-03-06

## 2025-03-04 RX ORDER — HYDROXYZINE HYDROCHLORIDE 25 MG/1
25 TABLET, FILM COATED ORAL EVERY 6 HOURS PRN
Status: DISCONTINUED | OUTPATIENT
Start: 2025-03-04 | End: 2025-03-07 | Stop reason: HOSPADM

## 2025-03-04 RX ORDER — ESTRADIOL 0.04 MG/D
2 PATCH, EXTENDED RELEASE TRANSDERMAL
Status: DISCONTINUED | OUTPATIENT
Start: 2025-03-06 | End: 2025-03-06

## 2025-03-04 RX ORDER — ATORVASTATIN CALCIUM 40 MG/1
40 TABLET, FILM COATED ORAL AT BEDTIME
Status: DISCONTINUED | OUTPATIENT
Start: 2025-03-04 | End: 2025-03-07 | Stop reason: HOSPADM

## 2025-03-04 RX ORDER — HYDROMORPHONE HYDROCHLORIDE 1 MG/ML
0.5 INJECTION, SOLUTION INTRAMUSCULAR; INTRAVENOUS; SUBCUTANEOUS EVERY 30 MIN PRN
Status: DISCONTINUED | OUTPATIENT
Start: 2025-03-04 | End: 2025-03-04

## 2025-03-04 RX ORDER — LIDOCAINE 40 MG/G
CREAM TOPICAL
Status: DISCONTINUED | OUTPATIENT
Start: 2025-03-04 | End: 2025-03-07 | Stop reason: HOSPADM

## 2025-03-04 RX ORDER — ONDANSETRON 4 MG/1
4 TABLET, ORALLY DISINTEGRATING ORAL EVERY 6 HOURS PRN
Status: DISCONTINUED | OUTPATIENT
Start: 2025-03-04 | End: 2025-03-07 | Stop reason: HOSPADM

## 2025-03-04 RX ORDER — MONTELUKAST SODIUM 10 MG/1
10 TABLET ORAL AT BEDTIME
Status: DISCONTINUED | OUTPATIENT
Start: 2025-03-04 | End: 2025-03-07 | Stop reason: HOSPADM

## 2025-03-04 RX ORDER — IOPAMIDOL 755 MG/ML
95 INJECTION, SOLUTION INTRAVASCULAR ONCE
Status: COMPLETED | OUTPATIENT
Start: 2025-03-04 | End: 2025-03-04

## 2025-03-04 RX ORDER — ZOLPIDEM TARTRATE 5 MG/1
5 TABLET ORAL
Status: DISCONTINUED | OUTPATIENT
Start: 2025-03-04 | End: 2025-03-07 | Stop reason: HOSPADM

## 2025-03-04 RX ORDER — ONDANSETRON 2 MG/ML
4 INJECTION INTRAMUSCULAR; INTRAVENOUS EVERY 30 MIN PRN
Status: DISCONTINUED | OUTPATIENT
Start: 2025-03-04 | End: 2025-03-04

## 2025-03-04 RX ORDER — HYDROMORPHONE HYDROCHLORIDE 1 MG/ML
0.5 INJECTION, SOLUTION INTRAMUSCULAR; INTRAVENOUS; SUBCUTANEOUS
Status: DISCONTINUED | OUTPATIENT
Start: 2025-03-04 | End: 2025-03-07 | Stop reason: HOSPADM

## 2025-03-04 RX ADMIN — ONDANSETRON 4 MG: 2 INJECTION, SOLUTION INTRAMUSCULAR; INTRAVENOUS at 11:19

## 2025-03-04 RX ADMIN — ACETAMINOPHEN 650 MG: 325 TABLET, FILM COATED ORAL at 21:21

## 2025-03-04 RX ADMIN — HYDROMORPHONE HYDROCHLORIDE 0.5 MG: 1 INJECTION, SOLUTION INTRAMUSCULAR; INTRAVENOUS; SUBCUTANEOUS at 11:19

## 2025-03-04 RX ADMIN — HYDROMORPHONE HYDROCHLORIDE 0.5 MG: 1 INJECTION, SOLUTION INTRAMUSCULAR; INTRAVENOUS; SUBCUTANEOUS at 12:58

## 2025-03-04 RX ADMIN — IOPAMIDOL 95 ML: 755 INJECTION, SOLUTION INTRAVENOUS at 12:24

## 2025-03-04 RX ADMIN — MONTELUKAST 10 MG: 10 TABLET, FILM COATED ORAL at 21:21

## 2025-03-04 RX ADMIN — HYDROXYZINE HYDROCHLORIDE 25 MG: 25 TABLET, FILM COATED ORAL at 17:47

## 2025-03-04 RX ADMIN — HYDROMORPHONE HYDROCHLORIDE 0.5 MG: 1 INJECTION, SOLUTION INTRAMUSCULAR; INTRAVENOUS; SUBCUTANEOUS at 21:08

## 2025-03-04 RX ADMIN — HYDROMORPHONE HYDROCHLORIDE 0.5 MG: 1 INJECTION, SOLUTION INTRAMUSCULAR; INTRAVENOUS; SUBCUTANEOUS at 11:59

## 2025-03-04 RX ADMIN — ONDANSETRON 4 MG: 2 INJECTION, SOLUTION INTRAMUSCULAR; INTRAVENOUS at 11:18

## 2025-03-04 RX ADMIN — NALOXONE HYDROCHLORIDE 0.25 MCG/KG/HR: 1 INJECTION PARENTERAL at 18:18

## 2025-03-04 RX ADMIN — SODIUM CHLORIDE 1000 ML: 0.9 INJECTION, SOLUTION INTRAVENOUS at 14:02

## 2025-03-04 RX ADMIN — HYDROMORPHONE HYDROCHLORIDE 0.5 MG: 1 INJECTION, SOLUTION INTRAMUSCULAR; INTRAVENOUS; SUBCUTANEOUS at 15:40

## 2025-03-04 RX ADMIN — SODIUM CHLORIDE, SODIUM LACTATE, POTASSIUM CHLORIDE, AND CALCIUM CHLORIDE 1000 ML: .6; .31; .03; .02 INJECTION, SOLUTION INTRAVENOUS at 15:56

## 2025-03-04 RX ADMIN — HYDROMORPHONE HYDROCHLORIDE 0.5 MG: 1 INJECTION, SOLUTION INTRAMUSCULAR; INTRAVENOUS; SUBCUTANEOUS at 14:00

## 2025-03-04 RX ADMIN — ZOLPIDEM TARTRATE 5 MG: 5 TABLET, FILM COATED ORAL at 22:34

## 2025-03-04 RX ADMIN — OXYCODONE HYDROCHLORIDE 5 MG: 5 TABLET ORAL at 17:46

## 2025-03-04 ASSESSMENT — ACTIVITIES OF DAILY LIVING (ADL)
ADLS_ACUITY_SCORE: 56

## 2025-03-04 NOTE — CONSULTS
EGS Surgery Consult  2025    Laura Gonzalez  : 1950    Date of Service: 3/4/2025 3:18 PM    Assessment and Plan:   Laura Gonzalez is a 74 year old female with history of tubulovillous adenoma and duodenal polyp s/p pylorus sparing Whipple in 2019 with removal of 1/3 of the pancreas c/b pancreaticcojejunal anastomosis stenosis with recurrent pancreatitis s/p multiple ERCPs and SBO who presents to the ED with abdominal pain.  Patient reports worsening acute on chronic pain over the past 3 weeks that is now a 10/10 constant midline abdominal pain radiating to the left and into the back.  General surgery was consulted for possible SBO. Given has had a bowel movement this morning, continue to pass gas throughout the day without any nausea or vomiting, SBO is unlikely.    - Would recommend work up of abdominal pain and acute on chronic pancreatitis  - Would recommend NPO  - General surgery will continue to follow    Discussed with chief who will discuss with staff    Red Guthrie MD  PGY-1 General Surgery Resident    History of Present Illness:    Laura Gonzalez is a 74 year old female with history of tubulovillous adenoma and duodenal polyp s/p pylorus sparing Whipple in 2019 with removal of 1/3 of the pancreas c/b pancreaticcojejunal anastomosis stenosis with recurrent pancreatitis s/p multiple ERCPs and SBO who presents to the ED with abdominal pain.  Patient reports worsening acute on chronic pain over the past 3 weeks that is now a 10/10 constant midline abdominal pain radiating to the left and into the back.  Pt reported nausea and 1 episode of vomiting on 3/3/2025 and foul smelling diarrhea this morning. She reports passing gas this morning and this afternoon.     Past Medical History:  Past Medical History:   Diagnosis Date    Asthma     pt.states no longer has symptoms    Atherosclerosis     CAD (coronary artery disease)     HLD (hyperlipidemia)        Past Surgical History  Past  Surgical History:   Procedure Laterality Date    APPENDECTOMY      ARTHROPLASTY HIP Left 6/15/2016    Procedure: ARTHROPLASTY HIP;  Surgeon: Jeffy Wolff MD;  Location: [facility]    COLONOSCOPY  10/3/2013    Procedure: COMBINED COLONOSCOPY, SINGLE BIOPSY/POLYPECTOMY BY BIOPSY;;  Surgeon: Kenney Walls MD;  Location:  GI    ENDOSCOPIC RETROGRADE CHOLANGIOPANCREATOGRAM N/A 6/16/2022    Procedure: ENDOSCOPIC RETROGRADE CHOLANGIOPANCREATOGRAPHY WITH PANCREATIC DUCT DILATION, DEBRIS REMOVAL AND STENT PLACEMENT;  Surgeon: Arnaldo Noguera MD;  Location: [facility]    ENDOSCOPIC RETROGRADE CHOLANGIOPANCREATOGRAM N/A 10/13/2022    Procedure: ENDOSCOPIC RETROGRADE CHOLANGIOPANCREATOGRAPHY, biliary stent placement;  Surgeon: Arnaldo Noguera MD;  Location: [facility]    ENDOSCOPIC RETROGRADE CHOLANGIOPANCREATOGRAM N/A 11/21/2022    Procedure: ENDOSCOPIC RETROGRADE CHOLANGIOPANCREATOGRAPHY, WITH TUBE OR STENT INSERTION, with balloon dialation;  Surgeon: Johnson Gotti MD;  Location: [facility]    ENDOSCOPIC RETROGRADE CHOLANGIOPANCREATOGRAM N/A 3/30/2023    Procedure: ENDOSCOPIC RETROGRADE CHOLANGIOPANCREATOGRAPHY with bile duct stents removed x2 and replacement of one pancreatic stent;  Surgeon: Arnaldo Noguera MD;  Location: [facility]    ENDOSCOPIC RETROGRADE CHOLANGIOPANCREATOGRAM N/A 8/20/2024    Procedure: ENTEROSCOPY ASSISTED ENDOSCOPIC RETROGRADE CHOLANGIOPANCREATOGRAPHY;  Surgeon: Arnaldo Noguera MD;  [facility]: [facility]    ENDOSCOPIC RETROGRADE CHOLANGIOPANCREATOGRAM N/A 10/1/2024    Procedure: ENDOSCOPIC RETROGRADE CHOLANGIOPANCREATOGRAPHY with pancreatic stent placement x1;  Surgeon: Arnaldo Noguera MD;  [facility]    ENDOSCOPIC RETROGRADE CHOLANGIOPANCREATOGRAPHY, EXCHANGE TUBE/STENT N/A 1/24/2023    Procedure: enteroscopy assisted ENDOSCOPIC RETROGRADE CHOLANGIOPANCREATOGRAPHY, with pancreatic stents replaced times 2;  Surgeon: Arnaldo Noguera MD;  [facility]: [facility]    ENDOSCOPIC  ULTRASOUND UPPER GASTROINTESTINAL TRACT (GI) N/A 2022    Procedure: ENDOSCOPIC ULTRASOUND WITH PANCREATICOGASTROSTOMY CREATION, TRACT DILATION, STENT PLACEMENT;  Surgeon: Romaine Oates MD;  Location: [facility]    ESOPHAGOSCOPY, GASTROSCOPY, DUODENOSCOPY (EGD), COMBINED N/A 2022    Procedure: ESOPHAGOGASTRODUODENOSCOPY WITH ENDOSCOPIC CLIP PLACEMENT;  Surgeon: Arnaldo Noguera MD;  Location: [facility]    FOOT SURGERY      HC TOOTH EXTRACTION W/FORCEP      HYSTERECTOMY      INCISION AND DRAINAGE BREAST Left 2022    Procedure: evacuate hematoma left  BREAST;  Surgeon: Dayron Garcia MD;  Location: [facility]    IR FOLLOW UP VISIT OUTPATIENT  2022    IR SINOGRAM INJECTION DIAGNOSTIC  2022       Family History:  No family history on file.    Social History:  Social History     Socioeconomic History    Marital status:      Spouse name: Not on file    Number of children: Not on file    Years of education: Not on file    Highest education level: Not on file   Occupational History    Not on file   Tobacco Use    Smoking status: Former     Current packs/day: 0.00     Types: Cigarettes     Quit date: 10/3/1988     Years since quittin.4     Passive exposure: Never    Smokeless tobacco: Never   Substance and Sexual Activity    Alcohol use: No    Drug use: No    Sexual activity: Not on file   Other Topics Concern    Parent/sibling w/ CABG, MI or angioplasty before 65F 55M? Not Asked   Social History Narrative    Not on file     Social Drivers of Health     Financial Resource Strain: Low Risk  (2023)    Received from [facility]    Overall Financial Resource Strain (CARDIA)     Difficulty of Paying Living Expenses: Not hard at all   Food Insecurity: No Food Insecurity (2023)    Received from [facility]    Hunger Vital Sign     Worried About Running Out of Food in the Last Year: Never true     Ran Out of Food in the Last Year: Never true    Transportation Needs: No Transportation Needs (6/13/2023)    Received from [facility]    PRAPARE - Transportation     Lack of Transportation (Medical): No     Lack of Transportation (Non-Medical): No   Physical Activity: Inactive (6/13/2023)    Received from [facility]    Exercise Vital Sign     Days of Exercise per Week: 0 days     Minutes of Exercise per Session: 20 min   Stress: No Stress Concern Present (4/20/2021)    Received from [facility]    [employer] [place] [employer] - Occupational Stress Questionnaire     Feeling of Stress : Only a little   Social Connections: Socially Integrated (4/20/2021)    Received from [facility]    Social Connection and Isolation Panel [NHANES]     Frequency of Communication with Friends and Family: More than three times a week     Frequency of Social Gatherings with Friends and Family: More than three times a week     Attends Holiness Services: More than 4 times per year     Active Member of Clubs or Organizations: Yes     Attends Club or Organization Meetings: More than 4 times per year     Marital Status:    Interpersonal Safety: Low Risk  (10/1/2024)    Interpersonal Safety     Do you feel physically and emotionally safe where you currently live?: Yes     Within the past 12 months, have you been hit, slapped, kicked or otherwise physically hurt by someone?: No     Within the past 12 months, have you been humiliated or emotionally abused in other ways by your partner or ex-partner?: No   Housing Stability: Low Risk  (6/13/2023)    Received from [facility]    Housing Stability     What is your living situation today?: I have a steady place to live       Medications:  Current Outpatient Medications   Medication Sig Dispense Refill    atorvastatin (LIPITOR) 40 MG tablet Take 40 mg by mouth At Bedtime       cholecalciferol (VITAMIN D3) 25 mcg (1000 units) capsule Take 1 capsule by mouth  daily      diazepam (VALIUM) 5 MG tablet Take 1 30-45 minutes prior to radiology scan.  May repeat x1 prn. 2 tablet 0    diazepam (VALIUM) 5 MG tablet Take 1 30-45 minutes prior to radiology scan.  May repeat x1 prn. 2 tablet 0    estradiol (VIVELLE-DOT) 0.075 MG/24HR BIW patch Place 1 patch onto the skin twice a week      evolocumab (REPATHA) 140 MG/ML prefilled autoinjector Inject 140 mg Subcutaneous every 14 days      ezetimibe (ZETIA) 10 MG tablet Take 10 mg by mouth At Bedtime      hydrOXYzine HCl (ATARAX) 25 MG tablet Take 1 tablet (25 mg) by mouth 3 times daily as needed for itching 30 tablet 1    lipase-protease-amylase (CREON) 69403-81452-900310 units CPEP per EC capsule Take 2-3 with meals / 1-2 with snacks, up to 15 per day. 450 capsule 0    Magnesium Ascorbate POWD One teaspoon at bedtime daily by mouth      montelukast (SINGULAIR) 10 MG tablet Take 10 mg by mouth at bedtime      oxyCODONE (ROXICODONE) 5 MG tablet Take 5 mg by mouth every 6 hours as needed for severe pain.      oxyCODONE (ROXICODONE) 5 MG tablet Take 1 tablet (5 mg) by mouth every 6 hours as needed for pain. 12 tablet 0    valACYclovir (VALTREX) 1000 mg tablet Take 1,000 mg by mouth daily as needed (cold sore)      VIOKACE 55102-58134 units TABS per tablet TAKE 3 CAPSULES WITH EACH MEAL, UP TO 9 PER DAY. 450 tablet 6    vitamin A 3 MG (61681 UNITS) capsule Take 1 capsule by mouth daily      zolpidem (AMBIEN) 10 MG tablet Take 1 tablet by mouth nightly as needed for sleep         Allergies:   No Known Allergies    Review of Symptoms:  A 10 point review of symptoms has been conducted and is negative except for that mentioned in the above HPI.    Physical Exam:    Blood pressure 123/63, pulse 55, temperature 98.1  F (36.7  C), temperature source Oral, resp. rate 18, SpO2 99%, not currently breastfeeding.  Gen:    Lying in bed in NAD, A&OX3  HEENT: Normocephalic and atraumatic  CV:  RRR  Pulm:  Non-labored breathing  Abd:  Soft,  non-distended, ttp in epigastrium with some guarding,                          Non-peritonitic   Ext:  Warm and well perfused, no obvious deformities    Labs:  CBC RESULTS:   Recent Labs   Lab Test 03/04/25  1111   WBC 13.4*   RBC 4.39   HGB 12.2   HCT 39.8   MCV 91   MCH 27.8   MCHC 30.7*   RDW 15.7*        BMP RESULTS:   Recent Labs   Lab 03/04/25  1111      POTASSIUM 4.4   CHLORIDE 106   CO2 27   BUN 16.2   CR 0.78   GLC 96     LFT RESULTS:   Recent Labs   Lab 03/04/25  1111   AST 47*   ALT 24   ALKPHOS 161*   BILITOTAL 0.4   ALBUMIN 3.7       Imaging:  EXAMINATION: CT ABDOMEN PELVIS W CONTRAST, 3/4/2025 12:44 PM     INDICATION: epigastric abdominal pain, hx pancreatitis     COMPARISON STUDY: Abdominal radiograph 10/28/2024, CT abdomen and  pelvis 9/23/2024     TECHNIQUE: CT scan of the abdomen and pelvis was performed on  multidetector CT scanner using volumetric acquisition technique and  images were reconstructed in multiple planes with variable thickness  and reviewed on dedicated workstations.      CONTRAST: iopamidol (ISOVUE-370) solution 95 mL injected IV without  oral contrast     CT scan radiation dose is optimized to minimum requisite dose using  automated dose modulation techniques.     FINDINGS:     Support devices: None.     Lower thorax: Subsegmental atelectasis. No pleural effusion or focal  consolidative collection.     Liver: No focal mass lesions. Mild intrahepatic ductal dilatation.     Gallbladder and bile ducts: Cholecystectomy. No significant  extrahepatic biliary ductal dilation.     Spleen: Unremarkable.     Pancreas: Postoperative changes of Whipple procedure. Edematous  appearance of the pancreas with surrounding ill-defined inflammation  primarily around the body and tail. The pancreatic duct measures  approximately 3.5 cm the midline, unchanged.     Adrenals: No nodules.      Kidneys and ureters: Simple appearing cysts in the left renal cortex.  No solid lesions. No  calculi. No hydroureteronephrosis.     Bladder: Unremarkable.     Reproductive: Status post hysterectomy.     Bowel: Focal prominence of small bowel loops in the mid and left lower  abdomen (for example series 6 image [id number], series 3 image [id number]),  findings are near suture line of anastomosis.  This may represent  early small bowel obstruction where the proximal transition point is  just to the right midline near the sacrum (series 3 image 27), the  distal transition point in the left mid abdomen near the jejunojejunal  anastomotic suture (series 3 image 159). Otherwise, no dilated loops  of small or large bowel.     Appendix: Surgically absent.     Mesentery/Peritoneum: Inflammatory stranding surrounding the pancreas,  trace fluid without focal fluid collection. No free air.     Lymph nodes: Multiple prominent lymph nodes in the abdomen, for  example there is a 10 cm lymph node in the aortocaval region (series 3  image 105).     Vasculature: Scattered calcifications of the abdominal aorta and iliac  arteries.     Bone windows: No aggressive osseous abnormalities. Left hip  arthroplasty. Degenerative changes of the spine with left convex  curvature centered around L4. Grade 1 anterolisthesis of L4 over L5.     Soft tissues: Unremarkable.                                                                      IMPRESSION:         1. Fatty streakiness and fluid density seen adjacent to the pancreatic  body and tail, no definite pancreatic necrosis, acute interstitial  pancreatitis.  2. Mild dilation of the distal biliopancreatic limb near the  anastomosis, nonspecific, likely secondary to slow transit or  developing adynamic ileus; early partial obstruction not entirely  excluded. Consider attention on follow-up         details…

## 2025-03-04 NOTE — PLAN OF CARE
Goal Outcome Evaluation:    Took care of Pt from 1430 Pt received  A &O x4,  at bedside. Received Dilaudid prior to arriving to [facility] area, IV bolus infusing well. Denied pain at current time. Pt is being assessed and seen by Dr. Felton. No N/V. No complaints at present time.                  -

## 2025-03-04 NOTE — CONSULTS
GASTROENTEROLOGY CONSULTATION      Date of Admission:  3/4/2025  Requesting physician: Khadra Wong MD            Reason for Consultation:   acute on chronic pancreatitis            ASSESSMENT AND RECOMMENDATIONS:   Assessment:  Laura Gonzalez is a 74 year old female with history of tubulovillous adenoma and duodenal polyp s/p pylorus sparing Whipple in 2019 with removal of 1/3 of the pancreas c/b pancreaticojejunal anastomosis stenosis with recurrent pancreatitis s/p multiple ERCPs and SBO who presents to the ED with abdominal pain concerning for acute on chronic pancreatitis.        # Acute on chronic pancreatitis   # S/p pyloric conserving Whipple complicated by pancreas c/b pancreaticojejunal anastomosis stenosis  BISAP score is 1 (age). No other organ dysfunction noted.     Has had multiple ERCPs at [facility] since 2022, each with temporary stenting +/- dilation of thepancreaticojejunal stenosis. Last ERCP 10/1/2024 by Dr. Noguera with finding of patent pancreaticojejunal anastomosis.  A PD stent was placed and fell out within 4 weeks. She had relatively symptom-free duration of 3 months after the ERCP.      Discussed with Dr. Noguera who is weighing repeat ERCP vs pancreatic surgery (considering recurring nature of her pancreatitis and short-lived benefit of last ERCP). Dr. Noguera will see her today to discuss further management options.       Recommendations  - LR @ 100 ml/hr (1.5ml/kg/hr)   - Appreciate primary team's help with pain management  - If she has appetite, ok for low diet from GI perspective and NPO at midnight in case of any GI procedure. Understand there is concern for possible SBO, so will deter diet to primary team and surgery      Thank you for involving us in this patient's care. Please do not hesitate to contact the GI service with any questions or concerns.     Pt care plan discussed with Dr. Oates, GI staff physician.      Madalyn Martinez MD PhD  GI Fellow   [phone]    -------------------------------------------------------------------------------------------------------------------           History of Present Illness:   Laura Gonzalez is a 74 year old female with history of tubulovillous adenoma and duodenal polyp s/p pylorus sparing Whipple in 2019 with removal of 1/3 of the pancreas c/b pancreaticojejunal anastomosis stenosis with recurrent pancreatitis s/p multiple ERCPs and SBO who presents to the ED with abdominal pain concerning for acute on chronic pancreatitis.     She has had multiple ERCPs at [facility] since 2022 for recurrent acute pancreatitis due to PJ anastomosis stenosis. Each with temporary stenting +/- dilation of thepancreaticojejunal stenosis. Last ERCP 10/1/2024 by Dr. Noguera with finding of patent pancreaticojejunal anastomosis.  A PD stent was placed and fell out within 4 weeks.     She had relatively symptom-free duration of 3 months after the ERCP. however since January 2025, she started to have abdominal pain which progressively worsened.  When she was in [place] on vacation in February, her pain was severe and she went to a local ED. after returning to [place], her pain persisted especially after dinner.  Therefore she came to the ED today.     On admission, lipase was elevated to 292 and CT showed peripancreatic fat stranding. Her pain improved quite a bit after IV dilaudid.     She reports passing flatus and had a bowel movement this morning.             Past Medical History:   Reviewed and edited as appropriate  Past Medical History:   Diagnosis Date    Asthma     pt.states no longer has symptoms    Atherosclerosis     CAD (coronary artery disease)     HLD (hyperlipidemia)             Past Surgical History:   Reviewed and edited as appropriate   Past Surgical History:   Procedure Laterality Date    APPENDECTOMY      ARTHROPLASTY HIP Left 6/15/2016    Procedure: ARTHROPLASTY HIP;  Surgeon: Jeffy Wolff MD;  Location: [facility]    COLONOSCOPY   10/3/2013    Procedure: COMBINED COLONOSCOPY, SINGLE BIOPSY/POLYPECTOMY BY BIOPSY;;  Surgeon: Kenney Walls MD;  Location: [facility]    ENDOSCOPIC RETROGRADE CHOLANGIOPANCREATOGRAM N/A 6/16/2022    Procedure: ENDOSCOPIC RETROGRADE CHOLANGIOPANCREATOGRAPHY WITH PANCREATIC DUCT DILATION, DEBRIS REMOVAL AND STENT PLACEMENT;  Surgeon: Arnaldo Noguera MD;  Location: [facility]    ENDOSCOPIC RETROGRADE CHOLANGIOPANCREATOGRAM N/A 10/13/2022    Procedure: ENDOSCOPIC RETROGRADE CHOLANGIOPANCREATOGRAPHY, biliary stent placement;  Surgeon: Arnaldo Noguera MD;  Location[facility]    ENDOSCOPIC RETROGRADE CHOLANGIOPANCREATOGRAM N/A 11/21/2022    Procedure: ENDOSCOPIC RETROGRADE CHOLANGIOPANCREATOGRAPHY, WITH TUBE OR STENT INSERTION, with balloon dialation;  Surgeon: Johnson Gotti MD;  [facility]: [facility]    ENDOSCOPIC RETROGRADE CHOLANGIOPANCREATOGRAM N/A 3/30/2023    Procedure: ENDOSCOPIC RETROGRADE CHOLANGIOPANCREATOGRAPHY with bile duct stents removed x2 and replacement of one pancreatic stent;  Surgeon: Arnaldo Noguera MD;  [facility]    ENDOSCOPIC RETROGRADE CHOLANGIOPANCREATOGRAM N/A 8/20/2024    Procedure: ENTEROSCOPY ASSISTED ENDOSCOPIC RETROGRADE CHOLANGIOPANCREATOGRAPHY;  Surgeon: Arnaldo Noguera MD;  [facility]    ENDOSCOPIC RETROGRADE CHOLANGIOPANCREATOGRAM N/A 10/1/2024    Procedure: ENDOSCOPIC RETROGRADE CHOLANGIOPANCREATOGRAPHY with pancreatic stent placement x1;  Surgeon: Arnaldo Noguera MD;  [facility]    ENDOSCOPIC RETROGRADE CHOLANGIOPANCREATOGRAPHY, EXCHANGE TUBE/STENT N/A 1/24/2023    Procedure: enteroscopy assisted ENDOSCOPIC RETROGRADE CHOLANGIOPANCREATOGRAPHY, with pancreatic stents replaced times 2;  Surgeon: Arnaldo Noguera MD;  [facility]    ENDOSCOPIC ULTRASOUND UPPER GASTROINTESTINAL TRACT (GI) N/A 5/12/2022    Procedure: ENDOSCOPIC ULTRASOUND WITH PANCREATICOGASTROSTOMY CREATION, TRACT DILATION, STENT PLACEMENT;  Surgeon: Romaine Oates MD;   [facility]    ESOPHAGOSCOPY, GASTROSCOPY, DUODENOSCOPY (EGD), COMBINED N/A 2022    Procedure: ESOPHAGOGASTRODUODENOSCOPY WITH ENDOSCOPIC CLIP PLACEMENT;  Surgeon: Arnaldo Noguera MD;  [facility]    FOOT SURGERY      HC TOOTH EXTRACTION W/FORCEP      HYSTERECTOMY      INCISION AND DRAINAGE BREAST Left 2022    Procedure: evacuate hematoma left  BREAST;  Surgeon: Dayron Garcia MD;  Location: [facility]    IR FOLLOW UP VISIT OUTPATIENT  2022    IR SINOGRAM INJECTION DIAGNOSTIC  2022            Social History:   Reviewed and edited as appropriate  Social History     Socioeconomic History    Marital status:      Spouse name: Not on file    Number of children: Not on file    Years of education: Not on file    Highest education level: Not on file   Occupational History    Not on file   Tobacco Use    Smoking status: Former     Current packs/day: 0.00     Types: Cigarettes     Quit date: 10/3/1988     Years since quittin.4     Passive exposure: Never    Smokeless tobacco: Never   Substance and Sexual Activity    Alcohol use: No    Drug use: No    Sexual activity: Not on file   Other Topics Concern    Parent/sibling w/ CABG, MI or angioplasty before 65F 55M? Not Asked   Social History Narrative    Not on file     Social Drivers of Health     Financial Resource Strain: Low Risk  (2023)    Received from [facility]    Overall Financial Resource Strain (CARDIA)     Difficulty of Paying Living Expenses: Not hard at all   Food Insecurity: No Food Insecurity (2023)    Received from [facility]    Hunger Vital Sign     Worried About Running Out of Food in the Last Year: Never true     Ran Out of Food in the Last Year: Never true   Transportation Needs: No Transportation Needs (2023)    Received from [facility]    PRAPARE - Transportation     Lack of Transportation (Medical): No     Lack of Transportation (Non-Medical): No    Physical Activity: Inactive (6/13/2023)    Received from [facility]    Exercise Vital Sign     Days of Exercise per Week: 0 days     Minutes of Exercise per Session: 20 min   Stress: No Stress Concern Present (4/20/2021)    Received from [facility]    [place] [employer] - Occupational Stress Questionnaire     Feeling of Stress : Only a little   Social Connections: Socially Integrated (4/20/2021)    Received from [facility]    Social Connection and Isolation Panel [NHANES]     Frequency of Communication with Friends and Family: More than three times a week     Frequency of Social Gatherings with Friends and Family: More than three times a week     Attends [employer] Services: More than 4 times per year     Active Member of Clubs or Organizations: Yes     Attends Club or Organization Meetings: More than 4 times per year     Marital Status:    Interpersonal Safety: Low Risk  (10/1/2024)    Interpersonal Safety     Do you feel physically and emotionally safe where you currently live?: Yes     Within the past 12 months, have you been hit, slapped, kicked or otherwise physically hurt by someone?: No     Within the past 12 months, have you been humiliated or emotionally abused in other ways by your partner or ex-partner?: No   Housing Stability: Low Risk  (6/13/2023)    Received from [facility]    Housing Stability     What is your living situation today?: I have a steady place to live            Family History:   Reviewed and edited as appropriate  No family history on file.   No known history of colorectal cancer, liver disease, or inflammatory bowel disease.         Allergies:   Reviewed and edited as appropriate   No Known Allergies         Medications:   (Not in a hospital admission)            Review of Systems:     A complete 10 point review of systems was performed and is negative except as noted in the HPI           Physical  Exam:   /63   Pulse 55   Temp 98.1  F (36.7  C) (Oral)   Resp 18   SpO2 99%   Wt:   Wt Readings from Last 2 Encounters:   10/01/24 70.1 kg (154 lb 8.7 oz)   08/20/24 70.5 kg (155 lb 6.8 oz)      Constitutional: No acute distress, resting comfortably in bed  Eyes: Sclera anicteric  Ears/nose/mouth/throat: Moist mucus membranes, hearing intact  Neck: supple  CV: No edema  Respiratory: Breathing comfortably on room air  Abd: Soft, tender throughout especially in RLQ, nondistended, bowel sounds present  Skin: warm, perfused, no jaundice  Neuro: AAO x 3  Psych: Normal affect  MSK: No gross deformities         Data:   Labs and imaging below were independently reviewed and interpreted    BMP  Recent Labs   Lab 03/04/25  1111      POTASSIUM 4.4   CHLORIDE 106   GLEN 8.6*   CO2 27   BUN 16.2   CR 0.78   GLC 96     CBC  Recent Labs   Lab 03/04/25  1111   WBC 13.4*   RBC 4.39   HGB 12.2   HCT 39.8   MCV 91   MCH 27.8   MCHC 30.7*   RDW 15.7*        INRNo lab results found in last 7 days.  LFTs  Recent Labs   Lab 03/04/25  1111   ALKPHOS 161*   AST 47*   ALT 24   BILITOTAL 0.4   PROTTOTAL 7.2   ALBUMIN 3.7      PANC  Recent Labs   Lab 03/04/25  1111   LIPASE 292*       Imaging: Reviewed    Endoscopy: Reviewed

## 2025-03-04 NOTE — ED PROVIDER NOTES
[facility] ([facility])    3/04/25       ED PROVIDER NOTE   History     Chief Complaint   Patient presents with    Abdominal Pain     HPI  Laura Gonzalez is a 74 year old female with history of tubulovillous adenoma and duodenal polyp s/p pylorus sparing Whipple in 2019 with removal of 1/3 of the pancreas c/b pancreaticcojejunal anastomosis stenosis with recurrent pancreatitis s/p multiple ERCPs and SBO who presents to the ED with abdominal pain.  Patient reports worsening acute on chronic pain over the past 3 weeks that is now a 10/10 constant midline abdominal pain radiating to the left and into the back.  She did take 5 mg oxycodone 2 days ago without relief.  Last p.o. intake was last night.    She notes that this pain has been getting worse over the last couple of weeks.  She actually was evaluated when she was on vacation down in [place] about 3 weeks ago and has been having daily pain ever since.  She is post to have a follow-up with her gastroenterologist tomorrow, but states that she was not able to tolerate the pain as its gotten significantly worse this morning.  Feels the pain is 10 out of 10 in severity.  Does have some nausea but no significant vomiting.  Feels like the pain wraps around her upper abdomen into her back.  No shortness of breath or chest pain.    Past Medical History  Past Medical History:   Diagnosis Date    Asthma     pt.states no longer has symptoms    Atherosclerosis     CAD (coronary artery disease)     HLD (hyperlipidemia)      Past Surgical History:   Procedure Laterality Date    APPENDECTOMY      ARTHROPLASTY HIP Left 6/15/2016    Procedure: ARTHROPLASTY HIP;  Surgeon: Jeffy Wolff MD;  Location: [facility] OR    COLONOSCOPY  10/3/2013    Procedure: COMBINED COLONOSCOPY, SINGLE BIOPSY/POLYPECTOMY BY BIOPSY;;  Surgeon: Kenney Walls MD;  Location:  GI    ENDOSCOPIC RETROGRADE CHOLANGIOPANCREATOGRAM N/A 6/16/2022    Procedure: ENDOSCOPIC  RETROGRADE CHOLANGIOPANCREATOGRAPHY WITH PANCREATIC DUCT DILATION, DEBRIS REMOVAL AND STENT PLACEMENT;  Surgeon: Arnaldo Noguera MD;  Location: [facility]    ENDOSCOPIC RETROGRADE CHOLANGIOPANCREATOGRAM N/A 10/13/2022    Procedure: ENDOSCOPIC RETROGRADE CHOLANGIOPANCREATOGRAPHY, biliary stent placement;  Surgeon: Arnaldo Noguera MD;  Location: [facility]    ENDOSCOPIC RETROGRADE CHOLANGIOPANCREATOGRAM N/A 11/21/2022    Procedure: ENDOSCOPIC RETROGRADE CHOLANGIOPANCREATOGRAPHY, WITH TUBE OR STENT INSERTION, with balloon dialation;  Surgeon: Johnson Gotti MD;  Location: [facility]    ENDOSCOPIC RETROGRADE CHOLANGIOPANCREATOGRAM N/A 3/30/2023    Procedure: ENDOSCOPIC RETROGRADE CHOLANGIOPANCREATOGRAPHY with bile duct stents removed x2 and replacement of one pancreatic stent;  Surgeon: Arnaldo Noguera MD;  Location: [facility]    ENDOSCOPIC RETROGRADE CHOLANGIOPANCREATOGRAM N/A 8/20/2024    Procedure: ENTEROSCOPY ASSISTED ENDOSCOPIC RETROGRADE CHOLANGIOPANCREATOGRAPHY;  Surgeon: Arnaldo Noguera MD;  Location: [facility]    ENDOSCOPIC RETROGRADE CHOLANGIOPANCREATOGRAM N/A 10/1/2024    Procedure: ENDOSCOPIC RETROGRADE CHOLANGIOPANCREATOGRAPHY with pancreatic stent placement x1;  Surgeon: Arnaldo Noguera MD;  Location: [facility]    ENDOSCOPIC RETROGRADE CHOLANGIOPANCREATOGRAPHY, EXCHANGE TUBE/STENT N/A 1/24/2023    Procedure: enteroscopy assisted ENDOSCOPIC RETROGRADE CHOLANGIOPANCREATOGRAPHY, with pancreatic stents replaced times 2;  Surgeon: Arnaldo Noguera MD;  Location: [facility]    ENDOSCOPIC ULTRASOUND UPPER GASTROINTESTINAL TRACT (GI) N/A 5/12/2022    Procedure: ENDOSCOPIC ULTRASOUND WITH PANCREATICOGASTROSTOMY CREATION, TRACT DILATION, STENT PLACEMENT;  Surgeon: Romaine Oates MD;  Location: [facility]    ESOPHAGOSCOPY, GASTROSCOPY, DUODENOSCOPY (EGD), COMBINED N/A 5/12/2022    Procedure: ESOPHAGOGASTRODUODENOSCOPY WITH ENDOSCOPIC CLIP PLACEMENT;  Surgeon: Arnaldo Noguera MD;  Location: [facility]     FOOT SURGERY      HC TOOTH EXTRACTION W/FORCEP      HYSTERECTOMY      INCISION AND DRAINAGE BREAST Left 2022    Procedure: evacuate hematoma left  BREAST;  Surgeon: Dayron Garcia MD;  Location: [facility] OR    IR FOLLOW UP VISIT OUTPATIENT  2022    IR SINOGRAM INJECTION DIAGNOSTIC  2022     atorvastatin (LIPITOR) 40 MG tablet  cholecalciferol (VITAMIN D3) 25 mcg (1000 units) capsule  diazepam (VALIUM) 5 MG tablet  diazepam (VALIUM) 5 MG tablet  estradiol (VIVELLE-DOT) 0.075 MG/24HR BIW patch  evolocumab (REPATHA) 140 MG/ML prefilled autoinjector  ezetimibe (ZETIA) 10 MG tablet  hydrOXYzine HCl (ATARAX) 25 MG tablet  lipase-protease-amylase (CREON) 19581-54545-979600 units CPEP per EC capsule  Magnesium Ascorbate POWD  montelukast (SINGULAIR) 10 MG tablet  oxyCODONE (ROXICODONE) 5 MG tablet  oxyCODONE (ROXICODONE) 5 MG tablet  valACYclovir (VALTREX) 1000 mg tablet  VIOKACE 76599-84015 units TABS per tablet  vitamin A 3 MG (23969 UNITS) capsule  zolpidem (AMBIEN) 10 MG tablet      No Known Allergies  Family History  No family history on file.  Social History   Social History     Tobacco Use    Smoking status: Former     Current packs/day: 0.00     Types: Cigarettes     Quit date: 10/3/1988     Years since quittin.4     Passive exposure: Never    Smokeless tobacco: Never   Substance Use Topics    Alcohol use: No    Drug use: No      A medically appropriate review of systems was performed with pertinent positives and negatives noted in the HPI, and all other systems negative.    Physical Exam   BP: (!) 144/76  Pulse: 67  Temp: 98.1  F (36.7  C)  Resp: 18  SpO2: 97 %  Physical Exam  GEN: Comfortable but nontoxic-appearing  HEENT: normocephalic and atraumatic, PERRLA, EOMI  CV: well-perfused, normal skin color for ethnicity, regular rate and rhythm, no murmurs rubs or gallops  PULM: breathing comfortably, in no respiratory distress, clear to auscultation upper and lower lung fields  ABD:  nondistended, moderate tenderness in the epigastrium, bilateral upper abdominal quadrants, no significant lower abdominal point tenderness, nonperitonitic  EXT: Full range of motion.  No edema.  NEURO: awake, conversant, grossly normal bilateral upper and lower extremity strength & ROM   SKIN: No rashes, ecchymosis, or lacerations  PSYCH: Calm and cooperative, interactive    ED Course, Procedures, & Data      Procedures            EKG Interpretation:      Interpreted by Khadra Wong MD  Time reviewed: 1031  Symptoms at time of EKG: nausea   Rhythm: normal sinus   Rate: normal  Axis: normal  Ectopy: none  Conduction: normal  ST Segments/ T Waves: No ST-T wave changes  Q Waves: none  Comparison to prior: Unchanged    Clinical Impression: normal EKG     Results for orders placed or performed during the hospital encounter of 03/04/25   CT Abdomen Pelvis w Contrast     Status: None (Preliminary result)    Impression    RESIDENT PRELIMINARY INTERPRETATION  IMPRESSION:   1. Edematous appearance of the pancreas with adjacent fat stranding  and trace fluid. No focal fluid collection is present. Findings likely  represent acute on chronic pancreatitis.  2. Dilated loop of small bowel with proximal beginning of the  dilatation just to the right of midline anterior to the sacrum with  distal transition near the jejunojejunal anastomosis, the loop of  bowel measures up to 3.6 cm. Finding may represent early small bowel  obstruction, close follow-up is recommended.  3. Multiple prominent lymph nodes throughout the abdomen, likely  reactive.      [Urgent Result: Acute on chronic pancreatitis.]    Finding was identified on 3/4/2025 12:57 PM.     Dr. Wong was contacted by Dr. Okeefe at 3/4/2025 1:28 PM and verbalized  understanding of the urgent finding.    Comprehensive metabolic panel     Status: Abnormal   Result Value Ref Range    Sodium 142 135 - 145 mmol/L    Potassium 4.4 3.4 - 5.3 mmol/L    Carbon Dioxide (CO2) 27 22 - 29  mmol/L    Anion Gap 9 7 - 15 mmol/L    Urea Nitrogen 16.2 8.0 - 23.0 mg/dL    Creatinine 0.78 0.51 - 0.95 mg/dL    GFR Estimate 79 >60 mL/min/1.73m2    Calcium 8.6 (L) 8.8 - 10.4 mg/dL    Chloride 106 98 - 107 mmol/L    Glucose 96 70 - 99 mg/dL    Alkaline Phosphatase 161 (H) 40 - 150 U/L    AST 47 (H) 0 - 45 U/L    ALT 24 0 - 50 U/L    Protein Total 7.2 6.4 - 8.3 g/dL    Albumin 3.7 3.5 - 5.2 g/dL    Bilirubin Total 0.4 <=1.2 mg/dL   Lipase     Status: Abnormal   Result Value Ref Range    Lipase 292 (H) 13 - 60 U/L   Lactic acid whole blood with 1x repeat in 2 hr when >2     Status: Normal   Result Value Ref Range    Lactic Acid, Initial 1.0 0.7 - 2.0 mmol/L   Troponin T, High Sensitivity     Status: Normal   Result Value Ref Range    Troponin T, High Sensitivity <6 <=14 ng/L   CBC with platelets and differential     Status: Abnormal   Result Value Ref Range    WBC Count 13.4 (H) 4.0 - 11.0 10e3/uL    RBC Count 4.39 3.80 - 5.20 10e6/uL    Hemoglobin 12.2 11.7 - 15.7 g/dL    Hematocrit 39.8 35.0 - 47.0 %    MCV 91 78 - 100 fL    MCH 27.8 26.5 - 33.0 pg    MCHC 30.7 (L) 31.5 - 36.5 g/dL    RDW 15.7 (H) 10.0 - 15.0 %    Platelet Count 290 150 - 450 10e3/uL    % Neutrophils 71 %    % Lymphocytes 22 %    % Monocytes 7 %    % Eosinophils 0 %    % Basophils 0 %    % Immature Granulocytes 0 %    NRBCs per 100 WBC 0 <1 /100    Absolute Neutrophils 9.4 (H) 1.6 - 8.3 10e3/uL    Absolute Lymphocytes 2.9 0.8 - 5.3 10e3/uL    Absolute Monocytes 0.9 0.0 - 1.3 10e3/uL    Absolute Eosinophils 0.0 0.0 - 0.7 10e3/uL    Absolute Basophils 0.0 0.0 - 0.2 10e3/uL    Absolute Immature Granulocytes 0.1 <=0.4 10e3/uL    Absolute NRBCs 0.0 10e3/uL   EKG 12-lead, tracing only     Status: None   Result Value Ref Range    Systolic Blood Pressure  mmHg    Diastolic Blood Pressure  mmHg    Ventricular Rate 60 BPM    Atrial Rate 60 BPM    OR Interval 136 ms    QRS Duration 84 ms     ms    QTc 430 ms    P Axis 69 degrees    R AXIS 33 degrees     T Axis 64 degrees    Interpretation ECG       Sinus rhythm  Normal ECG  Unconfirmed report - interpretation of this ECG is computer generated - see medical record for final interpretation    Confirmed by - EMERGENCY ROOM, PHYSICIAN ([id number]),  SURESH MOULTON ([id number]) on 3/4/2025 11:40:54 AM     CBC with platelets differential     Status: Abnormal    Narrative    The following orders were created for panel order CBC with platelets differential.  Procedure                               Abnormality         Status                     ---------                               -----------         ------                     CBC with platelets and d...[[id number]]  Abnormal            Final result                 Please view results for these tests on the individual orders.     Medications   ondansetron (ZOFRAN) injection 4 mg (4 mg Intravenous $Given 3/4/25 1119)   sodium chloride 0.9% BOLUS 1,000 mL (has no administration in time range)   HYDROmorphone (PF) (DILAUDID) injection 0.5 mg (0.5 mg Intravenous $Given 3/4/25 1258)   iopamidol (ISOVUE-370) solution 95 mL (95 mLs Intravenous $Given 3/4/25 1224)   sodium chloride (PF) 0.9% PF flush 74 mL (74 mLs Intracatheter $Given 3/4/25 1224)     Labs Ordered and Resulted from Time of ED Arrival to Time of ED Departure   COMPREHENSIVE METABOLIC PANEL - Abnormal       Result Value    Sodium 142      Potassium 4.4      Carbon Dioxide (CO2) 27      Anion Gap 9      Urea Nitrogen 16.2      Creatinine 0.78      GFR Estimate 79      Calcium 8.6 (*)     Chloride 106      Glucose 96      Alkaline Phosphatase 161 (*)     AST 47 (*)     ALT 24      Protein Total 7.2      Albumin 3.7      Bilirubin Total 0.4     LIPASE - Abnormal    Lipase 292 (*)    CBC WITH PLATELETS AND DIFFERENTIAL - Abnormal    WBC Count 13.4 (*)     RBC Count 4.39      Hemoglobin 12.2      Hematocrit 39.8      MCV 91      MCH 27.8      MCHC 30.7 (*)     RDW 15.7 (*)     Platelet Count 290      % Neutrophils 71      %  Lymphocytes 22      % Monocytes 7      % Eosinophils 0      % Basophils 0      % Immature Granulocytes 0      NRBCs per 100 WBC 0      Absolute Neutrophils 9.4 (*)     Absolute Lymphocytes 2.9      Absolute Monocytes 0.9      Absolute Eosinophils 0.0      Absolute Basophils 0.0      Absolute Immature Granulocytes 0.1      Absolute NRBCs 0.0     LACTIC ACID WHOLE BLOOD WITH 1X REPEAT IN 2 HR WHEN >2 - Normal    Lactic Acid, Initial 1.0     TROPONIN T, HIGH SENSITIVITY - Normal    Troponin T, High Sensitivity <6       CT Abdomen Pelvis w Contrast   Preliminary Result   RESIDENT PRELIMINARY INTERPRETATION   IMPRESSION:    1. Edematous appearance of the pancreas with adjacent fat stranding   and trace fluid. No focal fluid collection is present. Findings likely   represent acute on chronic pancreatitis.   2. Dilated loop of small bowel with proximal beginning of the   dilatation just to the right of midline anterior to the sacrum with   distal transition near the jejunojejunal anastomosis, the loop of   bowel measures up to 3.6 cm. Finding may represent early small bowel   obstruction, close follow-up is recommended.   3. Multiple prominent lymph nodes throughout the abdomen, likely   reactive.         [Urgent Result: Acute on chronic pancreatitis.]      Finding was identified on 3/4/2025 12:57 PM.       Dr. Wong was contacted by Dr. Okeefe at 3/4/2025 1:28 PM and verbalized   understanding of the urgent finding.              Critical care was not performed.     Medical Decision Making  The patient's presentation was of high complexity (a chronic illness severe exacerbation, progression, or side effect of treatment).    The patient's evaluation involved:  review of external note(s) from 3+ sources (see separate area of note for details)  review of 3+ test result(s) ordered prior to this encounter (see separate area of note for details)  ordering and/or review of 3+ test(s) in this encounter (see separate area of note for  details)    The patient's management necessitated high risk (a decision regarding hospitalization).    Assessment & Plan    74-year-old female with a history of duodenal polyp status post pylorus sparing Whipple in 2019 with chronic recurrent pancreatitis since presenting to the Emergency Department due to worsening pain similar to her previous pancreatitis that has been daily for the last 3 weeks now worsening over the last couple of days with increasing abdominal pain radiating into her back, nausea noted to have point tenderness in the epigastrium and throughout the entire upper abdomen.    Clinically concerning for dehydration.  Will give IV fluids, antiemetics, and pain control.  Primary concern would be for pancreatitis and complications of her pancreatitis.  Will plan for CT of her abdomen pelvis, electrolytes and likely admission due to pain control    1:40 PM labs demonstrate elevated lipase, and CT with evidence consistent with pancreatitis.  She also does have a single loop of small bowel that measures up to 3.6 cm concerning for possible early small bowel obstruction.  She is tolerating oral intake, still having bowel movements and at this point in time does not clinically have evidence consistent with a severe bowel obstruction and will hold on NG tube for now pending reevaluation.  Will admit to medicine w surgery consult    I have reviewed the nursing notes. I have reviewed the findings, diagnosis, plan and need for follow up with the patient.    New Prescriptions    No medications on file       Final diagnoses:   Acute on chronic pancreatitis (H)   Dilation of small bowel anastomosis       Khadra Wong MD  [facility] EMERGENCY DEPARTMENT  3/4/2025     Khadra Wong MD  03/04/25 [id number]

## 2025-03-04 NOTE — ED TRIAGE NOTES
Patient ambulatory to triage for chronic acute pancreatitis flare up. Patient referred by MD. Patient reports experiencing worsening 10/10 constant midline abdominal pain radiating left around the back. Patient reports 3 weeks worth of acute on chronic pain.     Endorses SOB at rest, denies CP.     Patient last ate/drank last night.   Pt did take 5mg oxycodone on Sunday which did not provide relief.

## 2025-03-04 NOTE — H&P
[facility]    History and Physical - Medicine Service, MAROON TEAM 4       Date of Admission:  3/4/2025    Assessment & Plan      Laura Gonzalez is a 74 year old female admitted on 3/4/2025. 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     #Acute on chronic pancreatitis   #History of tubulovillous duodenal adenoma s/p pylorus-sparing Whipple  Criteria 3/3 for pancreatitis: Abdominal pain, Lipase to 292. CT imaging with acute interstitial pancreatitis. Elevated Alk Phos 161 (previously 195). AST at 47 (previous 35)   Leukocytosis to 13.4. Lactate is neg. Vitals are stable. Low concern for bacterial infection.   - GI consulted    - 100cc/hr LR   Pain control   - Oxycodone 5-10mg q4 hours   - Dilaudid 0.5mg IV q3 hours     #Pruritus with opioids   - Hydroxyzine for itching   - Narcan drip for itching     #Concern for early partial bowel obstruction   Patient with BM today and passing gas.   General surgery consult.    - NPO    - Serial abdominal exams     #Pancreatic Insufficiency  Pt noting loose, foul smelling stools for last month.   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 CT AP as above and with stable mild intrahepatic biliary ductal dilation  CAD/ HLD: HOLD PTA statin and ezetimibe while NPO   Insomnia: Decrease Ambien dose to 5mg from 10mg due to opioid use -- avoid over sedation   Asthma: Stable. Continue PTA Singulair  #Menopausal symptoms: Continue PTA estradiol patch -- will do DVT ppx with lovenox         Diet:  NPO   DVT Prophylaxis: Enoxaparin (Lovenox) SQ  Condon Catheter: Not present  Fluids:  100cc/hr  Lines: None     Cardiac Monitoring: None  Code Status:  Full code     Clinically Significant Risk Factors Present on Admission           # Hypocalcemia: Lowest Ca = 8.6 mg/dL in last 2 days, will monitor and replace as appropriate                              Disposition Plan      Expected Discharge Date: 03/08/2025                The patient's care was discussed with the Attending Physician, Dr. Wade  .    Annie Felton MD  Medicine Service, [address]  Securely message with Vocera (more info)  Text page via [facility] Paging/Directory   See signed in provider for up to date coverage information  ______________________________________________________________________    Chief Complaint   Abdominal pain     History is obtained from the patient    History of Present Illness   Laura Gonzalez is a 74 year old female w/ history of recurrent pancreatitis presenting for abdominal pain.     The patient reports that she has been having ongoing abdominal pain for about the last month.  This morning about 3 AM she had excruciating pain.  She woke up with chills and sweating.  She was nauseous.  She also had an episode of emesis.  It has probably been 1 month since she has had a good day.  Pain feels like previous episodes of pancreatitis.  She endorses pain currently to entire abdomen.  States when pain is severe she endorses some shortness of breath and palpitations.  She occasionally takes oxycodone at home, with some relief.  She also endorses foul-smelling loose stools and explosive gas.     No fevers.  Weight has been stable.  No recent cough.  No dysuria.  No myalgias.  No dizziness or lightheadedness.    Her  is at bedside.  Reports that after her last ERCP with stent she did the best that she ever has for about 3 months.    ER Course: Afebrile.  144/76.  Heart rate 67.  97% on room air  Labs: WC BC 13.4.  Normal hemoglobin and platelets.   Lipase elevated to 92 lactate negative troponin negative  Imaging: CT showing acute interstitial pancreatitis and concern for partial bowel obstruction  Interventions: IV Dilaudid and 1 L IV fluids    Past Medical History    Past Medical History:   Diagnosis Date    Asthma     pt.states no longer has symptoms    Atherosclerosis     CAD (coronary artery disease)     HLD (hyperlipidemia)        Past Surgical History   Past Surgical History:   Procedure Laterality Date    APPENDECTOMY      ARTHROPLASTY HIP Left 6/15/2016    Procedure: ARTHROPLASTY HIP;  Surgeon: Jeffy Wolff MD;  Location: [facility] OR    COLONOSCOPY  10/3/2013    Procedure: COMBINED COLONOSCOPY, SINGLE BIOPSY/POLYPECTOMY BY BIOPSY;;  Surgeon: Kenney Walls MD;  Location:  GI    ENDOSCOPIC RETROGRADE CHOLANGIOPANCREATOGRAM N/A 6/16/2022    Procedure: ENDOSCOPIC RETROGRADE CHOLANGIOPANCREATOGRAPHY WITH PANCREATIC DUCT DILATION, DEBRIS REMOVAL AND STENT PLACEMENT;  Surgeon: Arnaldo Noguera MD;  Location: [facility] OR    ENDOSCOPIC RETROGRADE CHOLANGIOPANCREATOGRAM N/A 10/13/2022    Procedure: ENDOSCOPIC RETROGRADE CHOLANGIOPANCREATOGRAPHY, biliary stent placement;  Surgeon: Arnaldo Noguera MD;  Location: [facility]    ENDOSCOPIC RETROGRADE CHOLANGIOPANCREATOGRAM N/A 11/21/2022    Procedure: ENDOSCOPIC RETROGRADE CHOLANGIOPANCREATOGRAPHY, WITH TUBE OR STENT INSERTION, with balloon dialation;  Surgeon: Johnson Gotti MD;  Location: [facility] OR    ENDOSCOPIC RETROGRADE CHOLANGIOPANCREATOGRAM N/A 3/30/2023    Procedure: ENDOSCOPIC RETROGRADE CHOLANGIOPANCREATOGRAPHY with bile duct stents removed x2 and replacement of one pancreatic stent;  Surgeon: Arnaldo Noguera MD;  Location: [facility]    ENDOSCOPIC RETROGRADE CHOLANGIOPANCREATOGRAM N/A 8/20/2024    Procedure: ENTEROSCOPY ASSISTED ENDOSCOPIC RETROGRADE CHOLANGIOPANCREATOGRAPHY;  Surgeon: Arnaldo Noguera MD;  Location: [facility]    ENDOSCOPIC RETROGRADE CHOLANGIOPANCREATOGRAM N/A 10/1/2024     Procedure: ENDOSCOPIC RETROGRADE CHOLANGIOPANCREATOGRAPHY with pancreatic stent placement x1;  Surgeon: Arnaldo Noguera MD;  [facility]: [facility]    ENDOSCOPIC RETROGRADE CHOLANGIOPANCREATOGRAPHY, EXCHANGE TUBE/STENT N/A 1/24/2023    Procedure: enteroscopy assisted ENDOSCOPIC RETROGRADE CHOLANGIOPANCREATOGRAPHY, with pancreatic stents replaced times 2;  Surgeon: Arnaldo Noguera MD;  [facility]: [facility]    ENDOSCOPIC ULTRASOUND UPPER GASTROINTESTINAL TRACT (GI) N/A 5/12/2022    Procedure: ENDOSCOPIC ULTRASOUND WITH PANCREATICOGASTROSTOMY CREATION, TRACT DILATION, STENT PLACEMENT;  Surgeon: Romaine Oates MD;  Location: [facility]    ESOPHAGOSCOPY, GASTROSCOPY, DUODENOSCOPY (EGD), COMBINED N/A 5/12/2022    Procedure: ESOPHAGOGASTRODUODENOSCOPY WITH ENDOSCOPIC CLIP PLACEMENT;  Surgeon: Arnaldo Noguera MD;  Location: [facility]    FOOT SURGERY      HC TOOTH EXTRACTION W/FORCEP      HYSTERECTOMY      INCISION AND DRAINAGE BREAST Left 2/18/2022    Procedure: evacuate hematoma left  BREAST;  Surgeon: Dayron Garcia MD;  Location: [facility] OR    IR FOLLOW UP VISIT OUTPATIENT  5/31/2022    IR SINOGRAM INJECTION DIAGNOSTIC  5/31/2022       Prior to Admission Medications   Prior to Admission Medications   Prescriptions Last Dose Informant Patient Reported? Taking?   Magnesium Ascorbate POWD   Yes No   Sig: One teaspoon at bedtime daily by mouth   VIOKACE [id number] units TABS per tablet   No No   Sig: TAKE 3 CAPSULES WITH EACH MEAL, UP TO 9 PER DAY.   atorvastatin (LIPITOR) 40 MG tablet   Yes No   Sig: Take 40 mg by mouth At Bedtime    cholecalciferol (VITAMIN D3) 25 mcg (1000 units) capsule   Yes No   Sig: Take 1 capsule by mouth daily   diazepam (VALIUM) 5 MG tablet   No No   Sig: Take 1 30-45 minutes prior to radiology scan.  May repeat x1 prn.   diazepam (VALIUM) 5 MG tablet   No No   Sig: Take 1 30-45 minutes prior to radiology scan.  May repeat x1 prn.   estradiol (VIVELLE-DOT) 0.075 MG/24HR BIW patch   Yes  No   Sig: Place 1 patch onto the skin twice a week   evolocumab (REPATHA) 140 MG/ML prefilled autoinjector   Yes No   Sig: Inject 140 mg Subcutaneous every 14 days   ezetimibe (ZETIA) 10 MG tablet   Yes No   Sig: Take 10 mg by mouth At Bedtime   hydrOXYzine HCl (ATARAX) 25 MG tablet   No No   Sig: Take 1 tablet (25 mg) by mouth 3 times daily as needed for itching   lipase-protease-amylase (CREON) [id number] units CPEP per EC capsule   No No   Sig: Take 2-3 with meals / 1-2 with snacks, up to 15 per day.   montelukast (SINGULAIR) 10 MG tablet   Yes No   Sig: Take 10 mg by mouth at bedtime   oxyCODONE (ROXICODONE) 5 MG tablet   Yes No   Sig: Take 5 mg by mouth every 6 hours as needed for severe pain.   oxyCODONE (ROXICODONE) 5 MG tablet   No No   Sig: Take 1 tablet (5 mg) by mouth every 6 hours as needed for pain.   valACYclovir (VALTREX) 1000 mg tablet   Yes No   Sig: Take 1,000 mg by mouth daily as needed (cold sore)   vitamin A 3 MG (45290 UNITS) capsule   Yes No   Sig: Take 1 capsule by mouth daily   zolpidem (AMBIEN) 10 MG tablet   Yes No   Sig: Take 1 tablet by mouth nightly as needed for sleep      Facility-Administered Medications: None      Social: No smoking, alcohol or drug use.     Physical Exam   Vital Signs: Temp: 98.1  F (36.7  C) Temp src: Oral BP: 123/63 Pulse: 55   Resp: 18 SpO2: 99 % O2 Device: None (Room air)    Weight: 0 lbs 0 oz    General: Awake, alert, no acute distress. Laying in bed.   Cardiac: Regular rate and rhythm. No murmur noted.   Respiratory: Normal effort. Lungs are clear auscultation.   Abdomen: Soft, central tenderness with guarding, non-distended, positive bowel sounds   Extremities: No LE edema. Warm and dry.   Neurological: Alert, oriented to person, place and time. Gross motor intact.  Psychiatric: Pleasant affect.       Medical Decision Making       Please see A&P for additional details of medical decision making.      Data     I have personally reviewed the following  data over the past 24 hrs:    13.4 (H)  \   12.2   / 290     142 106 16.2 /  96   4.4 27 0.78 \     ALT: 24 AST: 47 (H) AP: 161 (H) TBILI: 0.4   ALB: 3.7 TOT PROTEIN: 7.2 LIPASE: 292 (H)     Trop: <6 BNP: N/A     Procal: N/A CRP: N/A Lactic Acid: 1.0           RESIDENT PRELIMINARY INTERPRETATION  IMPRESSION:   1. Edematous appearance of the pancreas with adjacent fat stranding  and trace fluid. No focal fluid collection is present. Findings likely  represent acute on chronic pancreatitis.  2. Dilated loop of small bowel with proximal beginning of the  dilatation just to the right of midline anterior to the sacrum with  distal transition near the jejunojejunal anastomosis, the loop of  bowel measures up to 3.6 cm. Finding may represent early small bowel  obstruction, close follow-up is recommended.  3. Multiple prominent lymph nodes throughout the abdomen, likely  reactive.

## 2025-03-05 LAB
ALBUMIN SERPL BCG-MCNC: 3.5 G/DL (ref 3.5–5.2)
ALP SERPL-CCNC: 165 U/L (ref 40–150)
ALT SERPL W P-5'-P-CCNC: 18 U/L (ref 0–50)
ANION GAP SERPL CALCULATED.3IONS-SCNC: 6 MMOL/L (ref 7–15)
AST SERPL W P-5'-P-CCNC: 33 U/L (ref 0–45)
BILIRUB SERPL-MCNC: 0.4 MG/DL
BUN SERPL-MCNC: 8.3 MG/DL (ref 8–23)
CALCIUM SERPL-MCNC: 8.4 MG/DL (ref 8.8–10.4)
CHLORIDE SERPL-SCNC: 104 MMOL/L (ref 98–107)
CREAT SERPL-MCNC: 0.8 MG/DL (ref 0.51–0.95)
EGFRCR SERPLBLD CKD-EPI 2021: 76 ML/MIN/1.73M2
ERYTHROCYTE [DISTWIDTH] IN BLOOD BY AUTOMATED COUNT: 15.9 % (ref 10–15)
GLUCOSE SERPL-MCNC: 82 MG/DL (ref 70–99)
HCO3 SERPL-SCNC: 28 MMOL/L (ref 22–29)
HCT VFR BLD AUTO: 35.7 % (ref 35–47)
HGB BLD-MCNC: 11.2 G/DL (ref 11.7–15.7)
MCH RBC QN AUTO: 27.5 PG (ref 26.5–33)
MCHC RBC AUTO-ENTMCNC: 31.4 G/DL (ref 31.5–36.5)
MCV RBC AUTO: 88 FL (ref 78–100)
PLATELET # BLD AUTO: 287 10E3/UL (ref 150–450)
POTASSIUM SERPL-SCNC: 3.8 MMOL/L (ref 3.4–5.3)
PROT SERPL-MCNC: 6.8 G/DL (ref 6.4–8.3)
RADIOLOGIST FLAGS: ABNORMAL
RADIOLOGIST FLAGS: ABNORMAL
RBC # BLD AUTO: 4.07 10E6/UL (ref 3.8–5.2)
SODIUM SERPL-SCNC: 138 MMOL/L (ref 135–145)
WBC # BLD AUTO: 8.2 10E3/UL (ref 4–11)

## 2025-03-05 PROCEDURE — 250N000013 HC RX MED GY IP 250 OP 250 PS 637

## 2025-03-05 PROCEDURE — 99232 SBSQ HOSP IP/OBS MODERATE 35: CPT | Mod: GC | Performed by: INTERNAL MEDICINE

## 2025-03-05 PROCEDURE — 250N000011 HC RX IP 250 OP 636: Performed by: PEDIATRICS

## 2025-03-05 PROCEDURE — 120N000002 HC R&B MED SURG/OB UMMC

## 2025-03-05 PROCEDURE — 36415 COLL VENOUS BLD VENIPUNCTURE: CPT

## 2025-03-05 PROCEDURE — 84132 ASSAY OF SERUM POTASSIUM: CPT

## 2025-03-05 PROCEDURE — 85048 AUTOMATED LEUKOCYTE COUNT: CPT

## 2025-03-05 PROCEDURE — 85018 HEMOGLOBIN: CPT

## 2025-03-05 PROCEDURE — 250N000011 HC RX IP 250 OP 636: Mod: JZ

## 2025-03-05 PROCEDURE — 82040 ASSAY OF SERUM ALBUMIN: CPT

## 2025-03-05 PROCEDURE — 250N000013 HC RX MED GY IP 250 OP 250 PS 637: Performed by: INTERNAL MEDICINE

## 2025-03-05 PROCEDURE — 258N000003 HC RX IP 258 OP 636

## 2025-03-05 RX ORDER — NALOXONE HYDROCHLORIDE 0.4 MG/ML
0.2 INJECTION, SOLUTION INTRAMUSCULAR; INTRAVENOUS; SUBCUTANEOUS
Status: DISCONTINUED | OUTPATIENT
Start: 2025-03-05 | End: 2025-03-07 | Stop reason: HOSPADM

## 2025-03-05 RX ORDER — OXYCODONE AND ACETAMINOPHEN 5; 325 MG/1; MG/1
1-2 TABLET ORAL EVERY 4 HOURS PRN
COMMUNITY

## 2025-03-05 RX ORDER — NALOXONE HYDROCHLORIDE 0.4 MG/ML
0.4 INJECTION, SOLUTION INTRAMUSCULAR; INTRAVENOUS; SUBCUTANEOUS
Status: DISCONTINUED | OUTPATIENT
Start: 2025-03-05 | End: 2025-03-07 | Stop reason: HOSPADM

## 2025-03-05 RX ORDER — ESTRADIOL 0.07 MG/D
1 FILM, EXTENDED RELEASE TRANSDERMAL
COMMUNITY

## 2025-03-05 RX ORDER — OMEPRAZOLE 40 MG/1
40 CAPSULE, DELAYED RELEASE ORAL DAILY
COMMUNITY
Start: 2024-08-26

## 2025-03-05 RX ORDER — SODIUM CHLORIDE, SODIUM LACTATE, POTASSIUM CHLORIDE, CALCIUM CHLORIDE 600; 310; 30; 20 MG/100ML; MG/100ML; MG/100ML; MG/100ML
INJECTION, SOLUTION INTRAVENOUS CONTINUOUS
Status: ACTIVE | OUTPATIENT
Start: 2025-03-05 | End: 2025-03-06

## 2025-03-05 RX ORDER — ESTRADIOL 0.1 MG/D
1 PATCH TRANSDERMAL WEEKLY
COMMUNITY
End: 2025-03-05

## 2025-03-05 RX ORDER — ONDANSETRON 4 MG/1
TABLET, ORALLY DISINTEGRATING ORAL
COMMUNITY
Start: 2025-02-02

## 2025-03-05 RX ADMIN — ACETAMINOPHEN 650 MG: 325 TABLET, FILM COATED ORAL at 15:42

## 2025-03-05 RX ADMIN — OXYCODONE HYDROCHLORIDE 10 MG: 10 TABLET ORAL at 01:18

## 2025-03-05 RX ADMIN — MONTELUKAST 10 MG: 10 TABLET, FILM COATED ORAL at 23:10

## 2025-03-05 RX ADMIN — OXYCODONE HYDROCHLORIDE 10 MG: 10 TABLET ORAL at 15:42

## 2025-03-05 RX ADMIN — ZOLPIDEM TARTRATE 5 MG: 5 TABLET, FILM COATED ORAL at 23:05

## 2025-03-05 RX ADMIN — PANCRELIPASE 2 TABLET: 20880; 78300; 78300 TABLET ORAL at 18:30

## 2025-03-05 RX ADMIN — ATORVASTATIN CALCIUM 40 MG: 40 TABLET, FILM COATED ORAL at 21:48

## 2025-03-05 RX ADMIN — PANCRELIPASE 2 TABLET: 20880; 78300; 78300 TABLET ORAL at 14:22

## 2025-03-05 RX ADMIN — SODIUM CHLORIDE, SODIUM LACTATE, POTASSIUM CHLORIDE, AND CALCIUM CHLORIDE 1000 ML: .6; .31; .03; .02 INJECTION, SOLUTION INTRAVENOUS at 03:20

## 2025-03-05 RX ADMIN — HYDROMORPHONE HYDROCHLORIDE 0.5 MG: 1 INJECTION, SOLUTION INTRAMUSCULAR; INTRAVENOUS; SUBCUTANEOUS at 20:56

## 2025-03-05 RX ADMIN — HYDROMORPHONE HYDROCHLORIDE 0.5 MG: 1 INJECTION, SOLUTION INTRAMUSCULAR; INTRAVENOUS; SUBCUTANEOUS at 12:04

## 2025-03-05 RX ADMIN — HYDROMORPHONE HYDROCHLORIDE 0.5 MG: 1 INJECTION, SOLUTION INTRAMUSCULAR; INTRAVENOUS; SUBCUTANEOUS at 03:28

## 2025-03-05 RX ADMIN — HYDROMORPHONE HYDROCHLORIDE 0.5 MG: 1 INJECTION, SOLUTION INTRAMUSCULAR; INTRAVENOUS; SUBCUTANEOUS at 07:54

## 2025-03-05 RX ADMIN — OXYCODONE HYDROCHLORIDE 10 MG: 10 TABLET ORAL at 19:54

## 2025-03-05 RX ADMIN — EZETIMIBE 10 MG: 10 TABLET ORAL at 21:52

## 2025-03-05 RX ADMIN — SODIUM CHLORIDE, SODIUM LACTATE, POTASSIUM CHLORIDE, AND CALCIUM CHLORIDE: .6; .31; .03; .02 INJECTION, SOLUTION INTRAVENOUS at 14:46

## 2025-03-05 RX ADMIN — HYDROXYZINE HYDROCHLORIDE 50 MG: 50 TABLET, FILM COATED ORAL at 21:48

## 2025-03-05 RX ADMIN — HYDROXYZINE HYDROCHLORIDE 25 MG: 25 TABLET, FILM COATED ORAL at 15:42

## 2025-03-05 RX ADMIN — SENNOSIDES AND DOCUSATE SODIUM 1 TABLET: 50; 8.6 TABLET ORAL at 01:18

## 2025-03-05 ASSESSMENT — ACTIVITIES OF DAILY LIVING (ADL)
ADLS_ACUITY_SCORE: 59
ADLS_ACUITY_SCORE: 58
ADLS_ACUITY_SCORE: 59
ADLS_ACUITY_SCORE: 59
ADLS_ACUITY_SCORE: 58
ADLS_ACUITY_SCORE: 59
ADLS_ACUITY_SCORE: 58
ADLS_ACUITY_SCORE: 59
ADLS_ACUITY_SCORE: 59
ADLS_ACUITY_SCORE: 58
ADLS_ACUITY_SCORE: 59
ADLS_ACUITY_SCORE: 59
ADLS_ACUITY_SCORE: 58
ADLS_ACUITY_SCORE: 58
ADLS_ACUITY_SCORE: 59
ADLS_ACUITY_SCORE: 56
ADLS_ACUITY_SCORE: 58
ADLS_ACUITY_SCORE: 59

## 2025-03-05 NOTE — PROGRESS NOTES
73 yo very healthy active, post lap Whipple at [place] in 2019 for advanced duodenal neoplasia, with numerous subsequent attacks of acute interstitial pancreatitis related to pancreaticojejunal stenosis. We have repeatedly dilated and short term stented that anastomosis with medium term response but subsequent admissions for interstital pancreatitis. As now.    I telephoned pt while she was in her hospital bed to discuss therapeutic options. We reviewed that she definitely responds well with pancreatic stents in place, for up to several months at a time, but has had multiple relapses of pancreatitis, when stents have passed. We discussed options of continued endoscopic therapy with multiple more rigid, flanged side by side retention stents as destination therapy, versus completion total pancreatectomy with islet autotransplant. She has been reluctant to consider latter but we agreed it is time to undergo formal evaluation with the TPIAT team so that she can realistically consider the alternative.     PLAN  1) schedule outpt ERCP w Dr Noguera next couple of weeks  2) I will present to TPIAT group at weekly meeting tonight 5-6PM

## 2025-03-05 NOTE — PROGRESS NOTES
D: 3/4 Acute on chronic pancreatitis     I: Monitored vitals and assessed pt status.   Changed: Afebrile, VSS. A&O*4 on RA. BM 3/4 passing gas. Pt has pressure/pain to abdomen. Oxycodone and Dilaudid given. Independent to bathroom often. Narcan stopped per pt request, no itching noted. Refused Lovenox. Pt slept on/off through night. No acute events.   Running: LR @ 100 ml/hr  PRN: Oxycodone, Dilaudid      Temp:  [98.1  F (36.7  C)-98.7  F (37.1  C)] 98.1  F (36.7  C)  Pulse:  [54-67] 54  Resp:  [17-18] 18  BP: (123-144)/(59-76) 127/60  Cuff Mean (mmHg):  [82] 82  SpO2:  [95 %-99 %] 95 %      P: Continue to monitor Pt status and report changes to treatment team.

## 2025-03-05 NOTE — PLAN OF CARE
Goal Outcome Evaluation:       A/Ox4, ambulating, advanced to Regular diet, tolerated food well, . C/o pain on abdominal mid-line radiating to LUQ  pain killer given with good effect, Denied N/V. IV bolus completed. Has been admitted and awaiting for a bed availability. New order, infusing.   /71 (BP Location: Left arm, Patient Position: Semi-[place]'s, Cuff Size: Adult Regular)   Pulse 59   Temp 98.7  F (37.1  C) (Oral)   Resp 18   SpO2 95%

## 2025-03-05 NOTE — PHARMACY-ADMISSION MEDICATION HISTORY
Pharmacy Intern Admission Medication History    Admission medication history is complete. The information provided in this note is only as accurate as the sources available at the time of the update.    Information Source(s): Patient and CareEverywhere/SureScripts via in-person    Pertinent Information:   Medication history originally completed by marisa this morning. Re-interviewed patient due to discrepancies with SureScripts report.  Oxycodone IR tablets last filled 9/23/2024. Oxycodone-acetaminophen tablets filled consistently since 4/12/2024-2/14/2025. Confirmed with patient she is taking oxycodone-acetaminophen.   Patient was consistently filling estradiol 0.075 mg patches up until 10/28/2024. She received one fill of 0.01% cream on 12/8/2024, then 0.1 mg patches on 3/1/25. Confirmed with Maria R over the phone patient picked up 0.1 mg strength patches on 3/1/2025. A new provider prescribed the 0.1 mg patch. The patient is currently wearing a patch from home, there is not a strength written on it. Per the patient, she was unaware of a dose increase and felt well controlled on the 0.075 mg dose. She has not opened the new box yet.  Per patient, she is still taking Viokace tablets daily with meals. She was given a large supply at last dispense (800 units on 8/29/24) that has lasted her up until this admission. She has had a few hospitalizations here and in [place] since this last fill. Tyron was marked as not taking 8/19/2024, patient confirmed she is just using Viokace at this time.  Patient is due for evolocumab injection;  will be bringing in home medication. At home, she usually injects on Wednesdays.    Changes made to PTA medication list:  Added: None  Deleted:   Diazepam 5 mg tablet - take 1 30-45 minutes prior to radiology scan. May repeat x1 prn (completed 8/9/2024)  Lipase-protease-amylase [id number] units CPEP pre capsule - take 2-3 with meals / 1-2 with snacks, up to 15 per  day  Changed:   Oxycodone 5 mg - take 5 mg by mouth every 6 hours as needed -> oxycodone-acetaminophen 5-325 mg tablet - take 1-2 tablets by mouth every 4 hours as needed    Allergies reviewed with patient and updates made in EHR: yes    Medication History Completed By: Lashonda Waite 3/5/2025 2:39 PM    PTA Med List   Medication Sig Last Dose/Taking    atorvastatin (LIPITOR) 40 MG tablet Take 40 mg by mouth At Bedtime  3/3/2025 Evening    cholecalciferol (VITAMIN D3) 25 mcg (1000 units) capsule Take 1 capsule by mouth daily 3/3/2025 Evening    estradiol (VIVELLE-DOT) 0.075 MG/24HR BIW patch Place 1 patch onto the skin twice a week. 3/3/2025    evolocumab (REPATHA) 140 MG/ML prefilled autoinjector Inject 140 mg Subcutaneous every 14 days 2/11/2025    ezetimibe (ZETIA) 10 MG tablet Take 10 mg by mouth At Bedtime 3/3/2025 Evening    hydrOXYzine HCl (ATARAX) 25 MG tablet Take 1 tablet (25 mg) by mouth 3 times daily as needed for itching 3/2/2025    Magnesium Ascorbate POWD One teaspoon at bedtime daily by mouth 3/3/2025    montelukast (SINGULAIR) 10 MG tablet Take 10 mg by mouth at bedtime 3/3/2025 Evening    omeprazole (PRILOSEC) 40 MG DR capsule Take 40 mg by mouth daily. 3/4/2025 Morning    ondansetron (ZOFRAN ODT) 4 MG ODT tab DISSOLVE ONE TABLET BY MOUTH EVERY 6 HOURS AS NEEDED FOR NAUSEA AND VOMITING 3/3/2025    oxyCODONE-acetaminophen (PERCOCET) 5-325 MG tablet Take 1-2 tablets by mouth every 4 hours as needed for severe pain (pancreatitis flare). 3/3/2025    valACYclovir (VALTREX) 1000 mg tablet Take 1,000 mg by mouth daily as needed (cold sore) Unknown    VIOKACE 16781-66247 units TABS per tablet TAKE 3 CAPSULES WITH EACH MEAL, UP TO 9 PER DAY. 3/3/2025    vitamin A 3 MG (24214 UNITS) capsule Take 1 capsule by mouth daily 3/3/2025    zolpidem (AMBIEN) 10 MG tablet Take 1 tablet by mouth nightly as needed for sleep 3/3/2025 Bedtime

## 2025-03-05 NOTE — PROGRESS NOTES
[facility]    Progress Note - EGS Service       Date of Admission:  3/4/2025    Assessment & Plan: Surgery    Laura Gonzalez is a 74 year old female with history of tubulovillous adenoma and duodenal polyp s/p pylorus sparing Whipple in 2019 with removal of 1/3 of the pancreas c/b pancreaticcojejunal anastomosis stenosis with recurrent pancreatitis s/p multiple ERCPs and SBO who presents to the ED with abdominal pain.  Patient reports worsening acute on chronic pain over the past 3 weeks that is now a 10/10 constant midline abdominal pain radiating to the left and into the back.  General surgery was consulted for possible SBO. Given has had a bowel movement this morning, continue to pass gas throughout the day without any nausea or vomiting, SBO is unlikely.       - No need for surgical intervention at this time, Surgery will sign off   - GI will follow regarding the pancreatitis       The patient's care was discussed with the chief resident dr Masterson who will discuss with the staff     Maty Jones MD  [facility]  Non-urgent messages: Securely message with [employer] (more info)  Text page via [facility] Paging/Directory     ______________________________________________________________________    Interval History   No nausea or vomiting, mild abdominal pain radiated to the back, passing gas and BM    Physical Exam   Vital Signs: Temp: 98.1  F (36.7  C) Temp src: Oral BP: 127/60 Pulse: 54   Resp: 18 SpO2: 95 % O2 Device: None (Room air)    Weight: 0 lbs 0 ozNo intake or output data in the 24 hours ending 03/05/25 0819  Constitutional: awake, alert, cooperative, no apparent distress  Respiratory: No increased work of breathing, good air exchange  Cardiovascular: regular rate and rhythm  GI: soft, non-distended, mild epigastric tenderness, no masses palpated          Data     I have personally reviewed the following data  over the past 24 hrs:    13.4 (H)  \   12.2   / 290     142 106 16.2 /  96   4.4 27 0.78 \     ALT: 24 AST: 47 (H) AP: 161 (H) TBILI: 0.4   ALB: 3.7 TOT PROTEIN: 7.2 LIPASE: 292 (H)     Trop: <6 BNP: N/A     TSH: N/A T4: N/A A1C: 5.9 (H)     Procal: N/A CRP: N/A Lactic Acid: 1.0         Imaging results reviewed over the past 24 hrs:   Recent Results (from the past 24 hours)   CT Abdomen Pelvis w Contrast   Result Value    Radiologist flags Acute on chronic pancreatitis.    Radiologist flags acute interstitial pancreatitis.. (Urgent)    Narrative    EXAMINATION: CT ABDOMEN PELVIS W CONTRAST, 3/4/2025 12:44 PM    INDICATION: epigastric abdominal pain, hx pancreatitis    COMPARISON STUDY: Abdominal radiograph 10/28/2024, CT abdomen and  pelvis 9/23/2024    TECHNIQUE: CT scan of the abdomen and pelvis was performed on  multidetector CT scanner using volumetric acquisition technique and  images were reconstructed in multiple planes with variable thickness  and reviewed on dedicated workstations.     CONTRAST: iopamidol (ISOVUE-370) solution 95 mL injected IV without  oral contrast    CT scan radiation dose is optimized to minimum requisite dose using  automated dose modulation techniques.    FINDINGS:    Support devices: None.    Lower thorax: Subsegmental atelectasis. No pleural effusion or focal  consolidative collection.    Liver: No focal mass lesions. Mild intrahepatic ductal dilatation.    Gallbladder and bile ducts: Cholecystectomy. No significant  extrahepatic biliary ductal dilation.    Spleen: Unremarkable.    Pancreas: Postoperative changes of Whipple procedure. Edematous  appearance of the pancreas with surrounding ill-defined inflammation  primarily around the body and tail. The pancreatic duct measures  approximately 3.5 cm the midline, unchanged.    Adrenals: No nodules.     Kidneys and ureters: Simple appearing cysts in the left renal cortex.  No solid lesions. No calculi. No  hydroureteronephrosis.    Bladder: Unremarkable.    Reproductive: Status post hysterectomy.    Bowel: Focal prominence of small bowel loops in the mid and left lower  abdomen (for example series 6 image [id number], series 3 image [id number]),  findings are near suture line of anastomosis.  This may represent  early small bowel obstruction where the proximal transition point is  just to the right midline near the sacrum (series 3 image 27), the  distal transition point in the left mid abdomen near the jejunojejunal  anastomotic suture (series 3 image 159). Otherwise, no dilated loops  of small or large bowel.    Appendix: Surgically absent.    Mesentery/Peritoneum: Inflammatory stranding surrounding the pancreas,  trace fluid without focal fluid collection. No free air.    Lymph nodes: Multiple prominent lymph nodes in the abdomen, for  example there is a 10 cm lymph node in the aortocaval region (series 3  image 105).    Vasculature: Scattered calcifications of the abdominal aorta and iliac  arteries.    Bone windows: No aggressive osseous abnormalities. Left hip  arthroplasty. Degenerative changes of the spine with left convex  curvature centered around L4. Grade 1 anterolisthesis of L4 over L5.    Soft tissues: Unremarkable.      Impression    IMPRESSION:       1. Fatty streakiness and fluid density seen adjacent to the pancreatic  body and tail, no definite pancreatic necrosis, acute interstitial  pancreatitis.  2. Mild dilation of the distal biliopancreatic limb near the  anastomosis, nonspecific, likely secondary to slow transit or  developing adynamic ileus; early partial obstruction not entirely  excluded. Consider attention on follow-up        [Urgent Result: acute interstitial pancreatitis..]    Finding was identified on 3/4/2025 12:57 PM.     Dr. Wong was contacted by Dr. Okeefe at 3/4/2025 1:28 PM and verbalized  understanding of the urgent finding.

## 2025-03-05 NOTE — PROGRESS NOTES
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         # Acute on chronic pancreatitis   # S/p pyloric conserving Whipple complicated by pancreas c/b pancreaticojejunal anastomosis stenosis  BISAP score is 1 (age). No other organ dysfunction noted.      Has had multiple ERCPs at [facility] since [facility], each with temporary stenting +/- dilation of thepancreaticojejunal stenosis. Last ERCP 10/1/2024 by Dr. Noguera with finding of patent pancreaticojejunal anastomosis.  A PD stent was placed and fell out within 4 weeks. She had relatively symptom-free duration of 3 months after the ERCP.    Dr. Noguera discussed her with options regarding repeating ERCP with PD stent vs TPiAT with the removal of the remaining pancreas. At this point, patient is hesitant for surgery. So Dr. Noguera will plan for repeat ERCP while initiating conversation on TPiAT.       RECOMMENDATIONS:  - Ok for regular diet  - Resume Viokace with meals  - Continue LR @ 100 ml/hr for another 24 hours. Can stop IVF tomorrow if she can keep adequate oral hydration   - Continue pain management per primary team  - GI will arrange outpt ERCP with Dr. Noguera in the next few weeks  - Dr. Noguera will discuss with surgical team regarding consideration of TPiAT        Thank you for involving us in this patient's care. Please do not hesitate to contact the GI service with any questions or concerns.      Pt care plan discussed with Dr. Gotti and Dr. Noguera, GI staff physicians.      Madalyn Martinez MD, PhD  Gastroenterology Fellow  Division of Gastroenterology, Hepatology and  Nutrition  [facility]  Pager: [phone]  _______________________________________________________________      Subjective:  Pain is adequately managed by opioid medication. Still passing gas and had a small BM this morning. Very hungry.     Objective:  Blood pressure 127/60, pulse 54, temperature 98.1  F (36.7  C), temperature source Oral, resp. rate 18, SpO2 95%, not currently breastfeeding.    Gen: A&Ox3, NAD  HEENT: sclera anicteric  CV: RRR  Lungs: breathing comfortably on room air  Abd:  soft, tender on palpation, nondistended, bowel sounds present  Skin: no jaundice, no stigmata of chronic liver disease  MS: appropriate muscle mass for age  Neuro: non focal       LABS:  BMP  Recent Labs   Lab 03/04/25  1111      POTASSIUM 4.4   CHLORIDE 106   GLEN 8.6*   CO2 27   BUN 16.2   CR 0.78   GLC 96     CBC  Recent Labs   Lab 03/04/25  1111   WBC 13.4*   RBC 4.39   HGB 12.2   HCT 39.8   MCV 91   MCH 27.8   MCHC 30.7*   RDW 15.7*        INRNo lab results found in last 7 days.  LFTs  Recent Labs   Lab 03/04/25  1111   ALKPHOS 161*   AST 47*   ALT 24   BILITOTAL 0.4   PROTTOTAL 7.2   ALBUMIN 3.7      PANC  Recent Labs   Lab 03/04/25  1111   LIPASE 292*       IMAGING:  Reviewed CT.

## 2025-03-05 NOTE — PROGRESS NOTES
[facility]    Progress Note - Medicine Service, MAROON TEAM 4       Date of Admission:  3/4/2025    Assessment & Plan   Laura Gonzalez is a 74 year old female admitted on 3/4/2025. 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     Changes Today   - Continue IV fluids 100cc/hr until tomorrow morning   - Low fat diet and advance as tolerated   - General surgery signing off   - No procedure inpatient per GI team: Will have outpatient ERCP   - Patient declines Lovenox for DVT ppx and she is regularly walking several times a day, therefore agree lower risk   - Discharge in next 1-2 days pending symptom improvement/resolution      #Acute on chronic pancreatitis   #History of tubulovillous duodenal adenoma s/p pylorus-sparing Whipple  Criteria 3/3 for pancreatitis: Abdominal pain, Lipase to 292. CT imaging with acute interstitial pancreatitis. Elevated Alk Phos 161 (previously 195). AST at 47 (previous 35)   Leukocytosis to 13.4. Lactate is neg. Vitals are stable. Low concern for bacterial infection.   Leukocytosis is improving   - GI consulted               - 100cc/hr LR    -Ok for diet, no procedure inpatient   Pain control   - Oxycodone 5-10mg q4 hours   - Dilaudid 0.5mg IV q3 hours      #Pruritus with opioids   - Hydroxyzine for itching     #Mild anemia   Hgb 12.2 -> 11.2 which is baseline. HD stable, no evidence of bleed.      #Concern for early partial bowel obstruction, resolved   Patient is passing gas and had BM 3/5 am.   General surgery signed off.      #Pancreatic Insufficiency  Pt noting loose, foul smelling stools for last month.   - Resume Viokace with meals     #Prediabetes   Hgb A1c is 5.9.  - Outpatient follow-up      H/o tubulovillous adenoma  duodenal polyp s/p pylorus sparing Whipple: C/b chronic pancreaticojejunal anastomosis stenosis with multiple prior ERCPs. CT AP as above and with stable mild intrahepatic biliary ductal dilation  CAD/ HLD: PTA statin and ezetimibe while NPO   Insomnia: Decrease Ambien dose to 5mg from 10mg due to opioid use -- avoid over sedation   Asthma: Stable. Continue PTA Singulair  #Menopausal symptoms: Continue PTA estradiol patch -- will do DVT ppx with lovenox         Diet: NPO for Medical/Clinical Reasons Except for: Meds, Ice Chips    DVT Prophylaxis: Ambulate every shift  Condon Catheter: Not present  Fluids: 100cc/hr LR   Lines: None     Cardiac Monitoring: None  Code Status: Full Code      Clinically Significant Risk Factors Present on Admission           # Hypocalcemia: Lowest Ca = 8.6 mg/dL in last 2 days, will monitor and replace as appropriate                              Social Drivers of Health   Tobacco Use: Medium Risk (9/27/2024)    Patient History     Smoking Tobacco Use: Former     Smokeless Tobacco Use: Never     Passive Exposure: Never   Physical Activity: Inactive (6/13/2023)    [facility], [facility]    Exercise Vital Sign     Days of Exercise per Week: 0 days     Minutes of Exercise per Session: 20 min         Disposition Plan     Medically Ready for Discharge: Anticipated Tomorrow     The patient's care was discussed with the Attending Physician, Dr. Ferrera  .    Annie Felton MD  Medicine Service, [address]  Securely message with [employer] (more info)  Text page via [facility] Paging/Directory   See signed in provider for up to date coverage information  ______________________________________________________________________    Interval History   NAEO.     Still having abdominal pain. Overall good control with pain medications. Has some appetite to eat this morning. Passing gas and small bm this morning. No new complaints. Has  not noticed any shortness of breath or increased leg swelling     Physical Exam   Vital Signs: Temp: 98.1  F (36.7  C) Temp src: Oral BP: 127/60 Pulse: 54   Resp: 18 SpO2: 95 % O2 Device: None (Room air)    Weight: 0 lbs 0 oz    General: Awake, alert, no acute distress. Laying in bed.   Cardiac: Bradycardic rate and regular rhythm. No murmur noted.   Respiratory: Normal effort. Lungs are clear auscultation.   Abdomen: Soft, mild tenderness to palpation to central abdomen, non-distended, positive bowel sounds   Extremities:No LE edema. Warm and dry.   Neurological: Alert, oriented to person, place and time. Gross motor intact.   Psychiatric: Pleasant affect.       Medical Decision Making       Please see A&P for additional details of medical decision making.      Data     I have personally reviewed the following data over the past 24 hrs:    8.2  \   11.2 (L)   / 287     138 104 8.3 /  82   3.8 28 0.80 \     ALT: 18 AST: 33 AP: 165 (H) TBILI: 0.4   ALB: 3.5 TOT PROTEIN: 6.8 LIPASE: N/A     TSH: N/A T4: N/A A1C: N/A

## 2025-03-05 NOTE — MEDICATION SCRIBE - ADMISSION MEDICATION HISTORY
Medication Scribe Admission Medication History    Admission medication history is complete. The information provided in this note is only as accurate as the sources available at the time of the update.    Information Source(s): Patient and [facility]  via in-person    Pertinent Information: per pt+[facility], pt reported taking medications on PTA medication list as directed.     Changes made to PTA medication list:  Added: Omeprazole 40 mg caps, Ondansetron 4 mg tabs.  Deleted: None  Changed: None    Allergies reviewed with patient and updates made in EHR: yes    Medication History Completed By: Carrie Davis 3/5/2025 8:00 AM    PTA Med List   Medication Sig Last Dose/Taking    atorvastatin (LIPITOR) 40 MG tablet Take 40 mg by mouth At Bedtime  3/3/2025 Evening    cholecalciferol (VITAMIN D3) 25 mcg (1000 units) capsule Take 1 capsule by mouth daily 3/3/2025 Evening    estradiol (VIVELLE-DOT) 0.075 MG/24HR BIW patch Place 1 patch onto the skin twice a week 3/3/2025    evolocumab (REPATHA) 140 MG/ML prefilled autoinjector Inject 140 mg Subcutaneous every 14 days 2/11/2025    ezetimibe (ZETIA) 10 MG tablet Take 10 mg by mouth At Bedtime 3/3/2025 Evening    hydrOXYzine HCl (ATARAX) 25 MG tablet Take 1 tablet (25 mg) by mouth 3 times daily as needed for itching 3/2/2025    Magnesium Ascorbate POWD One teaspoon at bedtime daily by mouth 3/3/2025    montelukast (SINGULAIR) 10 MG tablet Take 10 mg by mouth at bedtime 3/3/2025 Evening    omeprazole (PRILOSEC) 40 MG DR capsule Take 40 mg by mouth daily. 3/4/2025 Morning    ondansetron (ZOFRAN ODT) 4 MG ODT tab DISSOLVE ONE TABLET BY MOUTH EVERY 6 HOURS AS NEEDED FOR NAUSEA AND VOMITING 3/3/2025    oxyCODONE (ROXICODONE) 5 MG tablet Take 5 mg by mouth every 6 hours as needed for severe pain. 3/3/2025    valACYclovir (VALTREX) 1000 mg tablet Take 1,000 mg by mouth daily as needed (cold sore) Unknown    VIOKACE 14409-02670 units TABS per tablet TAKE 3 CAPSULES WITH EACH MEAL, UP TO 9 PER  DAY. 3/3/2025    vitamin A 3 MG (61711 UNITS) capsule Take 1 capsule by mouth daily 3/3/2025    zolpidem (AMBIEN) 10 MG tablet Take 1 tablet by mouth nightly as needed for sleep 3/3/2025 Bedtime

## 2025-03-06 ENCOUNTER — CARE COORDINATION (OUTPATIENT)
Dept: GASTROENTEROLOGY | Facility: CLINIC | Age: 75
End: 2025-03-06
Payer: COMMERCIAL

## 2025-03-06 VITALS
OXYGEN SATURATION: 99 % | TEMPERATURE: 98.2 F | DIASTOLIC BLOOD PRESSURE: 61 MMHG | HEART RATE: 50 BPM | SYSTOLIC BLOOD PRESSURE: 121 MMHG | RESPIRATION RATE: 17 BRPM

## 2025-03-06 LAB
ALBUMIN SERPL BCG-MCNC: 3.3 G/DL (ref 3.5–5.2)
ALP SERPL-CCNC: 147 U/L (ref 40–150)
ALT SERPL W P-5'-P-CCNC: 17 U/L (ref 0–50)
ANION GAP SERPL CALCULATED.3IONS-SCNC: 5 MMOL/L (ref 7–15)
AST SERPL W P-5'-P-CCNC: 32 U/L (ref 0–45)
BILIRUB SERPL-MCNC: 0.4 MG/DL
BUN SERPL-MCNC: 14 MG/DL (ref 8–23)
CALCIUM SERPL-MCNC: 8.6 MG/DL (ref 8.8–10.4)
CHLORIDE SERPL-SCNC: 104 MMOL/L (ref 98–107)
CREAT SERPL-MCNC: 0.8 MG/DL (ref 0.51–0.95)
EGFRCR SERPLBLD CKD-EPI 2021: 76 ML/MIN/1.73M2
ERYTHROCYTE [DISTWIDTH] IN BLOOD BY AUTOMATED COUNT: 15.8 % (ref 10–15)
GLUCOSE SERPL-MCNC: 97 MG/DL (ref 70–99)
HCO3 SERPL-SCNC: 29 MMOL/L (ref 22–29)
HCT VFR BLD AUTO: 33.6 % (ref 35–47)
HGB BLD-MCNC: 10.5 G/DL (ref 11.7–15.7)
MCH RBC QN AUTO: 28.5 PG (ref 26.5–33)
MCHC RBC AUTO-ENTMCNC: 31.3 G/DL (ref 31.5–36.5)
MCV RBC AUTO: 91 FL (ref 78–100)
PLATELET # BLD AUTO: 256 10E3/UL (ref 150–450)
POTASSIUM SERPL-SCNC: 4.2 MMOL/L (ref 3.4–5.3)
PROT SERPL-MCNC: 6.2 G/DL (ref 6.4–8.3)
RBC # BLD AUTO: 3.69 10E6/UL (ref 3.8–5.2)
SODIUM SERPL-SCNC: 138 MMOL/L (ref 135–145)
WBC # BLD AUTO: 6.1 10E3/UL (ref 4–11)

## 2025-03-06 PROCEDURE — 120N000002 HC R&B MED SURG/OB UMMC

## 2025-03-06 PROCEDURE — 80053 COMPREHEN METABOLIC PANEL: CPT

## 2025-03-06 PROCEDURE — 250N000013 HC RX MED GY IP 250 OP 250 PS 637

## 2025-03-06 PROCEDURE — 85027 COMPLETE CBC AUTOMATED: CPT

## 2025-03-06 PROCEDURE — 250N000013 HC RX MED GY IP 250 OP 250 PS 637: Performed by: INTERNAL MEDICINE

## 2025-03-06 PROCEDURE — 250N000011 HC RX IP 250 OP 636: Mod: JZ

## 2025-03-06 PROCEDURE — 36415 COLL VENOUS BLD VENIPUNCTURE: CPT

## 2025-03-06 PROCEDURE — 99232 SBSQ HOSP IP/OBS MODERATE 35: CPT | Mod: GC | Performed by: INTERNAL MEDICINE

## 2025-03-06 PROCEDURE — 258N000003 HC RX IP 258 OP 636

## 2025-03-06 RX ORDER — BISACODYL 10 MG
10 SUPPOSITORY, RECTAL RECTAL DAILY PRN
Status: DISCONTINUED | OUTPATIENT
Start: 2025-03-06 | End: 2025-03-07 | Stop reason: HOSPADM

## 2025-03-06 RX ORDER — ESTRADIOL 0.1 MG/D
1 FILM, EXTENDED RELEASE TRANSDERMAL
Status: DISCONTINUED | OUTPATIENT
Start: 2025-03-06 | End: 2025-03-07 | Stop reason: HOSPADM

## 2025-03-06 RX ORDER — AMOXICILLIN 250 MG
1 CAPSULE ORAL 2 TIMES DAILY
Status: DISCONTINUED | OUTPATIENT
Start: 2025-03-06 | End: 2025-03-07 | Stop reason: HOSPADM

## 2025-03-06 RX ORDER — ACETAMINOPHEN 500 MG
1000 TABLET ORAL 3 TIMES DAILY
Status: DISCONTINUED | OUTPATIENT
Start: 2025-03-06 | End: 2025-03-07 | Stop reason: HOSPADM

## 2025-03-06 RX ORDER — AMOXICILLIN 250 MG
2 CAPSULE ORAL 2 TIMES DAILY
Status: DISCONTINUED | OUTPATIENT
Start: 2025-03-06 | End: 2025-03-07 | Stop reason: HOSPADM

## 2025-03-06 RX ORDER — POLYETHYLENE GLYCOL 3350 17 G/17G
17 POWDER, FOR SOLUTION ORAL DAILY
Status: DISCONTINUED | OUTPATIENT
Start: 2025-03-06 | End: 2025-03-06

## 2025-03-06 RX ORDER — POLYETHYLENE GLYCOL 3350 17 G/17G
17 POWDER, FOR SOLUTION ORAL 2 TIMES DAILY
Status: DISCONTINUED | OUTPATIENT
Start: 2025-03-06 | End: 2025-03-07 | Stop reason: HOSPADM

## 2025-03-06 RX ADMIN — ACETAMINOPHEN 1000 MG: 500 TABLET, FILM COATED ORAL at 08:47

## 2025-03-06 RX ADMIN — MONTELUKAST 10 MG: 10 TABLET, FILM COATED ORAL at 21:01

## 2025-03-06 RX ADMIN — BISACODYL 10 MG: 10 SUPPOSITORY RECTAL at 12:52

## 2025-03-06 RX ADMIN — HYDROMORPHONE HYDROCHLORIDE 0.5 MG: 1 INJECTION, SOLUTION INTRAMUSCULAR; INTRAVENOUS; SUBCUTANEOUS at 01:19

## 2025-03-06 RX ADMIN — ACETAMINOPHEN 1000 MG: 500 TABLET, FILM COATED ORAL at 13:31

## 2025-03-06 RX ADMIN — ESTRADIOL 1 PATCH: 0.1 PATCH TRANSDERMAL at 11:13

## 2025-03-06 RX ADMIN — HYDROXYZINE HYDROCHLORIDE 50 MG: 50 TABLET, FILM COATED ORAL at 22:10

## 2025-03-06 RX ADMIN — PANCRELIPASE 2 TABLET: 20880; 78300; 78300 TABLET ORAL at 17:25

## 2025-03-06 RX ADMIN — HYDROMORPHONE HYDROCHLORIDE 0.5 MG: 1 INJECTION, SOLUTION INTRAMUSCULAR; INTRAVENOUS; SUBCUTANEOUS at 06:23

## 2025-03-06 RX ADMIN — HYDROXYZINE HYDROCHLORIDE 50 MG: 50 TABLET, FILM COATED ORAL at 16:10

## 2025-03-06 RX ADMIN — OXYCODONE HYDROCHLORIDE 10 MG: 10 TABLET ORAL at 00:00

## 2025-03-06 RX ADMIN — OXYCODONE HYDROCHLORIDE 10 MG: 10 TABLET ORAL at 22:11

## 2025-03-06 RX ADMIN — HYDROMORPHONE HYDROCHLORIDE 0.5 MG: 1 INJECTION, SOLUTION INTRAMUSCULAR; INTRAVENOUS; SUBCUTANEOUS at 18:54

## 2025-03-06 RX ADMIN — ZOLPIDEM TARTRATE 5 MG: 5 TABLET, FILM COATED ORAL at 22:54

## 2025-03-06 RX ADMIN — OXYCODONE HYDROCHLORIDE 10 MG: 10 TABLET ORAL at 04:13

## 2025-03-06 RX ADMIN — HYDROXYZINE HYDROCHLORIDE 50 MG: 50 TABLET, FILM COATED ORAL at 04:23

## 2025-03-06 RX ADMIN — SENNOSIDES AND DOCUSATE SODIUM 2 TABLET: 50; 8.6 TABLET ORAL at 05:12

## 2025-03-06 RX ADMIN — OXYCODONE HYDROCHLORIDE 5 MG: 5 TABLET ORAL at 17:23

## 2025-03-06 RX ADMIN — SENNOSIDES AND DOCUSATE SODIUM 2 TABLET: 50; 8.6 TABLET ORAL at 11:21

## 2025-03-06 RX ADMIN — OXYCODONE HYDROCHLORIDE 5 MG: 5 TABLET ORAL at 13:31

## 2025-03-06 RX ADMIN — SODIUM CHLORIDE, SODIUM LACTATE, POTASSIUM CHLORIDE, AND CALCIUM CHLORIDE: .6; .31; .03; .02 INJECTION, SOLUTION INTRAVENOUS at 01:53

## 2025-03-06 RX ADMIN — EZETIMIBE 10 MG: 10 TABLET ORAL at 21:01

## 2025-03-06 RX ADMIN — PANCRELIPASE 2 TABLET: 20880; 78300; 78300 TABLET ORAL at 07:45

## 2025-03-06 RX ADMIN — ATORVASTATIN CALCIUM 40 MG: 40 TABLET, FILM COATED ORAL at 21:00

## 2025-03-06 RX ADMIN — POLYETHYLENE GLYCOL 3350 17 G: 17 POWDER, FOR SOLUTION ORAL at 08:47

## 2025-03-06 RX ADMIN — ACETAMINOPHEN 1000 MG: 500 TABLET, FILM COATED ORAL at 20:59

## 2025-03-06 ASSESSMENT — ACTIVITIES OF DAILY LIVING (ADL)
ADLS_ACUITY_SCORE: 58
ADLS_ACUITY_SCORE: 59
ADLS_ACUITY_SCORE: 58
ADLS_ACUITY_SCORE: 59
ADLS_ACUITY_SCORE: 58
ADLS_ACUITY_SCORE: 59
ADLS_ACUITY_SCORE: 58
DEPENDENT_IADLS:: INDEPENDENT
ADLS_ACUITY_SCORE: 58
ADLS_ACUITY_SCORE: 59

## 2025-03-06 NOTE — PROGRESS NOTES
Per Dr. Noguera    Please assist in scheduling:     Procedure/Imaging/Clinic: Endoscopic Retrograde Cholangiopancreatography (ERCP)  Physician: Chuckie  Timing: 3/25  Scope time needed:tbd  Fluoro/C-arm needed: yes  Anesthesia:General  Dx: recurrent pancreatitis  Tier:Tier 3 -   Surgeries/procedures that can be delayed  between 30 to 90 days with no significant  morbidity/mortality to patient or impact on  patient/disease outcome.   Location: [facility]  OK to schedule while attending: yes  Header of letter for pt communication: Endoscopic Retrograde Cholangiopancreatography (ERCP)         Comments:

## 2025-03-06 NOTE — PROGRESS NOTES
Vitals: /66 (BP Location: Left arm)   Pulse 52   Temp 98.7  F (37.1  C) (Oral)   Resp 18   SpO2 97%   BMI= There is no height or weight on file to calculate BMI.      Neuro: A&Ox4.   Cardiac: SR. VSS.   Respiratory: Sating > 92% on RA.  GI/: Toilets self,   Diet/appetite: Tolerating regular diet. Eating well.  Activity:  Independent, ambulates ad benito  Pain: At acceptable level on current regimen, PRN Oxy, PRN Dilaudid  Skin: No new deficits noted.  LDA's: R PIV LR @ 100    Plan: Continue with POC. Notify primary team with changes.

## 2025-03-06 NOTE — PROGRESS NOTES
[facility]    Progress Note - Medicine Service, MAROON TEAM 4       Date of Admission:  3/4/2025    Assessment & Plan   Laura Gonzalez is a 74 year old female admitted on 3/4/2025. 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     Changes Today   - Completed IV fluids, tolerating diet and adequate oral intake   - Still requiring regular opioid use q4 hours and IV for breakthrough pain   - Schedule Tylenol 1000mg TID   - Increase senna/docusate and Miralax to BID  - Suppository prn   - Discharge in 1-2 days pending improvement in pain and reduction in opioid use and stopping of IV opioids     #Acute on chronic pancreatitis   #History of tubulovillous duodenal adenoma s/p pylorus-sparing Whipple  Criteria 3/3 for pancreatitis: Abdominal pain, Lipase to 292. CT imaging with acute interstitial pancreatitis. Elevated Alk Phos 161 (previously 195). AST at 47 (previous 35)   Leukocytosis to 13.4, now resolved.   - GI consulted               - Tolerating diet and oral intake   - Outpatient ERCP with Dr. Noguera 3/25    Pain control   - Oxycodone 5-10mg q4 hours   - Dilaudid 0.5mg IV q3 hours      #Pruritus with opioids   - Hydroxyzine for itching     #Mild anemia   Hgb 12.2 -> 11.2 which is baseline. HD stable, no evidence of bleed.      #Concern for early partial bowel obstruction, resolved     #Pancreatic Insufficiency  - Resume Viokace with meals     #Prediabetes   Hgb A1c is 5.9.  - Outpatient follow-up      H/o tubulovillous adenoma duodenal polyp s/p pylorus sparing Whipple: C/b chronic pancreaticojejunal anastomosis stenosis with multiple prior ERCPs. CT AP as above and with stable mild intrahepatic biliary ductal dilation  CAD/ HLD: PTA statin and  ezetimibe while NPO   Insomnia: Decrease Ambien dose to 5mg from 10mg due to opioid use -- avoid over sedation   Asthma: Stable. Continue PTA Singulair  #Menopausal symptoms: Continue PTA estradiol patch         Diet: Regular Diet Adult    DVT Prophylaxis: Ambulate every shift -- patient decline DVT ppx   Condon Catheter: Not present  Fluids: 100cc/hr LR   Lines: None     Cardiac Monitoring: None  Code Status: Full Code      Clinically Significant Risk Factors           # Hypocalcemia: Lowest Ca = 8.4 mg/dL in last 2 days, will monitor and replace as appropriate     # Hypoalbuminemia: Lowest albumin = 3.3 g/dL at 3/6/2025  7:56 AM, will monitor as appropriate                    # Financial/Environmental Concerns: none            Disposition Plan     Medically Ready for Discharge: Anticipated Tomorrow     The patient's care was discussed with the Attending Physician, Dr. Ferrera  .    Annie Felton MD  Medicine Service, [address]  Securely message with Vocera (more info)  Text page via [employer] Paging/Directory   See signed in provider for up to date coverage information  ______________________________________________________________________    Interval History   NAEO.     Abdominal pain is under control, but has episodes with severe pain. Mild nausea. No vomiting. Is feeling more constipated. Passing gas. Tolerating a diet ok.     Physical Exam   Vital Signs: Temp: 97.8  F (36.6  C) Temp src: Oral BP: 135/77 Pulse: 60   Resp: 16 SpO2: 95 % O2 Device: None (Room air)    Weight: 0 lbs 0 oz    General: Awake, alert, no acute distress. Laying in bed.   Cardiac: Bradycardic rate and regular rhythm. No murmur noted.   Respiratory: Normal effort. Lungs are clear auscultation.   Abdomen: Soft, mild tenderness to palpation to right abdomen, non-distended, positive bowel sounds   Extremities: Trace non-pitting edema. Warm and dry.   Neurological: Alert, oriented to  person, place and time. Gross motor intact.   Psychiatric: Pleasant affect.     Medical Decision Making       Please see A&P for additional details of medical decision making.      Data     I have personally reviewed the following data over the past 24 hrs:    6.1  \   10.5 (L)   / 256     138 104 14.0 /  97   4.2 29 0.80 \     ALT: 17 AST: 32 AP: 147 TBILI: 0.4   ALB: 3.3 (L) TOT PROTEIN: 6.2 (L) LIPASE: N/A

## 2025-03-06 NOTE — PLAN OF CARE
RN [id number]    Vitals: Afebrile. Hypertensive within parameters. OVSS on RA  Neuro: A&Ox4    Mobility: Pt ambulates independently.    Pain/Nausea: Pain treated w/ PRN Oxy x1. PRN Atarax x1. PRN IV Dilaudid x1. Denies nausea.   Diet: Reg diet  Labs: Reviewed.   LDAs: R PIV infusing LR @ 100 mL/hr  Skin/incisions: WNL; intact.   Respiratory: No acute changes this shift.   Cardiac: No acute changes this shift.  /GI: Voiding appropriately and spontaneously.  LBM date 3/5; passing flatus. Pt expressed feeling constipated. PRN Senna administered.     NEW CHANGES: No acute changes this shift.    Continue to monitor patient and intervene as needed. Continue to implement Plan of Care. Notify MD with any concerns or changes in patient status.     Luanne Cruz RN

## 2025-03-06 NOTE — CONSULTS
Care Management Initial Consult    General Information  Assessment completed with: Patient,    Type of CM/SW Visit: Initial Assessment  Primary Care Provider verified and updated as needed: Yes   Readmission within the last 30 days: no previous admission in last 30 days   Reason for Consult:  (high risk readmission)  Advance Care Planning: Advance Care Planning Reviewed: no concerns identified       Communication Assessment  Patient's communication style: spoken language (English or Bilingual)      Cognitive  Cognitive/Neuro/Behavioral: WDL                      Living Environment:   People in home: spouse     Current living Arrangements: house      Able to return to prior arrangements: yes     Family/Social Support:  Care provided by: self  Provides care for: no one  Marital Status:   Support system:           Description of Support System: Supportive, Involved    Support Assessment: Adequate family and caregiver support    Current Resources:   Patient receiving home care services: No  Community Resources: None  Equipment currently used at home: none  Supplies currently used at home: None    Employment/Financial:  Employment Status: retired     Financial Concerns: none   Does the patient's insurance plan have a 3 day qualifying hospital stay waiver?  No    Functional Status:  Prior to admission patient needed assistance:   Dependent ADLs:: Independent  Dependent IADLs:: Independent     Mental Health Status:  Mental Health Status:  (not discussed)     Chemical Dependency Status:  Chemical Dependency Status:  (not discussed)     Values/Beliefs:  Spiritual, Cultural Beliefs, Yazdanism Practices, Values that affect care: no       Discussed  Partnership in Safe Discharge Planning  document with patient/family: yes    Additional Information:  Per H/P: 74 year old female with history of tubulovillous adenoma and duodenal polyp s/p pylorus sparing Whipple in 2019 with removal of 1/3 of the pancreas c/b  pancreaticojejunal anastomosis stenosis with recurrent pancreatitis s/p multiple ERCPs and SBO who presents to the ED with abdominal pain concerning for acute on chronic pancreatitis.     Initial assessment completed due to high risk readmission score. See details above.     Patient is from home with  and independent with cares  MAICOL Ride:     No further care management intervention anticipated at this time.  Please re-consult if further needs arise.  Care management signing off.        Nupur Koch RN, [facility]  Inpatient Care Management - FLOAT

## 2025-03-06 NOTE — PLAN OF CARE
Goal Outcome Evaluation:      Plan of Care Reviewed With: patient    Overall Patient Progress: no changeOverall Patient Progress: no change    Outcome Evaluation: Discharge home once medically stable    Nupur Koch RN, [facility]  Acute Care Management - FLOAT

## 2025-03-07 VITALS
OXYGEN SATURATION: 98 % | SYSTOLIC BLOOD PRESSURE: 112 MMHG | DIASTOLIC BLOOD PRESSURE: 58 MMHG | HEART RATE: 52 BPM | RESPIRATION RATE: 16 BRPM | TEMPERATURE: 98.3 F

## 2025-03-07 LAB — GLUCOSE BLDC GLUCOMTR-MCNC: 85 MG/DL (ref 70–99)

## 2025-03-07 PROCEDURE — 99239 HOSP IP/OBS DSCHRG MGMT >30: CPT | Performed by: PEDIATRICS

## 2025-03-07 PROCEDURE — 250N000013 HC RX MED GY IP 250 OP 250 PS 637

## 2025-03-07 PROCEDURE — 250N000013 HC RX MED GY IP 250 OP 250 PS 637: Performed by: INTERNAL MEDICINE

## 2025-03-07 PROCEDURE — 82962 GLUCOSE BLOOD TEST: CPT

## 2025-03-07 RX ORDER — ACETAMINOPHEN 500 MG
1000 TABLET ORAL 3 TIMES DAILY
COMMUNITY
Start: 2025-03-07

## 2025-03-07 RX ORDER — OXYCODONE HYDROCHLORIDE 10 MG/1
5-10 TABLET ORAL EVERY 4 HOURS PRN
Qty: 20 TABLET | Refills: 0 | Status: SHIPPED | OUTPATIENT
Start: 2025-03-07

## 2025-03-07 RX ORDER — AMOXICILLIN 250 MG
1 CAPSULE ORAL 2 TIMES DAILY
Qty: 30 TABLET | Refills: 0 | Status: SHIPPED | OUTPATIENT
Start: 2025-03-07

## 2025-03-07 RX ORDER — POLYETHYLENE GLYCOL 3350 17 G/17G
17 POWDER, FOR SOLUTION ORAL DAILY
Qty: 510 G | Refills: 0 | Status: SHIPPED | OUTPATIENT
Start: 2025-03-07

## 2025-03-07 RX ADMIN — HYDROXYZINE HYDROCHLORIDE 50 MG: 50 TABLET, FILM COATED ORAL at 15:32

## 2025-03-07 RX ADMIN — OXYCODONE HYDROCHLORIDE 10 MG: 10 TABLET ORAL at 14:02

## 2025-03-07 RX ADMIN — POLYETHYLENE GLYCOL 3350 17 G: 17 POWDER, FOR SOLUTION ORAL at 09:15

## 2025-03-07 RX ADMIN — HYDROXYZINE HYDROCHLORIDE 50 MG: 50 TABLET, FILM COATED ORAL at 09:11

## 2025-03-07 RX ADMIN — ACETAMINOPHEN 1000 MG: 500 TABLET, FILM COATED ORAL at 09:11

## 2025-03-07 RX ADMIN — OXYCODONE HYDROCHLORIDE 5 MG: 5 TABLET ORAL at 09:10

## 2025-03-07 RX ADMIN — PANCRELIPASE 2 TABLET: 20880; 78300; 78300 TABLET ORAL at 09:48

## 2025-03-07 RX ADMIN — SENNOSIDES AND DOCUSATE SODIUM 1 TABLET: 50; 8.6 TABLET ORAL at 09:11

## 2025-03-07 RX ADMIN — ACETAMINOPHEN 1000 MG: 500 TABLET, FILM COATED ORAL at 14:02

## 2025-03-07 RX ADMIN — OXYCODONE HYDROCHLORIDE 5 MG: 5 TABLET ORAL at 10:10

## 2025-03-07 RX ADMIN — PANCRELIPASE 2 TABLET: 20880; 78300; 78300 TABLET ORAL at 13:16

## 2025-03-07 ASSESSMENT — ACTIVITIES OF DAILY LIVING (ADL)
ADLS_ACUITY_SCORE: 58

## 2025-03-07 NOTE — PROGRESS NOTES
[id number] Shift Summary:    VS:  /68   Pulse 50   Temp 97.9  F (36.6  C)   Resp 16   SpO2 95%     Cardiac:  Denied CP   Respiratory:  Sating >94% on RA, denied SOB, LSCTA   GI/:  Multiple loose BMs this shift, up to bathroom, voided w/o diffiuclty   Neuro:  A&O x4, denied new numbness or tingling   Pain:  Abd pain managed w/ PRN oxy 5mg given x1, PRN atarax 50mg given x1, PRN IV Dilaudid given x1 for breakthrough pain   Activity:  Ind, up ad benito   Skin:  No new deficits noted   Dressing:  R PIV dressing CDI   Diet:  Reg w/ thin; denied N/V   LDA:  R PIV SL   Plan:  Cont POC;   Pending bed on 7B   Additional Info:

## 2025-03-07 NOTE — DISCHARGE SUMMARY
[facility]  Hospitalist Discharge Summary      Date of Admission:  3/4/2025  Date of Discharge:  3/7/2025  9:00 PM  Discharging Provider: MARIAH CAMPBELL MD  Discharge Service: Medicine Service, CINDI TEAM 4    Discharge Diagnoses   Acute on chronic pancreatitis  History of tubulovillous duodenal adenoma s/p pylorus-sparing Whipple  Anemia  Pancreatic insufficiency  Prediabetes  Hyperlipidemia    Clinically Significant Risk Factors          Follow-ups Needed After Discharge   Follow-up Appointments       ADULT [facility] Specialty Follow-up and recommended labs and tests      Follow up with Dr. Noguera on 3/25 as scheduled.     Appointments on [place] and/or [facility] (with [facility] or [facility] provider or service). Call [phone] if you haven't heard regarding these appointments within 7 days of discharge.                Unresulted Labs Ordered in the Past 30 Days of this Admission       No orders found from 2/2/2025 to 3/5/2025.        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     # Acute on chronic pancreatitis  #History of tubulovillous duodenal adenoma s/p pylorus-sparing Whipple   Presented with abdominal pain, elevated lipase, and CT imaging consistent with pancreatitis. GI was consulted and recommended IVF and pain control. Pain control was achieved over the next couple days, and she was eventually able to transition to oral oxycodone. She has a planned outpatient ERCP on 3/25 with Dr. Noguera.     # Anemia  Hemoglobin was 11.2-12.2, which is at her baseline.     # Concern for early partial bowel obstruction  Found incidentally  on CT scan, but asymptomatic with bowel movements. Surgery was initially consulted. Serial exams were not concerning for bowel obstruction.     # Pancreatic insufficiency  Resumed home Viokace once tolerating PO.      # CAD  # HLD  Resumed home stain and ezetimibe when tolerating PO.     Consultations This Hospital Stay   SURGERY GENERAL ADULT IP CONSULT  GI PANCREATICOBILIARY ADULT IP CONSULT  CARE MANAGEMENT / SOCIAL WORK IP CONSULT    Code Status   Full Code    Time Spent on this Encounter   I, MARIAH CAMPBELL MD, personally saw the patient today and spent greater than 30 minutes discharging this patient.       MARIAH CAMPBELL MD  [facility] EMERGENCY DEPARTMENT  [address], [place] [address]  Phone: [phone]  ______________________________________________________________________    Physical Exam   Vital Signs: Temp: 99.5  F (37.5  C) Temp src: Oral BP: 124/58 Pulse: 55   Resp: 16 SpO2: 95 % O2 Device: None (Room air)    Weight: 0 lbs 0 oz  Constitutional: awake, alert, cooperative, no apparent distress, and appears stated age  Eyes: extra-ocular muscles intact and sclera clear  ENT: normocephalic, without obvious abnormality, MMM, neck supple  Respiratory: No increased work of breathing, good air exchange, clear to auscultation bilaterally, no crackles or wheezing  Cardiovascular: regular rate and rhythm, normal S1 and S2, no murmur noted, and no edema  GI: normal bowel sounds, soft, non-distended, and tenderness noted in the epigastric region and RLQ  Skin: normal skin color, texture, turgor  Neurologic: Awake, alert, oriented, moving all extremities, normal gait, no focal deficit       Primary Care Physician   MIQUEL ANDERS    Discharge Orders      Reason for your hospital stay    You were hospitalized for pancreatitis. You were treated with pain medications and fluids. For ongoing pain, you can take 5 mg oxycodone every 4 hours for moderate pain or 10 mg oxycodone every 4 hours for  severe pain. Do not take your percocet while taking oxycodone. Be sure to drink lots of water and take miralax and senna as prescribed to avoid constipation while taking oxycodone.     Follow up with Dr. Noguera as scheduled on 3/25 for your next ERCP.     Activity    Your activity upon discharge: activity as tolerated     ADULT [facility] Specialty Follow-up and recommended labs and tests    Follow up with Dr. Noguera on 3/25 as scheduled.     Appointments on [place] and/or [facility] (with [facility] or [facility] provider or service). Call [phone] if you haven't heard regarding these appointments within 7 days of discharge.     Diet    Follow this diet upon discharge: Advance to a regular diet as tolerated       Significant Results and Procedures   Results for orders placed or performed during the hospital encounter of 03/04/25   CT Abdomen Pelvis w Contrast     Value    Radiologist flags Acute on chronic pancreatitis.    Radiologist flags acute interstitial pancreatitis.. (Urgent)    Narrative    EXAMINATION: CT ABDOMEN PELVIS W CONTRAST, 3/4/2025 12:44 PM    INDICATION: epigastric abdominal pain, hx pancreatitis    COMPARISON STUDY: Abdominal radiograph 10/28/2024, CT abdomen and  pelvis 9/23/2024    TECHNIQUE: CT scan of the abdomen and pelvis was performed on  multidetector CT scanner using volumetric acquisition technique and  images were reconstructed in multiple planes with variable thickness  and reviewed on dedicated workstations.     CONTRAST: iopamidol (ISOVUE-370) solution 95 mL injected IV without  oral contrast    CT scan radiation dose is optimized to minimum requisite dose using  automated dose modulation techniques.    FINDINGS:    Support devices: None.    Lower thorax: Subsegmental atelectasis. No pleural effusion or focal  consolidative collection.    Liver: Similar too small to characterize hypodensity in segment 2   (3/36). Mild prominence of the intrahepatic bile ducts, similar to  prior  (3/55). Gallbladder is surgically absent with changes of  hepaticojejunostomy.    Spleen: Unremarkable.    Pancreas: Postoperative changes of pyloric sparing Whipple procedure.  Edematous appearance of the remnant pancreas with surrounding  ill-defined fluid (acute fluid collection) in the anterior pararenal  space and mesenteric root. Similar prominence of the central  pancreatic duct, near the pancreaticojejunostomy.No organized fluid  collection.    Adrenals: No nodules.     Kidneys and ureters: Similar too small to characterize renal cortical  hypodensities. No new focal renal lesion. No hydronephrosis..    Bladder: Partially distended    Reproductive: No new pelvic mass, limited evaluation of the pelvis  secondary to streak artifact from left hip arthroplasty.    Bowel: Postsurgical changes of pyloric sparing Whipple procedure. Mild  dilation of the distal biliopancreatic limb near the anastomosis,  nonspecific (3/191). Appendix surgically absent.    Lymph nodes: Similar prominent lymph node in the aortocaval region  (series 3 image 105) measuring 9 mm in short axis. Few subcentimeter  left para-aortic nodes. No new bulky adenopathy.    Vasculature: Scattered calcifications of the abdominal aorta and iliac  arteries.    Bone windows: Left hip arthroplasty. Degenerative changes of the spine  with mild scoliotic curvature. Similar sclerotic lesion in the right  iliac bone. No new acute or aggressive osseous lesion.    Soft tissues: Unremarkable.      Impression    IMPRESSION:     1. Fat streakiness and fluid density seen adjacent to edematous  remnant pancreas body and tail, no definite pancreatic necrosis,  compatible with acute interstitial pancreatitis.   2. Postsurgical changes of pyloric sparing Whipple procedure. Mild  dilation of the distal biliopancreatic limb near the anastomosis,  nonspecific, likely secondary to slow transit or developing adynamic  ileus; early partial obstruction not entirely  excluded. Consider  clinical follow-up and short-term repeat imaging as clinically  desired.  3. Additional findings similar to prior CT from 9/23/2024, as detailed  above.        [Urgent Result: acute interstitial pancreatitis..]    Finding was identified on 3/4/2025 12:57 PM.     Dr. Wong was contacted by Dr. Okeefe at 3/4/2025 1:28 PM and verbalized  understanding of the urgent finding.     I have personally reviewed the examination and initial interpretation  and I agree with the findings.    SHOBHA KLEIN MD         SYSTEM ID:  [id number]       Discharge Medications   Current Discharge Medication List        START taking these medications    Details   acetaminophen (TYLENOL) 500 MG tablet Take 2 tablets (1,000 mg) by mouth 3 times daily.    Associated Diagnoses: Acute on chronic pancreatitis (H)      oxyCODONE IR (ROXICODONE) 10 MG tablet Take 0.5-1 tablets (5-10 mg) by mouth every 4 hours as needed for severe pain.  Qty: 20 tablet, Refills: 0    Comments: Please take 5 mg for moderate pain and 10 mg for severe pain  Associated Diagnoses: Acute on chronic pancreatitis (H)      polyethylene glycol (MIRALAX) 17 GM/Dose powder Take 17 g by mouth daily. While taking oxycodone  Qty: 510 g, Refills: 0    Associated Diagnoses: Acute on chronic pancreatitis (H)      senna-docusate (SENOKOT-S/PERICOLACE) 8.6-50 MG tablet Take 1 tablet by mouth 2 times daily. While taking oxycodone  Qty: 30 tablet, Refills: 0    Associated Diagnoses: Acute on chronic pancreatitis (H)           CONTINUE these medications which have NOT CHANGED    Details   atorvastatin (LIPITOR) 40 MG tablet Take 40 mg by mouth At Bedtime       cholecalciferol (VITAMIN D3) 25 mcg (1000 units) capsule Take 1 capsule by mouth daily      estradiol (VIVELLE-DOT) 0.075 MG/24HR BIW patch Place 1 patch onto the skin twice a week.      evolocumab (REPATHA) 140 MG/ML prefilled autoinjector Inject 140 mg Subcutaneous every 14 days      ezetimibe (ZETIA) 10 MG tablet  Take 10 mg by mouth At Bedtime      hydrOXYzine HCl (ATARAX) 25 MG tablet Take 1 tablet (25 mg) by mouth 3 times daily as needed for itching  Qty: 30 tablet, Refills: 1    Associated Diagnoses: Acute pancreatitis, unspecified complication status, unspecified pancreatitis type      Magnesium Ascorbate POWD One teaspoon at bedtime daily by mouth      montelukast (SINGULAIR) 10 MG tablet Take 10 mg by mouth at bedtime      omeprazole (PRILOSEC) 40 MG DR capsule Take 40 mg by mouth daily.      ondansetron (ZOFRAN ODT) 4 MG ODT tab DISSOLVE ONE TABLET BY MOUTH EVERY 6 HOURS AS NEEDED FOR NAUSEA AND VOMITING      oxyCODONE-acetaminophen (PERCOCET) 5-325 MG tablet Take 1-2 tablets by mouth every 4 hours as needed for severe pain (pancreatitis flare).      valACYclovir (VALTREX) 1000 mg tablet Take 1,000 mg by mouth daily as needed (cold sore)      VIOKACE 39724-09372 units TABS per tablet TAKE 3 CAPSULES WITH EACH MEAL, UP TO 9 PER DAY.  Qty: 450 tablet, Refills: 6    Associated Diagnoses: Pancreatic insufficiency; Recurrent acute pancreatitis; Acute on chronic pancreatitis (H); History of Whipple procedure      vitamin A 3 MG (57389 UNITS) capsule Take 1 capsule by mouth daily      zolpidem (AMBIEN) 10 MG tablet Take 1 tablet by mouth nightly as needed for sleep           Allergies   No Known Allergies

## 2025-03-07 NOTE — PROGRESS NOTES
Patient is alert and oriented, vitals stable, pain is being managed with oxycodone, tylenol, hydroxyzine. On regular diet but ate very little breakfast and lunch. Estradiol patch is on the RLQ. R PIV is saline locked, had BM today, voids spontaneously, continue to monitor.

## 2025-03-07 NOTE — PROGRESS NOTES
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         # Acute on chronic pancreatitis   # S/p pyloric conserving Whipple complicated by pancreas c/b pancreaticojejunal anastomosis stenosis  BISAP score is 1 (age). No other organ dysfunction noted.      Has had multiple ERCPs at [facility] since 2022, each with temporary stenting +/- dilation of thepancreaticojejunal stenosis. Last ERCP 10/1/2024 by Dr. Noguera with finding of patent pancreaticojejunal anastomosis.  A PD stent was placed and fell out within 4 weeks. She had relatively symptom-free duration of 3 months after the ERCP.    Dr. Noguera discussed her with options regarding repeating ERCP with PD stent vs TPiAT with the removal of the remaining pancreas. At this point, patient is hesitant for surgery. So Dr. Noguera will plan for repeat outpatient ERCP while initiating conversation on TPiAT.       RECOMMENDATIONS:  - Continue regular diet with Viokace  - Outpt ERCP with Dr. Noguera scheduled on 3/25  - Appreciate ongoing pain management by primary team  - Agree with aggressive bowel regimen for opioid induced constipation       GI will follow peripherally this weekend if she remains here for pain control. Please call us with any question.      Pt care plan discussed with Dr. Gotti, GI staff physicians.      Madalyn Martinez MD, PhD  Gastroenterology Fellow  Division of Gastroenterology, Hepatology and Nutrition  [facility]  Pager: [phone]  _______________________________________________________________    Subjective:  Has not had any pain meds since last night. Initially  felt ok but started to have pain again.     Objective:  Blood pressure 124/58, pulse 55, temperature 99.5  F (37.5  C), temperature source Oral, resp. rate 16, SpO2 95%, not currently breastfeeding.    Gen: A&Ox3, NAD  HEENT: sclera anicteric  CV: RRR  Lungs: breathing comfortably on room air  Abd:  soft, tender on palpation, nondistended, bowel sounds present  Skin: no jaundice, no stigmata of chronic liver disease  MS: appropriate muscle mass for age  Neuro: non focal       LABS:  BMP  Recent Labs   Lab 03/06/25  0756 03/05/25  0912 03/04/25  1111    138 142   POTASSIUM 4.2 3.8 4.4   CHLORIDE 104 104 106   GLEN 8.6* 8.4* 8.6*   CO2 29 28 27   BUN 14.0 8.3 16.2   CR 0.80 0.80 0.78   GLC 97 82 96     CBC  Recent Labs   Lab 03/06/25  0756 03/05/25  0912 03/04/25  1111   WBC 6.1 8.2 13.4*   RBC 3.69* 4.07 4.39   HGB 10.5* 11.2* 12.2   HCT 33.6* 35.7 39.8   MCV 91 88 91   MCH 28.5 27.5 27.8   MCHC 31.3* 31.4* 30.7*   RDW 15.8* 15.9* 15.7*    287 290     INRNo lab results found in last 7 days.  LFTs  Recent Labs   Lab 03/06/25  0756 03/05/25  0912 03/04/25  1111   ALKPHOS 147 165* 161*   AST 32 33 47*   ALT 17 18 24   BILITOTAL 0.4 0.4 0.4   PROTTOTAL 6.2* 6.8 7.2   ALBUMIN 3.3* 3.5 3.7      PANC  Recent Labs   Lab 03/04/25  1111   LIPASE 292*       IMAGING:  Reviewed CT.

## 2025-03-07 NOTE — PROGRESS NOTES
Pt understood discharge orders/instructions : Yes.  Pt's belongings : Pt took.  Discharge medications : Pt got from pharmacy.  Lines : Piv removed.  How is pt getting home/transportion : Pt's .  Discharge papers : Given to the pt.  Follow up appt : As stated on the papers.  Home supply : N/A.

## 2025-03-07 NOTE — PLAN OF CARE
Pt is alert and oriented x4, darshana in the 50's, OVSS, afebrile on RA. She denies pain, nausea, vomiting, numbness and tingling. Estradiol patch RLQ. Up ind.Call light within reach

## 2025-09-03 ENCOUNTER — APPOINTMENT (OUTPATIENT)
Dept: URBAN - METROPOLITAN AREA CLINIC 257 | Age: 75
Setting detail: DERMATOLOGY
End: 2025-09-03

## 2025-09-03 VITALS — WEIGHT: 148 LBS | HEIGHT: 68 IN

## 2025-09-03 DIAGNOSIS — D49.2 NEOPLASM OF UNSPECIFIED BEHAVIOR OF BONE, SOFT TISSUE, AND SKIN: ICD-10-CM

## 2025-09-03 PROBLEM — D23.71 OTHER BENIGN NEOPLASM OF SKIN OF RIGHT LOWER LIMB, INCLUDING HIP: Status: ACTIVE | Noted: 2025-09-03

## 2025-09-03 PROCEDURE — OTHER MEDICATION COUNSELING: OTHER

## 2025-09-03 PROCEDURE — OTHER COUNSELING: OTHER

## 2025-09-03 PROCEDURE — OTHER PRESCRIPTION: OTHER

## 2025-09-03 PROCEDURE — 11102 TANGNTL BX SKIN SINGLE LES: CPT

## 2025-09-03 PROCEDURE — 11103 TANGNTL BX SKIN EA SEP/ADDL: CPT

## 2025-09-03 PROCEDURE — 99212 OFFICE O/P EST SF 10 MIN: CPT | Mod: 25

## 2025-09-03 PROCEDURE — OTHER BIOPSY BY SHAVE METHOD: OTHER

## 2025-09-03 PROCEDURE — OTHER PRESCRIPTION MEDICATION MANAGEMENT: OTHER

## 2025-09-03 RX ORDER — CLOBETASOL PROPIONATE 0.5 MG/G
OINTMENT TOPICAL QHS
Qty: 45 | Refills: 0 | Status: ERX | COMMUNITY
Start: 2025-09-03

## 2025-09-03 ASSESSMENT — LOCATION SIMPLE DESCRIPTION DERM
LOCATION SIMPLE: CHEST
LOCATION SIMPLE: LEFT BREAST

## 2025-09-03 ASSESSMENT — LOCATION ZONE DERM: LOCATION ZONE: TRUNK

## 2025-09-03 ASSESSMENT — LOCATION DETAILED DESCRIPTION DERM
LOCATION DETAILED: RIGHT LATERAL SUPERIOR CHEST
LOCATION DETAILED: LEFT LATERAL BREAST 1-2:00 REGION

## (undated) DEVICE — BNDG ELASTIC 4" DBL LENGTH UNSTERILE 6611-14

## (undated) DEVICE — ENDO CATH BALLOON DILATION HURRICANE 04MMX4X180CM M00545900

## (undated) DEVICE — CANNULA BILIARY ERCP .035" TAPER TIP 3.2MM CHANNEL

## (undated) DEVICE — PACK ENDOSCOPY GI CUSTOM UMMC

## (undated) DEVICE — BITE BLOCK BLOX ADJ STRAP 60FR SBT-546-100

## (undated) DEVICE — LINEN FULL SHEET 5511

## (undated) DEVICE — ENDO BITE BLOCK ADULT OMNI-BLOC

## (undated) DEVICE — KIT ENDO FIRST STEP DISINFECTANT 200ML W/POUCH EP-4

## (undated) DEVICE — SYR BULB IRRIG DOVER 60 ML LATEX FREE 67000

## (undated) DEVICE — ATTACHMENT CAP DISTAL REVEAL 13.4MM BX00711773

## (undated) DEVICE — SUCTION MANIFOLD NEPTUNE 2 SYS 4 PORT 0702-020-000

## (undated) DEVICE — GUIDEWIRE NOVAGOLD .018X480CM STR TIP M00552010

## (undated) DEVICE — SU MONOCRYL 4-0 PS-2 27" UND Y426H

## (undated) DEVICE — BIOPSY VALVE BIOSHIELD 00711135

## (undated) DEVICE — IMPLANTABLE DEVICE
Type: IMPLANTABLE DEVICE | Site: PANCREATIC DUCT | Status: NON-FUNCTIONAL
Removed: 2023-01-24

## (undated) DEVICE — NDL 30GA 1" 305128

## (undated) DEVICE — ESU ELEC NDL 1" COATED/INSULATED E1465

## (undated) DEVICE — DRAPE BREAST/CHEST 29420

## (undated) DEVICE — WIRE GUIDE 0.025"X450CM ANG VISIGLIDE G-240-2545A

## (undated) DEVICE — ENDO SNARE POLYPECTOMY OVAL 15MM LOOP SD-240U-15

## (undated) DEVICE — KIT CONNECTOR FOR OLYMPUS ENDOSCOPES DEFENDO 100310

## (undated) DEVICE — SYR EAR BULB 3OZ 0035830

## (undated) DEVICE — INFLATION DEVICE BIG 60 ENDO-AN6012

## (undated) DEVICE — ZIMMON PANCREATIC STENT
Type: IMPLANTABLE DEVICE | Site: PANCREATIC DUCT | Status: NON-FUNCTIONAL
Brand: ZIMMON
Removed: 2023-03-30

## (undated) DEVICE — STENT ZIMMON PANCREA 4FRX8CM SGL PIGTAIL G24582: Type: IMPLANTABLE DEVICE | Site: PANCREATIC DUCT | Status: NON-FUNCTIONAL

## (undated) DEVICE — GLOVE PROTEXIS BLUE W/NEU-THERA 6.5  2D73EB65

## (undated) DEVICE — TUBING SUCTION 12"X1/4" N612

## (undated) DEVICE — ESU ELEC BLADE 6" COATED/INSULATED E1455-6

## (undated) DEVICE — STENT FREEMAN PANCREA FLEX 5FRX5CM SGL PIGTAIL
Type: IMPLANTABLE DEVICE | Site: PANCREAS | Status: NON-FUNCTIONAL
Removed: 2022-10-13

## (undated) DEVICE — SOL WATER IRRIG 1000ML BOTTLE 2F7114

## (undated) DEVICE — Device

## (undated) DEVICE — DRAIN JACKSON PRATT RESERVOIR 100ML SU130-1305

## (undated) DEVICE — ENDO BASKET RETRIEVAL F/BILE DUCT STN FG-V431P

## (undated) DEVICE — NDL 27GA 1.25" 305136

## (undated) DEVICE — ESU GROUND PAD ADULT W/CORD E7507

## (undated) DEVICE — WIRE GUIDE ROADRUNNER X-SUPPORT .018X480CM STR TIP G22419

## (undated) DEVICE — CANNULA BILIARY CONTOUR ERCP .018"X210CM 5-4-3 TIP

## (undated) DEVICE — CATH BALLOON DILATOR STERLING OTW 3.5X60X150 H74939032350610

## (undated) DEVICE — SPONGE LAP 18X18" X8435

## (undated) DEVICE — ENDO NDL BALL TIP ULTRASOUND 22GA 5.2FR ECHO-3-22

## (undated) DEVICE — LABEL MEDICATION SYSTEM 3303-P

## (undated) DEVICE — PREP SCRUB SOL EXIDINE 4% CHG 4OZ 29002-404

## (undated) DEVICE — ENDO TUBING CO2 SMARTCAP STERILE DISP 100145CO2EXT

## (undated) DEVICE — ENDO CAP AND TUBING STERILE FOR ENDOGATOR  100130

## (undated) DEVICE — CATH BALLOON ELATION ESOPH/PYLORIC/COLON 5-6-7-8-9MMX240CM

## (undated) DEVICE — GLOVE BIOGEL PI ULTRATOUCH SZ 8.0 41180

## (undated) DEVICE — CATHETER ERCP GLO-TIP 5.5FRX200CM GT-1-T

## (undated) DEVICE — DRSG GAUZE 4X4" 8044

## (undated) DEVICE — BAG CLEAR TRASH 1.3M 39X33" P4040C

## (undated) DEVICE — SYR 05ML SLIP TIP W/O NDL 309647

## (undated) DEVICE — PITCHER STERILE 1000ML  SSK9004A

## (undated) DEVICE — SU VICRYL 2-0 CT-1 27" J339H

## (undated) DEVICE — DRAIN JACKSON PRATT 15FR ROUND SU130-1323

## (undated) DEVICE — ENDO FORCEP ENDOJAW BIOPSY 2.8MMX230CM FB-220U

## (undated) DEVICE — STPL SKIN 35W 6.9MM  PXW35

## (undated) DEVICE — SUCTION TIP YANKAUER W/O VENT K86

## (undated) DEVICE — NDL BLUNT 18GA 1" W/O FILTER 305181

## (undated) DEVICE — LINEN TOWEL PACK X10 5473

## (undated) DEVICE — SPHINCTEROTOME 5.5FRX25MM OMNI-TOME FS-OMNI-35 G31911

## (undated) DEVICE — SU STRATAFIX MONOCRYL 3-0 SPIRAL PS-1 45CM SXMP1B102

## (undated) DEVICE — SU MONOCRYL 3-0 PS-2 27" Y427H

## (undated) DEVICE — CONNECTOR STOPCOCK 3 WAY MALE LL 2C6204

## (undated) DEVICE — PREP SKIN SCRUB TRAY 4461A

## (undated) DEVICE — WIRE GUIDE 0.025"X270CM STR VISIGLIDE G-240-2527S

## (undated) DEVICE — SU ETHILON 2-0 PS 18" 585H

## (undated) DEVICE — DRSG XEROFORM 1X8"

## (undated) DEVICE — DRSG TELFA 3X8" 1238

## (undated) DEVICE — BALLOON EXTRACTION 15X1950MM 3.2MM TL B-V243Q-A

## (undated) DEVICE — CATH RETRIEVAL BALLOON EXTRACTOR PRO RX-S INJ ABOVE 9-12MM

## (undated) DEVICE — DEVICE ENDO CLIP INSTINCT PLUS INSC-P-7-230-S  G58010

## (undated) DEVICE — DRSG ABDOMINAL 07 1/2X8" 7197D

## (undated) DEVICE — PACK MINOR CUSTOM RIDGES SBA32RMRMA

## (undated) DEVICE — DRSG KERLIX 4 1/2"X4YDS ROLL 6730

## (undated) DEVICE — LINEN HALF SHEET 5512

## (undated) RX ORDER — PROPOFOL 10 MG/ML
INJECTION, EMULSION INTRAVENOUS
Status: DISPENSED
Start: 2023-03-30

## (undated) RX ORDER — ETOMIDATE 2 MG/ML
INJECTION INTRAVENOUS
Status: DISPENSED
Start: 2022-06-16

## (undated) RX ORDER — ALBUMIN, HUMAN INJ 5% 5 %
SOLUTION INTRAVENOUS
Status: DISPENSED
Start: 2022-06-16

## (undated) RX ORDER — FENTANYL CITRATE-0.9 % NACL/PF 10 MCG/ML
PLASTIC BAG, INJECTION (ML) INTRAVENOUS
Status: DISPENSED
Start: 2023-01-24

## (undated) RX ORDER — OXYCODONE HYDROCHLORIDE 5 MG/1
TABLET ORAL
Status: DISPENSED
Start: 2022-05-12

## (undated) RX ORDER — SODIUM CHLORIDE, SODIUM LACTATE, POTASSIUM CHLORIDE, CALCIUM CHLORIDE 600; 310; 30; 20 MG/100ML; MG/100ML; MG/100ML; MG/100ML
INJECTION, SOLUTION INTRAVENOUS
Status: DISPENSED
Start: 2022-05-12

## (undated) RX ORDER — LIDOCAINE HYDROCHLORIDE 20 MG/ML
INJECTION, SOLUTION EPIDURAL; INFILTRATION; INTRACAUDAL; PERINEURAL
Status: DISPENSED
Start: 2022-05-12

## (undated) RX ORDER — FENTANYL CITRATE 50 UG/ML
INJECTION, SOLUTION INTRAMUSCULAR; INTRAVENOUS
Status: DISPENSED
Start: 2022-02-18

## (undated) RX ORDER — FENTANYL CITRATE-0.9 % NACL/PF 10 MCG/ML
PLASTIC BAG, INJECTION (ML) INTRAVENOUS
Status: DISPENSED
Start: 2022-06-16

## (undated) RX ORDER — PROPOFOL 10 MG/ML
INJECTION, EMULSION INTRAVENOUS
Status: DISPENSED
Start: 2024-08-20

## (undated) RX ORDER — EPHEDRINE SULFATE 50 MG/ML
INJECTION, SOLUTION INTRAMUSCULAR; INTRAVENOUS; SUBCUTANEOUS
Status: DISPENSED
Start: 2022-02-18

## (undated) RX ORDER — ONDANSETRON 2 MG/ML
INJECTION INTRAMUSCULAR; INTRAVENOUS
Status: DISPENSED
Start: 2023-01-24

## (undated) RX ORDER — HYDROMORPHONE HCL IN WATER/PF 6 MG/30 ML
PATIENT CONTROLLED ANALGESIA SYRINGE INTRAVENOUS
Status: DISPENSED
Start: 2024-08-20

## (undated) RX ORDER — SODIUM CHLORIDE, SODIUM LACTATE, POTASSIUM CHLORIDE, CALCIUM CHLORIDE 600; 310; 30; 20 MG/100ML; MG/100ML; MG/100ML; MG/100ML
INJECTION, SOLUTION INTRAVENOUS
Status: DISPENSED
Start: 2023-03-30

## (undated) RX ORDER — OXYCODONE HYDROCHLORIDE 5 MG/1
TABLET ORAL
Status: DISPENSED
Start: 2022-06-16

## (undated) RX ORDER — EPHEDRINE SULFATE 50 MG/ML
INJECTION, SOLUTION INTRAMUSCULAR; INTRAVENOUS; SUBCUTANEOUS
Status: DISPENSED
Start: 2023-01-24

## (undated) RX ORDER — OXYCODONE HYDROCHLORIDE 10 MG/1
TABLET ORAL
Status: DISPENSED
Start: 2024-08-20

## (undated) RX ORDER — INDOMETHACIN 50 MG/1
SUPPOSITORY RECTAL
Status: DISPENSED
Start: 2024-10-01

## (undated) RX ORDER — FENTANYL CITRATE 50 UG/ML
INJECTION, SOLUTION INTRAMUSCULAR; INTRAVENOUS
Status: DISPENSED
Start: 2022-06-16

## (undated) RX ORDER — LABETALOL HYDROCHLORIDE 5 MG/ML
INJECTION, SOLUTION INTRAVENOUS
Status: DISPENSED
Start: 2022-10-13

## (undated) RX ORDER — ACETAMINOPHEN 325 MG/1
TABLET ORAL
Status: DISPENSED
Start: 2022-10-13

## (undated) RX ORDER — ONDANSETRON 2 MG/ML
INJECTION INTRAMUSCULAR; INTRAVENOUS
Status: DISPENSED
Start: 2024-10-01

## (undated) RX ORDER — FENTANYL CITRATE 50 UG/ML
INJECTION, SOLUTION INTRAMUSCULAR; INTRAVENOUS
Status: DISPENSED
Start: 2022-05-21

## (undated) RX ORDER — HYDROMORPHONE HCL IN WATER/PF 6 MG/30 ML
PATIENT CONTROLLED ANALGESIA SYRINGE INTRAVENOUS
Status: DISPENSED
Start: 2022-11-21

## (undated) RX ORDER — PROPOFOL 10 MG/ML
INJECTION, EMULSION INTRAVENOUS
Status: DISPENSED
Start: 2022-02-18

## (undated) RX ORDER — PROPOFOL 10 MG/ML
INJECTION, EMULSION INTRAVENOUS
Status: DISPENSED
Start: 2022-06-16

## (undated) RX ORDER — WATER 10 ML/10ML
INJECTION INTRAMUSCULAR; INTRAVENOUS; SUBCUTANEOUS
Status: DISPENSED
Start: 2022-10-13

## (undated) RX ORDER — EPHEDRINE SULFATE 50 MG/ML
INJECTION, SOLUTION INTRAMUSCULAR; INTRAVENOUS; SUBCUTANEOUS
Status: DISPENSED
Start: 2024-10-01

## (undated) RX ORDER — KETOROLAC TROMETHAMINE 15 MG/ML
INJECTION, SOLUTION INTRAMUSCULAR; INTRAVENOUS
Status: DISPENSED
Start: 2022-02-18

## (undated) RX ORDER — HYDROXYZINE HYDROCHLORIDE 25 MG/1
TABLET, FILM COATED ORAL
Status: DISPENSED
Start: 2024-08-20

## (undated) RX ORDER — FENTANYL CITRATE 50 UG/ML
INJECTION, SOLUTION INTRAMUSCULAR; INTRAVENOUS
Status: DISPENSED
Start: 2022-11-21

## (undated) RX ORDER — EPHEDRINE SULFATE 50 MG/ML
INJECTION, SOLUTION INTRAMUSCULAR; INTRAVENOUS; SUBCUTANEOUS
Status: DISPENSED
Start: 2022-11-21

## (undated) RX ORDER — EPHEDRINE SULFATE 50 MG/ML
INJECTION, SOLUTION INTRAMUSCULAR; INTRAVENOUS; SUBCUTANEOUS
Status: DISPENSED
Start: 2022-10-13

## (undated) RX ORDER — CEFAZOLIN SODIUM/WATER 2 G/20 ML
SYRINGE (ML) INTRAVENOUS
Status: DISPENSED
Start: 2022-02-18

## (undated) RX ORDER — FENTANYL CITRATE 50 UG/ML
INJECTION, SOLUTION INTRAMUSCULAR; INTRAVENOUS
Status: DISPENSED
Start: 2023-03-30

## (undated) RX ORDER — DEXAMETHASONE SODIUM PHOSPHATE 4 MG/ML
INJECTION, SOLUTION INTRA-ARTICULAR; INTRALESIONAL; INTRAMUSCULAR; INTRAVENOUS; SOFT TISSUE
Status: DISPENSED
Start: 2023-01-24

## (undated) RX ORDER — DEXAMETHASONE SODIUM PHOSPHATE 4 MG/ML
INJECTION, SOLUTION INTRA-ARTICULAR; INTRALESIONAL; INTRAMUSCULAR; INTRAVENOUS; SOFT TISSUE
Status: DISPENSED
Start: 2024-08-20

## (undated) RX ORDER — FENTANYL CITRATE-0.9 % NACL/PF 10 MCG/ML
PLASTIC BAG, INJECTION (ML) INTRAVENOUS
Status: DISPENSED
Start: 2022-02-18

## (undated) RX ORDER — ALBUTEROL SULFATE 90 UG/1
AEROSOL, METERED RESPIRATORY (INHALATION)
Status: DISPENSED
Start: 2022-10-13

## (undated) RX ORDER — ONDANSETRON 2 MG/ML
INJECTION INTRAMUSCULAR; INTRAVENOUS
Status: DISPENSED
Start: 2022-05-12

## (undated) RX ORDER — IOPAMIDOL 510 MG/ML
INJECTION, SOLUTION INTRAVASCULAR
Status: DISPENSED
Start: 2024-10-01

## (undated) RX ORDER — LIDOCAINE HYDROCHLORIDE 20 MG/ML
INJECTION, SOLUTION EPIDURAL; INFILTRATION; INTRACAUDAL; PERINEURAL
Status: DISPENSED
Start: 2022-06-16

## (undated) RX ORDER — LIDOCAINE HYDROCHLORIDE 20 MG/ML
INJECTION, SOLUTION EPIDURAL; INFILTRATION; INTRACAUDAL; PERINEURAL
Status: DISPENSED
Start: 2022-10-13

## (undated) RX ORDER — FENTANYL CITRATE-0.9 % NACL/PF 10 MCG/ML
PLASTIC BAG, INJECTION (ML) INTRAVENOUS
Status: DISPENSED
Start: 2024-08-20

## (undated) RX ORDER — LIDOCAINE HYDROCHLORIDE AND EPINEPHRINE 10; 10 MG/ML; UG/ML
INJECTION, SOLUTION INFILTRATION; PERINEURAL
Status: DISPENSED
Start: 2022-02-18

## (undated) RX ORDER — LIDOCAINE HYDROCHLORIDE 10 MG/ML
INJECTION, SOLUTION EPIDURAL; INFILTRATION; INTRACAUDAL; PERINEURAL
Status: DISPENSED
Start: 2022-05-12

## (undated) RX ORDER — PROPOFOL 10 MG/ML
INJECTION, EMULSION INTRAVENOUS
Status: DISPENSED
Start: 2022-10-13

## (undated) RX ORDER — ROCURONIUM BROMIDE 50 MG/5 ML
SYRINGE (ML) INTRAVENOUS
Status: DISPENSED
Start: 2022-05-12

## (undated) RX ORDER — DEXAMETHASONE SODIUM PHOSPHATE 4 MG/ML
INJECTION, SOLUTION INTRA-ARTICULAR; INTRALESIONAL; INTRAMUSCULAR; INTRAVENOUS; SOFT TISSUE
Status: DISPENSED
Start: 2022-05-12

## (undated) RX ORDER — OXYCODONE HYDROCHLORIDE 10 MG/1
TABLET ORAL
Status: DISPENSED
Start: 2023-01-24

## (undated) RX ORDER — ONDANSETRON 2 MG/ML
INJECTION INTRAMUSCULAR; INTRAVENOUS
Status: DISPENSED
Start: 2022-10-13

## (undated) RX ORDER — CIPROFLOXACIN 2 MG/ML
INJECTION, SOLUTION INTRAVENOUS
Status: DISPENSED
Start: 2022-10-13

## (undated) RX ORDER — ONDANSETRON 2 MG/ML
INJECTION INTRAMUSCULAR; INTRAVENOUS
Status: DISPENSED
Start: 2023-03-30

## (undated) RX ORDER — FENTANYL CITRATE 50 UG/ML
INJECTION, SOLUTION INTRAMUSCULAR; INTRAVENOUS
Status: DISPENSED
Start: 2024-10-01

## (undated) RX ORDER — HYDROMORPHONE HCL IN WATER/PF 6 MG/30 ML
PATIENT CONTROLLED ANALGESIA SYRINGE INTRAVENOUS
Status: DISPENSED
Start: 2022-06-16

## (undated) RX ORDER — FENTANYL CITRATE 50 UG/ML
INJECTION, SOLUTION INTRAMUSCULAR; INTRAVENOUS
Status: DISPENSED
Start: 2022-10-13

## (undated) RX ORDER — FENTANYL CITRATE 50 UG/ML
INJECTION, SOLUTION INTRAMUSCULAR; INTRAVENOUS
Status: DISPENSED
Start: 2023-01-24

## (undated) RX ORDER — HYDROMORPHONE HYDROCHLORIDE 1 MG/ML
INJECTION, SOLUTION INTRAMUSCULAR; INTRAVENOUS; SUBCUTANEOUS
Status: DISPENSED
Start: 2023-01-24

## (undated) RX ORDER — ONDANSETRON 2 MG/ML
INJECTION INTRAMUSCULAR; INTRAVENOUS
Status: DISPENSED
Start: 2024-08-20

## (undated) RX ORDER — FENTANYL CITRATE 50 UG/ML
INJECTION, SOLUTION INTRAMUSCULAR; INTRAVENOUS
Status: DISPENSED
Start: 2022-05-12

## (undated) RX ORDER — PROPOFOL 10 MG/ML
INJECTION, EMULSION INTRAVENOUS
Status: DISPENSED
Start: 2022-05-12

## (undated) RX ORDER — PROPOFOL 10 MG/ML
INJECTION, EMULSION INTRAVENOUS
Status: DISPENSED
Start: 2023-01-24

## (undated) RX ORDER — LIDOCAINE HYDROCHLORIDE 10 MG/ML
INJECTION, SOLUTION EPIDURAL; INFILTRATION; INTRACAUDAL; PERINEURAL
Status: DISPENSED
Start: 2022-05-21

## (undated) RX ORDER — GLYCOPYRROLATE 0.2 MG/ML
INJECTION, SOLUTION INTRAMUSCULAR; INTRAVENOUS
Status: DISPENSED
Start: 2022-05-12

## (undated) RX ORDER — OXYCODONE HYDROCHLORIDE 5 MG/1
TABLET ORAL
Status: DISPENSED
Start: 2022-11-21

## (undated) RX ORDER — OXYCODONE HYDROCHLORIDE 5 MG/1
TABLET ORAL
Status: DISPENSED
Start: 2023-03-30

## (undated) RX ORDER — OXYCODONE HYDROCHLORIDE 5 MG/1
TABLET ORAL
Status: DISPENSED
Start: 2022-02-18

## (undated) RX ORDER — SODIUM CHLORIDE, SODIUM LACTATE, POTASSIUM CHLORIDE, CALCIUM CHLORIDE 600; 310; 30; 20 MG/100ML; MG/100ML; MG/100ML; MG/100ML
INJECTION, SOLUTION INTRAVENOUS
Status: DISPENSED
Start: 2022-11-21

## (undated) RX ORDER — CEFAZOLIN SODIUM 1 G/3ML
INJECTION, POWDER, FOR SOLUTION INTRAMUSCULAR; INTRAVENOUS
Status: DISPENSED
Start: 2022-10-13

## (undated) RX ORDER — HYDROMORPHONE HCL IN WATER/PF 6 MG/30 ML
PATIENT CONTROLLED ANALGESIA SYRINGE INTRAVENOUS
Status: DISPENSED
Start: 2023-03-30

## (undated) RX ORDER — GLYCOPYRROLATE 0.2 MG/ML
INJECTION, SOLUTION INTRAMUSCULAR; INTRAVENOUS
Status: DISPENSED
Start: 2022-10-13

## (undated) RX ORDER — ONDANSETRON 2 MG/ML
INJECTION INTRAMUSCULAR; INTRAVENOUS
Status: DISPENSED
Start: 2022-06-16

## (undated) RX ORDER — LEVOFLOXACIN 5 MG/ML
INJECTION, SOLUTION INTRAVENOUS
Status: DISPENSED
Start: 2022-05-12

## (undated) RX ORDER — FENTANYL CITRATE-0.9 % NACL/PF 10 MCG/ML
PLASTIC BAG, INJECTION (ML) INTRAVENOUS
Status: DISPENSED
Start: 2022-05-12

## (undated) RX ORDER — FENTANYL CITRATE-0.9 % NACL/PF 10 MCG/ML
PLASTIC BAG, INJECTION (ML) INTRAVENOUS
Status: DISPENSED
Start: 2022-10-13

## (undated) RX ORDER — CALCIUM CHLORIDE 100 MG/ML
INJECTION INTRAVENOUS; INTRAVENTRICULAR
Status: DISPENSED
Start: 2022-10-13

## (undated) RX ORDER — LEVOFLOXACIN 5 MG/ML
INJECTION, SOLUTION INTRAVENOUS
Status: DISPENSED
Start: 2022-06-16

## (undated) RX ORDER — OXYCODONE HYDROCHLORIDE 5 MG/1
TABLET ORAL
Status: DISPENSED
Start: 2024-10-01

## (undated) RX ORDER — LEVOFLOXACIN 5 MG/ML
INJECTION, SOLUTION INTRAVENOUS
Status: DISPENSED
Start: 2022-10-13

## (undated) RX ORDER — FENTANYL CITRATE 50 UG/ML
INJECTION, SOLUTION INTRAMUSCULAR; INTRAVENOUS
Status: DISPENSED
Start: 2024-08-20

## (undated) RX ORDER — TRANEXAMIC ACID 10 MG/ML
INJECTION, SOLUTION INTRAVENOUS
Status: DISPENSED
Start: 2022-02-18